# Patient Record
Sex: FEMALE | Race: WHITE | Employment: OTHER | URBAN - METROPOLITAN AREA
[De-identification: names, ages, dates, MRNs, and addresses within clinical notes are randomized per-mention and may not be internally consistent; named-entity substitution may affect disease eponyms.]

---

## 2017-01-10 ENCOUNTER — TELEPHONE (OUTPATIENT)
Dept: SURGERY | Age: 62
End: 2017-01-10

## 2017-01-10 DIAGNOSIS — Z85.3 PERSONAL HISTORY OF BREAST CANCER: Primary | ICD-10-CM

## 2017-02-09 ENCOUNTER — HOSPITAL ENCOUNTER (OUTPATIENT)
Dept: MAMMOGRAPHY | Age: 62
Discharge: HOME | End: 2017-02-09
Attending: SPECIALIST | Admitting: SPECIALIST

## 2017-02-09 ENCOUNTER — OFFICE VISIT (OUTPATIENT)
Dept: SURGERY | Age: 62
End: 2017-02-09

## 2017-02-09 VITALS
BODY MASS INDEX: 47.09 KG/M2 | HEIGHT: 66 IN | WEIGHT: 293 LBS | SYSTOLIC BLOOD PRESSURE: 118 MMHG | DIASTOLIC BLOOD PRESSURE: 82 MMHG

## 2017-02-09 DIAGNOSIS — Z85.3 PERSONAL HISTORY OF BREAST CANCER: ICD-10-CM

## 2017-02-09 DIAGNOSIS — Z85.3 PERSONAL HISTORY OF BREAST CANCER: Primary | ICD-10-CM

## 2017-02-09 PROCEDURE — 99213 OFFICE O/P EST LOW 20 MIN: CPT | Performed by: SPECIALIST

## 2017-02-09 ASSESSMENT — ENCOUNTER SYMPTOMS
VOMITING: 0
DIARRHEA: 0

## 2017-03-14 ENCOUNTER — EMPLOYEE WELLNESS (OUTPATIENT)
Dept: OTHER | Age: 62
End: 2017-03-14

## 2017-04-20 ENCOUNTER — OFFICE VISIT (OUTPATIENT)
Dept: URGENT CARE | Age: 62
End: 2017-04-20

## 2017-04-20 VITALS
WEIGHT: 293 LBS | DIASTOLIC BLOOD PRESSURE: 98 MMHG | TEMPERATURE: 97.6 F | RESPIRATION RATE: 16 BRPM | SYSTOLIC BLOOD PRESSURE: 146 MMHG | HEART RATE: 66 BPM | OXYGEN SATURATION: 92 % | BODY MASS INDEX: 47.09 KG/M2 | HEIGHT: 66 IN

## 2017-04-20 DIAGNOSIS — M25.562 ACUTE PAIN OF LEFT KNEE: Primary | ICD-10-CM

## 2017-04-20 PROCEDURE — 99203 OFFICE O/P NEW LOW 30 MIN: CPT | Performed by: EMERGENCY MEDICINE

## 2017-04-20 RX ORDER — NAPROXEN 500 MG/1
500 TABLET ORAL 2 TIMES DAILY PRN
Qty: 20 TABLET | Refills: 0 | Status: SHIPPED | OUTPATIENT
Start: 2017-04-20 | End: 2017-12-15

## 2017-04-20 RX ORDER — IBUPROFEN 200 MG
200 TABLET ORAL EVERY 6 HOURS PRN
COMMUNITY
End: 2017-12-15

## 2017-04-20 ASSESSMENT — ENCOUNTER SYMPTOMS: COLOR CHANGE: 0

## 2017-05-12 ENCOUNTER — OFFICE VISIT (OUTPATIENT)
Dept: PRIMARY CARE CLINIC | Age: 62
End: 2017-05-12

## 2017-05-12 VITALS
WEIGHT: 287 LBS | DIASTOLIC BLOOD PRESSURE: 98 MMHG | SYSTOLIC BLOOD PRESSURE: 140 MMHG | TEMPERATURE: 98.2 F | BODY MASS INDEX: 46.32 KG/M2

## 2017-05-12 DIAGNOSIS — A08.4 VIRAL GASTROENTERITIS: ICD-10-CM

## 2017-05-12 DIAGNOSIS — N39.41 URGE INCONTINENCE: ICD-10-CM

## 2017-05-12 DIAGNOSIS — I10 BENIGN ESSENTIAL HTN: Primary | ICD-10-CM

## 2017-05-12 PROCEDURE — 99214 OFFICE O/P EST MOD 30 MIN: CPT | Performed by: INTERNAL MEDICINE

## 2017-05-12 RX ORDER — HYDROCHLOROTHIAZIDE 25 MG/1
TABLET ORAL
Qty: 30 TABLET | Refills: 1 | Status: SHIPPED | OUTPATIENT
Start: 2017-05-12 | End: 2017-09-22

## 2017-05-12 RX ORDER — OXYBUTYNIN CHLORIDE 10 MG/1
10 TABLET, EXTENDED RELEASE ORAL DAILY
Qty: 30 TABLET | Refills: 1 | Status: SHIPPED | OUTPATIENT
Start: 2017-05-12 | End: 2017-06-22 | Stop reason: SDUPTHER

## 2017-05-26 ENCOUNTER — OFFICE VISIT (OUTPATIENT)
Dept: PRIMARY CARE CLINIC | Age: 62
End: 2017-05-26

## 2017-05-26 DIAGNOSIS — N39.41 URGE INCONTINENCE: ICD-10-CM

## 2017-05-26 DIAGNOSIS — I10 BENIGN ESSENTIAL HTN: Primary | ICD-10-CM

## 2017-05-26 PROCEDURE — 99214 OFFICE O/P EST MOD 30 MIN: CPT | Performed by: INTERNAL MEDICINE

## 2017-05-26 RX ORDER — HYDROCHLOROTHIAZIDE 50 MG/1
50 TABLET ORAL DAILY
Qty: 90 TABLET | Refills: 3
Start: 2017-05-26 | End: 2017-06-12 | Stop reason: SDUPTHER

## 2017-05-27 VITALS
BODY MASS INDEX: 46.32 KG/M2 | WEIGHT: 287 LBS | HEART RATE: 64 BPM | DIASTOLIC BLOOD PRESSURE: 90 MMHG | SYSTOLIC BLOOD PRESSURE: 140 MMHG

## 2017-06-12 ENCOUNTER — TELEPHONE (OUTPATIENT)
Dept: PRIMARY CARE CLINIC | Age: 62
End: 2017-06-12

## 2017-06-12 RX ORDER — HYDROCHLOROTHIAZIDE 50 MG/1
50 TABLET ORAL DAILY
Qty: 90 TABLET | Refills: 3 | Status: SHIPPED | OUTPATIENT
Start: 2017-06-12 | End: 2017-09-22 | Stop reason: SDUPTHER

## 2017-06-22 RX ORDER — OXYBUTYNIN CHLORIDE 10 MG/1
10 TABLET, EXTENDED RELEASE ORAL DAILY
Qty: 90 TABLET | Refills: 3 | Status: SHIPPED | OUTPATIENT
Start: 2017-06-22 | End: 2017-12-15

## 2017-08-24 ENCOUNTER — OFFICE VISIT (OUTPATIENT)
Dept: ORTHOPEDIC SURGERY | Age: 62
End: 2017-08-24

## 2017-08-24 VITALS
HEIGHT: 66 IN | HEART RATE: 63 BPM | DIASTOLIC BLOOD PRESSURE: 81 MMHG | BODY MASS INDEX: 45 KG/M2 | SYSTOLIC BLOOD PRESSURE: 132 MMHG | WEIGHT: 280 LBS

## 2017-08-24 DIAGNOSIS — M25.561 RIGHT KNEE PAIN, UNSPECIFIED CHRONICITY: Primary | ICD-10-CM

## 2017-08-24 DIAGNOSIS — M25.562 LEFT KNEE PAIN, UNSPECIFIED CHRONICITY: ICD-10-CM

## 2017-08-24 PROCEDURE — 73562 X-RAY EXAM OF KNEE 3: CPT | Performed by: ORTHOPAEDIC SURGERY

## 2017-08-24 PROCEDURE — 99203 OFFICE O/P NEW LOW 30 MIN: CPT | Performed by: ORTHOPAEDIC SURGERY

## 2017-08-24 PROCEDURE — 20610 DRAIN/INJ JOINT/BURSA W/O US: CPT | Performed by: ORTHOPAEDIC SURGERY

## 2017-08-24 RX ORDER — MELOXICAM 7.5 MG/1
7.5 TABLET ORAL DAILY
Qty: 30 TABLET | Refills: 3 | Status: SHIPPED | OUTPATIENT
Start: 2017-08-24 | End: 2017-12-15 | Stop reason: SDUPTHER

## 2017-08-25 ENCOUNTER — CLINICAL DOCUMENTATION (OUTPATIENT)
Dept: URGENT CARE | Age: 62
End: 2017-08-25

## 2017-08-25 LAB
ANION GAP SERPL CALCULATED.3IONS-SCNC: 16 MMOL/L (ref 3–16)
BUN BLDV-MCNC: 21 MG/DL (ref 7–20)
CALCIUM SERPL-MCNC: 10.5 MG/DL (ref 8.3–10.6)
CHLORIDE BLD-SCNC: 98 MMOL/L (ref 99–110)
CO2: 28 MMOL/L (ref 21–32)
CREAT SERPL-MCNC: 0.7 MG/DL (ref 0.6–1.2)
GFR AFRICAN AMERICAN: >60
GFR NON-AFRICAN AMERICAN: >60
GLUCOSE BLD-MCNC: 152 MG/DL (ref 70–99)
POTASSIUM SERPL-SCNC: 4.5 MMOL/L (ref 3.5–5.1)
SODIUM BLD-SCNC: 142 MMOL/L (ref 136–145)

## 2017-08-28 ENCOUNTER — TELEPHONE (OUTPATIENT)
Dept: PRIMARY CARE CLINIC | Age: 62
End: 2017-08-28

## 2017-08-28 DIAGNOSIS — R73.9 HYPERGLYCEMIA: Primary | ICD-10-CM

## 2017-08-31 ENCOUNTER — TELEPHONE (OUTPATIENT)
Dept: PRIMARY CARE CLINIC | Age: 62
End: 2017-08-31

## 2017-09-01 ENCOUNTER — HOSPITAL ENCOUNTER (OUTPATIENT)
Dept: PHYSICAL THERAPY | Age: 62
Discharge: HOME | End: 2017-09-30
Admitting: ORTHOPAEDIC SURGERY

## 2017-09-01 ENCOUNTER — HOSPITAL ENCOUNTER (OUTPATIENT)
Dept: PHYSICAL THERAPY | Age: 62
Discharge: HOME | End: 2017-08-31
Admitting: ORTHOPAEDIC SURGERY

## 2017-09-08 ENCOUNTER — HOSPITAL ENCOUNTER (OUTPATIENT)
Dept: PHYSICAL THERAPY | Age: 62
Discharge: HOME | End: 2017-09-08
Admitting: ORTHOPAEDIC SURGERY

## 2017-09-21 LAB
ANION GAP SERPL CALCULATED.3IONS-SCNC: 12 MMOL/L (ref 3–16)
BUN BLDV-MCNC: 19 MG/DL (ref 7–20)
CALCIUM SERPL-MCNC: 9.4 MG/DL (ref 8.3–10.6)
CHLORIDE BLD-SCNC: 101 MMOL/L (ref 99–110)
CO2: 28 MMOL/L (ref 21–32)
CREAT SERPL-MCNC: 0.8 MG/DL (ref 0.6–1.2)
GFR AFRICAN AMERICAN: >60
GFR NON-AFRICAN AMERICAN: >60
GLUCOSE BLD-MCNC: 102 MG/DL (ref 70–99)
POTASSIUM SERPL-SCNC: 4.1 MMOL/L (ref 3.5–5.1)
SODIUM BLD-SCNC: 141 MMOL/L (ref 136–145)

## 2017-09-22 ENCOUNTER — OFFICE VISIT (OUTPATIENT)
Dept: PRIMARY CARE CLINIC | Age: 62
End: 2017-09-22

## 2017-09-22 VITALS — DIASTOLIC BLOOD PRESSURE: 82 MMHG | SYSTOLIC BLOOD PRESSURE: 122 MMHG | WEIGHT: 288.6 LBS | BODY MASS INDEX: 46.58 KG/M2

## 2017-09-22 DIAGNOSIS — R73.03 PREDIABETES: Primary | ICD-10-CM

## 2017-09-22 DIAGNOSIS — E66.01 MORBID OBESITY WITH BMI OF 45.0-49.9, ADULT (HCC): ICD-10-CM

## 2017-09-22 DIAGNOSIS — Z23 NEED FOR INFLUENZA VACCINATION: ICD-10-CM

## 2017-09-22 LAB
ESTIMATED AVERAGE GLUCOSE: 131.2 MG/DL
HBA1C MFR BLD: 6.2 %

## 2017-09-22 PROCEDURE — 90686 IIV4 VACC NO PRSV 0.5 ML IM: CPT | Performed by: INTERNAL MEDICINE

## 2017-09-22 PROCEDURE — 99214 OFFICE O/P EST MOD 30 MIN: CPT | Performed by: INTERNAL MEDICINE

## 2017-09-22 PROCEDURE — 90471 IMMUNIZATION ADMIN: CPT | Performed by: INTERNAL MEDICINE

## 2017-09-22 RX ORDER — HYDROCHLOROTHIAZIDE 50 MG/1
50 TABLET ORAL DAILY
Qty: 90 TABLET | Refills: 3 | Status: SHIPPED | OUTPATIENT
Start: 2017-09-22 | End: 2017-12-15 | Stop reason: SDUPTHER

## 2017-09-22 RX ORDER — HYDROCHLOROTHIAZIDE 50 MG/1
50 TABLET ORAL DAILY
Qty: 30 TABLET | Refills: 3 | Status: SHIPPED | OUTPATIENT
Start: 2017-09-22 | End: 2017-09-22 | Stop reason: SDUPTHER

## 2017-09-22 RX ORDER — HYDROCHLOROTHIAZIDE 50 MG/1
50 TABLET ORAL DAILY
Qty: 90 TABLET | Refills: 3 | Status: SHIPPED | OUTPATIENT
Start: 2017-09-22 | End: 2017-09-22 | Stop reason: SDUPTHER

## 2017-09-29 ENCOUNTER — HOSPITAL ENCOUNTER (OUTPATIENT)
Dept: PHYSICAL THERAPY | Age: 62
Discharge: HOME | End: 2017-09-29
Admitting: ORTHOPAEDIC SURGERY

## 2017-09-29 NOTE — FLOWSHEET NOTE
Denies any soreness or pain after last session. Reports compliance with HEP without any c/o pain or discomfort. OBJECTIVE:       LEFS: 80/80 or 0% deficit    Flexibility L R Comment   Hamstring WFL WFL     Gastroc WFL WFL     ITB/Mary POS WFL     Quad/Ely POS 90 deg POS 90 deg                            ROM PROM AROM Overpressure Comment     L R L R L R     Flexion     118 123         Extension     +2 +2               Strength L R Comment   Quad 4+ 4+     Hamstring 4+ 4+     Gastroc 4+ 4+     Hip flexion 4 4     Hip abd 4 4     Hip add 4 4     Hip ext 4 4           Special Test Results/Comment   Meniscal Click pos   Crepitus pos        Palpation: no ttp     Functional Mobility/Transfers:                                          Squat: 3/4 ROM with good form                        SLB: 20\"      RESTRICTIONS/PRECAUTIONS:     Exercises/Interventions:   Exercise/Equipment Resistance/Repetitions Other comments   Stretching     Hamstring     Towel Pull     Inclined Calf     Hip Flexion     ITB 3x30\"    Groin     Quad 3x30\" Prone        SLR     Supine     Abduction     Adduction     Prone     SLR+ 5x45\"         Isometrics     Quad sets          Patellar Glides     Medial     Superior     Inferior          ROM     Sheet Pulls     Hang Weights     Passive     Active     Weight Shift     Ankle Pumps                    CKC        Wall sits     Step ups     1 leg stand     Squatting     CC TKE        Tandem Stance 3x30\" Airex   Non latex S/L   TB Bridges Black, x45 Non Latex   TB side stepping Blue, x5         PRE     Extension  RANGE: 90-30   Flexion  RANGE:        Quantum machines     SL   SL, 90/40   Leg curl          Manual interventions                 Therapeutic Exercise and NMR EXR  [x] (63864) Provided verbal/tactile cueing for activities related to strengthening, flexibility, endurance, ROM for improvements in LE, proximal hip, and core control with self care, mobility, lifting, ambulation.   [x] (45785) Provided (typically 30 minutes face-to-face)  [] EVAL (HIGH) 28581 (typically 45 minutes face-to-face)  [] RE-EVAL   [x] RY(27466) x  2   [] IONTO  [x] NMR (51870) x  1   [] VASO  [] Manual (98669) x       [] Other:  [x] TA x  1    [] Mech Traction (88117)  [] ES(attended) (06370)      [] ES (un) (63723):     GOALS:   Patient stated goal: No pain with walking in community     Therapist goals for Patient:   Short Term Goals: To be achieved in: 2 weeks  1. Independent in HEP and progression per patient tolerance, in order to prevent re-injury. MET  2. Patient will have a decrease in pain to facilitate improvement in movement, function, and ADL as indicated by Functional Deficits. MET     Long Term Goals: To be achieved in: 12 weeks  1. Disability index score of 10% or less for the LEFS to assist with reaching prior level of function. MET  2. Patient will demonstrate increased AROM to 115 to allow for proper joint functioning as indicated by patients Functional Deficits. MET  3. Patient will demonstrate an increase in Strength to good proximal hip strength and control in LE to allow for proper functional mobility as indicated by patients Functional Deficits. MET  4. Patient will return to negotiating stairs independently and reciprocally without increased symptoms or restriction. MET  5. Patient will balance SL for > 15\" without increased symptoms or restriction. MET    Progression Towards Functional goals:  [x] Patient is progressing as expected towards functional goals listed. [] Progression is slowed due to complexities listed. [] Progression has been slowed due to co-morbidities. [] Plan just implemented, too soon to assess goals progression  [] Other:     ASSESSMENT:  Pt tolerated session well, without pain and good carryover from last session. Pt has met all goals and displays a strong understanding of her exercises and progressions.   PT updated HEP and reviewed with pt in regard to progression and implementation; pt verbalized understanding with all questions answered. Skilled therapy is no longer indicated and pt is now discharged from care. Pt to continue independently with HEP and call PT with any questions or concerns. Treatment/Activity Tolerance:  [x] Patient tolerated treatment well [] Patient limited by fatique  [] Patient limited by pain  [] Patient limited by other medical complications  [] Other:     Patient education: Pt reviewed HEP and POC with pt; pt agreed with all questions answered. Pt to call PT with any questions    Prognosis: [x] Good [] Fair  [] Poor    Patient Requires Follow-up: [] Yes  [x] No    PLAN: See eval  [] Continue per plan of care [] Alter current plan (see comments)  [] Plan of care initiated [] Hold pending MD visit [x] Discharge    Electronically signed by: Aarti Heaton PT, DPT      Caesar Kothari. JUSTINE SandovalT, [id number]  Physical Therapist  [email or web address]

## 2017-10-09 RX ORDER — MONTELUKAST SODIUM 10 MG/1
TABLET ORAL
Qty: 90 TABLET | Refills: 1 | Status: SHIPPED | OUTPATIENT
Start: 2017-10-09 | End: 2017-12-15 | Stop reason: SDUPTHER

## 2017-12-14 ENCOUNTER — OFFICE VISIT (OUTPATIENT)
Dept: GYNECOLOGY | Age: 62
End: 2017-12-14

## 2017-12-14 VITALS
WEIGHT: 291 LBS | HEIGHT: 66 IN | SYSTOLIC BLOOD PRESSURE: 147 MMHG | RESPIRATION RATE: 17 BRPM | BODY MASS INDEX: 46.77 KG/M2 | DIASTOLIC BLOOD PRESSURE: 91 MMHG

## 2017-12-14 DIAGNOSIS — Z01.419 WELL WOMAN EXAM WITH ROUTINE GYNECOLOGICAL EXAM: Primary | ICD-10-CM

## 2017-12-14 DIAGNOSIS — Z78.0 MENOPAUSE: ICD-10-CM

## 2017-12-14 PROCEDURE — 99396 PREV VISIT EST AGE 40-64: CPT | Performed by: OBSTETRICS & GYNECOLOGY

## 2017-12-15 ENCOUNTER — OFFICE VISIT (OUTPATIENT)
Dept: PRIMARY CARE CLINIC | Age: 62
End: 2017-12-15

## 2017-12-15 ENCOUNTER — HOSPITAL ENCOUNTER (OUTPATIENT)
Dept: OTHER | Age: 62
Discharge: HOME | End: 2017-12-15
Attending: INTERNAL MEDICINE | Admitting: INTERNAL MEDICINE

## 2017-12-15 VITALS
WEIGHT: 291.4 LBS | SYSTOLIC BLOOD PRESSURE: 122 MMHG | HEIGHT: 66 IN | DIASTOLIC BLOOD PRESSURE: 74 MMHG | BODY MASS INDEX: 46.83 KG/M2

## 2017-12-15 DIAGNOSIS — M79.672 INTRACTABLE LEFT HEEL PAIN: ICD-10-CM

## 2017-12-15 DIAGNOSIS — Z00.00 ROUTINE PHYSICAL EXAMINATION: Primary | ICD-10-CM

## 2017-12-15 DIAGNOSIS — E66.01 MORBID OBESITY WITH BMI OF 45.0-49.9, ADULT (HCC): ICD-10-CM

## 2017-12-15 DIAGNOSIS — L20.84 INTRINSIC ECZEMA: ICD-10-CM

## 2017-12-15 DIAGNOSIS — I10 BENIGN ESSENTIAL HTN: ICD-10-CM

## 2017-12-15 DIAGNOSIS — E03.9 ACQUIRED HYPOTHYROIDISM: Chronic | ICD-10-CM

## 2017-12-15 LAB
A/G RATIO: 2 (ref 1.1–2.2)
ALBUMIN SERPL-MCNC: 4.7 G/DL (ref 3.4–5)
ALP BLD-CCNC: 56 U/L (ref 40–129)
ALT SERPL-CCNC: 32 U/L (ref 10–40)
ANION GAP SERPL CALCULATED.3IONS-SCNC: 13 MMOL/L (ref 3–16)
AST SERPL-CCNC: 25 U/L (ref 15–37)
BILIRUB SERPL-MCNC: 0.6 MG/DL (ref 0–1)
BUN BLDV-MCNC: 20 MG/DL (ref 7–20)
CALCIUM SERPL-MCNC: 9.8 MG/DL (ref 8.3–10.6)
CHLORIDE BLD-SCNC: 99 MMOL/L (ref 99–110)
CHOLESTEROL, TOTAL: 213 MG/DL (ref 0–199)
CO2: 31 MMOL/L (ref 21–32)
CREAT SERPL-MCNC: 0.9 MG/DL (ref 0.6–1.2)
CREATININE URINE: 74.7 MG/DL (ref 28–259)
GFR AFRICAN AMERICAN: >60
GFR NON-AFRICAN AMERICAN: >60
GLOBULIN: 2.4 G/DL
GLUCOSE BLD-MCNC: 126 MG/DL (ref 70–99)
HDLC SERPL-MCNC: 50 MG/DL (ref 40–60)
LDL CHOLESTEROL CALCULATED: 124 MG/DL
MICROALBUMIN UR-MCNC: <1.2 MG/DL
MICROALBUMIN/CREAT UR-RTO: NORMAL MG/G (ref 0–30)
POTASSIUM SERPL-SCNC: 3.9 MMOL/L (ref 3.5–5.1)
SODIUM BLD-SCNC: 143 MMOL/L (ref 136–145)
TOTAL PROTEIN: 7.1 G/DL (ref 6.4–8.2)
TRIGL SERPL-MCNC: 197 MG/DL (ref 0–150)
TSH SERPL DL<=0.05 MIU/L-ACNC: 4.16 UIU/ML (ref 0.27–4.2)
VLDLC SERPL CALC-MCNC: 39 MG/DL

## 2017-12-15 PROCEDURE — 99396 PREV VISIT EST AGE 40-64: CPT | Performed by: INTERNAL MEDICINE

## 2017-12-15 RX ORDER — MELOXICAM 7.5 MG/1
7.5 TABLET ORAL DAILY
Qty: 90 TABLET | Refills: 2 | Status: SHIPPED | OUTPATIENT
Start: 2017-12-15 | End: 2018-09-11 | Stop reason: SDUPTHER

## 2017-12-15 RX ORDER — LEVOTHYROXINE SODIUM 0.12 MG/1
TABLET ORAL
Qty: 90 TABLET | Refills: 3 | Status: SHIPPED | OUTPATIENT
Start: 2017-12-15 | End: 2018-03-23 | Stop reason: SDUPTHER

## 2017-12-15 RX ORDER — HYDROCHLOROTHIAZIDE 50 MG/1
50 TABLET ORAL DAILY
Qty: 90 TABLET | Refills: 2 | Status: SHIPPED | OUTPATIENT
Start: 2017-12-15 | End: 2018-03-23 | Stop reason: SDUPTHER

## 2017-12-15 RX ORDER — FLUTICASONE PROPIONATE 50 MCG
SPRAY, SUSPENSION (ML) NASAL
Qty: 3 BOTTLE | Refills: 2 | Status: SHIPPED | OUTPATIENT
Start: 2017-12-15 | End: 2018-09-11 | Stop reason: SDUPTHER

## 2017-12-15 RX ORDER — OMEPRAZOLE 40 MG/1
CAPSULE, DELAYED RELEASE ORAL
Qty: 45 CAPSULE | Refills: 2 | Status: SHIPPED | OUTPATIENT
Start: 2017-12-15 | End: 2018-03-23 | Stop reason: SDUPTHER

## 2017-12-15 RX ORDER — AZELASTINE 1 MG/ML
2 SPRAY, METERED NASAL 2 TIMES DAILY
Qty: 3 BOTTLE | Refills: 2 | Status: SHIPPED | OUTPATIENT
Start: 2017-12-15 | End: 2018-09-11 | Stop reason: SDUPTHER

## 2017-12-15 RX ORDER — CITALOPRAM 40 MG/1
TABLET ORAL
Qty: 90 TABLET | Refills: 2 | Status: SHIPPED | OUTPATIENT
Start: 2017-12-15 | End: 2018-03-23 | Stop reason: SDUPTHER

## 2017-12-15 RX ORDER — OXYBUTYNIN CHLORIDE 15 MG/1
15 TABLET, EXTENDED RELEASE ORAL DAILY
Qty: 90 TABLET | Refills: 2 | Status: SHIPPED | OUTPATIENT
Start: 2017-12-15 | End: 2018-06-22

## 2017-12-15 RX ORDER — MONTELUKAST SODIUM 10 MG/1
TABLET ORAL
Qty: 90 TABLET | Refills: 2 | Status: SHIPPED | OUTPATIENT
Start: 2017-12-15 | End: 2018-03-23 | Stop reason: SDUPTHER

## 2017-12-15 NOTE — PROGRESS NOTES
Vitamin D (CHOLECALCIFEROL) 1000 UNITS CAPS capsule Take 1,000 Units by mouth daily.  multivitamin (THERAGRAN) per tablet Take 1 tablet by mouth daily.          Past Medical History:   Diagnosis Date    Allergic     Allergic rhinitis     Aortic stenosis, mild-echo 2016    BMI 40.0-44.9, adult (HCC)     [facility] [address]     Cancer ([facility] [address]     right breast    Depression     GERD (gastroesophageal reflux disease)     H/O laparoscopic adjustable gastric banding 10/27/2016    Heart palpitations     HPV test positive 2014    Hypertension     Hypothyroidism 2000    Mild aortic regurgitation-echo 2016    Mild dysplasia of cervix 2016    Obesity     Sleep apnea     Urge incontinence 2017     Past Surgical History:   Procedure Laterality Date    APPENDECTOMY      BREAST BIOPSY      BREAST LUMPECTOMY      BREAST SURGERY  2009    right lumpectomy xrt and chemo and serm     SECTION  ,,,    failed to drop in birth canal     SECTION      CHOLECYSTECTOMY      COLONOSCOPY      HYSTERECTOMY      LAP BAND      LAP BAND      Removal    OVARY REMOVAL      PARTIAL HYSTERECTOMY      THYROIDECTOMY, PARTIAL Left     TUNNELED VENOUS PORT PLACEMENT      Placed/Removed    UPPER GASTROINTESTINAL ENDOSCOPY  6/10/16    gastritis, gastric polyp bx      Family History   Problem Relation Age of Onset    Cancer Mother      breast and cervical    Heart Disease Father     Cancer Maternal Grandmother      breast    Cancer Paternal Grandmother      breast    No Known Problems Sister     No Known Problems Brother     No Known Problems Maternal Aunt     No Known Problems Maternal Uncle     No Known Problems Paternal Aunt     No Known Problems Paternal Uncle     No Known Problems Maternal Grandfather     No Known Problems Paternal Grandfather     No Known Problems Other     Asthma Neg Hx     Diabetes Neg Hx  Emphysema Neg Hx     Heart Failure Neg Hx     Hypertension Neg Hx     Rheum Arthritis Neg Hx     Osteoarthritis Neg Hx     Breast Cancer Neg Hx     High Cholesterol Neg Hx     Migraines Neg Hx     Ovarian Cancer Neg Hx     Rashes/Skin Problems Neg Hx     Seizures Neg Hx     Stroke Neg Hx     Thyroid Disease Neg Hx      Social History     Social History    Marital status:      Spouse name: N/A    Number of children: N/A    Years of education: N/A     Occupational History    [employer]      Social History Main Topics    Smoking status: Former Smoker     Packs/day: 0.25     Years: 1.50     Types: Cigarettes     Quit date: 3/27/1972    Smokeless tobacco: Never Used    Alcohol use 0.0 oz/week      Comment: occasionally    Drug use: No    Sexual activity: Yes     Partners: Male     Other Topics Concern    Not on file     Social History Narrative            Spends time with , grandchildren        Lives in [facility]               Review of Systems:  A comprehensive review of systems was negative except for what was noted in the HPI. Physical Exam:   Vitals:    12/15/17 0859   BP: 122/74   Weight: 291 lb 6.4 oz (132.2 kg)   Height: 5' 6\" (1.676 m)     Body mass index is 47.03 kg/m². Constitutional: She is oriented to person, place, and time. She appears well-developed and well-nourished. No distress. HEENT:   Head: Normocephalic and atraumatic. Right Ear: Tympanic membrane, external ear and ear canal normal.   Left Ear: Tympanic membrane, external ear and ear canal normal.   Nose: Nose normal.   Mouth/Throat: Oropharynx is clear and moist, and mucous membranes are normal.  There is no cervical adenopathy. Eyes: Conjunctivae and extraocular motions are normal. Pupils are equal, round, and reactive to light. Neck: Neck supple. No JVD present. Carotid bruit is not present. No mass and no thyromegaly present.    Cardiovascular: Normal rate, regular rhythm, normal heart sounds

## 2017-12-19 LAB
HPV COMMENT: ABNORMAL
HPV TYPE 16: DETECTED
HPV TYPE 18: NOT DETECTED
HPVOH (OTHER TYPES): NOT DETECTED

## 2017-12-20 ASSESSMENT — ENCOUNTER SYMPTOMS
RESPIRATORY NEGATIVE: 1
GASTROINTESTINAL NEGATIVE: 1
EYES NEGATIVE: 1

## 2017-12-21 NOTE — PROGRESS NOTES
CS-Unspec      2 Term 1980     CS-Unspec      1 Term 1     CS-Unspec           Social History     Social History    Marital status:      Spouse name: N/A    Number of children: N/A    Years of education: N/A     Occupational History    [employer]      Social History Main Topics    Smoking status: Former Smoker     Packs/day: 0.25     Years: 1.50     Types: Cigarettes     Quit date: 3/27/1972    Smokeless tobacco: Never Used    Alcohol use 0.0 oz/week      Comment: occasionally    Drug use: No    Sexual activity: Yes     Partners: Male     Other Topics Concern    Not on file     Social History Narrative            Spends time with , grandchildren        Lives in condo             Allergies   Allergen Reactions    Latex Rash    Iodine Shortness Of Breath     No outpatient prescriptions have been marked as taking for the 12/14/17 encounter (Office Visit) with Nimco Rivero MD.     Family History   Problem Relation Age of Onset    Cancer Mother      breast and cervical    Heart Disease Father     Cancer Maternal Grandmother      breast    Cancer Paternal Grandmother      breast    No Known Problems Sister     No Known Problems Brother     No Known Problems Maternal Aunt     No Known Problems Maternal Uncle     No Known Problems Paternal Aunt     No Known Problems Paternal Uncle     No Known Problems Maternal Grandfather     No Known Problems Paternal Grandfather     No Known Problems Other     Asthma Neg Hx     Diabetes Neg Hx     Emphysema Neg Hx     Heart Failure Neg Hx     Hypertension Neg Hx     Rheum Arthritis Neg Hx     Osteoarthritis Neg Hx     Breast Cancer Neg Hx     High Cholesterol Neg Hx     Migraines Neg Hx     Ovarian Cancer Neg Hx     Rashes/Skin Problems Neg Hx     Seizures Neg Hx     Stroke Neg Hx     Thyroid Disease Neg Hx      BP (!) 147/91 (Site: Left Arm, Position: Sitting, Cuff Size: Medium Adult)   Resp 17   Ht 5' 6\" (1.676 m)   Wt 291 lb (132 kg)   Breastfeeding? No   BMI 46.97 kg/m²       Objective:   Physical Exam   Constitutional: She is oriented to person, place, and time. She appears well-developed and well-nourished. No distress. HENT:   Head: Normocephalic and atraumatic. Eyes: EOM are normal. Pupils are equal, round, and reactive to light. Neck: Normal range of motion. Neck supple. No thyromegaly present. Cardiovascular: Normal rate, regular rhythm and normal heart sounds. Exam reveals no gallop and no friction rub. No murmur heard. Pulmonary/Chest: Effort normal and breath sounds normal. No respiratory distress. She has no wheezes. She has no rales. Abdominal: Soft. Bowel sounds are normal. She exhibits no distension and no mass. There is no hepatomegaly. There is no tenderness. There is no rebound and no guarding. No hernia. Genitourinary: Rectum normal, vagina normal and uterus normal. Rectal exam shows no external hemorrhoid, no internal hemorrhoid, no fissure, no mass, no tenderness and guaiac negative stool. No breast swelling, tenderness, discharge or bleeding. There is no rash, tenderness, lesion or injury on the right labia. There is no rash, tenderness, lesion or injury on the left labia. Cervix exhibits no motion tenderness, no discharge and no friability. Right adnexum displays no mass, no tenderness and no fullness. Left adnexum displays no mass, no tenderness and no fullness. No erythema, tenderness or bleeding in the vagina. No foreign body in the vagina. No signs of injury around the vagina. No vaginal discharge found. Genitourinary Comments: Normal urethral meatus, nl urethra, nl bladder. Musculoskeletal: Normal range of motion. She exhibits no edema or tenderness. Lymphadenopathy:     She has no cervical adenopathy. Right: No inguinal adenopathy present. Left: No inguinal adenopathy present. Neurological: She is alert and oriented to person, place, and time.  She has normal reflexes. Skin: Skin is warm and dry. No rash noted. She is not diaphoretic. No erythema. Psychiatric: She has a normal mood and affect. Her behavior is normal. Judgment and thought content normal.       Assessment:      1. Annual  2. Menopause  3. Hx lgsil      Plan:      1. Pap, calcium, exercise, mammogram, hemocult negative  2. Stable  3.  pap

## 2018-01-05 ENCOUNTER — OFFICE VISIT (OUTPATIENT)
Dept: SLEEP MEDICINE | Age: 63
End: 2018-01-05

## 2018-01-05 VITALS
DIASTOLIC BLOOD PRESSURE: 66 MMHG | HEART RATE: 88 BPM | HEIGHT: 66 IN | BODY MASS INDEX: 46.28 KG/M2 | OXYGEN SATURATION: 95 % | SYSTOLIC BLOOD PRESSURE: 129 MMHG | WEIGHT: 288 LBS

## 2018-01-05 DIAGNOSIS — K21.9 CHRONIC GERD: Chronic | ICD-10-CM

## 2018-01-05 DIAGNOSIS — E03.9 ACQUIRED HYPOTHYROIDISM: Chronic | ICD-10-CM

## 2018-01-05 DIAGNOSIS — E66.01 MORBID OBESITY, UNSPECIFIED OBESITY TYPE (HCC): Chronic | ICD-10-CM

## 2018-01-05 DIAGNOSIS — G47.33 OBSTRUCTIVE SLEEP APNEA: Primary | Chronic | ICD-10-CM

## 2018-01-05 DIAGNOSIS — I10 BENIGN ESSENTIAL HTN: Chronic | ICD-10-CM

## 2018-01-05 PROCEDURE — 99214 OFFICE O/P EST MOD 30 MIN: CPT | Performed by: INTERNAL MEDICINE

## 2018-01-05 ASSESSMENT — ENCOUNTER SYMPTOMS
APNEA: 0
SHORTNESS OF BREATH: 0
RHINORRHEA: 0
COUGH: 0
CHOKING: 0

## 2018-01-05 ASSESSMENT — SLEEP AND FATIGUE QUESTIONNAIRES
HOW LIKELY ARE YOU TO NOD OFF OR FALL ASLEEP WHILE SITTING AND READING: 2
HOW LIKELY ARE YOU TO NOD OFF OR FALL ASLEEP WHEN YOU ARE A PASSENGER IN A CAR FOR AN HOUR WITHOUT A BREAK: 1
HOW LIKELY ARE YOU TO NOD OFF OR FALL ASLEEP WHILE SITTING INACTIVE IN A PUBLIC PLACE: 1
HOW LIKELY ARE YOU TO NOD OFF OR FALL ASLEEP WHILE WATCHING TV: 2
HOW LIKELY ARE YOU TO NOD OFF OR FALL ASLEEP WHILE SITTING AND TALKING TO SOMEONE: 0
ESS TOTAL SCORE: 9
HOW LIKELY ARE YOU TO NOD OFF OR FALL ASLEEP WHILE LYING DOWN TO REST IN THE AFTERNOON WHEN CIRCUMSTANCES PERMIT: 3
HOW LIKELY ARE YOU TO NOD OFF OR FALL ASLEEP IN A CAR, WHILE STOPPED FOR A FEW MINUTES IN TRAFFIC: 0
HOW LIKELY ARE YOU TO NOD OFF OR FALL ASLEEP WHILE SITTING QUIETLY AFTER LUNCH WITHOUT ALCOHOL: 0

## 2018-02-14 ENCOUNTER — TELEPHONE (OUTPATIENT)
Dept: BARIATRICS/WEIGHT MGMT | Age: 63
End: 2018-02-14

## 2018-02-14 NOTE — TELEPHONE ENCOUNTER
Called as a new pt courtesy call - spoke w patients  - he said he will let her know we called and if she needs help she can call us. Did receive paperwork - told patient to have new pt paperwork completely filled out, insurance card, and id and to arrive 45min prior to appt time. Also told not to wear any oil, lotion, or powder.   If they didn't have the paperwork filled out and arrive on time may be rescheduled

## 2018-02-27 ENCOUNTER — TELEPHONE (OUTPATIENT)
Dept: BARIATRICS/WEIGHT MGMT | Age: 63
End: 2018-02-27

## 2018-03-20 VITALS — WEIGHT: 188 LBS | BODY MASS INDEX: 30.34 KG/M2

## 2018-03-23 ENCOUNTER — OFFICE VISIT (OUTPATIENT)
Dept: PRIMARY CARE CLINIC | Age: 63
End: 2018-03-23

## 2018-03-23 VITALS
TEMPERATURE: 98.3 F | DIASTOLIC BLOOD PRESSURE: 80 MMHG | BODY MASS INDEX: 45.68 KG/M2 | WEIGHT: 283 LBS | SYSTOLIC BLOOD PRESSURE: 118 MMHG

## 2018-03-23 DIAGNOSIS — R35.89 POLYURIA: Primary | ICD-10-CM

## 2018-03-23 DIAGNOSIS — I10 BENIGN ESSENTIAL HTN: ICD-10-CM

## 2018-03-23 DIAGNOSIS — N39.41 URGE INCONTINENCE OF URINE: ICD-10-CM

## 2018-03-23 LAB
BILIRUBIN, POC: ABNORMAL
BLOOD URINE, POC: ABNORMAL
CLARITY, POC: ABNORMAL
COLOR, POC: ABNORMAL
GLUCOSE URINE, POC: ABNORMAL
KETONES, POC: ABNORMAL
LEUKOCYTE EST, POC: ABNORMAL
NITRITE, POC: ABNORMAL
PH, POC: 7
PROTEIN, POC: ABNORMAL
SPECIFIC GRAVITY, POC: 1.01
UROBILINOGEN, POC: 1

## 2018-03-23 PROCEDURE — 99214 OFFICE O/P EST MOD 30 MIN: CPT | Performed by: INTERNAL MEDICINE

## 2018-03-23 PROCEDURE — 81002 URINALYSIS NONAUTO W/O SCOPE: CPT | Performed by: INTERNAL MEDICINE

## 2018-03-23 RX ORDER — CITALOPRAM 40 MG/1
TABLET ORAL
Qty: 90 TABLET | Refills: 2 | Status: SHIPPED | OUTPATIENT
Start: 2018-03-23 | End: 2018-09-11 | Stop reason: SDUPTHER

## 2018-03-23 RX ORDER — OMEPRAZOLE 40 MG/1
CAPSULE, DELAYED RELEASE ORAL
Qty: 45 CAPSULE | Refills: 2 | Status: SHIPPED | OUTPATIENT
Start: 2018-03-23 | End: 2018-09-11 | Stop reason: SDUPTHER

## 2018-03-23 RX ORDER — MONTELUKAST SODIUM 10 MG/1
TABLET ORAL
Qty: 90 TABLET | Refills: 2 | Status: SHIPPED | OUTPATIENT
Start: 2018-03-23 | End: 2018-09-11 | Stop reason: SDUPTHER

## 2018-03-23 RX ORDER — HYDROCHLOROTHIAZIDE 50 MG/1
50 TABLET ORAL DAILY
Qty: 90 TABLET | Refills: 2 | Status: SHIPPED | OUTPATIENT
Start: 2018-03-23 | End: 2018-09-11 | Stop reason: SDUPTHER

## 2018-03-23 RX ORDER — LEVOTHYROXINE SODIUM 0.12 MG/1
TABLET ORAL
Qty: 90 TABLET | Refills: 3 | Status: SHIPPED | OUTPATIENT
Start: 2018-03-23 | End: 2018-09-11 | Stop reason: SDUPTHER

## 2018-03-23 NOTE — PROGRESS NOTES
Juarez Armendariz is a 60 y.o. female presenting today with c/o    Worsening urinary incontinence  Went through pad on chair yesterday at work  Ditropan at max dose did not help  Saw gyn  Trying Kegel's  Can laugh or sneeze with leakage of urine  Does not strain to urinate  Avoids caffeine and alcohol  Awakens and has to urinate over night  Using bathroom hourly at least during the day  Has had problem for several years, but now worsening    HYPERTENSION. Also here for f/u HTN. She indicates that she is feeling well and denies any symptoms referable to her elevated blood pressure. Specifically denies chest pain, palpitations, dyspnea, orthopnea, PND or peripheral edema. No anorexia, arthralgia, or leg cramps noted. Current medication regimen is as listed below. She denies any side effects of medication, and has been taking it regularly.  Patient does not exercise regularly  Needs refills medication    BMI 39  Joined [email or web address] online  Has lost 8#    Review of Systems - comprehensive review of systems negative except as noted in HPI    Allergies   Allergen Reactions    Latex Rash    Iodine Shortness Of Breath       Past Medical History:   Diagnosis Date    Allergic     Allergic rhinitis     Aortic stenosis, mild-echo 2016    BMI 40.0-44.9, adult ([address]     [facility] ([facility] [address]     Cancer ([facility] [address]     right breast    Depression     GERD (gastroesophageal reflux disease)     H/O laparoscopic adjustable gastric banding 10/27/2016    Heart palpitations     HPV test positive 2014    Hypertension     Hypothyroidism 2000    Mild aortic regurgitation-echo 2016    Mild dysplasia of cervix 2016    Obesity     Sleep apnea     Urge incontinence 2017       Past Surgical History:   Procedure Laterality Date    APPENDECTOMY      BREAST BIOPSY      BREAST LUMPECTOMY      BREAST SURGERY  2009    right lumpectomy xrt and chemo and serm     SECTION

## 2018-04-17 ENCOUNTER — EMPLOYEE WELLNESS (OUTPATIENT)
Dept: OTHER | Age: 63
End: 2018-04-17

## 2018-04-17 LAB
CHOLESTEROL, TOTAL: 197 MG/DL (ref 0–199)
GLUCOSE BLD-MCNC: 117 MG/DL (ref 70–99)
HDLC SERPL-MCNC: 44 MG/DL (ref 40–60)
LDL CHOLESTEROL CALCULATED: 124 MG/DL
TRIGL SERPL-MCNC: 144 MG/DL (ref 0–150)

## 2018-04-23 VITALS — BODY MASS INDEX: 44.55 KG/M2 | WEIGHT: 276 LBS

## 2018-06-11 ENCOUNTER — OFFICE VISIT (OUTPATIENT)
Dept: GYNECOLOGY | Age: 63
End: 2018-06-11

## 2018-06-11 VITALS
TEMPERATURE: 97.3 F | HEART RATE: 59 BPM | BODY MASS INDEX: 44.52 KG/M2 | SYSTOLIC BLOOD PRESSURE: 131 MMHG | WEIGHT: 277 LBS | DIASTOLIC BLOOD PRESSURE: 78 MMHG | RESPIRATION RATE: 17 BRPM | HEIGHT: 66 IN

## 2018-06-11 DIAGNOSIS — R87.811 VAGINAL HIGH RISK HPV DNA TEST POSITIVE: Primary | ICD-10-CM

## 2018-06-11 DIAGNOSIS — Z12.31 SCREENING MAMMOGRAM, ENCOUNTER FOR: ICD-10-CM

## 2018-06-11 PROCEDURE — 99213 OFFICE O/P EST LOW 20 MIN: CPT | Performed by: OBSTETRICS & GYNECOLOGY

## 2018-06-14 LAB
ANION GAP SERPL CALCULATED.3IONS-SCNC: 10 MMOL/L (ref 3–16)
BUN BLDV-MCNC: 21 MG/DL (ref 7–20)
CALCIUM SERPL-MCNC: 9.7 MG/DL (ref 8.3–10.6)
CHLORIDE BLD-SCNC: 100 MMOL/L (ref 99–110)
CO2: 31 MMOL/L (ref 21–32)
CREAT SERPL-MCNC: 0.9 MG/DL (ref 0.6–1.2)
ESTIMATED AVERAGE GLUCOSE: 131.2 MG/DL
GFR AFRICAN AMERICAN: >60
GFR NON-AFRICAN AMERICAN: >60
GLUCOSE BLD-MCNC: 100 MG/DL (ref 70–99)
HBA1C MFR BLD: 6.2 %
HPV COMMENT: ABNORMAL
HPV TYPE 16: DETECTED
HPV TYPE 18: NOT DETECTED
HPVOH (OTHER TYPES): NOT DETECTED
POTASSIUM SERPL-SCNC: 3.9 MMOL/L (ref 3.5–5.1)
SODIUM BLD-SCNC: 141 MMOL/L (ref 136–145)

## 2018-06-22 ENCOUNTER — OFFICE VISIT (OUTPATIENT)
Dept: PRIMARY CARE CLINIC | Age: 63
End: 2018-06-22

## 2018-06-22 VITALS
RESPIRATION RATE: 17 BRPM | WEIGHT: 270.8 LBS | DIASTOLIC BLOOD PRESSURE: 82 MMHG | SYSTOLIC BLOOD PRESSURE: 119 MMHG | HEIGHT: 66 IN | BODY MASS INDEX: 43.52 KG/M2

## 2018-06-22 DIAGNOSIS — I10 BENIGN ESSENTIAL HTN: Primary | ICD-10-CM

## 2018-06-22 DIAGNOSIS — E66.01 SEVERE OBESITY (BMI >= 40) (HCC): ICD-10-CM

## 2018-06-22 DIAGNOSIS — L98.9 SKIN LESION: ICD-10-CM

## 2018-06-22 PROCEDURE — 99214 OFFICE O/P EST MOD 30 MIN: CPT | Performed by: INTERNAL MEDICINE

## 2018-06-22 RX ORDER — TRIAMCINOLONE ACETONIDE 1 MG/ML
LOTION TOPICAL
Qty: 60 ML | Refills: 3 | Status: ON HOLD | OUTPATIENT
Start: 2018-06-22 | End: 2019-07-12 | Stop reason: HOSPADM

## 2018-07-24 NOTE — TELEPHONE ENCOUNTER
From: Dania Roberts  Sent: 7/24/2018 8:31 AM EDT  Subject: Medication Renewal Request    Dania Roberts would like a refill of the following medications:     Mirabegron ER (MYRBETRIQ) 50 MG TB24 [SELAM CLEVELAND MD]   Patient Comment: I tried to get a refill for this medication when I was out of town last week but when the [facility] in [facility] tried it was declined so now I need a refill. [facility] Thank You.     Preferred pharmacy: [address], [address] [facility] [phone] - F [phone]

## 2018-09-11 RX ORDER — FLUTICASONE PROPIONATE 50 MCG
SPRAY, SUSPENSION (ML) NASAL
Qty: 3 BOTTLE | Refills: 2 | Status: SHIPPED | OUTPATIENT
Start: 2018-09-11 | End: 2018-11-15 | Stop reason: SDUPTHER

## 2018-09-11 RX ORDER — OMEPRAZOLE 40 MG/1
CAPSULE, DELAYED RELEASE ORAL
Qty: 45 CAPSULE | Refills: 2 | Status: SHIPPED | OUTPATIENT
Start: 2018-09-11 | End: 2018-09-13 | Stop reason: SDUPTHER

## 2018-09-11 RX ORDER — HYDROCHLOROTHIAZIDE 50 MG/1
50 TABLET ORAL DAILY
Qty: 90 TABLET | Refills: 2 | Status: SHIPPED | OUTPATIENT
Start: 2018-09-11 | End: 2018-11-15 | Stop reason: SDUPTHER

## 2018-09-11 RX ORDER — LEVOTHYROXINE SODIUM 0.12 MG/1
TABLET ORAL
Qty: 90 TABLET | Refills: 3 | Status: SHIPPED | OUTPATIENT
Start: 2018-09-11 | End: 2018-11-16 | Stop reason: SDUPTHER

## 2018-09-11 RX ORDER — CITALOPRAM 40 MG/1
TABLET ORAL
Qty: 90 TABLET | Refills: 2 | Status: SHIPPED | OUTPATIENT
Start: 2018-09-11 | End: 2018-11-15 | Stop reason: SDUPTHER

## 2018-09-11 RX ORDER — MELOXICAM 7.5 MG/1
7.5 TABLET ORAL DAILY
Qty: 90 TABLET | Refills: 2 | Status: SHIPPED | OUTPATIENT
Start: 2018-09-11 | End: 2018-11-15 | Stop reason: SDUPTHER

## 2018-09-11 RX ORDER — MONTELUKAST SODIUM 10 MG/1
TABLET ORAL
Qty: 90 TABLET | Refills: 2 | Status: SHIPPED | OUTPATIENT
Start: 2018-09-11 | End: 2018-11-15 | Stop reason: SDUPTHER

## 2018-09-11 RX ORDER — AZELASTINE 1 MG/ML
2 SPRAY, METERED NASAL 2 TIMES DAILY
Qty: 3 BOTTLE | Refills: 2 | Status: SHIPPED | OUTPATIENT
Start: 2018-09-11 | End: 2018-11-15 | Stop reason: SDUPTHER

## 2018-09-11 NOTE — TELEPHONE ENCOUNTER
From: Panfilo Vitale  Sent: 9/11/2018 7:04 AM EDT  Subject: Medication Renewal Request    Panfilo Vitale would like a refill of the following medications:     meloxicam (MOBIC) 7.5 MG tablet [SELAM CLEVELAND MD]     azelastine (ASTELIN) 0.1 % nasal spray [SELAM CLEVELAND MD]     fluticasone (FLONASE) 50 MCG/ACT nasal spray [SELAM CLEVELAND MD]     montelukast (SINGULAIR) 10 MG tablet [SELAM CLEVELAND MD]     hydrochlorothiazide (HYDRODIURIL) 50 MG tablet [SELAM CLEVELAND MD]     citalopram (CELEXA) 40 MG tablet [SELAM CLEVELAND MD]     omeprazole (PRILOSEC) 40 MG delayed release capsule [SELAM CLEVELAND MD]     levothyroxine (SYNTHROID) 125 MCG tablet [SELAM Charles MD]     Mirabegron ER (MYRBETRIQ) 50 MG TB24 [SELAM Charles MD]    Preferred pharmacy: [address] [facility] [address], [address] [facility] [phone] - F [phone]

## 2018-09-12 ENCOUNTER — TELEPHONE (OUTPATIENT)
Dept: PRIMARY CARE CLINIC | Age: 63
End: 2018-09-12

## 2018-09-13 RX ORDER — OMEPRAZOLE 40 MG/1
CAPSULE, DELAYED RELEASE ORAL
Qty: 90 CAPSULE | Refills: 2 | Status: SHIPPED | OUTPATIENT
Start: 2018-09-13 | End: 2018-09-19 | Stop reason: SDUPTHER

## 2018-09-19 ENCOUNTER — TELEPHONE (OUTPATIENT)
Dept: PRIMARY CARE CLINIC | Age: 63
End: 2018-09-19

## 2018-09-19 RX ORDER — OMEPRAZOLE 40 MG/1
CAPSULE, DELAYED RELEASE ORAL
Qty: 90 CAPSULE | Refills: 3 | Status: SHIPPED | OUTPATIENT
Start: 2018-09-19 | End: 2018-11-15 | Stop reason: SDUPTHER

## 2018-10-03 ENCOUNTER — OFFICE VISIT (OUTPATIENT)
Dept: PRIMARY CARE CLINIC | Age: 63
End: 2018-10-03
Payer: COMMERCIAL

## 2018-10-03 VITALS
WEIGHT: 272.8 LBS | HEART RATE: 69 BPM | OXYGEN SATURATION: 96 % | BODY MASS INDEX: 43.84 KG/M2 | DIASTOLIC BLOOD PRESSURE: 82 MMHG | RESPIRATION RATE: 16 BRPM | HEIGHT: 66 IN | TEMPERATURE: 97.5 F | SYSTOLIC BLOOD PRESSURE: 136 MMHG

## 2018-10-03 DIAGNOSIS — K11.20 PAROTITIS: Primary | ICD-10-CM

## 2018-10-03 PROCEDURE — 99214 OFFICE O/P EST MOD 30 MIN: CPT | Performed by: EMERGENCY MEDICINE

## 2018-10-03 RX ORDER — CETIRIZINE HYDROCHLORIDE, PSEUDOEPHEDRINE HYDROCHLORIDE 5; 120 MG/1; MG/1
1 TABLET, FILM COATED, EXTENDED RELEASE ORAL 2 TIMES DAILY
Status: ON HOLD | COMMUNITY
End: 2019-07-12 | Stop reason: HOSPADM

## 2018-10-03 RX ORDER — AMOXICILLIN 500 MG/1
500 CAPSULE ORAL 3 TIMES DAILY
Qty: 30 CAPSULE | Refills: 0 | Status: SHIPPED | OUTPATIENT
Start: 2018-10-03 | End: 2018-10-13

## 2018-10-03 ASSESSMENT — ENCOUNTER SYMPTOMS
VOMITING: 0
EYES NEGATIVE: 1
FACIAL SWELLING: 1
SORE THROAT: 0
ABDOMINAL PAIN: 0
NAUSEA: 1
COUGH: 0
TROUBLE SWALLOWING: 0
RHINORRHEA: 0
SHORTNESS OF BREATH: 0

## 2018-10-13 ENCOUNTER — HOSPITAL ENCOUNTER (OUTPATIENT)
Dept: MAMMOGRAPHY | Age: 63
Discharge: HOME | End: 2018-10-13
Payer: COMMERCIAL

## 2018-10-13 DIAGNOSIS — Z12.39 SCREENING BREAST EXAMINATION: ICD-10-CM

## 2018-10-13 PROCEDURE — 77067 SCR MAMMO BI INCL CAD: CPT

## 2018-11-15 ENCOUNTER — OFFICE VISIT (OUTPATIENT)
Dept: PRIMARY CARE CLINIC | Age: 63
End: 2018-11-15
Payer: COMMERCIAL

## 2018-11-15 VITALS — SYSTOLIC BLOOD PRESSURE: 120 MMHG | BODY MASS INDEX: 45.84 KG/M2 | DIASTOLIC BLOOD PRESSURE: 70 MMHG | WEIGHT: 284 LBS

## 2018-11-15 DIAGNOSIS — I10 BENIGN ESSENTIAL HTN: Primary | ICD-10-CM

## 2018-11-15 DIAGNOSIS — E03.9 ACQUIRED HYPOTHYROIDISM: Chronic | ICD-10-CM

## 2018-11-15 DIAGNOSIS — E66.01 SEVERE OBESITY (BMI >= 40) (HCC): ICD-10-CM

## 2018-11-15 DIAGNOSIS — Z23 NEED FOR SHINGLES VACCINE: ICD-10-CM

## 2018-11-15 LAB
CREATININE URINE: 70.1 MG/DL (ref 28–259)
MICROALBUMIN UR-MCNC: <1.2 MG/DL
MICROALBUMIN/CREAT UR-RTO: NORMAL MG/G (ref 0–30)
TSH SERPL DL<=0.05 MIU/L-ACNC: 3.52 UIU/ML (ref 0.27–4.2)

## 2018-11-15 PROCEDURE — 90471 IMMUNIZATION ADMIN: CPT | Performed by: INTERNAL MEDICINE

## 2018-11-15 PROCEDURE — 90750 HZV VACC RECOMBINANT IM: CPT | Performed by: INTERNAL MEDICINE

## 2018-11-15 PROCEDURE — 99214 OFFICE O/P EST MOD 30 MIN: CPT | Performed by: INTERNAL MEDICINE

## 2018-11-15 RX ORDER — FLUTICASONE PROPIONATE 50 MCG
SPRAY, SUSPENSION (ML) NASAL
Qty: 3 BOTTLE | Refills: 2 | Status: SHIPPED | OUTPATIENT
Start: 2018-11-15 | End: 2019-12-05 | Stop reason: SDUPTHER

## 2018-11-15 RX ORDER — HYDROCHLOROTHIAZIDE 50 MG/1
50 TABLET ORAL DAILY
Qty: 90 TABLET | Refills: 2 | Status: ON HOLD | OUTPATIENT
Start: 2018-11-15 | End: 2019-07-12 | Stop reason: HOSPADM

## 2018-11-15 RX ORDER — CITALOPRAM 40 MG/1
TABLET ORAL
Qty: 90 TABLET | Refills: 2 | Status: SHIPPED | OUTPATIENT
Start: 2018-11-15 | End: 2019-11-11 | Stop reason: SDUPTHER

## 2018-11-15 RX ORDER — OMEPRAZOLE 40 MG/1
CAPSULE, DELAYED RELEASE ORAL
Qty: 90 CAPSULE | Refills: 3 | Status: SHIPPED | OUTPATIENT
Start: 2018-11-15 | End: 2019-07-29

## 2018-11-15 RX ORDER — MONTELUKAST SODIUM 10 MG/1
TABLET ORAL
Qty: 90 TABLET | Refills: 2 | Status: SHIPPED | OUTPATIENT
Start: 2018-11-15 | End: 2020-01-08 | Stop reason: SDUPTHER

## 2018-11-15 RX ORDER — AZELASTINE 1 MG/ML
2 SPRAY, METERED NASAL 2 TIMES DAILY
Qty: 3 BOTTLE | Refills: 2 | Status: ON HOLD | OUTPATIENT
Start: 2018-11-15 | End: 2019-07-12 | Stop reason: HOSPADM

## 2018-11-15 RX ORDER — MELOXICAM 7.5 MG/1
7.5 TABLET ORAL DAILY
Qty: 90 TABLET | Refills: 2 | Status: SHIPPED | OUTPATIENT
Start: 2018-11-15 | End: 2020-01-28 | Stop reason: ALTCHOICE

## 2018-11-15 ASSESSMENT — PATIENT HEALTH QUESTIONNAIRE - PHQ9
SUM OF ALL RESPONSES TO PHQ9 QUESTIONS 1 & 2: 0
SUM OF ALL RESPONSES TO PHQ QUESTIONS 1-9: 0
2. FEELING DOWN, DEPRESSED OR HOPELESS: 0
1. LITTLE INTEREST OR PLEASURE IN DOING THINGS: 0
SUM OF ALL RESPONSES TO PHQ QUESTIONS 1-9: 0

## 2018-11-15 NOTE — PROGRESS NOTES
Vj Ceja is a 58 y.o. female presenting today with c/o    HYPERTENSION. here for f/u HTN. She indicates that she is feeling well and denies any symptoms referable to her elevated blood pressure. Specifically denies chest pain, palpitations, dyspnea, orthopnea, PND or peripheral edema. No anorexia, arthralgia, or leg cramps noted. Current medication regimen is as listed below. She denies any side effects of medication, and has been taking it regularly. Patient does not exercise regularly    BMI 45  Has gained 14 # of weight she lost  No longer doing weight watchers  No exercise currently  Walking dog some    Thyroid: Patient presents for f/u  of hypothyroidism. Current symptoms include none. Patient  denies fatigue, weight changes, heat/cold intolerance, bowel/skin changes or CVS symptoms. Patient taking synthroid as prescribed.   Review of Systems - comprehensive review of systems negative except as noted in HPI    Allergies   Allergen Reactions    Latex Rash    Iodine Shortness Of Breath       Past Medical History:   Diagnosis Date    Allergic     Allergic rhinitis     Aortic stenosis, mild-echo 2016    BMI 40.0-44.9, adult ([address]     [facility] ([facility] [address]     Cancer ([facility] [address]     right breast    Depression     GERD (gastroesophageal reflux disease)     H/O laparoscopic adjustable gastric banding 10/27/2016    Heart palpitations     HPV test positive 2014    Hypertension     Hypothyroidism 2000    Mild aortic regurgitation-echo 2016    Mild dysplasia of cervix 2016    Obesity     Sleep apnea     Urge incontinence 2017       Past Surgical History:   Procedure Laterality Date    APPENDECTOMY      BREAST BIOPSY      BREAST LUMPECTOMY      BREAST SURGERY  2009    right lumpectomy xrt and chemo and serm     SECTION  ,,,    failed to drop in birth canal     SECTION      CHOLECYSTECTOMY      COLONOSCOPY     

## 2018-11-16 RX ORDER — LEVOTHYROXINE SODIUM 0.12 MG/1
TABLET ORAL
Qty: 90 TABLET | Refills: 3 | Status: SHIPPED | OUTPATIENT
Start: 2018-11-16 | End: 2020-01-29 | Stop reason: SDUPTHER

## 2018-12-17 ENCOUNTER — OFFICE VISIT (OUTPATIENT)
Dept: GYNECOLOGY | Age: 63
End: 2018-12-17
Payer: COMMERCIAL

## 2018-12-17 VITALS
HEART RATE: 80 BPM | HEIGHT: 67 IN | BODY MASS INDEX: 43.07 KG/M2 | WEIGHT: 274.38 LBS | SYSTOLIC BLOOD PRESSURE: 98 MMHG | DIASTOLIC BLOOD PRESSURE: 66 MMHG | RESPIRATION RATE: 17 BRPM

## 2018-12-17 DIAGNOSIS — Z85.3 HISTORY OF BREAST CANCER IN FEMALE: ICD-10-CM

## 2018-12-17 DIAGNOSIS — Z78.0 MENOPAUSE: ICD-10-CM

## 2018-12-17 DIAGNOSIS — R87.811 VAGINAL HIGH RISK HUMAN PAPILLOMAVIRUS (HPV) DNA TEST POSITIVE: ICD-10-CM

## 2018-12-17 DIAGNOSIS — Z01.419 WELL WOMAN EXAM WITH ROUTINE GYNECOLOGICAL EXAM: Primary | ICD-10-CM

## 2018-12-17 PROCEDURE — 99396 PREV VISIT EST AGE 40-64: CPT | Performed by: OBSTETRICS & GYNECOLOGY

## 2018-12-18 ASSESSMENT — ENCOUNTER SYMPTOMS
GASTROINTESTINAL NEGATIVE: 1
EYES NEGATIVE: 1
RESPIRATORY NEGATIVE: 1

## 2018-12-19 LAB
HPV COMMENT: NORMAL
HPV TYPE 16: NOT DETECTED
HPV TYPE 18: NOT DETECTED
HPVOH (OTHER TYPES): NOT DETECTED

## 2019-01-11 ENCOUNTER — OFFICE VISIT (OUTPATIENT)
Dept: PULMONOLOGY | Age: 64
End: 2019-01-11
Payer: COMMERCIAL

## 2019-01-11 VITALS
HEIGHT: 67 IN | BODY MASS INDEX: 43 KG/M2 | OXYGEN SATURATION: 98 % | HEART RATE: 79 BPM | DIASTOLIC BLOOD PRESSURE: 78 MMHG | SYSTOLIC BLOOD PRESSURE: 138 MMHG | WEIGHT: 274 LBS

## 2019-01-11 DIAGNOSIS — I10 ESSENTIAL HYPERTENSION: ICD-10-CM

## 2019-01-11 DIAGNOSIS — G47.33 OBSTRUCTIVE SLEEP APNEA SYNDROME: Primary | Chronic | ICD-10-CM

## 2019-01-11 DIAGNOSIS — E66.01 SEVERE OBESITY (BMI >= 40) (HCC): ICD-10-CM

## 2019-01-11 DIAGNOSIS — J30.9 ALLERGIC RHINITIS, UNSPECIFIED SEASONALITY, UNSPECIFIED TRIGGER: Chronic | ICD-10-CM

## 2019-01-11 DIAGNOSIS — K21.9 CHRONIC GERD: ICD-10-CM

## 2019-01-11 PROCEDURE — 99214 OFFICE O/P EST MOD 30 MIN: CPT | Performed by: NURSE PRACTITIONER

## 2019-01-11 ASSESSMENT — SLEEP AND FATIGUE QUESTIONNAIRES
HOW LIKELY ARE YOU TO NOD OFF OR FALL ASLEEP WHILE LYING DOWN TO REST IN THE AFTERNOON WHEN CIRCUMSTANCES PERMIT: 3
HOW LIKELY ARE YOU TO NOD OFF OR FALL ASLEEP IN A CAR, WHILE STOPPED FOR A FEW MINUTES IN TRAFFIC: 0
ESS TOTAL SCORE: 8
HOW LIKELY ARE YOU TO NOD OFF OR FALL ASLEEP WHILE SITTING QUIETLY AFTER LUNCH WITHOUT ALCOHOL: 1
HOW LIKELY ARE YOU TO NOD OFF OR FALL ASLEEP WHILE WATCHING TV: 1
HOW LIKELY ARE YOU TO NOD OFF OR FALL ASLEEP WHILE SITTING AND TALKING TO SOMEONE: 0
HOW LIKELY ARE YOU TO NOD OFF OR FALL ASLEEP WHEN YOU ARE A PASSENGER IN A CAR FOR AN HOUR WITHOUT A BREAK: 1
HOW LIKELY ARE YOU TO NOD OFF OR FALL ASLEEP WHILE SITTING INACTIVE IN A PUBLIC PLACE: 1
HOW LIKELY ARE YOU TO NOD OFF OR FALL ASLEEP WHILE SITTING AND READING: 1

## 2019-01-11 ASSESSMENT — ENCOUNTER SYMPTOMS
ABDOMINAL DISTENTION: 0
COUGH: 0
SINUS PRESSURE: 0
SHORTNESS OF BREATH: 0
APNEA: 0
ABDOMINAL PAIN: 0
EYE PAIN: 0
EYE REDNESS: 0
RHINORRHEA: 0

## 2019-01-22 ENCOUNTER — OFFICE VISIT (OUTPATIENT)
Dept: PRIMARY CARE CLINIC | Age: 64
End: 2019-01-22
Payer: COMMERCIAL

## 2019-01-22 VITALS
HEART RATE: 84 BPM | TEMPERATURE: 97.7 F | RESPIRATION RATE: 14 BRPM | SYSTOLIC BLOOD PRESSURE: 136 MMHG | DIASTOLIC BLOOD PRESSURE: 83 MMHG

## 2019-01-22 DIAGNOSIS — Z23 NEED FOR SHINGLES VACCINE: Primary | ICD-10-CM

## 2019-01-22 PROCEDURE — 99212 OFFICE O/P EST SF 10 MIN: CPT | Performed by: PHYSICIAN ASSISTANT

## 2019-03-04 ENCOUNTER — OFFICE VISIT (OUTPATIENT)
Dept: DERMATOLOGY | Age: 64
End: 2019-03-04
Payer: COMMERCIAL

## 2019-03-04 DIAGNOSIS — L85.3 XEROSIS CUTIS: ICD-10-CM

## 2019-03-04 DIAGNOSIS — L30.0 NUMMULAR ECZEMA: ICD-10-CM

## 2019-03-04 DIAGNOSIS — Z87.891 FORMER SMOKER: ICD-10-CM

## 2019-03-04 DIAGNOSIS — L82.1 SEBORRHEIC KERATOSES: ICD-10-CM

## 2019-03-04 DIAGNOSIS — L91.8 SKIN TAGS, MULTIPLE ACQUIRED: ICD-10-CM

## 2019-03-04 DIAGNOSIS — D48.9 NEOPLASM OF UNCERTAIN BEHAVIOR: Primary | ICD-10-CM

## 2019-03-04 DIAGNOSIS — D22.9 MULTIPLE BENIGN MELANOCYTIC NEVI: ICD-10-CM

## 2019-03-04 PROCEDURE — 11102 TANGNTL BX SKIN SINGLE LES: CPT | Performed by: DERMATOLOGY

## 2019-03-04 PROCEDURE — 99243 OFF/OP CNSLTJ NEW/EST LOW 30: CPT | Performed by: DERMATOLOGY

## 2019-03-07 LAB — DERMATOLOGY PATHOLOGY REPORT: ABNORMAL

## 2019-04-11 ENCOUNTER — PROCEDURE VISIT (OUTPATIENT)
Dept: DERMATOLOGY | Age: 64
End: 2019-04-11
Payer: COMMERCIAL

## 2019-04-11 VITALS
SYSTOLIC BLOOD PRESSURE: 119 MMHG | WEIGHT: 277.4 LBS | HEART RATE: 81 BPM | BODY MASS INDEX: 43.45 KG/M2 | DIASTOLIC BLOOD PRESSURE: 77 MMHG

## 2019-04-11 DIAGNOSIS — Z79.2 PROPHYLACTIC ANTIBIOTIC: ICD-10-CM

## 2019-04-11 DIAGNOSIS — D04.72 BOWEN'S DISEASE OF LOWER EXTREMITY, LEFT: Primary | ICD-10-CM

## 2019-04-11 PROCEDURE — 99999 PR OFFICE/OUTPT VISIT,PROCEDURE ONLY: CPT | Performed by: DERMATOLOGY

## 2019-04-11 PROCEDURE — 12032 INTMD RPR S/A/T/EXT 2.6-7.5: CPT | Performed by: DERMATOLOGY

## 2019-04-11 PROCEDURE — 11602 EXC TR-EXT MAL+MARG 1.1-2 CM: CPT | Performed by: DERMATOLOGY

## 2019-04-11 RX ORDER — DOXYCYCLINE HYCLATE 100 MG
TABLET ORAL
Qty: 20 TABLET | Refills: 0 | Status: SHIPPED | OUTPATIENT
Start: 2019-04-11 | End: 2019-07-11

## 2019-04-11 NOTE — PROGRESS NOTES
[facility]) Dermatology  [facility] Friday, [address] [address]     Akhil Brasher  1955    58 y.o. female     Date of Visit: 2019    Chief Complaint:   Chief Complaint   Patient presents with    Procedure     Excision: Left Proximal Pretibial Lower Extremity, Olmos's Ds- SCC        History of Present Illness:  Akhil Brasher is a 58 y.o. female who presents with the chief complaint of follow up for the followin. 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 No concerns since biopsy. Pacemaker/ICD: No  Joint prosthesis: No  Difficulty with numbing in the past: No  Local Anesthesia Reaction: No  Artificial Heart Valve: No  Organ Transplant: No  Immunosuppression: No  Bleeding/Clotting Disorders: No  Anticoagulant Therapy: yes, meloxicam        Review of Systems:  Constitutional: Reports general sense of well-being   Skin: No new or changing moles, no history of keloids or hypertrophic scars. Heme: No abnormal bruising or bleeding. Past Medical History, Family History, Surgical History, Medications and Allergies reviewed.       Family History   Problem Relation Age of Onset    Cancer Mother         breast and cervical    Heart Disease Father     Cancer Father 61        skin cancer    Cancer Maternal Grandmother         breast    Cancer Paternal Grandmother         breast    No Known Problems Sister     No Known Problems Brother     No Known Problems Maternal Aunt     No Known Problems Maternal Uncle     No Known Problems Paternal Aunt     No Known Problems Paternal Uncle     No Known Problems Maternal Grandfather     No Known Problems Paternal Grandfather     Cancer Other         neice- head, not sure what kind skin ca    Asthma Neg Hx     Diabetes Neg Hx     Emphysema Neg Hx     Heart Failure Neg Hx     Hypertension Neg Hx     Rheum Arthritis Neg Hx     Osteoarthritis Neg Hx     MCG tablet TAKE ONE TABLET BY MOUTH ONE TIME A DAY 90 tablet 3    omeprazole (PRILOSEC) 40 MG delayed release capsule TAKE ONE CAPSULE BY MOUTH ONE TIME A DAY 90 capsule 3    meloxicam (MOBIC) 7.5 MG tablet Take 1 tablet by mouth daily 90 tablet 2    azelastine (ASTELIN) 0.1 % nasal spray 2 sprays by Nasal route 2 times daily Use in each nostril as directed 3 Bottle 2    fluticasone (FLONASE) 50 MCG/ACT nasal spray 2 sprays each nostril daily 3 Bottle 2    montelukast (SINGULAIR) 10 MG tablet TAKE ONE TABLET BY MOUTH NIGHTLY 90 tablet 2    hydrochlorothiazide (HYDRODIURIL) 50 MG tablet Take 1 tablet by mouth daily 90 tablet 2    citalopram (CELEXA) 40 MG tablet TAKE ONE TABLET BY MOUTH ONE TIME A DAY 90 tablet 2    Mirabegron ER (MYRBETRIQ) 50 MG TB24 Take 50 mg by mouth daily 90 tablet 3    cetirizine-psuedoephedrine (ZYRTEC-D ALLERGY & CONGESTION) 5-120 MG per extended release tablet Take 1 tablet by mouth 2 times daily      cetirizine (ZYRTEC) 10 MG tablet Take 10 mg by mouth daily      dextromethorphan-guaiFENesin (MUCINEX DM)  MG per SR tablet Take 1 tablet by mouth every 12 hours as needed      hyoscyamine (ANASPAZ;LEVSIN) 0.125 MG tablet Take 0.125 mg by mouth as needed.  multivitamin (THERAGRAN) per tablet Take 1 tablet by mouth daily.          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[place] [address] Financial resource strain: Not on file    Food insecurity:     Worry: Not on file     Inability: Not on file    Transportation needs:     Medical: Not on file     Non-medical: Not on file   Tobacco Use    Smoking status: Former Smoker     Packs/day: 0.25     Years: 1.50     Pack years: 0.37     Types: Cigarettes     Last attempt to quit: 3/27/1972 Years since quittin.0    Smokeless tobacco: Never Used   Substance and Sexual Activity    Alcohol use: Yes     Alcohol/week: 0.0 oz     Comment: occasionally    Drug use: No    Sexual activity: Yes     Partners: Male   Lifestyle    Physical activity:     Days per week: Not on file     Minutes per session: Not on file    Stress: Not on file   Relationships    Social connections:     Talks on phone: Not on file     Gets together: Not on file     Attends Sabianist service: Not on file     Active member of club or organization: Not on file     Attends meetings of clubs or organizations: Not on file     Relationship status: Not on file    Intimate partner violence:     Fear of current or ex partner: Not on file     Emotionally abused: Not on file     Physically abused: Not on file     Forced sexual activity: Not on file   Other Topics Concern    Not on file   Social History Narrative            Spends time with , grandchildren        Lives in [facility]           Physical Examination     Well appearing. BP: 119/77  1. Left proximal pretibial lower extremity- 0.5cm at site of biopsied Bowen's disease        Assessment and Plan     1. Bowen's disease of lower extremity, left    2. Prophylactic antibiotic        1. Bowen's disease of lower extremity, left  -Informed written consent obtained after risks (bleeding, infection, scar, discomfort) and benefits explained. -Area prepped/draped in sterile fashion. Local anesthesia achieved with 1% lidocaine w/ epinephrine/sodium bicarbonate. Elliptical excision to superficial subcutaneous fat performed. Specimen sent to pathology. Edges undermined and hemostasis achieved w/ pinpoint electrodessication. Layered closure with 3-0 deep vicryl sutures and 3-0 pulley stitch interrupted centrally with 5-0 simple interrupted laterally.  Pressure dressing applied.   -Width of lesion w/ 3-4 mm margins - 1.1 cm; length of repair 4.0 cm.   -[facility] re: wound care, suture removal   -Post-procedure /77. Pt left office in stable condition. F/u 6 mo for FSE, sooner prn.      2. Prophylactic antibiotic  - doxycycline hyclate (VIBRA-TABS) 100 MG tablet; Take one tablet PO BID with food and glass of water for 10 days, remain upright for 1 hour afterwards    -Edu re: GI upset, pill esophagitis, photosensitivity, yeast infection, rare pseudotumor cerebri risk (HA, visual changes), (no pregnancy)        Return in about 2 weeks (around 4/25/2019) for suture removal- Dr. Shayna Tompkins wants to look at first before remove.

## 2019-04-16 ENCOUNTER — TELEPHONE (OUTPATIENT)
Dept: DERMATOLOGY | Age: 64
End: 2019-04-16

## 2019-04-16 LAB — DERMATOLOGY PATHOLOGY REPORT: ABNORMAL

## 2019-04-16 NOTE — TELEPHONE ENCOUNTER
Left vm,requesting pt to return call to discuss excision results. Sutures out: 4/25/19  1 year TBSE: 3/9/2020 (need to schedule 6 mos TBSE)  Margins clear after surgery, no further treatment needed.

## 2019-04-18 NOTE — TELEPHONE ENCOUNTER
called pt. left vm (ok per HIPAA) and relayed bx results. Instructed pt to return call to scheduled 6 mos TBSE.      Suture removal 4/25/19   1 yr TBSE 3/9/2020

## 2019-04-18 NOTE — TELEPHONE ENCOUNTER
----- Message from Keyhsa Wing DO sent at 4/16/2019 11:07 AM EDT -----  Margins clear after surgery, no further treatment needed. Please notify pt of result(s).  Confirm 6 month skin exam has been scheduled

## 2019-04-24 ENCOUNTER — TELEPHONE (OUTPATIENT)
Dept: DERMATOLOGY | Age: 64
End: 2019-04-24

## 2019-04-24 NOTE — TELEPHONE ENCOUNTER
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

## 2019-04-25 NOTE — TELEPHONE ENCOUNTER
Called pt and left VM asking her to return my call. Dr. Maureen Miles could like pt to come in Friday at 2 in [facility] if possible rather than for pt to remove sutures.

## 2019-04-26 ENCOUNTER — NURSE ONLY (OUTPATIENT)
Dept: DERMATOLOGY | Age: 64
End: 2019-04-26
Payer: COMMERCIAL

## 2019-04-26 DIAGNOSIS — D04.72 BOWEN'S DISEASE OF LOWER EXTREMITY, LEFT: Primary | ICD-10-CM

## 2019-04-26 PROCEDURE — S0630 REMOVAL OF SUTURES: HCPCS | Performed by: DERMATOLOGY

## 2019-05-30 ENCOUNTER — EMPLOYEE WELLNESS (OUTPATIENT)
Dept: OTHER | Age: 64
End: 2019-05-30

## 2019-06-10 VITALS — BODY MASS INDEX: 42.91 KG/M2 | WEIGHT: 274 LBS

## 2019-07-11 ENCOUNTER — APPOINTMENT (OUTPATIENT)
Dept: GENERAL RADIOLOGY | Age: 64
DRG: 309 | End: 2019-07-11
Payer: COMMERCIAL

## 2019-07-11 ENCOUNTER — TELEPHONE (OUTPATIENT)
Dept: PULMONOLOGY | Age: 64
End: 2019-07-11

## 2019-07-11 ENCOUNTER — HOSPITAL ENCOUNTER (INPATIENT)
Age: 64
LOS: 1 days | Discharge: HOME | DRG: 309 | End: 2019-07-12
Attending: EMERGENCY MEDICINE | Admitting: INTERNAL MEDICINE
Payer: COMMERCIAL

## 2019-07-11 ENCOUNTER — NURSE TRIAGE (OUTPATIENT)
Dept: OTHER | Facility: CLINIC | Age: 64
End: 2019-07-11

## 2019-07-11 DIAGNOSIS — R00.2 PALPITATIONS: ICD-10-CM

## 2019-07-11 DIAGNOSIS — R06.02 SHORTNESS OF BREATH: ICD-10-CM

## 2019-07-11 DIAGNOSIS — I48.91 ATRIAL FIBRILLATION WITH RAPID VENTRICULAR RESPONSE (HCC): Primary | ICD-10-CM

## 2019-07-11 LAB
A/G RATIO: 1.3 (ref 1.1–2.2)
ALBUMIN SERPL-MCNC: 4.3 G/DL (ref 3.4–5)
ALP BLD-CCNC: 63 U/L (ref 40–129)
ALT SERPL-CCNC: 23 U/L (ref 10–40)
ANION GAP SERPL CALCULATED.3IONS-SCNC: 17 MMOL/L (ref 3–16)
AST SERPL-CCNC: 28 U/L (ref 15–37)
BASOPHILS ABSOLUTE: 0 K/UL (ref 0–0.2)
BASOPHILS RELATIVE PERCENT: 0.7 %
BILIRUB SERPL-MCNC: 0.8 MG/DL (ref 0–1)
BILIRUBIN URINE: NEGATIVE
BLOOD, URINE: NEGATIVE
BUN BLDV-MCNC: 19 MG/DL (ref 7–20)
CALCIUM SERPL-MCNC: 10 MG/DL (ref 8.3–10.6)
CHLORIDE BLD-SCNC: 99 MMOL/L (ref 99–110)
CHOLESTEROL, TOTAL: 206 MG/DL (ref 0–199)
CLARITY: CLEAR
CO2: 24 MMOL/L (ref 21–32)
COLOR: YELLOW
CREAT SERPL-MCNC: 0.8 MG/DL (ref 0.6–1.2)
D DIMER: <200 NG/ML DDU (ref 0–229)
EKG ATRIAL RATE: 192 BPM
EKG DIAGNOSIS: NORMAL
EKG Q-T INTERVAL: 340 MS
EKG QRS DURATION: 92 MS
EKG QTC CALCULATION (BAZETT): 476 MS
EKG R AXIS: -3 DEGREES
EKG T AXIS: 177 DEGREES
EKG VENTRICULAR RATE: 118 BPM
EOSINOPHILS ABSOLUTE: 0.1 K/UL (ref 0–0.6)
EOSINOPHILS RELATIVE PERCENT: 2 %
GFR AFRICAN AMERICAN: >60
GFR NON-AFRICAN AMERICAN: >60
GLOBULIN: 3.2 G/DL
GLUCOSE BLD-MCNC: 164 MG/DL (ref 70–99)
GLUCOSE URINE: NEGATIVE MG/DL
HCT VFR BLD CALC: 44.4 % (ref 36–48)
HDLC SERPL-MCNC: 46 MG/DL (ref 40–60)
HEMOGLOBIN: 15.1 G/DL (ref 12–16)
KETONES, URINE: NEGATIVE MG/DL
LDL CHOLESTEROL CALCULATED: 125 MG/DL
LEUKOCYTE ESTERASE, URINE: NEGATIVE
LYMPHOCYTES ABSOLUTE: 1.4 K/UL (ref 1–5.1)
LYMPHOCYTES RELATIVE PERCENT: 23.8 %
MCH RBC QN AUTO: 30.8 PG (ref 26–34)
MCHC RBC AUTO-ENTMCNC: 34 G/DL (ref 31–36)
MCV RBC AUTO: 90.6 FL (ref 80–100)
MICROSCOPIC EXAMINATION: NORMAL
MONOCYTES ABSOLUTE: 0.6 K/UL (ref 0–1.3)
MONOCYTES RELATIVE PERCENT: 10.4 %
NEUTROPHILS ABSOLUTE: 3.9 K/UL (ref 1.7–7.7)
NEUTROPHILS RELATIVE PERCENT: 63.1 %
NITRITE, URINE: NEGATIVE
PDW BLD-RTO: 13.3 % (ref 12.4–15.4)
PH UA: 8 (ref 5–8)
PLATELET # BLD: 242 K/UL (ref 135–450)
PMV BLD AUTO: 8.5 FL (ref 5–10.5)
POTASSIUM REFLEX MAGNESIUM: 4.4 MMOL/L (ref 3.5–5.1)
PRO-BNP: 265 PG/ML (ref 0–124)
PROTEIN UA: NEGATIVE MG/DL
RBC # BLD: 4.9 M/UL (ref 4–5.2)
SODIUM BLD-SCNC: 140 MMOL/L (ref 136–145)
SPECIFIC GRAVITY UA: 1.01 (ref 1–1.03)
T4 FREE: 1.5 NG/DL (ref 0.9–1.8)
TOTAL PROTEIN: 7.5 G/DL (ref 6.4–8.2)
TRIGL SERPL-MCNC: 177 MG/DL (ref 0–150)
TROPONIN: <0.01 NG/ML
TSH REFLEX: 4.25 UIU/ML (ref 0.27–4.2)
URINE REFLEX TO CULTURE: NORMAL
URINE TYPE: NORMAL
UROBILINOGEN, URINE: 0.2 E.U./DL
VLDLC SERPL CALC-MCNC: 35 MG/DL
WBC # BLD: 6.1 K/UL (ref 4–11)

## 2019-07-11 PROCEDURE — 85025 COMPLETE CBC W/AUTO DIFF WBC: CPT

## 2019-07-11 PROCEDURE — 94761 N-INVAS EAR/PLS OXIMETRY MLT: CPT

## 2019-07-11 PROCEDURE — 71046 X-RAY EXAM CHEST 2 VIEWS: CPT

## 2019-07-11 PROCEDURE — 2060000000 HC ICU INTERMEDIATE R&B

## 2019-07-11 PROCEDURE — 84484 ASSAY OF TROPONIN QUANT: CPT

## 2019-07-11 PROCEDURE — 83036 HEMOGLOBIN GLYCOSYLATED A1C: CPT

## 2019-07-11 PROCEDURE — 2580000003 HC RX 258: Performed by: PHYSICIAN ASSISTANT

## 2019-07-11 PROCEDURE — 80061 LIPID PANEL: CPT

## 2019-07-11 PROCEDURE — 83880 ASSAY OF NATRIURETIC PEPTIDE: CPT

## 2019-07-11 PROCEDURE — 6370000000 HC RX 637 (ALT 250 FOR IP): Performed by: PHYSICIAN ASSISTANT

## 2019-07-11 PROCEDURE — 99285 EMERGENCY DEPT VISIT HI MDM: CPT

## 2019-07-11 PROCEDURE — 6370000000 HC RX 637 (ALT 250 FOR IP): Performed by: INTERNAL MEDICINE

## 2019-07-11 PROCEDURE — 81003 URINALYSIS AUTO W/O SCOPE: CPT

## 2019-07-11 PROCEDURE — 96374 THER/PROPH/DIAG INJ IV PUSH: CPT

## 2019-07-11 PROCEDURE — 80053 COMPREHEN METABOLIC PANEL: CPT

## 2019-07-11 PROCEDURE — 2500000003 HC RX 250 WO HCPCS: Performed by: PHYSICIAN ASSISTANT

## 2019-07-11 PROCEDURE — 6360000002 HC RX W HCPCS: Performed by: PHYSICIAN ASSISTANT

## 2019-07-11 PROCEDURE — 84443 ASSAY THYROID STIM HORMONE: CPT

## 2019-07-11 PROCEDURE — 36415 COLL VENOUS BLD VENIPUNCTURE: CPT

## 2019-07-11 PROCEDURE — 84439 ASSAY OF FREE THYROXINE: CPT

## 2019-07-11 PROCEDURE — 93005 ELECTROCARDIOGRAM TRACING: CPT | Performed by: PHYSICIAN ASSISTANT

## 2019-07-11 PROCEDURE — 85379 FIBRIN DEGRADATION QUANT: CPT

## 2019-07-11 PROCEDURE — 93010 ELECTROCARDIOGRAM REPORT: CPT | Performed by: INTERNAL MEDICINE

## 2019-07-11 PROCEDURE — 99255 IP/OBS CONSLTJ NEW/EST HI 80: CPT | Performed by: INTERNAL MEDICINE

## 2019-07-11 RX ORDER — SODIUM CHLORIDE 0.9 % (FLUSH) 0.9 %
10 SYRINGE (ML) INJECTION EVERY 12 HOURS SCHEDULED
Status: DISCONTINUED | OUTPATIENT
Start: 2019-07-11 | End: 2019-07-12 | Stop reason: HOSPADM

## 2019-07-11 RX ORDER — SODIUM CHLORIDE 0.9 % (FLUSH) 0.9 %
10 SYRINGE (ML) INJECTION PRN
Status: DISCONTINUED | OUTPATIENT
Start: 2019-07-11 | End: 2019-07-12 | Stop reason: HOSPADM

## 2019-07-11 RX ORDER — DILTIAZEM HYDROCHLORIDE 5 MG/ML
10 INJECTION INTRAVENOUS ONCE
Status: COMPLETED | OUTPATIENT
Start: 2019-07-11 | End: 2019-07-11

## 2019-07-11 RX ORDER — HYDROCHLOROTHIAZIDE 25 MG/1
50 TABLET ORAL DAILY
Status: DISCONTINUED | OUTPATIENT
Start: 2019-07-11 | End: 2019-07-11

## 2019-07-11 RX ORDER — FLECAINIDE ACETATE 100 MG/1
100 TABLET ORAL ONCE
Status: COMPLETED | OUTPATIENT
Start: 2019-07-11 | End: 2019-07-11

## 2019-07-11 RX ORDER — CITALOPRAM 20 MG/1
40 TABLET ORAL DAILY
Status: DISCONTINUED | OUTPATIENT
Start: 2019-07-11 | End: 2019-07-12 | Stop reason: HOSPADM

## 2019-07-11 RX ORDER — PANTOPRAZOLE SODIUM 40 MG/1
40 TABLET, DELAYED RELEASE ORAL
Status: DISCONTINUED | OUTPATIENT
Start: 2019-07-12 | End: 2019-07-12 | Stop reason: HOSPADM

## 2019-07-11 RX ORDER — LACTOBACILLUS ACIDOPHILUS 20B CELL
1 CAPSULE ORAL DAILY
COMMUNITY
End: 2022-01-13

## 2019-07-11 RX ORDER — FLECAINIDE ACETATE 100 MG/1
100 TABLET ORAL EVERY 12 HOURS SCHEDULED
Status: DISCONTINUED | OUTPATIENT
Start: 2019-07-11 | End: 2019-07-12 | Stop reason: HOSPADM

## 2019-07-11 RX ORDER — MELOXICAM 15 MG/1
7.5 TABLET ORAL DAILY
Status: DISCONTINUED | OUTPATIENT
Start: 2019-07-11 | End: 2019-07-12 | Stop reason: HOSPADM

## 2019-07-11 RX ORDER — LEVOTHYROXINE SODIUM 0.12 MG/1
125 TABLET ORAL DAILY
Status: DISCONTINUED | OUTPATIENT
Start: 2019-07-11 | End: 2019-07-12 | Stop reason: HOSPADM

## 2019-07-11 RX ORDER — MONTELUKAST SODIUM 10 MG/1
10 TABLET ORAL NIGHTLY
Status: DISCONTINUED | OUTPATIENT
Start: 2019-07-11 | End: 2019-07-12 | Stop reason: HOSPADM

## 2019-07-11 RX ORDER — FLUTICASONE PROPIONATE 50 MCG
2 SPRAY, SUSPENSION (ML) NASAL DAILY
Status: DISCONTINUED | OUTPATIENT
Start: 2019-07-11 | End: 2019-07-12 | Stop reason: HOSPADM

## 2019-07-11 RX ORDER — ONDANSETRON 2 MG/ML
4 INJECTION INTRAMUSCULAR; INTRAVENOUS EVERY 6 HOURS PRN
Status: DISCONTINUED | OUTPATIENT
Start: 2019-07-11 | End: 2019-07-12 | Stop reason: HOSPADM

## 2019-07-11 RX ORDER — ACETAMINOPHEN 325 MG/1
650 TABLET ORAL EVERY 4 HOURS PRN
Status: DISCONTINUED | OUTPATIENT
Start: 2019-07-11 | End: 2019-07-12 | Stop reason: HOSPADM

## 2019-07-11 RX ADMIN — METOPROLOL TARTRATE 25 MG: 25 TABLET ORAL at 12:46

## 2019-07-11 RX ADMIN — CITALOPRAM HYDROBROMIDE 40 MG: 20 TABLET ORAL at 11:57

## 2019-07-11 RX ADMIN — FLECAINIDE ACETATE 100 MG: 100 TABLET ORAL at 12:24

## 2019-07-11 RX ADMIN — MELOXICAM 7.5 MG: 15 TABLET ORAL at 11:58

## 2019-07-11 RX ADMIN — Medication 10 ML: at 20:51

## 2019-07-11 RX ADMIN — DILTIAZEM HYDROCHLORIDE 10 MG: 5 INJECTION INTRAVENOUS at 09:06

## 2019-07-11 RX ADMIN — ENOXAPARIN SODIUM 120 MG: 120 INJECTION SUBCUTANEOUS at 12:23

## 2019-07-11 RX ADMIN — DILTIAZEM HYDROCHLORIDE 5 MG/HR: 5 INJECTION INTRAVENOUS at 12:01

## 2019-07-11 RX ADMIN — ENOXAPARIN SODIUM 120 MG: 120 INJECTION SUBCUTANEOUS at 20:52

## 2019-07-11 RX ADMIN — DILTIAZEM HYDROCHLORIDE 2.5 MG/HR: 5 INJECTION INTRAVENOUS at 09:22

## 2019-07-11 RX ADMIN — MONTELUKAST SODIUM 10 MG: 10 TABLET, FILM COATED ORAL at 20:51

## 2019-07-11 RX ADMIN — LEVOTHYROXINE SODIUM 125 MCG: 125 TABLET ORAL at 11:59

## 2019-07-11 RX ADMIN — Medication 10 ML: at 12:00

## 2019-07-11 RX ADMIN — FLECAINIDE ACETATE 100 MG: 100 TABLET ORAL at 20:51

## 2019-07-11 ASSESSMENT — PAIN SCALES - GENERAL
PAINLEVEL_OUTOF10: 1
PAINLEVEL_OUTOF10: 0

## 2019-07-11 ASSESSMENT — PAIN DESCRIPTION - PAIN TYPE: TYPE: CHRONIC PAIN

## 2019-07-11 ASSESSMENT — PAIN DESCRIPTION - LOCATION: LOCATION: KNEE

## 2019-07-11 ASSESSMENT — ENCOUNTER SYMPTOMS
GASTROINTESTINAL NEGATIVE: 1
SHORTNESS OF BREATH: 1

## 2019-07-11 ASSESSMENT — PAIN DESCRIPTION - ORIENTATION: ORIENTATION: LEFT;RIGHT

## 2019-07-11 NOTE — CONSULTS
to be 50%. No regional wall motion abnormalities are noted. Mildly dilated left atrium.   The aortic valve area is calculated at 1.37 cm2 with a maximum pressure gradient of 24 mmHg and a mean pressure gradient of 15 mmHg and a peak   systolic velocity of 550IO/HEA. This is c/w mild aortic stenosis. Mild aortic regurgitation is present.   Trivial mitral regurgitation is present. Stress test 12/12/11  IMPRESSION:     No findings of pharmacologic stress induced reversible ischemia. Mild inferior diaphragm attenuation artifact noted  SPECT Myocardial Perfusion Imaging results are considered Negative for acute ischemia. 12/11/11 Study Conclusions  - Stress ECG conclusions: There were no stress arrhythmias    or conduction abnormalities. The stress ECG was    non-diagnostic. Griffiths scoring: exercise time of 2min;    maximum ST deviation of 0mm; no angina; resulting score is    2. This score predicts a moderate risk of cardiac events.    Stress echo: Normal echo stress. Impressions:   Nondiagnostic for ischemia after maximal  exercise without reproduction of symptoms.     Assessment  Atrial fibrillations Rapid ventricular response   Sleep apnea  History of breast cancer s/p chemo radiation  Patient Active Problem List   Diagnosis    Malignant neoplasm of female breast ([facility] [address]    Chronic GERD    Heart palpitations    Allergic rhinitis    History of breast cancer    Sleep apnea    Hypothyroidism    Depressive disorder, not elsewhere classified    Hiatal hernia    Multiple gastric polyps    Aortic stenosis, mild-echo 8/2016    Mild aortic regurgitation-echo 8/2016    H/O laparoscopic adjustable gastric banding    Essential hypertension    Urge incontinence    Severe obesity (BMI >= 40) (HCC)    Chest pain    Other and unspecified hyperlipidemia    Atrial fibrillation with RVR (HCC)       [id number] Score for Atrial Fibrillation Stroke Risk   Risk   Factors  Component Value   C CHF No 0   H HTN Yes 1

## 2019-07-11 NOTE — TELEPHONE ENCOUNTER
Left voice message requesting patient to please return call to office to schedule a 1 yr F/U appt . Relocating from [place] office.  Around 01/11/2020

## 2019-07-11 NOTE — H&P
cetirizine (ZYRTEC) 10 MG tablet Take 10 mg by mouth 2 times daily    Yes Historical Provider, MD   dextromethorphan-guaiFENesin (MUCINEX DM)  MG per SR tablet Take 1 tablet by mouth every 12 hours as needed   Yes Historical Provider, MD   multivitamin [facility]) per tablet Take 1 tablet by mouth daily. Yes Historical Provider, MD   triamcinolone (KENALOG) 0.1 % ointment Apply twice daily for up to 2 weeks on patch on right lower leg 11/15/18   Earl Valencia MD   cetirizine-psuedoephedrine (ZYRTEC-D ALLERGY & CONGESTION) 5-120 MG per extended release tablet Take 1 tablet by mouth 2 times daily    Historical Provider, MD   triamcinolone (KENALOG) 0.1 % lotion Use twice daily for up to 2 weeks for ear eczema 6/22/18   Earl Valencia MD       Allergies:  Latex and Iodine    Social History:  The patient currently lives at home    TOBACCO:   reports that she quit smoking about 47 years ago. Her smoking use included cigarettes. She has a 0.37 pack-year smoking history. She has never used smokeless tobacco.  ETOH:   reports that she drinks alcohol.       Family History:   Positive as follows:        Problem Relation Age of Onset    Cancer Mother         breast and cervical    Heart Disease Father     Cancer Father 61        skin cancer    Cancer Maternal Grandmother         breast    Cancer Paternal Grandmother         breast    No Known Problems Sister     No Known Problems Brother     No Known Problems Maternal Aunt     No Known Problems Maternal Uncle     No Known Problems Paternal Aunt     No Known Problems Paternal Uncle     No Known Problems Maternal Grandfather     No Known Problems Paternal Grandfather     Cancer Other         neice- head, not sure what kind skin ca    Asthma Neg Hx     Diabetes Neg Hx     Emphysema Neg Hx     Heart Failure Neg Hx     Hypertension Neg Hx     Rheum Arthritis Neg Hx     Osteoarthritis Neg Hx     Breast Cancer Neg Hx     High Cholesterol Neg Hx     Migraines Neg Hx     Ovarian Cancer Neg Hx     Rashes/Skin Problems Neg Hx     Seizures Neg Hx     Stroke Neg Hx     Thyroid Disease Neg Hx        REVIEW OF SYSTEMS:     Constitutional: Negative for fever   HENT: Negative for sore throat   Eyes: Negative for redness   Respiratory: +SOB  Cardiovascular: + palpitations, Negative for chest pain   Gastrointestinal: Negative for vomiting, diarrhea   Genitourinary: Negative for hematuria   Musculoskeletal: Negative for arthralgias   Skin: Negative for rash   Neurological: Negative for syncope   Hematological: Negative for adenopathy   Psychiatric/Behavorial: Negative for anxiety    PHYSICAL EXAM:    BP (!) 131/93   Pulse 81   Temp 98.6 °F (37 °C) (Oral)   Resp 15   Ht 5' 6\" (1.676 m)   Wt 270 lb (122.5 kg)   LMP  (LMP Unknown)   SpO2 93%   BMI 43.58 kg/m²   Gen: No distress. Alert. Eyes: PERRL. No sclera icterus. No conjunctival injection. ENT: No discharge. Pharynx clear. Neck: No JVD. No Carotid Bruit. Trachea midline. Resp: No accessory muscle use. No crackles. No wheezes. No rhonchi. CV: Irregular rate and rhythm. No murmur. No rub. + edema. Capillary Refill: Brisk,< 3 seconds   Peripheral Pulses: +2 palpable, equal bilaterally   GI: Non-tender. Non-distended. No masses. No organomegaly. Normal bowel sounds. No hernia. Skin: Warm and dry. No nodule on exposed extremities. No rash on exposed extremities. M/S: No cyanosis. No joint deformity. No clubbing. Neuro: Awake. Grossly nonfocal    Psych: Oriented x 3. No anxiety or agitation.      CBC:   Recent Labs     07/11/19  0820   WBC 6.1   HGB 15.1   HCT 44.4   MCV 90.6        BMP:   Recent Labs     07/11/19  0820      K 4.4   CL 99   CO2 24   BUN 19   CREATININE 0.8     LIVER PROFILE:   Recent Labs     07/11/19  0820   AST 28   ALT 23   BILITOT 0.8   ALKPHOS 63     UA:  Recent Labs     07/11/19  0835   COLORU Yellow   PHUR 8.0   CLARITYU Clear   SPECGRAV full dose Lovenox started   - Echo and TSH pending   - Cardiology consult-- resume BB, start flecainide  - Echo pending   - Continue telemetry monitoring   - Had been on BB in the past, will restart metoprolol 25 mg BID     HTN   - Continue HCTZ  - BP is controlled     Hypothyroidism   - Continue synthroid   - TSH is pending     Hx of breast cancer   - reports dx in 2009   - Underwent chemo and radiation and R lumpectomy   - completed 5 years of Arimidex     JINA  - home CPAP     GERD  - Continue PPI     Hyperglycemia   -  on admission   - no hx of DM   - Check Hgb A1c     Morbid Obesity  - Body mass index is 43.58 kg/m². - Complicating assessment and treatment. Placing patient at risk for multiple co-morbidities as well as early death and contributing to the patient's presentation.   - Counseled on weight loss. DVT Prophylaxis: Full dose Lovenox   Diet: DIET CARDIAC;   Code Status: Full Code    Hospital course and plan of care discussed with Dr. Terence Santiago.       Nadeem Orr PA-C  7/11/2019 10:45 AM

## 2019-07-12 VITALS
HEART RATE: 66 BPM | BODY MASS INDEX: 43.87 KG/M2 | SYSTOLIC BLOOD PRESSURE: 124 MMHG | OXYGEN SATURATION: 97 % | HEIGHT: 66 IN | DIASTOLIC BLOOD PRESSURE: 79 MMHG | TEMPERATURE: 98 F | WEIGHT: 273 LBS | RESPIRATION RATE: 16 BRPM

## 2019-07-12 LAB
ANION GAP SERPL CALCULATED.3IONS-SCNC: 12 MMOL/L (ref 3–16)
BASOPHILS ABSOLUTE: 0 K/UL (ref 0–0.2)
BASOPHILS RELATIVE PERCENT: 0.7 %
BUN BLDV-MCNC: 23 MG/DL (ref 7–20)
CALCIUM SERPL-MCNC: 9.4 MG/DL (ref 8.3–10.6)
CHLORIDE BLD-SCNC: 99 MMOL/L (ref 99–110)
CO2: 28 MMOL/L (ref 21–32)
CREAT SERPL-MCNC: 1 MG/DL (ref 0.6–1.2)
EKG ATRIAL RATE: 66 BPM
EKG DIAGNOSIS: NORMAL
EKG P AXIS: 85 DEGREES
EKG P-R INTERVAL: 176 MS
EKG Q-T INTERVAL: 438 MS
EKG QRS DURATION: 116 MS
EKG QTC CALCULATION (BAZETT): 459 MS
EKG R AXIS: 33 DEGREES
EKG T AXIS: 130 DEGREES
EKG VENTRICULAR RATE: 66 BPM
EOSINOPHILS ABSOLUTE: 0.2 K/UL (ref 0–0.6)
EOSINOPHILS RELATIVE PERCENT: 3 %
ESTIMATED AVERAGE GLUCOSE: 128.4 MG/DL
GFR AFRICAN AMERICAN: >60
GFR NON-AFRICAN AMERICAN: 56
GLUCOSE BLD-MCNC: 135 MG/DL (ref 70–99)
HBA1C MFR BLD: 6.1 %
HCT VFR BLD CALC: 40.6 % (ref 36–48)
HEMOGLOBIN: 13.6 G/DL (ref 12–16)
LV EF: 49 %
LVEF MODALITY: NORMAL
LYMPHOCYTES ABSOLUTE: 2.2 K/UL (ref 1–5.1)
LYMPHOCYTES RELATIVE PERCENT: 32.5 %
MCH RBC QN AUTO: 30.9 PG (ref 26–34)
MCHC RBC AUTO-ENTMCNC: 33.6 G/DL (ref 31–36)
MCV RBC AUTO: 91.9 FL (ref 80–100)
MONOCYTES ABSOLUTE: 0.9 K/UL (ref 0–1.3)
MONOCYTES RELATIVE PERCENT: 13.1 %
NEUTROPHILS ABSOLUTE: 3.4 K/UL (ref 1.7–7.7)
NEUTROPHILS RELATIVE PERCENT: 50.7 %
PDW BLD-RTO: 13.8 % (ref 12.4–15.4)
PLATELET # BLD: 221 K/UL (ref 135–450)
PMV BLD AUTO: 8.2 FL (ref 5–10.5)
POTASSIUM REFLEX MAGNESIUM: 3.7 MMOL/L (ref 3.5–5.1)
RBC # BLD: 4.42 M/UL (ref 4–5.2)
SODIUM BLD-SCNC: 139 MMOL/L (ref 136–145)
WBC # BLD: 6.7 K/UL (ref 4–11)

## 2019-07-12 PROCEDURE — 36415 COLL VENOUS BLD VENIPUNCTURE: CPT

## 2019-07-12 PROCEDURE — 93010 ELECTROCARDIOGRAM REPORT: CPT | Performed by: INTERNAL MEDICINE

## 2019-07-12 PROCEDURE — 93005 ELECTROCARDIOGRAM TRACING: CPT | Performed by: INTERNAL MEDICINE

## 2019-07-12 PROCEDURE — 93306 TTE W/DOPPLER COMPLETE: CPT

## 2019-07-12 PROCEDURE — 99233 SBSQ HOSP IP/OBS HIGH 50: CPT | Performed by: INTERNAL MEDICINE

## 2019-07-12 PROCEDURE — 6370000000 HC RX 637 (ALT 250 FOR IP): Performed by: INTERNAL MEDICINE

## 2019-07-12 PROCEDURE — 6370000000 HC RX 637 (ALT 250 FOR IP): Performed by: PHYSICIAN ASSISTANT

## 2019-07-12 PROCEDURE — 80048 BASIC METABOLIC PNL TOTAL CA: CPT

## 2019-07-12 PROCEDURE — 85025 COMPLETE CBC W/AUTO DIFF WBC: CPT

## 2019-07-12 PROCEDURE — 2580000003 HC RX 258: Performed by: PHYSICIAN ASSISTANT

## 2019-07-12 PROCEDURE — 99238 HOSP IP/OBS DSCHRG MGMT 30/<: CPT | Performed by: INTERNAL MEDICINE

## 2019-07-12 RX ORDER — ATORVASTATIN CALCIUM 40 MG/1
40 TABLET, FILM COATED ORAL NIGHTLY
Status: DISCONTINUED | OUTPATIENT
Start: 2019-07-12 | End: 2019-07-12 | Stop reason: HOSPADM

## 2019-07-12 RX ORDER — POLYETHYLENE GLYCOL 3350 17 G/17G
17 POWDER, FOR SOLUTION ORAL DAILY
Qty: 527 G | Refills: 1 | Status: SHIPPED | OUTPATIENT
Start: 2019-07-12 | End: 2019-08-11

## 2019-07-12 RX ORDER — ATORVASTATIN CALCIUM 40 MG/1
40 TABLET, FILM COATED ORAL NIGHTLY
Qty: 30 TABLET | Refills: 1 | Status: SHIPPED | OUTPATIENT
Start: 2019-07-12 | End: 2019-09-09 | Stop reason: SDUPTHER

## 2019-07-12 RX ORDER — FLECAINIDE ACETATE 100 MG/1
100 TABLET ORAL EVERY 12 HOURS SCHEDULED
Qty: 60 TABLET | Refills: 1 | Status: ON HOLD | OUTPATIENT
Start: 2019-07-12 | End: 2019-08-02 | Stop reason: HOSPADM

## 2019-07-12 RX ORDER — POLYETHYLENE GLYCOL 3350 17 G/17G
17 POWDER, FOR SOLUTION ORAL DAILY
Status: DISCONTINUED | OUTPATIENT
Start: 2019-07-12 | End: 2019-07-12 | Stop reason: HOSPADM

## 2019-07-12 RX ADMIN — PANTOPRAZOLE SODIUM 40 MG: 40 TABLET, DELAYED RELEASE ORAL at 05:19

## 2019-07-12 RX ADMIN — CITALOPRAM HYDROBROMIDE 40 MG: 20 TABLET ORAL at 09:22

## 2019-07-12 RX ADMIN — FLECAINIDE ACETATE 100 MG: 100 TABLET ORAL at 09:22

## 2019-07-12 RX ADMIN — MELOXICAM 7.5 MG: 15 TABLET ORAL at 09:22

## 2019-07-12 RX ADMIN — POLYETHYLENE GLYCOL (3350) 17 G: 17 POWDER, FOR SOLUTION ORAL at 16:45

## 2019-07-12 RX ADMIN — Medication 10 ML: at 09:29

## 2019-07-12 RX ADMIN — LEVOTHYROXINE SODIUM 125 MCG: 125 TABLET ORAL at 05:19

## 2019-07-12 RX ADMIN — FLUTICASONE PROPIONATE 2 SPRAY: 50 SPRAY, METERED NASAL at 09:26

## 2019-07-12 RX ADMIN — METOPROLOL TARTRATE 25 MG: 25 TABLET ORAL at 09:22

## 2019-07-12 ASSESSMENT — PAIN SCALES - GENERAL: PAINLEVEL_OUTOF10: 0

## 2019-07-12 NOTE — PROGRESS NOTES
4 Eyes Skin Assessment     The patient is being assess for   Admission    I agree that 2 RN's have performed a thorough Head to Toe Skin Assessment on the patient. ALL assessment sites listed below have been assessed. Areas assessed by both nurses:   [x]   Head, Face, and Ears   [x]   Shoulders, Back, and Chest, Abdomen  [x]   Arms, Elbows, and Hands   [x]   Coccyx, Sacrum, and Ischium  [x]   Legs, Feet, and Heels        No skin issues    **SHARE this note so that the co-signing nurse is able to place an eSignature**    Co-signer eSignature: Electronically signed by Mirtha Davies RN on 7/11/19 at 6:34 PM    Does the Patient have Skin Breakdown?   No          Abdiaziz Prevention initiated:  No   Wound Care Orders initiated:  No      [facility] nurse consulted for Pressure Injury (Stage 3,4, Unstageable, DTI, NWPT, Complex wounds)and New or Established Ostomies:  No      Primary Nurse eSignature: Electronically signed by Marce Up RN on 7/11/19 at 6:33 PM
Pt in bed, awake, A/O X4. Shift assessment complete, night meds given. No C/O pain. metoprolol held at this time HR 58 . No other needs expressed. Call light in reach. Will monitor.  Valeria Carmen
hydroxide, ondansetron, acetaminophen    Lab Data:  CBC:   Recent Labs     19  0820   WBC 6.1   HGB 15.1   HCT 44.4   MCV 90.6        BMP:   Recent Labs     19  0820      K 4.4   CL 99   CO2 24   BUN 19   CREATININE 0.8     LIVER PROFILE:   Recent Labs     19  0820   AST 28   ALT 23   BILITOT 0.8   ALKPHOS 63     PT/INR: No results for input(s): PROTIME, INR in the last 72 hours. APTT: No results for input(s): APTT in the last 72 hours. BNP:  No results for input(s): BNP in the last 72 hours. IMAGING:     I have reviewed the following tests and documented in this encounter as follows:      EK19 I have reviewed EKG with the following interpretation:  Impression:  Atrial fibrillation with rapid ventricular responseModerate voltage criteria for LVH, may be normal variantST & T wave abnormality, consider inferolateral ischemia or digitalis effectAbnormal ECGWhen compared with ECG of8.29.09 no significant changesConfirmed by Roosevelt Greene MD, [address] () on 2019 10:21:19 AM     CXR 19  No significant findings in the chest.   FINDINGS: The heart, mediastinum and pulmonary vascularity are normal.  Lungs are well-expanded and clear. No skeletal abnormalities are present in the chest.      ECHO 8/10/16  Summary   Left ventricular size is dilated. There is mild concentric left ventricular  hypertrophy. Ejection fraction is visually estimated to be 50%. No regional wall motion abnormalities are noted. Mildly dilated left atrium.   The aortic valve area is calculated at 1.37 cm2 with a maximum pressure gradient of 24 mmHg and a mean pressure gradient of 15 mmHg and a peak   systolic velocity of 314YV/SON. This is c/w mild aortic stenosis. Mild aortic regurgitation is present.   Trivial mitral regurgitation is present.     Stress test 11  IMPRESSION:    No findings of pharmacologic stress induced reversible ischemia.  Mild inferior diaphragm attenuation artifact noted  SPECT

## 2019-07-12 NOTE — DISCHARGE SUMMARY
Name:  Priti Zimmerman  Room:  /6033-63  MRN:    [id number]    Discharge Summary      This discharge summary is in conjunction with a complete physical exam done on the day of discharge. Discharging Physician: Dr. Tevin Acevedo: 2019  Discharge:   2019    HPI taken from admission H&P:    The patient is a 71 y.o. female with mild aortic stenosis, hx of breast cancer, GERD, HTN, hypothyroidism, JINA who presented to [facility] ED with complaint of palpitations. Patient reports that she was at home, in the shower when she suddenly noticed palpitations. They continued for a while and she felt significantly short of breath as well. She reports that she has felt this in the past ~15 + years ago. She was started on Metoprolol at that time and took it for years. She states that she went to a new PCP 2 years ago and was referred to a cardiologist, as she did not carry a dx for the metoprolol, and the cardiologist cleared her and stopped the BB. She reports that she would still get some mild palpitations every now and then, but they wouldn't last very long and she would never feel short of breath with them. She reports that she had no associated CP. She reports that she is already feeling significantly better. Diagnoses this Admission and Hospital Course   New onset Atrial fibrillation with RVR   - Noted on EKG in ED, started on diltiazem bolus and gtt   - Highest rates reported in to the 140's, now down to the 100 (was in the 80's in the ED)  - [id number] score 3, full dose Lovenox started-->D/c home on Eliquis   - Cardiology consult--> pt had been on BB in the past,  Restarted on  metoprolol 25 mg BID,  Flecainide added   - Echo- see results below , Mild decrease in EF  - TSH , mildly elevated at 4.25  - Continued telemetry monitoring   --> stable today . D/c home on metoprolol, Eliquis and flecainide   - Follow up with cardiology OP       HTN   - off  HCTZ  - BB added for atrial fibrillation . BP stable      Hypothyroidism   - Continue synthroid   - TSH is mildly elevated at 4.25 with Free T4 wnl      Hemorrhoids  - with some rectal bleed . On anusol HC suppository, add miralax. Needs close monitoring for GI bleed while on anticoagulation     Hx of breast cancer   - reports dx in 2009   - Underwent chemo and radiation and R lumpectomy   - completed 5 years of Arimidex      JINA  - home CPAP      GERD  - Continue PPI      Hyperglycemia   -  on admission   - no hx of DM   - Check Hgb A1c--6. 1     Morbid Obesity  - Body mass index is 43.58 kg/m². - Complicating assessment and treatment. Placing patient at risk for multiple co-morbidities as well as early death and contributing to the patient's presentation.   - Counseled on weight loss. [id number] Score for Atrial Fibrillation Stroke Risk    Risk   Factors   Component Value   C CHF No 0   H HTN Yes 1   A2 Age >= 76 No,  (65 y.o.) 0   D DM No 0   S2 Prior Stroke/TIA No 0   V Vascular Disease No 0   A Age 74-69 Yes,  (65 y.o.) 1   Sc Sex female 1     [id number]  Score   3   Score last updated 7/11/19 10:50 AM      Procedures (Please Review Full Report for Details)  Echo    Consults    Cardiology      Physical Exam at Discharge:    /79   Pulse 66   Temp 98 °F (36.7 °C) (Oral)   Resp 16   Ht 5' 6\" (1.676 m)   Wt 273 lb (123.8 kg)   LMP  (LMP Unknown)   SpO2 97%   BMI 44.06 kg/m²   Gen: No distress. Alert. Eyes: PERRL. No sclera icterus. No conjunctival injection. ENT: No discharge. Pharynx clear. Neck: No JVD. No Carotid Bruit. Trachea midline. Resp: No accessory muscle use. No crackles. No wheezes. No rhonchi. CV: Irregular rate and rhythm. No murmur. No rub. trace edema. Capillary Refill: Brisk,< 3 seconds   Peripheral Pulses: +2 palpable, equal bilaterally   GI: Non-tender. Non-distended. No masses. No organomegaly. Normal bowel sounds. No hernia. Skin: Warm and dry. No nodule on exposed extremities.  No rash on exposed extremities. M/S: No cyanosis. No joint deformity. No clubbing. Neuro: Awake. Grossly nonfocal    Psych: Oriented x 3. No anxiety or agitation. CBC:   Recent Labs     07/11/19 0820 07/12/19 0515   WBC 6.1 6.7   HGB 15.1 13.6   HCT 44.4 40.6   MCV 90.6 91.9    221     BMP:   Recent Labs     07/11/19 0820 07/12/19 0515    139   K 4.4 3.7   CL 99 99   CO2 24 28   BUN 19 23*   CREATININE 0.8 1.0     LIVER PROFILE:   Recent Labs     07/11/19 0820   AST 28   ALT 23   BILITOT 0.8   ALKPHOS 63     UA:  Recent Labs     07/11/19 0835   COLORU Yellow   PHUR 8.0   CLARITYU Clear   SPECGRAV 1.010   LEUKOCYTESUR Negative   UROBILINOGEN 0.2   BILIRUBINUR Negative   BLOODU Negative   GLUCOSEU Negative     CULTURES  none    RADIOLOGY  XR CHEST STANDARD (2 VW)   Final Result   No significant findings in the chest.              ECHO   Summary   Left ventricular systolic function is reduced with ejection fraction   estimated at 48-50 %.   Mild global hypokinesis is present.  Nicolas Fee is mild concentric left ventricular hypertrophy.   Normal left ventricular diastolic filling pressure.   Mild mitral regurgitation.   Aortic sclerosis vs mild aortic stenosis.   Mild aortic regurgitation is present.   Normal systolic pulmonary artery pressure (SPAP) estimated at 29 mmHg (RA   pressure 8 mmHg). Discharge Medications     Medication List      START taking these medications    apixaban 5 MG Tabs tablet  Commonly known as:  ELIQUIS  Take 1 tablet by mouth 2 times daily     atorvastatin 40 MG tablet  Commonly known as:  LIPITOR  Take 1 tablet by mouth nightly     flecainide 100 MG tablet  Commonly known as:  TAMBOCOR  Take 1 tablet by mouth every 12 hours     hydrocortisone 2.5 % rectal cream  Commonly known as:  ANUSOL-HC  Place rectally 2 times daily.      metoprolol tartrate 25 MG tablet  Commonly known as:  LOPRESSOR  Take 1 tablet by mouth 2 times daily     polyethylene glycol packet  Commonly known

## 2019-07-12 NOTE — CARE COORDINATION
DISCHARGE ORDER  Date/Time 2019 2:36 PM  Completed by: Jh Hernandez, Case Management    Patient Name: Isabel Armstrong    : 1955  Admitting Diagnosis: Atrial fibrillation with RVR ([address] [I48.91]  Admit Date/Time: 2019  8:07 AM    Noted discharge order. Confirmed discharge plan with patient: Yes   Discharge Plan: Order for dc noted. Noted pt to dc on Eliquis. Spoke with pt who would like all new meds thru meds to beds. Discussed 30 day free supply of Eliquis and pt provided with 10$ co pay card. Spoke with Randi George in [address] [place] [address] re: Eliquis Co pay and Randi George will check cost and return call to writer. [facility]. Chart reviewed and no other dc needs identified. Discharge timeout done  with nsg, CM and pt. All discharge needs and concerns addressed.

## 2019-07-13 ENCOUNTER — CARE COORDINATION (OUTPATIENT)
Dept: CASE MANAGEMENT | Age: 64
End: 2019-07-13

## 2019-07-14 ENCOUNTER — CARE COORDINATION (OUTPATIENT)
Dept: CASE MANAGEMENT | Age: 64
End: 2019-07-14

## 2019-07-14 DIAGNOSIS — R00.2 HEART PALPITATIONS: Primary | ICD-10-CM

## 2019-07-15 ENCOUNTER — TELEPHONE (OUTPATIENT)
Dept: CARDIOLOGY CLINIC | Age: 64
End: 2019-07-15

## 2019-07-15 ENCOUNTER — CARE COORDINATION (OUTPATIENT)
Dept: CASE MANAGEMENT | Age: 64
End: 2019-07-15

## 2019-07-16 ENCOUNTER — CARE COORDINATION (OUTPATIENT)
Dept: CASE MANAGEMENT | Age: 64
End: 2019-07-16

## 2019-07-16 DIAGNOSIS — I20.0 UNSTABLE ANGINA PECTORIS (HCC): Primary | ICD-10-CM

## 2019-07-17 ENCOUNTER — HOSPITAL ENCOUNTER (EMERGENCY)
Age: 64
Discharge: HOME | End: 2019-07-17
Attending: EMERGENCY MEDICINE
Payer: COMMERCIAL

## 2019-07-17 ENCOUNTER — APPOINTMENT (OUTPATIENT)
Dept: GENERAL RADIOLOGY | Age: 64
End: 2019-07-17
Payer: COMMERCIAL

## 2019-07-17 VITALS
SYSTOLIC BLOOD PRESSURE: 161 MMHG | RESPIRATION RATE: 20 BRPM | OXYGEN SATURATION: 97 % | TEMPERATURE: 98.3 F | BODY MASS INDEX: 43.58 KG/M2 | WEIGHT: 270 LBS | HEART RATE: 106 BPM | DIASTOLIC BLOOD PRESSURE: 113 MMHG

## 2019-07-17 DIAGNOSIS — I48.91 ATRIAL FIBRILLATION WITH RVR (HCC): Primary | ICD-10-CM

## 2019-07-17 LAB
A/G RATIO: 1.2 (ref 1.1–2.2)
ALBUMIN SERPL-MCNC: 3.9 G/DL (ref 3.4–5)
ALP BLD-CCNC: 61 U/L (ref 40–129)
ALT SERPL-CCNC: 31 U/L (ref 10–40)
ANION GAP SERPL CALCULATED.3IONS-SCNC: 12 MMOL/L (ref 3–16)
AST SERPL-CCNC: 17 U/L (ref 15–37)
BASOPHILS ABSOLUTE: 0 K/UL (ref 0–0.2)
BASOPHILS RELATIVE PERCENT: 0.8 %
BILIRUB SERPL-MCNC: 0.9 MG/DL (ref 0–1)
BUN BLDV-MCNC: 13 MG/DL (ref 7–20)
CALCIUM SERPL-MCNC: 9.4 MG/DL (ref 8.3–10.6)
CHLORIDE BLD-SCNC: 105 MMOL/L (ref 99–110)
CO2: 23 MMOL/L (ref 21–32)
CREAT SERPL-MCNC: 0.7 MG/DL (ref 0.6–1.2)
EKG ATRIAL RATE: 111 BPM
EKG DIAGNOSIS: NORMAL
EKG Q-T INTERVAL: 368 MS
EKG QRS DURATION: 108 MS
EKG QTC CALCULATION (BAZETT): 502 MS
EKG R AXIS: 0 DEGREES
EKG T AXIS: 187 DEGREES
EKG VENTRICULAR RATE: 112 BPM
EOSINOPHILS ABSOLUTE: 0.1 K/UL (ref 0–0.6)
EOSINOPHILS RELATIVE PERCENT: 0.9 %
GFR AFRICAN AMERICAN: >60
GFR NON-AFRICAN AMERICAN: >60
GLOBULIN: 3.3 G/DL
GLUCOSE BLD-MCNC: 141 MG/DL (ref 70–99)
HCT VFR BLD CALC: 42.8 % (ref 36–48)
HEMOGLOBIN: 14.5 G/DL (ref 12–16)
LYMPHOCYTES ABSOLUTE: 1.4 K/UL (ref 1–5.1)
LYMPHOCYTES RELATIVE PERCENT: 24.7 %
MCH RBC QN AUTO: 30.7 PG (ref 26–34)
MCHC RBC AUTO-ENTMCNC: 33.9 G/DL (ref 31–36)
MCV RBC AUTO: 90.6 FL (ref 80–100)
MONOCYTES ABSOLUTE: 0.6 K/UL (ref 0–1.3)
MONOCYTES RELATIVE PERCENT: 9.6 %
NEUTROPHILS ABSOLUTE: 3.7 K/UL (ref 1.7–7.7)
NEUTROPHILS RELATIVE PERCENT: 64 %
PDW BLD-RTO: 13.5 % (ref 12.4–15.4)
PLATELET # BLD: 229 K/UL (ref 135–450)
PMV BLD AUTO: 8.1 FL (ref 5–10.5)
POTASSIUM SERPL-SCNC: 3.8 MMOL/L (ref 3.5–5.1)
RBC # BLD: 4.73 M/UL (ref 4–5.2)
SODIUM BLD-SCNC: 140 MMOL/L (ref 136–145)
TOTAL PROTEIN: 7.2 G/DL (ref 6.4–8.2)
TROPONIN: <0.01 NG/ML
WBC # BLD: 5.8 K/UL (ref 4–11)

## 2019-07-17 PROCEDURE — 2500000003 HC RX 250 WO HCPCS: Performed by: EMERGENCY MEDICINE

## 2019-07-17 PROCEDURE — 80053 COMPREHEN METABOLIC PANEL: CPT

## 2019-07-17 PROCEDURE — 85025 COMPLETE CBC W/AUTO DIFF WBC: CPT

## 2019-07-17 PROCEDURE — 93005 ELECTROCARDIOGRAM TRACING: CPT | Performed by: EMERGENCY MEDICINE

## 2019-07-17 PROCEDURE — 96374 THER/PROPH/DIAG INJ IV PUSH: CPT

## 2019-07-17 PROCEDURE — 6370000000 HC RX 637 (ALT 250 FOR IP): Performed by: EMERGENCY MEDICINE

## 2019-07-17 PROCEDURE — 99285 EMERGENCY DEPT VISIT HI MDM: CPT

## 2019-07-17 PROCEDURE — 71046 X-RAY EXAM CHEST 2 VIEWS: CPT

## 2019-07-17 PROCEDURE — 84484 ASSAY OF TROPONIN QUANT: CPT

## 2019-07-17 PROCEDURE — 93010 ELECTROCARDIOGRAM REPORT: CPT | Performed by: INTERNAL MEDICINE

## 2019-07-17 RX ORDER — DILTIAZEM HYDROCHLORIDE 5 MG/ML
20 INJECTION INTRAVENOUS ONCE
Status: COMPLETED | OUTPATIENT
Start: 2019-07-17 | End: 2019-07-17

## 2019-07-17 RX ORDER — DILTIAZEM HYDROCHLORIDE 120 MG/1
120 CAPSULE, COATED, EXTENDED RELEASE ORAL DAILY
Qty: 30 CAPSULE | Refills: 0 | Status: SHIPPED | OUTPATIENT
Start: 2019-07-17 | End: 2019-07-29 | Stop reason: SDUPTHER

## 2019-07-17 RX ORDER — DILTIAZEM HYDROCHLORIDE 120 MG/1
120 CAPSULE, COATED, EXTENDED RELEASE ORAL ONCE
Status: COMPLETED | OUTPATIENT
Start: 2019-07-17 | End: 2019-07-17

## 2019-07-17 RX ADMIN — DILTIAZEM HYDROCHLORIDE 20 MG: 5 INJECTION INTRAVENOUS at 12:30

## 2019-07-17 RX ADMIN — DILTIAZEM HYDROCHLORIDE 120 MG: 120 CAPSULE, COATED, EXTENDED RELEASE ORAL at 13:43

## 2019-07-17 NOTE — ED PROVIDER NOTES
11/15/18   Kirsten Folres MD   citalopram (CELEXA) 40 MG tablet TAKE ONE TABLET BY MOUTH ONE TIME A DAY 11/15/18   Kirsten Flores MD   Mirabegron ER (MYRBETRIQ) 50 MG TB24 Take 50 mg by mouth daily  Patient taking differently: Take 1 tablet by mouth nightly  11/15/18   Kirsten Flores MD   cetirizine (ZYRTEC) 10 MG tablet Take 10 mg by mouth 2 times daily     Historical Provider, MD   multivitamin [facility]) per tablet Take 1 tablet by mouth daily. Historical Provider, MD       Social history:  reports that she quit smoking about 47 years ago. Her smoking use included cigarettes. She has a 0.38 pack-year smoking history. She has never used smokeless tobacco. She reports that she drinks alcohol. She reports that she does not use drugs.     Family history:    Family History   Problem Relation Age of Onset    Cancer Mother         breast and cervical    Heart Disease Father     Cancer Father 61        skin cancer    Cancer Maternal Grandmother         breast    Cancer Paternal Grandmother         breast    No Known Problems Sister     No Known Problems Brother     No Known Problems Maternal Aunt     No Known Problems Maternal Uncle     No Known Problems Paternal Aunt     No Known Problems Paternal Uncle     No Known Problems Maternal Grandfather     No Known Problems Paternal Grandfather     Cancer Other         neice- head, not sure what kind skin ca    Asthma Neg Hx     Diabetes Neg Hx     Emphysema Neg Hx     Heart Failure Neg Hx     Hypertension Neg Hx     Rheum Arthritis Neg Hx     Osteoarthritis Neg Hx     Breast Cancer Neg Hx     High Cholesterol Neg Hx     Migraines Neg Hx     Ovarian Cancer Neg Hx     Rashes/Skin Problems Neg Hx     Seizures Neg Hx     Stroke Neg Hx     Thyroid Disease Neg Hx        Exam  ED Triage Vitals [07/17/19 1133]   BP Temp Temp Source Pulse Resp SpO2 Height Weight   139/84 98.3 °F (36.8 °C) Oral 114 20 97 % -- 270 lb (122.5 kg) Physical Exam   Constitutional: She is oriented to person, place, and time. She appears well-developed and well-nourished. No distress. HENT:   Head: Normocephalic and atraumatic. Eyes: Pupils are equal, round, and reactive to light. Conjunctivae and lids are normal. Right eye exhibits no discharge. Left eye exhibits no discharge. No scleral icterus. Neck: Neck supple. No tracheal deviation present. Cardiovascular: Normal heart sounds and intact distal pulses. An irregularly irregular rhythm present. Tachycardia present. No murmur heard. Pulmonary/Chest: Effort normal and breath sounds normal. No stridor. No respiratory distress. She has no wheezes. Abdominal: Soft. She exhibits no distension. There is no tenderness. There is no rebound and no guarding. Musculoskeletal: She exhibits no edema or deformity. Neurological: She is alert and oriented to person, place, and time. She has normal strength. She displays no tremor. She exhibits normal muscle tone. No facial droop, no slurred speech   Skin: Skin is warm and dry. No rash noted. She is not diaphoretic. No cyanosis. No pallor. Psychiatric: She has a normal mood and affect. Her speech is normal.   Nursing note and vitals reviewed. MDM/ED Course  ED Medication Orders (From admission, onward)    Start Ordered     Status Ordering Provider    07/17/19 1330 07/17/19 1319  diltiazem (CARDIZEM CD) extended release capsule 120 mg  ONCE      Last MAR action:  Given - by Jonny Graves on 07/17/19 at [address]     07/17/19 1215 07/17/19 1202  diltiazem injection 20 mg  ONCE      Last MAR action:  Given - by Jonny Graves on 07/17/19 at [address] [facility]          EKG  The Ekg interpreted by me in the absence of a cardiologist shows. atrial fibrillation with a rate of 112  Axis is   Normal  QTc is  normal  Intervals and Durations are unremarkable. No specific ST-T wave changes appreciated.   No evidence of acute provided. I estimate there is LOW risk for ACUTE CORONARY SYNDROME, INTRACRANIAL HEMORRHAGE, MALIGNANT DYSRHYTHMIA, MENINGITIS, PNEUMONIA, PULMONARY EMBOLISM, SEPSIS, SUBARACHNOID HEMORRHAGE, SUBDURAL HEMATOMA, or STROKE, thus I consider the discharge disposition reasonable. Aureliano Kirkland and I have discussed the diagnosis and risks, and we agree with discharging home to follow-up with cardiology. We also discussed returning to the Emergency Department immediately if new or worsening symptoms occur. We have discussed the symptoms which are most concerning (e.g., changing or worsening pain, weakness, vomiting, fever) that necessitate immediate return. Clinical Impression:  1. Atrial fibrillation with RVR (HCC)        Disposition:  Discharge to home in good condition. Patient was given scripts for the following medications. I counseled patient how to take these medications. Discharge Medication List as of 7/17/2019  2:24 PM      START taking these medications    Details   diltiazem (CARDIZEM CD) 120 MG extended release capsule Take 1 capsule by mouth daily, Disp-30 capsule, R-0Print             Disposition referral (if applicable):  Jeanette Cuellar, [facility]  [phone]    Schedule an appointment as soon as possible for a visit       This chart was generated in part by using Dragon Dictation system and may contain errors related to that system including errors in grammar, punctuation, and spelling, as well as words and phrases that may be inappropriate. If there are any questions or concerns please feel free to contact the dictating provider for clarification.      Ashli Gupta MD  [facility] Cristhian Iyer MD  07/17/19 3516

## 2019-07-17 NOTE — TELEPHONE ENCOUNTER
Spoke to Janice Stockton she was informed of Vertroiscan myoview stress test. Instructed to be NPO at midnight night before procedure. Central scheduling phone number given. She will schedule ASAP. Furthermore She was discharged from hospital 7/12/19 has not felt well therefore has not returned to work. She will return to work 7/18/19 and is requesting letter for missed days. Dr. Shania Guerra was informed, ok to give letter. Letter completed and printed will be at  awaiting pt .

## 2019-07-18 ENCOUNTER — CARE COORDINATION (OUTPATIENT)
Dept: CASE MANAGEMENT | Age: 64
End: 2019-07-18

## 2019-07-18 NOTE — CARE COORDINATION
[facility] Transitions Follow Up Call    2019    Patient: Tavia Cruz  Patient : 1955   MRN: [id number]  Reason for Admission: A Fib  Discharge Date: 19 RARS: Readmission Risk Score: 10       Spoke with: Jaz Negrete (patient)    Care Transitions Subsequent and Final Call    Schedule Follow Up Appointment with PCP:  Completed  Subsequent and Final Calls  Do you have any ongoing symptoms?:  Yes  Onset of Patient-reported symptoms:  Other  Patient-reported symptoms:  Weakness  Interventions for patient-reported symptoms:  Other  Have your medications changed?:  Yes  Do you have any questions related to your medications?:  No  Do you currently have any active services?:  No  Do you have any needs or concerns that I can assist you with?:  No  Identified Barriers: Other, Fear of Failure, Stress  Care Transitions Interventions   Medication Assistance Program:  Completed     Other Interventions:          Patient had ED visit yesterday and found in A Fib. Started on diltiazem and Dc to home. Hopeful for cardioversion once anticoagulated well. Today c/o continued weakness. Has BP cuff at home. Encouraged her to monitor BP and P daily and log. Take log to f/up appts. Report CP, palpitations, SOB, elevated BP/HR to MD same day. States she felt bad since Monday, felt like an allergic rxn, started with aura of migraine without headache. Denies symptoms today. Feels diltiazem is working. Denies needs at this time. Thankful for CTN follow up and was encouraged to contact for needs/concerns. Also encouraged her to contact HR about FMLA. She states she did this morning. Care transition following.      Future Appointments   Date Time Provider Pelon Whitfield   2019  8:00 AM [address] [facility] [address] STRESS None   2019  8:00 AM [facility] NM CARDIAC CAMERA 2 [facility] NM [place] [facility]   2019  8:30 AM [facility] NM CARDIAC CAMERA 2 [facility] NM [place] [facility]   8/15/2019  1:45 PM Luiza Dent MD P Ana Salazar

## 2019-07-25 ENCOUNTER — HOSPITAL ENCOUNTER (OUTPATIENT)
Dept: CARDIOLOGY | Age: 64
Discharge: HOME | End: 2019-07-25
Payer: COMMERCIAL

## 2019-07-25 ENCOUNTER — HOSPITAL ENCOUNTER (OUTPATIENT)
Dept: NUCLEAR MEDICINE | Age: 64
Discharge: HOME | End: 2019-07-25
Payer: COMMERCIAL

## 2019-07-25 DIAGNOSIS — I20.0 UNSTABLE ANGINA PECTORIS (HCC): ICD-10-CM

## 2019-07-25 LAB
LV EF: 61 %
LVEF MODALITY: NORMAL

## 2019-07-25 PROCEDURE — 93017 CV STRESS TEST TRACING ONLY: CPT

## 2019-07-25 PROCEDURE — 78452 HT MUSCLE IMAGE SPECT MULT: CPT

## 2019-07-25 PROCEDURE — 6360000002 HC RX W HCPCS: Performed by: INTERNAL MEDICINE

## 2019-07-25 PROCEDURE — 3430000000 HC RX DIAGNOSTIC RADIOPHARMACEUTICAL: Performed by: INTERNAL MEDICINE

## 2019-07-25 PROCEDURE — A9502 TC99M TETROFOSMIN: HCPCS | Performed by: INTERNAL MEDICINE

## 2019-07-25 RX ADMIN — TETROFOSMIN 10.4 MILLICURIE: 1.38 INJECTION, POWDER, LYOPHILIZED, FOR SOLUTION INTRAVENOUS at 08:40

## 2019-07-25 RX ADMIN — TETROFOSMIN 30.7 MILLICURIE: 1.38 INJECTION, POWDER, LYOPHILIZED, FOR SOLUTION INTRAVENOUS at 09:53

## 2019-07-25 RX ADMIN — REGADENOSON 0.4 MG: 0.08 INJECTION, SOLUTION INTRAVENOUS at 09:53

## 2019-07-26 ENCOUNTER — CARE COORDINATION (OUTPATIENT)
Dept: CASE MANAGEMENT | Age: 64
End: 2019-07-26

## 2019-07-29 ENCOUNTER — OFFICE VISIT (OUTPATIENT)
Dept: FAMILY MEDICINE CLINIC | Age: 64
End: 2019-07-29
Payer: COMMERCIAL

## 2019-07-29 ENCOUNTER — TELEPHONE (OUTPATIENT)
Dept: CARDIOLOGY CLINIC | Age: 64
End: 2019-07-29

## 2019-07-29 VITALS
BODY MASS INDEX: 44.06 KG/M2 | DIASTOLIC BLOOD PRESSURE: 80 MMHG | OXYGEN SATURATION: 99 % | WEIGHT: 273 LBS | SYSTOLIC BLOOD PRESSURE: 130 MMHG | HEART RATE: 67 BPM

## 2019-07-29 DIAGNOSIS — Z76.89 ENCOUNTER TO ESTABLISH CARE: Primary | ICD-10-CM

## 2019-07-29 DIAGNOSIS — N39.41 URGE INCONTINENCE: ICD-10-CM

## 2019-07-29 DIAGNOSIS — I10 ESSENTIAL HYPERTENSION: ICD-10-CM

## 2019-07-29 DIAGNOSIS — Z86.011 H/O MENINGIOMA OF THE BRAIN: ICD-10-CM

## 2019-07-29 DIAGNOSIS — K21.9 CHRONIC GERD: ICD-10-CM

## 2019-07-29 DIAGNOSIS — Z23 NEED FOR TDAP VACCINATION: ICD-10-CM

## 2019-07-29 DIAGNOSIS — E78.2 MIXED HYPERLIPIDEMIA: ICD-10-CM

## 2019-07-29 DIAGNOSIS — E03.9 ACQUIRED HYPOTHYROIDISM: ICD-10-CM

## 2019-07-29 DIAGNOSIS — Z23 NEED FOR PNEUMOCOCCAL VACCINATION: ICD-10-CM

## 2019-07-29 DIAGNOSIS — I48.91 ATRIAL FIBRILLATION, UNSPECIFIED TYPE (HCC): ICD-10-CM

## 2019-07-29 PROCEDURE — 99214 OFFICE O/P EST MOD 30 MIN: CPT | Performed by: PHYSICIAN ASSISTANT

## 2019-07-29 PROCEDURE — 90471 IMMUNIZATION ADMIN: CPT | Performed by: PHYSICIAN ASSISTANT

## 2019-07-29 PROCEDURE — 90670 PCV13 VACCINE IM: CPT | Performed by: PHYSICIAN ASSISTANT

## 2019-07-29 PROCEDURE — 90715 TDAP VACCINE 7 YRS/> IM: CPT | Performed by: PHYSICIAN ASSISTANT

## 2019-07-29 PROCEDURE — 90472 IMMUNIZATION ADMIN EACH ADD: CPT | Performed by: PHYSICIAN ASSISTANT

## 2019-07-29 RX ORDER — DILTIAZEM HYDROCHLORIDE 120 MG/1
120 CAPSULE, COATED, EXTENDED RELEASE ORAL DAILY
Qty: 90 CAPSULE | Refills: 0 | Status: ON HOLD | OUTPATIENT
Start: 2019-07-29 | End: 2019-08-02 | Stop reason: HOSPADM

## 2019-07-29 RX ORDER — RANITIDINE 150 MG/1
150 TABLET ORAL 2 TIMES DAILY PRN
Qty: 180 TABLET | Refills: 0 | Status: SHIPPED | OUTPATIENT
Start: 2019-07-29 | End: 2019-10-08

## 2019-07-29 ASSESSMENT — ENCOUNTER SYMPTOMS
VOMITING: 0
CHEST TIGHTNESS: 0
CONSTIPATION: 0
VOICE CHANGE: 0
DIARRHEA: 0
WHEEZING: 0
ABDOMINAL PAIN: 0
SHORTNESS OF BREATH: 1
BLOOD IN STOOL: 0
TROUBLE SWALLOWING: 0
COUGH: 0
NAUSEA: 0

## 2019-07-29 NOTE — PROGRESS NOTES
2019    Melisa Shields (:  1955) is a 71 y.o. female, here for evaluation of the following medical concerns:    HPI  Treatment Adherence:   Medication compliance:  compliant all of the time  Diet compliance:  compliant most of the time  Weight trend: stable  Current exercise: no regular exercise  Barriers: current issues with heart    Hypertension:  Home blood pressure monitoring: No. Patient complains of shortness of breath. Antihypertensive medication side effects: no medication side effects noted. Use of agents associated with hypertension: thyroid hormones. Sodium (mmol/L)   Date Value   2019 140    BUN (mg/dL)   Date Value   2019 13    Glucose (mg/dL)   Date Value   2019 141 (H)   2017 97      Potassium (mmol/L)   Date Value   2019 3.8     Potassium reflex Magnesium (mmol/L)   Date Value   2019 3.7    CREATININE (mg/dL)   Date Value   2019 0.7         Hyperlipidemia:  No new myalgias or GI upset on atorvastatin (Lipitor). Lab Results   Component Value Date    CHOL 206 (H) 2019    TRIG 177 (H) 2019    HDL 46 2019    LDLCALC 125 (H) 2019     Lab Results   Component Value Date    ALT 31 2019    AST 17 2019        Hypothyroidism: Recent symptoms: palpitations. She denies weight gain, weight loss, cold intolerance, heat intolerance, constipation, diarrhea and anxiety. Patient is  taking her medication consistently on an empty stomach. Has had a mika-thyroidectomy due to nodule. Pt reports that it was non-cancerous. Lab Results   Component Value Date    TSHREFLEX 4.25 2019    TSHREFLEX 3.35 2016    TSHREFLEX 3.43 2016     Lab Results   Component Value Date    TSH 3.52 11/15/2018    TSH 4.16 12/15/2017    TSH 3.43 2016     Atrial Fibrillation:  Following with Dr. Arabella Robles in cardiology. Diagnosed with Atrial fibrillation on .  Currently taking

## 2019-07-30 ENCOUNTER — CARE COORDINATION (OUTPATIENT)
Dept: CASE MANAGEMENT | Age: 64
End: 2019-07-30

## 2019-07-30 RX ORDER — PREDNISONE 20 MG/1
40 TABLET ORAL EVERY 8 HOURS
Qty: 8 TABLET | Refills: 0 | Status: SHIPPED | OUTPATIENT
Start: 2019-07-30 | End: 2019-08-01

## 2019-07-30 RX ORDER — FAMOTIDINE 20 MG/1
20 TABLET, FILM COATED ORAL 2 TIMES DAILY
Qty: 3 TABLET | Refills: 0 | Status: ON HOLD | OUTPATIENT
Start: 2019-07-30 | End: 2019-08-02 | Stop reason: HOSPADM

## 2019-07-30 RX ORDER — DIPHENHYDRAMINE HCL 25 MG
25 TABLET ORAL 2 TIMES DAILY
Qty: 3 TABLET | Refills: 0 | Status: SHIPPED | OUTPATIENT
Start: 2019-07-30 | End: 2019-08-01

## 2019-07-30 NOTE — CARE COORDINATION
[facility] Transitions FINAL Call    2019    Patient: Natalie Mcdaniel  Patient : 1955   MRN: [id number]  Reason for Admission: A Fib  Discharge Date: 19 RARS: Readmission Risk Score: 10         Spoke with: Alicia Small (patient)    Care Transitions Subsequent and Final Call    Schedule Follow Up Appointment with PCP:  Completed  Subsequent and Final Calls  Do you have any ongoing symptoms?:  No  Have your medications changed?:  Yes  Do you have any questions related to your medications?:  No  Do you currently have any active services?:  No  Do you have any needs or concerns that I can assist you with?:  No  Identified Barriers:  None  Care Transitions Interventions  No Identified Needs   Medication Assistance Program:  Completed     Other Interventions:          Established with [facility] PCP and very pleased with experience. Having LHC at [facility] Friday. Aware of instructions. Denies needs/concerns today. Encouraged her to call if anything comes up. Care transition outreach complete.      Follow Up  Future Appointments   Date Time Provider Pelon Whitfield   2019  8:00 AM SCHEDULE, [facility] CATH LAB ROOM 1 [facility] CATH None   2019  7:30 AM MHC CT MAIN [facility] [place] [facility]   8/15/2019  1:45 PM Mary Matos MD [facility] CLER CAR MMA   2019  2:15 PM MD José Manuel Saenz   10/24/2019  9:00 AM Rain Miranda DO [facility]   2020  6:00 PM KADEN Ace MMA   3/9/2020  5:00 PM Rain Miranda DO [facility] MMA       Claudette Paula RN

## 2019-08-02 ENCOUNTER — HOSPITAL ENCOUNTER (OUTPATIENT)
Dept: CARDIAC CATH/INVASIVE PROCEDURES | Age: 64
Discharge: HOME | End: 2019-08-02
Attending: INTERNAL MEDICINE
Payer: COMMERCIAL

## 2019-08-02 VITALS — WEIGHT: 275 LBS | HEIGHT: 66 IN | BODY MASS INDEX: 44.2 KG/M2

## 2019-08-02 DIAGNOSIS — I42.9 CARDIOMYOPATHY, UNSPECIFIED TYPE (HCC): Primary | ICD-10-CM

## 2019-08-02 LAB
ANION GAP SERPL CALCULATED.3IONS-SCNC: 12 MMOL/L (ref 3–16)
BUN BLDV-MCNC: 21 MG/DL (ref 7–20)
CALCIUM SERPL-MCNC: 10 MG/DL (ref 8.3–10.6)
CHLORIDE BLD-SCNC: 102 MMOL/L (ref 99–110)
CHOLESTEROL, TOTAL: 146 MG/DL (ref 0–199)
CO2: 25 MMOL/L (ref 21–32)
CREAT SERPL-MCNC: 0.8 MG/DL (ref 0.6–1.2)
EKG ATRIAL RATE: 73 BPM
EKG DIAGNOSIS: NORMAL
EKG P AXIS: 62 DEGREES
EKG P-R INTERVAL: 198 MS
EKG Q-T INTERVAL: 422 MS
EKG QRS DURATION: 124 MS
EKG QTC CALCULATION (BAZETT): 464 MS
EKG R AXIS: -4 DEGREES
EKG T AXIS: 197 DEGREES
EKG VENTRICULAR RATE: 73 BPM
GFR AFRICAN AMERICAN: >60
GFR NON-AFRICAN AMERICAN: >60
GLUCOSE BLD-MCNC: 161 MG/DL (ref 70–99)
HCT VFR BLD CALC: 41.6 % (ref 36–48)
HDLC SERPL-MCNC: 58 MG/DL (ref 40–60)
HEMOGLOBIN: 14.2 G/DL (ref 12–16)
INR BLD: 1.21 (ref 0.86–1.14)
LDL CHOLESTEROL CALCULATED: 78 MG/DL
LEFT VENTRICULAR EJECTION FRACTION HIGH VALUE: 30 %
LEFT VENTRICULAR EJECTION FRACTION MODE: NORMAL
LV EF: 25 %
MCH RBC QN AUTO: 30.8 PG (ref 26–34)
MCHC RBC AUTO-ENTMCNC: 34.1 G/DL (ref 31–36)
MCV RBC AUTO: 90.3 FL (ref 80–100)
PDW BLD-RTO: 13.3 % (ref 12.4–15.4)
PLATELET # BLD: 288 K/UL (ref 135–450)
PMV BLD AUTO: 7.6 FL (ref 5–10.5)
POTASSIUM SERPL-SCNC: 4 MMOL/L (ref 3.5–5.1)
PROTHROMBIN TIME: 13.8 SEC (ref 9.8–13)
RBC # BLD: 4.61 M/UL (ref 4–5.2)
SODIUM BLD-SCNC: 139 MMOL/L (ref 136–145)
TRIGL SERPL-MCNC: 51 MG/DL (ref 0–150)
VLDLC SERPL CALC-MCNC: 10 MG/DL
WBC # BLD: 13 K/UL (ref 4–11)

## 2019-08-02 PROCEDURE — 99152 MOD SED SAME PHYS/QHP 5/>YRS: CPT | Performed by: INTERNAL MEDICINE

## 2019-08-02 PROCEDURE — 93458 L HRT ARTERY/VENTRICLE ANGIO: CPT

## 2019-08-02 PROCEDURE — 85027 COMPLETE CBC AUTOMATED: CPT

## 2019-08-02 PROCEDURE — 93458 L HRT ARTERY/VENTRICLE ANGIO: CPT | Performed by: INTERNAL MEDICINE

## 2019-08-02 PROCEDURE — 80048 BASIC METABOLIC PNL TOTAL CA: CPT

## 2019-08-02 PROCEDURE — C1894 INTRO/SHEATH, NON-LASER: HCPCS

## 2019-08-02 PROCEDURE — 6370000000 HC RX 637 (ALT 250 FOR IP)

## 2019-08-02 PROCEDURE — 99152 MOD SED SAME PHYS/QHP 5/>YRS: CPT

## 2019-08-02 PROCEDURE — 93010 ELECTROCARDIOGRAM REPORT: CPT | Performed by: INTERNAL MEDICINE

## 2019-08-02 PROCEDURE — 2580000003 HC RX 258

## 2019-08-02 PROCEDURE — 6360000002 HC RX W HCPCS

## 2019-08-02 PROCEDURE — 85610 PROTHROMBIN TIME: CPT

## 2019-08-02 PROCEDURE — 93005 ELECTROCARDIOGRAM TRACING: CPT | Performed by: INTERNAL MEDICINE

## 2019-08-02 PROCEDURE — C1760 CLOSURE DEV, VASC: HCPCS

## 2019-08-02 PROCEDURE — 6360000004 HC RX CONTRAST MEDICATION

## 2019-08-02 PROCEDURE — 2500000003 HC RX 250 WO HCPCS

## 2019-08-02 PROCEDURE — 80061 LIPID PANEL: CPT

## 2019-08-02 PROCEDURE — 2709999900 HC NON-CHARGEABLE SUPPLY

## 2019-08-02 PROCEDURE — C1769 GUIDE WIRE: HCPCS

## 2019-08-02 RX ORDER — MIDAZOLAM HYDROCHLORIDE 5 MG/ML
INJECTION INTRAMUSCULAR; INTRAVENOUS
Status: COMPLETED | OUTPATIENT
Start: 2019-08-02 | End: 2019-08-02

## 2019-08-02 RX ORDER — FENTANYL CITRATE 50 UG/ML
INJECTION, SOLUTION INTRAMUSCULAR; INTRAVENOUS
Status: COMPLETED | OUTPATIENT
Start: 2019-08-02 | End: 2019-08-02

## 2019-08-02 RX ORDER — FUROSEMIDE 10 MG/ML
10 INJECTION INTRAMUSCULAR; INTRAVENOUS ONCE
Status: COMPLETED | OUTPATIENT
Start: 2019-08-02 | End: 2019-08-02

## 2019-08-02 RX ORDER — METHYLPREDNISOLONE SODIUM SUCCINATE 125 MG/2ML
INJECTION, POWDER, LYOPHILIZED, FOR SOLUTION INTRAMUSCULAR; INTRAVENOUS
Status: COMPLETED | OUTPATIENT
Start: 2019-08-02 | End: 2019-08-02

## 2019-08-02 RX ORDER — LISINOPRIL 5 MG/1
5 TABLET ORAL DAILY
Qty: 30 TABLET | Refills: 3 | Status: SHIPPED | OUTPATIENT
Start: 2019-08-02 | End: 2019-11-14 | Stop reason: ALTCHOICE

## 2019-08-02 RX ORDER — METOPROLOL SUCCINATE 25 MG/1
25 TABLET, EXTENDED RELEASE ORAL DAILY
Qty: 30 TABLET | Refills: 3 | Status: SHIPPED | OUTPATIENT
Start: 2019-08-02 | End: 2020-03-16

## 2019-08-02 RX ORDER — SODIUM CHLORIDE 9 MG/ML
INJECTION, SOLUTION INTRAVENOUS CONTINUOUS
Status: DISCONTINUED | OUTPATIENT
Start: 2019-08-02 | End: 2019-08-02 | Stop reason: HOSPADM

## 2019-08-02 RX ORDER — ASPIRIN 325 MG
325 TABLET ORAL ONCE
Status: COMPLETED | OUTPATIENT
Start: 2019-08-02 | End: 2019-08-02

## 2019-08-02 RX ADMIN — FENTANYL CITRATE 25 MCG: 50 INJECTION, SOLUTION INTRAMUSCULAR; INTRAVENOUS at 08:47

## 2019-08-02 RX ADMIN — SODIUM CHLORIDE: 9 INJECTION, SOLUTION INTRAVENOUS at 07:59

## 2019-08-02 RX ADMIN — FENTANYL CITRATE 25 MCG: 50 INJECTION, SOLUTION INTRAMUSCULAR; INTRAVENOUS at 08:25

## 2019-08-02 RX ADMIN — Medication 325 MG: at 08:00

## 2019-08-02 RX ADMIN — MIDAZOLAM HYDROCHLORIDE 1 MG: 5 INJECTION INTRAMUSCULAR; INTRAVENOUS at 08:48

## 2019-08-02 RX ADMIN — METHYLPREDNISOLONE SODIUM SUCCINATE 40 MG: 125 INJECTION, POWDER, LYOPHILIZED, FOR SOLUTION INTRAMUSCULAR; INTRAVENOUS at 08:28

## 2019-08-02 RX ADMIN — FUROSEMIDE 10 MG: 10 INJECTION INTRAMUSCULAR; INTRAVENOUS at 11:00

## 2019-08-02 RX ADMIN — MIDAZOLAM HYDROCHLORIDE 2 MG: 5 INJECTION INTRAMUSCULAR; INTRAVENOUS at 08:25

## 2019-08-02 NOTE — H&P
incontinence 2017         Surgical History:  Past Surgical History:   Procedure Laterality Date    APPENDECTOMY      BREAST BIOPSY      BREAST LUMPECTOMY      BREAST SURGERY  2009    right lumpectomy xrt and chemo and serm     SECTION  7378,7603,2819,3885    failed to drop in birth canal     SECTION      CHOLECYSTECTOMY      COLONOSCOPY      HYSTERECTOMY  2000    LAP BAND      LAP BAND  2012    Removal    OVARY REMOVAL      PARTIAL HYSTERECTOMY      THYROIDECTOMY, PARTIAL Left 2000    TUNNELED VENOUS PORT PLACEMENT      Placed/Removed    UPPER GASTROINTESTINAL ENDOSCOPY  6/10/16    gastritis, gastric polyp bx          Medications:  Current Facility-Administered Medications   Medication Dose Route Frequency Provider Last Rate Last Dose    0.9 % sodium chloride infusion   Intravenous Continuous Alec Gonsalez MD        aspirin tablet 325 mg  325 mg Oral Once Alec Gonsalez MD               Pre-Sedation:    Pre-Sedation Documentation and Exam:  I have assessed the patient and agree with the H&P present on the chart. Prior History of Anesthesia Complications:   none    Modified Mallampati:  II (soft palate, uvula, fauces visible)    ASA Classification:  Class 3 - A patient with severe systemic disease that limits activity but is not incapacitating    Cheri Scale: Activity:  2 - Able to move 4 extremities voluntarily on command  Respiration:  2 - Able to breathe deeply and cough freely  Circulation:  2 - BP+/- 20mmHg of normal  Consciousness:  2 - Fully awake  Oxygen Saturation (color):  2 - Able to maintain oxygen saturation >92% on room air    Sedation/Anesthesia Plan:  Guard the patient's safety and welfare. Minimize physical discomfort and pain. Minimize negative psychological responses to treatment by providing sedation and analgesia and maximize the potential amnesia. Patient to meet pre-procedure discharge plan.     Medication Planned:  midazolam

## 2019-08-08 ENCOUNTER — HOSPITAL ENCOUNTER (OUTPATIENT)
Dept: CT IMAGING | Age: 64
Discharge: HOME | End: 2019-08-08
Payer: COMMERCIAL

## 2019-08-08 DIAGNOSIS — Z86.011 H/O MENINGIOMA OF THE BRAIN: ICD-10-CM

## 2019-08-08 PROCEDURE — 70450 CT HEAD/BRAIN W/O DYE: CPT

## 2019-08-13 ENCOUNTER — OFFICE VISIT (OUTPATIENT)
Dept: CARDIOLOGY CLINIC | Age: 64
End: 2019-08-13
Payer: COMMERCIAL

## 2019-08-13 VITALS
SYSTOLIC BLOOD PRESSURE: 110 MMHG | BODY MASS INDEX: 43.39 KG/M2 | WEIGHT: 270 LBS | DIASTOLIC BLOOD PRESSURE: 80 MMHG | HEART RATE: 58 BPM | OXYGEN SATURATION: 99 % | HEIGHT: 66 IN

## 2019-08-13 DIAGNOSIS — I35.0 AORTIC STENOSIS, MILD: ICD-10-CM

## 2019-08-13 DIAGNOSIS — I10 ESSENTIAL HYPERTENSION: ICD-10-CM

## 2019-08-13 DIAGNOSIS — R00.2 HEART PALPITATIONS: ICD-10-CM

## 2019-08-13 DIAGNOSIS — I50.22 CHRONIC SYSTOLIC HEART FAILURE (HCC): Primary | ICD-10-CM

## 2019-08-13 DIAGNOSIS — I48.0 PAROXYSMAL ATRIAL FIBRILLATION (HCC): ICD-10-CM

## 2019-08-13 PROCEDURE — 99214 OFFICE O/P EST MOD 30 MIN: CPT | Performed by: INTERNAL MEDICINE

## 2019-08-13 RX ORDER — SPIRONOLACTONE 25 MG/1
12.5 TABLET ORAL DAILY
Qty: 30 TABLET | Refills: 3 | Status: SHIPPED | OUTPATIENT
Start: 2019-08-13 | End: 2019-09-03 | Stop reason: SDUPTHER

## 2019-08-13 NOTE — LETTER
[address] Cardiology - [address]  Phone: [phone]  Fax: [phone]    Marty Maynard MD        August 13, 2019    [address] [place] [address] 1955 is still undergoing testing and adjusting medications. Will need to remain off work at least until follow up September 3, 2019. If you have any questions or concerns, please don't hesitate to call.     Sincerely,        Marty Maynard MD

## 2019-08-14 ENCOUNTER — NURSE TRIAGE (OUTPATIENT)
Dept: OTHER | Facility: CLINIC | Age: 64
End: 2019-08-14

## 2019-08-15 ENCOUNTER — TELEPHONE (OUTPATIENT)
Dept: CARDIOLOGY CLINIC | Age: 64
End: 2019-08-15

## 2019-08-15 DIAGNOSIS — I20.0 UNSTABLE ANGINA PECTORIS (HCC): ICD-10-CM

## 2019-08-15 DIAGNOSIS — R00.2 HEART PALPITATIONS: ICD-10-CM

## 2019-08-15 DIAGNOSIS — F41.9 ANXIETY: ICD-10-CM

## 2019-08-15 DIAGNOSIS — I50.22 CHRONIC SYSTOLIC HEART FAILURE (HCC): Primary | ICD-10-CM

## 2019-08-15 RX ORDER — LORAZEPAM 1 MG/1
1 TABLET ORAL ONCE
Qty: 1 TABLET | Refills: 0 | Status: SHIPPED | OUTPATIENT
Start: 2019-08-15 | End: 2019-08-15

## 2019-08-19 NOTE — TELEPHONE ENCOUNTER
Spoke with patient. She is a supervisor at [employer]. NEFTALI put her off until 09/03/2019 when she has a f/u appt with NEFTALI. Then go from there.

## 2019-08-23 PROBLEM — I42.0 DILATED CARDIOMYOPATHY (HCC): Status: ACTIVE | Noted: 2019-08-23

## 2019-08-23 PROBLEM — I50.42 CHRONIC COMBINED SYSTOLIC AND DIASTOLIC CONGESTIVE HEART FAILURE (HCC): Status: ACTIVE | Noted: 2019-08-23

## 2019-08-23 LAB
POC CREATININE: 1.08 MG/DL (ref 0.6–1.3)
POC EGFR MDRD NON AFRICAN AMERICAN: 51 ML/MIN/1.73ME2
POC GFR AFRICAN AMERICAN: >60 ML/MIN/1.73ME2
POC SAMPLE TYPE: NORMAL

## 2019-08-26 ENCOUNTER — TELEPHONE (OUTPATIENT)
Dept: CARDIOLOGY CLINIC | Age: 64
End: 2019-08-26

## 2019-08-26 RX ORDER — OXYBUTYNIN CHLORIDE 15 MG/1
15 TABLET, EXTENDED RELEASE ORAL DAILY
Qty: 90 TABLET | Refills: 2 | Status: SHIPPED | OUTPATIENT
Start: 2019-08-26 | End: 2020-05-26 | Stop reason: SDUPTHER

## 2019-08-28 ENCOUNTER — HOSPITAL ENCOUNTER (OUTPATIENT)
Age: 64
Discharge: HOME | End: 2019-08-28
Payer: COMMERCIAL

## 2019-08-28 DIAGNOSIS — I50.22 CHRONIC SYSTOLIC HEART FAILURE (HCC): ICD-10-CM

## 2019-08-28 PROCEDURE — 36415 COLL VENOUS BLD VENIPUNCTURE: CPT

## 2019-08-28 PROCEDURE — 83880 ASSAY OF NATRIURETIC PEPTIDE: CPT

## 2019-08-28 PROCEDURE — 80048 BASIC METABOLIC PNL TOTAL CA: CPT

## 2019-08-29 LAB
ANION GAP SERPL CALCULATED.3IONS-SCNC: 12 MMOL/L (ref 3–16)
BUN BLDV-MCNC: 26 MG/DL (ref 7–20)
CALCIUM SERPL-MCNC: 10.1 MG/DL (ref 8.3–10.6)
CHLORIDE BLD-SCNC: 101 MMOL/L (ref 99–110)
CO2: 26 MMOL/L (ref 21–32)
CREAT SERPL-MCNC: 1 MG/DL (ref 0.6–1.2)
GFR AFRICAN AMERICAN: >60
GFR NON-AFRICAN AMERICAN: 56
GLUCOSE BLD-MCNC: 87 MG/DL (ref 70–99)
POTASSIUM SERPL-SCNC: 4.2 MMOL/L (ref 3.5–5.1)
PRO-BNP: 125 PG/ML (ref 0–124)
SODIUM BLD-SCNC: 139 MMOL/L (ref 136–145)

## 2019-08-29 RX ORDER — OMEPRAZOLE 20 MG/1
20 CAPSULE, DELAYED RELEASE ORAL
Qty: 30 CAPSULE | Refills: 5 | Status: SHIPPED | OUTPATIENT
Start: 2019-08-29 | End: 2020-02-17 | Stop reason: SDUPTHER

## 2019-08-30 ENCOUNTER — TELEPHONE (OUTPATIENT)
Dept: CARDIOLOGY CLINIC | Age: 64
End: 2019-08-30

## 2019-08-30 NOTE — PROGRESS NOTES
accessory muscles  · Resp Auscultation: Normal breath sounds without dullness  Cardiovascular:  · The apical impulses not displaced  · Heart tones are crisp and normal  · Cervical veins are not engorged  · The carotid upstroke is normal in amplitude and contour without delay or bruit  · JVP less than 8 cm H2O  RRR with nl S1 and S2 without m,r,g  · Peripheral pulses are symmetrical and full  · There is no clubbing, cyanosis of the extremities. · No edema  · Femoral Arteries: 2+ and equal  · Pedal Pulses: 2+ and equal   Neck:  · No thyromegaly  Abdomen:  · No masses or tenderness  · Liver/Spleen: No Abnormalities Noted  Neurological/Psychiatric:  · Alert and oriented in all spheres  · Moves all extremities well  · Exhibits normal gait balance and coordination  · No abnormalities of mood, affect, memory, mentation, or behavior are noted    Echo: 07/11/19   Summary   Left ventricular systolic function is reduced with ejection fraction   estimated at 48-50 %. Mild global hypokinesis is present. There is mild concentric left ventricular hypertrophy. Normal left ventricular diastolic filling pressure. Mild mitral regurgitation. Aortic sclerosis vs mild aortic stenosis. Mild aortic regurgitation is present. Normal systolic pulmonary artery pressure (SPAP) estimated at 29 mmHg (RA   pressure 8 mmHg). Stress test: 07/25/19   Normal LVEF 61%  Base to mid anteroseptal hypokinesis  There is a mixed defect in the anteroseptal wall raising concern for mixed  ischemia/scar in that region  This would be considered an abnormal intermediate risk study     [facility]: 08/02/19  LM: luminals  LAD: mid luminals, distal vessel small  LCX: luminals  RCA: dominant, very large     LVEDP: 18  LVEF: 25-30% global hypokinesis      Assessment  1. Nonischemic cardiomyopathy  2. Given worsening cardiomyopathy, suggest Cardiac MRI                - eval for myocarditis or infiltrative cardiomyopathy  3.  Will d/c flecainide and dilt

## 2019-08-30 NOTE — TELEPHONE ENCOUNTER
Left message for Chaitanya Jang to call back regarding patients paperwork. I need to talk to her. It was faxed to them, they faxed it back and all of it is illegible. I need to know if they need additional information or if there was a problem or question about patients paperwork.

## 2019-09-03 ENCOUNTER — OFFICE VISIT (OUTPATIENT)
Dept: CARDIOLOGY CLINIC | Age: 64
End: 2019-09-03
Payer: COMMERCIAL

## 2019-09-03 VITALS
HEIGHT: 66 IN | WEIGHT: 266.25 LBS | DIASTOLIC BLOOD PRESSURE: 72 MMHG | BODY MASS INDEX: 42.79 KG/M2 | OXYGEN SATURATION: 96 % | HEART RATE: 76 BPM | SYSTOLIC BLOOD PRESSURE: 110 MMHG

## 2019-09-03 DIAGNOSIS — R00.2 HEART PALPITATIONS: ICD-10-CM

## 2019-09-03 DIAGNOSIS — I48.0 PAROXYSMAL ATRIAL FIBRILLATION (HCC): ICD-10-CM

## 2019-09-03 DIAGNOSIS — I35.0 AORTIC STENOSIS, MILD: ICD-10-CM

## 2019-09-03 DIAGNOSIS — I10 ESSENTIAL HYPERTENSION: ICD-10-CM

## 2019-09-03 DIAGNOSIS — I50.22 CHRONIC SYSTOLIC HEART FAILURE (HCC): Primary | ICD-10-CM

## 2019-09-03 PROCEDURE — 99214 OFFICE O/P EST MOD 30 MIN: CPT | Performed by: INTERNAL MEDICINE

## 2019-09-03 RX ORDER — HYDROCHLOROTHIAZIDE 25 MG/1
25 TABLET ORAL
COMMUNITY
End: 2020-01-16 | Stop reason: ALTCHOICE

## 2019-09-03 RX ORDER — SPIRONOLACTONE 25 MG/1
25 TABLET ORAL DAILY
Qty: 30 TABLET | Refills: 5 | Status: SHIPPED | OUTPATIENT
Start: 2019-09-03 | End: 2019-10-08

## 2019-09-03 RX ORDER — AZELASTINE HYDROCHLORIDE, FLUTICASONE PROPIONATE 137; 50 UG/1; UG/1
1 SPRAY, METERED NASAL 2 TIMES DAILY
COMMUNITY
End: 2022-01-13

## 2019-09-04 ENCOUNTER — OFFICE VISIT (OUTPATIENT)
Dept: CARDIOLOGY CLINIC | Age: 64
End: 2019-09-04
Payer: COMMERCIAL

## 2019-09-04 ENCOUNTER — TELEPHONE (OUTPATIENT)
Dept: CARDIOLOGY CLINIC | Age: 64
End: 2019-09-04

## 2019-09-04 VITALS
SYSTOLIC BLOOD PRESSURE: 112 MMHG | HEART RATE: 56 BPM | BODY MASS INDEX: 42.75 KG/M2 | DIASTOLIC BLOOD PRESSURE: 62 MMHG | HEIGHT: 66 IN | WEIGHT: 266 LBS

## 2019-09-04 DIAGNOSIS — R00.2 HEART PALPITATIONS: Primary | ICD-10-CM

## 2019-09-04 PROCEDURE — 93000 ELECTROCARDIOGRAM COMPLETE: CPT | Performed by: INTERNAL MEDICINE

## 2019-09-04 PROCEDURE — 99244 OFF/OP CNSLTJ NEW/EST MOD 40: CPT | Performed by: INTERNAL MEDICINE

## 2019-09-04 RX ORDER — PROPAFENONE HYDROCHLORIDE 225 MG/1
225 CAPSULE, EXTENDED RELEASE ORAL 2 TIMES DAILY
Qty: 60 CAPSULE | Refills: 6 | Status: SHIPPED | OUTPATIENT
Start: 2019-09-04 | End: 2020-03-17

## 2019-09-04 NOTE — LETTER
palpitations. She has been having weakness and shortness of breath. She is currently off work. She has been walking daily. Patient continues to feel palpitations. Past Medical History:   has a past medical history of Abnormal Pap smear of cervix, Allergic, Allergic rhinitis, Aortic stenosis, mild-echo 2016, Atrial fibrillation ([facility] BMI 40.0-44.9, adult ([facility] [address] Breast cancer ([facility] [address] Cancer ([facility] [address] Depression, GERD (gastroesophageal reflux disease), H/O laparoscopic adjustable gastric banding, Heart palpitations, HPV (human papilloma virus) infection, HPV test positive, Hypertension, Hypothyroidism, Meningioma ([facility] [address] Mild aortic regurgitation-echo 2016, Mild dysplasia of cervix, Obesity, Sleep apnea, and Urge incontinence. Past Surgical History:   has a past surgical history that includes Appendectomy; Cholecystectomy; Colonoscopy; Lap Band (); Tunneled venous port placement ();  section (6680,2142,5161,1262);  section; Breast surgery (); Breast biopsy; Breast lumpectomy; Hysterectomy (); Lap Band (); Ovary removal; Thyroidectomy, partial (Left, ); Upper gastrointestinal endoscopy (6/10/16); and partial hysterectomy (cervix not removed). Allergies:  Latex and Iodine    Social History:   reports that she quit smoking about 47 years ago. Her smoking use included cigarettes. She has a 0.38 pack-year smoking history. She has never used smokeless tobacco. She reports that she drinks alcohol. She reports that she does not use drugs. Family History: family history includes Cancer in her maternal grandmother, mother, paternal grandmother, sister, and another family member; Cancer (age of onset: 61) in her father; Heart Attack in her father; Heart Disease in her father and sister; No Known Problems in her brother, maternal aunt, maternal grandfather, maternal uncle, paternal aunt, paternal grandfather, and paternal uncle; Other in her father and sister. Home Medications:    Prior to Admission medications    Medication Sig Start Date End Date Taking? Authorizing Provider   hydrochlorothiazide (HYDRODIURIL) 25 MG tablet Take 25 mg by mouth three times a week   Yes Historical Provider, MD   Azelastine-Fluticasone 137-50 MCG/ACT SUSP 1 spray by Nasal route 2 times daily   Yes Historical Provider, MD   spironolactone (ALDACTONE) 25 MG tablet Take 1 tablet by mouth daily 9/3/19  Yes Anjali Perez MD   omeprazole (PRILOSEC) 20 MG delayed release capsule Take 1 capsule by mouth every morning (before breakfast) 8/29/19  Yes KADEN Fitzgerald   oxybutynin (DITROPAN XL) 15 MG extended release tablet Take 1 tablet by mouth daily 8/26/19  Yes KADEN Fitzgerald   dextromethorphan-guaiFENesin (MUCINEX DM)  MG per extended release tablet Take 1 tablet by mouth every 12 hours as needed Indications: prn   Yes Historical Provider, MD   metoprolol succinate (TOPROL XL) 25 MG extended release tablet Take 1 tablet by mouth daily 8/2/19  Yes Titi Briceno MD   lisinopril (PRINIVIL;ZESTRIL) 5 MG tablet Take 1 tablet by mouth daily 8/2/19  Yes Titi Briceno MD   ranitidine (ZANTAC) 150 MG tablet Take 1 tablet by mouth 2 times daily as needed for Heartburn 7/29/19  Yes KADEN Fitzgerald   apixaban (ELIQUIS) 5 MG TABS tablet Take 1 tablet by mouth 2 times daily 7/12/19  Yes Nishant Nichols MD   atorvastatin (LIPITOR) 40 MG tablet Take 1 tablet by mouth nightly 7/12/19  Yes Nishant Nichols MD   hydrocortisone (ANUSOL-HC) 2.5 % rectal cream Place rectally 2 times daily.  7/12/19  Yes Nishant Nichols MD   Lactobacillus (FLORAJEN ACIDOPHILUS) CAPS Take 1 capsule by mouth daily   Yes Historical Provider, MD   levothyroxine (SYNTHROID) 125 MCG tablet TAKE ONE TABLET BY MOUTH ONE TIME A DAY 11/16/18  Yes Derrick Dave MD   meloxicam FERNANDA CANTU [facility]) 7.5 MG tablet Take 1 tablet by mouth daily 11/15/18  Yes Jorgito Bunn MD Left ventricular systolic function is reduced with ejection fraction   estimated at 48-50 %. Mild global hypokinesis is present. There is mild concentric left ventricular hypertrophy. Normal left ventricular diastolic filling pressure. Mild mitral regurgitation. Aortic sclerosis vs mild aortic stenosis. Mild aortic regurgitation is present. Normal systolic pulmonary artery pressure (SPAP) estimated at 29 mmHg (RA   pressure 8 mmHg). LV Diastolic Dimension: 4.88 cm LV Systolic Dimension: 3.9 cm   LV Septum Diastolic: 6.27 cm   LV PW Diastolic: 5.83 cm        AO Root Dimension: 3.4 cm                                      LA Area: 24.3 cm2   LVOT: 2 cm                      LA volume/Index: 65 ml /29 ml/m2         [id number] Score for Atrial Fibrillation Stroke Risk   Risk   Factors  Component Value   C CHF Yes 1   H HTN Yes 1   A2 Age >= 76 No,  (65 y.o.) 0   D DM No 0   S2 Prior Stroke/TIA No 0   V Vascular Disease No 0   A Age 74-69 Yes,  (65 y.o.) 1   Sc Sex female 1    [id number]  Score  4   Score last updated 3/0/18 6:63 PM    Click here for a link to the UpToDate guideline \"Atrial Fibrillation: Anticoagulation therapy to prevent embolization    Disclaimer: Risk Score calculation is dependent on accuracy of patient problem list and past encounter diagnosis. IMPRESSION:    1. Paroxysmal atrial fibrillation  2. CHF  3. Aortic stenosis, mild-echo 7/2019  4. Hypertension   5. Palpitations   6. Sleep apnea- on CPAP  7. SOB  8. Weakness       RECOMMENDATIONS:  1. Ablation vs antiarrhythmic therapy dicussed Success and possible need for more than one procedure discussed.   Risk, benefit, alternative, rationale of the procedure were discussed with the patient which included but were not limited to allergic reaction to the medications, pain, bleeding, infection, nerve injury, injury to diaphragm(breathing muscle), pulmonary embolus(blood clot in lungs), deep Tete Howard MD

## 2019-09-09 NOTE — TELEPHONE ENCOUNTER
Spoke to [employer] @ [phone], they need an additional attending physicians statement faxed back to them. She faxed the form to us, I am working on filling this form out and will have NEFTALI sign and fax back. I left a message for the patient to call back to update her on this.

## 2019-09-10 RX ORDER — APIXABAN 5 MG/1
TABLET, FILM COATED ORAL
Qty: 180 TABLET | Refills: 3 | Status: ON HOLD | OUTPATIENT
Start: 2019-09-10 | End: 2020-03-07 | Stop reason: SDUPTHER

## 2019-09-10 RX ORDER — ATORVASTATIN CALCIUM 40 MG/1
40 TABLET, FILM COATED ORAL NIGHTLY
Qty: 90 TABLET | Refills: 3 | Status: SHIPPED | OUTPATIENT
Start: 2019-09-10 | End: 2020-08-25

## 2019-09-19 PROCEDURE — 93228 REMOTE 30 DAY ECG REV/REPORT: CPT | Performed by: INTERNAL MEDICINE

## 2019-09-25 ENCOUNTER — HOSPITAL ENCOUNTER (OUTPATIENT)
Age: 64
Discharge: HOME | End: 2019-09-25
Payer: COMMERCIAL

## 2019-09-25 DIAGNOSIS — I50.22 CHRONIC SYSTOLIC HEART FAILURE (HCC): ICD-10-CM

## 2019-09-25 LAB
ANION GAP SERPL CALCULATED.3IONS-SCNC: 9 MMOL/L (ref 3–16)
BUN BLDV-MCNC: 22 MG/DL (ref 7–20)
CALCIUM SERPL-MCNC: 9.7 MG/DL (ref 8.3–10.6)
CHLORIDE BLD-SCNC: 101 MMOL/L (ref 99–110)
CO2: 29 MMOL/L (ref 21–32)
CREAT SERPL-MCNC: 1.2 MG/DL (ref 0.6–1.2)
GFR AFRICAN AMERICAN: 55
GFR NON-AFRICAN AMERICAN: 45
GLUCOSE BLD-MCNC: 99 MG/DL (ref 70–99)
POTASSIUM SERPL-SCNC: 5 MMOL/L (ref 3.5–5.1)
PRO-BNP: 90 PG/ML (ref 0–124)
SODIUM BLD-SCNC: 139 MMOL/L (ref 136–145)

## 2019-09-25 PROCEDURE — 80048 BASIC METABOLIC PNL TOTAL CA: CPT

## 2019-09-25 PROCEDURE — 36415 COLL VENOUS BLD VENIPUNCTURE: CPT

## 2019-09-25 PROCEDURE — 83880 ASSAY OF NATRIURETIC PEPTIDE: CPT

## 2019-10-01 DIAGNOSIS — R00.2 HEART PALPITATIONS: ICD-10-CM

## 2019-10-01 DIAGNOSIS — I48.0 PAROXYSMAL ATRIAL FIBRILLATION (HCC): ICD-10-CM

## 2019-10-08 ENCOUNTER — OFFICE VISIT (OUTPATIENT)
Dept: CARDIOLOGY CLINIC | Age: 64
End: 2019-10-08
Payer: COMMERCIAL

## 2019-10-08 VITALS
HEART RATE: 57 BPM | DIASTOLIC BLOOD PRESSURE: 78 MMHG | HEIGHT: 66 IN | OXYGEN SATURATION: 96 % | SYSTOLIC BLOOD PRESSURE: 106 MMHG | BODY MASS INDEX: 42.68 KG/M2 | WEIGHT: 265.6 LBS

## 2019-10-08 DIAGNOSIS — I50.22 CHRONIC SYSTOLIC HEART FAILURE (HCC): ICD-10-CM

## 2019-10-08 DIAGNOSIS — I48.0 PAROXYSMAL ATRIAL FIBRILLATION (HCC): ICD-10-CM

## 2019-10-08 DIAGNOSIS — I42.8 NON-ISCHEMIC CARDIOMYOPATHY (HCC): Primary | ICD-10-CM

## 2019-10-08 PROCEDURE — 99214 OFFICE O/P EST MOD 30 MIN: CPT | Performed by: NURSE PRACTITIONER

## 2019-10-08 RX ORDER — SPIRONOLACTONE 25 MG/1
25 TABLET ORAL DAILY
Qty: 90 TABLET | Refills: 1 | Status: SHIPPED | OUTPATIENT
Start: 2019-10-08 | End: 2020-05-04

## 2019-10-17 ENCOUNTER — HOSPITAL ENCOUNTER (OUTPATIENT)
Dept: MAMMOGRAPHY | Age: 64
Discharge: HOME | End: 2019-10-17
Payer: COMMERCIAL

## 2019-10-17 DIAGNOSIS — Z12.31 SCREENING MAMMOGRAM, ENCOUNTER FOR: ICD-10-CM

## 2019-10-17 PROCEDURE — 77063 BREAST TOMOSYNTHESIS BI: CPT

## 2019-10-24 ENCOUNTER — OFFICE VISIT (OUTPATIENT)
Dept: DERMATOLOGY | Age: 64
End: 2019-10-24
Payer: COMMERCIAL

## 2019-10-24 DIAGNOSIS — Z86.007 HISTORY OF SQUAMOUS CELL CARCINOMA IN SITU: ICD-10-CM

## 2019-10-24 DIAGNOSIS — D22.9 MULTIPLE BENIGN MELANOCYTIC NEVI: ICD-10-CM
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
## 2019-10-24 DIAGNOSIS — L81.4 LENTIGINES: ICD-10-CM

## 2019-10-24 DIAGNOSIS — L57.0 ACTINIC KERATOSES: Primary | ICD-10-CM

## 2019-10-24 PROCEDURE — 17003 DESTRUCT PREMALG LES 2-14: CPT | Performed by: DERMATOLOGY

## 2019-10-24 PROCEDURE — 99213 OFFICE O/P EST LOW 20 MIN: CPT | Performed by: DERMATOLOGY

## 2019-10-24 PROCEDURE — 17000 DESTRUCT PREMALG LESION: CPT | Performed by: DERMATOLOGY

## 2019-11-06 ENCOUNTER — TELEPHONE (OUTPATIENT)
Dept: CARDIOLOGY CLINIC | Age: 64
End: 2019-11-06

## 2019-11-11 RX ORDER — MELOXICAM 7.5 MG/1
7.5 TABLET ORAL DAILY
Qty: 90 TABLET | Refills: 1 | OUTPATIENT
Start: 2019-11-11

## 2019-11-11 RX ORDER — CITALOPRAM 40 MG/1
TABLET ORAL
Qty: 90 TABLET | Refills: 1 | Status: SHIPPED | OUTPATIENT
Start: 2019-11-11 | End: 2020-05-04 | Stop reason: SDUPTHER

## 2019-11-14 ENCOUNTER — HOSPITAL ENCOUNTER (OUTPATIENT)
Age: 64
Discharge: HOME | End: 2019-11-14
Payer: COMMERCIAL

## 2019-11-14 ENCOUNTER — OFFICE VISIT (OUTPATIENT)
Dept: CARDIOLOGY CLINIC | Age: 64
End: 2019-11-14
Payer: COMMERCIAL

## 2019-11-14 VITALS
WEIGHT: 260.6 LBS | BODY MASS INDEX: 41.88 KG/M2 | HEIGHT: 66 IN | DIASTOLIC BLOOD PRESSURE: 64 MMHG | OXYGEN SATURATION: 97 % | SYSTOLIC BLOOD PRESSURE: 102 MMHG | HEART RATE: 68 BPM

## 2019-11-14 DIAGNOSIS — I48.0 PAROXYSMAL ATRIAL FIBRILLATION (HCC): ICD-10-CM

## 2019-11-14 DIAGNOSIS — I50.22 CHRONIC SYSTOLIC HEART FAILURE (HCC): ICD-10-CM

## 2019-11-14 DIAGNOSIS — I35.0 AORTIC STENOSIS, MILD: ICD-10-CM

## 2019-11-14 DIAGNOSIS — I42.8 NON-ISCHEMIC CARDIOMYOPATHY (HCC): Primary | ICD-10-CM

## 2019-11-14 LAB
ANION GAP SERPL CALCULATED.3IONS-SCNC: 13 MMOL/L (ref 3–16)
BUN BLDV-MCNC: 23 MG/DL (ref 7–20)
CALCIUM SERPL-MCNC: 9.9 MG/DL (ref 8.3–10.6)
CHLORIDE BLD-SCNC: 93 MMOL/L (ref 99–110)
CO2: 27 MMOL/L (ref 21–32)
CREAT SERPL-MCNC: 1.1 MG/DL (ref 0.6–1.2)
GFR AFRICAN AMERICAN: >60
GFR NON-AFRICAN AMERICAN: 50
GLUCOSE BLD-MCNC: 95 MG/DL (ref 70–99)
POTASSIUM SERPL-SCNC: 4.3 MMOL/L (ref 3.5–5.1)
PRO-BNP: 102 PG/ML (ref 0–124)
SODIUM BLD-SCNC: 133 MMOL/L (ref 136–145)

## 2019-11-14 PROCEDURE — 36415 COLL VENOUS BLD VENIPUNCTURE: CPT

## 2019-11-14 PROCEDURE — 80048 BASIC METABOLIC PNL TOTAL CA: CPT

## 2019-11-14 PROCEDURE — 99214 OFFICE O/P EST MOD 30 MIN: CPT | Performed by: NURSE PRACTITIONER

## 2019-11-14 PROCEDURE — 83880 ASSAY OF NATRIURETIC PEPTIDE: CPT

## 2019-11-14 RX ORDER — VALSARTAN 40 MG/1
40 TABLET ORAL DAILY
Qty: 90 TABLET | Refills: 3 | Status: SHIPPED | OUTPATIENT
Start: 2019-11-14 | End: 2020-10-07

## 2019-11-14 RX ORDER — MELOXICAM 7.5 MG/1
7.5 TABLET ORAL DAILY
Qty: 90 TABLET | Refills: 2 | OUTPATIENT
Start: 2019-11-14

## 2019-11-15 ENCOUNTER — TELEPHONE (OUTPATIENT)
Dept: CARDIOLOGY CLINIC | Age: 64
End: 2019-11-15

## 2019-12-02 ENCOUNTER — TELEPHONE (OUTPATIENT)
Dept: PULMONOLOGY | Age: 64
End: 2019-12-02

## 2019-12-05 RX ORDER — FLUTICASONE PROPIONATE 50 MCG
SPRAY, SUSPENSION (ML) NASAL
Qty: 3 BOTTLE | Refills: 2 | Status: SHIPPED | OUTPATIENT
Start: 2019-12-05 | End: 2021-02-25

## 2019-12-23 ENCOUNTER — OFFICE VISIT (OUTPATIENT)
Dept: GYNECOLOGY | Age: 64
End: 2019-12-23
Payer: COMMERCIAL

## 2019-12-23 VITALS
DIASTOLIC BLOOD PRESSURE: 69 MMHG | SYSTOLIC BLOOD PRESSURE: 119 MMHG | HEART RATE: 55 BPM | WEIGHT: 270.2 LBS | RESPIRATION RATE: 17 BRPM | HEIGHT: 66 IN | BODY MASS INDEX: 43.42 KG/M2

## 2019-12-23 DIAGNOSIS — Z78.0 MENOPAUSE: ICD-10-CM

## 2019-12-23 DIAGNOSIS — Z01.419 WELL WOMAN EXAM WITH ROUTINE GYNECOLOGICAL EXAM: Primary | ICD-10-CM

## 2019-12-23 PROCEDURE — 99397 PER PM REEVAL EST PAT 65+ YR: CPT | Performed by: OBSTETRICS & GYNECOLOGY

## 2019-12-26 ASSESSMENT — ENCOUNTER SYMPTOMS
RESPIRATORY NEGATIVE: 1
EYES NEGATIVE: 1
GASTROINTESTINAL NEGATIVE: 1

## 2019-12-27 ENCOUNTER — HOSPITAL ENCOUNTER (OUTPATIENT)
Dept: GENERAL RADIOLOGY | Age: 64
Discharge: HOME | End: 2019-12-27
Payer: COMMERCIAL

## 2019-12-27 DIAGNOSIS — Z78.0 MENOPAUSE: ICD-10-CM

## 2019-12-27 PROCEDURE — 77080 DXA BONE DENSITY AXIAL: CPT

## 2020-01-08 RX ORDER — MONTELUKAST SODIUM 10 MG/1
TABLET ORAL
Qty: 90 TABLET | Refills: 2 | Status: SHIPPED | OUTPATIENT
Start: 2020-01-08 | End: 2020-10-07 | Stop reason: SDUPTHER

## 2020-01-08 NOTE — TELEPHONE ENCOUNTER
Last Seen: 7/29/2019    Last Writen: 11- # 90 x 2 refills by another provider    Next Appointment: 1/28/2020    Requested Prescriptions     Pending Prescriptions Disp Refills    montelukast (SINGULAIR) 10 MG tablet 90 tablet 2     Sig: TAKE ONE TABLET BY MOUTH NIGHTLY

## 2020-01-16 ENCOUNTER — OFFICE VISIT (OUTPATIENT)
Dept: CARDIOLOGY CLINIC | Age: 65
End: 2020-01-16
Payer: COMMERCIAL

## 2020-01-16 VITALS
DIASTOLIC BLOOD PRESSURE: 70 MMHG | HEART RATE: 52 BPM | SYSTOLIC BLOOD PRESSURE: 110 MMHG | WEIGHT: 271.5 LBS | HEIGHT: 66 IN | BODY MASS INDEX: 43.63 KG/M2

## 2020-01-16 PROCEDURE — 99214 OFFICE O/P EST MOD 30 MIN: CPT | Performed by: NURSE PRACTITIONER

## 2020-01-16 NOTE — LETTER
[address]   Cardiac Follow-up    Primary Care Doctor:  KADEN Burton    Chief Complaint   Patient presents with    Cardiomyopathy    Dizziness        History of Present Illness:   I had the pleasure of seeing Gerri Issa in follow up for cardiomyopathy. Hx of Afib on Flecainide, diltiazem and eliquis. S/P [facility] 19. Since last visit, Lisinopril was stopped due to cough and changed to valsartan. She is getting some dizziness with position changes, intermittently she will get dizzy with standing, transient in duration. Otherwise breathing is much improved. Cough is improved and almost gone. Taking miralax PRN  She is able to walk into the office at work some shortness of breath but overall has improved. Mild leg edema depending on what she eats. Home weight 269lbs- gradually due to the diet. Gerri Issa describes symptoms including dyspnea but denies syncope. Past Medical History:   has a past medical history of Abnormal Pap smear of cervix, Allergic, Allergic rhinitis, Aortic stenosis, mild-echo 2016, Atrial fibrillation ([address] BMI 40.0-44.9, adult ([address] Breast cancer ([address] Cancer ([address] Depression, GERD (gastroesophageal reflux disease), H/O laparoscopic adjustable gastric banding, Heart palpitations, HPV (human papilloma virus) infection, HPV test positive, Hypertension, Hypothyroidism, Meningioma ([address] Mild aortic regurgitation-echo 2016, Mild dysplasia of cervix, Obesity, Sleep apnea, and Urge incontinence. Surgical History:   has a past surgical history that includes Appendectomy; Cholecystectomy; Colonoscopy; Lap Band (); Tunneled venous port placement ();  section (3912,1271,5327,5394);  section; Breast surgery (); Breast biopsy; Breast lumpectomy; Hysterectomy (); Lap Band (); Ovary removal; Thyroidectomy, partial (Left, );  Upper  Left ventricular systolic function is reduced with ejection fraction   estimated at 48-50 %.   Mild global hypokinesis is present.  Onel Pat is mild concentric left ventricular hypertrophy.   Normal left ventricular diastolic filling pressure.   Mild mitral regurgitation.   Aortic sclerosis vs mild aortic stenosis.   Mild aortic regurgitation is present.   Normal systolic pulmonary artery pressure (SPAP) estimated at 29 mmHg (RA   pressure 8 mmHg).    Stress test: 07/25/19   Normal LVEF 61%  Base to mid anteroseptal hypokinesis Onel Pat is a mixed defect in the anteroseptal wall raising concern for mixed  ischemia/scar in that region  This would be considered an abnormal intermediate risk study      [facility]: 08/02/19  LM: luminals  LAD: mid luminals, distal vessel small  LCX: luminals  RCA: dominant, very large     LVEDP: 18  LVEF: 25-30% global hypokinesis      Assessment  1. Nonischemic cardiomyopathy  2. Given worsening cardiomyopathy, suggest Cardiac MRI                - eval for myocarditis or infiltrative cardiomyopathy  3. Will d/c flecainide and dilt given worsening LVEF, pt currently in NSR                - start toprol 25                - start ASA and lisinopril  4. Lasix in post op area                - will make Appt with heart failure team    MRI cardiac: 08/23/19  FINDINGS:  1. The left ventricular size is normal. The left ventricular ejection fraction is 45 % by Melton's method. Global left ventricular function is mildly decreased. There is moderate hypokinesis involving the mid inferior, mid inferoseptal and apical inferior segments. The left ventricular mass is normal.  Delayed systole in the left ventricle relative to the right ventricle and septal bounce suggest incomplete or complete left bundle branch block. 2. Resting first pass myocardial perfusion is probably normal (assessment limited by wrap artifact).  There is no late gadolinium enhancement to

## 2020-01-16 NOTE — PROGRESS NOTES
not removed). Social History:   reports that she quit smoking about 47 years ago. Her smoking use included cigarettes. She has a 0.38 pack-year smoking history. She has never used smokeless tobacco. She reports current alcohol use. She reports that she does not use drugs. Family History:   Family History   Problem Relation Age of Onset    Cancer Mother         breast and cervical    Heart Disease Father     Cancer Father 61        skin cancer    Other Father         AAA    Heart Attack Father     Cancer Maternal Grandmother         breast    Cancer Paternal Grandmother         breast    Cancer Sister         lymphoma    No Known Problems Brother     No Known Problems Maternal Aunt     No Known Problems Maternal Uncle     No Known Problems Paternal Aunt     No Known Problems Paternal Uncle     No Known Problems Maternal Grandfather     No Known Problems Paternal Grandfather     Cancer Other         neice- head, not sure what kind skin ca    Heart Disease Sister     Other Sister         biliary hepatitis    Asthma Neg Hx     Diabetes Neg Hx     Emphysema Neg Hx     Heart Failure Neg Hx     Hypertension Neg Hx     Rheum Arthritis Neg Hx     Osteoarthritis Neg Hx     Breast Cancer Neg Hx     High Cholesterol Neg Hx     Migraines Neg Hx     Ovarian Cancer Neg Hx     Rashes/Skin Problems Neg Hx     Seizures Neg Hx     Stroke Neg Hx     Thyroid Disease Neg Hx        Home Medications:  Prior to Admission medications    Medication Sig Start Date End Date Taking?  Authorizing Provider   montelukast (SINGULAIR) 10 MG tablet TAKE ONE TABLET BY MOUTH NIGHTLY 1/8/20  Yes KADEN Hayden   fluticasone (FLONASE) 50 MCG/ACT nasal spray 2 sprays each nostril daily 12/5/19  Yes KADEN Hayden   valsartan (DIOVAN) 40 MG tablet Take 1 tablet by mouth daily 11/14/19  Yes BLANK Alexis CNP   citalopram (CELEXA) 40 MG tablet TAKE ONE TABLET BY MOUTH ONE TIME A DAY 11/11/19  Yes KADEN Pantoja   spironolactone (ALDACTONE) 25 MG tablet Take 1 tablet by mouth daily 10/8/19  Yes BLANK Mata - CNP   atorvastatin (LIPITOR) 40 MG tablet TAKE 1 TABLET BY MOUTH NIGHTLY 9/10/19  Yes Rafa Andre MD   ELIQUIS 5 MG TABS tablet TAKE 1 TABLET BY MOUTH TWO TIMES A DAY 9/10/19  Yes Rafa Andre MD   propafenone (RYTHMOL SR) 225 MG extended release capsule Take 1 capsule by mouth 2 times daily 9/4/19  Yes Sheila Zaldivar MD   hydrochlorothiazide (HYDRODIURIL) 25 MG tablet Take 25 mg by mouth three times a week   Yes Historical Provider, MD   Azelastine-Fluticasone 137-50 MCG/ACT SUSP 1 spray by Nasal route 2 times daily   Yes Historical Provider, MD   omeprazole (PRILOSEC) 20 MG delayed release capsule Take 1 capsule by mouth every morning (before breakfast) 8/29/19  Yes KADEN Pantoja   oxybutynin (DITROPAN XL) 15 MG extended release tablet Take 1 tablet by mouth daily 8/26/19  Yes KADEN Pantoja   dextromethorphan-guaiFENesin (MUCINEX DM)  MG per extended release tablet Take 1 tablet by mouth every 12 hours as needed Indications: prn   Yes Historical Provider, MD   metoprolol succinate (TOPROL XL) 25 MG extended release tablet Take 1 tablet by mouth daily 8/2/19  Yes Hector Hsu MD   hydrocortisone (ANUSOL-HC) 2.5 % rectal cream Place rectally 2 times daily. 7/12/19  Yes Vic Patiño MD   Lactobacillus (FLORAJEN ACIDOPHILUS) CAPS Take 1 capsule by mouth daily   Yes Historical Provider, MD   levothyroxine (SYNTHROID) 125 MCG tablet TAKE ONE TABLET BY MOUTH ONE TIME A DAY 11/16/18  Yes Evelio Mc MD   cetirizine (ZYRTEC) 10 MG tablet Take 10 mg by mouth 2 times daily    Yes Historical Provider, MD   multivitamin [facility]) per tablet Take 1 tablet by mouth daily.    Yes Historical Provider, MD   Cetirizine-Pseudoephedrine (ZYRTEC-D PO) Take by mouth    Historical Provider, MD   meloxicam (MOBIC) 7.5 MG tablet Take 1 tablet by mouth daily  Patient iron-overloading of the myocardium). 3. The right ventricular size is normal. Global right ventricular function is normal. There are no regional wall motion abnormalities of the right ventricular wall. There is late gadolinium enhancement of the right ventricular insertion points. 4. Left atrial size is mildly enlarged. Right atrial size is normal.  The Qp/Qs is 1.0 by phase contrast imaging and is consistent with no evidence of shunt.  There is late gadolinium enhancement involving the RV insertion points. 5. Velocity-encoded phase contrast Imaging in (2D/4D) was performed to assess valvular function. There is mild aortic stenosis. Peak velocity at the aortic valve is 1.76 corresponding to a peak gradient of 12.4 mmHg. There is mild aortic regurgitation. The calculated aortic regurgitant fraction is 10.5 %. There is mild mitral regurgitation. The calculated mitral regurgitant fraction is 10.3 %. Peak velocity at pulmonary valve is 0.73 corresponding to a peak gradient of 2.13 mmHg. The calculated pulmonic regurgitant fraction is 0.0 %.  6. The coronary arteries have normal origins and proximal courses.  There is evidence of atherosclerosis in the descending aorta.  The descending aorta is dilated measuring 28.0 mm.  7. 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 NYHA:   III  ACC/ AHA Stage:    C    Pertinent Problems:  · Non ischemic cardiomyopathy  · Chronic systolic heart failure  · PAF  · HTN  · Aortic stenosis- mild    Visit Diagnosis:    1. Non-ischemic cardiomyopathy ([address]    2. Chronic systolic heart failure ([address]    3. Essential hypertension    4. Nonrheumatic aortic valve stenosis        Plan:   1. Continue daily weights; if your weight goes up 3lbs in a day or 5lbs in a week notify the office  2. Stop the HCTZ for now due to dizziness  3. Repeat labs - as planned  4. Continue Spironolactone 25mg (aldactone) daily   5. Continue valsartan 40 mg daily   6.  Continue toprol

## 2020-01-16 NOTE — PATIENT INSTRUCTIONS
Plan:   1. Continue daily weights; if your weight goes up 3lbs in a day or 5lbs in a week notify the office  2. Stop the HCTZ for now due to dizziness  3. Repeat labs - as planned  4. Continue Spironolactone 25mg (aldactone) daily   5. Continue valsartan 40 mg daily   6. Continue toprol 25mg daily  7. Follow up with EP as planned  8.  Follow up 4 months - Dr. Felix Haywood

## 2020-01-28 ENCOUNTER — OFFICE VISIT (OUTPATIENT)
Dept: FAMILY MEDICINE CLINIC | Age: 65
End: 2020-01-28
Payer: COMMERCIAL

## 2020-01-28 VITALS
HEART RATE: 53 BPM | OXYGEN SATURATION: 98 % | DIASTOLIC BLOOD PRESSURE: 78 MMHG | SYSTOLIC BLOOD PRESSURE: 112 MMHG | BODY MASS INDEX: 44.36 KG/M2 | HEIGHT: 66 IN | WEIGHT: 276 LBS

## 2020-01-28 PROBLEM — N18.30 CKD (CHRONIC KIDNEY DISEASE) STAGE 3, GFR 30-59 ML/MIN (HCC): Status: ACTIVE | Noted: 2020-01-28

## 2020-01-28 PROBLEM — H91.93 BILATERAL CHANGE IN HEARING: Status: ACTIVE | Noted: 2020-01-28

## 2020-01-28 LAB
T4 FREE: 1.3 NG/DL (ref 0.9–1.8)
TSH REFLEX: 6.98 UIU/ML (ref 0.27–4.2)

## 2020-01-28 PROCEDURE — 36415 COLL VENOUS BLD VENIPUNCTURE: CPT | Performed by: PHYSICIAN ASSISTANT

## 2020-01-28 PROCEDURE — 99213 OFFICE O/P EST LOW 20 MIN: CPT | Performed by: PHYSICIAN ASSISTANT

## 2020-01-28 ASSESSMENT — ENCOUNTER SYMPTOMS
SINUS PRESSURE: 0
SHORTNESS OF BREATH: 0

## 2020-01-28 NOTE — PROGRESS NOTES
2020     Bossman Rogers (:  1955) is a 71 y.o. female, here for evaluation of the following medical concerns:    HPI    Hypothyroidism: Recent symptoms: none. She denies fatigue, weight gain, cold intolerance, heat intolerance, hair loss, dry skin, constipation and diarrhea. Patient is  taking her medication consistently on an empty stomach. TSH slightly high in the summer but T4 was within normal limits. Lab Results   Component Value Date    TSHREFLEX 4.25 2019    TSHREFLEX 3.35 2016    TSHREFLEX 3.43 2016     Lab Results   Component Value Date    TSH 3.52 11/15/2018    TSH 4.16 12/15/2017    TSH 3.43 2016     Kidney functioning stage III CKD. Pt is aware. Hydrochlorothiazide recently stopped. Being monitored by cardiology. Pt reports difficulty hearing. Has wax in bilateral ears. Affecting her ability to hear in meetings.  has asked her to have hearing checked. Review of Systems   Constitutional: Negative for activity change, appetite change, diaphoresis, fatigue and unexpected weight change. HENT: Positive for hearing loss. Negative for congestion, ear discharge, ear pain and sinus pressure. Respiratory: Negative for shortness of breath. Cardiovascular: Negative for chest pain. Endocrine: Negative for cold intolerance and heat intolerance. Prior to Visit Medications    Medication Sig Taking?  Authorizing Provider   montelukast (SINGULAIR) 10 MG tablet TAKE ONE TABLET BY MOUTH NIGHTLY Yes KADEN Stoddard   fluticasone (FLONASE) 50 MCG/ACT nasal spray 2 sprays each nostril daily Yes KADEN Stoddard   valsartan (DIOVAN) 40 MG tablet Take 1 tablet by mouth daily Yes Betsey Burkitt, APRN - CNP   citalopram (CELEXA) 40 MG tablet TAKE ONE TABLET BY MOUTH ONE TIME A DAY Yes KADEN Stoddard   spironolactone (ALDACTONE) 25 MG tablet Take 1 tablet by mouth daily Yes Betsey Burkitt, APRN - CNP   atorvastatin (LIPITOR) 40 MG tablet TAKE 1 TABLET BY MOUTH NIGHTLY Yes Vitaly Elias MD   ELIQUIS 5 MG TABS tablet TAKE 1 TABLET BY MOUTH TWO TIMES A DAY Yes Vitaly Elias MD   propafenone (RYTHMOL SR) 225 MG extended release capsule Take 1 capsule by mouth 2 times daily Yes Deirdre Haines MD   Azelastine-Fluticasone 137-50 MCG/ACT SUSP 1 spray by Nasal route 2 times daily Yes Historical Provider, MD   omeprazole (PRILOSEC) 20 MG delayed release capsule Take 1 capsule by mouth every morning (before breakfast) Yes KADEN Stoddard   oxybutynin (DITROPAN XL) 15 MG extended release tablet Take 1 tablet by mouth daily Yes KADEN Stoddard   dextromethorphan-guaiFENesin (MUCINEX DM)  MG per extended release tablet Take 1 tablet by mouth every 12 hours as needed Indications: prn Yes Historical Provider, MD   metoprolol succinate (TOPROL XL) 25 MG extended release tablet Take 1 tablet by mouth daily Yes Ramiro Pace MD   hydrocortisone (ANUSOL-HC) 2.5 % rectal cream Place rectally 2 times daily. Yes Bentley Manuel MD   levothyroxine (SYNTHROID) 125 MCG tablet TAKE ONE TABLET BY MOUTH ONE TIME A DAY Yes Dre Astorga MD   cetirizine (ZYRTEC) 10 MG tablet Take 10 mg by mouth 2 times daily  Yes Historical Provider, MD   multivitamin [facility]) per tablet Take 1 tablet by mouth daily. Yes Historical Provider, MD   Lactobacillus (FLORAJEN ACIDOPHILUS) CAPS Take 1 capsule by mouth daily  Historical Provider, MD        Social History     Tobacco Use    Smoking status: Former Smoker     Packs/day: 0.25     Years: 1.50     Pack years: 0.37     Types: Cigarettes     Last attempt to quit: 3/27/1972     Years since quittin.8    Smokeless tobacco: Never Used   Substance Use Topics    Alcohol use:  Yes     Alcohol/week: 0.0 standard drinks     Comment: soc        Vitals:    20 1758   BP: 112/78   Site: Left Upper Arm   Position: Sitting   Cuff Size: Medium Adult   Pulse: 53   SpO2: 98%   Weight: 276 lb (125.2 kg)   Height: 5'

## 2020-01-29 ENCOUNTER — OFFICE VISIT (OUTPATIENT)
Dept: PULMONOLOGY | Age: 65
End: 2020-01-29
Payer: COMMERCIAL

## 2020-01-29 VITALS
WEIGHT: 271 LBS | HEIGHT: 66 IN | TEMPERATURE: 98.8 F | HEART RATE: 62 BPM | BODY MASS INDEX: 43.55 KG/M2 | DIASTOLIC BLOOD PRESSURE: 71 MMHG | RESPIRATION RATE: 16 BRPM | OXYGEN SATURATION: 97 % | SYSTOLIC BLOOD PRESSURE: 116 MMHG

## 2020-01-29 PROCEDURE — 99214 OFFICE O/P EST MOD 30 MIN: CPT | Performed by: NURSE PRACTITIONER

## 2020-01-29 RX ORDER — LEVOTHYROXINE SODIUM 137 UG/1
TABLET ORAL
Qty: 90 TABLET | Refills: 1 | Status: SHIPPED | OUTPATIENT
Start: 2020-01-29 | End: 2020-07-29 | Stop reason: SDUPTHER

## 2020-01-29 ASSESSMENT — SLEEP AND FATIGUE QUESTIONNAIRES
HOW LIKELY ARE YOU TO NOD OFF OR FALL ASLEEP WHILE SITTING AND TALKING TO SOMEONE: 0
NECK CIRCUMFERENCE (INCHES): 17.75
HOW LIKELY ARE YOU TO NOD OFF OR FALL ASLEEP WHILE WATCHING TV: 1
ESS TOTAL SCORE: 6
HOW LIKELY ARE YOU TO NOD OFF OR FALL ASLEEP WHILE SITTING INACTIVE IN A PUBLIC PLACE: 1
HOW LIKELY ARE YOU TO NOD OFF OR FALL ASLEEP WHILE SITTING QUIETLY AFTER LUNCH WITHOUT ALCOHOL: 0
HOW LIKELY ARE YOU TO NOD OFF OR FALL ASLEEP WHILE SITTING AND READING: 1
HOW LIKELY ARE YOU TO NOD OFF OR FALL ASLEEP WHILE LYING DOWN TO REST IN THE AFTERNOON WHEN CIRCUMSTANCES PERMIT: 3
HOW LIKELY ARE YOU TO NOD OFF OR FALL ASLEEP WHEN YOU ARE A PASSENGER IN A CAR FOR AN HOUR WITHOUT A BREAK: 0
HOW LIKELY ARE YOU TO NOD OFF OR FALL ASLEEP IN A CAR, WHILE STOPPED FOR A FEW MINUTES IN TRAFFIC: 0

## 2020-01-29 NOTE — PATIENT INSTRUCTIONS
your ability to fall asleep. Regular exercise is recommended to help you deepen the sleep. 3- Avoid heavy, spicy, or sugary foods 4-6 hours before bedtime: These can affect your ability to stay asleep. 4- Have a light snack before bed: Having an empty stomach can interfere with your sleep. Dairy products and turkey contain tryptophan, which acts as a natural sleep inducer. 5- Stay away from caffeine, nicotine and alcohol at least 4-6 hours before bed: Caffeine and nicotine are stimulants that interfere with your ability to fall asleep. While alcohol has an immediate sleep-inducing effect, a few hours later, as alcohol levels in your blood start to fall, there is a stimulant effect and you will experience fragmented sleep. 6- Take a hot bath 90 minutes before bedtime:  A hot bath will raise your body temperature, but it is the drop in body temperature that may leave you feeling sleepy  7- Develop sleep rituals: it is important to give your body cues that it is time to slow down and sleep. Listen to relaxing music, read something soothing for 15 minutes, have a cup of caffeine free tea, or do relaxation exercises such as yoga or deep breathing help relieve anxiety and reduce muscle tension. 8- Fix a bedtime and an awakening time: Even on weekends! When your sleep cycle has a regular rhythm, you will feel better. 9- Sleep only when sleepy: This reduces the time you are awake in bed. 10- Get into your favorite sleeping position: If you can't fall asleep within 15-30 minutes, get up and do something boring until you feel sleepy. Sit quietly in the dark or read the warranty on your refrigerator. Don't expose yourself to bright light while you are up, it gives cues to your brain that it is time to wake up. 11- Only use your bed for sleeping: Dont use the bed as an office, workroom or recreation room. Let your body \"know\" that the bed is associated with sleeping  12- Use comfortable bedding.  Uncomfortable Moisture in your unit can cause sudden pressure increases or short circuits  DO's and DON'Ts:  - Don't use alcohol-based products to clean your mask, because it can cause the materials to become hard and brittle. - Don't put headgear in the washer or dryer  - Don't use any caustic or household cleaning solutions such as bleach on your CPAP   equipment.  - Do follow the recommended cleaning schedule. - Do change your disposable filter frequently. Adapted From: [employer]/cleaning. shtm. These are general suggestions for all models please follow specific s recommendations and specific instructions    Here are some tips to to getting better sleep  1- Avoid napping during the day: This will ensure you are tired at bedtime. If you have to take a nap, sleep less than one hour, before 3 pm.   2- Exercise regularly, but not right before bed: but the timing of the workout is important. Exercising in the morning or early afternoon will not interfere with sleep. Exercising within two hours before bedtime can decrease your ability to fall asleep. Regular exercise is recommended to help you deepen the sleep. 3- Avoid heavy, spicy, or sugary foods 4-6 hours before bedtime: These can affect your ability to stay asleep. 4- Have a light snack before bed: Having an empty stomach can interfere with your sleep. Dairy products and turkey contain tryptophan, which acts as a natural sleep inducer. 5- Stay away from caffeine, nicotine and alcohol at least 4-6 hours before bed: Caffeine and nicotine are stimulants that interfere with your ability to fall asleep. While alcohol has an immediate sleep-inducing effect, a few hours later, as alcohol levels in your blood start to fall, there is a stimulant effect and you will experience fragmented sleep.    6- Take a hot bath 90 minutes before bedtime:  A hot bath will raise your body temperature, but it is the drop in body temperature that may leave

## 2020-01-29 NOTE — PROGRESS NOTES
Patient ID: Darrin Cooper is a 71 y.o. female who is being seen today for   Chief Complaint   Patient presents with   [facility] Patient     Jerome Cody         HPI:     Darrin Cooper is a 71 y.o. female in office for JINA follow up. She was previously a patient of [place] [facility] however changed offices due to location. States she diagnosed with JINA in 1980s. States she has been on CPAP since, does well with it. States snoring, poor sleep hypersomnia prior to CPAP, resolved with CPAP. Patient is using CPAP 8+ hrs/night. Using humidifier. No snoring on CPAP. The pressure is well tolerated. The mask is comfortable-nasal mask dreamwear. No mask leak. No significant daytime sleepiness. No nodding off when driving. No dry nose or throat. Some fatigue. Bedtime is 8-9 pm and rise time is 530 am. Sleep onset is 2 minutes. Wakes up 0-1 times at night total. 0-1 nocturia. It takes usually few minutes to fall back a sleep. Rare naps during the day, more since diagnosed with afib. No headache in am. No weight gain. 0-1 caffienated beverages during the day. Rare alcohol. ESS is 6.     Sleep Medicine 1/29/2020 1/11/2019 1/5/2018 5/9/2016 10/7/2014   Sitting and reading 1 1 2 3 3   Watching TV 1 1 2 2 3   Sitting, inactive in a public place (e.g. a theatre or a meeting) 1 1 1 1 1   As a passenger in a car for an hour without a break 0 1 1 2 1   Lying down to rest in the afternoon when circumstances permit 3 3 3 3 3   Sitting and talking to someone 0 0 0 1 1   Sitting quietly after a lunch without alcohol 0 1 0 2 1   In a car, while stopped for a few minutes in traffic 0 0 0 0 0   Total score 6 8 9 14 13   Neck circumference 17.75 - - - -       Past Medical History:  Past Medical History:   Diagnosis Date    Abnormal Pap smear of cervix 06/11/2018    hpv+    Allergic     Allergic rhinitis     Aortic stenosis, mild-echo 8/2016 8/18/2016    Atrial fibrillation (HCC)     BMI 40.0-44.9, adult ([address]     Breast

## 2020-02-05 NOTE — PROGRESS NOTES
[address]   Electrophysiology  Note              Date:  February 7, 2020  Patient name: Virginia Londono  YOB: 1955    Reason for follow up:  Paroxysmal atrial fibrillation and  Atrial Fibrillation    KADEN Parrish     HISTORY OF PRESENT ILLNESS: Virginia Londono is a 71 y.o. female who presents for evaluation for Paroxysmal atrial fibrillation. She has a history of cardiomyopathy and follows with the heart failure team. She presented to the hospital 07/11/19 with palpitations. She was found to be in AFib RVR. She was started on flecainide, diltiazem and eliquis. Her echo from 07/11/19 showed her EF was 48-50% with mild/ global hypokinesis. Mild MR and AR. Her stress test from 07/25/19 showed a mixed defect in the anteroseptal wall raising concern for mixed ischemia/scar. Her cardiac cath from 08/02/19 showed nonischemic cardiomyopathy. Flecainide and diltiazem were stopped due to worsening. LVEF. She was started on toprol 25 mg. She was started on lisinopril and eliquis as well. She wore a KITTY from 8/13-8/26/19 showed a fib. Cardiac MRI showed her EF was 45%. Her EKG on 9/14/19 showed sinus abhinav. She states when she originally went to the ER in July, this started with palpitations. Her Lisinopril was stopped due to cough and changed to diovan 11/14/19. Interval history since last office visit:   Today she states that she feels good. She states that she felt her afib last night and continues to have intermittent palpitations. She states that she feels palpitations when she feels her afib She states that Monday she was nervous before a work meeting and felt like she was going to pass out but did not. She follows with heart failure team to manage her lasix. She is taking her medications as prescribed. She is tolerating them well.        Past Medical History:   has a past medical history of Abnormal Pap smear of cervix, Allergic, Allergic rhinitis, Aortic stenosis, mild-echo 2016, Atrial fibrillation ([facility] BMI 40.0-44.9, adult ([facility] [facility] Cancer ([facility] Depression, GERD (gastroesophageal reflux disease), H/O laparoscopic adjustable gastric banding, Heart palpitations, HPV (human papilloma virus) infection, HPV test positive, Hypertension, Hypothyroidism, Meningioma ([facility] [facility] Mild aortic regurgitation-echo 2016, Mild dysplasia of cervix, Obesity, Sleep apnea, and Urge incontinence. Past Surgical History:   has a past surgical history that includes Appendectomy; Cholecystectomy; Colonoscopy; Lap Band (); Tunneled venous port placement ();  section (9209,[id number],8967);  section; Breast surgery (); Breast biopsy; Breast lumpectomy; Hysterectomy (); Lap Band (); Ovary removal; Thyroidectomy, partial (Left, ); Upper gastrointestinal endoscopy (6/10/16); and partial hysterectomy (cervix not removed). Allergies:  Latex and Iodine    Social History:   reports that she quit smoking about 47 years ago. Her smoking use included cigarettes. She has a 0.38 pack-year smoking history. She has never used smokeless tobacco. She reports current alcohol use. She reports that she does not use drugs. Family History: family history includes Cancer in her maternal grandmother, mother, paternal grandmother, sister, and another family member; Cancer (age of onset: 61) in her father; Heart Attack in her father; Heart Disease in her father and sister; No Known Problems in her brother, maternal aunt, maternal grandfather, maternal uncle, paternal aunt, paternal grandfather, and paternal uncle; Other in her father and sister. Home Medications:    Prior to Admission medications    Medication Sig Start Date End Date Taking?  Authorizing Provider   levothyroxine (SYNTHROID) 137 MCG tablet TAKE ONE TABLET BY MOUTH ONE TIME A DAY 20  Yes KADEN Shah   montelukast (SINGULAIR) 10 MG tablet TAKE ONE TABLET BY MOUTH NIGHTLY 1/8/20  Yes KADEN Stoddard   fluticasone (FLONASE) 50 MCG/ACT nasal spray 2 sprays each nostril daily 12/5/19  Yes KADEN Stoddard   valsartan (DIOVAN) 40 MG tablet Take 1 tablet by mouth daily 11/14/19  Yes Betsey Burkitt, APRN - CNP   citalopram (CELEXA) 40 MG tablet TAKE ONE TABLET BY MOUTH ONE TIME A DAY 11/11/19  Yes KADEN Stoddard   spironolactone (ALDACTONE) 25 MG tablet Take 1 tablet by mouth daily 10/8/19  Yes Betsey Burkitt, APRN - CNP   atorvastatin (LIPITOR) 40 MG tablet TAKE 1 TABLET BY MOUTH NIGHTLY 9/10/19  Yes Vitaly Elias MD   ELIQUIS 5 MG TABS tablet TAKE 1 TABLET BY MOUTH TWO TIMES A DAY 9/10/19  Yes Vitaly Elias MD   propafenone (RYTHMOL SR) 225 MG extended release capsule Take 1 capsule by mouth 2 times daily 9/4/19  Yes Deirdre Haines MD   Azelastine-Fluticasone 137-50 MCG/ACT SUSP 1 spray by Nasal route 2 times daily   Yes Historical Provider, MD   omeprazole (PRILOSEC) 20 MG delayed release capsule Take 1 capsule by mouth every morning (before breakfast) 8/29/19  Yes KADEN Stoddard   oxybutynin (DITROPAN XL) 15 MG extended release tablet Take 1 tablet by mouth daily 8/26/19  Yes KADEN Stoddard   dextromethorphan-guaiFENesin (MUCINEX DM)  MG per extended release tablet Take 1 tablet by mouth every 12 hours as needed Indications: prn   Yes Historical Provider, MD   metoprolol succinate (TOPROL XL) 25 MG extended release tablet Take 1 tablet by mouth daily 8/2/19  Yes Ramiro Pace MD   hydrocortisone (ANUSOL-HC) 2.5 % rectal cream Place rectally 2 times daily. 7/12/19  Yes Bentley Manuel MD   Lactobacillus (FLORAJEN ACIDOPHILUS) CAPS Take 1 capsule by mouth daily   Yes Historical Provider, MD   cetirizine (ZYRTEC) 10 MG tablet Take 10 mg by mouth 2 times daily    Yes Historical Provider, MD   multivitamin [facility]) per tablet Take 1 tablet by mouth daily.    Yes Historical Provider, MD          REVIEW OF SYSTEMS:      All 14-point review of systems are completed and  pertinent positives are mentioned in the history of present illness. Other  systems are reviewed and are negative. Physical Examination:    /64   Pulse 66   Ht 5' 6\" (1.676 m)   Wt 269 lb (122 kg)   LMP  (LMP Unknown)   SpO2 98%   BMI 43.42 kg/m²      Constitutional and General Appearance:    alert, cooperative, no distress and appears stated age  [de-identified]:    PERRLA, no cervical lymphadenopathy. No masses palpable. Normal oral  mucosa  Respiratory:  · Normal excursion and expansion without use of accessory muscles  · Resp Auscultation: Normal breath sounds without dullness or wheezing  Cardiovascular:  · The apical impulse is not displaced  · Heart tones are crisp and normal. irregular   · Jugular venous pulsation Normal  · The carotid upstroke is normal in amplitude and contour without delay or bruit  · Peripheral pulses are symmetrical and full  Abdomen:  · No masses or tenderness  · Bowel sounds present  Extremities:  ·  No Cyanosis or Clubbing  ·  Lower extremity edema: No  · Skin: Warm and dry  Neurological:  · Alert and oriented. · Moves all extremities well  · No abnormalities of mood, affect, memory, mentation, or behavior are noted      DATA:    ECG:    ECHO: 7/12/19  Summary   Left ventricular systolic function is reduced with ejection fraction   estimated at 48-50 %. Mild global hypokinesis is present. There is mild concentric left ventricular hypertrophy. Normal left ventricular diastolic filling pressure. Mild mitral regurgitation. Aortic sclerosis vs mild aortic stenosis. Mild aortic regurgitation is present. Normal systolic pulmonary artery pressure (SPAP) estimated at 29 mmHg (RA   pressure 8 mmHg).     LV Diastolic Dimension: 4.62 cm LV Systolic Dimension: 3.9 cm   LV Septum Diastolic: 9.01 cm   LV PW Diastolic: 8.25 cm        AO Root Dimension: 3.4 cm                                      LA Area: 24.3 cm2   LVOT: 2 cm                      LA procedure. Explained to patient that discontinuation of anticoagulation is not an indication for the procedure. 2.  Hold eliquis one day prior. And Hold diuretics the morning of procedure. Obesity: The patient is asked to make an attempt to improve diet and exercise patterns to aid in medical management of this problem. QUALITY MEASURES  1. Tobacco Cessation Counseling: NA  2. Retake of BP if >140/90:   NA  3. Documentation to PCP/referring for new patient:  Sent to PCP at close of office visit  4. CAD patient on anti-platelet: NA  5. CAD patient on STATIN therapy:  NA  6. Patient with CHF and aFib on anticoagulation:  Yes     This note was scribed in the presence of MICAH Chow MD by Silvino Thibodeaux RN.     I, Sandrita Vallejo, personally performed the services described in this documentation as scribed by the above signed scribe in my presence, and it is both accurate and complete to the best of our ability and knowledge. I agree with the details independently gathered by my clinical support staff, while the remaining scribed note accurately describes my personal service to the patient. The above RN is working as a scribe for and in the presence of myself . Working as a scribe, the RN may have prepopulated components of this note with my historical intellectual property under my direct supervision. Any additions to this intellectual property were performed at my direction. Furthermore, the content and accuracy of this note have been reviewed by me to the best of my ability.          All questions and concerns were addressed to the patient/family. Alternatives to my treatment were discussed. FELICITY ThomasC. Electrophysiology  [facility]. [address].   [place], [address]  Phone: [phone]  Fax: [phone]

## 2020-02-07 ENCOUNTER — TELEPHONE (OUTPATIENT)
Dept: CARDIOLOGY CLINIC | Age: 65
End: 2020-02-07

## 2020-02-07 ENCOUNTER — OFFICE VISIT (OUTPATIENT)
Dept: CARDIOLOGY CLINIC | Age: 65
End: 2020-02-07
Payer: COMMERCIAL

## 2020-02-07 VITALS
SYSTOLIC BLOOD PRESSURE: 112 MMHG | DIASTOLIC BLOOD PRESSURE: 64 MMHG | BODY MASS INDEX: 43.23 KG/M2 | OXYGEN SATURATION: 98 % | HEART RATE: 66 BPM | HEIGHT: 66 IN | WEIGHT: 269 LBS

## 2020-02-07 PROCEDURE — 99215 OFFICE O/P EST HI 40 MIN: CPT | Performed by: INTERNAL MEDICINE

## 2020-02-07 NOTE — PATIENT INSTRUCTIONS
RECOMMENDATIONS:  1. Ablation discussed. Risk, benefit, alternative, rationale of the procedure were discussed with the patient which included but were not limited to allergic reaction to the medications, pain, bleeding, infection, nerve injury, injury to diaphragm(breathing muscle), pulmonary embolus(blood clot in lungs), deep vein blood clot, pneumothorax, hemothorax, acute renal failure, cardiac perforation,  tamponade, need for emergent surgery (open heart), need for any future surgery, pulmonary vein stenosis requiring stent or angioplasty, left atrial to esophageal fistula requiring emergent surgery, stroke, myocardial infarction, need for permanent pacemaker, lead dislodgement and death. Given the complex nature of the disease no guarantee was given on the success of the procedure. Explained to patient that discontinuation of anticoagulation is not an indication for the procedure. 2.  Hold eliquis one day prior. And Hold diuretics the morning of procedure. Obesity: The patient is asked to make an attempt to improve diet and exercise patterns to aid in medical management of this problem.

## 2020-02-07 NOTE — TELEPHONE ENCOUNTER
Patient was seen in office today. Patient is scheduled with Dr. Antonia Barnes for Afib Ablation with anesthesia on 3/5/20 at [facility], arrival time of 6:30am to the Cath Lab. Please have patient arrive to the main entrance of [facility] at 6:15am and check in with the registration desk. Medications reviewed with CANDELARIA Otto -Hold eliquis one day prior. And Hold diuretics the morning of procedure. Remind patient to be NPO after midnight (8 hours prior). Do not apply lotions/creams on skin the day of procedure.

## 2020-02-17 RX ORDER — OMEPRAZOLE 20 MG/1
20 CAPSULE, DELAYED RELEASE ORAL
Qty: 30 CAPSULE | Refills: 5 | Status: SHIPPED | OUTPATIENT
Start: 2020-02-17 | End: 2020-08-24

## 2020-02-17 NOTE — TELEPHONE ENCOUNTER
Last office visit 1/28/2020     Last written 08/29/2019, 30 days with 5 refills.       Next office visit scheduled     Requested Prescriptions     Pending Prescriptions Disp Refills    omeprazole (PRILOSEC) 20 MG delayed release capsule 30 capsule 5     Sig: Take 1 capsule by mouth every morning (before breakfast)

## 2020-03-05 ENCOUNTER — HOSPITAL ENCOUNTER (INPATIENT)
Dept: CARDIAC CATH/INVASIVE PROCEDURES | Age: 65
LOS: 2 days | Discharge: HOME | DRG: 274 | End: 2020-03-07
Attending: INTERNAL MEDICINE | Admitting: INTERNAL MEDICINE
Payer: COMMERCIAL

## 2020-03-05 ENCOUNTER — ANESTHESIA (OUTPATIENT)
Dept: CARDIAC CATH/INVASIVE PROCEDURES | Age: 65
DRG: 274 | End: 2020-03-05
Payer: COMMERCIAL

## 2020-03-05 ENCOUNTER — ANESTHESIA EVENT (OUTPATIENT)
Dept: CARDIAC CATH/INVASIVE PROCEDURES | Age: 65
DRG: 274 | End: 2020-03-05
Payer: COMMERCIAL

## 2020-03-05 VITALS
SYSTOLIC BLOOD PRESSURE: 151 MMHG | RESPIRATION RATE: 3 BRPM | DIASTOLIC BLOOD PRESSURE: 69 MMHG | OXYGEN SATURATION: 88 % | TEMPERATURE: 95.6 F

## 2020-03-05 PROBLEM — I48.0 PAF (PAROXYSMAL ATRIAL FIBRILLATION) (HCC): Status: ACTIVE | Noted: 2020-03-05

## 2020-03-05 LAB
A/G RATIO: 1.4 (ref 1.1–2.2)
ALBUMIN SERPL-MCNC: 4 G/DL (ref 3.4–5)
ALP BLD-CCNC: 49 U/L (ref 40–129)
ALT SERPL-CCNC: 25 U/L (ref 10–40)
ANION GAP SERPL CALCULATED.3IONS-SCNC: 12 MMOL/L (ref 3–16)
AST SERPL-CCNC: 29 U/L (ref 15–37)
BILIRUB SERPL-MCNC: 0.6 MG/DL (ref 0–1)
BUN BLDV-MCNC: 16 MG/DL (ref 7–20)
CALCIUM SERPL-MCNC: 9.1 MG/DL (ref 8.3–10.6)
CHLORIDE BLD-SCNC: 104 MMOL/L (ref 99–110)
CO2: 25 MMOL/L (ref 21–32)
CREAT SERPL-MCNC: 0.9 MG/DL (ref 0.6–1.2)
EKG ATRIAL RATE: 52 BPM
EKG DIAGNOSIS: NORMAL
EKG P AXIS: 83 DEGREES
EKG P-R INTERVAL: 186 MS
EKG Q-T INTERVAL: 464 MS
EKG QRS DURATION: 110 MS
EKG QTC CALCULATION (BAZETT): 431 MS
EKG R AXIS: 3 DEGREES
EKG T AXIS: 16 DEGREES
EKG VENTRICULAR RATE: 52 BPM
GFR AFRICAN AMERICAN: >60
GFR NON-AFRICAN AMERICAN: >60
GLOBULIN: 2.8 G/DL
GLUCOSE BLD-MCNC: 131 MG/DL (ref 70–99)
HCT VFR BLD CALC: 36.7 % (ref 36–48)
HEMOGLOBIN: 12.3 G/DL (ref 12–16)
INR BLD: 1.21 (ref 0.86–1.14)
MCH RBC QN AUTO: 30.7 PG (ref 26–34)
MCHC RBC AUTO-ENTMCNC: 33.6 G/DL (ref 31–36)
MCV RBC AUTO: 91.5 FL (ref 80–100)
PDW BLD-RTO: 13.6 % (ref 12.4–15.4)
PLATELET # BLD: 214 K/UL (ref 135–450)
PMV BLD AUTO: 8.1 FL (ref 5–10.5)
POTASSIUM SERPL-SCNC: 4.9 MMOL/L (ref 3.5–5.1)
PROTHROMBIN TIME: 14.1 SEC (ref 10–13.2)
RBC # BLD: 4.02 M/UL (ref 4–5.2)
SODIUM BLD-SCNC: 141 MMOL/L (ref 136–145)
TOTAL PROTEIN: 6.8 G/DL (ref 6.4–8.2)
WBC # BLD: 4.9 K/UL (ref 4–11)

## 2020-03-05 PROCEDURE — 93657 TX L/R ATRIAL FIB ADDL: CPT | Performed by: INTERNAL MEDICINE

## 2020-03-05 PROCEDURE — 93662 INTRACARDIAC ECG (ICE): CPT | Performed by: INTERNAL MEDICINE

## 2020-03-05 PROCEDURE — 2060000000 HC ICU INTERMEDIATE R&B

## 2020-03-05 PROCEDURE — C1769 GUIDE WIRE: HCPCS

## 2020-03-05 PROCEDURE — 93656 COMPRE EP EVAL ABLTJ ATR FIB: CPT | Performed by: INTERNAL MEDICINE

## 2020-03-05 PROCEDURE — 2580000003 HC RX 258: Performed by: INTERNAL MEDICINE

## 2020-03-05 PROCEDURE — 2500000003 HC RX 250 WO HCPCS

## 2020-03-05 PROCEDURE — 4A0234Z MEASUREMENT OF CARDIAC ELECTRICAL ACTIVITY, PERCUTANEOUS APPROACH: ICD-10-PCS | Performed by: INTERNAL MEDICINE

## 2020-03-05 PROCEDURE — 85610 PROTHROMBIN TIME: CPT

## 2020-03-05 PROCEDURE — 85027 COMPLETE CBC AUTOMATED: CPT

## 2020-03-05 PROCEDURE — 3700000000 HC ANESTHESIA ATTENDED CARE

## 2020-03-05 PROCEDURE — 93623 PRGRMD STIMJ&PACG IV RX NFS: CPT | Performed by: INTERNAL MEDICINE

## 2020-03-05 PROCEDURE — 02583ZZ DESTRUCTION OF CONDUCTION MECHANISM, PERCUTANEOUS APPROACH: ICD-10-PCS | Performed by: INTERNAL MEDICINE

## 2020-03-05 PROCEDURE — 93005 ELECTROCARDIOGRAM TRACING: CPT | Performed by: INTERNAL MEDICINE

## 2020-03-05 PROCEDURE — 93662 INTRACARDIAC ECG (ICE): CPT

## 2020-03-05 PROCEDURE — 2580000003 HC RX 258

## 2020-03-05 PROCEDURE — 6360000002 HC RX W HCPCS: Performed by: NURSE ANESTHETIST, CERTIFIED REGISTERED

## 2020-03-05 PROCEDURE — 3700000001 HC ADD 15 MINUTES (ANESTHESIA)

## 2020-03-05 PROCEDURE — 2720000010 HC SURG SUPPLY STERILE

## 2020-03-05 PROCEDURE — 6360000002 HC RX W HCPCS: Performed by: ANESTHESIOLOGY

## 2020-03-05 PROCEDURE — C1732 CATH, EP, DIAG/ABL, 3D/VECT: HCPCS

## 2020-03-05 PROCEDURE — 93613 INTRACARDIAC EPHYS 3D MAPG: CPT | Performed by: INTERNAL MEDICINE

## 2020-03-05 PROCEDURE — 80053 COMPREHEN METABOLIC PANEL: CPT

## 2020-03-05 PROCEDURE — 93623 PRGRMD STIMJ&PACG IV RX NFS: CPT

## 2020-03-05 PROCEDURE — 4A023FZ MEASUREMENT OF CARDIAC RHYTHM, PERCUTANEOUS APPROACH: ICD-10-PCS | Performed by: INTERNAL MEDICINE

## 2020-03-05 PROCEDURE — 6370000000 HC RX 637 (ALT 250 FOR IP): Performed by: INTERNAL MEDICINE

## 2020-03-05 PROCEDURE — 6360000002 HC RX W HCPCS

## 2020-03-05 PROCEDURE — 02K83ZZ MAP CONDUCTION MECHANISM, PERCUTANEOUS APPROACH: ICD-10-PCS | Performed by: INTERNAL MEDICINE

## 2020-03-05 PROCEDURE — 2500000003 HC RX 250 WO HCPCS: Performed by: NURSE ANESTHETIST, CERTIFIED REGISTERED

## 2020-03-05 PROCEDURE — C1894 INTRO/SHEATH, NON-LASER: HCPCS

## 2020-03-05 PROCEDURE — 7100000001 HC PACU RECOVERY - ADDTL 15 MIN

## 2020-03-05 PROCEDURE — 2580000003 HC RX 258: Performed by: NURSE ANESTHETIST, CERTIFIED REGISTERED

## 2020-03-05 PROCEDURE — 93657 TX L/R ATRIAL FIB ADDL: CPT

## 2020-03-05 PROCEDURE — 93656 COMPRE EP EVAL ABLTJ ATR FIB: CPT

## 2020-03-05 PROCEDURE — 6370000000 HC RX 637 (ALT 250 FOR IP): Performed by: ANESTHESIOLOGY

## 2020-03-05 PROCEDURE — 2709999900 HC NON-CHARGEABLE SUPPLY

## 2020-03-05 PROCEDURE — 7100000000 HC PACU RECOVERY - FIRST 15 MIN

## 2020-03-05 PROCEDURE — G0378 HOSPITAL OBSERVATION PER HR: HCPCS

## 2020-03-05 PROCEDURE — C1759 CATH, INTRA ECHOCARDIOGRAPHY: HCPCS

## 2020-03-05 PROCEDURE — 85347 COAGULATION TIME ACTIVATED: CPT

## 2020-03-05 PROCEDURE — 93613 INTRACARDIAC EPHYS 3D MAPG: CPT

## 2020-03-05 RX ORDER — MONTELUKAST SODIUM 10 MG/1
10 TABLET ORAL NIGHTLY
Status: DISCONTINUED | OUTPATIENT
Start: 2020-03-05 | End: 2020-03-07 | Stop reason: HOSPADM

## 2020-03-05 RX ORDER — ROCURONIUM BROMIDE 10 MG/ML
INJECTION, SOLUTION INTRAVENOUS PRN
Status: DISCONTINUED | OUTPATIENT
Start: 2020-03-05 | End: 2020-03-05 | Stop reason: SDUPTHER

## 2020-03-05 RX ORDER — FENTANYL CITRATE 50 UG/ML
INJECTION, SOLUTION INTRAMUSCULAR; INTRAVENOUS PRN
Status: DISCONTINUED | OUTPATIENT
Start: 2020-03-05 | End: 2020-03-05 | Stop reason: SDUPTHER

## 2020-03-05 RX ORDER — PANTOPRAZOLE SODIUM 40 MG/1
40 TABLET, DELAYED RELEASE ORAL
Status: DISCONTINUED | OUTPATIENT
Start: 2020-03-06 | End: 2020-03-07 | Stop reason: HOSPADM

## 2020-03-05 RX ORDER — ONDANSETRON 2 MG/ML
4 INJECTION INTRAMUSCULAR; INTRAVENOUS
Status: COMPLETED | OUTPATIENT
Start: 2020-03-05 | End: 2020-03-05

## 2020-03-05 RX ORDER — ONDANSETRON 2 MG/ML
INJECTION INTRAMUSCULAR; INTRAVENOUS PRN
Status: DISCONTINUED | OUTPATIENT
Start: 2020-03-05 | End: 2020-03-05 | Stop reason: SDUPTHER

## 2020-03-05 RX ORDER — SPIRONOLACTONE 25 MG/1
25 TABLET ORAL DAILY
Status: DISCONTINUED | OUTPATIENT
Start: 2020-03-05 | End: 2020-03-07 | Stop reason: HOSPADM

## 2020-03-05 RX ORDER — VALSARTAN 80 MG/1
40 TABLET ORAL DAILY
Status: DISCONTINUED | OUTPATIENT
Start: 2020-03-05 | End: 2020-03-07 | Stop reason: HOSPADM

## 2020-03-05 RX ORDER — OXYCODONE HYDROCHLORIDE AND ACETAMINOPHEN 5; 325 MG/1; MG/1
1 TABLET ORAL PRN
Status: COMPLETED | OUTPATIENT
Start: 2020-03-05 | End: 2020-03-05

## 2020-03-05 RX ORDER — CETIRIZINE HYDROCHLORIDE 10 MG/1
10 TABLET ORAL DAILY
Status: DISCONTINUED | OUTPATIENT
Start: 2020-03-05 | End: 2020-03-07 | Stop reason: HOSPADM

## 2020-03-05 RX ORDER — SODIUM CHLORIDE, SODIUM LACTATE, POTASSIUM CHLORIDE, CALCIUM CHLORIDE 600; 310; 30; 20 MG/100ML; MG/100ML; MG/100ML; MG/100ML
INJECTION, SOLUTION INTRAVENOUS CONTINUOUS PRN
Status: DISCONTINUED | OUTPATIENT
Start: 2020-03-05 | End: 2020-03-05 | Stop reason: SDUPTHER

## 2020-03-05 RX ORDER — HEPARIN SODIUM 1000 [USP'U]/ML
INJECTION, SOLUTION INTRAVENOUS; SUBCUTANEOUS
Status: COMPLETED | OUTPATIENT
Start: 2020-03-05 | End: 2020-03-05

## 2020-03-05 RX ORDER — M-VIT,TX,IRON,MINS/CALC/FOLIC 27MG-0.4MG
1 TABLET ORAL DAILY
Status: DISCONTINUED | OUTPATIENT
Start: 2020-03-06 | End: 2020-03-07 | Stop reason: HOSPADM

## 2020-03-05 RX ORDER — SODIUM CHLORIDE, SODIUM LACTATE, POTASSIUM CHLORIDE, CALCIUM CHLORIDE 600; 310; 30; 20 MG/100ML; MG/100ML; MG/100ML; MG/100ML
INJECTION, SOLUTION INTRAVENOUS CONTINUOUS
Status: CANCELLED | OUTPATIENT
Start: 2020-03-05

## 2020-03-05 RX ORDER — SUCCINYLCHOLINE CHLORIDE 20 MG/ML
INJECTION INTRAMUSCULAR; INTRAVENOUS PRN
Status: DISCONTINUED | OUTPATIENT
Start: 2020-03-05 | End: 2020-03-05 | Stop reason: SDUPTHER

## 2020-03-05 RX ORDER — PROPAFENONE HYDROCHLORIDE 225 MG/1
225 CAPSULE, EXTENDED RELEASE ORAL 2 TIMES DAILY
Status: DISCONTINUED | OUTPATIENT
Start: 2020-03-05 | End: 2020-03-07 | Stop reason: HOSPADM

## 2020-03-05 RX ORDER — CITALOPRAM 20 MG/1
40 TABLET ORAL DAILY
Status: DISCONTINUED | OUTPATIENT
Start: 2020-03-05 | End: 2020-03-07 | Stop reason: HOSPADM

## 2020-03-05 RX ORDER — SODIUM CHLORIDE 0.9 % (FLUSH) 0.9 %
10 SYRINGE (ML) INJECTION PRN
Status: CANCELLED | OUTPATIENT
Start: 2020-03-05

## 2020-03-05 RX ORDER — PROPOFOL 10 MG/ML
INJECTION, EMULSION INTRAVENOUS PRN
Status: DISCONTINUED | OUTPATIENT
Start: 2020-03-05 | End: 2020-03-05 | Stop reason: SDUPTHER

## 2020-03-05 RX ORDER — SODIUM CHLORIDE 0.9 % (FLUSH) 0.9 %
10 SYRINGE (ML) INJECTION EVERY 12 HOURS SCHEDULED
Status: DISCONTINUED | OUTPATIENT
Start: 2020-03-05 | End: 2020-03-07 | Stop reason: HOSPADM

## 2020-03-05 RX ORDER — SODIUM CHLORIDE 0.9 % (FLUSH) 0.9 %
10 SYRINGE (ML) INJECTION EVERY 12 HOURS SCHEDULED
Status: CANCELLED | OUTPATIENT
Start: 2020-03-05

## 2020-03-05 RX ORDER — FLUTICASONE PROPIONATE 50 MCG
1 SPRAY, SUSPENSION (ML) NASAL 2 TIMES DAILY PRN
Status: DISCONTINUED | OUTPATIENT
Start: 2020-03-05 | End: 2020-03-07 | Stop reason: HOSPADM

## 2020-03-05 RX ORDER — SODIUM CHLORIDE 9 MG/ML
INJECTION, SOLUTION INTRAVENOUS ONCE
Status: COMPLETED | OUTPATIENT
Start: 2020-03-05 | End: 2020-03-05

## 2020-03-05 RX ORDER — METOPROLOL SUCCINATE 25 MG/1
25 TABLET, EXTENDED RELEASE ORAL DAILY
Status: DISCONTINUED | OUTPATIENT
Start: 2020-03-05 | End: 2020-03-07 | Stop reason: HOSPADM

## 2020-03-05 RX ORDER — MORPHINE SULFATE 4 MG/ML
2 INJECTION, SOLUTION INTRAMUSCULAR; INTRAVENOUS EVERY 5 MIN PRN
Status: DISCONTINUED | OUTPATIENT
Start: 2020-03-05 | End: 2020-03-07 | Stop reason: HOSPADM

## 2020-03-05 RX ORDER — OXYBUTYNIN CHLORIDE 5 MG/1
15 TABLET, EXTENDED RELEASE ORAL DAILY
Status: DISCONTINUED | OUTPATIENT
Start: 2020-03-05 | End: 2020-03-07 | Stop reason: HOSPADM

## 2020-03-05 RX ORDER — MIDAZOLAM HYDROCHLORIDE 1 MG/ML
INJECTION INTRAMUSCULAR; INTRAVENOUS PRN
Status: DISCONTINUED | OUTPATIENT
Start: 2020-03-05 | End: 2020-03-05 | Stop reason: SDUPTHER

## 2020-03-05 RX ORDER — HEPARIN SODIUM 10000 [USP'U]/100ML
INJECTION, SOLUTION INTRAVENOUS CONTINUOUS PRN
Status: COMPLETED | OUTPATIENT
Start: 2020-03-05 | End: 2020-03-05

## 2020-03-05 RX ORDER — OXYCODONE HYDROCHLORIDE AND ACETAMINOPHEN 5; 325 MG/1; MG/1
2 TABLET ORAL PRN
Status: COMPLETED | OUTPATIENT
Start: 2020-03-05 | End: 2020-03-05

## 2020-03-05 RX ORDER — MEPERIDINE HYDROCHLORIDE 50 MG/ML
12.5 INJECTION INTRAMUSCULAR; INTRAVENOUS; SUBCUTANEOUS EVERY 5 MIN PRN
Status: DISCONTINUED | OUTPATIENT
Start: 2020-03-05 | End: 2020-03-07 | Stop reason: HOSPADM

## 2020-03-05 RX ORDER — ACETAMINOPHEN 325 MG/1
650 TABLET ORAL EVERY 4 HOURS PRN
Status: DISCONTINUED | OUTPATIENT
Start: 2020-03-05 | End: 2020-03-06

## 2020-03-05 RX ORDER — LACTOBACILLUS RHAMNOSUS GG 10B CELL
1 CAPSULE ORAL DAILY
Status: DISCONTINUED | OUTPATIENT
Start: 2020-03-05 | End: 2020-03-07 | Stop reason: HOSPADM

## 2020-03-05 RX ORDER — ATORVASTATIN CALCIUM 40 MG/1
40 TABLET, FILM COATED ORAL NIGHTLY
Status: DISCONTINUED | OUTPATIENT
Start: 2020-03-05 | End: 2020-03-07 | Stop reason: HOSPADM

## 2020-03-05 RX ORDER — SODIUM CHLORIDE 0.9 % (FLUSH) 0.9 %
10 SYRINGE (ML) INJECTION PRN
Status: DISCONTINUED | OUTPATIENT
Start: 2020-03-05 | End: 2020-03-07 | Stop reason: HOSPADM

## 2020-03-05 RX ORDER — LIDOCAINE HYDROCHLORIDE 20 MG/ML
INJECTION, SOLUTION EPIDURAL; INFILTRATION; INTRACAUDAL; PERINEURAL PRN
Status: DISCONTINUED | OUTPATIENT
Start: 2020-03-05 | End: 2020-03-05 | Stop reason: SDUPTHER

## 2020-03-05 RX ORDER — DEXAMETHASONE SODIUM PHOSPHATE 4 MG/ML
INJECTION, SOLUTION INTRA-ARTICULAR; INTRALESIONAL; INTRAMUSCULAR; INTRAVENOUS; SOFT TISSUE PRN
Status: DISCONTINUED | OUTPATIENT
Start: 2020-03-05 | End: 2020-03-05 | Stop reason: SDUPTHER

## 2020-03-05 RX ORDER — MORPHINE SULFATE 4 MG/ML
1 INJECTION, SOLUTION INTRAMUSCULAR; INTRAVENOUS EVERY 5 MIN PRN
Status: DISCONTINUED | OUTPATIENT
Start: 2020-03-05 | End: 2020-03-07 | Stop reason: HOSPADM

## 2020-03-05 RX ADMIN — CETIRIZINE HYDROCHLORIDE 10 MG: 10 TABLET, FILM COATED ORAL at 16:28

## 2020-03-05 RX ADMIN — LIDOCAINE HYDROCHLORIDE 60 MG: 20 INJECTION, SOLUTION EPIDURAL; INFILTRATION; INTRACAUDAL; PERINEURAL at 08:35

## 2020-03-05 RX ADMIN — FENTANYL CITRATE 50 MCG: 50 INJECTION, SOLUTION INTRAMUSCULAR; INTRAVENOUS at 12:51

## 2020-03-05 RX ADMIN — MONTELUKAST SODIUM 10 MG: 10 TABLET ORAL at 21:17

## 2020-03-05 RX ADMIN — SUCCINYLCHOLINE CHLORIDE 140 MG: 20 INJECTION, SOLUTION INTRAMUSCULAR; INTRAVENOUS at 08:35

## 2020-03-05 RX ADMIN — PROPAFENONE HYDROCHLORIDE 225 MG: 225 CAPSULE, EXTENDED RELEASE ORAL at 21:17

## 2020-03-05 RX ADMIN — HEPARIN SODIUM 1000 UNITS/HR: 10000 INJECTION, SOLUTION INTRAVENOUS at 09:54

## 2020-03-05 RX ADMIN — FENTANYL CITRATE 75 MCG: 50 INJECTION, SOLUTION INTRAMUSCULAR; INTRAVENOUS at 08:35

## 2020-03-05 RX ADMIN — MIDAZOLAM HYDROCHLORIDE 2 MG: 2 INJECTION, SOLUTION INTRAMUSCULAR; INTRAVENOUS at 07:50

## 2020-03-05 RX ADMIN — SPIRONOLACTONE 25 MG: 25 TABLET ORAL at 16:29

## 2020-03-05 RX ADMIN — HEPARIN SODIUM 6000 UNITS: 1000 INJECTION, SOLUTION INTRAVENOUS; SUBCUTANEOUS at 10:22

## 2020-03-05 RX ADMIN — SODIUM CHLORIDE, POTASSIUM CHLORIDE, SODIUM LACTATE AND CALCIUM CHLORIDE: 600; 310; 30; 20 INJECTION, SOLUTION INTRAVENOUS at 09:29

## 2020-03-05 RX ADMIN — Medication 1 CAPSULE: at 16:28

## 2020-03-05 RX ADMIN — OXYBUTYNIN CHLORIDE 15 MG: 5 TABLET, EXTENDED RELEASE ORAL at 16:27

## 2020-03-05 RX ADMIN — ONDANSETRON 4 MG: 2 INJECTION INTRAMUSCULAR; INTRAVENOUS at 17:17

## 2020-03-05 RX ADMIN — CITALOPRAM HYDROBROMIDE 40 MG: 20 TABLET ORAL at 16:28

## 2020-03-05 RX ADMIN — SODIUM CHLORIDE: 9 INJECTION, SOLUTION INTRAVENOUS at 07:05

## 2020-03-05 RX ADMIN — ONDANSETRON 4 MG: 2 INJECTION INTRAMUSCULAR; INTRAVENOUS at 13:00

## 2020-03-05 RX ADMIN — APIXABAN 5 MG: 5 TABLET, FILM COATED ORAL at 21:18

## 2020-03-05 RX ADMIN — FLUTICASONE PROPIONATE 1 SPRAY: 50 SPRAY, METERED NASAL at 16:27

## 2020-03-05 RX ADMIN — HEPARIN SODIUM 5000 UNITS: 1000 INJECTION, SOLUTION INTRAVENOUS; SUBCUTANEOUS at 09:18

## 2020-03-05 RX ADMIN — HEPARIN SODIUM 6000 UNITS: 1000 INJECTION, SOLUTION INTRAVENOUS; SUBCUTANEOUS at 10:50

## 2020-03-05 RX ADMIN — ATORVASTATIN CALCIUM 40 MG: 40 TABLET, FILM COATED ORAL at 21:18

## 2020-03-05 RX ADMIN — HEPARIN SODIUM 1000 UNITS: 1000 INJECTION, SOLUTION INTRAVENOUS; SUBCUTANEOUS at 09:53

## 2020-03-05 RX ADMIN — OXYCODONE HYDROCHLORIDE AND ACETAMINOPHEN 2 TABLET: 5; 325 TABLET ORAL at 17:17

## 2020-03-05 RX ADMIN — VALSARTAN 40 MG: 80 TABLET, FILM COATED ORAL at 16:29

## 2020-03-05 RX ADMIN — ROCURONIUM BROMIDE 70 MG: 10 SOLUTION INTRAVENOUS at 09:10

## 2020-03-05 RX ADMIN — PROPAFENONE HYDROCHLORIDE 225 MG: 225 CAPSULE, EXTENDED RELEASE ORAL at 16:28

## 2020-03-05 RX ADMIN — METOPROLOL SUCCINATE 25 MG: 25 TABLET, EXTENDED RELEASE ORAL at 16:27

## 2020-03-05 RX ADMIN — LEVOTHYROXINE SODIUM 137 MCG: 112 TABLET ORAL at 16:28

## 2020-03-05 RX ADMIN — PROPOFOL 200 MG: 10 INJECTION, EMULSION INTRAVENOUS at 08:35

## 2020-03-05 RX ADMIN — ROCURONIUM BROMIDE 5 MG: 10 SOLUTION INTRAVENOUS at 08:35

## 2020-03-05 RX ADMIN — Medication 10 ML: at 21:18

## 2020-03-05 RX ADMIN — HYDROMORPHONE HYDROCHLORIDE 0.25 MG: 1 INJECTION, SOLUTION INTRAMUSCULAR; INTRAVENOUS; SUBCUTANEOUS at 13:59

## 2020-03-05 RX ADMIN — DEXAMETHASONE SODIUM PHOSPHATE 8 MG: 4 INJECTION, SOLUTION INTRAMUSCULAR; INTRAVENOUS at 13:00

## 2020-03-05 ASSESSMENT — PULMONARY FUNCTION TESTS
PIF_VALUE: 28
PIF_VALUE: 29
PIF_VALUE: 27
PIF_VALUE: 28
PIF_VALUE: 32
PIF_VALUE: 28
PIF_VALUE: 27
PIF_VALUE: 29
PIF_VALUE: 27
PIF_VALUE: 32
PIF_VALUE: 19
PIF_VALUE: 27
PIF_VALUE: 27
PIF_VALUE: 29
PIF_VALUE: 2
PIF_VALUE: 1
PIF_VALUE: 29
PIF_VALUE: 31
PIF_VALUE: 0
PIF_VALUE: 29
PIF_VALUE: 2
PIF_VALUE: 33
PIF_VALUE: 28
PIF_VALUE: 0
PIF_VALUE: 0
PIF_VALUE: 28
PIF_VALUE: 28
PIF_VALUE: 0
PIF_VALUE: 29
PIF_VALUE: 0
PIF_VALUE: 27
PIF_VALUE: 31
PIF_VALUE: 28
PIF_VALUE: 29
PIF_VALUE: 28
PIF_VALUE: 28
PIF_VALUE: 0
PIF_VALUE: 0
PIF_VALUE: 27
PIF_VALUE: 20
PIF_VALUE: 28
PIF_VALUE: 27
PIF_VALUE: 28
PIF_VALUE: 27
PIF_VALUE: 17
PIF_VALUE: 28
PIF_VALUE: 30
PIF_VALUE: 27
PIF_VALUE: 28
PIF_VALUE: 30
PIF_VALUE: 27
PIF_VALUE: 0
PIF_VALUE: 29
PIF_VALUE: 27
PIF_VALUE: 28
PIF_VALUE: 28
PIF_VALUE: 0
PIF_VALUE: 27
PIF_VALUE: 32
PIF_VALUE: 29
PIF_VALUE: 11
PIF_VALUE: 29
PIF_VALUE: 30
PIF_VALUE: 27
PIF_VALUE: 29
PIF_VALUE: 28
PIF_VALUE: 29
PIF_VALUE: 28
PIF_VALUE: 24
PIF_VALUE: 28
PIF_VALUE: 29
PIF_VALUE: 40
PIF_VALUE: 27
PIF_VALUE: 29
PIF_VALUE: 28
PIF_VALUE: 27
PIF_VALUE: 29
PIF_VALUE: 28
PIF_VALUE: 32
PIF_VALUE: 27
PIF_VALUE: 0
PIF_VALUE: 29
PIF_VALUE: 1
PIF_VALUE: 27
PIF_VALUE: 28
PIF_VALUE: 28
PIF_VALUE: 30
PIF_VALUE: 29
PIF_VALUE: 31
PIF_VALUE: 28
PIF_VALUE: 27
PIF_VALUE: 2
PIF_VALUE: 27
PIF_VALUE: 28
PIF_VALUE: 27
PIF_VALUE: 28
PIF_VALUE: 27
PIF_VALUE: 29
PIF_VALUE: 0
PIF_VALUE: 29
PIF_VALUE: 33
PIF_VALUE: 32
PIF_VALUE: 0
PIF_VALUE: 0
PIF_VALUE: 28
PIF_VALUE: 29
PIF_VALUE: 28
PIF_VALUE: 28
PIF_VALUE: 29
PIF_VALUE: 29
PIF_VALUE: 0
PIF_VALUE: 29
PIF_VALUE: 28
PIF_VALUE: 29
PIF_VALUE: 27
PIF_VALUE: 29
PIF_VALUE: 3
PIF_VALUE: 29
PIF_VALUE: 29
PIF_VALUE: 27
PIF_VALUE: 28
PIF_VALUE: 1
PIF_VALUE: 28
PIF_VALUE: 27
PIF_VALUE: 29
PIF_VALUE: 28
PIF_VALUE: 29
PIF_VALUE: 22
PIF_VALUE: 32
PIF_VALUE: 29
PIF_VALUE: 28
PIF_VALUE: 28
PIF_VALUE: 8
PIF_VALUE: 11
PIF_VALUE: 29
PIF_VALUE: 33
PIF_VALUE: 28
PIF_VALUE: 32
PIF_VALUE: 32
PIF_VALUE: 28
PIF_VALUE: 27
PIF_VALUE: 28
PIF_VALUE: 32
PIF_VALUE: 28
PIF_VALUE: 31
PIF_VALUE: 28
PIF_VALUE: 29
PIF_VALUE: 29
PIF_VALUE: 28
PIF_VALUE: 0
PIF_VALUE: 26
PIF_VALUE: 28
PIF_VALUE: 29
PIF_VALUE: 28
PIF_VALUE: 27
PIF_VALUE: 28
PIF_VALUE: 0
PIF_VALUE: 29
PIF_VALUE: 28
PIF_VALUE: 29
PIF_VALUE: 30
PIF_VALUE: 32
PIF_VALUE: 0
PIF_VALUE: 27
PIF_VALUE: 29
PIF_VALUE: 28
PIF_VALUE: 27
PIF_VALUE: 28
PIF_VALUE: 14
PIF_VALUE: 29
PIF_VALUE: 29
PIF_VALUE: 28
PIF_VALUE: 29
PIF_VALUE: 28
PIF_VALUE: 28
PIF_VALUE: 29
PIF_VALUE: 0
PIF_VALUE: 29
PIF_VALUE: 0
PIF_VALUE: 28
PIF_VALUE: 31
PIF_VALUE: 29
PIF_VALUE: 2
PIF_VALUE: 28
PIF_VALUE: 29
PIF_VALUE: 32
PIF_VALUE: 27
PIF_VALUE: 31
PIF_VALUE: 29
PIF_VALUE: 27
PIF_VALUE: 27
PIF_VALUE: 28
PIF_VALUE: 32
PIF_VALUE: 0
PIF_VALUE: 28
PIF_VALUE: 28
PIF_VALUE: 32
PIF_VALUE: 29
PIF_VALUE: 27
PIF_VALUE: 28
PIF_VALUE: 31
PIF_VALUE: 2
PIF_VALUE: 28
PIF_VALUE: 26
PIF_VALUE: 28
PIF_VALUE: 27
PIF_VALUE: 27
PIF_VALUE: 29
PIF_VALUE: 28
PIF_VALUE: 31
PIF_VALUE: 27
PIF_VALUE: 28
PIF_VALUE: 27
PIF_VALUE: 28
PIF_VALUE: 30
PIF_VALUE: 22
PIF_VALUE: 28
PIF_VALUE: 31
PIF_VALUE: 28
PIF_VALUE: 29
PIF_VALUE: 0
PIF_VALUE: 26
PIF_VALUE: 0
PIF_VALUE: 29
PIF_VALUE: 28
PIF_VALUE: 29
PIF_VALUE: 28
PIF_VALUE: 32
PIF_VALUE: 29
PIF_VALUE: 0
PIF_VALUE: 27
PIF_VALUE: 28
PIF_VALUE: 32
PIF_VALUE: 28
PIF_VALUE: 27
PIF_VALUE: 29
PIF_VALUE: 29
PIF_VALUE: 27
PIF_VALUE: 2
PIF_VALUE: 3
PIF_VALUE: 28
PIF_VALUE: 28
PIF_VALUE: 27
PIF_VALUE: 32
PIF_VALUE: 28
PIF_VALUE: 27
PIF_VALUE: 28
PIF_VALUE: 27
PIF_VALUE: 0
PIF_VALUE: 28
PIF_VALUE: 29
PIF_VALUE: 29
PIF_VALUE: 28
PIF_VALUE: 32
PIF_VALUE: 29
PIF_VALUE: 28
PIF_VALUE: 51
PIF_VALUE: 29
PIF_VALUE: 0
PIF_VALUE: 32
PIF_VALUE: 28
PIF_VALUE: 29
PIF_VALUE: 0
PIF_VALUE: 29
PIF_VALUE: 27
PIF_VALUE: 27
PIF_VALUE: 29
PIF_VALUE: 32
PIF_VALUE: 0
PIF_VALUE: 31
PIF_VALUE: 29
PIF_VALUE: 29
PIF_VALUE: 28
PIF_VALUE: 29
PIF_VALUE: 0

## 2020-03-05 ASSESSMENT — PAIN SCALES - GENERAL
PAINLEVEL_OUTOF10: 0
PAINLEVEL_OUTOF10: 5
PAINLEVEL_OUTOF10: 2
PAINLEVEL_OUTOF10: 5

## 2020-03-05 ASSESSMENT — PAIN DESCRIPTION - PAIN TYPE: TYPE: ACUTE PAIN

## 2020-03-05 ASSESSMENT — LIFESTYLE VARIABLES: SMOKING_STATUS: 0

## 2020-03-05 ASSESSMENT — PAIN DESCRIPTION - LOCATION: LOCATION: HEAD

## 2020-03-05 ASSESSMENT — PAIN DESCRIPTION - DESCRIPTORS: DESCRIPTORS: HEADACHE

## 2020-03-05 ASSESSMENT — ENCOUNTER SYMPTOMS: SHORTNESS OF BREATH: 1

## 2020-03-05 NOTE — ANESTHESIA PRE PROCEDURE
2018    hpv+    Allergic     Allergic rhinitis     Aortic stenosis, mild-echo 2016    Atrial fibrillation (HCC)     BMI 40.0-44.9, adult ([facility]     Breast cancer ([facility]     53    Cancer ([facility] 2009    right breast    Depression     GERD (gastroesophageal reflux disease)     H/O laparoscopic adjustable gastric banding 10/27/2016    Heart palpitations     HPV (human papilloma virus) infection 2018    HPV test positive 2014    Hypertension     Hypothyroidism 2000    [facility]     10 years ago    Mild aortic regurgitation-echo 2016    Mild dysplasia of cervix 2016    Obesity     Sleep apnea     Urge incontinence 2017       Past Surgical History:        Procedure Laterality Date    APPENDECTOMY      BREAST BIOPSY      BREAST LUMPECTOMY      BREAST SURGERY  2009    right lumpectomy xrt and chemo and serm     SECTION  ,,,    failed to drop in birth canal     SECTION      CHOLECYSTECTOMY      COLONOSCOPY      HYSTERECTOMY      LAP BAND      LAP BAND      Removal    OVARY REMOVAL      PARTIAL HYSTERECTOMY      THYROIDECTOMY, PARTIAL Left 2000    TUNNELED VENOUS PORT PLACEMENT      Placed/Removed    UPPER GASTROINTESTINAL ENDOSCOPY  6/10/16    gastritis, gastric polyp bx        Social History:    Social History     Tobacco Use    Smoking status: Former Smoker     Packs/day: 0.25     Years: 1.50     Pack years: 0.37     Types: Cigarettes     Last attempt to quit: 3/27/1972     Years since quittin.9    Smokeless tobacco: Never Used   Substance Use Topics    Alcohol use:  Yes     Alcohol/week: 0.0 standard drinks     Comment: soc                                Counseling given: Not Answered      Vital Signs (Current):   Vitals:    20 0639   Weight: 281 lb (127.5 kg)   Height: 5' 6\" (1.676 m)                                              BP Readings from Last 3 Encounters:

## 2020-03-05 NOTE — PROCEDURES
cathter (DecaNav)was placed inside the coronary sinus for pacing, recording and mapping of the left atrium. Patient received a bolus of heparin prior transseptal puncture followed by continuous monitoring of the ACT and boluses of heparin during the procedure to keep the ACT more than 300. Also an esophageal temperature probe CIRCA  was advanced into the esophagus for mapping of the esophagus and careful monitoring of the esophageal temperature during ablation and was moved as needed to be near ablation site using ICE. Patient's esophagus was directly posterior to RIPV veins      A transseptal puncture through intact septum were done using ICE as well as  pressure monitoring . We placed a SL-1 Sheaths inside the left atrium. The LA pressure was recorded. Then a Pentaray catheter  and an irrigated ablation catheter was advanced sequentially into the left atrium. Using Pentaray cathter and a Carto  mapping system a three dimensional map and electro anatomical map of the left atrium was created. In addition right and left sided pulmonary vein map was created. Patient had classic 4 pulmonary vein anatomy. There were 2 Veins on left and 2 on right. Then wide area circumferential ablation for right and left sided pulmonary veins were performed well outside the pulmonary vein ostium till all four pulmonary veins were isolated. The power was limited to 10-15 W posteriorly and 32 W anteriorly and duration to 10 second on the posterior wall and careful monitoring of esophageal temperature was done to prevent injury to esophagus. At one point esophageal temperature jumped to 1.4 degrees making it very difficult to isolate RIPV      Following confirmation of pulmonary veins isolation by PentaRay catheter and completing EP study, isuprel IV was started to check for pulmonary veins reconnection and induction of any arrhythmia. No arrhythmia was induced.        Exit block was confirmed using PentaRay

## 2020-03-05 NOTE — ANESTHESIA POSTPROCEDURE EVALUATION
Department of Anesthesiology  Postprocedure Note    Patient: Renaldo Reece  MRN: [id number]  YOB: 1955  Date of evaluation: 3/5/2020  Time:  2:56 PM     Procedure Summary     Date:  03/05/20 Room / Location:  [facility] Cardiac Cath Lab    Anesthesia Start:  0752 Anesthesia Stop:  1322    Procedure:  ABLATION Diagnosis:       Paroxysmal atrial fibrillation      PAF (paroxysmal atrial fibrillation) (HCC)      Paroxysmal atrial fibrillation    Scheduled Providers:   Responsible Provider:  Yahir Shen MD    Anesthesia Type:  general ASA Status:  3          Anesthesia Type: general    Cheri Phase I: Cheri Score: 8    Cheri Phase II:      Last vitals: Reviewed and per EMR flowsheets.      Vitals:    03/05/20 1355 03/05/20 1400 03/05/20 1405 03/05/20 1410   BP: (!) 173/80 (!) 184/70 (!) 170/80 (!) 172/79   Pulse: 72 75 71 74   Resp: 10 12 14 20   Temp:   97.1 °F (36.2 °C)    TempSrc:   Temporal    SpO2: 92% (!) 89% 90% 94%   Weight:       Height:         Anesthesia Post Evaluation    Patient location during evaluation: bedside  Patient participation: complete - patient participated  Level of consciousness: awake and alert  Airway patency: patent  Nausea & Vomiting: no nausea  Complications: no  Cardiovascular status: hemodynamically stable  Respiratory status: acceptable  Hydration status: euvolemic

## 2020-03-05 NOTE — H&P
[address]   Electrophysiology  Note              Date:  March 5, 2020  Patient name: Virginia Londono  YOB: 1955    H&P is reflective of office visit from 2/7/20. No significant changes noted. HISTORY OF PRESENT ILLNESS: Virginia Londono is a 71 y.o. female who presents for evaluation for Paroxysmal atrial fibrillation. She has a history of cardiomyopathy and follows with the heart failure team. She presented to the hospital 07/11/19 with palpitations. She was found to be in AFib RVR. She was started on flecainide, diltiazem and eliquis. Her echo from 07/11/19 showed her EF was 48-50% with mild/ global hypokinesis. Mild MR and AR. Her stress test from 07/25/19 showed a mixed defect in the anteroseptal wall raising concern for mixed ischemia/scar. Her cardiac cath from 08/02/19 showed nonischemic cardiomyopathy. Flecainide and diltiazem were stopped due to worsening. LVEF. She was started on toprol 25 mg. She was started on lisinopril and eliquis as well. She wore a KITTY from 8/13-8/26/19 showed a fib. Cardiac MRI showed her EF was 45%. Her EKG on 9/14/19 showed sinus abhinav. She states when she originally went to the ER in July, this started with palpitations. Her Lisinopril was stopped due to cough and changed to diovan 11/14/19. Interval history since last office visit:   Today she states that she feels good. She states that she felt her afib last night and continues to have intermittent palpitations. She states that she feels palpitations when she feels her afib She states that Monday she was nervous before a work meeting and felt like she was going to pass out but did not. She follows with heart failure team to manage her lasix. She is taking her medications as prescribed. She is tolerating them well.        Past Medical History:   has a past medical history of Abnormal Pap smear of cervix, Allergic, Allergic rhinitis, Aortic stenosis, mild-echo 8/2016, Atrial fibrillation ([facility] BMI 40.0-44.9, adult ([facility] [address] [facility] Cancer ([facility] Depression, GERD (gastroesophageal reflux disease), H/O laparoscopic adjustable gastric banding, Heart palpitations, HPV (human papilloma virus) infection, HPV test positive, Hypertension, Hypothyroidism, Meningioma ([facility] Mild aortic regurgitation-echo 2016, Mild dysplasia of cervix, Obesity, Sleep apnea, and Urge incontinence. Past Surgical History:   has a past surgical history that includes Appendectomy; Cholecystectomy; Colonoscopy; Lap Band (); Tunneled venous port placement ();  section ([id number]);  section; Breast surgery (); Breast biopsy; Breast lumpectomy; Hysterectomy (); Lap Band (); Ovary removal; Thyroidectomy, partial (Left, ); Upper gastrointestinal endoscopy (6/10/16); and partial hysterectomy (cervix not removed). Allergies:  Latex and Iodine    Social History:   reports that she quit smoking about 47 years ago. Her smoking use included cigarettes. She has a 0.38 pack-year smoking history. She has never used smokeless tobacco. She reports current alcohol use. She reports that she does not use drugs. Family History: family history includes Cancer in her maternal grandmother, mother, paternal grandmother, sister, and another family member; Cancer (age of onset: 61) in her father; Heart Attack in her father; Heart Disease in her father and sister; No Known Problems in her brother, maternal aunt, maternal grandfather, maternal uncle, paternal aunt, paternal grandfather, and paternal uncle; Other in her father and sister. Home Medications:    Prior to Admission medications    Medication Sig Start Date End Date Taking?  Authorizing Provider   omeprazole (PRILOSEC) 20 MG delayed release capsule Take 1 capsule by mouth every morning (before breakfast) 20  Yes Claire Hall, DO   levothyroxine (SYNTHROID) 137 MCG tablet TAKE ONE TABLET BY MOUTH ONE positives are mentioned in the history of present illness. Other  systems are reviewed and are negative. Physical Examination:    Ht 5' 6\" (1.676 m)   Wt 281 lb (127.5 kg)   LMP  (LMP Unknown)   BMI 45.35 kg/m²      Constitutional and General Appearance:    alert, cooperative, no distress and appears stated age  [de-identified]:    PERRLA, no cervical lymphadenopathy. No masses palpable. Normal oral  mucosa  Respiratory:  · Normal excursion and expansion without use of accessory muscles  · Resp Auscultation: Normal breath sounds without dullness or wheezing  Cardiovascular:  · The apical impulse is not displaced  · Heart tones are crisp and normal. irregular   · Jugular venous pulsation Normal  · The carotid upstroke is normal in amplitude and contour without delay or bruit  · Peripheral pulses are symmetrical and full  Abdomen:  · No masses or tenderness  · Bowel sounds present  Extremities:  ·  No Cyanosis or Clubbing  ·  Lower extremity edema: No  · Skin: Warm and dry  Neurological:  · Alert and oriented. · Moves all extremities well  · No abnormalities of mood, affect, memory, mentation, or behavior are noted      DATA:    ECG:    ECHO: 7/12/19  Summary   Left ventricular systolic function is reduced with ejection fraction   estimated at 48-50 %. Mild global hypokinesis is present. There is mild concentric left ventricular hypertrophy. Normal left ventricular diastolic filling pressure. Mild mitral regurgitation. Aortic sclerosis vs mild aortic stenosis. Mild aortic regurgitation is present. Normal systolic pulmonary artery pressure (SPAP) estimated at 29 mmHg (RA   pressure 8 mmHg).     LV Diastolic Dimension: 1.46 cm LV Systolic Dimension: 3.9 cm   LV Septum Diastolic: 6.11 cm   LV PW Diastolic: 3.51 cm        AO Root Dimension: 3.4 cm                                      LA Area: 24.3 cm2   LVOT: 2 cm                      LA volume/Index: 65 ml /29 ml/m2         [id number] Score for Atrial Fibrillation Stroke Risk   Risk   Factors  Component Value   C CHF No 0   H HTN Yes 1   A2 Age >= 76 No,  (65 y.o.) 0   D DM No 0   S2 Prior Stroke/TIA No 0   V Vascular Disease No 0   A Age 74-69 Yes,  (65 y.o.) 1   Sc Sex female 1    [id number]  Score  3   Score last updated 7/0/07 3:13 PM    Click here for a link to the UpToDate guideline \"Atrial Fibrillation: Anticoagulation therapy to prevent embolization    Disclaimer: Risk Score calculation is dependent on accuracy of patient problem list and past encounter diagnosis. IMPRESSION:    1. Paroxysmal atrial fibrillation   ~Flecainide and diltiazem stopped due to worsening LVEF 8/2019 and toprol, lisinopril and eliquis was started. 11/14/19 Lisinopril changed to diovan due to cough   ~[id number] Score 3 (HTN, age, and gender)   ~On Rythmol   2. CHF   ~followed by Dr. Rufino Fleming  3. Aortic stenosis, mild-echo 7/2019  4. Hypertension   5. Palpitations    ~on going   6. Sleep apnea   ~on CPAP  7. Nonischemic cardiomyopathy   ~ cardiac cath 8/2/19      RECOMMENDATIONS:  1. Electrophysiology study with possible radiofrequency catheter ablation discussed. Risk, benefit, alternative, rationale of the procedure were discussed with the patient which included but were not limited to allergic reaction to the medications, pain, bleeding, infection, nerve injury, injury to diaphragm(breathing muscle), pulmonary embolus(blood clot in lungs), deep vein blood clot, pneumothorax, hemothorax, acute renal failure, cardiac perforation,  tamponade, need for emergent surgery (open heart), need for any future surgery, pulmonary vein stenosis requiring stent or angioplasty, left atrial to esophageal fistula requiring emergent surgery, stroke, myocardial infarction, need for permanent pacemaker, lead dislodgement and death. Given the complex nature of the disease no guarantee was given on the success of the procedure.     Explained to patient that discontinuation of

## 2020-03-05 NOTE — PROGRESS NOTES
Patient admitted to room 429 from [facility] Patient oriented to room, call light, bed rails, phone, lights and bathroom. Patient instructed about the schedule of the day including: vital sign frequency, lab draws, possible tests, frequency of MD and staff rounds, including RN/MD rounding together at bedside, daily weights, and I &O's. Patient instructed about prescribed diet, how to use 8MENU, and television. Telemetry box  in place, patient aware of placement and reason. Bed locked, in lowest position, side rails up 2/4, call light within reach. Will continue to monitor.

## 2020-03-05 NOTE — PROGRESS NOTES
Pt arrived to PACU,VSS. BP elevated, 181/86. Anesthesia aware, ok with diasystolic BP < 951. Site clean and intact, no bleeding and soft to touch. Pt denies pain or nausea. Will continue to monitor.   Parisa Olivares RN

## 2020-03-06 LAB
POC ACT LR: 208 SEC
POC ACT LR: 211 SEC
POC ACT LR: 258 SEC
POC ACT LR: 270 SEC
POC ACT LR: 343 SEC
POC ACT LR: >400 SEC

## 2020-03-06 PROCEDURE — 6370000000 HC RX 637 (ALT 250 FOR IP): Performed by: INTERNAL MEDICINE

## 2020-03-06 PROCEDURE — 2060000000 HC ICU INTERMEDIATE R&B

## 2020-03-06 PROCEDURE — 99233 SBSQ HOSP IP/OBS HIGH 50: CPT | Performed by: NURSE PRACTITIONER

## 2020-03-06 PROCEDURE — G0378 HOSPITAL OBSERVATION PER HR: HCPCS

## 2020-03-06 PROCEDURE — 2580000003 HC RX 258: Performed by: INTERNAL MEDICINE

## 2020-03-06 PROCEDURE — 6370000000 HC RX 637 (ALT 250 FOR IP): Performed by: NURSE PRACTITIONER

## 2020-03-06 RX ORDER — ACETAMINOPHEN 325 MG/1
650 TABLET ORAL EVERY 4 HOURS PRN
Status: DISCONTINUED | OUTPATIENT
Start: 2020-03-06 | End: 2020-03-07 | Stop reason: HOSPADM

## 2020-03-06 RX ADMIN — LEVOTHYROXINE SODIUM 137 MCG: 112 TABLET ORAL at 05:45

## 2020-03-06 RX ADMIN — Medication 10 ML: at 08:57

## 2020-03-06 RX ADMIN — ACETAMINOPHEN 650 MG: 325 TABLET ORAL at 16:57

## 2020-03-06 RX ADMIN — Medication 1 CAPSULE: at 08:57

## 2020-03-06 RX ADMIN — MONTELUKAST SODIUM 10 MG: 10 TABLET ORAL at 21:02

## 2020-03-06 RX ADMIN — Medication 10 ML: at 21:03

## 2020-03-06 RX ADMIN — CITALOPRAM HYDROBROMIDE 40 MG: 20 TABLET ORAL at 08:57

## 2020-03-06 RX ADMIN — PROPAFENONE HYDROCHLORIDE 225 MG: 225 CAPSULE, EXTENDED RELEASE ORAL at 08:57

## 2020-03-06 RX ADMIN — PANTOPRAZOLE SODIUM 40 MG: 40 TABLET, DELAYED RELEASE ORAL at 05:46

## 2020-03-06 RX ADMIN — SPIRONOLACTONE 25 MG: 25 TABLET ORAL at 09:07

## 2020-03-06 RX ADMIN — APIXABAN 5 MG: 5 TABLET, FILM COATED ORAL at 21:02

## 2020-03-06 RX ADMIN — Medication 1 TABLET: at 08:57

## 2020-03-06 RX ADMIN — VALSARTAN 40 MG: 80 TABLET, FILM COATED ORAL at 09:07

## 2020-03-06 RX ADMIN — OXYBUTYNIN CHLORIDE 15 MG: 5 TABLET, EXTENDED RELEASE ORAL at 08:56

## 2020-03-06 RX ADMIN — ATORVASTATIN CALCIUM 40 MG: 40 TABLET, FILM COATED ORAL at 21:02

## 2020-03-06 RX ADMIN — CETIRIZINE HYDROCHLORIDE 10 MG: 10 TABLET, FILM COATED ORAL at 08:57

## 2020-03-06 RX ADMIN — ACETAMINOPHEN 650 MG: 325 TABLET ORAL at 10:29

## 2020-03-06 RX ADMIN — METOPROLOL SUCCINATE 25 MG: 25 TABLET, EXTENDED RELEASE ORAL at 08:57

## 2020-03-06 RX ADMIN — PROPAFENONE HYDROCHLORIDE 225 MG: 225 CAPSULE, EXTENDED RELEASE ORAL at 21:02

## 2020-03-06 RX ADMIN — APIXABAN 5 MG: 5 TABLET, FILM COATED ORAL at 08:57

## 2020-03-06 ASSESSMENT — PAIN DESCRIPTION - DESCRIPTORS: DESCRIPTORS: HEADACHE

## 2020-03-06 ASSESSMENT — PAIN DESCRIPTION - PAIN TYPE: TYPE: ACUTE PAIN

## 2020-03-06 ASSESSMENT — PAIN SCALES - GENERAL
PAINLEVEL_OUTOF10: 6
PAINLEVEL_OUTOF10: 0
PAINLEVEL_OUTOF10: 0
PAINLEVEL_OUTOF10: 8
PAINLEVEL_OUTOF10: 6

## 2020-03-06 ASSESSMENT — PAIN DESCRIPTION - LOCATION: LOCATION: HEAD

## 2020-03-06 NOTE — PROGRESS NOTES
[address]     Electrophysiology                                     Progress Note    Admission date:  3/5/2020    Reason for follow up visit: PAF    HPI/CC: Serge Blanc was admitted on 3/5/2020 and had an elective RFCA of PAF. Rhythm has been sinus. Subjective: She complains of a headache and generalized weakness. Denies chest pain, palpitations, shortness of breath, and dizziness. She has eaten, ambulated, and voided. Vitals:  Blood pressure 119/76, pulse 65, temperature 97.6 °F (36.4 °C), temperature source Oral, resp. rate 16, height 5' 6\" (1.676 m), weight 281 lb 3.2 oz (127.6 kg), SpO2 99 %, not currently breastfeeding.   Temp  Av.3 °F (35.2 °C)  Min: 95 °F (35 °C)  Max: 98.4 °F (36.9 °C)  Pulse  Av.1  Min: 60  Max: 82  BP  Min: 112/68  Max: 210/104  SpO2  Av.4 %  Min: 83 %  Max: 100 %  FiO2   Av.3 %  Min: 90 %  Max: 95 %    24 hour I/O    Intake/Output Summary (Last 24 hours) at 3/6/2020 1100  Last data filed at 3/6/2020 1031  Gross per 24 hour   Intake 2560 ml   Output 2750 ml   Net -190 ml     Current Facility-Administered Medications   Medication Dose Route Frequency Provider Last Rate Last Dose    acetaminophen (TYLENOL) tablet 650 mg  650 mg Oral Q4H PRN BLANK Garcia - CNP   650 mg at 20 1029    morphine (PF) injection 1 mg  1 mg Intravenous Q5 Min PRN Theresa Jewell MD        morphine (PF) injection 2 mg  2 mg Intravenous Q5 Min PRN Theresa Jewell MD        meperidine (DEMEROL) injection 12.5 mg  12.5 mg Intravenous Q5 Min PRN Theresa Jewell MD        therapeutic multivitamin-minerals 1 tablet  1 tablet Oral Daily Willie Conn MD   1 tablet at 20 0857    cetirizine (ZYRTEC) tablet 10 mg  10 mg Oral Daily Willie Conn MD   10 mg at 20 0857    lactobacillus (CULTURELLE) capsule 1 capsule  1 capsule Oral Daily Tor Bernard MD   1 capsule at 20 0857    metoprolol succinate (TOPROL XL) extended release tablet 25 mg  25 mg Oral Daily Tor Almanza MD   25 mg at 03/06/20 0857    oxybutynin (DITROPAN-XL) extended release tablet 15 mg  15 mg Oral Daily Bj Davenport MD   15 mg at 03/06/20 0856    fluticasone (FLONASE) 50 MCG/ACT nasal spray 1 spray  1 spray Nasal BID PRN Bj Davenport MD   1 spray at 03/05/20 1627    propafenone (RYTHMOL SR) extended release capsule 225 mg  225 mg Oral BID Tor Almanza MD   225 mg at 03/06/20 0857    atorvastatin (LIPITOR) tablet 40 mg  40 mg Oral Nightly Tor Almanza MD   40 mg at 03/05/20 2118    apixaban (ELIQUIS) tablet 5 mg  5 mg Oral BID Tor Almanza MD   5 mg at 03/06/20 5012    spironolactone (ALDACTONE) tablet 25 mg  25 mg Oral Daily Tor Almanza MD   25 mg at 03/06/20 2102    citalopram (CELEXA) tablet 40 mg  40 mg Oral Daily Tor Almanza MD   40 mg at 03/06/20 0857    valsartan (DIOVAN) tablet 40 mg  40 mg Oral Daily Tor Almanza MD   40 mg at 03/06/20 0084    montelukast (SINGULAIR) tablet 10 mg  10 mg Oral Nightly Tor Almanza MD   10 mg at 03/05/20 2117    levothyroxine (SYNTHROID) tablet 137 mcg  137 mcg Oral Daily Tor Almanza MD   137 mcg at 03/06/20 0545    pantoprazole (PROTONIX) tablet 40 mg  40 mg Oral QAM AC Bj Davenport MD   40 mg at 03/06/20 0546    sodium chloride flush 0.9 % injection 10 mL  10 mL Intravenous 2 times per day Bj Davenport MD   10 mL at 03/06/20 0857    sodium chloride flush 0.9 % injection 10 mL  10 mL Intravenous PRN Bj Davenport MD        HYDROmorphone (DILAUDID) injection 0.25 mg  0.25 mg Intravenous Q5 Min PRN Audra Robertson MD   0.25 mg at 03/05/20 1359    HYDROmorphone (DILAUDID) injection 0.5 mg  0.5 mg Intravenous Q5 Min PRN Audra Robertson MD           Objective:     Telemetry monitor: SR    Physical Exam:  Constitutional and general appearance: alert, cooperative, no distress and appears stated age  [de-identified]: PERRL, no cervical lymphadenopathy. No masses palpable. Normal oral mucosa  Respiratory:  · Normal excursion and expansion without use of accessory muscles  · Resp auscultation: Normal breath sounds without wheezing, rhonchi, and rales  Cardiovascular:  · The apical impulse is not displaced  · Heart tones are crisp and normal. regular S1 and S2.  · Jugular venous pulsation Normal  · The carotid upstroke is normal in amplitude and contour without delay or bruit  · Peripheral pulses are symmetrical and full   Abdomen:  · No masses or tenderness  · Bowel sounds present  Extremities:  ·  No cyanosis or clubbing  ·  No lower extremity edema  ·  Skin: warm and dry; right femoral access site stable, no suture present   Neurological:  · Alert and oriented  · Moves all extremities well  · No abnormalities of mood, affect, memory, mentation, or behavior are noted    Data  Echo 7/12/2019:  Left ventricular systolic function is reduced with ejection fraction estimated at 48-50 %. Mild global hypokinesis is present. There is mild concentric left ventricular hypertrophy. Normal left ventricular diastolic filling pressure. Mild mitral regurgitation. Aortic sclerosis vs mild aortic stenosis. Mild aortic regurgitation is present. Normal systolic pulmonary artery pressure (SPAP) estimated at 29 mmHg (RA pressure 8 mmHg). Cardiac MRI 8/23/2019 ():  Findings are most consistent with a nonischemic cardiomyopathy. There is no evidence of left ventricular scarring, edema or iron overload. There is evidence of incomplete or complete left bundle branch block. Regional wall motion abnormalities involving are likely secondary to underlying conduction disease; however, ischemic heart disease cannot be excluded. The right ventricle is normal in size and systolic function. There is late gadolinium enhancement of the RV insertion points suggestive of elevated right heart pressures.   The left atrium is mildly dilated. There is mild mitral regurgitation  The right atrium is normal in size. There is late gadolinium enhancement involving both atria. There is mild aortic stenosis and regurgitation. The descending aorta is dilated there is evidence of associated atherosclerosis. [facility] 8/2/2019 ([place]):  LM: luminals  LAD: mid luminals, distal vessel small  LCX: luminals  RCA: dominant, very large     LVEDP: 18  LVEF: 25-30% global hypokinesis    Assessment  1. Nonischemic cardiomyopathy  2. Given worsening cardiomyopathy, suggest Cardiac MRI                - eval for myocarditis or infiltrative cardiomyopathy  3. Will d/c flecainide and dilt given worsening LVEF, pt currently in NSR                - start toprol 25                - start ASA and lisinopril  4. Lasix in post op area                - will make Appt with heart failure team    All labs and testing reviewed.   Lab Review     Renal Profile:   Lab Results   Component Value Date    CREATININE 0.9 03/05/2020    BUN 16 03/05/2020     03/05/2020    K 4.9 03/05/2020    K 3.7 07/12/2019     03/05/2020    CO2 25 03/05/2020     CBC:    Lab Results   Component Value Date    WBC 4.9 03/05/2020    RBC 4.02 03/05/2020    RBC 4.71 01/24/2017    HGB 12.3 03/05/2020    HCT 36.7 03/05/2020    MCV 91.5 03/05/2020    RDW 13.6 03/05/2020     03/05/2020     BNP:  No results found for: BNP  Fasting Lipid Panel:    Lab Results   Component Value Date    CHOL 146 08/02/2019    HDL 58 08/02/2019    TRIG 51 08/02/2019     Cardiac Enzymes:  CK/MbTroponin  Lab Results   Component Value Date    TROPONINI <0.01 07/17/2019     PT/ INR   Lab Results   Component Value Date    INR 1.21 03/05/2020    INR 1.21 08/02/2019    PROTIME 14.1 03/05/2020    PROTIME 13.8 08/02/2019     PTT No results found for: PTT   Lab Results   Component Value Date    MG 2.40 05/02/2016      Lab Results   Component Value Date    TSH 3.52 11/15/2018       Assessment:  Symptomatic paroxysmal atrial fibrillation: stable   -s/p RFCA of PAF 3/5/2020   -[id number] score 4 (age, gender, CHF, HTN)  NICM: ongoing   -EF 45% on cardiac MRI 3/3546  Chronic systolic CHF: compensated   -followed by heart failure team   -cough with lisinopril  Aortic stenosis: mild  Hypothyroidism: on Synthroid  Sleep apnea: uses CPAP  Obesity: diet, exercise, and weight loss is recommended     Plan:   1. Continue Eliquis, Rythmol, Toprol, valsartan, and spironolactone  2. Tylenol for headache  3. Post procedure instructions reviewed    Will reevaluate for discharge in afternoon.      BLANK Moeller-CNP  [facility]  [phone]

## 2020-03-06 NOTE — PROGRESS NOTES
Patient was reevaluated. Headache improving. Patient complains of generalized weakness and would like to be reevaluated for discharge on 3/7/2020.

## 2020-03-06 NOTE — PROGRESS NOTES
4 Eyes Skin Assessment     The patient is being assess for  Admission     I agree that 2 RN's have performed a thorough Head to Toe Skin Assessment on the patient. ALL assessment sites listed below have been assessed. Areas assessed by both nurses:   [x]   Head, Face, and Ears   [x]   Shoulders, Back, and Chest  [x]   Arms, Elbows, and Hands   [x]   Coccyx, Sacrum, and IschIum  [x]   Legs, Feet, and Heels        Does the Patient have Skin Breakdown?   No         Abdiaziz Prevention initiated:  No   Wound Care Orders initiated:  No      [facility] nurse consulted for Pressure Injury (Stage 3,4, Unstageable, DTI, NWPT, and Complex wounds), New and Established Ostomies:  No      Nurse 1 eSignature: Electronically signed by Bola Rich RN on 3/5/20 at 10:38 PM    **SHARE this note so that the co-signing nurse is able to place an eSignature**    Nurse 2 eSignature: Electronically signed by Edyta Hurtado RN on 3/6/20 at 6:47 PM

## 2020-03-06 NOTE — CARE COORDINATION
CASE MANAGEMENT INITIAL ASSESSMENT      Reviewed chart and met with patient today, re: Triggered by age an diagnosis  Explained Case Management role/services.  To patient     Family present: None  Primary contact information: Figueroa Cedeño spouse [phone]    Admit date/status: inpatient 3/5/20  Diagnosis: PAF  Is this a Readmission?: No    Insurance: Roberto Drake required for SNF - Y     3 night stay required -  N    Living arrangements, Adls, care needs, prior to admission: Lives with spouse and ambulates independently, independent with all ADL's and still drives walker and cane belong to her     Transportation:     [facility] [employer] at home: Walker_x_Cane_x_RTS__ BSC__Shower Chair__  02__ HHN__ CPAP__  BiPap__  Hospital Bed__ W/C___ Other__________    Services in the home and/or outpatient, prior to admission: none      PT/OT recs: not seen/ ambulatory    Hospital Exemption Notification (HEN): N/A    Barriers to discharge: None identified at present     Plan/comments: Denies needs and plans to return home with spouse    ECOC on chart for MD signature

## 2020-03-06 NOTE — FLOWSHEET NOTE
03/05/20 2227   Assessment   Charting Type Shift assessment   Neurological   Neuro (WDL) WDL   Level of Consciousness 0   [place] Coma Scale   Eye Opening 4   Best Verbal Response 5   Best Motor Response 6   Kirti Coma Scale Score 15   HEENT   HEENT (WDL) WDL   Respiratory   Respiratory (WDL) WDL   Respiratory Pattern Regular   Respiratory Depth Normal   Respiratory Quality/Effort Unlabored   Chest Assessment Chest expansion symmetrical   L Breath Sounds Clear   R Breath Sounds Clear   Cardiac   Cardiac (WDL) WDL   Cardiac Regularity Regular   Heart Sounds S1, S2   Cardiac Rhythm NSR   Cardiac Monitor   Telemetry Monitor On Yes   Telemetry Audible Yes   Telemetry Alarms Set Yes   Gastrointestinal   Abdominal (WDL) X   Abdomen Inspection Rotund; Soft   Tenderness Soft;Nontender   Peripheral Vascular   Peripheral Vascular (WDL) WDL   Puncture Site Assessment 1   Location Femoral - right   Site Assessment No redness, drainage, swelling or hematoma   Dressing Applied Transparent occlusive dressing   Skin Color/Condition   Skin Color/Condition (WDL) WDL   Skin Integrity   Skin Integrity (WDL) WDL   Musculoskeletal   Musculoskeletal (WDL) WDL   Genitourinary   Genitourinary (WDL) X   Flank Tenderness No   Suprapubic Tenderness No   Dysuria No   Anus/Rectum   Anus/Rectum (WDL) WDL   Urethral Catheter   Placement Date/Time: 03/05/20 (c)   Inserted by: cath lab   Catheter Indications Perioperative use in selected surgeries including but not limited to urologic, pelvic or need for intraoperative monitoring of urinary output due to prolonged surgery, large volume infusion or need for diuretic therapy in surgery   Site Assessment Pink; No urethral drainage   Urine Color Yellow   Urine Appearance Clear   Psychosocial   Psychosocial (WDL) WDL

## 2020-03-07 VITALS
DIASTOLIC BLOOD PRESSURE: 67 MMHG | RESPIRATION RATE: 16 BRPM | SYSTOLIC BLOOD PRESSURE: 102 MMHG | HEART RATE: 53 BPM | HEIGHT: 66 IN | WEIGHT: 287.8 LBS | BODY MASS INDEX: 46.25 KG/M2 | TEMPERATURE: 98.2 F | OXYGEN SATURATION: 92 %

## 2020-03-07 PROCEDURE — 6370000000 HC RX 637 (ALT 250 FOR IP): Performed by: NURSE PRACTITIONER

## 2020-03-07 PROCEDURE — 99239 HOSP IP/OBS DSCHRG MGMT >30: CPT | Performed by: NURSE PRACTITIONER

## 2020-03-07 PROCEDURE — 6370000000 HC RX 637 (ALT 250 FOR IP): Performed by: INTERNAL MEDICINE

## 2020-03-07 PROCEDURE — 94761 N-INVAS EAR/PLS OXIMETRY MLT: CPT

## 2020-03-07 PROCEDURE — 2700000000 HC OXYGEN THERAPY PER DAY

## 2020-03-07 PROCEDURE — G0378 HOSPITAL OBSERVATION PER HR: HCPCS

## 2020-03-07 RX ORDER — FUROSEMIDE 20 MG/1
20 TABLET ORAL ONCE
Status: COMPLETED | OUTPATIENT
Start: 2020-03-07 | End: 2020-03-07

## 2020-03-07 RX ADMIN — VALSARTAN 40 MG: 80 TABLET, FILM COATED ORAL at 09:43

## 2020-03-07 RX ADMIN — PANTOPRAZOLE SODIUM 40 MG: 40 TABLET, DELAYED RELEASE ORAL at 07:46

## 2020-03-07 RX ADMIN — OXYBUTYNIN CHLORIDE 15 MG: 5 TABLET, EXTENDED RELEASE ORAL at 09:43

## 2020-03-07 RX ADMIN — Medication 1 CAPSULE: at 09:43

## 2020-03-07 RX ADMIN — LEVOTHYROXINE SODIUM 137 MCG: 112 TABLET ORAL at 07:45

## 2020-03-07 RX ADMIN — SPIRONOLACTONE 25 MG: 25 TABLET ORAL at 09:44

## 2020-03-07 RX ADMIN — CETIRIZINE HYDROCHLORIDE 10 MG: 10 TABLET, FILM COATED ORAL at 09:43

## 2020-03-07 RX ADMIN — Medication 1 TABLET: at 09:43

## 2020-03-07 RX ADMIN — FUROSEMIDE 20 MG: 20 TABLET ORAL at 09:43

## 2020-03-07 RX ADMIN — APIXABAN 5 MG: 5 TABLET, FILM COATED ORAL at 09:44

## 2020-03-07 RX ADMIN — PROPAFENONE HYDROCHLORIDE 225 MG: 225 CAPSULE, EXTENDED RELEASE ORAL at 09:43

## 2020-03-07 RX ADMIN — CITALOPRAM HYDROBROMIDE 40 MG: 20 TABLET ORAL at 09:43

## 2020-03-07 RX ADMIN — METOPROLOL SUCCINATE 25 MG: 25 TABLET, EXTENDED RELEASE ORAL at 09:43

## 2020-03-07 ASSESSMENT — PAIN SCALES - GENERAL
PAINLEVEL_OUTOF10: 0
PAINLEVEL_OUTOF10: 0

## 2020-03-07 NOTE — CARE COORDINATION
CASE MANAGEMENT DISCHARGE SUMMARY      Discharge to: home      Transportation:    Family/car:     Confirmed discharge plan with: patient and RN     Patient: yes     Family, name and contact number: spoke with patient who is alert and oriented and denies any needs for discharge.

## 2020-03-07 NOTE — DISCHARGE SUMMARY
atria.  There is mild aortic stenosis and regurgitation. The descending aorta is dilated there is evidence of associated atherosclerosis. [facility] 8/2/2019 ([place]):  LM: luminals  LAD: mid luminals, distal vessel small  LCX: luminals  RCA: dominant, very large     LVEDP: 18  LVEF: 25-30% global hypokinesis     Assessment  1. Nonischemic cardiomyopathy  2. Given worsening cardiomyopathy, suggest Cardiac MRI                - eval for myocarditis or infiltrative cardiomyopathy  3. Will d/c flecainide and dilt given worsening LVEF, pt currently in NSR                - start toprol 25                - start ASA and lisinopril  4.  Lasix in post op area                - will make Appt with heart failure team    Labs:   Lab Results   Component Value Date    CREATININE 0.9 03/05/2020    BUN 16 03/05/2020     03/05/2020    K 4.9 03/05/2020     03/05/2020    CO2 25 03/05/2020      Lab Results   Component Value Date    WBC 4.9 03/05/2020    HGB 12.3 03/05/2020    HCT 36.7 03/05/2020    MCV 91.5 03/05/2020     03/05/2020      Lab Results   Component Value Date    INR 1.21 (H) 03/05/2020    PROTIME 14.1 (H) 03/05/2020    No results found for: BNP    Radiology: N/A    Treatments: analgesia: Dilaudid and Percocet, cardiac meds: valsartan, spironolactone, metoprolol, furosemide and Rhythmol and anticoagulation: Eliquis    Disposition: home    Patient Instructions:    Germain Gracia \"Maira\"   Home Medication Instructions [facility]:[id number]    Printed on:03/07/20 7887   Medication Information                      apixaban (ELIQUIS) 5 MG TABS tablet  TAKE 1 TABLET BY MOUTH TWO TIMES A DAY             atorvastatin (LIPITOR) 40 MG tablet  TAKE 1 TABLET BY MOUTH NIGHTLY             Azelastine-Fluticasone 137-50 MCG/ACT SUSP  1 spray by Nasal route 2 times daily             cetirizine (ZYRTEC) 10 MG tablet  Take 10 mg by mouth 2 times daily              citalopram (CELEXA) 40 MG tablet  TAKE ONE TABLET BY MOUTH ONE

## 2020-03-09 ENCOUNTER — TELEPHONE (OUTPATIENT)
Dept: FAMILY MEDICINE CLINIC | Age: 65
End: 2020-03-09

## 2020-03-11 ENCOUNTER — TELEPHONE (OUTPATIENT)
Dept: FAMILY MEDICINE CLINIC | Age: 65
End: 2020-03-11

## 2020-03-13 ENCOUNTER — TELEPHONE (OUTPATIENT)
Dept: CARDIOLOGY CLINIC | Age: 65
End: 2020-03-13

## 2020-03-13 NOTE — TELEPHONE ENCOUNTER
Pt had ABL week ago Thursday and on that Friday after she noticed a sore throat and cough. Should she be concerned. Also pt works at [employer] and with her heart condition should she work. Please advise.

## 2020-03-14 ENCOUNTER — NURSE TRIAGE (OUTPATIENT)
Dept: OTHER | Facility: CLINIC | Age: 65
End: 2020-03-14

## 2020-03-16 ENCOUNTER — TELEPHONE (OUTPATIENT)
Dept: CARDIOLOGY CLINIC | Age: 65
End: 2020-03-16

## 2020-03-16 RX ORDER — METOPROLOL SUCCINATE 25 MG/1
TABLET, EXTENDED RELEASE ORAL
Qty: 30 TABLET | Refills: 2 | Status: SHIPPED | OUTPATIENT
Start: 2020-03-16 | End: 2020-03-17

## 2020-03-16 NOTE — TELEPHONE ENCOUNTER
[id number] Minoo Santiago, that happens. Any fevers, chills, nausea, vomiting, diarrhea, shortness of breath, swallowing issues? If not and is improving ok to watch and see her PCP, thanks.

## 2020-03-16 NOTE — TELEPHONE ENCOUNTER
Patient had an ablation on 3/5/2020 with RPS. She called today to let us know she has had a cough that started while she was in the hospital. When she coughs she has a \"pain in her heart. \" she denies any palpitations. No fevers. I advised her to call her PCP regarding her cough. She states it is gradually getting better.

## 2020-03-17 RX ORDER — PROPAFENONE HYDROCHLORIDE 225 MG/1
CAPSULE, EXTENDED RELEASE ORAL
Qty: 180 CAPSULE | Refills: 1 | Status: SHIPPED | OUTPATIENT
Start: 2020-03-17 | End: 2020-08-27

## 2020-03-17 RX ORDER — METOPROLOL SUCCINATE 25 MG/1
TABLET, EXTENDED RELEASE ORAL
Qty: 30 TABLET | Refills: 2 | Status: SHIPPED | OUTPATIENT
Start: 2020-03-17 | End: 2020-05-26

## 2020-04-02 ENCOUNTER — TELEMEDICINE (OUTPATIENT)
Dept: CARDIOLOGY CLINIC | Age: 65
End: 2020-04-02
Payer: COMMERCIAL

## 2020-04-02 VITALS — OXYGEN SATURATION: 98 % | HEART RATE: 59 BPM | WEIGHT: 273 LBS | BODY MASS INDEX: 43.87 KG/M2 | HEIGHT: 66 IN

## 2020-04-02 PROCEDURE — 99442 PR PHYS/QHP TELEPHONE EVALUATION 11-20 MIN: CPT | Performed by: NURSE PRACTITIONER

## 2020-04-02 RX ORDER — FUROSEMIDE 20 MG/1
20 TABLET ORAL SEE ADMIN INSTRUCTIONS
Qty: 90 TABLET | Refills: 0 | Status: SHIPPED | OUTPATIENT
Start: 2020-04-02 | End: 2020-08-25

## 2020-04-02 NOTE — PATIENT INSTRUCTIONS
Continue daily weights, record blood pressures a few times over next few weeks; If top number running >140's please notify the office. Discussed CHF symptoms  Will trial 20mg lasix as needed for weight gain and worsening shortness of breath. Discussed how to take and if she is requiring more than 2 doses in a week then will check BMP. Last BMP showed normal renal function and potassium 4.9. Continue Spironolactone (aldactone) and valsartan  Continue toprol   Follow up with Dr. Lynda Boogie as planned and will reschedule EP visit.

## 2020-04-02 NOTE — PROGRESS NOTES
2020    Marvin Sawyer is a 71 y.o. female evaluated via telephone on 2020. Consent:  She and/or health care decision maker is aware that that she may receive a bill for this telephone service, depending on her insurance coverage, and has provided verbal consent to proceed: Yes      HPI:    Marvin Sawyer (:  1955) has requested an audio evaluation for the following concern(s): follow up cardiomyopathy. Hx of Afib on Flecainide, diltiazem and eliquis. S/P LHC 19. Since last visit, she underwent elective afib RFCA ablation 3/5/20  HCTZ was stopped due to dizziness 2020. She was set up to do video visit however, due to technology issues was not able to perform the video portion of the visit. HPI obtained via telephone. SHe had a headache before and after procedure. Episode of chest pain on 3/14/20 with coughing, when she would cough she feel it a muscle pull in her chest. Then she had esophageal spasms. Pain in the front and the back and then improved in about 25 minutes. Sat in the chair and improved through the day. No further chest pains since that day. No lightheadedness or dizziness. She has been off of work; now working from home. She feels really good lately , no palpitations. HR running 59. Has a b/p cuff, but doesn't know where it is. Home weight was 273lbs- trying to lose weight. Still getting shortness of breath with exertion with activity for about 20 minutes. Not coughing with shortness of breath. Still has a residual cough from the procedure- clear sputum     She doesn't feel like her legs are swelling. She continues to use her CPAP. Review of Systems    Prior to Visit Medications    Medication Sig Taking?  Authorizing Provider   propafenone (RYTHMOL SR) 225 MG extended release capsule TAKE 1 CAPSULE BY MOUTH 2 TIMES A DAY  Rivas Romero MD   metoprolol succinate (TOPROL XL) 25 MG extended release tablet TAKE 1 TABLET BY MOUTH hypokinesis is present. There is mild concentric left ventricular hypertrophy. Normal left ventricular diastolic filling pressure. Mild mitral regurgitation. Aortic sclerosis vs mild aortic stenosis. Mild aortic regurgitation is present. Normal systolic pulmonary artery pressure (SPAP) estimated at 29 mmHg (RA pressure 8 mmHg).       Cardiac MRI 8/23/2019 ():  Findings are most consistent with a nonischemic cardiomyopathy. Arland Finner is no evidence of left ventricular scarring, edema or iron overload. There is evidence of incomplete or complete left bundle branch block. Regional wall motion abnormalities involving are likely secondary to underlying conduction disease; however, ischemic heart disease cannot be excluded. The right ventricle is normal in size and systolic function. There is late gadolinium enhancement of the RV insertion points suggestive of elevated right heart pressures. The left atrium is mildly dilated. There is mild mitral regurgitation  The right atrium is normal in size.  There is late gadolinium enhancement involving both atria. There is mild aortic stenosis and regurgitation. The descending aorta is dilated there is evidence of associated atherosclerosis.     [facility] 8/2/2019 ([place]):  LM: luminals  LAD: mid luminals, distal vessel small  LCX: luminals  RCA: dominant, very large     LVEDP: 18  LVEF: 25-30% global hypokinesis     Assessment  1. Nonischemic cardiomyopathy  2. Given worsening cardiomyopathy, suggest Cardiac MRI                - eval for myocarditis or infiltrative cardiomyopathy  3. Will d/c flecainide and dilt given worsening LVEF, pt currently in NSR                - start toprol 25                - start ASA and lisinopril  4. Lasix in post op area                - will make Appt with heart failure team      ASSESSMENT/PLAN:  There are no diagnoses linked to this encounter.     Non-ischemic cardiomyopathy  Chronic systolic heart failure  PAF  HTN  Aortic stenosis    Plan:

## 2020-05-04 RX ORDER — CITALOPRAM 40 MG/1
TABLET ORAL
Qty: 90 TABLET | Refills: 1 | Status: SHIPPED | OUTPATIENT
Start: 2020-05-04 | End: 2020-10-26 | Stop reason: SDUPTHER

## 2020-05-04 RX ORDER — SPIRONOLACTONE 25 MG/1
TABLET ORAL
Qty: 90 TABLET | Refills: 1 | Status: SHIPPED | OUTPATIENT
Start: 2020-05-04 | End: 2020-10-26

## 2020-05-04 NOTE — TELEPHONE ENCOUNTER
LOV 04/02/2020 w/ NPRB (tele visit)    Assessment  1. Nonischemic cardiomyopathy  2. Given worsening cardiomyopathy, suggest Cardiac MRI                - eval for myocarditis or infiltrative cardiomyopathy  3. Will d/c flecainide and dilt given worsening LVEF, pt currently in NSR                - start toprol 25                - start ASA and lisinopril  4. Lasix in post op area                - will make Appt with heart failure team        ASSESSMENT/PLAN:  There are no diagnoses linked to this encounter.     Non-ischemic cardiomyopathy  Chronic systolic heart failure  PAF  HTN  Aortic stenosis     Plan:   Continue daily weights, record blood pressures  Discussed CHF symptoms  Will trial 20mg lasix PRN for weight gain and worsening shortness of breath. Discussed how to take and if she is requiring more than 2 doses in a week then will check BMP. Last BMP showed normal renal function and potassium 4.9.    Continue Spironolactone (aldactone) and valsartan  Continue toprol

## 2020-05-15 NOTE — PROGRESS NOTES
[facility]  Advanced CHF/Pulmonary Hypertension   Cardiac Evaluation      Nzaia Almeida  YOB: 1955    Date of Visit:  5/19/20      Chief Complaint   Patient presents with    Follow-up    Congestive Heart Failure        History of Present Illness:  Nazia Almeida is a 71 y.o. female who presents from referral from Dr. Johnathan Monteiro for consultation and management of cardiomyopathy. She presented to the hospital 07/11/19 with palpitations. She was found to be in AFib RVR. She was started on flecainide, diltiazem and eliquis. Her echo from 07/11/19 showed her EF was 48-50% with mild/ global hypokinesis. Mild MR and AR. Her stress test from 07/25/19 showed a mixed defect in the anteroseptal wall raising concern for mixed ischemia/scar. Her cardiac cath from 08/02/19 showed nonischemic cardiomyopathy. Flecainide and diltiazem were stopped due to worsening. LVEF. She was started on toprol 25 mg. She was started on lisinopril and eliquis as well. She believes she has had PAF for 30 years but was never able to capture it. She reports she feels awful and SOB with the AFib. She reports she verifies afib with checking her radial pulse. She started having palpitations again last night after eating. She does not check her BP at home. Her cardiac MRI from 08/02/19 showed her EF was 45%. She underwent afib ablation with Dr Orestes Giron on 03/05/20    Nazia Almeida is a 71 y.o. female evaluated via virtual visit (video) on 5/19/2020. Consent:  She and/or health care decision maker is aware that that she may receive a bill for this telephone service, depending on her insurance coverage, and has provided verbal consent to proceed: Yes      Documentation:  I communicated with the patient and/or health care decision maker about medications and symptoms. Details of this discussion including any medical advice provided: see below.       I affirm this is a Patient Initiated Episode with an Last attempt to quit: 3/27/1972     Years since quittin.1    Smokeless tobacco: Never Used   Substance and Sexual Activity    Alcohol use: Yes     Alcohol/week: 0.0 standard drinks     Comment: soc    Drug use: No    Sexual activity: Yes     Partners: Male   Lifestyle    Physical activity     Days per week: Not on file     Minutes per session: Not on file    Stress: Not on file   Relationships    Social connections     Talks on phone: Not on file     Gets together: Not on file     Attends [employer] service: Not on file     Active member of club or organization: Not on file     Attends meetings of clubs or organizations: Not on file     Relationship status: Not on file    Intimate partner violence     Fear of current or ex partner: Not on file     Emotionally abused: Not on file     Physically abused: Not on file     Forced sexual activity: Not on file   Other Topics Concern    Not on file   Social History Narrative            Spends time with , grandchildren        Lives in [address] [place] [address]:   · Constitutional: there has been no unanticipated weight loss. There's been no change in energy level, sleep pattern, or activity level. · Eyes: No visual changes or diplopia. No scleral icterus. · ENT: No Headaches, hearing loss or vertigo. No mouth sores or sore throat. · Cardiovascular: Reviewed in HPI  · Respiratory: No cough or wheezing, no sputum production. No hematemesis. · Gastrointestinal: No abdominal pain, appetite loss, blood in stools. No change in bowel or bladder habits. · Genitourinary: No dysuria, trouble voiding, or hematuria. · Musculoskeletal:  No gait disturbance, weakness or joint complaints. · Integumentary: No rash or pruritis. · Neurological: No headache, diplopia, change in muscle strength, numbness or tingling. No change in gait, balance, coordination, mood, affect, memory, mentation, behavior.   · Psychiatric: No anxiety, no Martina Nelson M.D., [facility]

## 2020-05-19 ENCOUNTER — TELEPHONE (OUTPATIENT)
Dept: CARDIOLOGY CLINIC | Age: 65
End: 2020-05-19

## 2020-05-19 ENCOUNTER — VIRTUAL VISIT (OUTPATIENT)
Dept: CARDIOLOGY CLINIC | Age: 65
End: 2020-05-19
Payer: COMMERCIAL

## 2020-05-19 VITALS — WEIGHT: 278.2 LBS | OXYGEN SATURATION: 98 % | HEART RATE: 68 BPM | BODY MASS INDEX: 44.71 KG/M2 | HEIGHT: 66 IN

## 2020-05-19 PROCEDURE — 99215 OFFICE O/P EST HI 40 MIN: CPT | Performed by: INTERNAL MEDICINE

## 2020-05-26 RX ORDER — OXYBUTYNIN CHLORIDE 15 MG/1
15 TABLET, EXTENDED RELEASE ORAL DAILY
Qty: 90 TABLET | Refills: 2 | Status: SHIPPED | OUTPATIENT
Start: 2020-05-26 | End: 2021-02-25

## 2020-05-26 RX ORDER — METOPROLOL SUCCINATE 25 MG/1
TABLET, EXTENDED RELEASE ORAL
Qty: 30 TABLET | Refills: 2 | Status: SHIPPED | OUTPATIENT
Start: 2020-05-26 | End: 2020-08-22

## 2020-05-30 DIAGNOSIS — E78.2 MIXED HYPERLIPIDEMIA: ICD-10-CM

## 2020-05-30 DIAGNOSIS — I48.0 PAROXYSMAL ATRIAL FIBRILLATION (HCC): ICD-10-CM

## 2020-05-30 DIAGNOSIS — I50.22 CHRONIC SYSTOLIC HEART FAILURE (HCC): ICD-10-CM

## 2020-05-30 DIAGNOSIS — E55.9 VITAMIN D DEFICIENCY: ICD-10-CM

## 2020-05-30 LAB
A/G RATIO: 1.6 (ref 1.1–2.2)
ALBUMIN SERPL-MCNC: 4.2 G/DL (ref 3.4–5)
ALP BLD-CCNC: 55 U/L (ref 40–129)
ALT SERPL-CCNC: 20 U/L (ref 10–40)
ANION GAP SERPL CALCULATED.3IONS-SCNC: 11 MMOL/L (ref 3–16)
AST SERPL-CCNC: 21 U/L (ref 15–37)
BASOPHILS ABSOLUTE: 0 K/UL (ref 0–0.2)
BASOPHILS RELATIVE PERCENT: 0.7 %
BILIRUB SERPL-MCNC: 0.6 MG/DL (ref 0–1)
BUN BLDV-MCNC: 22 MG/DL (ref 7–20)
CALCIUM SERPL-MCNC: 9.6 MG/DL (ref 8.3–10.6)
CHLORIDE BLD-SCNC: 100 MMOL/L (ref 99–110)
CHOLESTEROL, TOTAL: 142 MG/DL (ref 0–199)
CO2: 26 MMOL/L (ref 21–32)
CREAT SERPL-MCNC: 0.9 MG/DL (ref 0.6–1.2)
EOSINOPHILS ABSOLUTE: 0.1 K/UL (ref 0–0.6)
EOSINOPHILS RELATIVE PERCENT: 1.5 %
GFR AFRICAN AMERICAN: >60
GFR NON-AFRICAN AMERICAN: >60
GLOBULIN: 2.7 G/DL
GLUCOSE BLD-MCNC: 133 MG/DL (ref 70–99)
HCT VFR BLD CALC: 40.2 % (ref 36–48)
HDLC SERPL-MCNC: 39 MG/DL (ref 40–60)
HEMOGLOBIN: 13.3 G/DL (ref 12–16)
LDL CHOLESTEROL CALCULATED: 76 MG/DL
LYMPHOCYTES ABSOLUTE: 1.4 K/UL (ref 1–5.1)
LYMPHOCYTES RELATIVE PERCENT: 26 %
MCH RBC QN AUTO: 30.2 PG (ref 26–34)
MCHC RBC AUTO-ENTMCNC: 33.2 G/DL (ref 31–36)
MCV RBC AUTO: 90.9 FL (ref 80–100)
MONOCYTES ABSOLUTE: 0.5 K/UL (ref 0–1.3)
MONOCYTES RELATIVE PERCENT: 8.8 %
NEUTROPHILS ABSOLUTE: 3.4 K/UL (ref 1.7–7.7)
NEUTROPHILS RELATIVE PERCENT: 63 %
PDW BLD-RTO: 14.1 % (ref 12.4–15.4)
PLATELET # BLD: 227 K/UL (ref 135–450)
PMV BLD AUTO: 8.5 FL (ref 5–10.5)
POTASSIUM SERPL-SCNC: 4.3 MMOL/L (ref 3.5–5.1)
PRO-BNP: 146 PG/ML (ref 0–124)
RBC # BLD: 4.42 M/UL (ref 4–5.2)
SODIUM BLD-SCNC: 137 MMOL/L (ref 136–145)
T4 FREE: 1.4 NG/DL (ref 0.9–1.8)
TOTAL PROTEIN: 6.9 G/DL (ref 6.4–8.2)
TRIGL SERPL-MCNC: 133 MG/DL (ref 0–150)
TSH REFLEX: 4.46 UIU/ML (ref 0.27–4.2)
VITAMIN D 25-HYDROXY: 52.8 NG/ML
VLDLC SERPL CALC-MCNC: 27 MG/DL
WBC # BLD: 5.4 K/UL (ref 4–11)

## 2020-06-04 NOTE — PATIENT INSTRUCTIONS
Surgical Wound Care Instructions  Sutured wounds:  · After your surgery, go home and take it easy. Please refrain from any vigorous physical activity or heavy lifting. · Leave the pressure bandage in place for 48 hours. If it starts to detach, reinforce the bandage with another piece of tape. · Please keep the bandage dry for 48 hours  · After 48 hours, the wound can get wet. Clean the area daily using mild soapy water and a gauze pad or cotton tipped applicator, applying gentle pressure. · Apply a thin layer of Vaseline or petroleum jelly. · Cut a Telfa pad in the shape of the wound but slightly larger and secure with tape. Special sites:  Facial sites:  · Keep the wound elevated for the first 2 nights. · Do not sleep on the side of the body where the wound is located. · Do not bend your head lower than your heart or engage in heavy lifting. Leg:  · Keep the leg elevated when reclining, using a pillow to elevate it. · Keep leg wrapped daily with ACE bandage for at least the first 7 days. Complications:  · If bleeding develops when you go home, apply pressure for 15 minutes continuously with no peeking. A small amount of ice in a bag covered with a towel may be used for another 10 minutes if necessary. If the bleeding does not stop, please call us [phone]. · Please call if you develop any fevers, chills, or pus drains from the wound. · A small amount of redness at the site of the surgery is normal at first, but if the redness begins to spread and/or pain worsens, you may have an infection and need to call the office.
08-Jun-2020 17:56

## 2020-07-29 RX ORDER — LEVOTHYROXINE SODIUM 137 UG/1
TABLET ORAL
Qty: 90 TABLET | Refills: 1 | Status: SHIPPED | OUTPATIENT
Start: 2020-07-29 | End: 2021-01-27 | Stop reason: SDUPTHER

## 2020-07-29 NOTE — TELEPHONE ENCOUNTER
Last office visit 1/28/2020     Last written 1- #90 x 1 refill    Next office visit scheduled  Not scheduled    Requested Prescriptions     Pending Prescriptions Disp Refills    levothyroxine (SYNTHROID) 137 MCG tablet 90 tablet 1     Sig: TAKE ONE TABLET BY MOUTH ONE TIME A DAY

## 2020-08-22 RX ORDER — METOPROLOL SUCCINATE 25 MG/1
TABLET, EXTENDED RELEASE ORAL
Qty: 30 TABLET | Refills: 2 | Status: SHIPPED | OUTPATIENT
Start: 2020-08-22 | End: 2021-01-06 | Stop reason: SDUPTHER

## 2020-08-24 RX ORDER — OMEPRAZOLE 20 MG/1
CAPSULE, DELAYED RELEASE ORAL
Qty: 30 CAPSULE | Refills: 5 | Status: SHIPPED | OUTPATIENT
Start: 2020-08-24 | End: 2021-01-25

## 2020-08-24 NOTE — TELEPHONE ENCOUNTER
Refill Request     Last Seen: 1/28/2020    Last Written: 2/17/2020    Next Appointment:   Future Appointments   Date Time Provider Pelon Robersoni   8[facility]25/2020  8:00 [facility]   12/28/2020 11:20 AM MD Timbo [place]wood G[facility]   1/25/2021  3:00 PM Zak Link, APRN - CNP CLERM PULM [facility]             Requested Prescriptions     Pending Prescriptions Disp Refills    omeprazole (PRILOSEC) 20 MG delayed release capsule [Pharmacy Med Name: OMEPRAZOLE 20MG CPDR] 30 capsule 5     Sig: TAKE ONE CAPSULE BY MOUTH EVERY MORNING BEFORE BREAKFAST

## 2020-08-25 ENCOUNTER — OFFICE VISIT (OUTPATIENT)
Dept: CARDIOLOGY CLINIC | Age: 65
End: 2020-08-25
Payer: COMMERCIAL

## 2020-08-25 VITALS
HEART RATE: 61 BPM | OXYGEN SATURATION: 98 % | DIASTOLIC BLOOD PRESSURE: 58 MMHG | HEIGHT: 66 IN | BODY MASS INDEX: 46.03 KG/M2 | SYSTOLIC BLOOD PRESSURE: 112 MMHG | WEIGHT: 286.4 LBS

## 2020-08-25 PROCEDURE — 99214 OFFICE O/P EST MOD 30 MIN: CPT | Performed by: INTERNAL MEDICINE

## 2020-08-25 RX ORDER — FUROSEMIDE 20 MG/1
TABLET ORAL
Qty: 90 TABLET | Refills: 1 | Status: SHIPPED | OUTPATIENT
Start: 2020-08-25 | End: 2021-01-27 | Stop reason: SDUPTHER

## 2020-08-25 RX ORDER — ATORVASTATIN CALCIUM 40 MG/1
TABLET, FILM COATED ORAL
Qty: 90 TABLET | Refills: 1 | Status: SHIPPED | OUTPATIENT
Start: 2020-08-25 | End: 2021-02-22

## 2020-08-25 NOTE — PROGRESS NOTES
[facility]  Advanced CHF/Pulmonary Hypertension   Cardiac Evaluation      Woodrow Skinner  YOB: 1955    Date of Visit:  8/25/20      Chief Complaint   Patient presents with    Follow-up    Congestive Heart Failure        History of Present Illness:  Woodrow Skinner is a 76 y.o. female who presents from referral from Dr. Jamil Aleman for consultation and management of cardiomyopathy. She presented to the hospital 07/11/19 with palpitations. She was found to be in AFib RVR. She was started on flecainide, diltiazem and eliquis. Her echo from 07/11/19 showed her EF was 48-50% with mild/ global hypokinesis. Mild MR and AR. Her stress test from 07/25/19 showed a mixed defect in the anteroseptal wall raising concern for mixed ischemia/scar. Her cardiac cath from 08/02/19 showed nonischemic cardiomyopathy. Flecainide and diltiazem were stopped due to worsening. LVEF. She was started on toprol 25 mg. She was started on lisinopril and eliquis as well. She believes she has had PAF for 30 years but was never able to capture it. She reports she feels awful and SOB with the AFib. She reports she verifies afib with checking her radial pulse. She started having palpitations again last night after eating. She does not check her BP at home. Her cardiac MRI from 08/02/19 showed her EF was 45%. She underwent afib ablation with Dr Neville Caicedo on 03/05/20. Today she reports she feels well. She has occasional BLE edema. She denies CP, SOB, dizziness or syncope. She is still working from home and will continue indefinitely. She is on spironolactone once daily. She is on lasix as needed for edema about 3 times a week. She is still taking propafenone.          Allergies   Allergen Reactions    Latex Rash    Iodine Shortness Of Breath     Current Outpatient Medications   Medication Sig Dispense Refill    omeprazole (PRILOSEC) 20 MG delayed release capsule TAKE ONE CAPSULE BY MOUTH EVERY MORNING BEFORE BREAKFAST 30 capsule 5    metoprolol succinate (TOPROL XL) 25 MG extended release tablet TAKE 1 TABLET BY MOUTH ONE TIME A DAY 30 tablet 2    levothyroxine (SYNTHROID) 137 MCG tablet TAKE ONE TABLET BY MOUTH ONE TIME A DAY 90 tablet 1    oxybutynin (DITROPAN XL) 15 MG extended release tablet Take 1 tablet by mouth daily 90 tablet 2    spironolactone (ALDACTONE) 25 MG tablet TAKE 1 TABLET BY MOUTH ONE TIME A DAY 90 tablet 1    citalopram (CELEXA) 40 MG tablet TAKE ONE TABLET BY MOUTH ONE TIME A DAY 90 tablet 1    furosemide (LASIX) 20 MG tablet Take 1 tablet by mouth See Admin Instructions As needed for weight gain of 3lbs in a day or 5lbs in a week. 90 tablet 0    propafenone (RYTHMOL SR) 225 MG extended release capsule TAKE 1 CAPSULE BY MOUTH 2 TIMES A  capsule 1    apixaban (ELIQUIS) 5 MG TABS tablet TAKE 1 TABLET BY MOUTH TWO TIMES A  tablet 3    montelukast (SINGULAIR) 10 MG tablet TAKE ONE TABLET BY MOUTH NIGHTLY 90 tablet 2    fluticasone (FLONASE) 50 MCG/ACT nasal spray 2 sprays each nostril daily 3 Bottle 2    valsartan (DIOVAN) 40 MG tablet Take 1 tablet by mouth daily 90 tablet 3    atorvastatin (LIPITOR) 40 MG tablet TAKE 1 TABLET BY MOUTH NIGHTLY 90 tablet 3    Azelastine-Fluticasone 137-50 MCG/ACT SUSP 1 spray by Nasal route 2 times daily      hydrocortisone (ANUSOL-HC) 2.5 % rectal cream Place rectally 2 times daily. 1 Tube 0    Lactobacillus (FLORAJEN ACIDOPHILUS) CAPS Take 1 capsule by mouth daily      cetirizine (ZYRTEC) 10 MG tablet Take 10 mg by mouth 2 times daily       multivitamin (THERAGRAN) per tablet Take 1 tablet by mouth daily. No current facility-administered medications for this visit.         Past Medical History:   Diagnosis Date    Abnormal Pap smear of cervix 06/11/2018    hpv+    Allergic     Allergic rhinitis     Aortic stenosis, mild-echo 8/2016 8/18/2016    Atrial fibrillation (HCC)     BMI 40.0-44.9, adult (HCC)     Breast cancer (HCC)     54    Cancer [facility]) 2009    right breast    Depression     GERD (gastroesophageal reflux disease)     H/O laparoscopic adjustable gastric banding 10/27/2016    Heart palpitations     HPV (human papilloma virus) infection 2018    HPV test positive 2014    Hypertension     Hypothyroidism 2000    Meningioma ([address]     10 years ago    Mild aortic regurgitation-echo 2016    Mild dysplasia of cervix 2016    Obesity     Sleep apnea     Urge incontinence 2017     Past Surgical History:   Procedure Laterality Date    APPENDECTOMY      BREAST BIOPSY      BREAST LUMPECTOMY      BREAST SURGERY  2009    right lumpectomy xrt and chemo and serm     SECTION  ,,,    failed to drop in birth canal     SECTION      CHOLECYSTECTOMY      COLONOSCOPY      HYSTERECTOMY  2000    LAP BAND      LAP BAND      Removal    OVARY REMOVAL      PARTIAL HYSTERECTOMY      THYROIDECTOMY, PARTIAL Left 2000    TUNNELED VENOUS PORT PLACEMENT      Placed/Removed    UPPER GASTROINTESTINAL ENDOSCOPY  6/10/16    gastritis, gastric polyp bx      Family History   Problem Relation Age of Onset    Cancer Mother         breast and cervical    Heart Disease Father     Cancer Father 61        skin cancer    Other Father         AAA    Heart Attack Father     Cancer Maternal Grandmother         breast    Cancer Paternal Grandmother         breast    Cancer Sister         lymphoma    No Known Problems Brother     No Known Problems Maternal Aunt     No Known Problems Maternal Uncle     No Known Problems Paternal Aunt     No Known Problems Paternal Uncle     No Known Problems Maternal Grandfather     No Known Problems Paternal Grandfather     Cancer Other         neice- head, not sure what kind skin ca    Heart Disease Sister     Other Sister         biliary hepatitis    Asthma Neg Hx     Diabetes Neg Hx     Emphysema Neg Hx     Heart Failure Neg Hx  Hypertension Neg Hx     Rheum Arthritis Neg Hx     Osteoarthritis Neg Hx     Breast Cancer Neg Hx     High Cholesterol Neg Hx     Migraines Neg Hx     Ovarian Cancer Neg Hx     Rashes/Skin Problems Neg Hx     Seizures Neg Hx     Stroke Neg Hx     Thyroid Disease Neg Hx      Social History     Socioeconomic History    Marital status:      Spouse name: Not on file    Number of children: Not on file    Years of education: Not on file    Highest education level: Not on file   Occupational History    Occupation: [employer]   Social Needs    Financial resource strain: Not on file    Food insecurity     Worry: Not on file     Inability: Not on file   [email or web address] needs     Medical: Not on file     Non-medical: Not on file   Tobacco Use    Smoking status: Former Smoker     Packs/day: 0.25     Years: 1.50     Pack years: 0.37     Types: Cigarettes     Last attempt to quit: 3/27/1972     Years since quittin.4    Smokeless tobacco: Never Used   Substance and Sexual Activity    Alcohol use:  Yes     Alcohol/week: 0.0 standard drinks     Comment: soc    Drug use: No    Sexual activity: Yes     Partners: Male   Lifestyle    Physical activity     Days per week: Not on file     Minutes per session: Not on file    Stress: Not on file   Relationships    Social connections     Talks on phone: Not on file     Gets together: Not on file     Attends Ten[employer] service: Not on file     Active member of club or organization: Not on file     Attends meetings of clubs or organizations: Not on file     Relationship status: Not on file    Intimate partner violence     Fear of current or ex partner: Not on file     Emotionally abused: Not on file     Physically abused: Not on file     Forced sexual activity: Not on file   Other Topics Concern    Not on file   Social History Narrative            Spends time with , grandchildren        Lives in [facility]           Review of Systems: · Constitutional: there has been no unanticipated weight loss. There's been no change in energy level, sleep pattern, or activity level. · Eyes: No visual changes or diplopia. No scleral icterus. · ENT: No Headaches, hearing loss or vertigo. No mouth sores or sore throat. · Cardiovascular: Reviewed in HPI  · Respiratory: No cough or wheezing, no sputum production. No hematemesis. · Gastrointestinal: No abdominal pain, appetite loss, blood in stools. No change in bowel or bladder habits. · Genitourinary: No dysuria, trouble voiding, or hematuria. · Musculoskeletal:  No gait disturbance, weakness or joint complaints. · Integumentary: No rash or pruritis. · Neurological: No headache, diplopia, change in muscle strength, numbness or tingling. No change in gait, balance, coordination, mood, affect, memory, mentation, behavior. · Psychiatric: No anxiety, no depression. · Endocrine: No malaise, fatigue or temperature intolerance. No excessive thirst, fluid intake, or urination. No tremor. · Hematologic/Lymphatic: No abnormal bruising or bleeding, blood clots or swollen lymph nodes. · Allergic/Immunologic: No nasal congestion or hives. Physical Examination:    Vitals:    08/25/20 0758 08/25/20 0802   BP: (!) 112/58 (!) 112/58   Pulse: 61    SpO2: 98%    Weight: 286 lb 6.4 oz (129.9 kg)    Height: 5' 6\" (1.676 m)      Body mass index is 46.23 kg/m².      Wt Readings from Last 3 Encounters:   08/25/20 286 lb 6.4 oz (129.9 kg)   05/19/20 278 lb 3.2 oz (126.2 kg)   04/02/20 273 lb (123.8 kg)     BP Readings from Last 3 Encounters:   08/25/20 (!) 112/58   03/07/20 102/67   03/05/20 (!) 151/69      Constitutional and General Appearance:   Chronically ill appearing  HEENT:  NC/AT  CHEY  No problems with hearing  Skin:  Warm, dry  Respiratory:  · Normal excursion and expansion without use of accessory muscles  · Resp Auscultation: Normal breath sounds without dullness  Cardiovascular:  · The apical impulses not displaced  · Heart tones are crisp and normal  · Cervical veins are not engorged  · The carotid upstroke is normal in amplitude and contour without delay or bruit   JVP <8 mm H2O\   Irregularly irregular rhythm with nl S1 and S2 without m,r,g  · Peripheral pulses are symmetrical and full  · There is no clubbing, cyanosis of the extremities. · No edema  · Femoral Arteries: 2+ and equal  · Pedal Pulses: 2+ and equal   Neck:  · No thyromegaly  Abdomen:  · No masses or tenderness  · Liver/Spleen: No Abnormalities Noted  Neurological/Psychiatric:  · Alert and oriented in all spheres  · Moves all extremities well  · Exhibits normal gait balance and coordination  · No abnormalities of mood, affect, memory, mentation, or behavior are noted        Echo: 07/11/19   Summary   Left ventricular systolic function is reduced with ejection fraction   estimated at 48-50 %. Mild global hypokinesis is present. There is mild concentric left ventricular hypertrophy. Normal left ventricular diastolic filling pressure. Mild mitral regurgitation. Aortic sclerosis vs mild aortic stenosis. Mild aortic regurgitation is present. Normal systolic pulmonary artery pressure (SPAP) estimated at 29 mmHg (RA   pressure 8 mmHg). Stress test: 07/25/19   Normal LVEF 61%  Base to mid anteroseptal hypokinesis  There is a mixed defect in the anteroseptal wall raising concern for mixed  ischemia/scar in that region  This would be considered an abnormal intermediate risk study     [facility]: 08/02/19  LM: luminals  LAD: mid luminals, distal vessel small  LCX: luminals  RCA: dominant, very large     LVEDP: 18  LVEF: 25-30% global hypokinesis      Assessment  1. Nonischemic cardiomyopathy  2. Given worsening cardiomyopathy, suggest Cardiac MRI                - eval for myocarditis or infiltrative cardiomyopathy  3.  Will d/c flecainide and dilt given worsening LVEF, pt currently in NSR                - start toprol 25                - start ASA and lisinopril  4. Lasix in post op area                - will make Appt with heart failure team    MRI cardiac: 08/23/19  FINDINGS:    1. The left ventricular size is normal. The left ventricular ejection fraction is 45 % by Melton's method. Global left ventricular function is mildly decreased. There is moderate hypokinesis involving the mid inferior, mid inferoseptal and apical inferior segments. The left ventricular mass is normal.  Delayed systole in the left ventricle relative to the right ventricle and septal bounce suggest incomplete or complete left bundle branch block. 2. Resting first pass myocardial perfusion is probably normal (assessment limited by wrap artifact). There is no late gadolinium enhancement to suggest prior infarct, inflammation, or infiltration. There is no evidence of myocardial edema by T2 weighted imaging (myocardium/skeletal muscle 159/108, normal < 1.9). There is no evidence of increased iron deposition within the myocardium (T2*myocardium = 53 msec; if < 20 msec, suggestive of iron-overloading of the myocardium). 3. The right ventricular size is normal. Global right ventricular function is normal. There are no regional wall motion abnormalities of the right ventricular wall. There is late gadolinium enhancement of the right ventricular insertion points. 4. Left atrial size is mildly enlarged. Right atrial size is normal.  The Qp/Qs is 1.0 by phase contrast imaging and is consistent with no evidence of shunt.  There is late gadolinium enhancement involving the RV insertion points. 5. Velocity-encoded phase contrast Imaging in (2D/4D) was performed to assess valvular function. There is mild aortic stenosis. Peak velocity at the aortic valve is 1.76 corresponding to a peak gradient of 12.4 mmHg. There is mild aortic regurgitation. The calculated aortic regurgitant fraction is 10.5 %. There is mild mitral regurgitation. The calculated mitral regurgitant fraction is 10.3 %.  Peak

## 2020-08-25 NOTE — TELEPHONE ENCOUNTER
Pt requesting furosemide 20mg. Pending script sent to [facility]. Last OV 5/19/2020 LW.  Next OV 12/8/2020 LW

## 2020-08-25 NOTE — TELEPHONE ENCOUNTER
Pt requesting atorvastatin calcium 40mg. Pending script sent to [facility]. Last VV 5/19/2020 LW. Next VV 12/8/2020 LW      Plan:  1. Echo in 3 months  2. CMP, CBC, BNP, TSH, T4, Vit D and lipids  3.  Follow up in 3 months

## 2020-08-27 RX ORDER — PROPAFENONE HYDROCHLORIDE 225 MG/1
CAPSULE, EXTENDED RELEASE ORAL
Qty: 180 CAPSULE | Refills: 0 | Status: SHIPPED | OUTPATIENT
Start: 2020-08-27 | End: 2020-12-30 | Stop reason: SDUPTHER

## 2020-08-27 NOTE — TELEPHONE ENCOUNTER
She has not seen EP since Dr. Neville Caicedo 2/7/2020 and EP study 3/5/2020  Can you fill?   Upcoming with BB CNP 9/29/2020

## 2020-09-09 ENCOUNTER — EMPLOYEE WELLNESS (OUTPATIENT)
Dept: OTHER | Age: 65
End: 2020-09-09

## 2020-09-09 LAB
CHOLESTEROL, TOTAL: 166 MG/DL (ref 0–199)
GLUCOSE BLD-MCNC: 127 MG/DL (ref 70–99)
HDLC SERPL-MCNC: 41 MG/DL (ref 40–60)
LDL CHOLESTEROL CALCULATED: 85 MG/DL
TRIGL SERPL-MCNC: 202 MG/DL (ref 0–150)

## 2020-09-14 LAB
ESTIMATED AVERAGE GLUCOSE: 142.7 MG/DL
HBA1C MFR BLD: 6.6 %

## 2020-09-23 ENCOUNTER — OFFICE VISIT (OUTPATIENT)
Dept: FAMILY MEDICINE CLINIC | Age: 65
End: 2020-09-23
Payer: COMMERCIAL

## 2020-09-23 VITALS
SYSTOLIC BLOOD PRESSURE: 124 MMHG | BODY MASS INDEX: 47.13 KG/M2 | WEIGHT: 292 LBS | HEART RATE: 63 BPM | OXYGEN SATURATION: 96 % | TEMPERATURE: 97 F | DIASTOLIC BLOOD PRESSURE: 70 MMHG

## 2020-09-23 DIAGNOSIS — I42.8 NON-ISCHEMIC CARDIOMYOPATHY (HCC): ICD-10-CM

## 2020-09-23 DIAGNOSIS — E03.9 ACQUIRED HYPOTHYROIDISM: Chronic | ICD-10-CM

## 2020-09-23 PROBLEM — R73.03 PREDIABETES: Status: ACTIVE | Noted: 2020-09-23

## 2020-09-23 LAB
ANION GAP SERPL CALCULATED.3IONS-SCNC: 10 MMOL/L (ref 3–16)
BUN BLDV-MCNC: 19 MG/DL (ref 7–20)
CALCIUM SERPL-MCNC: 9.6 MG/DL (ref 8.3–10.6)
CHLORIDE BLD-SCNC: 101 MMOL/L (ref 99–110)
CO2: 28 MMOL/L (ref 21–32)
CREAT SERPL-MCNC: 0.9 MG/DL (ref 0.6–1.2)
GFR AFRICAN AMERICAN: >60
GFR NON-AFRICAN AMERICAN: >60
GLUCOSE BLD-MCNC: 141 MG/DL (ref 70–99)
POTASSIUM SERPL-SCNC: 4.5 MMOL/L (ref 3.5–5.1)
PRO-BNP: 149 PG/ML (ref 0–124)
SODIUM BLD-SCNC: 139 MMOL/L (ref 136–145)
TSH REFLEX: 3.4 UIU/ML (ref 0.27–4.2)

## 2020-09-23 PROCEDURE — 90662 IIV NO PRSV INCREASED AG IM: CPT | Performed by: PHYSICIAN ASSISTANT

## 2020-09-23 PROCEDURE — 90732 PPSV23 VACC 2 YRS+ SUBQ/IM: CPT | Performed by: PHYSICIAN ASSISTANT

## 2020-09-23 PROCEDURE — 90472 IMMUNIZATION ADMIN EACH ADD: CPT | Performed by: PHYSICIAN ASSISTANT

## 2020-09-23 PROCEDURE — 90471 IMMUNIZATION ADMIN: CPT | Performed by: PHYSICIAN ASSISTANT

## 2020-09-23 PROCEDURE — 99214 OFFICE O/P EST MOD 30 MIN: CPT | Performed by: PHYSICIAN ASSISTANT

## 2020-09-23 RX ORDER — POLYETHYLENE GLYCOL 3350 17 G/17G
17 POWDER, FOR SOLUTION ORAL DAILY PRN
Status: ON HOLD | COMMUNITY
End: 2020-11-02 | Stop reason: ALTCHOICE

## 2020-09-23 ASSESSMENT — ENCOUNTER SYMPTOMS
CONSTIPATION: 0
DIARRHEA: 0
SHORTNESS OF BREATH: 0
BLOOD IN STOOL: 0

## 2020-09-23 NOTE — PROGRESS NOTES
2020     Benita Munoz (:  1955) is a 76 y.o. female, here for evaluation of the following medical concerns:    HPI  GI Disorder:  Patient presents for follow-up of GERD- chronic. Current symptoms include none. Symptoms are better since last visit. Patient denies any other symptoms. Current treatment includes: Prilosec. Medication side effects:  none. Recent diagnostic testing: none. Symptoms are well controlled with medication    Hypothyroidism: Recent symptoms: weight gain. She denies hair loss, dry skin, constipation, diarrhea, edema, anxiety, tremor, palpitations and dysphagia. Patient is  taking her medication consistently on an empty stomach. Lab Results   Component Value Date    TSHREFLEX 4.46 2020    TSHREFLEX 6.98 2020    TSHREFLEX 4.25 2019     Lab Results   Component Value Date    TSH 3.52 11/15/2018    TSH 4.16 12/15/2017    TSH 3.43 2016     Prediabetes: Pt admits to eating a high carb diet over the last few months. She has also been very inactive due to COVID restrictions. Her weight is increased. Recent screening for work revealed A1C of 6.6%. Pt would like to do a trial of 3 months of lifestyle change prior to diagnostic A1C. Current symptoms: frequent urination      Review of Systems   Constitutional: Positive for activity change and unexpected weight change. Negative for appetite change. Respiratory: Negative for shortness of breath. Cardiovascular: Negative for chest pain and palpitations. Gastrointestinal: Negative for blood in stool, constipation and diarrhea. Endocrine: Positive for polydipsia. Negative for cold intolerance, heat intolerance, polyphagia and polyuria. Genitourinary: Positive for urgency. Negative for hematuria. Skin: Negative for pallor. Neurological: Negative for dizziness, light-headedness and headaches. Hematological: Negative for adenopathy. Prior to Visit Medications    Medication Sig Taking? Authorizing Provider   polyethylene glycol (GLYCOLAX) 17 g packet Take 17 g by mouth daily as needed for Constipation Yes Historical Provider, MD   propafenone (RYTHMOL SR) 225 MG extended release capsule TAKE 1 CAPSULE BY MOUTH 2 TIMES A DAY Yes SARAH Kaplan MD   furosemide (LASIX) 20 MG tablet TAKE 1 TABLET BY MOUTH ONE TIME A DAY AS NEEDED FOR WEIGHT GAIN OF 3LBS IN A DAY OR 5LBS IN A WEEK Yes BLANK Patiño CNP   atorvastatin (LIPITOR) 40 MG tablet TAKE ONE TABLET BY MOUTH NIGHTLY Yes Gustavo Perry MD   omeprazole (PRILOSEC) 20 MG delayed release capsule TAKE ONE CAPSULE BY MOUTH EVERY MORNING BEFORE BREAKFAST Yes Francisca Coleman,    metoprolol succinate (TOPROL XL) 25 MG extended release tablet TAKE 1 TABLET BY MOUTH ONE TIME A DAY Yes Octavio Cabrera MD   levothyroxine (SYNTHROID) 137 MCG tablet TAKE ONE TABLET BY MOUTH ONE TIME A DAY Yes KADEN Graff   oxybutynin (DITROPAN XL) 15 MG extended release tablet Take 1 tablet by mouth daily Yes KADEN Graff   spironolactone (ALDACTONE) 25 MG tablet TAKE 1 TABLET BY MOUTH ONE TIME A DAY Yes BLANK Monroy - CNP   citalopram (CELEXA) 40 MG tablet TAKE ONE TABLET BY MOUTH ONE TIME A DAY Yes KADEN Graff   apixaban (ELIQUIS) 5 MG TABS tablet TAKE 1 TABLET BY MOUTH TWO TIMES A DAY Yes BLANK Christopher - CNP   montelukast (SINGULAIR) 10 MG tablet TAKE ONE TABLET BY MOUTH NIGHTLY Yes KADEN Shah   fluticasone (FLONASE) 50 MCG/ACT nasal spray 2 sprays each nostril daily Yes KADEN Graff   valsartan (DIOVAN) 40 MG tablet Take 1 tablet by mouth daily Yes BLANK Patiño CNP   Azelastine-Fluticasone 137-50 MCG/ACT SUSP 1 spray by Nasal route 2 times daily Yes Historical Provider, MD   hydrocortisone (ANUSOL-HC) 2.5 % rectal cream Place rectally 2 times daily.  Yes Mau Mandujano MD   [facility] ACIDOPHILUS) CAPS Take 1 capsule by mouth daily Yes Historical Provider, MD   cetirizine (ZYRTEC) 10 MG tablet Take 10 mg by mouth 2 times daily  Yes Historical Provider, MD   multivitamin [facility]) per tablet Take 1 tablet by mouth daily. Yes Historical Provider, MD        Social History     Tobacco Use    Smoking status: Former Smoker     Packs/day: 0.25     Years: 1.50     Pack years: 0.37     Types: Cigarettes     Last attempt to quit: 3/27/1972     Years since quittin.5    Smokeless tobacco: Never Used   Substance Use Topics    Alcohol use: Yes     Alcohol/week: 0.0 standard drinks     Comment: soc        Vitals:    20 0926   BP: 124/70   Pulse: 63   Temp: 97 °F (36.1 °C)   TempSrc: Temporal   SpO2: 96%   Weight: 292 lb (132.5 kg)     Estimated body mass index is 47.13 kg/m² as calculated from the following:    Height as of 20: 5' 6\" (1.676 m). Weight as of this encounter: 292 lb (132.5 kg). Physical Exam  Vitals signs reviewed. Constitutional:       Appearance: Normal appearance. HENT:      Head: Normocephalic and atraumatic. Eyes:      Conjunctiva/sclera: Conjunctivae normal.   Cardiovascular:      Rate and Rhythm: Normal rate and regular rhythm. Heart sounds: Normal heart sounds. Pulmonary:      Effort: Pulmonary effort is normal.      Breath sounds: Normal breath sounds. No wheezing or rhonchi. Abdominal:      General: Bowel sounds are normal.      Palpations: Abdomen is soft. Musculoskeletal:      Right lower leg: No edema. Left lower leg: No edema. Neurological:      Mental Status: She is alert and oriented to person, place, and time. Cranial Nerves: No cranial nerve deficit. Psychiatric:         Mood and Affect: Mood normal.         Behavior: Behavior normal.         Thought Content: Thought content normal.         Judgment: Judgment normal.         ASSESSMENT/PLAN:  1.  Prediabetes  -  Educated the patient on the plate method of eating and encouraged her to increase physical activity to 150 mins of moderate intensity exercise per week. Follow up in 3 months for repeat A1C    2. Chronic GERD  -  Continue with Prilosec. Well controlled. 3. Acquired hypothyroidism  -  Pt to complete TSH, medication adjusted after completion    4. CKD (chronic kidney disease) stage 3, GFR 30-59 ml/min ([facility])  -  Pending lab work    5. Need for influenza vaccination  - INFLUENZA, HIGH-DOSE, QUADV, 72 YRS +, IM, PF, 0.7ML (FLUZONE)    6. Need for pneumococcal vaccination  - Pneumococcal polysaccharide vaccine 23-valent PPSV23    7. Encounter for screening mammogram for breast cancer  - [facility] Digital Screen Bilateral [[id number]]; Future      Return in about 3 months (around 12/23/2020) for prediabetes. An electronic signature was used to authenticate this note.     --KADEN Pierson on 9/23/2020 at 1:37 PM

## 2020-09-24 ENCOUNTER — TELEPHONE (OUTPATIENT)
Dept: CARDIOLOGY CLINIC | Age: 65
End: 2020-09-24

## 2020-09-24 NOTE — TELEPHONE ENCOUNTER
----- Message from BLANK Lord CNP sent at 9/24/2020  7:52 AM EDT -----  Covering for Jennifer Kruse CNP   Lab results stable. Continue current treatment plan.     BLANK Lord CNP

## 2020-09-28 RX ORDER — SULFAMETHOXAZOLE AND TRIMETHOPRIM 800; 160 MG/1; MG/1
1 TABLET ORAL 2 TIMES DAILY
Qty: 14 TABLET | Refills: 0 | Status: SHIPPED | OUTPATIENT
Start: 2020-09-28 | End: 2020-10-05

## 2020-09-29 ENCOUNTER — HOSPITAL ENCOUNTER (OUTPATIENT)
Dept: NON INVASIVE DIAGNOSTICS | Age: 65
Discharge: HOME | End: 2020-09-29
Payer: COMMERCIAL

## 2020-09-29 LAB
LV EF: 55 %
LVEF MODALITY: NORMAL

## 2020-09-29 PROCEDURE — 93306 TTE W/DOPPLER COMPLETE: CPT

## 2020-10-02 NOTE — PROGRESS NOTES
partial hysterectomy (cervix not removed). Home Medications:    Prior to Admission medications    Medication Sig Start Date End Date Taking?  Authorizing Provider   polyethylene glycol (GLYCOLAX) 17 g packet Take 17 g by mouth daily as needed for Constipation   Yes Historical Provider, MD   propafenone (RYTHMOL SR) 225 MG extended release capsule TAKE 1 CAPSULE BY MOUTH 2 TIMES A DAY 8/27/20  Yes Katherin Webb MD   furosemide (LASIX) 20 MG tablet TAKE 1 TABLET BY MOUTH ONE TIME A DAY AS NEEDED FOR WEIGHT GAIN OF 3LBS IN A DAY OR 5LBS IN A WEEK 8/25/20  Yes BLANK Sequeira CNP   atorvastatin (LIPITOR) 40 MG tablet TAKE ONE TABLET BY MOUTH NIGHTLY 8/25/20  Yes Yashira Wheeler MD   omeprazole (PRILOSEC) 20 MG delayed release capsule TAKE ONE CAPSULE BY MOUTH EVERY MORNING BEFORE BREAKFAST 8/24/20  Yes Baron Valdez DO   metoprolol succinate (TOPROL XL) 25 MG extended release tablet TAKE 1 TABLET BY MOUTH ONE TIME A DAY 8/22/20  Yes Della Garcia MD   levothyroxine (SYNTHROID) 137 MCG tablet TAKE ONE TABLET BY MOUTH ONE TIME A DAY 7/29/20  Yes KADEN Tinoco   oxybutynin (DITROPAN XL) 15 MG extended release tablet Take 1 tablet by mouth daily 5/26/20  Yes KADEN Tinoco   spironolactone (ALDACTONE) 25 MG tablet TAKE 1 TABLET BY MOUTH ONE TIME A DAY 5/4/20  Yes BLANK Marshall CNP   citalopram (CELEXA) 40 MG tablet TAKE ONE TABLET BY MOUTH ONE TIME A DAY 5/4/20  Yes KADEN Tinoco   apixaban (ELIQUIS) 5 MG TABS tablet TAKE 1 TABLET BY MOUTH TWO TIMES A DAY 3/7/20  Yes BLANK Marshall CNP   montelukast (SINGULAIR) 10 MG tablet TAKE ONE TABLET BY MOUTH NIGHTLY 1/8/20  Yes KADEN Tinoco   fluticasone (FLONASE) 50 MCG/ACT nasal spray 2 sprays each nostril daily 12/5/19  Yes KADEN Tinoco   valsartan (DIOVAN) 40 MG tablet Take 1 tablet by mouth daily 11/14/19  Yes Daniele Hacker, APRN - CNP   Azelastine-Fluticasone 137-50 MCG/ACT SUSP 1 spray by Nasal route 2 times daily   Yes Historical Provider, MD   Lactobacillus (FLORAJEN ACIDOPHILUS) CAPS Take 1 capsule by mouth daily   Yes Historical Provider, MD   cetirizine (ZYRTEC) 10 MG tablet Take 10 mg by mouth 2 times daily    Yes Historical Provider, MD   multivitamin [facility]) per tablet Take 1 tablet by mouth daily. Yes Historical Provider, MD   sulfamethoxazole-trimethoprim (BACTRIM DS;SEPTRA DS) 800-160 MG per tablet Take 1 tablet by mouth 2 times daily for 7 days  Patient not taking: Reported on 10/5/2020 9/28/20 10/5/20  KADEN Shrestha   hydrocortisone (ANUSOL-HC) 2.5 % rectal cream Place rectally 2 times daily. Patient not taking: Reported on 10/5/2020 7/12/19   Dominik Padilla MD       Allergies:  Latex and Iodine    Social History:   reports that she quit smoking about 48 years ago. Her smoking use included cigarettes. She has a 0.38 pack-year smoking history. She has never used smokeless tobacco. She reports current alcohol use. She reports that she does not use drugs. Family History: family history includes Cancer in her maternal grandmother, mother, paternal grandmother, sister, and another family member; Cancer (age of onset: 61) in her father; Heart Attack in her father; Heart Disease in her father and sister; No Known Problems in her brother, maternal aunt, maternal grandfather, maternal uncle, paternal aunt, paternal grandfather, and paternal uncle; Other in her father and sister. Review of Systems   Constitutional: Negative. HENT: Negative. Eyes: Negative. Respiratory: Negative. Cardiovascular: see HPI  Gastrointestinal: Negative. Genitourinary: Negative. Musculoskeletal: Negative. Skin: Negative. Neurological: + occasional dizziness with standing   Hematological: Negative. Psychiatric/Behavioral: Negative.     PHYSICAL EXAM:    Vital signs:    /84   Pulse 68   Ht 5' 6\" (1.676 m)   Wt 292 lb (132.5 kg)   LMP  (LMP Unknown)   SpO2 98%   BMI 47.13 kg/m² Constitutional and general appearance: alert, cooperative, no distress and appears stated age  [de-identified]: PERRL, no cervical lymphadenopathy. No masses palpable. Normal oral mucosa  Respiratory:  · Normal excursion and expansion without use of accessory muscles  · Resp auscultation: Normal breath sounds without wheezing, rhonchi, and rales  Cardiovascular:  · The apical impulse is not displaced  · Heart tones are crisp and normal. regular S1 and S2.  · Jugular venous pulsation Normal  · The carotid upstroke is normal in amplitude and contour without delay or bruit  · Peripheral pulses are symmetrical and full   Abdomen:  · No masses or tenderness  · Bowel sounds present  Extremities:  ·  No cyanosis or clubbing  ·  No lower extremity edema  ·  Skin: warm and dry  Neurological:  · Alert and oriented  · Moves all extremities well  · No abnormalities of mood, affect, memory, mentation, or behavior are noted    DATA:    ECG 10/5/2020:  SR HR 63    Echo 9/29/2020:  Left ventricular systolic function is normal with a visually estimated ejection fraction of 55%. The left ventricle is normal in size with mild concentric hypertrophy. No regional wall motion abnormalities are noted. Systolic pulmonary artery pressure (SPAP) is normal and estimated at 32 mmHg (right atrial pressure 8 mmHg). The IVC is normal in size (<2.1 cm) but collapses <50% with respiration consistent with elevated right atrial pressures (8 mmHg) . Echo 7/12/2019:  Left ventricular systolic function is reduced with ejection fraction estimated at 48-50 %. Mild global hypokinesis is present. There is mild concentric left ventricular hypertrophy. Normal left ventricular diastolic filling pressure. Mild mitral regurgitation. Aortic sclerosis vs mild aortic stenosis. Mild aortic regurgitation is present. Normal systolic pulmonary artery pressure (SPAP) estimated at 29 mmHg (RA pressure 8 mmHg).       Cardiac MRI 8/23/2019 ():  Findings are most consistent with a nonischemic cardiomyopathy. There is no evidence of left ventricular scarring, edema or iron overload. There is evidence of incomplete or complete left bundle branch block. Regional wall motion abnormalities involving are likely secondary to underlying conduction disease; however, ischemic heart disease cannot be excluded. The right ventricle is normal in size and systolic function. There is late gadolinium enhancement of the RV insertion points suggestive of elevated right heart pressures. The left atrium is mildly dilated. There is mild mitral regurgitation  The right atrium is normal in size. There is late gadolinium enhancement involving both atria. There is mild aortic stenosis and regurgitation. The descending aorta is dilated there is evidence of associated atherosclerosis.     [facility] 8/2/2019 ([place]):  LM: luminals  LAD: mid luminals, distal vessel small  LCX: luminals  RCA: dominant, very large     LVEDP: 18  LVEF: 25-30% global hypokinesis     Assessment  1. Nonischemic cardiomyopathy  2. Given worsening cardiomyopathy, suggest Cardiac MRI                - eval for myocarditis or infiltrative cardiomyopathy  3. Will d/c flecainide and dilt given worsening LVEF, pt currently in NSR                - start toprol 25                - start ASA and lisinopril  4. Lasix in post op area                - will make Appt with heart failure team    All labs and testing reviewed. CARDIOLOGY LABS:   CBC: No results for input(s): WBC, HGB, HCT, PLT in the last 72 hours. BMP: No results for input(s): NA, K, CO2, BUN, CREATININE, LABGLOM, GLUCOSE in the last 72 hours. PT/INR: No results for input(s): PROTIME, INR in the last 72 hours. APTT:No results for input(s): APTT in the last 72 hours.   FASTING LIPID PANEL:  Lab Results   Component Value Date    HDL 41 09/09/2020    LDLCALC 85 09/09/2020    TRIG 202 09/09/2020     LIVER PROFILE:No results for input(s): AST, ALT, ALB in the last 72 hours.      Assessment:   Symptomatic paroxysmal atrial fibrillation: stable              -s/p RFCA of PAF 3/2020              -[id number] score 4 (age, gender, CHF, HTN)  NICM: improved               -EF 45% on cardiac MRI 8/2019   -EF 55% 7/8501  Chronic systolic CHF: compensated              -followed by heart failure team              -cough with lisinopril  Aortic stenosis: mild  Hypothyroidism: on Synthroid  Sleep apnea: uses CPAP  History of breast cancer    Plan:   1. Continue Eliquis, Toprol, Rythmol, Lasix, valsartan, and spironolactone   2. Annual CBC (due 5/2021) and BMP (due 9/2021)   3.  Follow up in 6 months      BLANK Villela-CNP  [employer]  [phone]

## 2020-10-05 ENCOUNTER — OFFICE VISIT (OUTPATIENT)
Dept: CARDIOLOGY CLINIC | Age: 65
End: 2020-10-05
Payer: COMMERCIAL

## 2020-10-05 VITALS
OXYGEN SATURATION: 98 % | SYSTOLIC BLOOD PRESSURE: 132 MMHG | HEIGHT: 66 IN | WEIGHT: 292 LBS | HEART RATE: 68 BPM | DIASTOLIC BLOOD PRESSURE: 84 MMHG | BODY MASS INDEX: 46.93 KG/M2

## 2020-10-05 PROCEDURE — 93000 ELECTROCARDIOGRAM COMPLETE: CPT | Performed by: NURSE PRACTITIONER

## 2020-10-05 PROCEDURE — 99213 OFFICE O/P EST LOW 20 MIN: CPT | Performed by: NURSE PRACTITIONER

## 2020-10-05 NOTE — LETTER
[address]   Electrophysiology Outpatient Note              Date:  2020  Patient name: Grant Vizcaino  YOB: 1955    Primary Care physician: KADEN Rendon    HISTORY OF PRESENT ILLNESS: The patient is a 76 y.o.  female with a history of atrial fibrillation, NICM with recovered EF, nonobstructive CAD, chronic CHF, AS, hypothyroidism, breast cancer, and sleep apnea. She was admitted in 2019 with AF RVR and was started on flecainide. Echo showed an EF of 48-50%. Stress test was abnormal and LHC in 2019 showed nonobstructive CAD and EF 25-30%. Cardiac MRI 2019 showed an EF of 45%. Afib was recurrent and Rythmol was started. In 3/2020 she had RFCA of PAF. Most recent echo in 2020 showed an EF of 55%. Today she is being seen for PAF. EKG shows SR with a HR of 63. Patient complains of occasional dizziness but denies chest pain, palpitations, and shortness of breath. Past Medical History:   has a past medical history of Abnormal Pap smear of cervix, Allergic, Allergic rhinitis, Aortic stenosis, mild-echo 2016, Atrial fibrillation ([address] BMI 40.0-44.9, adult ([address] Breast cancer ([address] Cancer ([address] Depression, GERD (gastroesophageal reflux disease), H/O laparoscopic adjustable gastric banding, Heart palpitations, HPV (human papilloma virus) infection, HPV test positive, Hypertension, Hypothyroidism, Meningioma ([address] Mild aortic regurgitation-echo 2016, Mild dysplasia of cervix, Obesity, Sleep apnea, and Urge incontinence. Past Surgical History:   has a past surgical history that includes Appendectomy; Cholecystectomy; Colonoscopy; Lap Band (); Tunneled venous port placement ();  section (7983,7767,5920,7145);  section; Breast surgery (); Breast biopsy; Breast lumpectomy; Hysterectomy (); Lap Band (); Ovary removal; Thyroidectomy, partial (Left, );  Upper gastrointestinal endoscopy (6/10/16); and partial hysterectomy (cervix not removed). Home Medications:    Prior to Admission medications    Medication Sig Start Date End Date Taking?  Authorizing Provider   polyethylene glycol (GLYCOLAX) 17 g packet Take 17 g by mouth daily as needed for Constipation   Yes Historical Provider, MD   propafenone (RYTHMOL SR) 225 MG extended release capsule TAKE 1 CAPSULE BY MOUTH 2 TIMES A DAY 8/27/20  Yes Steven Carlos MD   furosemide (LASIX) 20 MG tablet TAKE 1 TABLET BY MOUTH ONE TIME A DAY AS NEEDED FOR WEIGHT GAIN OF 3LBS IN A DAY OR 5LBS IN A WEEK 8/25/20  Yes BLANK Tabor CNP   atorvastatin (LIPITOR) 40 MG tablet TAKE ONE TABLET BY MOUTH NIGHTLY 8/25/20  Yes Precious Cruz MD   omeprazole (PRILOSEC) 20 MG delayed release capsule TAKE ONE CAPSULE BY MOUTH EVERY MORNING BEFORE BREAKFAST 8/24/20  Yes Jeanette Mckeon DO   metoprolol succinate (TOPROL XL) 25 MG extended release tablet TAKE 1 TABLET BY MOUTH ONE TIME A DAY 8/22/20  Yes Chiquita Velez MD   levothyroxine (SYNTHROID) 137 MCG tablet TAKE ONE TABLET BY MOUTH ONE TIME A DAY 7/29/20  Yes KADEN Rendon   oxybutynin (DITROPAN XL) 15 MG extended release tablet Take 1 tablet by mouth daily 5/26/20  Yes KADEN Rendon   spironolactone (ALDACTONE) 25 MG tablet TAKE 1 TABLET BY MOUTH ONE TIME A DAY 5/4/20  Yes BLANK Meade CNP   citalopram (CELEXA) 40 MG tablet TAKE ONE TABLET BY MOUTH ONE TIME A DAY 5/4/20  Yes KADEN Rendon   apixaban (ELIQUIS) 5 MG TABS tablet TAKE 1 TABLET BY MOUTH TWO TIMES A DAY 3/7/20  Yes BLANK Meade CNP   montelukast (SINGULAIR) 10 MG tablet TAKE ONE TABLET BY MOUTH NIGHTLY 1/8/20  Yes KADEN Rendon   fluticasone (FLONASE) 50 MCG/ACT nasal spray 2 sprays each nostril daily 12/5/19  Yes KADEN Rendon   valsartan (DIOVAN) 40 MG tablet Take 1 tablet by mouth daily 11/14/19  Yes BLANK Tabor CNP /84   Pulse 68   Ht 5' 6\" (1.676 m)   Wt 292 lb (132.5 kg)   LMP  (LMP Unknown)   SpO2 98%   BMI 47.13 kg/m²      Constitutional and general appearance: alert, cooperative, no distress and appears stated age  HEENT: PERRL, no cervical lymphadenopathy. No masses palpable. Normal oral mucosa  Respiratory:  · Normal excursion and expansion without use of accessory muscles  · Resp auscultation: Normal breath sounds without wheezing, rhonchi, and rales  Cardiovascular:  · The apical impulse is not displaced  · Heart tones are crisp and normal. regular S1 and S2.  · Jugular venous pulsation Normal  · The carotid upstroke is normal in amplitude and contour without delay or bruit  · Peripheral pulses are symmetrical and full   Abdomen:  · No masses or tenderness  · Bowel sounds present  Extremities:  ·  No cyanosis or clubbing  ·  No lower extremity edema  ·  Skin: warm and dry  Neurological:  · Alert and oriented  · Moves all extremities well  · No abnormalities of mood, affect, memory, mentation, or behavior are noted    DATA:    ECG 10/5/2020:  SR HR 63    Echo 9/29/2020:  Left ventricular systolic function is normal with a visually estimated ejection fraction of 55%. The left ventricle is normal in size with mild concentric hypertrophy. No regional wall motion abnormalities are noted. Systolic pulmonary artery pressure (SPAP) is normal and estimated at 32 mmHg (right atrial pressure 8 mmHg). The IVC is normal in size (<2.1 cm) but collapses <50% with respiration consistent with elevated right atrial pressures (8 mmHg) . Echo 7/12/2019:  Left ventricular systolic function is reduced with ejection fraction estimated at 48-50 %. Mild global hypokinesis is present. There is mild concentric left ventricular hypertrophy. Normal left ventricular diastolic filling pressure. Mild mitral regurgitation. Aortic sclerosis vs mild aortic stenosis. Mild aortic regurgitation is present. Normal systolic pulmonary artery pressure (SPAP) estimated at 29 mmHg (RA pressure 8 mmHg).    Cardiac MRI 8/23/2019 ():  Findings are most consistent with a nonischemic cardiomyopathy. There is no evidence of left ventricular scarring, edema or iron overload. There is evidence of incomplete or complete left bundle branch block. Regional wall motion abnormalities involving are likely secondary to underlying conduction disease; however, ischemic heart disease cannot be excluded. The right ventricle is normal in size and systolic function. There is late gadolinium enhancement of the RV insertion points suggestive of elevated right heart pressures. The left atrium is mildly dilated. There is mild mitral regurgitation  The right atrium is normal in size. There is late gadolinium enhancement involving both atria. There is mild aortic stenosis and regurgitation. The descending aorta is dilated there is evidence of associated atherosclerosis.     [facility] 8/2/2019 ([place]):  LM: luminals  LAD: mid luminals, distal vessel small  LCX: luminals  RCA: dominant, very large     LVEDP: 18  LVEF: 25-30% global hypokinesis     Assessment  1. Nonischemic cardiomyopathy  2. Given worsening cardiomyopathy, suggest Cardiac MRI                - eval for myocarditis or infiltrative cardiomyopathy  3. Will d/c flecainide and dilt given worsening LVEF, pt currently in NSR                - start toprol 25                - start ASA and lisinopril  4. Lasix in post op area                - will make Appt with heart failure team    All labs and testing reviewed. CARDIOLOGY LABS:   CBC: No results for input(s): WBC, HGB, HCT, PLT in the last 72 hours. BMP: No results for input(s): NA, K, CO2, BUN, CREATININE, LABGLOM, GLUCOSE in the last 72 hours. PT/INR: No results for input(s): PROTIME, INR in the last 72 hours. APTT:No results for input(s): APTT in the last 72 hours.   FASTING LIPID PANEL:  Lab Results   Component Value Date HDL 41 09/09/2020    LDLCALC 85 09/09/2020    TRIG 202 09/09/2020     LIVER PROFILE:No results for input(s): AST, ALT, ALB in the last 72 hours. Assessment:   Symptomatic paroxysmal atrial fibrillation: stable              -s/p RFCA of PAF 3/2020              -[id number] score 4 (age, gender, CHF, HTN)  NICM: improved               -EF 45% on cardiac MRI 8/2019   -EF 55% 7/2064  Chronic systolic CHF: compensated              -followed by heart failure team              -cough with lisinopril  Aortic stenosis: mild  Hypothyroidism: on Synthroid  Sleep apnea: uses CPAP  History of breast cancer    Plan:   1. Continue Eliquis, Toprol, Rythmol, Lasix, valsartan, and spironolactone   2. Annual CBC (due 5/2021) and BMP (due 9/2021)   3.  Follow up in 6 months      BLANK Sinclair-SOM  [facility]  [phone]

## 2020-10-06 RX ORDER — CEPHALEXIN 500 MG/1
500 CAPSULE ORAL 3 TIMES DAILY
Qty: 21 CAPSULE | Refills: 0 | Status: SHIPPED | OUTPATIENT
Start: 2020-10-06 | End: 2020-10-13

## 2020-10-07 RX ORDER — VALSARTAN 40 MG/1
TABLET ORAL
Qty: 90 TABLET | Refills: 3 | Status: SHIPPED | OUTPATIENT
Start: 2020-10-07 | End: 2021-08-24 | Stop reason: SDUPTHER

## 2020-10-07 RX ORDER — MONTELUKAST SODIUM 10 MG/1
TABLET ORAL
Qty: 90 TABLET | Refills: 2 | Status: SHIPPED | OUTPATIENT
Start: 2020-10-07 | End: 2021-04-20 | Stop reason: SDUPTHER

## 2020-10-11 ENCOUNTER — NURSE TRIAGE (OUTPATIENT)
Dept: OTHER | Facility: CLINIC | Age: 65
End: 2020-10-11

## 2020-10-11 NOTE — TELEPHONE ENCOUNTER
Call on Ensemble line:     patient calling with right thumb nail  issues- patient was placed on Bactrim for this issue and the patient had an allergic reaction to the Bactrim. The PCP then ordered Keflex to fight the infection under the right thumb nail. The patient started when she had an allergic reaction her tongue did swell and the tongue is sensitive. The patient has a sensitive tongue still and dryness at the corner of her mouth. Corner of the mouth is dry and cracking with slight bleeding occasionally. Patient is leaving town tomorrow. Protocol recommendation shared to be seen within 3 days  and care advice shared. Patient will message her PCP for her issue. Reason for Disposition   [1] Dry mouth AND [2] new onset AND [3] unexplained (Exceptions: chronic symptom or dry mouth from mild dehydration)    Answer Assessment - Initial Assessment Questions  1. SYMPTOM: \"What's the main symptom you're concerned about? \" (e.g., dry mouth. chapped lips, lump)      Dryness to the corners of the mouth and tongue sensitivity    2. ONSET: \"When did the    start? \"       Started on 9/29/20     3. PAIN: \"Is there any pain? \" If so, ask: \"How bad is it? \" (Scale: 1-10; mild, moderate, severe)  Tongue is sensitive - no pain       4. CAUSE: \"What do you think is causing the symptoms? \"     Allergic reaction to Bactrim    5. OTHER SYMPTOMS: \"Do you have any other symptoms? \" (e.g., fever, sore throat, toothache, swelling)      Denies fever, sore throat, toothache, swelling    6. PREGNANCY: \"Is there any chance you are pregnant? \" \"When was your last menstrual period? \"      n/a    Protocols used: [facility]

## 2020-10-15 ENCOUNTER — TELEPHONE (OUTPATIENT)
Dept: DERMATOLOGY | Age: 65
End: 2020-10-15

## 2020-10-15 NOTE — TELEPHONE ENCOUNTER
Pt calling states she has a infection in her RT thumb have some concern about is taking a antibotic for it pls return pt call back @ [phone] to discuss

## 2020-10-19 VITALS — WEIGHT: 287 LBS | BODY MASS INDEX: 46.32 KG/M2

## 2020-10-22 NOTE — TELEPHONE ENCOUNTER
Recommend she see PCP or urgent care/ER as soon as possible. No openings this week or next in my schedule. Add her to cancellation waitlist please.      (Last visit with me: 10/2019)

## 2020-10-22 NOTE — TELEPHONE ENCOUNTER
Called pt and she was unavailable. However, I spoke to her , Fortunato Aviles ( ok per HIPAA) and relayed message. He stated he understood and she would call tomorrow if she had more questions.

## 2020-10-22 NOTE — TELEPHONE ENCOUNTER
Pt has infection under fingernail. Took round of bactrim and it cleared up, but returned immediately. Just finished round of Keflex. Infection has created a hole in nail near base, that pt can smell foul odor. Please advise.

## 2020-10-23 RX ORDER — CLINDAMYCIN HYDROCHLORIDE 300 MG/1
300 CAPSULE ORAL 3 TIMES DAILY
Qty: 21 CAPSULE | Refills: 0 | Status: SHIPPED | OUTPATIENT
Start: 2020-10-23 | End: 2020-10-30

## 2020-10-26 ENCOUNTER — HOSPITAL ENCOUNTER (OUTPATIENT)
Dept: WOMENS IMAGING | Age: 65
Discharge: HOME | End: 2020-10-26
Payer: COMMERCIAL

## 2020-10-26 ENCOUNTER — TELEPHONE (OUTPATIENT)
Dept: WOMENS IMAGING | Age: 65
End: 2020-10-26

## 2020-10-26 PROCEDURE — 77067 SCR MAMMO BI INCL CAD: CPT

## 2020-10-26 PROCEDURE — 77063 BREAST TOMOSYNTHESIS BI: CPT

## 2020-10-26 RX ORDER — SPIRONOLACTONE 25 MG/1
TABLET ORAL
Qty: 90 TABLET | Refills: 1 | Status: SHIPPED | OUTPATIENT
Start: 2020-10-26 | End: 2021-04-20 | Stop reason: SDUPTHER

## 2020-10-26 RX ORDER — CITALOPRAM 40 MG/1
TABLET ORAL
Qty: 90 TABLET | Refills: 1 | Status: SHIPPED | OUTPATIENT
Start: 2020-10-26 | End: 2021-04-20 | Stop reason: SDUPTHER

## 2020-10-26 NOTE — TELEPHONE ENCOUNTER
Last office visit 9/23/2020     Last written 5-4-2020 x 1 refill    Next office visit scheduled 12/28/2020    Requested Prescriptions     Pending Prescriptions Disp Refills    citalopram (CELEXA) 40 MG tablet 90 tablet 1     Sig: TAKE ONE TABLET BY MOUTH ONE TIME A DAY

## 2020-10-30 ENCOUNTER — OFFICE VISIT (OUTPATIENT)
Dept: ORTHOPEDIC SURGERY | Age: 65
End: 2020-10-30
Payer: COMMERCIAL

## 2020-10-30 ENCOUNTER — HOSPITAL ENCOUNTER (OUTPATIENT)
Age: 65
Discharge: HOME | End: 2020-10-30
Payer: COMMERCIAL

## 2020-10-30 VITALS — WEIGHT: 292 LBS | BODY MASS INDEX: 46.93 KG/M2 | HEIGHT: 66 IN

## 2020-10-30 LAB — SARS-COV-2, NAAT: NOT DETECTED

## 2020-10-30 PROCEDURE — U0002 COVID-19 LAB TEST NON-CDC: HCPCS

## 2020-10-30 PROCEDURE — 99243 OFF/OP CNSLTJ NEW/EST LOW 30: CPT | Performed by: ORTHOPAEDIC SURGERY

## 2020-10-30 NOTE — PROGRESS NOTES
Chief Complaint  Hand Pain (RIGHT THUMB HAS BEEN RED WITH DISCHARGE FOR SINCE 2020)      History of Present Illness:  Kota Carrillo is a 76 y.o. female right-hand-dominant female who presents for on and off cracking and swelling of the right thumb fingernail for about 2 years but worsened with drainage over the past 2 months. She denies fevers or night sweats. There has been several rounds of oral antibiotics. She does not feel that this is going away.   She has required debridement of paronychia in the foot before    A specialty hand and upper extremity consultation was requested by my local colleague KADEN Gill, for evaluation treatment of right thumb pain and concern for infection    Medical History  Past Medical History:   Diagnosis Date    Abnormal Pap smear of cervix 2018    hpv+    Allergic     Allergic rhinitis     Aortic stenosis, mild-echo 2016    Atrial fibrillation ([address]     BMI 40.0-44.9, adult ([address]     Breast cancer ([address]     53    Cancer ([address] 2009    right breast    Depression     GERD (gastroesophageal reflux disease)     H/O laparoscopic adjustable gastric banding 10/27/2016    Heart palpitations     HPV (human papilloma virus) infection 2018    HPV test positive 2014    Hypertension     Hypothyroidism 2000    Meningioma ([address]     10 years ago    Mild aortic regurgitation-echo 2016    Mild dysplasia of cervix 2016    Obesity     Sleep apnea     Urge incontinence 2017     Past Surgical History:   Procedure Laterality Date    APPENDECTOMY      BREAST BIOPSY      BREAST LUMPECTOMY      BREAST SURGERY  2009    right lumpectomy xrt and chemo and serm     SECTION  ,,,    failed to drop in birth canal     SECTION      CHOLECYSTECTOMY      COLONOSCOPY      HYSTERECTOMY  2000    LAP BAND      LAP BAND      Removal    OVARY REMOVAL      PARTIAL HYSTERECTOMY      THYROIDECTOMY, PARTIAL Left 2000    TUNNELED VENOUS PORT PLACEMENT  2009/2011    Placed/Removed    UPPER GASTROINTESTINAL ENDOSCOPY  6/10/16    gastritis, gastric polyp bx      Family History   Problem Relation Age of Onset    Cancer Mother         breast and cervical    Heart Disease Father     Cancer Father 61        skin cancer    Other Father         AAA    Heart Attack Father     Cancer Maternal Grandmother         breast    Cancer Paternal Grandmother         breast    Cancer Sister         lymphoma    No Known Problems Brother     No Known Problems Maternal Aunt     No Known Problems Maternal Uncle     No Known Problems Paternal Aunt     No Known Problems Paternal Uncle     No Known Problems Maternal Grandfather     No Known Problems Paternal Grandfather     Cancer Other         neice- head, not sure what kind skin ca    Heart Disease Sister     Other Sister         biliary hepatitis    Asthma Neg Hx     Diabetes Neg Hx     Emphysema Neg Hx     Heart Failure Neg Hx     Hypertension Neg Hx     Rheum Arthritis Neg Hx     Osteoarthritis Neg Hx     Breast Cancer Neg Hx     High Cholesterol Neg Hx     Migraines Neg Hx     Ovarian Cancer Neg Hx     Rashes/Skin Problems Neg Hx     Seizures Neg Hx     Stroke Neg Hx     Thyroid Disease Neg Hx      Social History     Socioeconomic History    Marital status:      Spouse name: Not on file    Number of children: Not on file    Years of education: Not on file    Highest education level: Not on file   Occupational History    Occupation: [employer]   Social [employer]    Financial resource strain: Not on file    Food insecurity     Worry: Not on file     Inability: Not on file    Transportation needs     Medical: Not on file     Non-medical: Not on file   Tobacco Use    Smoking status: Former Smoker     Packs/day: 0.25     Years: 1.50     Pack years: 0.37     Types: Cigarettes     Last attempt to quit: 3/27/1972     Years since quittin.6    Smokeless tobacco: Never Used   Substance and Sexual Activity    Alcohol use:  Yes     Alcohol/week: 0.0 standard drinks     Comment: soc    Drug use: No    Sexual activity: Yes     Partners: Male   Lifestyle    Physical activity     Days per week: Not on file     Minutes per session: Not on file    Stress: Not on file   Relationships    Social connections     Talks on phone: Not on file     Gets together: Not on file     Attends Bahai service: Not on file     Active member of club or organization: Not on file     Attends meetings of clubs or organizations: Not on file     Relationship status: Not on file    Intimate partner violence     Fear of current or ex partner: Not on file     Emotionally abused: Not on file     Physically abused: Not on file     Forced sexual activity: Not on file   Other Topics Concern    Not on file   Social History Narrative            Spends time with , grandchildren        L[facility] in [facility]         Current Outpatient Medications   Medication Sig Dispense Refill    spironolactone (ALDACTONE) 25 MG tablet TAKE 1 TABLET BY MOUTH ONE TIME A DAY 90 tablet 1    citalopram (CELEXA) 40 MG tablet TAKE ONE TABLET BY MOUTH ONE TIME A DAY 90 tablet 1    clindamycin (CLEOCIN) 300 MG capsule Take 1 capsule by mouth 3 times daily for 7 days 21 capsule 0    valsartan (DIOVAN) 40 MG tablet TAKE 1 TABLET BY MOUTH ONE TIME A DAY 90 tablet 3    montelukast (SINGULAIR) 10 MG tablet TAKE ONE TABLET BY MOUTH NIGHTLY 90 tablet 2    polyethylene glycol (GLYCOLAX) 17 g packet Take 17 g by mouth daily as needed for Constipation      propafenone (RYTHMOL SR) 225 MG extended release capsule TAKE 1 CAPSULE BY MOUTH 2 TIMES A  capsule 0    furosemide (LASIX) 20 MG tablet TAKE 1 TABLET BY MOUTH ONE TIME A DAY AS NEEDED FOR WEIGHT GAIN OF 3LBS IN A DAY OR 5LBS IN A WEEK 90 tablet 1    atorvastatin (LIPITOR) 40 MG tablet TAKE ONE TABLET BY MOUTH NIGHTLY 90 tablet 1  omeprazole (PRILOSEC) 20 MG delayed release capsule TAKE ONE CAPSULE BY MOUTH EVERY MORNING BEFORE BREAKFAST 30 capsule 5    metoprolol succinate (TOPROL XL) 25 MG extended release tablet TAKE 1 TABLET BY MOUTH ONE TIME A DAY 30 tablet 2    levothyroxine (SYNTHROID) 137 MCG tablet TAKE ONE TABLET BY MOUTH ONE TIME A DAY 90 tablet 1    oxybutynin (DITROPAN XL) 15 MG extended release tablet Take 1 tablet by mouth daily 90 tablet 2    apixaban (ELIQUIS) 5 MG TABS tablet TAKE 1 TABLET BY MOUTH TWO TIMES A  tablet 3    fluticasone (FLONASE) 50 MCG/ACT nasal spray 2 sprays each nostril daily 3 Bottle 2    Azelastine-Fluticasone 137-50 MCG/ACT SUSP 1 spray by Nasal route 2 times daily      hydrocortisone (ANUSOL-HC) 2.5 % rectal cream Place rectally 2 times daily. 1 Tube 0    Lactobacillus (FLORAJEN ACIDOPHILUS) CAPS Take 1 capsule by mouth daily      cetirizine (ZYRTEC) 10 MG tablet Take 10 mg by mouth 2 times daily       multivitamin (THERAGRAN) per tablet Take 1 tablet by mouth daily. No current facility-administered medications for this visit. Allergies   Allergen Reactions    Latex Rash    Bactrim [Sulfamethoxazole-Trimethoprim] Swelling    Iodine Shortness Of Breath       Review of Systems  Relevant review of systems reviewed and available in the patient's chart    Vital Signs  There were no vitals filed for this visit. General Exam:   Constitutional: Patient is adequately groomed with no evidence of malnutrition  Mental Status: The patient is oriented to time, place and person. The patient's mood and affect are appropriate. Lymphatic: The lymphatic examination bilaterally reveals all areas to be without enlargement or induration. Neurological: The patient has good coordination. There is no weakness or sensory deficit. Finger Examination  Inspection: Mild swelling around the paronychial fold, along its entire length with proximal erythema at the nail fold.   There is ridging and cracking and discoloration of the fingernail. Discoloration beneath the fingernail, yellowish and darkish. Palpation: Tenderness is mild, no fluctuance or active drainage at this point    Range of Motion: Good motion without guarding. Strength: Intact neurovascular exam    Special Tests: No lymphadenopathy    Skin: There are no additional worrisome rashes, ulcerations or lesions. Gait: normal    Circulation: well perfused    Radiology:     X-rays obtained and reviewed in office:  Views 3  Location right thumb  Impression :  No fracture or lytic lesion. Large dorsal radial bone spur at the IP joint consistent with arthritis           Assessment: Chronic paronychia right thumb    Impression:   Encounter Diagnosis   Name Primary?  Finger pain, right Yes       Office Procedures:  Orders Placed This Encounter   Procedures    XR FINGER RIGHT (MIN 2 VIEWS)     Standing Status:   Future     Number of Occurrences:   1     Standing Expiration Date:   10/29/2021       Treatment Plan: Recommendation made for surgical drainage of the right thumb including removal of the fingernail, thorough cultures of the nailbed with possible nailbed biopsy. There will be opening of the paronychial fold with debridement. Fingernail will be left off. We will also remove bone spur at the IP joint. Patient is in agreement with the plan. We discussed anesthesia options, she is interested in local anesthesia only. We will schedule in the next couple coming days. We appreciate the patient referral.  All questions and concerns were addressed today. Patient is in agreement with the plan. Norah Tomlinson MD  Hand & Upper Extremity Surgery  [address] [place] [address]  A partner of [facility] ([facility])        Please note that this transcription was created using voice recognition software.   Any errors are unintentional and may be due to voice recognition

## 2020-11-02 ENCOUNTER — HOSPITAL ENCOUNTER (OUTPATIENT)
Age: 65
Setting detail: OUTPATIENT SURGERY
Discharge: HOME | End: 2020-11-02
Attending: ORTHOPAEDIC SURGERY | Admitting: ORTHOPAEDIC SURGERY
Payer: COMMERCIAL

## 2020-11-02 VITALS
RESPIRATION RATE: 14 BRPM | BODY MASS INDEX: 46.28 KG/M2 | WEIGHT: 288 LBS | HEART RATE: 55 BPM | DIASTOLIC BLOOD PRESSURE: 57 MMHG | HEIGHT: 66 IN | SYSTOLIC BLOOD PRESSURE: 130 MMHG | OXYGEN SATURATION: 96 % | TEMPERATURE: 97.1 F

## 2020-11-02 PROCEDURE — 87101 SKIN FUNGI CULTURE: CPT

## 2020-11-02 PROCEDURE — 87070 CULTURE OTHR SPECIMN AEROBIC: CPT

## 2020-11-02 PROCEDURE — 87015 SPECIMEN INFECT AGNT CONCNTJ: CPT

## 2020-11-02 PROCEDURE — 87076 CULTURE ANAEROBE IDENT EACH: CPT

## 2020-11-02 PROCEDURE — 2500000003 HC RX 250 WO HCPCS: Performed by: ORTHOPAEDIC SURGERY

## 2020-11-02 PROCEDURE — 3600000002 HC SURGERY LEVEL 2 BASE: Performed by: ORTHOPAEDIC SURGERY

## 2020-11-02 PROCEDURE — 2580000003 HC RX 258: Performed by: ORTHOPAEDIC SURGERY

## 2020-11-02 PROCEDURE — 87206 SMEAR FLUORESCENT/ACID STAI: CPT

## 2020-11-02 PROCEDURE — 87205 SMEAR GRAM STAIN: CPT

## 2020-11-02 PROCEDURE — 7100000010 HC PHASE II RECOVERY - FIRST 15 MIN: Performed by: ORTHOPAEDIC SURGERY

## 2020-11-02 PROCEDURE — 87116 MYCOBACTERIA CULTURE: CPT

## 2020-11-02 PROCEDURE — 87185 SC STD ENZYME DETCJ PER NZM: CPT

## 2020-11-02 PROCEDURE — 2709999900 HC NON-CHARGEABLE SUPPLY: Performed by: ORTHOPAEDIC SURGERY

## 2020-11-02 PROCEDURE — 87186 SC STD MICRODIL/AGAR DIL: CPT

## 2020-11-02 PROCEDURE — 3600000012 HC SURGERY LEVEL 2 ADDTL 15MIN: Performed by: ORTHOPAEDIC SURGERY

## 2020-11-02 PROCEDURE — 87075 CULTR BACTERIA EXCEPT BLOOD: CPT

## 2020-11-02 PROCEDURE — 87077 CULTURE AEROBIC IDENTIFY: CPT

## 2020-11-02 RX ORDER — HYDROCODONE BITARTRATE AND ACETAMINOPHEN 5; 325 MG/1; MG/1
1 TABLET ORAL EVERY 8 HOURS PRN
Qty: 15 TABLET | Refills: 0 | Status: SHIPPED | OUTPATIENT
Start: 2020-11-02 | End: 2020-11-07

## 2020-11-02 RX ORDER — MAGNESIUM HYDROXIDE 1200 MG/15ML
LIQUID ORAL CONTINUOUS PRN
Status: COMPLETED | OUTPATIENT
Start: 2020-11-02 | End: 2020-11-02

## 2020-11-02 RX ORDER — BUPIVACAINE HYDROCHLORIDE 2.5 MG/ML
INJECTION, SOLUTION INFILTRATION; PERINEURAL PRN
Status: DISCONTINUED | OUTPATIENT
Start: 2020-11-02 | End: 2020-11-02 | Stop reason: ALTCHOICE

## 2020-11-02 ASSESSMENT — PAIN DESCRIPTION - DESCRIPTORS: DESCRIPTORS: ACHING

## 2020-11-02 ASSESSMENT — PAIN - FUNCTIONAL ASSESSMENT
PAIN_FUNCTIONAL_ASSESSMENT: PREVENTS OR INTERFERES WITH MANY ACTIVE NOT PASSIVE ACTIVITIES
PAIN_FUNCTIONAL_ASSESSMENT: 0-10

## 2020-11-02 ASSESSMENT — PAIN SCALES - GENERAL: PAINLEVEL_OUTOF10: 0

## 2020-11-02 NOTE — OP NOTE
Operative Note      Patient: David Mcgowan  YOB: 1955  MRN: [id number]    Date of Procedure: 11/2/2020    Pre-Op Diagnosis:  1. Right thumb pain  2. Chronic paronychia right thumb  3. Arthritis right thumb IP joint    Post-Op Diagnosis: Same     Procedure:  1. Irrigation debridement right thumb nailbed and paronychial fold for chronic paronychia including nail plate removal, and removal of segment of ingrown nail  2. Right thumb IP joint arthrotomy with bone spur removal, osteophyte, separate incision      Surgeon(s):  Ana Paula Contreras MD    Assistant:   Surgical Assistant: Ana Paula Contreras    Anesthesia: Local only    Estimated Blood Loss (mL): Minimal    Complications: None    Specimens:   Cultures right thumb paronychial fold and nailbed    Implants:  * No implants in log *      Drains: * No LDAs found *    Findings:     HPI: Patient with longstanding discomfort right thumb and infectious-like symptoms and findings despite multiple rounds of oral antibiotics and a topical antifungal.  Concern for chronic paronychia. She also has longstanding discomfort at the IP joint and spur formation and what she explains is a area dorsal radially that will swell on and off, consistent with likely mucous cyst.  We discussed surgical debridement of the right thumb involving the nailbed and paronychial fold with cultures. Possible biopsy of the nailbed. She would like the bone spur removed at the IP joint at the same time. Detailed Description of Procedure:     Patient brought to the operating room and kept in the usual supine position. Under sterile conditions digital nerve block provided. Right upper extremity prepped and draped in the normal sterile fashion. Timeout held. Digital tourniquet used. T-type incision created on the radial side of the IP joint through skin only. Full-thickness skin flaps carefully elevated.   Cyst was noted to be arising between the collateral ligament and extensor tendon with early mucous cyst attached. Mucous cyst was excised and discarded. It appeared benign. Benign-appearing osteophyte, bone spur off the dorsal radial aspect of the distal phalanx at the joint was removed with a small rongeur. The joint appeared to have some early arthritic change but no loose body or sign of infection. The wound and joint was thoroughly irrigated. Meticulous hemostasis followed by closure with nylon. Separate surgical site now for the debridement of the right thumb chronic paronychia. [place] elevator was now used to carefully elevate the nail plate off of the nailbed. The nail plate was discolored, cracked, thin and appeared abnormal consistent with chronic paronychia. The radial aspect appeared to be have a chronic ingrowth appearance, this was excised for permanent removal.  The nail plate was discarded. The nailbed had a nice pink color throughout, no discoloration of the nailbed, therefore nail biopsy not performed. There was some swelling of the central nailbed. The nailbed was incised longitudinally at the area of swelling down to the distal phalanx. No sign of osteomyelitis. No sign of foreign body. The small blade was now used to open the paronychial fold around its circumference. Gentle debridement of the paronychial fold throughout. Cultures were taken of the nailbed and paronychial fold. No purulence noted. Thorough irrigation of the paronychial fold and nailbed with more than 1 L of sterile saline. The remaining structures appeared unremarkable at this point under loupe magnification. The incision made on the paronychial fold was allowed to heal by secondary intention. Tissues appeared healthy enough to not need marsupialization at this point. Nail plate not replaced as it appeared unhealthy and potentially had nidus for infection. Digital tourniquet was removed and the thumb was well perfused.   Nonadherent dressings were placed over the nailbed and surgical site at the IP joint. Both sites were now covered with sterile dressings and a gentle compression wrap. She tolerated the procedure under local anesthesia and was taken the postanesthesia recovery in good condition. No complications. Postoperative course: Follow cultures and adjust antibiotic or antifungal regimen as needed. Time to allow the fingernail to regrow. Gentle motion is permitted. Edema control. Sutures out once wound has healed, likely 10 to 21 days. Therapy if stiffness is a problem. Charisse Larry MD  Hand & Upper Extremity Surgery  [address] [place] [address]          Please note that this transcription was created using voice recognition software. Any errors are unintentional and may be due to voice recognition transcription.       Electronically signed by Sav Ho MD on 11/2/2020 at 9:27 AM

## 2020-11-02 NOTE — H&P
Department of Orthopedic Surgery  Attending   History and Physical        CHIEF COMPLAINT: Right thumb pain    Reason for Admission: As Above    History Obtained From:  patient    HISTORY OF PRESENT ILLNESS:      The patient is a 76 y.o. female  who presents with persistent pain and infection type symptoms right thumb consistent with chronic paronychia       Past Medical History:        Diagnosis Date    Abnormal Pap smear of cervix 2018    hpv+    Allergic     Allergic rhinitis     Aortic stenosis, mild-echo 2016    Atrial fibrillation ([address]     BMI 40.0-44.9, adult ([address]     Breast cancer ([address]     53    Cancer ([address]     right breast    Depression     GERD (gastroesophageal reflux disease)     H/O laparoscopic adjustable gastric banding 10/27/2016    Heart palpitations     HPV (human papilloma virus) infection 2018    HPV test positive 2014    Hypertension     Hypothyroidism 2000    Meningioma ([address]     10 years ago    Mild aortic regurgitation-echo 2016    Mild dysplasia of cervix 2016    Obesity     Sleep apnea     Urge incontinence 2017       Past Surgical History:        Procedure Laterality Date    APPENDECTOMY      BREAST BIOPSY      BREAST LUMPECTOMY      BREAST SURGERY  2009    right lumpectomy xrt and chemo and serm     SECTION  ,,,    failed to drop in birth canal     SECTION      CHOLECYSTECTOMY      COLONOSCOPY      HYSTERECTOMY      LAP BAND      LAP BAND      Removal    OVARY REMOVAL      PARTIAL HYSTERECTOMY      THYROIDECTOMY, PARTIAL Left 2000    TUNNELED VENOUS PORT PLACEMENT      Placed/Removed    UPPER GASTROINTESTINAL ENDOSCOPY  6/10/16    gastritis, gastric polyp bx        Medications Prior to Admission:   Prior to Admission medications    Medication Sig Start Date End Date Taking?  Authorizing Provider   spironolactone (ALDACTONE) 25 MG tablet TAKE 1 MD   Lactobacillus (FLORAJEN ACIDOPHILUS) CAPS Take 1 capsule by mouth daily    Historical Provider, MD       Allergies:  Latex; Bactrim [sulfamethoxazole-trimethoprim]; and Iodine    Social History:    Tobacco:  reports that she quit smoking about 48 years ago. Her smoking use included cigarettes. She has a 0.38 pack-year smoking history. She has never used smokeless tobacco.   Alcohol:  reports current alcohol use.    Illicit Drug: No    Family History:       Problem Relation Age of Onset    Cancer Mother         breast and cervical    Heart Disease Father     Cancer Father 61        skin cancer    Other Father         AAA    Heart Attack Father     Cancer Maternal Grandmother         breast    Cancer Paternal Grandmother         breast    Cancer Sister         lymphoma    No Known Problems Brother     No Known Problems Maternal Aunt     No Known Problems Maternal Uncle     No Known Problems Paternal Aunt     No Known Problems Paternal Uncle     No Known Problems Maternal Grandfather     No Known Problems Paternal Grandfather     Cancer Other         neice- head, not sure what kind skin ca    Heart Disease Sister     Other Sister         biliary hepatitis    Asthma Neg Hx     Diabetes Neg Hx     Emphysema Neg Hx     Heart Failure Neg Hx     Hypertension Neg Hx     Rheum Arthritis Neg Hx     Osteoarthritis Neg Hx     Breast Cancer Neg Hx     High Cholesterol Neg Hx     Migraines Neg Hx     Ovarian Cancer Neg Hx     Rashes/Skin Problems Neg Hx     Seizures Neg Hx     Stroke Neg Hx     Thyroid Disease Neg Hx        REVIEW OF SYSTEMS:  CONSTITUTIONAL:  negative  EYES:  negative  HEENT:  negative  RESPIRATORY:  negative  CARDIOVASCULAR:  negative  GASTROINTESTINAL:  negative  MUSCULOSKELETAL:  positive for  pain  NEUROLOGICAL:  positive for pain    PHYSICAL EXAM:  Admission weight: 288 lb (130.6 kg)  5' 6\" (167.6 cm)  VITALS:  BP (!) 119/54   Pulse 74   Temp 97.1 °F (36.2 °C) (Temporal)   Resp 14   Ht 5' 6\" (1.676 m)   Wt 288 lb (130.6 kg)   LMP  (LMP Unknown)   SpO2 94%   BMI 46.48 kg/m²     awake, alert, cooperative, no apparent distress, and appears stated age  ENT:  Clear, eye movements intact  CHEST:  Clear, regular inspiration  CARDIAC: Reg rate  ABD:  Soft, NT  MUSCULOSKELETAL:  there is no redness, warmth, or swelling of the joints  full range of motion noted  motor strength is 5 out of 5 all extremities bilaterally  tone is normal  with exception of  right hand:  Thumb with paronychial swelling discoloration with nail cracking and discoloration  NEURO: Tenderness about the thumb bed. Thumb sensate and pink    DATA:  CBC:   Lab Results   Component Value Date    WBC 5.4 05/30/2020    RBC 4.42 05/30/2020    RBC 4.71 01/24/2017    HGB 13.3 05/30/2020    HCT 40.2 05/30/2020    MCV 90.9 05/30/2020    MCH 30.2 05/30/2020    MCHC 33.2 05/30/2020    RDW 14.1 05/30/2020     05/30/2020    MPV 8.5 05/30/2020     BMP:    Lab Results   Component Value Date     09/23/2020    K 4.5 09/23/2020    K 3.7 07/12/2019     09/23/2020    CO2 28 09/23/2020    BUN 19 09/23/2020    LABALBU 4.2 05/30/2020    CREATININE 0.9 09/23/2020    CALCIUM 9.6 09/23/2020    GFRAA >60 09/23/2020    LABGLOM >60 09/23/2020    GLUCOSE 141 09/23/2020    GLUCOSE 97 01/24/2017     PT/INR:    Lab Results   Component Value Date    PROTIME 14.1 03/05/2020    INR 1.21 03/05/2020       Radiology:  No orders to display         ASSESSMENT: Chronic paronychia right thumb    PLAN:  1) to the OR for I&D right thumb including removal of nail plate, cultures  2) Marked and consented. She wants local anesthesia only  3)  The risks and benefits were discussed thoroughly. These included, but were not limited to infection, tendon or nerve injury, scar or thumb sensitivity, need for transfusion, adverse effects of anesthesia, incomplete relief of numbness, pain, and/or weakness.     The patient was counseled at length about the risks of jono Covid-19 during their perioperative period and any recovery window from their procedure. The patient was made aware that jono Covid-19  may worsen their prognosis for recovering from their procedure  and lend to a higher morbidity and/or mortality risk. All material risks, benefits, and reasonable alternatives including postponing the procedure were discussed. The patient does wish to proceed with the procedure at this time. All questions and concerns were addressed today. Patient is in agreement with the plan.         Matthew Carrasco MD  Hand & Upper Extremity Surgery  [address] partner of [address]

## 2020-11-08 LAB
ANAEROBIC CULTURE: ABNORMAL
GRAM STAIN RESULT: ABNORMAL
ORGANISM: ABNORMAL
WOUND/ABSCESS: ABNORMAL
WOUND/ABSCESS: ABNORMAL

## 2020-11-10 ENCOUNTER — TELEPHONE (OUTPATIENT)
Dept: ORTHOPEDIC SURGERY | Age: 65
End: 2020-11-10

## 2020-11-10 ENCOUNTER — TELEPHONE (OUTPATIENT)
Dept: INFECTIOUS DISEASES | Age: 65
End: 2020-11-10

## 2020-11-10 RX ORDER — CIPROFLOXACIN 500 MG/1
500 TABLET, FILM COATED ORAL 2 TIMES DAILY
Qty: 28 TABLET | Refills: 0 | Status: SHIPPED | OUTPATIENT
Start: 2020-11-10 | End: 2020-11-24

## 2020-11-10 NOTE — TELEPHONE ENCOUNTER
Called pt about rx. Working on getting her in w ID. ----- Message from Luke Brooks MD sent at 11/9/2020  7:25 AM EST -----    Please inform patient that cultures have returned positive from the chronic paronychia. I would suggest beginning the patient on a 2-week course of ciprofloxacin. Appears to be a good choice based on results from cultures intraoperatively and her current allergies. In addition, please have patient have consultation with infectious disease for further recommendations.     Thank you  ----- Message -----  From: Kat Tellez Incoming Lab Results From Soft (Epic Adt)  Sent: 11/8/2020   8:00 AM EST  To: Luke Brooks MD

## 2020-11-10 NOTE — TELEPHONE ENCOUNTER
Dr. Chiquita Rainey office called to ask if we are able to see patient in clinic? She has chronic paronychia and is currently on antibiotics. Next available is second week of December but they are asking if we can see sooner.      Thank you

## 2020-11-10 NOTE — TELEPHONE ENCOUNTER
Called Infectious Dis for an appt. They offered 12/8 w Dr. Tyrone Chisholm at [facility]. She is going to see if Dr. Tyrone Chisholm will see her sooner and give us a call back. Pt has been made aware of the rx and upcoming ID appt to be scheduled.

## 2020-11-11 ENCOUNTER — TELEPHONE (OUTPATIENT)
Dept: ORTHOPEDIC SURGERY | Age: 65
End: 2020-11-11

## 2020-11-11 NOTE — TELEPHONE ENCOUNTER
I called Dr. Luigi Robledo office twice today, and their answering machine picked up. I left a message trying to follow up with Jazmine Cornejo about getting pt an appt sooner then 12/8. I left a message for them to call me.

## 2020-11-12 ENCOUNTER — TELEPHONE (OUTPATIENT)
Dept: ORTHOPEDIC SURGERY | Age: 65
End: 2020-11-12

## 2020-11-12 NOTE — TELEPHONE ENCOUNTER
I don't see any opportunity in my schedule next week   Please put her on a cancellation list and check with the other docs as we can keep trying to expedite her evaluation   Thanks

## 2020-11-12 NOTE — TELEPHONE ENCOUNTER
Called Dr. Ernesto Bonilla office to get an ID appt, spoke to [facility], she informed me Dr. Ernesto Bonilla has responded to the message. She is going to resend the message and get back in touch w us. She has my direct contact to follow up w us about an appt.

## 2020-11-13 ENCOUNTER — OFFICE VISIT (OUTPATIENT)
Dept: ORTHOPEDIC SURGERY | Age: 65
End: 2020-11-13

## 2020-11-13 ENCOUNTER — TELEPHONE (OUTPATIENT)
Dept: ORTHOPEDIC SURGERY | Age: 65
End: 2020-11-13

## 2020-11-13 VITALS — HEIGHT: 66 IN | WEIGHT: 288 LBS | BODY MASS INDEX: 46.28 KG/M2

## 2020-11-13 PROBLEM — L03.019 PARONYCHIA OF FINGER: Status: ACTIVE | Noted: 2020-11-13

## 2020-11-13 PROCEDURE — 99024 POSTOP FOLLOW-UP VISIT: CPT | Performed by: ORTHOPAEDIC SURGERY

## 2020-11-13 NOTE — PROGRESS NOTES
Chief Complaint   Patient presents with    Post-Op Check     s/p 11/2/2020 right thumb incsision and drainage, nail removal       HISTORY OF PRESENT ILLNESS:  Harpreet Miles is a 76 y.o.  patient here for postoperative evaluation after right thumb nail removal with debridement of the nailbed for chronic paronychia along with right thumb IP joint spur removal.  Surgery 11 days ago. She reports mild discomfort, no numbness. Denies fevers. She is on ciprofloxacin postoperatively for positive cultures    ROS:  ROS neg     Past medical history is reviewed again today, no changes to report    PHYSICAL EXAMINATION:  Patient is alert and pleasant, in no acute distress. The affected extremity is examined today. Right thumb is well aligned with mild swelling, to be expected. Thumb is sensate, well-perfused and viable appearing. Fingernail was removed, nailbed appears healthy. Minimal swelling. No worrisome erythema locally or streaking. No fluctuance or drainage. Mild thumb stiffness    X-rays: None today    Cultures positive for Pseudomonas, staph coagulase negative, Prevotella. Fungal cultures negative to this point    IMPRESSION AND PLAN: Chronic paronychia right thumb  Doing well. We will continue with our current care plan. Postoperative wound care was discussed with the patient today. Sutures were removed without difficulty. Steri-Strips were applied (as long as no allergy) to help prevent wound dehiscence. Appropriate monitoring and care of the wound, including cleansing, was outlined with the patient today. We discussed appropriate activity limitations and modifications over the next couple weeks to prevent wound complication, such as dehiscence. The patient understands to contact my office if having wound problems. These would include, but not limited to, erythema, new swelling or pain, dehiscence, signs of infection. We have arranged infectious disease appointment for the patient. We have extended antibiotics until that point after reviewing susceptibilities. Nonadherent dressing was applied along with a tip protector. Soft tissue massage, range of motion and wound care outlined. I would like to see the patient in about 1 month unless needed sooner. All questions and concerns were addressed today. Patient is in agreement with the plan. Eugenia Hairston MD  Hand & Upper Extremity Surgery  [phone]  A partner of [facility] ([facility])        Please note that this transcription was created using voice recognition software. Any errors are unintentional and may be due to voice recognition transcription.

## 2020-11-18 RX ORDER — CIPROFLOXACIN 500 MG/1
500 TABLET, FILM COATED ORAL 2 TIMES DAILY
Qty: 28 TABLET | Refills: 0 | Status: SHIPPED | OUTPATIENT
Start: 2020-11-24 | End: 2020-12-08

## 2020-11-27 ENCOUNTER — TELEPHONE (OUTPATIENT)
Dept: ORTHOPEDIC SURGERY | Age: 65
End: 2020-11-27

## 2020-12-04 NOTE — PROGRESS NOTES
[address]  Advanced CHF/Pulmonary Hypertension   Cardiac Evaluation      Harpreet Miles  YOB: 1955    Date of Visit:  12/8/20      Chief Complaint   Patient presents with    Follow-up    Congestive Heart Failure        History of Present Illness:  Harpreet Miles is a 76 y.o. female who presents from referral from Dr. Marcella Lehman for consultation and management of cardiomyopathy. She presented to the hospital 07/11/19 with palpitations. She was found to be in AFib RVR. She was started on flecainide, diltiazem and eliquis. Her echo from 07/11/19 showed her EF was 48-50% with mild/ global hypokinesis. Mild MR and AR. Her stress test from 07/25/19 showed a mixed defect in the anteroseptal wall raising concern for mixed ischemia/scar. Her cardiac cath from 08/02/19 showed nonischemic cardiomyopathy. Flecainide and diltiazem were stopped due to worsening. LVEF. She was started on toprol 25 mg. She was started on lisinopril and eliquis as well. She believes she has had PAF for 30 years but was never able to capture it. She reports she feels awful and SOB with the AFib. She reports she verifies afib with checking her radial pulse. She started having palpitations again last night after eating. She does not check her BP at home. Her cardiac MRI from 08/02/19 showed her EF was 45%. She underwent afib ablation with Dr Angelique Reveles on 03/05/20. Her echo from 09/29/20 showed her EF was 55%. Elevated RA pressures. Today she continues to work from home due to [employer]. She reports she feels well overall. She denies CP, SOB, dizziness or syncope. She reports she has a nail surgery on her right thumb. She was to follow up for infection with DR Padmini Rader. Lasix as needed about 2-3 times a week.          Allergies   Allergen Reactions    Latex Rash    Bactrim [Sulfamethoxazole-Trimethoprim] Swelling    Iodine Shortness Of Breath     Current Outpatient Medications   Medication Sig Dispense Refill    ciprofloxacin (CIPRO) 500 MG tablet Take 1 tablet by mouth 2 times daily for 14 days 28 tablet 0    spironolactone (ALDACTONE) 25 MG tablet TAKE 1 TABLET BY MOUTH ONE TIME A DAY 90 tablet 1    citalopram (CELEXA) 40 MG tablet TAKE ONE TABLET BY MOUTH ONE TIME A DAY 90 tablet 1    valsartan (DIOVAN) 40 MG tablet TAKE 1 TABLET BY MOUTH ONE TIME A DAY 90 tablet 3    montelukast (SINGULAIR) 10 MG tablet TAKE ONE TABLET BY MOUTH NIGHTLY 90 tablet 2    propafenone (RYTHMOL SR) 225 MG extended release capsule TAKE 1 CAPSULE BY MOUTH 2 TIMES A  capsule 0    furosemide (LASIX) 20 MG tablet TAKE 1 TABLET BY MOUTH ONE TIME A DAY AS NEEDED FOR WEIGHT GAIN OF 3LBS IN A DAY OR 5LBS IN A WEEK 90 tablet 1    atorvastatin (LIPITOR) 40 MG tablet TAKE ONE TABLET BY MOUTH NIGHTLY 90 tablet 1    omeprazole (PRILOSEC) 20 MG delayed release capsule TAKE ONE CAPSULE BY MOUTH EVERY MORNING BEFORE BREAKFAST 30 capsule 5    metoprolol succinate (TOPROL XL) 25 MG extended release tablet TAKE 1 TABLET BY MOUTH ONE TIME A DAY 30 tablet 2    levothyroxine (SYNTHROID) 137 MCG tablet TAKE ONE TABLET BY MOUTH ONE TIME A DAY 90 tablet 1    oxybutynin (DITROPAN XL) 15 MG extended release tablet Take 1 tablet by mouth daily 90 tablet 2    apixaban (ELIQUIS) 5 MG TABS tablet TAKE 1 TABLET BY MOUTH TWO TIMES A  tablet 3    fluticasone (FLONASE) 50 MCG/ACT nasal spray 2 sprays each nostril daily 3 Bottle 2    Azelastine-Fluticasone 137-50 MCG/ACT SUSP 1 spray by Nasal route 2 times daily      hydrocortisone (ANUSOL-HC) 2.5 % rectal cream Place rectally 2 times daily. 1 Tube 0    Lactobacillus (FLORAJEN ACIDOPHILUS) CAPS Take 1 capsule by mouth daily      cetirizine (ZYRTEC) 10 MG tablet Take 10 mg by mouth 2 times daily       multivitamin (THERAGRAN) per tablet Take 1 tablet by mouth daily. No current facility-administered medications for this visit.         Past Medical History:   Diagnosis Date    Abnormal Pap smear of cervix 2018    hpv+    Allergic     Allergic rhinitis     Aortic stenosis, mild-echo 2016    Atrial fibrillation (HCC)     [facility] 40.0-44.9, [facility] ([facility]     Breast cancer ([facility]     54    Cancer ([facility] 2009    right breast    Depression     GERD (gastroesophageal reflux disease)     H/O laparoscopic adjustable gastric banding 10/27/2016    Heart palpitations     HPV (human papilloma virus) infection 2018    HPV test positive 2014    Hypertension     Hypothyroidism 2000    Meningioma (HCC)     10 years ago    Mild aortic regurgitation-echo 2016    Mild dysplasia of cervix 2016    Obesity     Sleep apnea     Urge incontinence 2017     Past Surgical History:   Procedure Laterality Date    APPENDECTOMY      BREAST BIOPSY      BREAST LUMPECTOMY      BREAST SURGERY  2009    right lumpectomy xrt and chemo and serm     SECTION  ,,,    failed to drop in birth canal     SECTION      CHOLECYSTECTOMY      COLONOSCOPY      HAND SURGERY Right 2020    RIGHT THUMB INCISION AND DRAINAGE WITH NAIL REMOVAL AND SPUR REMOVAL performed by Leatha Alford MD at [address] [facility] [facility] LAP BAND      LAP BAND      Removal    OVARY REMOVAL      PARTIAL HYSTERECTOMY      THYROIDECTOMY, PARTIAL Left     TUNNELED VENOUS PORT PLACEMENT      Placed/Removed    UPPER GASTROINTESTINAL ENDOSCOPY  6/10/16    gastritis, gastric polyp bx      Family History   Problem Relation Age of Onset    Cancer Mother         breast and cervical    Heart Disease Father     Cancer Father 61        skin cancer    Other Father         AAA    Heart Attack Father     Cancer Maternal Grandmother         breast    Cancer Paternal Grandmother         breast    Cancer Sister         lymphoma    No Known Problems Brother     No Known Problems Maternal Aunt     No Known Problems Maternal Uncle  No Known Problems Paternal Aunt     No Known Problems Paternal Uncle     No Known Problems Maternal Grandfather     No Known Problems Paternal Grandfather     Cancer Other         neice- head, not sure what kind skin ca    Heart Disease Sister     Other Sister         biliary hepatitis    Asthma Neg Hx     Diabetes Neg Hx     Emphysema Neg Hx     Heart Failure Neg Hx     Hypertension Neg Hx     Rheum Arthritis Neg Hx     Osteoarthritis Neg Hx     Breast Cancer Neg Hx     High Cholesterol Neg Hx     Migraines Neg Hx     Ovarian Cancer Neg Hx     Rashes/Skin Problems Neg Hx     Seizures Neg Hx     Stroke Neg Hx     Thyroid Disease Neg Hx      Social History     Socioeconomic History    Marital status:      Spouse name: Not on file    Number of children: Not on file    Years of education: Not on file    Highest education level: Not on file   Occupational History    Occupation: [employer] Needs    Financial resource strain: Not on file    Food insecurity     Worry: Not on file     Inability: Not on file   [employer] needs     Medical: Not on file     Non-medical: Not on file   Tobacco Use    Smoking status: Former Smoker     Packs/day: 0.25     Years: 1.50     Pack years: 0.37     Types: Cigarettes     Last attempt to quit: 3/27/1972     Years since quittin.7    Smokeless tobacco: Never Used   Substance and Sexual Activity    Alcohol use:  Yes     Alcohol/week: 0.0 standard drinks     Comment: soc    Drug use: No    Sexual activity: Yes     Partners: Male   Lifestyle    Physical activity     Days per week: Not on file     Minutes per session: Not on file    Stress: Not on file   Relationships    Social connections     Talks on phone: Not on file     Gets together: Not on file     Attends Baptism service: Not on file     Active member of club or organization: Not on file     Attends meetings of clubs or organizations: Not on file     Relationship status: Not on file    Intimate partner violence     Fear of current or ex partner: Not on file     Emotionally abused: Not on file     Physically abused: Not on file     Forced sexual activity: Not on file   Other Topics Concern    Not on file   Social History Narrative            Spends time with , grandchildren        Lives in [address] [place] [address]:   · Constitutional: there has been no unanticipated weight loss. There's been no change in energy level, sleep pattern, or activity level. · Eyes: No visual changes or diplopia. No scleral icterus. · ENT: No Headaches, hearing loss or vertigo. No mouth sores or sore throat. · Cardiovascular: Reviewed in HPI  · Respiratory: No cough or wheezing, no sputum production. No hematemesis. · Gastrointestinal: No abdominal pain, appetite loss, blood in stools. No change in bowel or bladder habits. · Genitourinary: No dysuria, trouble voiding, or hematuria. · Musculoskeletal:  No gait disturbance, weakness or joint complaints. · Integumentary: No rash or pruritis. · Neurological: No headache, diplopia, change in muscle strength, numbness or tingling. No change in gait, balance, coordination, mood, affect, memory, mentation, behavior. · Psychiatric: No anxiety, no depression. · Endocrine: No malaise, fatigue or temperature intolerance. No excessive thirst, fluid intake, or urination. No tremor. · Hematologic/Lymphatic: No abnormal bruising or bleeding, blood clots or swollen lymph nodes. · Allergic/Immunologic: No nasal congestion or hives. Physical Examination:    Vitals:    12/08/20 1529   BP: 110/70   Pulse: 57   SpO2: 98%   Weight: 293 lb 12.8 oz (133.3 kg)   Height: 5' 6\" (1.676 m)     Body mass index is 47.42 kg/m².      Wt Readings from Last 3 Encounters:   12/08/20 293 lb 12.8 oz (133.3 kg)   11/13/20 288 lb (130.6 kg)   11/02/20 288 lb (130.6 kg)     BP Readings from Last 3 Encounters:   12/08/20 110/70   11/02/20 (!) 130/57 10/05/20 132/84          Constitutional and General Appearance:   Chronically ill appearing  HEENT:  NC/AT  CHEY  No problems with hearing  Skin:  Warm, dry  Respiratory:  · Normal excursion and expansion without use of accessory muscles  · Resp Auscultation: Normal breath sounds without dullness  Cardiovascular:  · The apical impulses not displaced  · Heart tones are crisp and normal  · Cervical veins are not engorged  · The carotid upstroke is normal in amplitude and contour without delay or bruit   JVP <8 mm H2O\   Irregularly irregular rhythm with nl S1 and S2 without m,r,g  · Peripheral pulses are symmetrical and full  · There is no clubbing, cyanosis of the extremities. · No edema  · Femoral Arteries: 2+ and equal  · Pedal Pulses: 2+ and equal   Neck:  · No thyromegaly  Abdomen:  · No masses or tenderness  · Liver/Spleen: No Abnormalities Noted  Neurological/Psychiatric:  · Alert and oriented in all spheres  · Moves all extremities well  · Exhibits normal gait balance and coordination  · No abnormalities of mood, affect, memory, mentation, or behavior are noted        Echo: 07/11/19   Summary   Left ventricular systolic function is reduced with ejection fraction   estimated at 48-50 %. Mild global hypokinesis is present. There is mild concentric left ventricular hypertrophy. Normal left ventricular diastolic filling pressure. Mild mitral regurgitation. Aortic sclerosis vs mild aortic stenosis. Mild aortic regurgitation is present. Normal systolic pulmonary artery pressure (SPAP) estimated at 29 mmHg (RA   pressure 8 mmHg).      Stress test: 07/25/19   Normal LVEF 61%  Base to mid anteroseptal hypokinesis  There is a mixed defect in the anteroseptal wall raising concern for mixed  ischemia/scar in that region  This would be considered an abnormal intermediate risk study     [facility]: 08/02/19  LM: luminals  LAD: mid luminals, distal vessel small  LCX: luminals  RCA: dominant, very large     LVEDP: 18  LVEF: 25-30% global hypokinesis      Assessment  1. Nonischemic cardiomyopathy  2. Given worsening cardiomyopathy, suggest Cardiac MRI                - eval for myocarditis or infiltrative cardiomyopathy  3. Will d/c flecainide and dilt given worsening LVEF, pt currently in NSR                - start toprol 25                - start ASA and lisinopril  4. Lasix in post op area                - will make Appt with heart failure team    MRI cardiac: 08/23/19  FINDINGS:    1. The left ventricular size is normal. The left ventricular ejection fraction is 45 % by Melton's method. Global left ventricular function is mildly decreased. There is moderate hypokinesis involving the mid inferior, mid inferoseptal and apical inferior segments. The left ventricular mass is normal.  Delayed systole in the left ventricle relative to the right ventricle and septal bounce suggest incomplete or complete left bundle branch block. 2. Resting first pass myocardial perfusion is probably normal (assessment limited by wrap artifact). There is no late gadolinium enhancement to suggest prior infarct, inflammation, or infiltration. There is no evidence of myocardial edema by T2 weighted imaging (myocardium/skeletal muscle 159/108, normal < 1.9). There is no evidence of increased iron deposition within the myocardium (T2*myocardium = 53 msec; if < 20 msec, suggestive of iron-overloading of the myocardium). 3. The right ventricular size is normal. Global right ventricular function is normal. There are no regional wall motion abnormalities of the right ventricular wall. There is late gadolinium enhancement of the right ventricular insertion points. 4. Left atrial size is mildly enlarged. Right atrial size is normal.  The Qp/Qs is 1.0 by phase contrast imaging and is consistent with no evidence of shunt.  There is late gadolinium enhancement involving the RV insertion points.   5. Velocity-encoded phase contrast Imaging in (2D/4D) was reviewed by me in its entirety. I confirm that the note above accurately reflects all work, treatment, procedures, and medical decision making performed by me. I appreciate the opportunity of cooperating in the care of this patient.     Hayden Mcdowell M.D., [facility]

## 2020-12-07 LAB — CULTURE FUNGUS SKIN: NORMAL

## 2020-12-08 ENCOUNTER — OFFICE VISIT (OUTPATIENT)
Dept: CARDIOLOGY CLINIC | Age: 65
End: 2020-12-08
Payer: COMMERCIAL

## 2020-12-08 VITALS
WEIGHT: 293 LBS | HEIGHT: 66 IN | BODY MASS INDEX: 47.09 KG/M2 | HEART RATE: 57 BPM | OXYGEN SATURATION: 98 % | SYSTOLIC BLOOD PRESSURE: 110 MMHG | DIASTOLIC BLOOD PRESSURE: 70 MMHG

## 2020-12-08 PROCEDURE — 99214 OFFICE O/P EST MOD 30 MIN: CPT | Performed by: INTERNAL MEDICINE

## 2020-12-14 ENCOUNTER — OFFICE VISIT (OUTPATIENT)
Dept: ORTHOPEDIC SURGERY | Age: 65
End: 2020-12-14

## 2020-12-14 VITALS — BODY MASS INDEX: 47.09 KG/M2 | WEIGHT: 293 LBS | HEIGHT: 66 IN

## 2020-12-14 PROCEDURE — 99024 POSTOP FOLLOW-UP VISIT: CPT | Performed by: ORTHOPAEDIC SURGERY

## 2020-12-14 NOTE — PROGRESS NOTES
Chief Complaint   Patient presents with    Follow-up     s/p 11/2/2020 right thumb incsision and drainage, nail removal       HISTORY OF PRESENT ILLNESS:  Xiao Lombardi is a 76 y.o.  patient here for repeat postoperative visit now 1 month following right thumb nailbed debridement with thumb nail removal for chronic paronychia. She has a follow-up infectious disease appointment tomorrow. ROS:  ROS neg     Past medical history is reviewed again today, no changes to report    PHYSICAL EXAMINATION:  Patient is alert and pleasant, in no acute distress. The affected extremity is examined today. Right thumb reveals minimal swelling. The mucous cyst excisional area with bone spur at the IP joint has healed nicely with minimal residual swelling. He paronychial fold swelling is minimal without erythema. No sign of residual infection. The new fingernail has not fully emerged yet. She has minimal stiffness but no pain. No guarding. Thumb is well-perfused. X-rays: None needed today  Positive surgical cultures reviewed again. IMPRESSION AND PLAN: Right thumb chronic paronychia  Doing well. We will continue with our current care plan. Patient to be seen by infectious disease tomorrow for her chronic paronychia. At this stage, I do feel that she is making progress and doing well. We would be able to honor any changes in antibacterial or antifungal regimen as outlined by infectious disease. Patient will continue to monitor the area. Soft tissue massage, range of motion and stretching. Follow-up in 8 weeks unless needed sooner. She is pleased with her progress. All questions and concerns were addressed today. Patient is in agreement with the plan.         Farhan Carreon MD  Hand & Upper Extremity Surgery  [id number] partner of [facility])

## 2020-12-15 ENCOUNTER — OFFICE VISIT (OUTPATIENT)
Dept: INFECTIOUS DISEASES | Age: 65
End: 2020-12-15
Payer: COMMERCIAL

## 2020-12-15 VITALS
DIASTOLIC BLOOD PRESSURE: 70 MMHG | WEIGHT: 291 LBS | TEMPERATURE: 97.8 F | BODY MASS INDEX: 46.77 KG/M2 | SYSTOLIC BLOOD PRESSURE: 118 MMHG | HEIGHT: 66 IN

## 2020-12-15 PROBLEM — L03.011 PARONYCHIA OF FINGER OF RIGHT HAND: Status: ACTIVE | Noted: 2020-11-13

## 2020-12-15 LAB
AFB CULTURE (MYCOBACTERIA): NORMAL
AFB SMEAR: NORMAL

## 2020-12-15 PROCEDURE — 99203 OFFICE O/P NEW LOW 30 MIN: CPT | Performed by: INTERNAL MEDICINE

## 2020-12-15 NOTE — PROGRESS NOTES
Infectious Diseases   Consult Note      Reason for Consult:  Chronic paronychia, Pseudomonas infection   Requesting Physician:  Nickie Coley MD        Subjective:   CHIEF COMPLAINT:  None given       HPI:    Stacia Magdaleno is a 75yoF with history of afib, breast cancer, GERD, HTN, JINA, obesity     Few years ago, \"split the nail\" of R thumb. Historically gets regular manicures regularly with synthetic nail \"dipping. \"   Several months ago developed paronychia R thumb, tenderness, periungual erythema, dystrophic nail base with scant drainage. PCP gave po abx - Bactrim complicated by rash, cephalexin - improved to some degree but would recur. 11/2/20 - I&D R thumbnail bed, nail plate removal, R thumb IP joint arthrotomy with bone spur removal, osteophyte. Joint without overt signs of infection. Culture was positive for pan-S Pseudomonas aeruginosa, rare growth, Prevotella and CoNS in broth. GS with [facility]. Rx po cipro x2 weeks, off maybe 4 days and now completing another 2 week course. Well tolerated without AE.    surgical incision well healed. Decreased local inflammatory changes. Minimal pain.                      Current abx:  cipro BID        Past Surgical History:       Diagnosis Date    Abnormal Pap smear of cervix 06/11/2018    hpv+    Allergic     Allergic rhinitis     Aortic stenosis, mild-echo 8/2016 8/18/2016    Atrial fibrillation (HCC)     BMI 40.0-44.9, adult ([address]     Breast cancer ([facility]     54    Cancer ([facility] [address] 2009    right breast    Depression     GERD (gastroesophageal reflux disease)     H/O laparoscopic adjustable gastric banding 10/27/2016    Heart palpitations     HPV (human papilloma virus) infection 06/11/2018    HPV test positive 12/2014    Hypertension     Hypothyroidism 2000    Meningioma (HCC)     10 years ago    Mild aortic regurgitation-echo 8/2016 8/18/2016    Mild dysplasia of cervix 04/2016    Obesity     Sleep apnea     Urge incontinence 2017         Procedure Laterality Date    APPENDECTOMY      BREAST BIOPSY      BREAST LUMPECTOMY      BREAST SURGERY  2009    right lumpectomy xrt and chemo and serm     SECTION  6000,1708,1201,0063    failed to drop in birth canal     SECTION      CHOLECYSTECTOMY      COLONOSCOPY      HAND SURGERY Right 2020    RIGHT THUMB INCISION AND DRAINAGE WITH NAIL REMOVAL AND SPUR REMOVAL performed by Antonia Zheng MD at [address] LAP BAND      LAP BAND      Removal    OVARY REMOVAL      PARTIAL HYSTERECTOMY      THYROIDECTOMY, PARTIAL Left 2000    TUNNELED VENOUS PORT PLACEMENT      Placed/Removed    UPPER GASTROINTESTINAL ENDOSCOPY  6/10/16    gastritis, gastric polyp bx        Social History:    TOBACCO:   reports that she quit smoking about 48 years ago. Her smoking use included cigarettes. She has a 0.38 pack-year smoking history. She has never used smokeless tobacco.  ETOH:   reports current alcohol use. There is no history of illicit drug use or other significant epidemiologic exposures.       Family History:       Problem Relation Age of Onset    Cancer Mother         breast and cervical    Heart Disease Father     Cancer Father 61        skin cancer    Other Father         AAA    Heart Attack Father     Cancer Maternal Grandmother         breast    Cancer Paternal Grandmother         breast    Cancer Sister         lymphoma    No Known Problems Brother     No Known Problems Maternal Aunt     No Known Problems Maternal Uncle     No Known Problems Paternal Aunt     No Known Problems Paternal Uncle     No Known Problems Maternal Grandfather     No Known Problems Paternal Grandfather     Cancer Other         neice- head, not sure what kind skin ca    Heart Disease Sister     Other Sister         biliary hepatitis    Asthma Neg Hx     Diabetes Neg Hx     Emphysema Neg Hx     Heart Failure Neg Hx     Hypertension Neg Hx     Rheum Arthritis Neg Hx     Osteoarthritis Neg Hx     Breast Cancer Neg Hx     High Cholesterol Neg Hx     Migraines Neg Hx     Ovarian Cancer Neg Hx     Rashes/Skin Problems Neg Hx     Seizures Neg Hx     Stroke Neg Hx     Thyroid Disease Neg Hx        Allergies   Allergen Reactions    Latex Rash    Bactrim [Sulfamethoxazole-Trimethoprim] Swelling    Iodine Shortness Of Breath        REVIEW OF SYSTEMS:    CONSTITUTIONAL:   Without F/C/NS  EYES:  negative for blurred vision, eye discharge, visual disturbance and icterus  HEENT:  negative for acute hearing loss  RESPIRATORY:  No cough, shortness of breath, hemoptysis  CARDIOVASCULAR:  negative for chest pain, edema, syncope  GASTROINTESTINAL:  negative for nausea, vomiting, diarrhea   GENITOURINARY:  negative for frequency, dysuria  HEMATOLOGIC/LYMPHATIC:  negative for easy bruising, bleeding and lymphadenopathy  ALLERGIC/IMMUNOLOGIC:  negative for recurrent infections, angioedema, anaphylaxis and drug reactions  ENDOCRINE:  negative for weight changes and diabetic symptoms including polyuria, polydipsia and polyphagia  MUSCULOSKELETAL:   Per HPI   NEUROLOGICAL:  negative for headaches, slurred speech, unilateral weakness  PSYCHIATRIC/BEHAVIORAL: negative for hallucinations, behavioral problems, confusion and agitation. Objective:   PHYSICAL EXAM:      VITALS:  LMP  (LMP Unknown)      24HR INTAKE/OUTPUT:  No intake or output data in the 24 hours ending 12/15/20 0751  CONSTITUTIONAL:  Awake, alert, cooperative, no apparent distress, and appears stated age  [de-identified]: NCAT, PERRL, EOMI. Sclera white, conjunctiva full. NECK:  Supple, symmetrical  LUNGS:  no increased work of breathing  PSYCHIATRIC: Oriented to person place and time. No obvious depression or anxiety. MUSCULOSKELETAL: No obvious misalignment or effusion of the joints. R thumb pictures below. Surgical site well healed. No local erythema.   Mild

## 2020-12-30 DIAGNOSIS — I48.0 PAF (PAROXYSMAL ATRIAL FIBRILLATION) (HCC): Primary | ICD-10-CM

## 2020-12-30 RX ORDER — PROPAFENONE HYDROCHLORIDE 225 MG/1
225 CAPSULE, EXTENDED RELEASE ORAL 2 TIMES DAILY
Qty: 60 CAPSULE | Refills: 0 | OUTPATIENT
Start: 2020-12-30 | End: 2021-07-14 | Stop reason: SDUPTHER

## 2020-12-30 RX ORDER — PROPAFENONE HYDROCHLORIDE 225 MG/1
225 CAPSULE, EXTENDED RELEASE ORAL 2 TIMES DAILY
Qty: 180 CAPSULE | Refills: 2 | Status: SHIPPED | OUTPATIENT
Start: 2020-12-30 | End: 2021-04-15 | Stop reason: ALTCHOICE

## 2020-12-30 RX ORDER — PROPAFENONE HYDROCHLORIDE 225 MG/1
225 CAPSULE, EXTENDED RELEASE ORAL 2 TIMES DAILY
Qty: 60 CAPSULE | Refills: 0 | Status: CANCELLED | OUTPATIENT
Start: 2020-12-30

## 2021-01-06 RX ORDER — METOPROLOL SUCCINATE 25 MG/1
TABLET, EXTENDED RELEASE ORAL
Qty: 90 TABLET | Refills: 2 | Status: SHIPPED | OUTPATIENT
Start: 2021-01-06 | End: 2021-04-07 | Stop reason: SDUPTHER

## 2021-01-13 ENCOUNTER — OFFICE VISIT (OUTPATIENT)
Dept: FAMILY MEDICINE CLINIC | Age: 66
End: 2021-01-13
Payer: COMMERCIAL

## 2021-01-13 VITALS
DIASTOLIC BLOOD PRESSURE: 80 MMHG | BODY MASS INDEX: 46.97 KG/M2 | TEMPERATURE: 96.9 F | WEIGHT: 291 LBS | OXYGEN SATURATION: 96 % | HEART RATE: 62 BPM | SYSTOLIC BLOOD PRESSURE: 106 MMHG

## 2021-01-13 DIAGNOSIS — R73.03 PREDIABETES: Primary | ICD-10-CM

## 2021-01-13 DIAGNOSIS — I50.22 CHRONIC SYSTOLIC HEART FAILURE (HCC): ICD-10-CM

## 2021-01-13 PROBLEM — L03.011 PARONYCHIA OF FINGER OF RIGHT HAND: Status: RESOLVED | Noted: 2020-11-13 | Resolved: 2021-01-13

## 2021-01-13 LAB — HBA1C MFR BLD: 6.2 %

## 2021-01-13 PROCEDURE — 99212 OFFICE O/P EST SF 10 MIN: CPT | Performed by: PHYSICIAN ASSISTANT

## 2021-01-13 PROCEDURE — 83036 HEMOGLOBIN GLYCOSYLATED A1C: CPT | Performed by: PHYSICIAN ASSISTANT

## 2021-01-13 NOTE — PROGRESS NOTES
 montelukast (SINGULAIR) 10 MG tablet TAKE ONE TABLET BY MOUTH NIGHTLY 90 tablet 2    furosemide (LASIX) 20 MG tablet TAKE 1 TABLET BY MOUTH ONE TIME A DAY AS NEEDED FOR WEIGHT GAIN OF 3LBS IN A DAY OR 5LBS IN A WEEK 90 tablet 1    atorvastatin (LIPITOR) 40 MG tablet TAKE ONE TABLET BY MOUTH NIGHTLY 90 tablet 1    omeprazole (PRILOSEC) 20 MG delayed release capsule TAKE ONE CAPSULE BY MOUTH EVERY MORNING BEFORE BREAKFAST 30 capsule 5    levothyroxine (SYNTHROID) 137 MCG tablet TAKE ONE TABLET BY MOUTH ONE TIME A DAY 90 tablet 1    oxybutynin (DITROPAN XL) 15 MG extended release tablet Take 1 tablet by mouth daily 90 tablet 2    apixaban (ELIQUIS) 5 MG TABS tablet TAKE 1 TABLET BY MOUTH TWO TIMES A  tablet 3    fluticasone (FLONASE) 50 MCG/ACT nasal spray 2 sprays each nostril daily 3 Bottle 2    Azelastine-Fluticasone 137-50 MCG/ACT SUSP 1 spray by Nasal route 2 times daily      hydrocortisone (ANUSOL-HC) 2.5 % rectal cream Place rectally 2 times daily. 1 Tube 0    Lactobacillus (FLORAJEN ACIDOPHILUS) CAPS Take 1 capsule by mouth daily      cetirizine (ZYRTEC) 10 MG tablet Take 10 mg by mouth 2 times daily       multivitamin (THERAGRAN) per tablet Take 1 tablet by mouth daily. No current facility-administered medications for this visit. Vitals:    01/13/21 0754   BP: 106/80   Pulse: 62   Temp: 96.9 °F (36.1 °C)   TempSrc: Temporal   SpO2: 96%   Weight: 291 lb (132 kg)     Estimated body mass index is 46.97 kg/m² as calculated from the following:    Height as of 12/15/20: 5' 6\" (1.676 m). Weight as of this encounter: 291 lb (132 kg). Physical Exam  Vitals signs reviewed. Constitutional:       Appearance: Normal appearance. Eyes:      Conjunctiva/sclera: Conjunctivae normal.   Neurological:      Mental Status: She is alert and oriented to person, place, and time. Cranial Nerves: No cranial nerve deficit.    Psychiatric:         Mood and Affect: Mood normal. Behavior: Behavior normal.         Thought Content: Thought content normal.         Judgment: Judgment normal.         ASSESSMENT and PLAN:  Beola Nyhan was seen today for diabetes. Diagnoses and all orders for this visit:    Prediabetes  -     POCT glycosylated hemoglobin (Hb A1C): improved at 6.2%  -     Pt would like to continue lifestyle changes and delay medication. She will be retiring soon and therefore will have more time for regular exercise. I encouraged her to continue reducing carbohydrates at her meals to less than 1/4 of her plate. Return in about 6 months (around 7/13/2021) for prediabetes.

## 2021-01-14 LAB
ANION GAP SERPL CALCULATED.3IONS-SCNC: 9 MMOL/L (ref 3–16)
BASOPHILS ABSOLUTE: 0 K/UL (ref 0–0.2)
BASOPHILS RELATIVE PERCENT: 0.6 %
BUN BLDV-MCNC: 22 MG/DL (ref 7–20)
CALCIUM SERPL-MCNC: 9.7 MG/DL (ref 8.3–10.6)
CHLORIDE BLD-SCNC: 101 MMOL/L (ref 99–110)
CO2: 30 MMOL/L (ref 21–32)
CREAT SERPL-MCNC: 1.2 MG/DL (ref 0.6–1.2)
EOSINOPHILS ABSOLUTE: 0.1 K/UL (ref 0–0.6)
EOSINOPHILS RELATIVE PERCENT: 2.3 %
GFR AFRICAN AMERICAN: 54
GFR NON-AFRICAN AMERICAN: 45
GLUCOSE BLD-MCNC: 145 MG/DL (ref 70–99)
HCT VFR BLD CALC: 40.1 % (ref 36–48)
HEMOGLOBIN: 13.1 G/DL (ref 12–16)
LYMPHOCYTES ABSOLUTE: 1.3 K/UL (ref 1–5.1)
LYMPHOCYTES RELATIVE PERCENT: 23.1 %
MCH RBC QN AUTO: 30.5 PG (ref 26–34)
MCHC RBC AUTO-ENTMCNC: 32.7 G/DL (ref 31–36)
MCV RBC AUTO: 93.3 FL (ref 80–100)
MONOCYTES ABSOLUTE: 0.6 K/UL (ref 0–1.3)
MONOCYTES RELATIVE PERCENT: 10.1 %
NEUTROPHILS ABSOLUTE: 3.6 K/UL (ref 1.7–7.7)
NEUTROPHILS RELATIVE PERCENT: 63.9 %
PDW BLD-RTO: 14.2 % (ref 12.4–15.4)
PLATELET # BLD: 242 K/UL (ref 135–450)
PMV BLD AUTO: 8.6 FL (ref 5–10.5)
POTASSIUM SERPL-SCNC: 4.3 MMOL/L (ref 3.5–5.1)
PRO-BNP: 194 PG/ML (ref 0–124)
RBC # BLD: 4.3 M/UL (ref 4–5.2)
SODIUM BLD-SCNC: 140 MMOL/L (ref 136–145)
WBC # BLD: 5.6 K/UL (ref 4–11)

## 2021-01-25 ENCOUNTER — TELEPHONE (OUTPATIENT)
Dept: PULMONOLOGY | Age: 66
End: 2021-01-25

## 2021-01-25 RX ORDER — OMEPRAZOLE 20 MG/1
CAPSULE, DELAYED RELEASE ORAL
Qty: 90 CAPSULE | Refills: 1 | Status: SHIPPED | OUTPATIENT
Start: 2021-01-25 | End: 2021-02-25

## 2021-01-25 NOTE — TELEPHONE ENCOUNTER
Refill Request     Last Seen: 1/13/2021    Last Written: 8/24/2020    Next Appointment:   Future Appointments   Date Time Provider Pelon Whitfield   1/25/2021  3:00 PM BLANK Smith CNP [facility]   6/8/2021  3:00 PM MD Gato Butt [facility]   7/13/2021  8:30 AM KADEN Franks [facility]       Future appointment scheduled      Requested Prescriptions     Pending Prescriptions Disp Refills    omeprazole (PRILOSEC) 20 MG delayed release capsule [Pharmacy Med Name: OMEPRAZOLE 20MG CPDR] 90 capsule 1     Sig: TAKE ONE CAPSULE BY MOUTH EVERY MORNING BEFORE BREAKFAST

## 2021-01-25 NOTE — TELEPHONE ENCOUNTER
Patient did not show for 1 year sleep appointment  with Ej Lopez on 1/25/21    Same Day Cancellation: Yes    Patient rescheduled:  Yes    New appointment: 3/17/21    Patient was also no show on: n/a    LOV 1/29/20

## 2021-01-27 RX ORDER — LEVOTHYROXINE SODIUM 137 UG/1
TABLET ORAL
Qty: 90 TABLET | Refills: 1 | Status: SHIPPED | OUTPATIENT
Start: 2021-01-27 | End: 2021-02-25

## 2021-02-22 RX ORDER — ATORVASTATIN CALCIUM 40 MG/1
TABLET, FILM COATED ORAL
Qty: 90 TABLET | Refills: 1 | Status: SHIPPED | OUTPATIENT
Start: 2021-02-22 | End: 2021-07-14 | Stop reason: SDUPTHER

## 2021-02-25 RX ORDER — OXYBUTYNIN CHLORIDE 15 MG/1
TABLET, EXTENDED RELEASE ORAL
Qty: 90 TABLET | Refills: 2 | Status: SHIPPED | OUTPATIENT
Start: 2021-02-25 | End: 2021-07-24 | Stop reason: SDUPTHER

## 2021-02-25 RX ORDER — FLUTICASONE PROPIONATE 50 MCG
SPRAY, SUSPENSION (ML) NASAL
Qty: 1 BOTTLE | Refills: 1 | Status: SHIPPED | OUTPATIENT
Start: 2021-02-25

## 2021-02-25 RX ORDER — LEVOTHYROXINE SODIUM 137 UG/1
TABLET ORAL
Qty: 90 TABLET | Refills: 1 | Status: SHIPPED | OUTPATIENT
Start: 2021-02-25 | End: 2021-04-20 | Stop reason: SDUPTHER

## 2021-02-25 RX ORDER — OMEPRAZOLE 20 MG/1
CAPSULE, DELAYED RELEASE ORAL
Qty: 90 CAPSULE | Refills: 1 | Status: SHIPPED | OUTPATIENT
Start: 2021-02-25 | End: 2021-04-20 | Stop reason: SDUPTHER

## 2021-02-25 NOTE — TELEPHONE ENCOUNTER
Refill Request     Last Seen: 2021    Last Written: Levothyroxine was just ordered 2021 #90 with 1 refill, Oxybutynin 2020 #90 with 2 refills, and Fluticasone 2019 3 bottles with 2 refills      Next Appointment:   Future Appointments   Date Time Provider Pelon Whitfield   3/17/2021  9:00 AM BLANK Grissom - CNP [facility] SLEEP [facility]   2021  3:00 PM MD Varun Dixon [facility]   2021  8:30 AM KADEN Diaz [facility]       Future appointment scheduled      Requested Prescriptions     Pending Prescriptions Disp Refills    fluticasone (FLONASE) 50 MCG/ACT nasal spray [Pharmacy Med Name: FLUTICASONE PROPIONATE 50 SUSP] 1 Bottle 1     Si SPRAYS IN EACH NOSTRIL ONCE DAILY.     oxybutynin (DITROPAN XL) 15 MG extended release tablet [Pharmacy Med Name: OXYBUTYNIN CHLORIDE ER 15MG TB24] 90 tablet 2     Sig: TAKE 1 TABLET BY MOUTH ONE TIME A DAY    levothyroxine (SYNTHROID) 137 MCG tablet [Pharmacy Med Name: LEVOTHYROXINE SODIUM 137MCG TABS] 90 tablet 1     Sig: TAKE 1 TABLET BY MOUTH ONE TIME A DAY

## 2021-02-25 NOTE — TELEPHONE ENCOUNTER
Refill Request     Last Seen: 1/13/2021    Last Written: 1/25/2021    Next Appointment:   Future Appointments   Date Time Provider Pelon Whitfield   3/17/2021  9:00 AM BLANK Penny CNP [facility]   6/8/2021  3:00 PM MD Burgess Bret Suresh [facility]   7/13/2021  8:30 AM KADEN Queen [facility]       Future appointment scheduled      Requested Prescriptions     Pending Prescriptions Disp Refills    omeprazole (PRILOSEC) 20 MG delayed release capsule [Pharmacy Med Name: OMEPRAZOLE 20MG CPDR] 30 capsule 5     Sig: TAKE ONE CAPSULE BY MOUTH EVERY MORNING BEFORE BREAKFAST

## 2021-03-17 ENCOUNTER — VIRTUAL VISIT (OUTPATIENT)
Dept: PULMONOLOGY | Age: 66
End: 2021-03-17
Payer: MEDICARE

## 2021-03-17 ENCOUNTER — TELEPHONE (OUTPATIENT)
Dept: PULMONOLOGY | Age: 66
End: 2021-03-17

## 2021-03-17 DIAGNOSIS — R53.83 OTHER FATIGUE: ICD-10-CM

## 2021-03-17 DIAGNOSIS — Z71.89 CPAP USE COUNSELING: ICD-10-CM

## 2021-03-17 DIAGNOSIS — G47.33 SEVERE OBSTRUCTIVE SLEEP APNEA: Primary | Chronic | ICD-10-CM

## 2021-03-17 DIAGNOSIS — E66.01 OBESITY, MORBID, BMI 40.0-49.9 (HCC): ICD-10-CM

## 2021-03-17 PROCEDURE — G8399 PT W/DXA RESULTS DOCUMENT: HCPCS | Performed by: NURSE PRACTITIONER

## 2021-03-17 PROCEDURE — 4040F PNEUMOC VAC/ADMIN/RCVD: CPT | Performed by: NURSE PRACTITIONER

## 2021-03-17 PROCEDURE — 99213 OFFICE O/P EST LOW 20 MIN: CPT | Performed by: NURSE PRACTITIONER

## 2021-03-17 PROCEDURE — G8427 DOCREV CUR MEDS BY ELIG CLIN: HCPCS | Performed by: NURSE PRACTITIONER

## 2021-03-17 PROCEDURE — 3017F COLORECTAL CA SCREEN DOC REV: CPT | Performed by: NURSE PRACTITIONER

## 2021-03-17 PROCEDURE — 1090F PRES/ABSN URINE INCON ASSESS: CPT | Performed by: NURSE PRACTITIONER

## 2021-03-17 PROCEDURE — 1123F ACP DISCUSS/DSCN MKR DOCD: CPT | Performed by: NURSE PRACTITIONER

## 2021-03-17 ASSESSMENT — SLEEP AND FATIGUE QUESTIONNAIRES
ESS TOTAL SCORE: 4
HOW LIKELY ARE YOU TO NOD OFF OR FALL ASLEEP WHILE SITTING QUIETLY AFTER LUNCH WITHOUT ALCOHOL: 0
HOW LIKELY ARE YOU TO NOD OFF OR FALL ASLEEP WHILE SITTING AND TALKING TO SOMEONE: 0
HOW LIKELY ARE YOU TO NOD OFF OR FALL ASLEEP WHILE SITTING AND READING: 1
HOW LIKELY ARE YOU TO NOD OFF OR FALL ASLEEP IN A CAR, WHILE STOPPED FOR A FEW MINUTES IN TRAFFIC: 0
HOW LIKELY ARE YOU TO NOD OFF OR FALL ASLEEP WHILE LYING DOWN TO REST IN THE AFTERNOON WHEN CIRCUMSTANCES PERMIT: 3

## 2021-03-17 NOTE — TELEPHONE ENCOUNTER

## 2021-03-17 NOTE — PATIENT INSTRUCTIONS

## 2021-03-17 NOTE — PROGRESS NOTES
Patient ID: Ann Carr is a 76 y.o. female who is being seen today for   Chief Complaint   Patient presents with    Sleep Apnea     1 year          HPI:     Ann Carr is a 76 y.o. female for televideo appointment via video and audio doxy. me virtual visit for JINA follow up. States she is doing okay with CPAP. States it is old and she needs a new one, states it makes more noise. States she has had CPAP around 6 years it is old and outdated. States it is leaking. Patient is using CPAP   8 hrs/night. Sometimes using humidifier. No snoring on CPAP. The pressure is well tolerated. The mask is comfortable- dreamwear nasal mask. No mask leak. No significant daytime sleepiness. No nodding off when driving. No dry nose or throat. Some  fatigue. Bedtime is 10 pm and rise time is 8-9 am. Sleep onset is few minutes. Wakes up 0 times at night total. No nocturia. 1 naps during the day most days for 60 minutes. No headache in am. No weight gain. 1-2 caffienated beverages during the day. Rare alcohol. ESS is 4. Initial 1/29/20  Ann Carr is a 76 y.o. female in office for JINA follow up. She was previously a patient of [facility] however changed offices due to location. States she diagnosed with JINA in 1980s. States she has been on CPAP since, does well with it. States snoring, poor sleep hypersomnia prior to CPAP, resolved with CPAP. Patient is using CPAP 8+ hrs/night. Using humidifier. No snoring on CPAP. The pressure is well tolerated. The mask is comfortable-nasal mask dreamwear. No mask leak. No significant daytime sleepiness. No nodding off when driving. No dry nose or throat. Some fatigue. Bedtime is 8-9 pm and rise time is 530 am. Sleep onset is 2 minutes. Wakes up 0-1 times at night total. 0-1 nocturia. It takes usually few minutes to fall back a sleep. Rare naps during the day, more since diagnosed with afib. No headache in am. No weight gain.  0-1 caffienated beverages during the day. Rare alcohol. ESS is 6.     Sleep Medicine 3/17/2021 2020 2019 2018 2016 10/7/2014   Sitting and reading 1 1 1 2 3 3   Watching TV 0 1 1 2 2 3   Sitting, inactive in a public place (e.g. a theatre or a meeting) 0 1 1 1 1 1   As a passenger in a car for an hour without a break 0 0 1 1 2 1   Lying down to rest in the afternoon when circumstances permit 3 3 3 3 3 3   Sitting and talking to someone 0 0 0 0 1 1   Sitting quietly after a lunch without alcohol 0 0 1 0 2 1   In a car, while stopped for a few minutes in traffic 0 0 0 0 0 0   Total score 4 6 8 9 14 13   Neck circumference - 17.75 - - - -       Past Medical History:  Past Medical History:   Diagnosis Date    Abnormal Pap smear of cervix 2018    hpv+    Allergic     Allergic rhinitis     Aortic stenosis, mild-echo 2016    Atrial fibrillation ([address]     BMI 40.0-44.9, adult ([address]     Breast cancer ([address]     53    Cancer ([address]     right breast    Depression     GERD (gastroesophageal reflux disease)     H/O laparoscopic adjustable gastric banding 10/27/2016    Heart palpitations     HPV (human papilloma virus) infection 2018    HPV test positive 2014    Hypertension     Hypothyroidism 2000    Meningioma ([address]     10 years ago    Mild aortic regurgitation-echo 2016    Mild dysplasia of cervix 2016    Obesity     Sleep apnea     Urge incontinence 2017       Past Surgical History:        Procedure Laterality Date    APPENDECTOMY      BREAST BIOPSY      BREAST LUMPECTOMY      BREAST SURGERY  2009    right lumpectomy xrt and chemo and serm     SECTION  ,,1981,1984    failed to drop in birth canal     SECTION      CHOLECYSTECTOMY      COLONOSCOPY      HAND SURGERY Right 2020    RIGHT THUMB INCISION AND DRAINAGE WITH NAIL REMOVAL AND SPUR REMOVAL performed by Grace Krish[facility]urthy MD [facility]    LAP BAND  2007    LAP BAND  2012    Removal    OVARY REMOVAL      PARTIAL HYSTERECTOMY      THYROIDECTOMY, PARTIAL Left 2000    TUNNELED VENOUS PORT PLACEMENT  2009/2011    Placed/Removed    UPPER GASTROINTESTINAL ENDOSCOPY  6/10/16    gastritis, gastric polyp bx        Allergies:  is allergic to latex; bactrim [sulfamethoxazole-trimethoprim]; and iodine. Social History:    TOBACCO:   reports that she quit smoking about 49 years ago. Her smoking use included cigarettes. She has a 0.38 pack-year smoking history. She has never used smokeless tobacco.  ETOH:   reports current alcohol use.     Family History:       Problem Relation Age of Onset    Cancer Mother         breast and cervical    Heart Disease Father     Cancer Father 61        skin cancer    Other Father         AAA    Heart Attack Father     Cancer Maternal Grandmother         breast    Cancer Paternal Grandmother         breast    Cancer Sister         lymphoma    No Known Problems Brother     No Known Problems Maternal Aunt     No Known Problems Maternal Uncle     No Known Problems Paternal Aunt     No Known Problems Paternal Uncle     No Known Problems Maternal Grandfather     No Known Problems Paternal Grandfather     Cancer Other         neice- head, not sure what kind skin ca    Heart Disease Sister     Other Sister         biliary hepatitis    Asthma Neg Hx     Diabetes Neg Hx     Emphysema Neg Hx     Heart Failure Neg Hx     Hypertension Neg Hx     Rheum Arthritis Neg Hx     Osteoarthritis Neg Hx     Breast Cancer Neg Hx     High Cholesterol Neg Hx     Migraines Neg Hx     Ovarian Cancer Neg Hx     Rashes/Skin Problems Neg Hx     Seizures Neg Hx     Stroke Neg Hx     Thyroid Disease Neg Hx        Current Medications:    Current Outpatient Medications:     omeprazole (PRILOSEC) 20 MG delayed release capsule, TAKE ONE CAPSULE BY MOUTH EVERY MORNING BEFORE BREAKFAST, Disp: 90 capsule, Rfl: 1   fluticasone (FLONASE) 50 MCG/ACT nasal spray, 2 SPRAYS IN EACH NOSTRIL ONCE DAILY. , Disp: 1 Bottle, Rfl: 1    oxybutynin (DITROPAN XL) 15 MG extended release tablet, TAKE 1 TABLET BY MOUTH ONE TIME A DAY, Disp: 90 tablet, Rfl: 2    levothyroxine (SYNTHROID) 137 MCG tablet, TAKE 1 TABLET BY MOUTH ONE TIME A DAY, Disp: 90 tablet, Rfl: 1    atorvastatin (LIPITOR) 40 MG tablet, TAKE ONE TABLET BY MOUTH NIGHTLY, Disp: 90 tablet, Rfl: 1    apixaban (ELIQUIS) 5 MG TABS tablet, TAKE 1 TABLET BY MOUTH TWO TIMES A DAY, Disp: 180 tablet, Rfl: 1    furosemide (LASIX) 20 MG tablet, TAKE 1 TABLET BY MOUTH ONE TIME A DAY AS NEEDED FOR WEIGHT GAIN OF 3LBS IN A DAY OR 5LBS IN A WEEK, Disp: 90 tablet, Rfl: 2    metoprolol succinate (TOPROL XL) 25 MG extended release tablet, TAKE 1 TABLET BY MOUTH ONE TIME A DAY, Disp: 90 tablet, Rfl: 2    propafenone (RYTHMOL SR) 225 MG extended release capsule, Take 1 capsule by mouth 2 times daily, Disp: 60 capsule, Rfl: 0    spironolactone (ALDACTONE) 25 MG tablet, TAKE 1 TABLET BY MOUTH ONE TIME A DAY, Disp: 90 tablet, Rfl: 1    citalopram (CELEXA) 40 MG tablet, TAKE ONE TABLET BY MOUTH ONE TIME A DAY, Disp: 90 tablet, Rfl: 1    valsartan (DIOVAN) 40 MG tablet, TAKE 1 TABLET BY MOUTH ONE TIME A DAY, Disp: 90 tablet, Rfl: 3    montelukast (SINGULAIR) 10 MG tablet, TAKE ONE TABLET BY MOUTH NIGHTLY, Disp: 90 tablet, Rfl: 2    Azelastine-Fluticasone 137-50 MCG/ACT SUSP, 1 spray by Nasal route 2 times daily, Disp: , Rfl:     hydrocortisone (ANUSOL-HC) 2.5 % rectal cream, Place rectally 2 times daily. , Disp: 1 Tube, Rfl: 0    cetirizine (ZYRTEC) 10 MG tablet, Take 10 mg by mouth 2 times daily , Disp: , Rfl:     multivitamin (THERAGRAN) per tablet, Take 1 tablet by mouth daily. , Disp: , Rfl:     propafenone (RYTHMOL SR) 225 MG extended release capsule, Take 1 capsule by mouth 2 times daily, Disp: 180 capsule, Rfl: 2    Lactobacillus (FLORAJEN ACIDOPHILUS) CAPS, Take 1 capsule by mouth daily, Disp: , Rfl:       Objective:   PHYSICAL EXAM:    LMP  (LMP Unknown)     Physical exam:  Exam:  Gen: No acute distress, does not appear to be in pain. Appears well developed and nourished. HENT: Head is normocephalic and atraumatic. Normal appearing nose. External Ears normal.   Neck: No visualized mass. Trachea is midline   Eyes: EOM intact. No visible discharge. Resp:No visualized signs of difficulty breathing or respiratory distress, speaking in full sentences. Respiratory effort normal.  Neuro: Awake. Alert. Able to follow commands. No facial asymmetry. Psych: Oriented x 3. No anxiety. Normal affect. DATA:   6/16/2006 PSG RDI 57.8, low SPO2 84%  6/17/2006 titration, recommendation CPAP 15 cm H2O  3/28/2014 titration recommendation CPAP 12 cm H2O    CPAP compliance data:  Compliance download report from 12/30/19 to 1/28/20 reviewed today by me and showed patient is using machine 8:54 hrs/night with 96.7% compliance and AHI 2.6 within this time frame. 29/30days with greater than 4 hours of machine use. 90% pressure 17.4 cm H20 on auto CPAP 15-20 cm H2O    3/17/2021unable to obtain CPAP download report. Patient has older outdated CPAP    Assessment:      · Severe JINA. Auto CPAP 15-20 cm H2O. Compliant per patient  · Snoring, hypersomnia-resolved on CPAP  · Obesity  · Fatigue  · A. fib and HTNper Cardiology    Plan:     -Order new CPAP 16-20 cm H2O  - Advised to use CPAP 6-8 hrs at night and during naps. - Replacement of mask, tubing, head straps every 3-6 months or sooner if damaged. - Patient instructed to contact [employer] for any mask, tubing or machine trouble shooting if problems arise.  - Sleep hygiene  - Avoid sedatives, alcohol and caffeinated drinks at bed time. - Patient counseled to never drive or operate heavy machinery while fatigue, drowsy or sleepy.    - Weight loss is recommended as a long-term intervention.    - Complications of JINA if not treated were discussed with patient patient, including: systemic hypertension, pulmonary hypertension, cardiovascular morbidities, car accidents and all cause mortality.  -Patient education  provided regarding sleep tips and CPAP cleaning recommendations     Consent for telehealth visit was obtained and is noted in chart      [address]. Denise Corbett is a 76 y.o. female being evaluated by a Virtual Visit (video visit) encounter to address concerns as mentioned above. A caregiver was present when appropriate. Due to this being a TeleHealth encounter (During GYJXR-90 public health emergency), evaluation of the following organ systems was limited: Vitals/Constitutional/EENT/Resp/CV/GI//MS/Neuro/Skin/Heme-Lymph-Imm. Pursuant to the emergency declaration under the [address] [place] [address] authority and the [employer] Act, this Virtual Visit was conducted with patient's (and/or legal guardian's) consent, to reduce the patient's risk of exposure to COVID-19 and provide necessary medical care. The patient (and/or legal guardian) has also been advised to contact this office for worsening conditions or problems, and seek emergency medical treatment and/or call 911 if deemed necessary. Patient identification was verified at the start of the visit: Yes    Total time spent for this encounter: Not billed by time    Services were provided through a video synchronous discussion virtually to substitute for in-person clinic visit. Patient and provider were located at their individual homes. --BLANK Bergeron - CNP on 3/17/2021 at 9:22 AM    An electronic signature was used to authenticate this note.

## 2021-04-05 NOTE — PATIENT INSTRUCTIONS
Sun Protection Tips    Apply broad spectrum water resistant sunscreen with an SPF of at least 30 to exposed areas of the skin. Dont forget the ears and lips! Remember to reapply sunscreen about every 2 hours and after swimming or sweating. Wear sun protective clothing. Swim shirts (aka. rash guards) are a great idea and negates the need to reapply sunscreen in those areas. Seek the shade whenever possible especially between the hours of 10am and 4pm when the suns rays are the strongest.     Avoid tanning beds          Wear a wide brim hat while in the sun     Cryosurgery (Freezing) Wound Care Instructions    AFTER THE PROCEDURE:    You will notice swelling and redness around the site. This is normal.    You may experience a sharp or sore feeling for the next several days. For this discomfort, you may take acetaminophen (Tylenol©).  A blister may develop at the treated area, sometimes as soon as by the end of the day. After several days, the blister will subside and a scab will form.  If the area is bumped or traumatized during the first few days following freezing, you may develop bleeding into the blister, forming a blood blister. This is nothing to be alarmed about.  If the blister is tense, uncomfortable, or much larger than the site that was frozen, you may pop the blister along its edge with a sterile needle (boiled, heated under a flame, or cleaned with alcohol) to allow the fluid to drain out. If the blister does not bother you, no treatment is needed.  Do NOT peel off the top of the blister roof. It will act as a dressing on top of your wound. WOUND CARE:    You may shower or bathe as usual, but avoid scrubbing the areas that have been frozen.  Cleanse the site twice a day with mild soapy water, and then apply a thin film of white petrolatum (Vaseline©).  You do not need to cover the area, but can if you prefer.     Do NOT allow the site to become dry or crusted, or attempt to dry it out with rubbing alcohol or hydrogen peroxide.  Continue this regimen until the area is pink and healed. Depending on the size and location of your cryosurgery site, healing may take 2 to 4 weeks.  The area may continue to be pink for several weeks, and over the next few months may become darker or lighter than the surrounding skin. This may be a permanent change. Thanks for your visit! Feel free to send  Dr. Krystin Ayala assistant, Gini, a [employer] message/call for any questions, concerns or  to schedule your cosmetic procedures.

## 2021-04-06 ENCOUNTER — OFFICE VISIT (OUTPATIENT)
Dept: DERMATOLOGY | Age: 66
End: 2021-04-06
Payer: MEDICARE

## 2021-04-06 VITALS — TEMPERATURE: 98.8 F

## 2021-04-06 DIAGNOSIS — L57.0 ACTINIC KERATOSES: Primary | ICD-10-CM

## 2021-04-06 DIAGNOSIS — L82.1 SEBORRHEIC KERATOSIS: ICD-10-CM

## 2021-04-06 DIAGNOSIS — Z86.007 HISTORY OF SQUAMOUS CELL CARCINOMA IN SITU: ICD-10-CM

## 2021-04-06 DIAGNOSIS — D22.9 MULTIPLE BENIGN NEVI: ICD-10-CM

## 2021-04-06 DIAGNOSIS — L55.0 SUNBURN OF FIRST DEGREE: ICD-10-CM

## 2021-04-06 PROCEDURE — G8399 PT W/DXA RESULTS DOCUMENT: HCPCS | Performed by: DERMATOLOGY

## 2021-04-06 PROCEDURE — 99213 OFFICE O/P EST LOW 20 MIN: CPT | Performed by: DERMATOLOGY

## 2021-04-06 PROCEDURE — 1123F ACP DISCUSS/DSCN MKR DOCD: CPT | Performed by: DERMATOLOGY

## 2021-04-06 PROCEDURE — G8427 DOCREV CUR MEDS BY ELIG CLIN: HCPCS | Performed by: DERMATOLOGY

## 2021-04-06 PROCEDURE — 17000 DESTRUCT PREMALG LESION: CPT | Performed by: DERMATOLOGY

## 2021-04-06 PROCEDURE — 1036F TOBACCO NON-USER: CPT | Performed by: DERMATOLOGY

## 2021-04-06 PROCEDURE — 4040F PNEUMOC VAC/ADMIN/RCVD: CPT | Performed by: DERMATOLOGY

## 2021-04-06 PROCEDURE — 3017F COLORECTAL CA SCREEN DOC REV: CPT | Performed by: DERMATOLOGY

## 2021-04-06 PROCEDURE — 1090F PRES/ABSN URINE INCON ASSESS: CPT | Performed by: DERMATOLOGY

## 2021-04-06 PROCEDURE — G8417 CALC BMI ABV UP PARAM F/U: HCPCS | Performed by: DERMATOLOGY

## 2021-04-06 PROCEDURE — 17003 DESTRUCT PREMALG LES 2-14: CPT | Performed by: DERMATOLOGY

## 2021-04-06 NOTE — PROGRESS NOTES
[facility]) Dermatology  [address], [place], [address]       Germaine Perkins  1955    76 y.o. female     Date of Visit: 2021    Chief Complaint:   Chief Complaint   Patient presents with    Skin Lesion     spots, tbse        I was asked to see this patient by Dr. Ring ref. provider found. History of Present Illness:  Germaine Perkins is a 76 y.o. female who presents with the chief complaint of the followin. Total-body skin exam for spots. Many year history of multiple nevi on the head/neck, trunk and extremities, all present for many years. Denies new moles. Denies moles changing in size, shape, color. None associated w/ pain, bleeding, pruritus. Last visit 10/2019.  2. 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 History of actinic keratoses to right lateral cheek and left lateral cheek status post cryotherapy at last visit in 10/2019. Patient denies recurrence. 4.  Complains of a raised rough feeling bump to her left inferior forehead near eyebrow,she denies associated burning, bleeding, pain, or itch. 5.  Admits to being outdoors recently for her grandchildren soccer game without proper sun protection. Unable to seek shade at that time. Did experience a sunburn to her face but denies any blistering. Admits to sun exposure in youth without wearing sunscreen, hats, or protective clothing.  wears sunscreen occasionally when outdoors for long periods of time, denies wearing sun hats.     Review of Systems:  Constitutional: Reports general sense of well-being   Skin: No new or changing moles, no tendency to develop thick scars, no interval of severe sunburns  Heme: No abnormal bruising or bleeding.       Past Skin Hx:  -2019-history of Bowen's disease/SCCIS located to left proximal pretibial lower extremity status post surgical excision  -History of nummular eczema-triamcinolone 0.1% lotion twice daily sparingly as needed flares  -History of solar lentiginosis Patient denies past history of melanoma, dysplastic nevi     PFHx: Denies hx of MM or NMSC    ADDITIONAL HISTORY:    I have reviewed past medical and surgical histories, current medications, allergies, social and family histories as documented in the patient's electronic medical record.     Family History   Problem Relation Age of Onset    Cancer Mother         breast and cervical    Heart Disease Father     Cancer Father 61        skin cancer    Other Father         AAA    Heart Attack Father     Cancer Maternal Grandmother         breast    Cancer Paternal Grandmother         breast    Cancer Sister         lymphoma    No Known Problems Brother     No Known Problems Maternal Aunt     No Known Problems Maternal Uncle     No Known Problems Paternal Aunt     No Known Problems Paternal Uncle     No Known Problems Maternal Grandfather     No Known Problems Paternal Grandfather     Cancer Other         neice- head, not sure what kind skin ca    Heart Disease Sister     Other Sister         biliary hepatitis    Asthma Neg Hx     Diabetes Neg Hx     Emphysema Neg Hx     Heart Failure Neg Hx     Hypertension Neg Hx     Rheum Arthritis Neg Hx     Osteoarthritis Neg Hx     Breast Cancer Neg Hx     High Cholesterol Neg Hx     Migraines Neg Hx     Ovarian Cancer Neg Hx     Rashes/Skin Problems Neg Hx     Seizures Neg Hx     Stroke Neg Hx     Thyroid Disease Neg Hx      Past Medical History:   Diagnosis Date    Abnormal Pap smear of cervix 06/11/2018    hpv+    Allergic     Allergic rhinitis     Aortic stenosis, mild-echo 8/2016 8/18/2016    Atrial fibrillation (HCC)     BMI 40.0-44.9, adult (HCC)     Breast cancer ([address]     54    Cancer ([address] 2009    right breast    Depression     GERD (gastroesophageal reflux disease)     H/O laparoscopic adjustable gastric banding 10/27/2016    Heart palpitations     HPV (human papilloma virus) infection 06/11/2018    HPV test positive 12/2014 furosemide (LASIX) 20 MG tablet TAKE 1 TABLET BY MOUTH ONE TIME A DAY AS NEEDED FOR WEIGHT GAIN OF 3LBS IN A DAY OR 5LBS IN A WEEK 90 tablet 2    metoprolol succinate (TOPROL XL) 25 MG extended release tablet TAKE 1 TABLET BY MOUTH ONE TIME A DAY 90 tablet 2    propafenone (RYTHMOL SR) 225 MG extended release capsule Take 1 capsule by mouth 2 times daily 60 capsule 0    spironolactone (ALDACTONE) 25 MG tablet TAKE 1 TABLET BY MOUTH ONE TIME A DAY 90 tablet 1    citalopram (CELEXA) 40 MG tablet TAKE ONE TABLET BY MOUTH ONE TIME A DAY 90 tablet 1    valsartan (DIOVAN) 40 MG tablet TAKE 1 TABLET BY MOUTH ONE TIME A DAY 90 tablet 3    montelukast (SINGULAIR) 10 MG tablet TAKE ONE TABLET BY MOUTH NIGHTLY 90 tablet 2    Azelastine-Fluticasone 137-50 MCG/ACT SUSP 1 spray by Nasal route 2 times daily      hydrocortisone (ANUSOL-HC) 2.5 % rectal cream Place rectally 2 times daily. 1 Tube 0    Lactobacillus (FLORAJEN ACIDOPHILUS) CAPS Take 1 capsule by mouth daily      cetirizine (ZYRTEC) 10 MG tablet Take 10 mg by mouth 2 times daily       multivitamin (THERAGRAN) per tablet Take 1 tablet by mouth daily. Social History:   Social History     Socioeconomic History    Marital status:      Spouse name: Not on file    Number of children: Not on file    Years of education: Not on file    Highest education level: Not on file   Occupational History    Occupation: [employer] [address] [place] [address] Financial resource strain: Not on file    Food insecurity     Worry: Not on file     Inability: Not on file   [employer] needs     Medical: Not on file     Non-medical: Not on file   Tobacco Use    Smoking status: Former Smoker     Packs/day: 0.25     Years: 1.50     Pack years: 0.37     Types: Cigarettes     Quit date: 3/27/1972     Years since quittin.0    Smokeless tobacco: Never Used   Substance and Sexual Activity    Alcohol use:  Yes     Alcohol/week: 0.0 standard drinks     Comment: soc    2. Seborrheic keratosis    3. Multiple benign nevi    4. Sunburn of first degree    5. History of squamous cell carcinoma in situ        1. Actinic keratoses  -Edu re: relationship with chronic cumulative sun exposure, low premalignant potential.   Verbal consent obtained. -left lateral cheek and left temple, 2 lesion(s) treated w/ liquid nitrogen x 1cycles, 3 seconds each located    Edu re: risk of blister formation, discomfort, scar, dyspigmentation. Discussed wound care. -Reviewed sun protective behavior -- sun avoidance during the peak hours of the day, sun-protective clothing (including hat and sunglasses), sunscreen use (water resistant, broad spectrum, SPF at least 30, need for reapplication every 2 to 3 hours). -Patient to contact office if AK fails to resolve despite treatment or concern for recurrence (discussed signs/symptoms to monitor for) or if patient develops side effect from therapy, such as unbearable blister, crusting, scabbing, redness, or tenderness. 2. Seborrheic keratosis  -Reassurance provided to the patient regarding their chronic and benign nature. No treatment performed      3. Multiple benign nevi  Benign acquired melanocytic nevi  -Recommend monthly self skin exams   -Educated regarding the ABCDEs of melanoma detection   -Call for any new/changing moles or concerning lesions  -Reviewed sun protective behavior -- sun avoidance during the peak hours of the day, sun-protective clothing (including hat and sunglasses), sunscreen use (water resistant, broad spectrum, SPF at least 30, need for reapplication every 1.5 to 2 hours), avoidance of tanning beds   -Plan: Observation with annual skin checks (earlier if indicated) performed in office to monitor current nevi and to assess for new lesions.       4. Sunburn of first degree  The patient was counseled regarding sun protective practices such as sunscreen use (water resistant, broad spectrum sunscreen with SPF rating of at least 30) and need for reapplication at least every 2 hours, sun protective clothing (including hat and sunglasses), avoidance of sun during the peak hours of the day, and avoidance of tanning beds.  -discussed UV radiation exposure can lead to skin cancer, practice sun avoidance/sun protection    -aquaphor daily to affected areas prn    5. History of squamous cell carcinoma in situ  No evidence of recurrence   Skin Care:  Skin cancer is primarily a result of exposure to UV light. Patients with a history of non-melanoma skin cancer should wear broad-spectrum sunscreen (discussed proper use with patient),, sun protective clothing, wide-brimmed hats and practice sun avoidance as much as possible to decrease their risk of developing new skin cancers. Expectations:  Scars from where skin cancers have been removed should be monitored for recurrences. Contact office:  If the patient notices discoloration, bleeding, or a bump arising in a previously healed scar or if a new lesion develops elsewhere. Return to Clinic: 1 year skin exam   Discussed plan with patient and/or primary caretaker. Patient to call clinic with any questions / concerns. Reviewed proper use and side effects of treatment(s) and/or medication(s) with patient and/or primary caretaker. AVS provided or is available on [employer]     Note is transcribed using voice recognition software. Inadvertent computerized transcription errors may be present.

## 2021-04-15 ENCOUNTER — OFFICE VISIT (OUTPATIENT)
Dept: FAMILY MEDICINE CLINIC | Age: 66
End: 2021-04-15
Payer: MEDICARE

## 2021-04-15 VITALS
HEIGHT: 66 IN | OXYGEN SATURATION: 98 % | WEIGHT: 290 LBS | DIASTOLIC BLOOD PRESSURE: 78 MMHG | BODY MASS INDEX: 46.61 KG/M2 | HEART RATE: 61 BPM | SYSTOLIC BLOOD PRESSURE: 128 MMHG

## 2021-04-15 DIAGNOSIS — R68.89 FORGETFULNESS: ICD-10-CM

## 2021-04-15 DIAGNOSIS — Z00.00 ROUTINE GENERAL MEDICAL EXAMINATION AT A HEALTH CARE FACILITY: Primary | ICD-10-CM

## 2021-04-15 PROCEDURE — 3017F COLORECTAL CA SCREEN DOC REV: CPT | Performed by: PHYSICIAN ASSISTANT

## 2021-04-15 PROCEDURE — 4040F PNEUMOC VAC/ADMIN/RCVD: CPT | Performed by: PHYSICIAN ASSISTANT

## 2021-04-15 PROCEDURE — G0402 INITIAL PREVENTIVE EXAM: HCPCS | Performed by: PHYSICIAN ASSISTANT

## 2021-04-15 PROCEDURE — 1123F ACP DISCUSS/DSCN MKR DOCD: CPT | Performed by: PHYSICIAN ASSISTANT

## 2021-04-15 ASSESSMENT — PATIENT HEALTH QUESTIONNAIRE - PHQ9: 2. FEELING DOWN, DEPRESSED OR HOPELESS: 1

## 2021-04-15 NOTE — PROGRESS NOTES
Medicare Annual Wellness Visit  Name: Daniele Wong Date: 4/15/2021   MRN: [id number] Sex: Female   Age: 76 y.o. Ethnicity: Non-/Non    : 1955 Race: Sylvie Lozano is here for Medicare AWV    Screenings for behavioral, psychosocial and functional/safety risks, and cognitive dysfunction are all negative except as indicated below. These results, as well as other patient data from the [address] form, are documented in Flowsheets linked to this Encounter. Allergies   Allergen Reactions    Latex Rash    Bactrim [Sulfamethoxazole-Trimethoprim] Swelling    Iodine Shortness Of Breath         Prior to Visit Medications    Medication Sig Taking? Authorizing Provider   metoprolol succinate (TOPROL XL) 25 MG extended release tablet TAKE 1 TABLET BY MOUTH ONE TIME A DAY Yes BLANK Moore CNP   ELDERBERRY PO Take by mouth Yes Historical Provider, MD   omeprazole (PRILOSEC) 20 MG delayed release capsule TAKE ONE CAPSULE BY MOUTH EVERY MORNING BEFORE BREAKFAST Yes KADEN Shah   fluticasone (FLONASE) 50 MCG/ACT nasal spray 2 SPRAYS IN EACH NOSTRIL ONCE DAILY.  Yes KADEN Ham   oxybutynin (DITROPAN XL) 15 MG extended release tablet TAKE 1 TABLET BY MOUTH ONE TIME A DAY Yes KADEN Ham   levothyroxine (SYNTHROID) 137 MCG tablet TAKE 1 TABLET BY MOUTH ONE TIME A DAY Yes KADEN Shah   atorvastatin (LIPITOR) 40 MG tablet TAKE ONE TABLET BY MOUTH NIGHTLY Yes Too Ceja MD   apixaban (ELIQUIS) 5 MG TABS tablet TAKE 1 TABLET BY MOUTH TWO TIMES A DAY Yes BLANK Moore CNP   furosemide (LASIX) 20 MG tablet TAKE 1 TABLET BY MOUTH ONE TIME A DAY AS NEEDED FOR WEIGHT GAIN OF 3LBS IN A DAY OR 5LBS IN A WEEK Yes BLANK Scruggs CNP   propafenone (RYTHMOL SR) 225 MG extended release capsule Take 1 capsule by mouth 2 times daily Yes Patrice Smith MD   spironolactone (ALDACTONE) 25 MG tablet TAKE 1 TABLET  HAND SURGERY Right 11/2/2020    RIGHT THUMB INCISION AND DRAINAGE WITH NAIL REMOVAL AND SPUR REMOVAL performed by Elisabeth Richard MD at [address] LAP BAND  2007    LAP BAND  2012    Removal    OVARY REMOVAL      PARTIAL HYSTERECTOMY      THYROIDECTOMY, PARTIAL Left 2000    TUNNELED VENOUS PORT PLACEMENT  2009/2011    Placed/Removed    UPPER GASTROINTESTINAL ENDOSCOPY  6/10/16    gastritis, gastric polyp bx          Family History   Problem Relation Age of Onset    Cancer Mother         breast and cervical    Heart Disease Father     Cancer Father 61        skin cancer    Other Father         AAA    Heart Attack Father     Cancer Maternal Grandmother         breast    Cancer Paternal Grandmother         breast    Cancer Sister         lymphoma    No Known Problems Brother     No Known Problems Maternal Aunt     No Known Problems Maternal Uncle     No Known Problems Paternal Aunt     No Known Problems Paternal Uncle     No Known Problems Maternal Grandfather     No Known Problems Paternal Grandfather     Cancer Other         neice- head, not sure what kind skin ca    Heart Disease Sister     Other Sister         biliary hepatitis    Asthma Neg Hx     Diabetes Neg Hx     Emphysema Neg Hx     Heart Failure Neg Hx     Hypertension Neg Hx     Rheum Arthritis Neg Hx     Osteoarthritis Neg Hx     Breast Cancer Neg Hx     High Cholesterol Neg Hx     Migraines Neg Hx     Ovarian Cancer Neg Hx     Rashes/Skin Problems Neg Hx     Seizures Neg Hx     Stroke Neg Hx     Thyroid Disease Neg Hx        CareTeam (Including outside providers/suppliers regularly involved in providing care):   Patient Care Team:  KADEN Crystal as PCP - General (Physician Assistant)  KADEN Crystal as PCP - [facility] Empaneled Provider  Barbie Dietz MD as PCP - OBGYN (Obstetrics & Gynecology)  Virginia Bowers MD as Consulting Physician (Sleep Medicine) Preventive Plan   Current Health Maintenance Status  Immunization History   Administered Date(s) Administered    Influenza A (K0V3-00) Vaccine IM 10/10/2015    Influenza A (I6V9-61) Vaccine PF IM 10/10/2015    Influenza Vaccine, unspecified formulation 09/27/2011, 11/02/2016    Influenza Virus Vaccine 12/12/2012, 10/24/2014, 11/02/2016, 10/31/2018, 10/31/2018, 10/01/2019, 11/06/2019    Influenza Whole 09/27/2011, 12/12/2012, 10/24/2014    Influenza, High-dose, Quadv, 65 yrs +, IM (Fluzone) 09/23/2020    Influenza, Quadv, IM, PF (6 mo and older Fluzone, Flulaval, Fluarix, and 3 yrs and older Afluria) 09/22/2017    Pneumococcal Conjugate 13-valent ([id number]) 07/29/2019    Pneumococcal Polysaccharide ([id number]) 04/01/2009, 08/30/2009, 09/23/2020    Tdap (Boostrix, Adacel) 04/01/2009, 07/29/2019    Zoster Recombinant (Shingrix) 11/15/2018, 01/22/2019        Health Maintenance   Topic Date Due    COVID-19 Vaccine (1) Never done   [employer] Visit (AWV)  Never done    Lipid screen  09/09/2021    TSH testing  09/23/2021    Breast cancer screen  10/26/2021    A1C test (Diabetic or Prediabetic)  01/13/2022    Potassium monitoring  01/13/2022    Creatinine monitoring  01/13/2022    Colon cancer screen colonoscopy  08/06/2025    DTaP/Tdap/Td vaccine (3 - Td) 07/29/2029    DEXA (modify frequency per FRAX score)  Completed    Flu vaccine  Completed    Shingles Vaccine  Completed    Pneumococcal 65+ years Vaccine  Completed    Hepatitis C screen  Completed    Hepatitis A vaccine  Aged Out    Hepatitis B vaccine  Aged Out    Hib vaccine  Aged Out    Meningococcal (ACWY) vaccine  Aged Out     Recommendations for [employer] Due: see orders and patient instructions/AVS.  . Recommended screening schedule for the next 5-10 years is provided to the patient in written form: see Patient Veronika Forte was seen today for medicare awv.     Diagnoses and all orders for this visit:    Routine general medical examination at a health care facility  -   Pt has ACP documents signed at home and will bring to her next appt    Forgetfulness  -  SLUMS completed and within normal range. Encouraged to read, work on word cross puzzles and sudoko. Pt considering prevagin.

## 2021-04-15 NOTE — PATIENT INSTRUCTIONS
Personalized Preventive Plan for Catalina Williamson - 4/15/2021  Medicare offers a range of preventive health benefits. Some of the tests and screenings are paid in full while other may be subject to a deductible, co-insurance, and/or copay. Some of these benefits include a comprehensive review of your medical history including lifestyle, illnesses that may run in your family, and various assessments and screenings as appropriate. After reviewing your medical record and screening and assessments performed today your provider may have ordered immunizations, labs, imaging, and/or referrals for you. A list of these orders (if applicable) as well as your Preventive Care list are included within your After Visit Summary for your review. Other Preventive Recommendations:    · A preventive eye exam performed by an eye specialist is recommended every 1-2 years to screen for glaucoma; cataracts, macular degeneration, and other eye disorders. · A preventive dental visit is recommended every 6 months. · Try to get at least 150 minutes of exercise per week or 10,000 steps per day on a pedometer . · Order or download the FREE \"Exercise & Physical Activity: Your Everyday Guide\" from [employer]. Call 3-752.778.1387 or search [employer] online. · You need 7168-8338 mg of calcium and 2566-7506 IU of vitamin D per day. It is possible to meet your calcium requirement with diet alone, but a vitamin D supplement is usually necessary to meet this goal.  · When exposed to the sun, use a sunscreen that protects against both UVA and UVB radiation with an SPF of 30 or greater. Reapply every 2 to 3 hours or after sweating, drying off with a towel, or swimming. · Always wear a seat belt when traveling in a car. Always wear a helmet when riding a bicycle or motorcycle.

## 2021-04-20 ENCOUNTER — PATIENT MESSAGE (OUTPATIENT)
Dept: FAMILY MEDICINE CLINIC | Age: 66
End: 2021-04-20

## 2021-04-20 DIAGNOSIS — I50.22 CHRONIC SYSTOLIC HEART FAILURE (HCC): ICD-10-CM

## 2021-04-20 RX ORDER — MONTELUKAST SODIUM 10 MG/1
TABLET ORAL
Qty: 90 TABLET | Refills: 2 | Status: SHIPPED | OUTPATIENT
Start: 2021-04-20 | End: 2022-01-04

## 2021-04-20 RX ORDER — OMEPRAZOLE 20 MG/1
CAPSULE, DELAYED RELEASE ORAL
Qty: 90 CAPSULE | Refills: 1 | Status: SHIPPED | OUTPATIENT
Start: 2021-04-20 | End: 2021-10-01

## 2021-04-20 RX ORDER — CITALOPRAM 40 MG/1
TABLET ORAL
Qty: 90 TABLET | Refills: 1 | Status: SHIPPED | OUTPATIENT
Start: 2021-04-20 | End: 2021-10-01

## 2021-04-20 RX ORDER — SPIRONOLACTONE 25 MG/1
TABLET ORAL
Qty: 90 TABLET | Refills: 3 | Status: SHIPPED | OUTPATIENT
Start: 2021-04-20 | End: 2022-03-03

## 2021-04-20 RX ORDER — LEVOTHYROXINE SODIUM 137 UG/1
TABLET ORAL
Qty: 90 TABLET | Refills: 1 | Status: SHIPPED | OUTPATIENT
Start: 2021-04-20 | End: 2021-10-01

## 2021-04-20 NOTE — TELEPHONE ENCOUNTER
Patient called requesting a new  prescription for spirolactone with 90 DAY refills be sent to her new pharmacy, [facility]

## 2021-04-20 NOTE — TELEPHONE ENCOUNTER
From: Lima Daniel  To: KADEN Green  Sent: 4/20/2021 10:58 AM EDT  Subject: Prescription Question    Hi, Since I have retired and Medicare is my primary now I have new pharmacy insurance also. I am due for a few refills with the new insurance. Today I filled my meds for 30 days and there are 4 meds that I will need filled before next month when I fill them again. I am asking that you please send a written rx to my mail away pharmacy for 90 days to: [facility] Attn: Claims [address] [facility],  [id number], [place]. My rx ID is [id number] eff 3/1/21. My list of meds needed is: Montelukast sodium 10mg 1 Daily, Citalopram Hydrobromide 40mg tabs 1 daily, Omepraole 20mg 1 daily and Levothyroxine Sodium 137 mcg 1 daily. Please let me know if there are any problems and I thank you for your help with my refills.  Jason Lott

## 2021-05-20 ENCOUNTER — VIRTUAL VISIT (OUTPATIENT)
Dept: PULMONOLOGY | Age: 66
End: 2021-05-20
Payer: MEDICARE

## 2021-05-20 ENCOUNTER — TELEPHONE (OUTPATIENT)
Dept: PULMONOLOGY | Age: 66
End: 2021-05-20

## 2021-05-20 DIAGNOSIS — Z71.89 CPAP USE COUNSELING: ICD-10-CM

## 2021-05-20 DIAGNOSIS — G47.33 SEVERE OBSTRUCTIVE SLEEP APNEA: Primary | ICD-10-CM

## 2021-05-20 DIAGNOSIS — E66.01 OBESITY, MORBID, BMI 40.0-49.9 (HCC): ICD-10-CM

## 2021-05-20 PROCEDURE — G8427 DOCREV CUR MEDS BY ELIG CLIN: HCPCS | Performed by: NURSE PRACTITIONER

## 2021-05-20 PROCEDURE — 1123F ACP DISCUSS/DSCN MKR DOCD: CPT | Performed by: NURSE PRACTITIONER

## 2021-05-20 PROCEDURE — 1090F PRES/ABSN URINE INCON ASSESS: CPT | Performed by: NURSE PRACTITIONER

## 2021-05-20 PROCEDURE — 4040F PNEUMOC VAC/ADMIN/RCVD: CPT | Performed by: NURSE PRACTITIONER

## 2021-05-20 PROCEDURE — 99213 OFFICE O/P EST LOW 20 MIN: CPT | Performed by: NURSE PRACTITIONER

## 2021-05-20 PROCEDURE — G8399 PT W/DXA RESULTS DOCUMENT: HCPCS | Performed by: NURSE PRACTITIONER

## 2021-05-20 PROCEDURE — 3017F COLORECTAL CA SCREEN DOC REV: CPT | Performed by: NURSE PRACTITIONER

## 2021-05-20 ASSESSMENT — SLEEP AND FATIGUE QUESTIONNAIRES
HOW LIKELY ARE YOU TO NOD OFF OR FALL ASLEEP WHEN YOU ARE A PASSENGER IN A CAR FOR AN HOUR WITHOUT A BREAK: 1
HOW LIKELY ARE YOU TO NOD OFF OR FALL ASLEEP WHILE SITTING QUIETLY AFTER LUNCH WITHOUT ALCOHOL: 0
ESS TOTAL SCORE: 6
HOW LIKELY ARE YOU TO NOD OFF OR FALL ASLEEP IN A CAR, WHILE STOPPED FOR A FEW MINUTES IN TRAFFIC: 0
HOW LIKELY ARE YOU TO NOD OFF OR FALL ASLEEP WHILE LYING DOWN TO REST IN THE AFTERNOON WHEN CIRCUMSTANCES PERMIT: 3
HOW LIKELY ARE YOU TO NOD OFF OR FALL ASLEEP WHILE SITTING AND TALKING TO SOMEONE: 0
HOW LIKELY ARE YOU TO NOD OFF OR FALL ASLEEP WHILE SITTING AND READING: 1

## 2021-05-20 NOTE — PROGRESS NOTES
2019 2018 2016 10/7/2014   Sitting and reading 1 1 1 1 2 3 3   Watching TV 1 0 1 1 2 2 3   Sitting, inactive in a public place (e.g. a theatre or a meeting) 0 0 1 1 1 1 1   As a passenger in a car for an hour without a break 1 0 0 1 1 2 1   Lying down to rest in the afternoon when circumstances permit 3 3 3 3 3 3 3   Sitting and talking to someone 0 0 0 0 0 1 1   Sitting quietly after a lunch without alcohol 0 0 0 1 0 2 1   In a car, while stopped for a few minutes in traffic 0 0 0 0 0 0 0   Total score 6 4 6 8 9 14 13   Neck circumference - - 17.75 - - - -       Past Medical History:  Past Medical History:   Diagnosis Date    Abnormal Pap smear of cervix 2018    hpv+    Allergic     Allergic rhinitis     Aortic stenosis, mild-echo 2016    Atrial fibrillation ([address]     BMI 40.0-44.9, adult ([address]     Breast cancer ([address]     53    Cancer ([address] 2009    right breast    Depression     GERD (gastroesophageal reflux disease)     H/O laparoscopic adjustable gastric banding 10/27/2016    Heart palpitations     HPV (human papilloma virus) infection 2018    HPV test positive 2014    Hypertension     Hypothyroidism 2000    Meningioma ([address]     10 years ago    Mild aortic regurgitation-echo 2016    Mild dysplasia of cervix 2016    Obesity     Sleep apnea     Urge incontinence 2017       Past Surgical History:        Procedure Laterality Date    APPENDECTOMY      BREAST BIOPSY      BREAST LUMPECTOMY      BREAST SURGERY  2009    right lumpectomy xrt and chemo and serm     SECTION  ,,,    failed to drop in birth canal     SECTION      CHOLECYSTECTOMY      COLONOSCOPY      HAND SURGERY Right 2020    RIGHT THUMB INCISION AND DRAINAGE WITH NAIL REMOVAL AND SPUR REMOVAL performed by Mil Hensley [facility] at [facility]  2007   [address] [place] [address]  2012    Removal    OVARY REMOVAL      PARTIAL HYSTERECTOMY      THYROIDECTOMY, PARTIAL Left 2000    TUNNELED VENOUS PORT PLACEMENT  2009/2011    Placed/Removed    UPPER GASTROINTESTINAL ENDOSCOPY  6/10/16    gastritis, gastric polyp bx        Allergies:  is allergic to latex, bactrim [sulfamethoxazole-trimethoprim], and iodine. Social History:    TOBACCO:   reports that she quit smoking about 49 years ago. Her smoking use included cigarettes. She has a 0.38 pack-year smoking history. She has never used smokeless tobacco.  ETOH:   reports current alcohol use.     Family History:       Problem Relation Age of Onset    Cancer Mother         breast and cervical    Heart Disease Father     Cancer Father 61        skin cancer    Other Father         AAA    Heart Attack Father     Cancer Maternal Grandmother         breast    Cancer Paternal Grandmother         breast    Cancer Sister         lymphoma    No Known Problems Brother     No Known Problems Maternal Aunt     No Known Problems Maternal Uncle     No Known Problems Paternal Aunt     No Known Problems Paternal Uncle     No Known Problems Maternal Grandfather     No Known Problems Paternal Grandfather     Cancer Other         neice- head, not sure what kind skin ca    Heart Disease Sister     Other Sister         biliary hepatitis    Asthma Neg Hx     Diabetes Neg Hx     Emphysema Neg Hx     Heart Failure Neg Hx     Hypertension Neg Hx     Rheum Arthritis Neg Hx     Osteoarthritis Neg Hx     Breast Cancer Neg Hx     High Cholesterol Neg Hx     Migraines Neg Hx     Ovarian Cancer Neg Hx     Rashes/Skin Problems Neg Hx     Seizures Neg Hx     Stroke Neg Hx     Thyroid Disease Neg Hx        Current Medications:    Current Outpatient Medications:     montelukast (SINGULAIR) 10 MG tablet, TAKE ONE TABLET BY MOUTH NIGHTLY, Disp: 90 tablet, Rfl: 2    citalopram (CELEXA) 40 MG tablet, TAKE ONE TABLET BY MOUTH ONE TIME A DAY, Disp: 90 tablet, Rfl: 1   omeprazole (PRILOSEC) 20 MG delayed release capsule, TAKE ONE CAPSULE BY MOUTH EVERY MORNING BEFORE BREAKFAST, Disp: 90 capsule, Rfl: 1    levothyroxine (SYNTHROID) 137 MCG tablet, TAKE 1 TABLET BY MOUTH ONE TIME A DAY, Disp: 90 tablet, Rfl: 1    spironolactone (ALDACTONE) 25 MG tablet, TAKE 1 TABLET BY MOUTH ONE TIME A DAY, Disp: 90 tablet, Rfl: 3    metoprolol succinate (TOPROL XL) 25 MG extended release tablet, TAKE 1 TABLET BY MOUTH ONE TIME A DAY, Disp: 90 tablet, Rfl: 0    ELDERBERRY PO, Take by mouth, Disp: , Rfl:     fluticasone (FLONASE) 50 MCG/ACT nasal spray, 2 SPRAYS IN EACH NOSTRIL ONCE DAILY. , Disp: 1 Bottle, Rfl: 1    oxybutynin (DITROPAN XL) 15 MG extended release tablet, TAKE 1 TABLET BY MOUTH ONE TIME A DAY, Disp: 90 tablet, Rfl: 2    atorvastatin (LIPITOR) 40 MG tablet, TAKE ONE TABLET BY MOUTH NIGHTLY, Disp: 90 tablet, Rfl: 1    apixaban (ELIQUIS) 5 MG TABS tablet, TAKE 1 TABLET BY MOUTH TWO TIMES A DAY, Disp: 180 tablet, Rfl: 1    furosemide (LASIX) 20 MG tablet, TAKE 1 TABLET BY MOUTH ONE TIME A DAY AS NEEDED FOR WEIGHT GAIN OF 3LBS IN A DAY OR 5LBS IN A WEEK, Disp: 90 tablet, Rfl: 2    propafenone (RYTHMOL SR) 225 MG extended release capsule, Take 1 capsule by mouth 2 times daily, Disp: 60 capsule, Rfl: 0    valsartan (DIOVAN) 40 MG tablet, TAKE 1 TABLET BY MOUTH ONE TIME A DAY, Disp: 90 tablet, Rfl: 3    Azelastine-Fluticasone 137-50 MCG/ACT SUSP, 1 spray by Nasal route 2 times daily, Disp: , Rfl:     hydrocortisone (ANUSOL-HC) 2.5 % rectal cream, Place rectally 2 times daily. , Disp: 1 Tube, Rfl: 0    Lactobacillus (FLORAJEN ACIDOPHILUS) CAPS, Take 1 capsule by mouth daily, Disp: , Rfl:     cetirizine (ZYRTEC) 10 MG tablet, Take 10 mg by mouth 2 times daily , Disp: , Rfl:     multivitamin (THERAGRAN) per tablet, Take 1 tablet by mouth daily. , Disp: , Rfl:       Objective:   PHYSICAL EXAM:    LMP  (LMP Unknown)     Physical exam:  Exam:  Gen: No acute distress, does not appear Patient counseled to never drive or operate heavy machinery while fatigue, drowsy or sleepy. - Weight loss is recommended as a long-term intervention.    - Complications of JINA if not treated were discussed with patient patient, including: systemic hypertension, pulmonary hypertension, cardiovascular morbidities, car accidents and all cause mortality.  -Patient education  provided regarding sleep tips and CPAP cleaning recommendations     Consent for telehealth visit was obtained and is noted in chart      THIS VISIT [facility]. Jordi Loya is a 76 y.o. female being evaluated by a Virtual Visit (video visit) encounter to address concerns as mentioned above. A caregiver was present when appropriate. Due to this being a TeleHealth encounter (During BZYPX-68 public health emergency), evaluation of the following organ systems was limited: Vitals/Constitutional/EENT/Resp/CV/GI//MS/Neuro/Skin/Heme-Lymph-Imm. Pursuant to the emergency declaration under the [address] [place] [address], [address] [place] [address] and the [employer] and [employer] Act, this Virtual Visit was conducted with patient's (and/or legal guardian's) consent, to reduce the patient's risk of exposure to COVID-19 and provide necessary medical care. The patient (and/or legal guardian) has also been advised to contact this office for worsening conditions or problems, and seek emergency medical treatment and/or call 911 if deemed necessary. Patient identification was verified at the start of the visit: Yes    Total time spent for this encounter: Not billed by time    Services were provided through a video synchronous discussion virtually to substitute for in-person clinic visit. Patient and provider were located at their individual homes. --BLANK Giles - CNP on 5/20/2021 at 1:07 PM    An electronic signature was used to authenticate this note.

## 2021-05-20 NOTE — PATIENT INSTRUCTIONS

## 2021-06-08 ENCOUNTER — OFFICE VISIT (OUTPATIENT)
Dept: CARDIOLOGY CLINIC | Age: 66
End: 2021-06-08
Payer: MEDICARE

## 2021-06-08 VITALS
DIASTOLIC BLOOD PRESSURE: 70 MMHG | WEIGHT: 289 LBS | HEART RATE: 56 BPM | SYSTOLIC BLOOD PRESSURE: 110 MMHG | OXYGEN SATURATION: 97 % | HEIGHT: 66 IN | BODY MASS INDEX: 46.45 KG/M2

## 2021-06-08 DIAGNOSIS — I42.8 NON-ISCHEMIC CARDIOMYOPATHY (HCC): ICD-10-CM

## 2021-06-08 DIAGNOSIS — I10 ESSENTIAL HYPERTENSION: ICD-10-CM

## 2021-06-08 DIAGNOSIS — E78.2 MIXED HYPERLIPIDEMIA: ICD-10-CM

## 2021-06-08 DIAGNOSIS — I50.22 CHRONIC SYSTOLIC HEART FAILURE (HCC): Primary | ICD-10-CM

## 2021-06-08 DIAGNOSIS — I48.0 PAF (PAROXYSMAL ATRIAL FIBRILLATION) (HCC): ICD-10-CM

## 2021-06-08 DIAGNOSIS — E55.9 VITAMIN D DEFICIENCY: ICD-10-CM

## 2021-06-08 PROCEDURE — G8427 DOCREV CUR MEDS BY ELIG CLIN: HCPCS | Performed by: INTERNAL MEDICINE

## 2021-06-08 PROCEDURE — 4040F PNEUMOC VAC/ADMIN/RCVD: CPT | Performed by: INTERNAL MEDICINE

## 2021-06-08 PROCEDURE — 1123F ACP DISCUSS/DSCN MKR DOCD: CPT | Performed by: INTERNAL MEDICINE

## 2021-06-08 PROCEDURE — 99215 OFFICE O/P EST HI 40 MIN: CPT | Performed by: INTERNAL MEDICINE

## 2021-06-08 PROCEDURE — G8399 PT W/DXA RESULTS DOCUMENT: HCPCS | Performed by: INTERNAL MEDICINE

## 2021-06-08 PROCEDURE — G8417 CALC BMI ABV UP PARAM F/U: HCPCS | Performed by: INTERNAL MEDICINE

## 2021-06-08 PROCEDURE — 1090F PRES/ABSN URINE INCON ASSESS: CPT | Performed by: INTERNAL MEDICINE

## 2021-06-08 PROCEDURE — 3017F COLORECTAL CA SCREEN DOC REV: CPT | Performed by: INTERNAL MEDICINE

## 2021-06-08 PROCEDURE — 1036F TOBACCO NON-USER: CPT | Performed by: INTERNAL MEDICINE

## 2021-06-08 NOTE — PROGRESS NOTES
[facility]  Advanced CHF/Pulmonary Hypertension   Cardiac Evaluation      Natalya Perkins  YOB: 1955    Date of Visit:  6/8/21      Chief Complaint   Patient presents with    Follow-up    Congestive Heart Failure        History of Present Illness:  Natalya Perkins is a 76 y.o. female who presents from referral from Dr. Gali Smith for consultation and management of cardiomyopathy. She presented to the hospital 07/11/19 with palpitations. She was found to be in AFib RVR. She was started on flecainide, diltiazem and eliquis. Her echo from 07/11/19 showed her EF was 48-50% with mild/ global hypokinesis. Mild MR and AR. Her stress test from 07/25/19 showed a mixed defect in the anteroseptal wall raising concern for mixed ischemia/scar. Her cardiac cath from 08/02/19 showed nonischemic cardiomyopathy. Flecainide and diltiazem were stopped due to worsening. LVEF. She was started on toprol 25 mg. She was started on lisinopril and eliquis as well. She believes she has had PAF for 30 years but was never able to capture it. She reports she feels awful and SOB with the AFib. She reports she verifies afib with checking her radial pulse. She started having palpitations again last night after eating. She does not check her BP at home. Her cardiac MRI from 08/02/19 showed her EF was 45%. She underwent afib ablation with Dr Ligia Bañuelos on 03/05/20. Her echo from 09/29/20 showed her EF was 55%. Elevated RA pressures. Today she reports she has retired. She is working at [employer] living for two days a week. She reports a couple of episodes of palpitations after missing a couple of doses of medications. She reports more recently she feels well overall. She reports a little dizziness at times when she is dehydrated. She denies CP, SOB, BLE edema or syncope. She is taking lasix two - three times a week.           Allergies   Allergen Reactions    Latex Rash    Bactrim [Sulfamethoxazole-Trimethoprim] Swelling    Iodine Shortness Of Breath     Current Outpatient Medications   Medication Sig Dispense Refill    montelukast (SINGULAIR) 10 MG tablet TAKE ONE TABLET BY MOUTH NIGHTLY 90 tablet 2    citalopram (CELEXA) 40 MG tablet TAKE ONE TABLET BY MOUTH ONE TIME A DAY 90 tablet 1    omeprazole (PRILOSEC) 20 MG delayed release capsule TAKE ONE CAPSULE BY MOUTH EVERY MORNING BEFORE BREAKFAST 90 capsule 1    levothyroxine (SYNTHROID) 137 MCG tablet TAKE 1 TABLET BY MOUTH ONE TIME A DAY 90 tablet 1    spironolactone (ALDACTONE) 25 MG tablet TAKE 1 TABLET BY MOUTH ONE TIME A DAY 90 tablet 3    metoprolol succinate (TOPROL XL) 25 MG extended release tablet TAKE 1 TABLET BY MOUTH ONE TIME A DAY 90 tablet 0    ELDERBERRY PO Take by mouth      fluticasone (FLONASE) 50 MCG/ACT nasal spray 2 SPRAYS IN EACH NOSTRIL ONCE DAILY. 1 Bottle 1    oxybutynin (DITROPAN XL) 15 MG extended release tablet TAKE 1 TABLET BY MOUTH ONE TIME A DAY 90 tablet 2    atorvastatin (LIPITOR) 40 MG tablet TAKE ONE TABLET BY MOUTH NIGHTLY 90 tablet 1    apixaban (ELIQUIS) 5 MG TABS tablet TAKE 1 TABLET BY MOUTH TWO TIMES A  tablet 1    furosemide (LASIX) 20 MG tablet TAKE 1 TABLET BY MOUTH ONE TIME A DAY AS NEEDED FOR WEIGHT GAIN OF 3LBS IN A DAY OR 5LBS IN A WEEK 90 tablet 2    propafenone (RYTHMOL SR) 225 MG extended release capsule Take 1 capsule by mouth 2 times daily 60 capsule 0    valsartan (DIOVAN) 40 MG tablet TAKE 1 TABLET BY MOUTH ONE TIME A DAY 90 tablet 3    Azelastine-Fluticasone 137-50 MCG/ACT SUSP 1 spray by Nasal route 2 times daily      Lactobacillus (FLORAJEN ACIDOPHILUS) CAPS Take 1 capsule by mouth daily      cetirizine (ZYRTEC) 10 MG tablet Take 10 mg by mouth 2 times daily       multivitamin (THERAGRAN) per tablet Take 1 tablet by mouth daily.  hydrocortisone (ANUSOL-HC) 2.5 % rectal cream Place rectally 2 times daily.  (Patient not taking: Reported on 6/8/2021) 1 Tube 0     No current facility-administered medications for this visit.        Past Medical History:   Diagnosis Date    Abnormal Pap smear of cervix 2018    hpv+    Allergic     Allergic rhinitis     Aortic stenosis, mild-echo 2016    Atrial fibrillation (HCC)     BMI 40.0-44.9, adult ([address]     Breast cancer ([facility]     54    Cancer ([facility]     right breast    Depression     GERD (gastroesophageal reflux disease)     H/O laparoscopic adjustable gastric banding 10/27/2016    Heart palpitations     HPV (human papilloma virus) infection 2018    HPV test positive 2014    Hypertension     Hypothyroidism 2000    Meningioma (HCC)     10 years ago    Mild aortic regurgitation-echo 2016    Mild dysplasia of cervix 2016    Obesity     Sleep apnea     Urge incontinence 2017     Past Surgical History:   Procedure Laterality Date    APPENDECTOMY      BREAST BIOPSY      BREAST LUMPECTOMY      BREAST SURGERY  2009    right lumpectomy xrt and chemo and serm     SECTION  ,,,    failed to drop in birth canal     SECTION      CHOLECYSTECTOMY      COLONOSCOPY      HAND SURGERY Right 2020    RIGHT THUMB INCISION AND DRAINAGE WITH NAIL REMOVAL AND SPUR REMOVAL performed by Maykel Siddiqi MD at [address] [facility] LAP BAND      LAP BAND      Removal    OVARY REMOVAL      PARTIAL HYSTERECTOMY      THYROIDECTOMY, PARTIAL Left     TUNNELED VENOUS PORT PLACEMENT      Placed/Removed    UPPER GASTROINTESTINAL ENDOSCOPY  6/10/16    gastritis, gastric polyp bx      Family History   Problem Relation Age of Onset    Cancer Mother         breast and cervical    Heart Disease Father     Cancer Father 61        skin cancer    Other Father         AAA    Heart Attack Father     Cancer Maternal Grandmother         breast    Cancer Paternal Grandmother         breast    Cancer Sister lymphoma    No Known Problems Brother     No Known Problems Maternal Aunt     No Known Problems Maternal Uncle     No Known Problems Paternal Aunt     No Known Problems Paternal Uncle     No Known Problems Maternal Grandfather     No Known Problems Paternal Grandfather     Cancer Other         neice- head, not sure what kind skin ca    Heart Disease Sister     Other Sister         biliary hepatitis    Asthma Neg Hx     Diabetes Neg Hx     Emphysema Neg Hx     Heart Failure Neg Hx     Hypertension Neg Hx     Rheum Arthritis Neg Hx     Osteoarthritis Neg Hx     Breast Cancer Neg Hx     High Cholesterol Neg Hx     Migraines Neg Hx     Ovarian Cancer Neg Hx     Rashes/Skin Problems Neg Hx     Seizures Neg Hx     Stroke Neg Hx     Thyroid Disease Neg Hx      Social History     Socioeconomic History    Marital status:      Spouse name: Not on file    Number of children: Not on file    Years of education: Not on file    Highest education level: Not on file   Occupational History    Occupation: billing [employer]   Tobacco Use    Smoking status: Former Smoker     Packs/day: 0.25     Years: 1.50     Pack years: 0.37     Types: Cigarettes     Quit date: 3/27/1972     Years since quittin.2    Smokeless tobacco: Never Used   Vaping Use    Vaping Use: Never used   Substance and Sexual Activity    Alcohol use: Yes     Alcohol/week: 0.0 standard drinks     Comment: soc    Drug use: No    Sexual activity: Yes     Partners: Male   Other Topics Concern    Not on file   Social History Narrative            Spends time with , grandchildren        Lives in [address] [place] [address] Dr Strain:     Difficulty of Paying Living Expenses:    Food Insecurity:     Worried About Running Out of Food in the Last Year:     [address] in the Last Year:    Transportation Needs:     Lack of Transportation (Medical):      Lack of Transportation (Non-Medical):    Physical Activity:     Days of Exercise per Week:     Minutes of Exercise per Session:    Stress:     Feeling of Stress :    Social Connections:     Frequency of Communication with Friends and Family:     Frequency of Social Gatherings with Friends and Family:     Attends Yarsani Services:     Active Member of Clubs or Organizations:     Attends Club or Organization Meetings:     Marital Status:    Intimate Partner Violence:     Fear of Current or Ex-Partner:     Emotionally Abused:     Physically Abused:     Sexually Abused:        Review of Systems:   · Constitutional: there has been no unanticipated weight loss. There's been no change in energy level, sleep pattern, or activity level. · Eyes: No visual changes or diplopia. No scleral icterus. · ENT: No Headaches, hearing loss or vertigo. No mouth sores or sore throat. · Cardiovascular: Reviewed in HPI  · Respiratory: No cough or wheezing, no sputum production. No hematemesis. · Gastrointestinal: No abdominal pain, appetite loss, blood in stools. No change in bowel or bladder habits. · Genitourinary: No dysuria, trouble voiding, or hematuria. · Musculoskeletal:  No gait disturbance, weakness or joint complaints. · Integumentary: No rash or pruritis. · Neurological: No headache, diplopia, change in muscle strength, numbness or tingling. No change in gait, balance, coordination, mood, affect, memory, mentation, behavior. · Psychiatric: No anxiety, no depression. · Endocrine: No malaise, fatigue or temperature intolerance. No excessive thirst, fluid intake, or urination. No tremor. · Hematologic/Lymphatic: No abnormal bruising or bleeding, blood clots or swollen lymph nodes. · Allergic/Immunologic: No nasal congestion or hives. Physical Examination:    Vitals:    06/08/21 1523   BP: 110/70   Pulse: 56   SpO2: 97%   Weight: 289 lb (131.1 kg)   Height: 5' 6\" (1.676 m)     Body mass index is 46.65 kg/m².      Wt Readings from Last 3 Encounters:   06/08/21 289 lb (131.1 kg)   04/15/21 290 lb (131.5 kg)   01/13/21 291 lb (132 kg)     BP Readings from Last 3 Encounters:   06/08/21 110/70   04/15/21 128/78   01/13/21 106/80          Constitutional and General Appearance:   Chronically ill appearing  HEENT:  NC/AT  CHEY  No problems with hearing  Skin:  Warm, dry  Respiratory:  · Normal excursion and expansion without use of accessory muscles  · Resp Auscultation: Normal breath sounds without dullness  Cardiovascular:  · The apical impulses not displaced  · Heart tones are crisp and normal  · Cervical veins are not engorged  · The carotid upstroke is normal in amplitude and contour without delay or bruit   JVP <8 mm H2O\   Irregularly irregular rhythm with nl S1 and S2 without m,r,g  · Peripheral pulses are symmetrical and full  · There is no clubbing, cyanosis of the extremities. · No edema  · Femoral Arteries: 2+ and equal  · Pedal Pulses: 2+ and equal   Neck:  · No thyromegaly  Abdomen:  · No masses or tenderness  · Liver/Spleen: No Abnormalities Noted  Neurological/Psychiatric:  · Alert and oriented in all spheres  · Moves all extremities well  · Exhibits normal gait balance and coordination  · No abnormalities of mood, affect, memory, mentation, or behavior are noted        Echo: 07/11/19   Summary   Left ventricular systolic function is reduced with ejection fraction   estimated at 48-50 %. Mild global hypokinesis is present. There is mild concentric left ventricular hypertrophy. Normal left ventricular diastolic filling pressure. Mild mitral regurgitation. Aortic sclerosis vs mild aortic stenosis. Mild aortic regurgitation is present. Normal systolic pulmonary artery pressure (SPAP) estimated at 29 mmHg (RA   pressure 8 mmHg).      Stress test: 07/25/19   Normal LVEF 61%  Base to mid anteroseptal hypokinesis  There is a mixed defect in the anteroseptal wall raising concern for mixed  ischemia/scar in that contrast imaging and is consistent with no evidence of shunt.  There is late gadolinium enhancement involving the RV insertion points. 5. Velocity-encoded phase contrast Imaging in (2D/4D) was performed to assess valvular function. There is mild aortic stenosis. Peak velocity at the aortic valve is 1.76 corresponding to a peak gradient of 12.4 mmHg. There is mild aortic regurgitation. The calculated aortic regurgitant fraction is 10.5 %. There is mild mitral regurgitation. The calculated mitral regurgitant fraction is 10.3 %. Peak velocity at pulmonary valve is 0.73 corresponding to a peak gradient of 2.13 mmHg. The calculated pulmonic regurgitant fraction is 0.0 %.  6. The coronary arteries have normal origins and proximal courses.  There is evidence of atherosclerosis in the descending aorta.  The descending aorta is dilated measuring 28.0 mm.  7. 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 Echo: 09/29/20  Summary   Left ventricular systolic function is normal with a visually estimated   ejection fraction of 55%. The left ventricle is normal in size with mild concentric hypertrophy. No regional wall motion abnormalities are noted. Systolic pulmonary artery pressure (SPAP) is normal and estimated at 32 mmHg   (right atrial pressure 8 mmHg). The IVC is normal in size (<2.1 cm) but collapses <50% with respiration   consistent with elevated right atrial pressures (8 mmHg) . Labs were reviewed including labs from other hospital systems through [facility]. Cardiac testing was reviewed including echos, nuclear scans, cardiac catheterization, including from other hospital systems through [facility]. Assessment:    1. Chronic systolic heart failure ([address]    2. PAF (paroxysmal atrial fibrillation) ([address]    3. Essential hypertension    4. Non-ischemic cardiomyopathy ([address]    5. Mixed hyperlipidemia    6. Vitamin D deficiency          Plan:  1.  Lipids, CMP, CBC, BNP and Vit D  2. Echo in 6 months  3. Follow up in 6 months          Scribe's attestation: This note was scribed in the presence of Rajwinder Lane M.D. By Erline Ganser RN    The scribe's documentation has been prepared under my direction and personally reviewed by me in its entirety. I confirm that the note above accurately reflects all work, treatment, procedures, and medical decision making performed by me. Time Based Itemization  A total of 40 minutes was spent on today's patient encounter. If applicable, non-patient-facing activities:  ( x)Preparing to see the patient and reviewing records  ( ) Individual interpretation of results  ( ) Discussion or coordination of care with other health care professionals  ( x) Ordering of unique tests, medications, or procedures  ( x) Documentation within the EHR       I appreciate the opportunity of cooperating in the care of this patient.     Rajwinder Lane M.D., [facility]

## 2021-06-24 ENCOUNTER — VIRTUAL VISIT (OUTPATIENT)
Dept: INFECTIOUS DISEASES | Age: 66
End: 2021-06-24
Payer: MEDICARE

## 2021-06-24 DIAGNOSIS — L03.011 PARONYCHIA OF FINGER OF RIGHT HAND: Primary | ICD-10-CM

## 2021-06-24 PROCEDURE — 3017F COLORECTAL CA SCREEN DOC REV: CPT | Performed by: INTERNAL MEDICINE

## 2021-06-24 PROCEDURE — 99213 OFFICE O/P EST LOW 20 MIN: CPT | Performed by: INTERNAL MEDICINE

## 2021-06-24 PROCEDURE — 1090F PRES/ABSN URINE INCON ASSESS: CPT | Performed by: INTERNAL MEDICINE

## 2021-06-24 PROCEDURE — 1123F ACP DISCUSS/DSCN MKR DOCD: CPT | Performed by: INTERNAL MEDICINE

## 2021-06-24 PROCEDURE — G8427 DOCREV CUR MEDS BY ELIG CLIN: HCPCS | Performed by: INTERNAL MEDICINE

## 2021-06-24 PROCEDURE — G8399 PT W/DXA RESULTS DOCUMENT: HCPCS | Performed by: INTERNAL MEDICINE

## 2021-06-24 PROCEDURE — 4040F PNEUMOC VAC/ADMIN/RCVD: CPT | Performed by: INTERNAL MEDICINE

## 2021-06-24 NOTE — PROGRESS NOTES
Infectious Diseases   office Note      Reason for visit:  Chronic paronychia, Pseudomonas infection       Subjective:   CHIEF COMPLAINT:  None given       HPI:    Stacia Magdaleno is a 75yoF with history of afib, breast cancer, GERD, HTN, JINA, obesity     She was seen in office consult 12/2020 for evaluation chronic infection of the R thumb   She is seen in telehealth visit today with concerns nail dystrophy and possible infection     Few years ago, \"split the nail\" of R thumb. Historically gets regular manicures regularly with synthetic nail \"dipping. \"   Several months ago developed paronychia R thumb, tenderness, periungual erythema, dystrophic nail base with scant drainage. PCP gave po abx - Bactrim complicated by rash, cephalexin - improved to some degree but would recur. 11/2/20 - I&D R thumbnail bed, nail plate removal, R thumb IP joint arthrotomy with bone spur removal, osteophyte. Joint without overt signs of infection.    Culture was positive for pan-S Pseudomonas aeruginosa, rare growth, Prevotella and CoNS in broth for which she completed a course of abx      Nail was removed  Nail has grown back, \"from both sides to meet in the middle\"  Now the nail has a line that is green-yellow at the base  Painful with pressure  No erythema distally       Current abx:  None        Past Surgical History:       Diagnosis Date    Abnormal Pap smear of cervix 06/11/2018    hpv+    Allergic     Allergic rhinitis     Aortic stenosis, mild-echo 8/2016 8/18/2016    Atrial fibrillation ([address]     BMI 40.0-44.9, adult ([address]     Breast cancer ([address]     53    Cancer ([address] 2009    right breast    Depression     GERD (gastroesophageal reflux disease)     H/O laparoscopic adjustable gastric banding 10/27/2016    Heart palpitations     HPV (human papilloma virus) infection 06/11/2018    HPV test positive 12/2014    Hypertension     Hypothyroidism 2000    Meningioma ([address]     10 years ago    Mild aortic regurgitation-echo 2016    Mild dysplasia of cervix 2016    Obesity     Sleep apnea     Urge incontinence 2017         Procedure Laterality Date    APPENDECTOMY      BREAST BIOPSY      BREAST LUMPECTOMY      BREAST SURGERY  2009    right lumpectomy xrt and chemo and serm     SECTION  ,,,    failed to drop in birth canal     SECTION      CHOLECYSTECTOMY      COLONOSCOPY      HAND SURGERY Right 2020    RIGHT THUMB INCISION AND DRAINAGE WITH NAIL REMOVAL AND SPUR REMOVAL performed by Anna Ibarra MD at [address] [facility] [facility] LAP BAND      LAP BAND  2012    Removal    OVARY REMOVAL      PARTIAL HYSTERECTOMY      THYROIDECTOMY, PARTIAL Left 2000    TUNNELED VENOUS PORT PLACEMENT      Placed/Removed    UPPER GASTROINTESTINAL ENDOSCOPY  6/10/16    gastritis, gastric polyp bx        Social History:    TOBACCO:   reports that she quit smoking about 49 years ago. Her smoking use included cigarettes. She has a 0.38 pack-year smoking history. She has never used smokeless tobacco.  ETOH:   reports current alcohol use. There is no history of illicit drug use or other significant epidemiologic exposures.       Allergies   Allergen Reactions    Latex Rash    Bactrim [Sulfamethoxazole-Trimethoprim] Swelling    Iodine Shortness Of Breath        REVIEW OF SYSTEMS:    CONSTITUTIONAL:   Without F/C/NS  EYES:  negative for blurred vision, eye discharge, visual disturbance and icterus  HEENT:  negative for acute hearing loss  GASTROINTESTINAL:  negative for nausea, vomiting, diarrhea   GENITOURINARY:  negative for frequency, dysuria  HEMATOLOGIC/LYMPHATIC:  negative for easy bruising, bleeding and lymphadenopathy  ALLERGIC/IMMUNOLOGIC:  negative for recurrent infections, angioedema, anaphylaxis and drug reactions  ENDOCRINE:  negative for weight changes and diabetic symptoms including polyuria, polydipsia and Pseudomonas and has completed several weeks without adverse effects. Role of the CoNS and anaerobe in this infection unclear. She completed a course of po abx    Today with central abnormality in the nail bed with green color, no other local sins of infection     Allergy Bactrim        -low suspicion of residual infection in the nail based on appearance today  -continue to monitor expectantly   -she will call with infection related concerns     Discussed in detail, all questions answered       Michael Carter M.D. Thank you for the opportunity to participate in the care of your patient. Please do not hesitate to contact me:   [phone] office    Raul Pelayo, was evaluated through a synchronous (real-time) audio-video encounter. The patient (or guardian if applicable) is aware that this is a billable service. Verbal consent to proceed has been obtained within the past 12 months. The visit was conducted pursuant to the emergency declaration under the [address] [place] [address], [address] [place] [address] authority and the [employer] Act. Patient identification was verified, and a caregiver was present when appropriate. The patient was located in a state where the provider was credentialed to provide care. Total time spent for this encounter: Not billed by time    --Armen Calvo MD on 7/6/2021 at 11:49 AM    An electronic signature was used to authenticate this note.

## 2021-06-25 NOTE — TELEPHONE ENCOUNTER
CPAP download report from 5/21/2021-6/19/2021 on auto CPAP 17-20 cm H2O reviewed. Compliance is good 97%. AHI has improved and is good 2.9.    -[employer] recently recalled some Pap units. Patient should call DME/manufacture to see if her unit is on recall and should register it with [employer] if so. Patient has severe JINA    At this time, if patients have moderate to severe sleep apnea or have severe daytime sleepiness, it is recommended continue therapy until the machine can be replaced. Based upon the information we have so far, it appears the risk of stopping therapy may be higher than the risks associated with foam degradation. However, there are still many unknown variables and each patient will have to make a final determination on his or her own. Please only use cleaning methods recommended by .     I am more than happy to further discuss risks/benefits of continuing or discontinuing CPAP and/or alternative treatments if patient would like appt to discuss

## 2021-07-13 ENCOUNTER — OFFICE VISIT (OUTPATIENT)
Dept: FAMILY MEDICINE CLINIC | Age: 66
End: 2021-07-13
Payer: MEDICARE

## 2021-07-13 VITALS
DIASTOLIC BLOOD PRESSURE: 80 MMHG | HEIGHT: 66 IN | TEMPERATURE: 98 F | OXYGEN SATURATION: 98 % | HEART RATE: 55 BPM | WEIGHT: 283 LBS | SYSTOLIC BLOOD PRESSURE: 130 MMHG | BODY MASS INDEX: 45.48 KG/M2

## 2021-07-13 DIAGNOSIS — E03.9 ACQUIRED HYPOTHYROIDISM: ICD-10-CM

## 2021-07-13 DIAGNOSIS — I50.22 CHRONIC SYSTOLIC HEART FAILURE (HCC): ICD-10-CM

## 2021-07-13 DIAGNOSIS — F32.9 REACTIVE DEPRESSION: ICD-10-CM

## 2021-07-13 DIAGNOSIS — E55.9 VITAMIN D DEFICIENCY: ICD-10-CM

## 2021-07-13 DIAGNOSIS — R73.03 PREDIABETES: Primary | ICD-10-CM

## 2021-07-13 DIAGNOSIS — E78.2 MIXED HYPERLIPIDEMIA: ICD-10-CM

## 2021-07-13 LAB
A/G RATIO: 1.7 (ref 1.1–2.2)
ALBUMIN SERPL-MCNC: 4.3 G/DL (ref 3.4–5)
ALP BLD-CCNC: 79 U/L (ref 40–129)
ALT SERPL-CCNC: 18 U/L (ref 10–40)
ANION GAP SERPL CALCULATED.3IONS-SCNC: 14 MMOL/L (ref 3–16)
AST SERPL-CCNC: 16 U/L (ref 15–37)
BASOPHILS ABSOLUTE: 0 K/UL (ref 0–0.2)
BASOPHILS RELATIVE PERCENT: 0.5 %
BILIRUB SERPL-MCNC: 0.6 MG/DL (ref 0–1)
BUN BLDV-MCNC: 17 MG/DL (ref 7–20)
CALCIUM SERPL-MCNC: 9.5 MG/DL (ref 8.3–10.6)
CHLORIDE BLD-SCNC: 104 MMOL/L (ref 99–110)
CHOLESTEROL, TOTAL: 138 MG/DL (ref 0–199)
CO2: 24 MMOL/L (ref 21–32)
CREAT SERPL-MCNC: 1 MG/DL (ref 0.6–1.2)
EOSINOPHILS ABSOLUTE: 0.1 K/UL (ref 0–0.6)
EOSINOPHILS RELATIVE PERCENT: 2 %
GFR AFRICAN AMERICAN: >60
GFR NON-AFRICAN AMERICAN: 55
GLOBULIN: 2.5 G/DL
GLUCOSE BLD-MCNC: 130 MG/DL (ref 70–99)
HBA1C MFR BLD: 6.4 %
HCT VFR BLD CALC: 38.5 % (ref 36–48)
HDLC SERPL-MCNC: 41 MG/DL (ref 40–60)
HEMOGLOBIN: 13.1 G/DL (ref 12–16)
LDL CHOLESTEROL CALCULATED: 64 MG/DL
LYMPHOCYTES ABSOLUTE: 1.6 K/UL (ref 1–5.1)
LYMPHOCYTES RELATIVE PERCENT: 27.7 %
MCH RBC QN AUTO: 30.8 PG (ref 26–34)
MCHC RBC AUTO-ENTMCNC: 34.1 G/DL (ref 31–36)
MCV RBC AUTO: 90.6 FL (ref 80–100)
MONOCYTES ABSOLUTE: 0.6 K/UL (ref 0–1.3)
MONOCYTES RELATIVE PERCENT: 10.9 %
NEUTROPHILS ABSOLUTE: 3.5 K/UL (ref 1.7–7.7)
NEUTROPHILS RELATIVE PERCENT: 58.9 %
PDW BLD-RTO: 14.1 % (ref 12.4–15.4)
PLATELET # BLD: 212 K/UL (ref 135–450)
PMV BLD AUTO: 8.6 FL (ref 5–10.5)
POTASSIUM SERPL-SCNC: 4.5 MMOL/L (ref 3.5–5.1)
PRO-BNP: 315 PG/ML (ref 0–124)
RBC # BLD: 4.25 M/UL (ref 4–5.2)
SODIUM BLD-SCNC: 142 MMOL/L (ref 136–145)
T4 FREE: 1.3 NG/DL (ref 0.9–1.8)
TOTAL PROTEIN: 6.8 G/DL (ref 6.4–8.2)
TRIGL SERPL-MCNC: 163 MG/DL (ref 0–150)
TSH SERPL DL<=0.05 MIU/L-ACNC: 3.42 UIU/ML (ref 0.27–4.2)
VITAMIN D 25-HYDROXY: 51.1 NG/ML
VLDLC SERPL CALC-MCNC: 33 MG/DL
WBC # BLD: 5.9 K/UL (ref 4–11)

## 2021-07-13 PROCEDURE — 3017F COLORECTAL CA SCREEN DOC REV: CPT | Performed by: PHYSICIAN ASSISTANT

## 2021-07-13 PROCEDURE — 4040F PNEUMOC VAC/ADMIN/RCVD: CPT | Performed by: PHYSICIAN ASSISTANT

## 2021-07-13 PROCEDURE — G8399 PT W/DXA RESULTS DOCUMENT: HCPCS | Performed by: PHYSICIAN ASSISTANT

## 2021-07-13 PROCEDURE — 1090F PRES/ABSN URINE INCON ASSESS: CPT | Performed by: PHYSICIAN ASSISTANT

## 2021-07-13 PROCEDURE — G8427 DOCREV CUR MEDS BY ELIG CLIN: HCPCS | Performed by: PHYSICIAN ASSISTANT

## 2021-07-13 PROCEDURE — 1123F ACP DISCUSS/DSCN MKR DOCD: CPT | Performed by: PHYSICIAN ASSISTANT

## 2021-07-13 PROCEDURE — G8417 CALC BMI ABV UP PARAM F/U: HCPCS | Performed by: PHYSICIAN ASSISTANT

## 2021-07-13 PROCEDURE — 99213 OFFICE O/P EST LOW 20 MIN: CPT | Performed by: PHYSICIAN ASSISTANT

## 2021-07-13 PROCEDURE — 83036 HEMOGLOBIN GLYCOSYLATED A1C: CPT | Performed by: PHYSICIAN ASSISTANT

## 2021-07-13 PROCEDURE — 1036F TOBACCO NON-USER: CPT | Performed by: PHYSICIAN ASSISTANT

## 2021-07-13 SDOH — ECONOMIC STABILITY: FOOD INSECURITY: WITHIN THE PAST 12 MONTHS, YOU WORRIED THAT YOUR FOOD WOULD RUN OUT BEFORE YOU GOT MONEY TO BUY MORE.: NEVER TRUE

## 2021-07-13 SDOH — ECONOMIC STABILITY: TRANSPORTATION INSECURITY
IN THE PAST 12 MONTHS, HAS LACK OF TRANSPORTATION KEPT YOU FROM MEETINGS, WORK, OR FROM GETTING THINGS NEEDED FOR DAILY LIVING?: NO

## 2021-07-13 SDOH — ECONOMIC STABILITY: FOOD INSECURITY: WITHIN THE PAST 12 MONTHS, THE FOOD YOU BOUGHT JUST DIDN'T LAST AND YOU DIDN'T HAVE MONEY TO GET MORE.: NEVER TRUE

## 2021-07-13 SDOH — ECONOMIC STABILITY: TRANSPORTATION INSECURITY
IN THE PAST 12 MONTHS, HAS THE LACK OF TRANSPORTATION KEPT YOU FROM MEDICAL APPOINTMENTS OR FROM GETTING MEDICATIONS?: NO

## 2021-07-13 ASSESSMENT — ENCOUNTER SYMPTOMS: SHORTNESS OF BREATH: 0

## 2021-07-13 ASSESSMENT — SOCIAL DETERMINANTS OF HEALTH (SDOH): HOW HARD IS IT FOR YOU TO PAY FOR THE VERY BASICS LIKE FOOD, HOUSING, MEDICAL CARE, AND HEATING?: NOT HARD AT ALL

## 2021-07-13 NOTE — PROGRESS NOTES
2021  Marquita Duque (: 1955)  78 y.o. HPI  Prediabetes:  Diet: eating smaller portions, intermittent fasting  Exercise: small improvements to be more active, no regular exercise  Weight: down 10 lbs since the fall  Last A1C was 6.2%    Thyroid:  Pt is taking her medication on an empty stomach. No concerns with thyroid at this time. Depression:  Current medication: Celexa  Side effects: none  Residual symptoms: some sadness but heavier depression is lifting as life starts again after COVID  Denies: impulsive behaviors, intrusive thoughts, SI/HI    Nail bed problem  Seeing Dr. Susie Dale for chronic paronychia. Was treated with ABX and removal of finger nail. The nail has grown back with a bend which is allowing for bacteria to enter the nail bed    Review of Systems   Constitutional: Negative for activity change, appetite change and unexpected weight change. Respiratory: Negative for shortness of breath. Cardiovascular: Negative for chest pain and palpitations. Endocrine: Negative for cold intolerance, heat intolerance, polydipsia, polyphagia and polyuria. Skin: Negative for pallor. Neurological: Negative for dizziness, light-headedness and headaches. Psychiatric/Behavioral: Positive for dysphoric mood. Negative for agitation, behavioral problems, confusion, decreased concentration, hallucinations, self-injury, sleep disturbance and suicidal ideas. The patient is not nervous/anxious and is not hyperactive. Allergies, past medical history, family history, and social history reviewed and unchanged from previous encounter.      Current Outpatient Medications   Medication Sig Dispense Refill    montelukast (SINGULAIR) 10 MG tablet TAKE ONE TABLET BY MOUTH NIGHTLY 90 tablet 2    citalopram (CELEXA) 40 MG tablet TAKE ONE TABLET BY MOUTH ONE TIME A DAY 90 tablet 1    omeprazole (PRILOSEC) 20 MG delayed release capsule TAKE ONE CAPSULE BY MOUTH EVERY MORNING BEFORE BREAKFAST 90 capsule 1    levothyroxine (SYNTHROID) 137 MCG tablet TAKE 1 TABLET BY MOUTH ONE TIME A DAY 90 tablet 1    spironolactone (ALDACTONE) 25 MG tablet TAKE 1 TABLET BY MOUTH ONE TIME A DAY 90 tablet 3    metoprolol succinate (TOPROL XL) 25 MG extended release tablet TAKE 1 TABLET BY MOUTH ONE TIME A DAY 90 tablet 0    ELDERBERRY PO Take by mouth      fluticasone (FLONASE) 50 MCG/ACT nasal spray 2 SPRAYS IN EACH NOSTRIL ONCE DAILY. 1 Bottle 1    oxybutynin (DITROPAN XL) 15 MG extended release tablet TAKE 1 TABLET BY MOUTH ONE TIME A DAY 90 tablet 2    atorvastatin (LIPITOR) 40 MG tablet TAKE ONE TABLET BY MOUTH NIGHTLY 90 tablet 1    apixaban (ELIQUIS) 5 MG TABS tablet TAKE 1 TABLET BY MOUTH TWO TIMES A  tablet 1    furosemide (LASIX) 20 MG tablet TAKE 1 TABLET BY MOUTH ONE TIME A DAY AS NEEDED FOR WEIGHT GAIN OF 3LBS IN A DAY OR 5LBS IN A WEEK 90 tablet 2    propafenone (RYTHMOL SR) 225 MG extended release capsule Take 1 capsule by mouth 2 times daily 60 capsule 0    valsartan (DIOVAN) 40 MG tablet TAKE 1 TABLET BY MOUTH ONE TIME A DAY 90 tablet 3    Azelastine-Fluticasone 137-50 MCG/ACT SUSP 1 spray by Nasal route 2 times daily      hydrocortisone (ANUSOL-HC) 2.5 % rectal cream Place rectally 2 times daily. 1 Tube 0    Lactobacillus (FLORAJEN ACIDOPHILUS) CAPS Take 1 capsule by mouth daily      cetirizine (ZYRTEC) 10 MG tablet Take 10 mg by mouth 2 times daily       multivitamin (THERAGRAN) per tablet Take 1 tablet by mouth daily. No current facility-administered medications for this visit. Vitals:    07/13/21 0834   BP: 130/80   Site: Left Upper Arm   Position: Sitting   Cuff Size: Large Adult   Pulse: 55   Temp: 98 °F (36.7 °C)   SpO2: 98%   Weight: 283 lb (128.4 kg)   Height: 5' 6\" (1.676 m)     Estimated body mass index is 45.68 kg/m² as calculated from the following:    Height as of this encounter: 5' 6\" (1.676 m).     Weight as of this encounter: 283 lb (128.4 kg).    Physical Exam  Vitals reviewed. Constitutional:       Appearance: Normal appearance. She is obese. Neurological:      Mental Status: She is alert and oriented to person, place, and time. Cranial Nerves: No cranial nerve deficit. Psychiatric:         Attention and Perception: Attention and perception normal.         Mood and Affect: Affect normal. Mood is depressed. Speech: Speech normal.         Behavior: Behavior normal. Behavior is cooperative. Thought Content: Thought content normal.         Cognition and Memory: Cognition and memory normal.         Judgment: Judgment normal.         ASSESSMENT and PLAN:  Sonny Mosqueda was seen today for other. Diagnoses and all orders for this visit:    Prediabetes  -     POCT glycosylated hemoglobin (Hb A1C): 6.4%  -      The pt would like to avoid medication at this time. We discussed ways to increase her physical activity and further improve her diet. Follow up in 6 months    Acquired hypothyroidism  -     TSH without Reflex; Future  -     T4, FREE; Future  -     Adjust medication after labs are completed    Reactive depression        -     Pt is happy with her current medication. Continue with celexa. Return in about 6 months (around 1/13/2022) for prediabetes, thyroid and mood.

## 2021-07-14 DIAGNOSIS — I48.0 PAF (PAROXYSMAL ATRIAL FIBRILLATION) (HCC): ICD-10-CM

## 2021-07-14 RX ORDER — PROPAFENONE HYDROCHLORIDE 225 MG/1
225 CAPSULE, EXTENDED RELEASE ORAL 2 TIMES DAILY
Qty: 60 CAPSULE | Refills: 0 | Status: SHIPPED | OUTPATIENT
Start: 2021-07-14 | End: 2022-01-11

## 2021-07-14 RX ORDER — PROPAFENONE HYDROCHLORIDE 225 MG/1
225 CAPSULE, EXTENDED RELEASE ORAL 2 TIMES DAILY
Qty: 180 CAPSULE | Refills: 0 | Status: SHIPPED | OUTPATIENT
Start: 2021-07-14 | End: 2021-08-24 | Stop reason: SDUPTHER

## 2021-07-14 NOTE — TELEPHONE ENCOUNTER
Last OV with [facility] 10/2020. Sees [facility] 7/20/2021. CBC done 7/13/21    Almost out of med. Requesting 30 days to local pharmacy and full script to mail order.

## 2021-07-15 RX ORDER — METOPROLOL SUCCINATE 25 MG/1
TABLET, EXTENDED RELEASE ORAL
Qty: 90 TABLET | Refills: 1 | Status: SHIPPED | OUTPATIENT
Start: 2021-07-15 | End: 2022-03-03 | Stop reason: SDUPTHER

## 2021-07-15 NOTE — PROGRESS NOTES
Hysterectomy (2000); Lap Band (2012); Ovary removal; Thyroidectomy, partial (Left, 2000); Upper gastrointestinal endoscopy (6/10/16); partial hysterectomy (cervix not removed); and Hand surgery (Right, 11/2/2020). Home Medications:    Prior to Admission medications    Medication Sig Start Date End Date Taking? Authorizing Provider   atorvastatin (LIPITOR) 40 MG tablet Take 1 tablet by mouth daily 7/16/21 7/16/22 Yes BLANK Cunningham CNP   metoprolol succinate (TOPROL XL) 25 MG extended release tablet TAKE 1 TABLET BY MOUTH ONE TIME A DAY 7/15/21  Yes BLANK Bang - CNP   apixaban (ELIQUIS) 5 MG TABS tablet TAKE 1 TABLET BY MOUTH TWO TIMES A DAY 7/15/21  Yes Adrienne Downy, BLANK - CNP   apixaban (ELIQUIS) 5 MG TABS tablet TAKE 1 TABLET BY MOUTH TWO TIMES A DAY 7/15/21  Yes BLANK Bang - CNP   propafenone (RYTHMOL SR) 225 MG extended release capsule Take 1 capsule by mouth 2 times daily 7/14/21  Yes Darline Garcia MD   propafenone (RYTHMOL SR) 225 MG extended release capsule Take 1 capsule by mouth 2 times daily 7/14/21  Yes Darline Garcia MD   montelukast (SINGULAIR) 10 MG tablet TAKE ONE TABLET BY MOUTH NIGHTLY 4/20/21  Yes KADEN Parkinson   citalopram (CELEXA) 40 MG tablet TAKE ONE TABLET BY MOUTH ONE TIME A DAY 4/20/21  Yes KADEN Parkinson   omeprazole (PRILOSEC) 20 MG delayed release capsule TAKE ONE CAPSULE BY MOUTH EVERY MORNING BEFORE BREAKFAST 4/20/21  Yes KADEN Parkinson   levothyroxine (SYNTHROID) 137 MCG tablet TAKE 1 TABLET BY MOUTH ONE TIME A DAY 4/20/21  Yes KADEN Parkinson   spironolactone (ALDACTONE) 25 MG tablet TAKE 1 TABLET BY MOUTH ONE TIME A DAY 4/20/21  Yes Mara Casper MD   ELDERBERRY PO Take by mouth   Yes Maynor Quiroga MD   fluticasone (FLONASE) 50 MCG/ACT nasal spray 2 SPRAYS IN EACH NOSTRIL ONCE DAILY.  2/25/21  Yes KADEN Parkinson   oxybutynin (DITROPAN XL) 15 MG extended release tablet TAKE 1 TABLET BY MOUTH ONE TIME A DAY 2/25/21  Yes KADEN Bermudez   furosemide (LASIX) 20 MG tablet TAKE 1 TABLET BY MOUTH ONE TIME A DAY AS NEEDED FOR WEIGHT GAIN OF 3LBS IN A DAY OR 5LBS IN A WEEK 1/27/21  Yes BLANK Bazzi - CNP   valsartan (DIOVAN) 40 MG tablet TAKE 1 TABLET BY MOUTH ONE TIME A DAY 10/7/20  Yes Ally Donovan MD   Azelastine-Fluticasone 137-50 MCG/ACT SUSP 1 spray by Nasal route 2 times daily   Yes Historical Provider, MD   hydrocortisone (ANUSOL-HC) 2.5 % rectal cream Place rectally 2 times daily. 7/12/19  Yes Maria Alejandra Arguelles MD   Lactobacillus (FLORAJEN ACIDOPHILUS) CAPS Take 1 capsule by mouth daily   Yes Historical Provider, MD   cetirizine (ZYRTEC) 10 MG tablet Take 10 mg by mouth 2 times daily    Yes Historical Provider, MD   multivitamin [facility]) per tablet Take 1 tablet by mouth daily. Yes Historical Provider, MD       Allergies:  Latex, Bactrim [sulfamethoxazole-trimethoprim], and Iodine    Social History:   reports that she quit smoking about 49 years ago. Her smoking use included cigarettes. She has a 0.38 pack-year smoking history. She has never used smokeless tobacco. She reports current alcohol use. She reports that she does not use drugs. Family History: family history includes Cancer in her maternal grandmother, mother, paternal grandmother, sister, and another family member; Cancer (age of onset: 61) in her father; Heart Attack in her father; Heart Disease in her father and sister; No Known Problems in her brother, maternal aunt, maternal grandfather, maternal uncle, paternal aunt, paternal grandfather, and paternal uncle; Other in her father and sister. Review of Systems   All 14-point review of systems are completed and pertinent positives are mentioned in the history of present illness. Other systems are reviewed and are negative.      PHYSICAL EXAM:    Vital signs:    /64   Pulse 62   Ht 5' 6\" (1.676 m)   Wt 284 lb (128.8 kg)   LMP  (LMP Unknown)   SpO2 98%   BMI 45.84 kg/m²      Constitutional and general appearance: alert, cooperative, no distress and appears stated age  HEENT: PERRL, no cervical lymphadenopathy. No masses palpable. Normal oral mucosa  Respiratory:  · Normal excursion and expansion without use of accessory muscles  · Resp auscultation: Normal breath sounds without wheezing, rhonchi, and rales  Cardiovascular:  · The apical impulse is not displaced  · Heart tones are crisp and normal. regular S1 and S2.  · Jugular venous pulsation Normal  · The carotid upstroke is normal in amplitude and contour without delay or bruit  · Peripheral pulses are symmetrical and full   Abdomen:  · No masses or tenderness  · Bowel sounds present  Extremities:  ·  No cyanosis or clubbing  ·  No lower extremity edema  ·  Skin: warm and dry  Neurological:  · Alert and oriented  · Moves all extremities well  · No abnormalities of mood, affect, memory, mentation, or behavior are noted    DATA:    ECG 7/20/2021:  SB HR 55    Echo 9/29/2020:  Left ventricular systolic function is normal with a visually estimated ejection fraction of 55%. The left ventricle is normal in size with mild concentric hypertrophy. No regional wall motion abnormalities are noted. Systolic pulmonary artery pressure (SPAP) is normal and estimated at 32 mmHg (right atrial pressure 8 mmHg). The IVC is normal in size (<2.1 cm) but collapses <50% with respiration consistent with elevated right atrial pressures (8 mmHg) . Echo 7/12/2019:  Left ventricular systolic function is reduced with ejection fraction estimated at 48-50 %. Mild global hypokinesis is present. There is mild concentric left ventricular hypertrophy. Normal left ventricular diastolic filling pressure. Mild mitral regurgitation. Aortic sclerosis vs mild aortic stenosis. Mild aortic regurgitation is present. Normal systolic pulmonary artery pressure (SPAP) estimated at 29 mmHg (RA pressure 8 mmHg).       Cardiac MRI 8/23/2019 ([facility]):  Findings are most consistent with a nonischemic cardiomyopathy. There is no evidence of left ventricular scarring, edema or iron overload. There is evidence of incomplete or complete left bundle branch block. Regional wall motion abnormalities involving are likely secondary to underlying conduction disease; however, ischemic heart disease cannot be excluded. The right ventricle is normal in size and systolic function. There is late gadolinium enhancement of the RV insertion points suggestive of elevated right heart pressures. The left atrium is mildly dilated. There is mild mitral regurgitation  The right atrium is normal in size. There is late gadolinium enhancement involving both atria. There is mild aortic stenosis and regurgitation. The descending aorta is dilated there is evidence of associated atherosclerosis.     [facility] 8/2/2019 ([place]):  LM: luminals  LAD: mid luminals, distal vessel small  LCX: luminals  RCA: dominant, very large     LVEDP: 18  LVEF: 25-30% global hypokinesis     Assessment  1. Nonischemic cardiomyopathy  2. Given worsening cardiomyopathy, suggest Cardiac MRI                - eval for myocarditis or infiltrative cardiomyopathy  3. Will d/c flecainide and dilt given worsening LVEF, pt currently in NSR                - start toprol 25                - start ASA and lisinopril  4. Lasix in post op area                - will make Appt with heart failure team    All labs and testing reviewed. CARDIOLOGY LABS:   CBC:   No results for input(s): WBC, HGB, HCT, PLT in the last 72 hours. BMP:   No results for input(s): NA, K, CO2, BUN, CREATININE, LABGLOM, GLUCOSE in the last 72 hours. PT/INR: No results for input(s): PROTIME, INR in the last 72 hours. APTT:No results for input(s): APTT in the last 72 hours.   FASTING LIPID PANEL:  Lab Results   Component Value Date    HDL 41 07/13/2021    LDLCALC 64 07/13/2021    TRIG 163 07/13/2021     LIVER PROFILE:  No results for input(s): AST, ALT, ALB in the last 72 hours. Assessment:   Symptomatic paroxysmal atrial fibrillation: stable              -s/p RFCA of PAF 3/2020              -[id number] score 4 (age, gender, CHF, HTN)  Sinus bradycardia: stable   HTN: controlled   NICM: improved               -EF 45% on cardiac MRI 8/2019   -EF 55% 3/4112  Chronic systolic CHF: compensated              -followed by heart failure team              -cough with lisinopril  Aortic stenosis: mild  Hypothyroidism: on Synthroid  Sleep apnea: uses CPAP  History of breast cancer    Plan:   1. Continue Eliquis, Toprol, Rythmol, Lasix, valsartan, and spironolactone   2. Annual CBC and BMP (due 7/2022)  3.  Follow up in 6 months with EP     MDM: BLANK Raymundo-CNP  [facility]  [phone]

## 2021-07-16 ENCOUNTER — TELEPHONE (OUTPATIENT)
Dept: CARDIOLOGY CLINIC | Age: 66
End: 2021-07-16

## 2021-07-16 RX ORDER — ATORVASTATIN CALCIUM 40 MG/1
40 TABLET, FILM COATED ORAL DAILY
Qty: 90 TABLET | Refills: 1 | Status: SHIPPED | OUTPATIENT
Start: 2021-07-16 | End: 2022-03-03

## 2021-07-16 RX ORDER — ATORVASTATIN CALCIUM 40 MG/1
TABLET, FILM COATED ORAL
Qty: 30 TABLET | Refills: 0 | OUTPATIENT
Start: 2021-07-16

## 2021-07-16 NOTE — TELEPHONE ENCOUNTER
----- Message from BLANK Alberto CNP sent at 7/15/2021  9:10 PM EDT -----  Labs are stable. Mildly elevated BNP compared to prior,continue daily weights, 2L fluid restriction. Continue current regimen.

## 2021-07-16 NOTE — TELEPHONE ENCOUNTER
Created telephone encounter. Spoke with Maira relayed message per [facility] regarding labs. Pt verbalized understanding.

## 2021-07-20 ENCOUNTER — OFFICE VISIT (OUTPATIENT)
Dept: CARDIOLOGY CLINIC | Age: 66
End: 2021-07-20
Payer: MEDICARE

## 2021-07-20 VITALS
BODY MASS INDEX: 45.64 KG/M2 | OXYGEN SATURATION: 98 % | HEART RATE: 62 BPM | WEIGHT: 284 LBS | SYSTOLIC BLOOD PRESSURE: 110 MMHG | HEIGHT: 66 IN | DIASTOLIC BLOOD PRESSURE: 64 MMHG

## 2021-07-20 DIAGNOSIS — I48.0 PAF (PAROXYSMAL ATRIAL FIBRILLATION) (HCC): Primary | ICD-10-CM

## 2021-07-20 DIAGNOSIS — I10 ESSENTIAL HYPERTENSION: ICD-10-CM

## 2021-07-20 DIAGNOSIS — I42.8 NON-ISCHEMIC CARDIOMYOPATHY (HCC): ICD-10-CM

## 2021-07-20 PROCEDURE — 3017F COLORECTAL CA SCREEN DOC REV: CPT | Performed by: NURSE PRACTITIONER

## 2021-07-20 PROCEDURE — 1123F ACP DISCUSS/DSCN MKR DOCD: CPT | Performed by: NURSE PRACTITIONER

## 2021-07-20 PROCEDURE — 93000 ELECTROCARDIOGRAM COMPLETE: CPT | Performed by: NURSE PRACTITIONER

## 2021-07-20 PROCEDURE — G8417 CALC BMI ABV UP PARAM F/U: HCPCS | Performed by: NURSE PRACTITIONER

## 2021-07-20 PROCEDURE — 1090F PRES/ABSN URINE INCON ASSESS: CPT | Performed by: NURSE PRACTITIONER

## 2021-07-20 PROCEDURE — G8399 PT W/DXA RESULTS DOCUMENT: HCPCS | Performed by: NURSE PRACTITIONER

## 2021-07-20 PROCEDURE — 99214 OFFICE O/P EST MOD 30 MIN: CPT | Performed by: NURSE PRACTITIONER

## 2021-07-20 PROCEDURE — 1036F TOBACCO NON-USER: CPT | Performed by: NURSE PRACTITIONER

## 2021-07-20 PROCEDURE — G8427 DOCREV CUR MEDS BY ELIG CLIN: HCPCS | Performed by: NURSE PRACTITIONER

## 2021-07-20 PROCEDURE — 4040F PNEUMOC VAC/ADMIN/RCVD: CPT | Performed by: NURSE PRACTITIONER

## 2021-07-20 RX ORDER — METOPROLOL SUCCINATE 25 MG/1
25 TABLET, EXTENDED RELEASE ORAL DAILY
Qty: 30 TABLET | Refills: 0 | Status: SHIPPED | OUTPATIENT
Start: 2021-07-20 | End: 2022-01-11

## 2021-07-20 NOTE — LETTER
[facility]   Electrophysiology Outpatient Note              Date:  2021  Patient name: Bob Medrano  YOB: 1955    Primary Care physician: KADEN Deng    HISTORY OF PRESENT ILLNESS: The patient is a 78 y.o.  female with a history of atrial fibrillation, NICM with recovered EF, nonobstructive CAD, chronic CHF, AS, hypothyroidism, breast cancer, and sleep apnea. She was admitted in 2019 with AF RVR and was started on flecainide. Echo showed an EF of 48-50%. Stress test was abnormal and LHC in 2019 showed nonobstructive CAD and EF 25-30%. Cardiac MRI 2019 showed an EF of 45%. Afib was recurrent and Rythmol was started. In 3/2020 she had RFCA of PAF. Most recent echo in 2020 showed an EF of 55%. Has remained in sinus at subsequent visits. Today she is being seen for PAF. EKG shows sinus abhinav with a HR of 55. Patient reports she had palpitations and dizziness over a month ago but they have resolved. She is feeling well and denies chest pain and shortness of breath. Enjoying custodial since 2021. Past Medical History:   has a past medical history of Abnormal Pap smear of cervix, Allergic, Allergic rhinitis, Aortic stenosis, mild-echo 2016, Atrial fibrillation ([address] BMI 40.0-44.9, adult ([address] Breast cancer ([address] Cancer ([address] Depression, GERD (gastroesophageal reflux disease), H/O laparoscopic adjustable gastric banding, Heart palpitations, HPV (human papilloma virus) infection, HPV test positive, Hypertension, Hypothyroidism, Meningioma ([address] Mild aortic regurgitation-echo 2016, Mild dysplasia of cervix, Obesity, Sleep apnea, and Urge incontinence. Past Surgical History:   has a past surgical history that includes Appendectomy; Cholecystectomy; Colonoscopy; Lap Band (); Tunneled venous port placement ();  section (4834,1588,3311,3915);  section; Breast surgery ();  Breast biopsy; Breast lumpectomy; Hysterectomy (2000); Lap Band (2012); Ovary removal; Thyroidectomy, partial (Left, 2000); Upper gastrointestinal endoscopy (6/10/16); partial hysterectomy (cervix not removed); and Hand surgery (Right, 11/2/2020). Home Medications:    Prior to Admission medications    Medication Sig Start Date End Date Taking? Authorizing Provider   atorvastatin (LIPITOR) 40 MG tablet Take 1 tablet by mouth daily 7/16/21 7/16/22 Yes BLANK Vargas CNP   metoprolol succinate (TOPROL XL) 25 MG extended release tablet TAKE 1 TABLET BY MOUTH ONE TIME A DAY 7/15/21  Yes BLANK Dorantes CNP   apixaban (ELIQUIS) 5 MG TABS tablet TAKE 1 TABLET BY MOUTH TWO TIMES A DAY 7/15/21  Yes BLANK Dorantes CNP   apixaban (ELIQUIS) 5 MG TABS tablet TAKE 1 TABLET BY MOUTH TWO TIMES A DAY 7/15/21  Yes BLANK Dorantes CNP   propafenone (RYTHMOL SR) 225 MG extended release capsule Take 1 capsule by mouth 2 times daily 7/14/21  Yes Alanis Andrade MD   propafenone (RYTHMOL SR) 225 MG extended release capsule Take 1 capsule by mouth 2 times daily 7/14/21  Yes Alanis Andrade MD   montelukast (SINGULAIR) 10 MG tablet TAKE ONE TABLET BY MOUTH NIGHTLY 4/20/21  Yes KADEN Reeves   citalopram (CELEXA) 40 MG tablet TAKE ONE TABLET BY MOUTH ONE TIME A DAY 4/20/21  Yes KADEN Reeves   omeprazole (PRILOSEC) 20 MG delayed release capsule TAKE ONE CAPSULE BY MOUTH EVERY MORNING BEFORE BREAKFAST 4/20/21  Yes KADEN Reeves   levothyroxine (SYNTHROID) 137 MCG tablet TAKE 1 TABLET BY MOUTH ONE TIME A DAY 4/20/21  Yes KADEN Reeves   spironolactone (ALDACTONE) 25 MG tablet TAKE 1 TABLET BY MOUTH ONE TIME A DAY 4/20/21  Yes MD GEOVANNI Boston PO Take by mouth   Yes Maynor Quiroga MD   fluticasone (FLONASE) 50 MCG/ACT nasal spray 2 SPRAYS IN EACH NOSTRIL ONCE DAILY.  2/25/21  Yes KADEN Reeves   oxybutynin (DITROPAN XL) 15 MG extended release tablet TAKE 1 TABLET BY MOUTH ONE TIME A DAY 2/25/21  Yes KADEN Benjamin   furosemide (LASIX) 20 MG tablet TAKE 1 TABLET BY MOUTH ONE TIME A DAY AS NEEDED FOR WEIGHT GAIN OF 3LBS IN A DAY OR 5LBS IN A WEEK 1/27/21  Yes BLANK Gottlieb CNP   valsartan (DIOVAN) 40 MG tablet TAKE 1 TABLET BY MOUTH ONE TIME A DAY 10/7/20  Yes Caitlyn Boyd MD   Azelastine-Fluticasone 137-50 MCG/ACT SUSP 1 spray by Nasal route 2 times daily   Yes Historical Provider, MD   hydrocortisone (ANUSOL-HC) 2.5 % rectal cream Place rectally 2 times daily. 7/12/19  Yes Basilio Salgado MD   Lactobacillus (FLORAJEN ACIDOPHILUS) CAPS Take 1 capsule by mouth daily   Yes Historical Provider, MD   cetirizine (ZYRTEC) 10 MG tablet Take 10 mg by mouth 2 times daily    Yes Historical Provider, MD   multivitamin [facility]) per tablet Take 1 tablet by mouth daily. Yes Historical Provider, MD       Allergies:  Latex, Bactrim [sulfamethoxazole-trimethoprim], and Iodine    Social History:   reports that she quit smoking about 49 years ago. Her smoking use included cigarettes. She has a 0.38 pack-year smoking history. She has never used smokeless tobacco. She reports current alcohol use. She reports that she does not use drugs. Family History: family history includes Cancer in her maternal grandmother, mother, paternal grandmother, sister, and another family member; Cancer (age of onset: 61) in her father; Heart Attack in her father; Heart Disease in her father and sister; No Known Problems in her brother, maternal aunt, maternal grandfather, maternal uncle, paternal aunt, paternal grandfather, and paternal uncle; Other in her father and sister. Review of Systems   All 14-point review of systems are completed and pertinent positives are mentioned in the history of present illness. Other systems are reviewed and are negative.      PHYSICAL EXAM:    Vital signs:    /64   Pulse 62   Ht 5' 6\" (1.676 m)   Wt 284 lb (128.8 kg)   LMP  (LMP Unknown) SpO2 98%   BMI 45.84 kg/m²      Constitutional and general appearance: alert, cooperative, no distress and appears stated age  [de-identified]: PERRL, no cervical lymphadenopathy. No masses palpable. Normal oral mucosa  Respiratory:  · Normal excursion and expansion without use of accessory muscles  · Resp auscultation: Normal breath sounds without wheezing, rhonchi, and rales  Cardiovascular:  · The apical impulse is not displaced  · Heart tones are crisp and normal. regular S1 and S2.  · Jugular venous pulsation Normal  · The carotid upstroke is normal in amplitude and contour without delay or bruit  · Peripheral pulses are symmetrical and full   Abdomen:  · No masses or tenderness  · Bowel sounds present  Extremities:  ·  No cyanosis or clubbing  ·  No lower extremity edema  ·  Skin: warm and dry  Neurological:  · Alert and oriented  · Moves all extremities well  · No abnormalities of mood, affect, memory, mentation, or behavior are noted    DATA:    ECG 7/20/2021:  SB HR 55    Echo 9/29/2020:  Left ventricular systolic function is normal with a visually estimated ejection fraction of 55%. The left ventricle is normal in size with mild concentric hypertrophy. No regional wall motion abnormalities are noted. Systolic pulmonary artery pressure (SPAP) is normal and estimated at 32 mmHg (right atrial pressure 8 mmHg). The IVC is normal in size (<2.1 cm) but collapses <50% with respiration consistent with elevated right atrial pressures (8 mmHg) . Echo 7/12/2019:  Left ventricular systolic function is reduced with ejection fraction estimated at 48-50 %. Mild global hypokinesis is present. There is mild concentric left ventricular hypertrophy. Normal left ventricular diastolic filling pressure. Mild mitral regurgitation. Aortic sclerosis vs mild aortic stenosis. Mild aortic regurgitation is present. Normal systolic pulmonary artery pressure (SPAP) estimated at 29 mmHg (RA pressure 8 mmHg).       Cardiac MRI 8/23/2019 ():  Findings are most consistent with a nonischemic cardiomyopathy. There is no evidence of left ventricular scarring, edema or iron overload. There is evidence of incomplete or complete left bundle branch block. Regional wall motion abnormalities involving are likely secondary to underlying conduction disease; however, ischemic heart disease cannot be excluded. The right ventricle is normal in size and systolic function. There is late gadolinium enhancement of the RV insertion points suggestive of elevated right heart pressures. The left atrium is mildly dilated. There is mild mitral regurgitation  The right atrium is normal in size. There is late gadolinium enhancement involving both atria. There is mild aortic stenosis and regurgitation. The descending aorta is dilated there is evidence of associated atherosclerosis.     [facility] 8/2/2019 ([place]):  LM: luminals  LAD: mid luminals, distal vessel small  LCX: luminals  RCA: dominant, very large     LVEDP: 18  LVEF: 25-30% global hypokinesis     Assessment  1. Nonischemic cardiomyopathy  2. Given worsening cardiomyopathy, suggest Cardiac MRI                - eval for myocarditis or infiltrative cardiomyopathy  3. Will d/c flecainide and dilt given worsening LVEF, pt currently in NSR                - start toprol 25                - start ASA and lisinopril  4. Lasix in post op area                - will make Appt with heart failure team    All labs and testing reviewed. CARDIOLOGY LABS:   CBC:   No results for input(s): WBC, HGB, HCT, PLT in the last 72 hours. BMP:   No results for input(s): NA, K, CO2, BUN, CREATININE, LABGLOM, GLUCOSE in the last 72 hours. PT/INR: No results for input(s): PROTIME, INR in the last 72 hours. APTT:No results for input(s): APTT in the last 72 hours.   FASTING LIPID PANEL:  Lab Results   Component Value Date    HDL 41 07/13/2021    LDLCALC 64 07/13/2021    TRIG 163 07/13/2021     LIVER PROFILE:  No results for

## 2021-07-26 RX ORDER — OXYBUTYNIN CHLORIDE 15 MG/1
TABLET, EXTENDED RELEASE ORAL
Qty: 90 TABLET | Refills: 2 | Status: SHIPPED | OUTPATIENT
Start: 2021-07-26 | End: 2022-01-13 | Stop reason: SDUPTHER

## 2021-07-26 NOTE — TELEPHONE ENCOUNTER
Refill request for ditropan medication.      [facility]      Last visit - 7-13-21     Pending visit - 1-13-22    Last refill -2-25-21      Medication Contract signed -   Last Jamaal Herring ran-         Additional Comments 63 F w/ hx of anxiety / depression on valium, hx of spinal stenosis sx on (hydromorphine) sent to the er by PCP for concern that pt is withdrawing from benzos, pt reports that she is seeing bugs and concerned that they are crawling on her, she is afraid that other people "will get my disease and I don't want to hurt anyone" pt reports that she has a distant hx of cocaine use (over 4 yerars ago) denies any alcohol use, pt reports that she was seen at [facility] for tremors/anxiety and auditory/visual hallucinations seen by psych and sent home, since then pt continues to have tremors, family concerned. no active vomiting, pt reports mild nausea.   pt last use of benzo was ~2 weeks ago? though pt reports "I am unsure" and opiate was 10 days ago  on exam, pt is awake and alert oritented x3 w/ repetitive thoughts, she has tremors in the bilateral upper extremities, she has pupils that are 6 mm and reactive bilaterally, she has dry mucus membranes, armpits dry, she has a soft abdomen, no lower extremity edema, no piloerection, she has clear lungs.   Will obtain blood work, treat for possible benzo withdrawal w/ IV benzo and admit to medicine for continued monitoring and psych clearance

## 2021-08-10 ENCOUNTER — PATIENT MESSAGE (OUTPATIENT)
Dept: CARDIOLOGY CLINIC | Age: 66
End: 2021-08-10

## 2021-08-10 DIAGNOSIS — I48.0 PAF (PAROXYSMAL ATRIAL FIBRILLATION) (HCC): Primary | ICD-10-CM

## 2021-08-12 NOTE — TELEPHONE ENCOUNTER
Refill approved. With new insurance, she will be eligible for a free 30 day supply. Please mail her a card. Thank you.

## 2021-08-12 NOTE — TELEPHONE ENCOUNTER
Spoke with patient on the phone- Will pend Eliquis to Ella Cardenas in [facility] with refills.

## 2021-08-23 DIAGNOSIS — I48.0 PAF (PAROXYSMAL ATRIAL FIBRILLATION) (HCC): ICD-10-CM

## 2021-08-23 NOTE — TELEPHONE ENCOUNTER
08/23      Medication Refill    Medication needing refilled: PROPAFENONE/VALSARTAN    Dosage of the medication:   PROPAFENONE 225 MG  VALSARTAN 40 MG    How are you taking this medication (QD, BID, TID, QID, PRN):  PROPAFENONE 1 CAP BY MOUTH 2X DAILY  VALSARTAN 1 TAB BY MOUTH 1X A DAY    30 or 90 day supply called in:  PROPAFENONE 60 CAPS  VALSARTAN 90 TABS    When will you run out of your medication: [address] [place] [address] are we sending the medication to?:    Fabio Laguerre [facility], [place] - [facility] Amber Diaz Sender [phone]   [address] [place]   Phone:  [phone]  Fax:  [phone]

## 2021-08-24 ENCOUNTER — TELEPHONE (OUTPATIENT)
Dept: CARDIOLOGY CLINIC | Age: 66
End: 2021-08-24

## 2021-08-24 DIAGNOSIS — I48.0 PAF (PAROXYSMAL ATRIAL FIBRILLATION) (HCC): ICD-10-CM

## 2021-08-24 RX ORDER — VALSARTAN 40 MG/1
TABLET ORAL
Qty: 90 TABLET | Refills: 3 | OUTPATIENT
Start: 2021-08-24

## 2021-08-24 RX ORDER — PROPAFENONE HYDROCHLORIDE 225 MG/1
225 CAPSULE, EXTENDED RELEASE ORAL 2 TIMES DAILY
Qty: 60 CAPSULE | Refills: 12 | OUTPATIENT
Start: 2021-08-24

## 2021-08-24 RX ORDER — VALSARTAN 40 MG/1
TABLET ORAL
Qty: 90 TABLET | Refills: 3 | Status: SHIPPED | OUTPATIENT
Start: 2021-08-24 | End: 2022-06-07 | Stop reason: SDUPTHER

## 2021-08-24 RX ORDER — PROPAFENONE HYDROCHLORIDE 225 MG/1
225 CAPSULE, EXTENDED RELEASE ORAL 2 TIMES DAILY
Qty: 180 CAPSULE | Refills: 1 | Status: SHIPPED | OUTPATIENT
Start: 2021-08-24 | End: 2022-01-11 | Stop reason: SDUPTHER

## 2021-08-24 NOTE — TELEPHONE ENCOUNTER
Needs to be signed off by two different cardiac specialist. Will refuse this request and send two separate encounters.

## 2021-08-24 NOTE — TELEPHONE ENCOUNTER
Medication Refill     Medication needing refilled: VALSARTAN     Dosage of the medication: 40 MG     How are you taking this medication (QD, BID, TID, QID, PRN): 1 TAB BY MOUTH 1X A DAY     30 or 90 day supply called in: 90 TABS     When will you run out of your medication: OUT NOW     Which Pharmacy are we sending the medication to?:     [facility], [place] - [facility] Amber Siegel [phone]   [address] [place] [address]   Phone:  [phone]  Fax:  [phone]

## 2021-08-24 NOTE — TELEPHONE ENCOUNTER
Medication Refill     Medication needing refilled: PROPAFENONE     Dosage of the medication: 225 MG    How are you taking this medication (QD, BID, TID, QID, PRN): 1 CAP BY MOUTH 2X DAILY    30 or 90 day supply called in: 60 CAPS    When will you run out of your medication: [address] [facility] are we sending the medication to?:     [facility] Sridhar Maci Ayala [phone]   [address]   Phone:  [phone]  Fax:  [phone]

## 2021-10-01 RX ORDER — CITALOPRAM 40 MG/1
TABLET ORAL
Qty: 90 TABLET | Refills: 1 | Status: SHIPPED | OUTPATIENT
Start: 2021-10-01 | End: 2022-03-03

## 2021-10-01 RX ORDER — LEVOTHYROXINE SODIUM 137 UG/1
TABLET ORAL
Qty: 90 TABLET | Refills: 1 | Status: SHIPPED | OUTPATIENT
Start: 2021-10-01 | End: 2022-03-03

## 2021-10-01 RX ORDER — OMEPRAZOLE 20 MG/1
CAPSULE, DELAYED RELEASE ORAL
Qty: 90 CAPSULE | Refills: 1 | Status: SHIPPED | OUTPATIENT
Start: 2021-10-01 | End: 2021-11-16 | Stop reason: SDUPTHER

## 2021-10-01 NOTE — TELEPHONE ENCOUNTER
.  Refill Request     Last Seen: Last Seen Department: 7/13/2021  Last Seen by PCP: 7/13/2021    Last Written: 4-20-21 90 with 1     Next Appointment:   Future Appointments   Date Time Provider Pelon Robersoni   11/30/2021 10:00 AM [facility] ECHO ROOM [facility] AND CAR Elvie Cage   11/30/2021 11:15 AM MD Irina Tate G[place] [facility]   1/10/2022 11:20 AM MD Beata Johansen GYN [facility]   1/13/2022  9:30 AM KADEN [place] - [facility]D   4/18/2022  9:30 AM KADEN [place] - DYD   5/20/2022 11:20 AM BLANK Jacob CNP [facility]       Future appointment scheduled      Requested Prescriptions     Pending Prescriptions Disp Refills    omeprazole (PRILOSEC) 20 MG delayed release capsule [Pharmacy Med Name: OMEPRAZOL RX CAP 20MG] 90 capsule 1     Sig: TAKE 1 CAPSULE EVERY       MORNING BEFORE BREAKFAST    citalopram (CELEXA) 40 MG tablet [Pharmacy Med Name: CITALOPRAM TAB 40MG] 90 tablet 1     Sig: TAKE 1 TABLET DAILY    levothyroxine (SYNTHROID) 137 MCG tablet [Pharmacy Med Name: SYNTHROID TAB 137MCG] 90 tablet 1     Sig: TAKE 1 TABLET ONCE DAILY

## 2021-10-26 ENCOUNTER — TELEPHONE (OUTPATIENT)
Dept: INFECTIOUS DISEASES | Age: 66
End: 2021-10-26

## 2021-10-26 ENCOUNTER — OFFICE VISIT (OUTPATIENT)
Dept: INFECTIOUS DISEASES | Age: 66
End: 2021-10-26
Payer: MEDICARE

## 2021-10-26 VITALS
SYSTOLIC BLOOD PRESSURE: 116 MMHG | DIASTOLIC BLOOD PRESSURE: 72 MMHG | BODY MASS INDEX: 43.23 KG/M2 | HEIGHT: 66 IN | WEIGHT: 269 LBS | TEMPERATURE: 98.3 F

## 2021-10-26 DIAGNOSIS — I87.2 VENOUS STASIS DERMATITIS OF RIGHT LOWER EXTREMITY: ICD-10-CM

## 2021-10-26 DIAGNOSIS — A08.4 VIRAL GASTROENTERITIS: ICD-10-CM

## 2021-10-26 DIAGNOSIS — L03.011 PARONYCHIA OF FINGER OF RIGHT HAND: Primary | ICD-10-CM

## 2021-10-26 PROCEDURE — 99214 OFFICE O/P EST MOD 30 MIN: CPT | Performed by: INTERNAL MEDICINE

## 2021-10-26 PROCEDURE — G8427 DOCREV CUR MEDS BY ELIG CLIN: HCPCS | Performed by: INTERNAL MEDICINE

## 2021-10-26 PROCEDURE — G8417 CALC BMI ABV UP PARAM F/U: HCPCS | Performed by: INTERNAL MEDICINE

## 2021-10-26 PROCEDURE — 4040F PNEUMOC VAC/ADMIN/RCVD: CPT | Performed by: INTERNAL MEDICINE

## 2021-10-26 PROCEDURE — 3017F COLORECTAL CA SCREEN DOC REV: CPT | Performed by: INTERNAL MEDICINE

## 2021-10-26 PROCEDURE — 1036F TOBACCO NON-USER: CPT | Performed by: INTERNAL MEDICINE

## 2021-10-26 PROCEDURE — G8399 PT W/DXA RESULTS DOCUMENT: HCPCS | Performed by: INTERNAL MEDICINE

## 2021-10-26 PROCEDURE — 1123F ACP DISCUSS/DSCN MKR DOCD: CPT | Performed by: INTERNAL MEDICINE

## 2021-10-26 PROCEDURE — 1090F PRES/ABSN URINE INCON ASSESS: CPT | Performed by: INTERNAL MEDICINE

## 2021-10-26 PROCEDURE — G8484 FLU IMMUNIZE NO ADMIN: HCPCS | Performed by: INTERNAL MEDICINE

## 2021-10-26 NOTE — PROGRESS NOTES
Infectious Diseases   office Note      Reason for visit:  Chronic paronychia, Pseudomonas infection       Subjective:   CHIEF COMPLAINT:  None given       HPI:    Alisa Walton is a 68yoF with history of afib, breast cancer, GERD, HTN, JINA, obesity     She was seen in office consult 12/2020 for evaluation chronic infection of the R thumb   She is seen in telehealth visit 6/2021 concerns nail dystrophy and possible infection     Pertinent history -   Few years ago, \"split the nail\" of R thumb. Historically gets regular manicures regularly with synthetic nail \"dipping. \"   Developed paronychia R thumb, tenderness, periungual erythema, dystrophic nail base with scant drainage. PCP gave po abx - Bactrim complicated by rash, cephalexin - improved to some degree but would recur. 11/2/20 - I&D R thumbnail bed, nail plate removal, R thumb IP joint arthrotomy with bone spur removal, osteophyte. Joint without overt signs of infection. Culture was positive for pan-S Pseudomonas aeruginosa, rare growth, Prevotella and CoNS in broth for which she completed a course of abx      Nail grew back, \"from both sides to meet in the middle\"  Now the nail has a line that is green-yellow at the base  Painful with pressure  No erythema distally   This has persisted since my last evaluation with her in 6/2021. It continues to be bothersome. She saw Ortho yesterday and removal of part of the nail was offered. She was also prescribed Augmentin, not yet started       She is also concerned about R leg rash noted yesterday  Does not itch, hurt, burn. Does not seem to be enlarging. She does not shave  Was told it may be \"staph infection\"       GI concerns   ~10d ago - belching with sulfuric smell   Watery diarrhea, cramping, malaise, weakness  Symptoms persisted ~1 week   Still not energetic  Today burp was again sulfuric, has her concerned the diarrhea may be returning.     Today stool was formed at initiation, softer at the finish   Spouse had similar symptoms   No fever      Current abx:  None        Past Surgical History:       Diagnosis Date    Abnormal Pap smear of cervix 2018    hpv+    Allergic     Allergic rhinitis     Aortic stenosis, mild-echo 2016    Atrial fibrillation ([address]     BMI 40.0-44.9, adult ([address]     Breast cancer ([facility]     54    Cancer ([facility]     right breast    Depression     GERD (gastroesophageal reflux disease)     H/O laparoscopic adjustable gastric banding 10/27/2016    Heart palpitations     HPV (human papilloma virus) infection 2018    HPV test positive 2014    Hypertension     Hypothyroidism 2000    Meningioma ([facility]     10 years ago    Mild aortic regurgitation-echo 2016    Mild dysplasia of cervix 2016    Obesity     Sleep apnea     Urge incontinence 2017         Procedure Laterality Date    APPENDECTOMY      BREAST BIOPSY      BREAST LUMPECTOMY      BREAST SURGERY  2009    right lumpectomy xrt and chemo and serm     SECTION  ,,,    failed to drop in birth canal     SECTION      CHOLECYSTECTOMY      COLONOSCOPY      HAND SURGERY Right 2020    RIGHT THUMB INCISION AND DRAINAGE WITH NAIL REMOVAL AND SPUR REMOVAL performed by Shilpa Aldrich MD at [address] [facility] LAP BAND      LAP BAND  2012    Removal    OVARY REMOVAL      PARTIAL HYSTERECTOMY      THYROIDECTOMY, PARTIAL Left 2000    TUNNELED VENOUS PORT PLACEMENT      Placed/Removed    UPPER GASTROINTESTINAL ENDOSCOPY  6/10/16    gastritis, gastric polyp bx        Social History:    TOBACCO:   reports that she quit smoking about 49 years ago. Her smoking use included cigarettes. She has a 0.38 pack-year smoking history. She has never used smokeless tobacco.  ETOH:   reports current alcohol use.   There is no history of illicit drug use or other significant epidemiologic exposures. Allergies   Allergen Reactions    Latex Rash    Bactrim [Sulfamethoxazole-Trimethoprim] Swelling    Iodine Shortness Of Breath        REVIEW OF SYSTEMS:    CONSTITUTIONAL:   Without F/C/NS  EYES:  negative for blurred vision, eye discharge, visual disturbance and icterus  HEENT:  negative for acute hearing loss  GASTROINTESTINAL:  negative for nausea, vomiting, diarrhea   GENITOURINARY:  negative for frequency, dysuria  HEMATOLOGIC/LYMPHATIC:  negative for easy bruising, bleeding and lymphadenopathy  ALLERGIC/IMMUNOLOGIC:  negative for recurrent infections, angioedema, anaphylaxis and drug reactions  ENDOCRINE:  negative for weight changes and diabetic symptoms including polyuria, polydipsia and polyphagia  MUSCULOSKELETAL:   Per HPI   NEUROLOGICAL:  negative for headaches, slurred speech, unilateral weakness  PSYCHIATRIC/BEHAVIORAL: negative for hallucinations, behavioral problems, confusion and agitation. Objective:   PHYSICAL EXAM:      VITALS:  /72   Temp 98.3 °F (36.8 °C) (Oral)   Ht 5' 6\" (1.676 m)   Wt 269 lb (122 kg)   LMP  (LMP Unknown)   BMI 43.42 kg/m²      Alert, oriented, well appearing  Thumb without cellulitic changes   Raised area in center of nail bed with discoloration    Patch or erythema R anterior tibia, very slight nodular component.   No vesicles or pustules       DATA:    Old records have been reviewed      Cultures:   11/2/20 surgical culture R thumb CoNS in broth, light growth P aeruginosa; GS 1+ GPC   Pseudomonas aeruginosa  Antibiotic Interpretation SIMEON Status    cefepime Sensitive <=2 mcg/mL     ciprofloxacin Sensitive <=1 mcg/mL     gentamicin Sensitive <=4 mcg/mL     meropenem Sensitive <=1 mcg/mL     piperacillin-tazobactam Sensitive <=16 mcg/mL     tobramycin Sensitive <=4 mcg/mL         Assessment:     Patient Active Problem List   Diagnosis    Chronic GERD    Allergic rhinitis    History of breast cancer    Severe obstructive sleep apnea    Hypothyroidism    Reactive depression    Hiatal hernia    Multiple gastric polyps    Aortic valve stenosis    Mild aortic regurgitation-echo 8/2016    H/O laparoscopic adjustable gastric banding    Essential hypertension    Urge incontinence    Obesity, morbid, BMI 40.0-49.9 (HCC)    Other and unspecified hyperlipidemia    Non-ischemic cardiomyopathy (HCC)    Chronic systolic heart failure (HCC)    CKD (chronic kidney disease) stage 3, GFR 30-59 ml/min (HCC)    Bilateral change in hearing    PAF ([facility]oxysmal [facility] fibrillation) ([facility]    Prediabetes       Parker Gomez is a 75yoF who is evaluated for the following:    Chronic paronychia R thumb  S/p debridement and excision nail plate 07/7/47. Surgical culture with pan-S Pseudomonas, Pevotella and CoNS. The nail bed is healthy appearing  Favor nail removal to facilitate resolution - she is in agreement   Can probably hold off on the Augmentin, jackie since recovering from GI illness, unless sgy is delayed or local signs of infection get worse      Dermatitis RLE  Does not look like cellulitis or SSTI   Perhaps stasis dermatitis or other contact dermatitis  Apply gentle moisturizer such as Cerave or Aquaphor  Call back if it persists or gets worse, can revisit diagnosis and treatment       Acute gastroenteritis   Spouse was also affected and recovered  Favor viral though common food borne illness not excluded   Seems to be self limited. As long as diarrhea is resolved, ok to return to work as soon as tomorrow      Allergy Bactrim      Call back with concerns   Discussed in detail, all questions answered       Arnel Kilgore M.D. Thank you for the opportunity to participate in the care of your patient.     Please do not hesitate to contact me:   [phone] office

## 2021-11-04 ENCOUNTER — OFFICE VISIT (OUTPATIENT)
Dept: ORTHOPEDIC SURGERY | Age: 66
End: 2021-11-04
Payer: MEDICARE

## 2021-11-04 VITALS — HEIGHT: 66 IN | WEIGHT: 269 LBS | TEMPERATURE: 96.8 F | BODY MASS INDEX: 43.23 KG/M2

## 2021-11-04 DIAGNOSIS — M17.12 PRIMARY OSTEOARTHRITIS OF LEFT KNEE: ICD-10-CM

## 2021-11-04 DIAGNOSIS — M17.11 PRIMARY OSTEOARTHRITIS OF RIGHT KNEE: Primary | ICD-10-CM

## 2021-11-04 DIAGNOSIS — M25.561 RIGHT KNEE PAIN, UNSPECIFIED CHRONICITY: ICD-10-CM

## 2021-11-04 DIAGNOSIS — E66.01 CLASS 3 SEVERE OBESITY DUE TO EXCESS CALORIES WITH BODY MASS INDEX (BMI) OF 40.0 TO 44.9 IN ADULT, UNSPECIFIED WHETHER SERIOUS COMORBIDITY PRESENT (HCC): ICD-10-CM

## 2021-11-04 PROCEDURE — 99213 OFFICE O/P EST LOW 20 MIN: CPT | Performed by: PHYSICIAN ASSISTANT

## 2021-11-04 PROCEDURE — G8399 PT W/DXA RESULTS DOCUMENT: HCPCS | Performed by: PHYSICIAN ASSISTANT

## 2021-11-04 PROCEDURE — G8427 DOCREV CUR MEDS BY ELIG CLIN: HCPCS | Performed by: PHYSICIAN ASSISTANT

## 2021-11-04 PROCEDURE — 3017F COLORECTAL CA SCREEN DOC REV: CPT | Performed by: PHYSICIAN ASSISTANT

## 2021-11-04 PROCEDURE — G8484 FLU IMMUNIZE NO ADMIN: HCPCS | Performed by: PHYSICIAN ASSISTANT

## 2021-11-04 PROCEDURE — 4040F PNEUMOC VAC/ADMIN/RCVD: CPT | Performed by: PHYSICIAN ASSISTANT

## 2021-11-04 PROCEDURE — G8417 CALC BMI ABV UP PARAM F/U: HCPCS | Performed by: PHYSICIAN ASSISTANT

## 2021-11-04 PROCEDURE — 20610 DRAIN/INJ JOINT/BURSA W/O US: CPT | Performed by: PHYSICIAN ASSISTANT

## 2021-11-04 PROCEDURE — 1090F PRES/ABSN URINE INCON ASSESS: CPT | Performed by: PHYSICIAN ASSISTANT

## 2021-11-04 PROCEDURE — 1123F ACP DISCUSS/DSCN MKR DOCD: CPT | Performed by: PHYSICIAN ASSISTANT

## 2021-11-04 PROCEDURE — 1036F TOBACCO NON-USER: CPT | Performed by: PHYSICIAN ASSISTANT

## 2021-11-04 NOTE — PROGRESS NOTES
Review of Systems   Cardiovascular:        H/o Heart disease   Musculoskeletal:        Arthritis     All other systems reviewed and are negative.

## 2021-11-05 NOTE — PROGRESS NOTES
[facility]  History and Physical      Date:  2021    Name:  Betty Vega  Address:  [address] [address]    :  1955      Age:   78 y.o. Chief Complaint    New Patient (right knee)      History of Present Illness:  Betty Vega is a 78 y.o. female who presents for evaluation of her right knee pain. This patient is known to [address] [place] [address] and is being treated with conservative therapy for the arthritis in both of her knees. She was last seen on 2017 where she had a corticosteroid injection administered in the left knee. Patient presents today due to several weeks of increased right knee pain. She states this started when she went on vacation and had an increased amount of ambulating. She states she also went on to subsequent trips after that where she conducted an increased amount of walking attempting to keep up with her family. She began noticing pain throughout the day throughout the right knee. She denies any mechanical symptoms or sensations of instability. The patient denies any new injury. She has been attempting to utilize Tylenol and elevation with little relief. The patient denies any catching, giving way, joint locking, numbness, paresthesias, or weakness.       Pain Assessment  Location of Pain: Knee  Location Modifiers: Right  Severity of Pain: 10  Quality of Pain: Sharp, Aching  Duration of Pain: Persistent  Frequency of Pain: Constant  Aggravating Factors: Bending, Stretching  Limiting Behavior: Yes  Relieving Factors: Rest, Nsaids  Result of Injury: No  Work-Related Injury: No  Are there other pain locations you wish to document?: No    Past Medical History:   Diagnosis Date    Abnormal Pap smear of cervix 2018    hpv+    Allergic     Allergic rhinitis     Aortic stenosis, mild-echo 2016    Atrial fibrillation (HCC)     BMI 40.0-44.9, adult ([facility]     Breast cancer ([facility]     54    Cancer ([facility]     right breast    Depression     GERD (gastroesophageal reflux disease)     H/O laparoscopic adjustable gastric banding 10/27/2016    Heart palpitations     HPV (human papilloma virus) infection 2018    HPV test positive 2014    Hypertension     Hypothyroidism 2000    Meningioma ([facility]     10 years ago    Mild aortic regurgitation-echo 2016    Mild dysplasia of cervix 2016    Obesity     Sleep apnea     Urge incontinence 2017        Past Surgical History:   Procedure Laterality Date    APPENDECTOMY      BREAST BIOPSY      BREAST LUMPECTOMY      BREAST SURGERY  2009    right lumpectomy xrt and chemo and serm     SECTION  ,,,    failed to drop in birth canal     SECTION      CHOLECYSTECTOMY      COLONOSCOPY      HAND SURGERY Right 2020    RIGHT THUMB INCISION AND DRAINAGE WITH NAIL REMOVAL AND SPUR REMOVAL performed by Kaylan Hay MD at [address] [facility] LAP BAND      LAP BAND      Removal    OVARY REMOVAL      PARTIAL HYSTERECTOMY      THYROIDECTOMY, PARTIAL Left     TUNNELED VENOUS PORT PLACEMENT      Placed/Removed    UPPER GASTROINTESTINAL ENDOSCOPY  6/10/16    gastritis, gastric polyp bx        Family History   Problem Relation Age of Onset    Cancer Mother         breast and cervical    Heart Disease Father     Cancer Father 61        skin cancer    Other Father         AAA    Heart Attack Father     Cancer Maternal Grandmother         breast    Cancer Paternal Grandmother         breast    Cancer Sister         lymphoma    No Known Problems Brother     No Known Problems Maternal Aunt     No Known Problems Maternal Uncle     No Known Problems Paternal Aunt     No Known Problems Paternal Uncle     No Known Problems Maternal Grandfather     No Known Problems Paternal Grandfather     Cancer Other         neice- head, not sure what kind skin ca    Heart Disease Sister     Other Sister         biliary hepatitis    Asthma Neg Hx     Diabetes Neg Hx     Emphysema Neg Hx     Heart Failure Neg Hx     Hypertension Neg Hx     Rheum Arthritis Neg Hx     Osteoarthritis Neg Hx     Breast Cancer Neg Hx     High Cholesterol Neg Hx     Migraines Neg Hx     Ovarian Cancer Neg Hx     Rashes/Skin Problems Neg Hx     Seizures Neg Hx     Stroke Neg Hx     Thyroid Disease Neg Hx        Social History     Socioeconomic History    Marital status:      Spouse name: None    Number of children: None    Years of education: None    Highest education level: None   Occupational History    Occupation: billing [employer]   Tobacco Use    Smoking status: Former Smoker     Packs/day: 0.25     Years: 1.50     Pack years: 0.37     Types: Cigarettes     Quit date: 3/27/1972     Years since quittin.6    Smokeless tobacco: Never Used   Vaping Use    Vaping Use: Never used   Substance and Sexual Activity    Alcohol use: Yes     Alcohol/week: 0.0 standard drinks     Comment: soc    Drug use: No    Sexual activity: Yes     Partners: Male   Other Topics Concern    None   Social History Narrative            Spends time with , grandchildren        Lives in [address] [place] [address] of Health     Financial Resource Strain: Low Risk     Difficulty of Paying Living Expenses: Not hard at all   Food Insecurity: No Food Insecurity    Worried About Running Out of Food in the Last Year: Never true    Kathy of Food in the Last Year: Never true   Transportation Needs: No Transportation Needs    Lack of Transportation (Medical): No    Lack of Transportation (Non-Medical):  No   Physical Activity:     Days of Exercise per Week:     Minutes of Exercise per Session:    Stress:     Feeling of Stress :    Social Connections:     Frequency of Communication with Friends and Family:     Frequency of Social Gatherings with Friends and Family:     Attends Buddhist Services:     Active Member of Clubs or Organizations:     Attends Club or Organization Meetings:     Marital Status:    Intimate Partner Violence:     Fear of Current or Ex-Partner:     Emotionally Abused:     Physically Abused:     Sexually Abused:        Current Outpatient Medications   Medication Sig Dispense Refill    omeprazole (PRILOSEC) 20 MG delayed release capsule TAKE 1 CAPSULE EVERY       MORNING BEFORE BREAKFAST 90 capsule 1    citalopram (CELEXA) 40 MG tablet TAKE 1 TABLET DAILY 90 tablet 1    levothyroxine (SYNTHROID) 137 MCG tablet TAKE 1 TABLET ONCE DAILY 90 tablet 1    valsartan (DIOVAN) 40 MG tablet TAKE 1 TABLET BY MOUTH ONE TIME A DAY 90 tablet 3    propafenone (RYTHMOL SR) 225 MG extended release capsule Take 1 capsule by mouth 2 times daily 180 capsule 1    apixaban (ELIQUIS) 5 MG TABS tablet TAKE 1 TABLET BY MOUTH TWO TIMES A DAY 60 tablet 5    oxybutynin (DITROPAN XL) 15 MG extended release tablet TAKE 1 TABLET BY MOUTH ONE TIME A DAY 90 tablet 2    metoprolol succinate (TOPROL XL) 25 MG extended release tablet Take 1 tablet by mouth daily 30 tablet 0    atorvastatin (LIPITOR) 40 MG tablet Take 1 tablet by mouth daily 90 tablet 1    metoprolol succinate (TOPROL XL) 25 MG extended release tablet TAKE 1 TABLET BY MOUTH ONE TIME A DAY 90 tablet 1    apixaban (ELIQUIS) 5 MG TABS tablet TAKE 1 TABLET BY MOUTH TWO TIMES A  tablet 1    propafenone (RYTHMOL SR) 225 MG extended release capsule Take 1 capsule by mouth 2 times daily 60 capsule 0    montelukast (SINGULAIR) 10 MG tablet TAKE ONE TABLET BY MOUTH NIGHTLY 90 tablet 2    spironolactone (ALDACTONE) 25 MG tablet TAKE 1 TABLET BY MOUTH ONE TIME A DAY 90 tablet 3    ELDERBERRY PO Take by mouth      fluticasone (FLONASE) 50 MCG/ACT nasal spray 2 SPRAYS IN EACH NOSTRIL ONCE DAILY.  1 Bottle 1    furosemide (LASIX) 20 MG tablet TAKE 1 TABLET BY MOUTH ONE TIME A DAY AS NEEDED FOR WEIGHT GAIN OF 3LBS IN A DAY OR 5LBS IN A WEEK 90 tablet 2    Azelastine-Fluticasone 137-50 MCG/ACT SUSP 1 spray by Nasal route 2 times daily      hydrocortisone (ANUSOL-HC) 2.5 % rectal cream Place rectally 2 times daily. 1 Tube 0    Lactobacillus (FLORAJEN ACIDOPHILUS) CAPS Take 1 capsule by mouth daily      cetirizine (ZYRTEC) 10 MG tablet Take 10 mg by mouth 2 times daily       multivitamin (THERAGRAN) per tablet Take 1 tablet by mouth daily. No current facility-administered medications for this visit. Allergies   Allergen Reactions    Latex Rash    Bactrim [Sulfamethoxazole-Trimethoprim] Swelling    Iodine Shortness Of Breath         Review of Systems:  A 14 point review of systems was completed by the patient and is available in the media section of the scanned medical record and was reviewed. Vital Signs:   Temp 96.8 °F (36 °C) (Infrared)   Ht 5' 6\" (1.676 m)   Wt 269 lb (122 kg)   LMP  (LMP Unknown)   BMI 43.42 kg/m²     General Exam:    General: Dana Gray is a healthy and well appearing 78y.o. year old female who is sitting comfortably in our office in no acute distress. Neuro: Alert & Oriented x 3,  normal,  no focal deficits noted. Normal mood & affect. Eyes: Sclera clear  Ears: Normal external ear  Mouth: Patient wearing a mask  Pulm: Respirations unlabored and regular   Skin: Warm, well perfused      Knee Examination: Right    Inspection:  No effusion, ecchymosis, discoloration, erythema, excessive warmth. no gross deformities, no signs of infection. Palpation: There is patellofemoral crepitus, there is medial joint line tenderness. No other osseous or soft tissue tenderness around the knee. Negative calf tenderness    Range of Motion:  0-125 degrees without pain or difficulty. Appropriate patellar mobility.     Strength: Grossly intact    Special Tests:  No ligament instability, valgus and varus stress test are stable at 0 and 30°. Lachman's exhibits a solid endpoint. Negative posterior drawer. Negative Homans sign    Gait: antalgic, with the use of a cane    Alignment:  Varus deformity    Neuro: Sensation equal bilateral lower extremities    Vascular: 2+ posterior tibialis pulse      Contralateral Knee Comparison Examination: Left    Inspection:  No effusion, ecchymosis, discoloration, erythema, excessive warmth. no gross deformities, no signs of infection. Palpation: There is patellofemoral crepitus, there is no line tenderness. No other osseous or soft tissue tenderness around the knee. Negative calf tenderness    Range of Motion:  0-125 degrees without pain or difficulty. Appropriate patellar mobility. Strength: Grossly intact    Special Tests:  No ligament instability, valgus and varus stress test are stable at 0 and 30°. Lachman's exhibits a solid endpoint. Negative posterior drawer. Negative Homans sign    Gait: antalgic, with the use of a cane    Alignment:  Varus deformity    Neuro: Sensation equal bilateral lower extremities    Vascular: 2+ posterior tibialis pulse      Radiology:   Previously obtain imaging reviewed. X-rays obtained and reviewed in office: AP and merchant view of both knees and a lateral of the right. Impression: No fractures, dislocations, visible tumors, or signs of acute trauma. Patient exhibits decreased joint space in the medial and lateral femoral-tibial compartments bilaterally. Medial compartments worse than lateral, left worse than right. Patient has severely decreased joint spacing in the patellofemoral joints bilaterally. KL grade 2. Patellas exhibit mild lateral subluxation with bone-on-bone osteoarthritis. Osteophytes noted to the lateral patella border of both knees.        Office Procedures:  Orders Placed This Encounter   Procedures    XR KNEE RIGHT (3 VIEWS)     Standing Status:   Future     Number of Occurrences:   1     Standing Expiration Date:   11/4/2022     Order Specific Question:   Reason for exam:     Answer:   pain     After informed consent was provided, the patient was seated on the exam table with the right knee flexed to 90 degrees. The anterolateral aspect of the knee adjacent to the joint line was prepped with Chlora-prep. The skin and subcutaneous tissues were anesthetized with ethyl chloride spray. A 22-gauge needle was inserted into the right knee and 2 ml of 40 mg/ml DepoMedrol was injected. The needle was withdrawn and the puncture site sealed with a Band-Aid. The patient tolerated the procedure well. Post injection instructions and precautions given and any problems to notify us. Assessment: Patient is a 78 y.o. female presenting to the office for an evaluation of her acute on chronic right knee pain. Patient is being treated with conservative therapy for the arthritis in both of her knees. Encounter Diagnoses   Name Primary?  Primary osteoarthritis of right knee Yes    Primary osteoarthritis of left knee     Right knee pain, unspecified chronicity     Class 3 severe obesity due to excess calories with body mass index (BMI) of 40.0 to 44.9 in adult, unspecified whether serious comorbidity present [facility])          Medical decision making:  Patient's workup and evaluation were reviewed with the patient in the office today. Imaging reviewed with the patient. Believe the patient will do well with conservative therapy. She was given a corticosteroid injection in the right knee which is worked well for her in the past.  It is likely that her increased amount ambulating caused exacerbation of her pre-existing osteoarthritis. Patient was educated on conservative therapy for her condition as detailed in the treatment plan below. She was also educated on surgical optimization for future total knee replacement. All the patient's questions were answered while in the clinic.   The patient is understanding of all instructions and agrees with the plan. Treatment Plan:    1. Observe postinjection precautions  2. Health/surgical optimization  3. Utilize cane while ambulating as needed  4. Activity modification/Rest   5. Ice 20 minutes ever 1-2 hours PRN  6. Take medications as prescribed/instructed  7. Elevation   8. Lightweight exercise/low impact exercise  9. Appropriate diet/weight management      Follow up: As needed    This patient exhibits moderate complexity for obtaining an independent history, reviewing past medical records, independent interpretation/review of diagnostic imaging, and further coordinating care. The patient exhibits low risk given that the patient is being treated with conservative therapy. Approximately 30 minutes were spent reviewing past medical records, imaging, educating the patient, and coordinating care. Sarah Ayers PA-C    Physician Assistant - Certified    91/51/61 12:24 PM    During this examination, I, Eben Donne Rom[place], [place], functioned as a scribe for Dr. Allan Matson. This dictation was performed with a verbal recognition program (DRAGON) and it was checked for errors. It is possible that there are still dictated errors within this office note. If so, please bring any errors to my attention for an addendum. All efforts were made to ensure that this office note is accurate. This dictation was performed with a verbal recognition program (DRAGON) and it was checked for errors. It is possible that there are still dictated errors within this office note. If so, please bring any errors to my attention for an addendum. All efforts were made to ensure that this office note is accurate. Supervising Physician Attestation:  I, Dr. Allan Matson, personally performed the services described in this documentation as scribed above, and it is both accurate and complete and I agree with all pertinent clinical information.   I personally interviewed the patient and performed a physical examination and medical decision making. I discussed the patient's condition and treatment options and have  reviewed and agree with the past medical, family and social history unless otherwise noted. All of the patient's questions were answered.       Board Certified Orthopaedic Surgeon  [facility] and [address] and [address] and [employer]  Professor of [address]

## 2021-11-16 ENCOUNTER — OFFICE VISIT (OUTPATIENT)
Dept: FAMILY MEDICINE CLINIC | Age: 66
End: 2021-11-16
Payer: MEDICARE

## 2021-11-16 VITALS
TEMPERATURE: 98.1 F | HEIGHT: 66 IN | OXYGEN SATURATION: 97 % | DIASTOLIC BLOOD PRESSURE: 80 MMHG | HEART RATE: 60 BPM | SYSTOLIC BLOOD PRESSURE: 118 MMHG | WEIGHT: 279.6 LBS | BODY MASS INDEX: 44.93 KG/M2

## 2021-11-16 DIAGNOSIS — K59.01 SLOW TRANSIT CONSTIPATION: ICD-10-CM

## 2021-11-16 DIAGNOSIS — N18.31 STAGE 3A CHRONIC KIDNEY DISEASE (HCC): ICD-10-CM

## 2021-11-16 DIAGNOSIS — R11.2 NAUSEA AND VOMITING, INTRACTABILITY OF VOMITING NOT SPECIFIED, UNSPECIFIED VOMITING TYPE: Primary | ICD-10-CM

## 2021-11-16 DIAGNOSIS — Z12.31 ENCOUNTER FOR SCREENING MAMMOGRAM FOR MALIGNANT NEOPLASM OF BREAST: ICD-10-CM

## 2021-11-16 PROCEDURE — G8417 CALC BMI ABV UP PARAM F/U: HCPCS | Performed by: PHYSICIAN ASSISTANT

## 2021-11-16 PROCEDURE — 4040F PNEUMOC VAC/ADMIN/RCVD: CPT | Performed by: PHYSICIAN ASSISTANT

## 2021-11-16 PROCEDURE — 99214 OFFICE O/P EST MOD 30 MIN: CPT | Performed by: PHYSICIAN ASSISTANT

## 2021-11-16 PROCEDURE — 1090F PRES/ABSN URINE INCON ASSESS: CPT | Performed by: PHYSICIAN ASSISTANT

## 2021-11-16 PROCEDURE — G8427 DOCREV CUR MEDS BY ELIG CLIN: HCPCS | Performed by: PHYSICIAN ASSISTANT

## 2021-11-16 PROCEDURE — G8484 FLU IMMUNIZE NO ADMIN: HCPCS | Performed by: PHYSICIAN ASSISTANT

## 2021-11-16 PROCEDURE — 1036F TOBACCO NON-USER: CPT | Performed by: PHYSICIAN ASSISTANT

## 2021-11-16 PROCEDURE — 1123F ACP DISCUSS/DSCN MKR DOCD: CPT | Performed by: PHYSICIAN ASSISTANT

## 2021-11-16 PROCEDURE — G8399 PT W/DXA RESULTS DOCUMENT: HCPCS | Performed by: PHYSICIAN ASSISTANT

## 2021-11-16 PROCEDURE — 3017F COLORECTAL CA SCREEN DOC REV: CPT | Performed by: PHYSICIAN ASSISTANT

## 2021-11-16 RX ORDER — SENNA PLUS 8.6 MG/1
1 TABLET ORAL DAILY
Qty: 90 TABLET | Refills: 1 | Status: SHIPPED | OUTPATIENT
Start: 2021-11-16 | End: 2022-02-14

## 2021-11-16 RX ORDER — OMEPRAZOLE 40 MG/1
CAPSULE, DELAYED RELEASE ORAL
Qty: 90 CAPSULE | Refills: 1 | Status: SHIPPED | OUTPATIENT
Start: 2021-11-16 | End: 2022-01-19 | Stop reason: SDUPTHER

## 2021-11-16 ASSESSMENT — ENCOUNTER SYMPTOMS
CONSTIPATION: 1
COUGH: 0
ANAL BLEEDING: 0
RECTAL PAIN: 0
ABDOMINAL PAIN: 0
NAUSEA: 0
CHOKING: 0
ABDOMINAL DISTENTION: 0
APNEA: 0
VOMITING: 1
DIARRHEA: 0
COLOR CHANGE: 0
BLOOD IN STOOL: 1

## 2021-11-16 NOTE — PROGRESS NOTES
Jackelyn George (:  1955) is a 78 y.o. female,Established patient, here for evaluation of the following chief complaint(s):  Nausea & Vomiting and Leg Pain (Bilateral leg pain )         ASSESSMENT/PLAN:  1. Nausea and vomiting, intractability of vomiting not specified, unspecified vomiting type  -     omeprazole (PRILOSEC) 40 MG delayed release capsule; TAKE 1 CAPSULE EVERY       MORNING BEFORE BREAKFAST, Disp-90 capsule, R-1Normal  -    Increase omperazole to 40 mg daily. Follow up with GI to rule out stricture  2. Slow transit constipation  -     senna (SENOKOT) 8.6 MG tablet; Take 1 tablet by mouth daily, Disp-90 tablet, R-1Normal  -     Stop fiber, start senna. If stools return to loose state please add fiber back in  3. Stage 3a chronic kidney disease (HCC)  -     Stable, follow up in 6 months for repeat lab work  4. Encounter for screening mammogram for malignant neoplasm of breast  -     CYNTHIA DIGITAL SCREEN W OR WO CAD BILATERAL; Future      Return if symptoms worsen or fail to improve.          Subjective   SUBJECTIVE/OBJECTIVE:  HPI  Nausea and vomiting:  Occurring intermittently, 1-2 times per week, some weeks not at all  Has watering taste in her mouth and then vomits, occurs quickly and without warning  Denies any blood in emesis, diarrhea, abdominal cramping, diaphoresis, cough  Can occur with or without eating  Occasionally feels like she gets full easily or like food is sitting in her throat    constipation:  Having harder stools that requiring straining and aggravation of external hemorrhoid  Diet: consumes a large amount of dairy through cheese, stress eating currently  Tx: taking fiber gummies, she had explosive diarrhea with miralax    Bilateral leg pain:  Started after a trip in October  Has seen ortho and was given a steroid injection in her knee  There is knee pain below the patella tendon and into the shins  There is no swelling or erythema    Review of Systems   Respiratory: Negative for apnea, cough and choking. Gastrointestinal: Positive for blood in stool, constipation and vomiting. Negative for abdominal distention, abdominal pain, anal bleeding, diarrhea, nausea and rectal pain. Musculoskeletal: Positive for arthralgias and myalgias. Negative for joint swelling. Skin: Negative for color change. Objective   Physical Exam  Vitals reviewed. Constitutional:       Appearance: She is normal weight. Cardiovascular:      Rate and Rhythm: Normal rate and regular rhythm. Heart sounds: Normal heart sounds. Pulmonary:      Effort: Pulmonary effort is normal.      Breath sounds: Normal breath sounds. No wheezing. Abdominal:      General: Abdomen is flat. Bowel sounds are normal.      Palpations: Abdomen is soft. Tenderness: There is abdominal tenderness in the right upper quadrant and epigastric area. Musculoskeletal:        Legs:    Neurological:      Mental Status: She is alert. An electronic signature was used to authenticate this note.     --KADEN Downing

## 2021-11-23 ENCOUNTER — HOSPITAL ENCOUNTER (OUTPATIENT)
Dept: WOMENS IMAGING | Age: 66
Discharge: HOME | End: 2021-11-23
Payer: MEDICARE

## 2021-11-23 DIAGNOSIS — Z12.31 ENCOUNTER FOR SCREENING MAMMOGRAM FOR MALIGNANT NEOPLASM OF BREAST: ICD-10-CM

## 2021-11-23 PROCEDURE — 77063 BREAST TOMOSYNTHESIS BI: CPT

## 2022-01-04 RX ORDER — MONTELUKAST SODIUM 10 MG/1
TABLET ORAL
Qty: 90 TABLET | Refills: 2 | Status: SHIPPED | OUTPATIENT
Start: 2022-01-04 | End: 2022-09-23

## 2022-01-04 NOTE — TELEPHONE ENCOUNTER
Refill Request     Last Seen: Last Seen Department: 11/16/2021  Last Seen by PCP: 11/16/2021    Last Written: 4/20/21    Next Appointment:   Future Appointments   Date Time Provider Pelon Roseann   1/10/2022 11:20 AM MD Beata Clements [facility]   1/11/2022 10:00 AM A ECHO ROOM [facility] AND CAR Gabe [facility]   1/11/2022 11:15 AM MD Praveena Beavers [facility]   1/13/2022  9:30 AM Aneta Fabry, PA [facility]  [place] [facility]   4/18/2022  9:30 AM Aneta Fabry, PA [facility] [place] - [facility]   5/20/2022 11:20 AM Dasie Canter, APRN - CNP [facility]       Future appointment scheduled      Requested Prescriptions     Pending Prescriptions Disp Refills    montelukast (SINGULAIR) 10 MG tablet [Pharmacy Med Name: MONTELUKAST  TAB 10MG] 90 tablet 2     Sig: TAKE 1 TABLET NIGHTLY

## 2022-01-10 ENCOUNTER — OFFICE VISIT (OUTPATIENT)
Dept: GYNECOLOGY | Age: 67
End: 2022-01-10
Payer: MEDICARE

## 2022-01-10 VITALS
HEIGHT: 67 IN | BODY MASS INDEX: 44.64 KG/M2 | RESPIRATION RATE: 17 BRPM | SYSTOLIC BLOOD PRESSURE: 114 MMHG | WEIGHT: 284.4 LBS | HEART RATE: 52 BPM | DIASTOLIC BLOOD PRESSURE: 76 MMHG | OXYGEN SATURATION: 96 %

## 2022-01-10 DIAGNOSIS — Z01.419 WELL WOMAN EXAM WITH ROUTINE GYNECOLOGICAL EXAM: Primary | ICD-10-CM

## 2022-01-10 PROCEDURE — G8484 FLU IMMUNIZE NO ADMIN: HCPCS | Performed by: OBSTETRICS & GYNECOLOGY

## 2022-01-10 PROCEDURE — G0101 CA SCREEN;PELVIC/BREAST EXAM: HCPCS | Performed by: OBSTETRICS & GYNECOLOGY

## 2022-01-10 ASSESSMENT — ENCOUNTER SYMPTOMS
RESPIRATORY NEGATIVE: 1
GASTROINTESTINAL NEGATIVE: 1
EYES NEGATIVE: 1

## 2022-01-11 ENCOUNTER — HOSPITAL ENCOUNTER (OUTPATIENT)
Dept: CARDIOLOGY | Age: 67
Discharge: HOME | End: 2022-01-11

## 2022-01-11 ENCOUNTER — OFFICE VISIT (OUTPATIENT)
Dept: CARDIOLOGY CLINIC | Age: 67
End: 2022-01-11
Payer: MEDICARE

## 2022-01-11 VITALS
BODY MASS INDEX: 44.49 KG/M2 | DIASTOLIC BLOOD PRESSURE: 80 MMHG | HEIGHT: 67 IN | SYSTOLIC BLOOD PRESSURE: 124 MMHG | HEART RATE: 59 BPM | OXYGEN SATURATION: 97 % | WEIGHT: 283.5 LBS

## 2022-01-11 DIAGNOSIS — I48.0 PAF (PAROXYSMAL ATRIAL FIBRILLATION) (HCC): ICD-10-CM

## 2022-01-11 DIAGNOSIS — I50.22 CHRONIC SYSTOLIC HEART FAILURE (HCC): ICD-10-CM

## 2022-01-11 DIAGNOSIS — I42.8 NON-ISCHEMIC CARDIOMYOPATHY (HCC): ICD-10-CM

## 2022-01-11 DIAGNOSIS — I50.22 CHRONIC SYSTOLIC HEART FAILURE (HCC): Primary | ICD-10-CM

## 2022-01-11 PROCEDURE — G8417 CALC BMI ABV UP PARAM F/U: HCPCS | Performed by: INTERNAL MEDICINE

## 2022-01-11 PROCEDURE — G8399 PT W/DXA RESULTS DOCUMENT: HCPCS | Performed by: INTERNAL MEDICINE

## 2022-01-11 PROCEDURE — 99214 OFFICE O/P EST MOD 30 MIN: CPT | Performed by: INTERNAL MEDICINE

## 2022-01-11 PROCEDURE — 1123F ACP DISCUSS/DSCN MKR DOCD: CPT | Performed by: INTERNAL MEDICINE

## 2022-01-11 PROCEDURE — G8484 FLU IMMUNIZE NO ADMIN: HCPCS | Performed by: INTERNAL MEDICINE

## 2022-01-11 PROCEDURE — 4040F PNEUMOC VAC/ADMIN/RCVD: CPT | Performed by: INTERNAL MEDICINE

## 2022-01-11 PROCEDURE — 1090F PRES/ABSN URINE INCON ASSESS: CPT | Performed by: INTERNAL MEDICINE

## 2022-01-11 PROCEDURE — 1036F TOBACCO NON-USER: CPT | Performed by: INTERNAL MEDICINE

## 2022-01-11 PROCEDURE — G8427 DOCREV CUR MEDS BY ELIG CLIN: HCPCS | Performed by: INTERNAL MEDICINE

## 2022-01-11 PROCEDURE — 3017F COLORECTAL CA SCREEN DOC REV: CPT | Performed by: INTERNAL MEDICINE

## 2022-01-11 RX ORDER — PROPAFENONE HYDROCHLORIDE 225 MG/1
225 CAPSULE, EXTENDED RELEASE ORAL 2 TIMES DAILY
Qty: 180 CAPSULE | Refills: 1 | Status: ON HOLD | OUTPATIENT
Start: 2022-01-11 | End: 2022-03-28

## 2022-01-11 NOTE — PROGRESS NOTES
Subjective:      Patient ID: Vikas Jama is a 78 y.o. female. Patient is here for annual. Patient in menopause. Gynecologic Exam        Review of Systems   Constitutional: Negative. HENT: Negative. Eyes: Negative. Respiratory: Negative. Cardiovascular: Negative. Gastrointestinal: Negative. Genitourinary: Negative. Musculoskeletal: Negative. Skin: Negative. Neurological: Negative. Psychiatric/Behavioral: Negative.       Date of Birth 1955  Past Medical History:   Diagnosis Date    Abnormal Pap smear of cervix 2018    hpv+    Allergic     Allergic rhinitis     Aortic stenosis, mild-echo 2016    Atrial fibrillation (HCC)     BMI 40.0-44.9, adult ([address]     Breast cancer ([facility]     54    Cancer ([address]     right breast    Depression     GERD (gastroesophageal reflux disease)     H/O laparoscopic adjustable gastric banding 10/27/2016    Heart palpitations     HPV (human papilloma virus) infection 2018    HPV test positive 2014    Hypertension     Hypothyroidism 2000    Meningioma ([facility]     10 years ago    Mild aortic regurgitation-echo 2016    Mild dysplasia of cervix 2016    Obesity     Sleep apnea     Urge incontinence 2017     Past Surgical History:   Procedure Laterality Date    APPENDECTOMY      BREAST BIOPSY      BREAST LUMPECTOMY      BREAST SURGERY      right lumpectomy xrt and chemo and serm    CARDIAC SURGERY      ablation     SECTION  2201,[id number]    failed to drop in birth canal     SECTION      CHOLECYSTECTOMY      COLONOSCOPY      HAND SURGERY Right 2020    RIGHT THUMB INCISION AND DRAINAGE WITH NAIL REMOVAL AND SPUR REMOVAL performed by Zhao Gibbons MD at [address] [facility] LAP BAND      LAP BAND      Removal    OVARY REMOVAL      PARTIAL HYSTERECTOMY      THYROIDECTOMY, PARTIAL Left     TUNNELED VENOUS PORT PLACEMENT      Placed/Removed    UPPER GASTROINTESTINAL ENDOSCOPY  06/10/2016    gastritis, gastric polyp bx      OB History    Para Term  AB Living   4 4 4     4   SAB IAB Ectopic Molar Multiple Live Births                    # Outcome Date GA Lbr Terry/2nd Weight Sex Delivery Anes PTL Lv   4 Term 1984     CS-Unspec      3 Term 0     CS-Unspec      2 Term 36     CS-Unspec      1 Term 1     CS-Unspec        Social History     Socioeconomic History    Marital status:      Spouse name: Not on file    Number of children: Not on file    Years of education: Not on file    Highest education level: Not on file   Occupational History    Occupation: billing [employer]   Tobacco Use    Smoking status: Former Smoker     Packs/day: 0.25     Years: 1.50     Pack years: 0.37     Types: Cigarettes     Quit date: 3/27/1972     Years since quittin.8    Smokeless tobacco: Never Used   Vaping Use    Vaping Use: Never used   Substance and Sexual Activity    Alcohol use: Yes     Alcohol/week: 0.0 standard drinks     Comment: soc    Drug use: No    Sexual activity: Yes     Partners: Male   Other Topics Concern    Not on file   Social History Narrative            Spends time with , grandchildren        Lives in [address] [place] [address] Health     Financial Resource Strain: Low Risk     Difficulty of Paying Living Expenses: Not hard at all   Food Insecurity: No Food Insecurity    Worried About Running Out of Food in the Last Year: Never true    Kathy of Food in the Last Year: Never true   Transportation Needs: No Transportation Needs    Lack of Transportation (Medical): No    Lack of Transportation (Non-Medical):  No   Physical Activity:     Days of Exercise per Week: Not on file    Minutes of Exercise per Session: Not on file   Stress:     Feeling of Stress : Not on file   Social Connections:     Frequency of Communication with Friends and Family: Not on file    Frequency of Social Gatherings with Friends and Family: Not on file    Attends Congregation Services: Not on file    Active Member of Clubs or Organizations: Not on file    Attends Club or Organization Meetings: Not on file    Marital Status: Not on file   Intimate Partner Violence:     Fear of Current or Ex-Partner: Not on file    Emotionally Abused: Not on file    Physically Abused: Not on file    Sexually Abused: Not on file   Housing Stability:     Unable to Pay for Housing in the Last Year: Not on file    Number of Places Lived in the Last Year: Not on file    Unstable Housing in the Last Year: Not on file     Allergies   Allergen Reactions    Latex Rash    Bactrim [Sulfamethoxazole-Trimethoprim] Swelling    Iodine Shortness Of Breath     Outpatient Medications Marked as Taking for the 1/10/22 encounter (Office Visit) with Alie Michelle MD   Medication Sig Dispense Refill    montelukast (SINGULAIR) 10 MG tablet TAKE 1 TABLET NIGHTLY 90 tablet 2    senna (SENOKOT) 8.6 MG tablet Take 1 tablet by mouth daily 90 tablet 1    omeprazole (PRILOSEC) 40 MG delayed release capsule TAKE 1 CAPSULE EVERY       MORNING BEFORE BREAKFAST 90 capsule 1    citalopram (CELEXA) 40 MG tablet TAKE 1 TABLET DAILY 90 tablet 1    levothyroxine (SYNTHROID) 137 MCG tablet TAKE 1 TABLET ONCE DAILY 90 tablet 1    valsartan (DIOVAN) 40 MG tablet TAKE 1 TABLET BY MOUTH ONE TIME A DAY 90 tablet 3    oxybutynin (DITROPAN XL) 15 MG extended release tablet TAKE 1 TABLET BY MOUTH ONE TIME A DAY 90 tablet 2    atorvastatin (LIPITOR) 40 MG tablet Take 1 tablet by mouth daily 90 tablet 1    metoprolol succinate (TOPROL XL) 25 MG extended release tablet TAKE 1 TABLET BY MOUTH ONE TIME A DAY 90 tablet 1    apixaban (ELIQUIS) 5 MG TABS tablet TAKE 1 TABLET BY MOUTH TWO TIMES A  tablet 1    propafenone (RYTHMOL SR) 225 MG extended release capsule Take 1 capsule by mouth 2 times daily 60 capsule 0  spironolactone (ALDACTONE) 25 MG tablet TAKE 1 TABLET BY MOUTH ONE TIME A DAY 90 tablet 3    ELDERBERRY PO Take by mouth      fluticasone (FLONASE) 50 MCG/ACT nasal spray 2 SPRAYS IN EACH NOSTRIL ONCE DAILY. 1 Bottle 1    furosemide (LASIX) 20 MG tablet TAKE 1 TABLET BY MOUTH ONE TIME A DAY AS NEEDED FOR WEIGHT GAIN OF 3LBS IN A DAY OR 5LBS IN A WEEK 90 tablet 2    Azelastine-Fluticasone 137-50 MCG/ACT SUSP 1 spray by Nasal route 2 times daily      hydrocortisone (ANUSOL-HC) 2.5 % rectal cream Place rectally 2 times daily. 1 Tube 0    Lactobacillus (FLORAJEN ACIDOPHILUS) CAPS Take 1 capsule by mouth daily      cetirizine (ZYRTEC) 10 MG tablet Take 10 mg by mouth 2 times daily       multivitamin (THERAGRAN) per tablet Take 1 tablet by mouth daily.        Family History   Problem Relation Age of Onset    Cancer Mother         breast and cervical    Breast Cancer Mother     Heart Disease Father     Cancer Father 61        skin cancer    Other Father         AAA    Heart Attack Father     Cancer Maternal Grandmother         breast    BRCA 2 Negative Maternal Grandmother     Cancer Paternal Grandmother         breast    BRCA 2 Negative Paternal Grandmother    Carmen Larger Cancer Sister         lymphoma    No Known Problems Brother     No Known Problems Maternal Aunt     No Known Problems Maternal Uncle     No Known Problems Paternal Aunt     No Known Problems Paternal Uncle     No Known Problems Maternal Grandfather     No Known Problems Paternal Grandfather     Cancer Other         neice- head, not sure what kind skin ca    Heart Disease Sister     Other Sister         biliary hepatitis    Asthma Neg Hx     Diabetes Neg Hx     Emphysema Neg Hx     Heart Failure Neg Hx     Hypertension Neg Hx     Rheum Arthritis Neg Hx     Osteoarthritis Neg Hx     High Cholesterol Neg Hx     Migraines Neg Hx     Ovarian Cancer Neg Hx     Rashes/Skin Problems Neg Hx     Seizures Neg Hx     Stroke Neg Hx  Thyroid Disease Neg Hx      /76 (Site: Right Lower Arm, Position: Sitting, Cuff Size: Large Adult)   Pulse 52   Resp 17   Ht 5' 7\" (1.702 m)   Wt 284 lb 6.4 oz (129 kg)   LMP  (LMP Unknown)   SpO2 96%   BMI 44.54 kg/m²       Objective:   Physical Exam  Constitutional:       General: She is not in acute distress. Appearance: Normal appearance. She is well-developed and normal weight. She is not diaphoretic. HENT:      Head: Normocephalic. Nose: Nose normal.      Mouth/Throat:      Mouth: Mucous membranes are moist.      Pharynx: Oropharynx is clear. Eyes:      Pupils: Pupils are equal, round, and reactive to light. Neck:      Thyroid: No thyromegaly. Cardiovascular:      Rate and Rhythm: Normal rate and regular rhythm. Heart sounds: Normal heart sounds. No murmur heard. No friction rub. No gallop. Pulmonary:      Effort: Pulmonary effort is normal. No respiratory distress. Breath sounds: Normal breath sounds. No wheezing or rales. Chest:      Chest wall: No tenderness. Breasts:      Right: No swelling, bleeding, inverted nipple, mass, nipple discharge, skin change or tenderness. Left: No swelling, bleeding, inverted nipple, mass, nipple discharge, skin change or tenderness. Abdominal:      General: Abdomen is flat. There is no distension. Palpations: Abdomen is soft. There is no hepatomegaly or mass. Tenderness: There is no abdominal tenderness. There is no guarding or rebound. Hernia: No hernia is present. There is no hernia in the left inguinal area. Genitourinary:     Exam position: Lithotomy position. Labia:         Right: No rash, tenderness, lesion or injury. Left: No rash, tenderness, lesion or injury. Urethra: No prolapse, urethral pain, urethral swelling or urethral lesion. Vagina: Normal. No signs of injury and foreign body. No vaginal discharge, erythema, tenderness, bleeding or lesions.       Adnexa: Right adnexa normal and left adnexa normal.        Right: No mass, tenderness or fullness. Left: No mass, tenderness or fullness. Rectum: Normal. Guaiac result negative. No mass, tenderness, anal fissure, external hemorrhoid or internal hemorrhoid. Normal anal tone. Comments: Normal urethral meatus, nl urethra, nl bladder. Musculoskeletal:         General: No tenderness. Normal range of motion. Cervical back: Normal range of motion and neck supple. No rigidity. Lymphadenopathy:      Cervical: No cervical adenopathy. Skin:     General: Skin is warm and dry. Neurological:      General: No focal deficit present. Mental Status: She is alert and oriented to person, place, and time. Mental status is at baseline. Deep Tendon Reflexes: Reflexes are normal and symmetric. Psychiatric:         Mood and Affect: Mood normal.         Behavior: Behavior normal.         Thought Content: Thought content normal.         Judgment: Judgment normal.         Assessment:      1. Annual  2. menopause      Plan:      1.  Pap, calcium, exercise, mammogram, hemocult negative  2. stable        Yifan Fitch MD

## 2022-01-11 NOTE — PROGRESS NOTES
[facility]  Advanced CHF/Pulmonary Hypertension   Cardiac Evaluation      Anna Mcelroy  YOB: 1955    Date of Visit:  1/11/22    Chief Complaint   Patient presents with    Follow-up    Congestive Heart Failure    Cardiomyopathy    Atrial Fibrillation          History of Present Illness:  Anna Mcelroy is a 78 y.o. female who presents from referral from Dr. Karen aPtrick for consultation and management of cardiomyopathy. She presented to the hospital 07/11/19 with palpitations. She was found to be in AFib RVR. She was started on flecainide, diltiazem and eliquis. Her echo from 07/11/19 showed her EF was 48-50% with mild/ global hypokinesis. Mild MR and AR. Her stress test from 07/25/19 showed a mixed defect in the anteroseptal wall raising concern for mixed ischemia/scar. Her cardiac cath from 08/02/19 showed nonischemic cardiomyopathy. Flecainide and diltiazem were stopped due to worsening. LVEF. She was started on toprol 25 mg. She was started on lisinopril and eliquis as well. She believes she has had PAF for 30 years but was never able to capture it. She reports she feels awful and SOB with the AFib. She reports she verifies afib with checking her radial pulse. She started having palpitations again last night after eating. She does not check her BP at home. Her cardiac MRI from 08/02/19 showed her EF was 45%. She underwent afib ablation with Dr Patti Avendaño on 03/05/20. Her echo from 09/29/20 showed her EF was 55%. Elevated RA pressures. Today presents for 6 month follow up. She wrote down the wrong time for her echo and did not have it done today. She plans to reschedule. She retired recently. She reports she feels her heart race only when having caffeine. She reports she took a trip and did not have her rythmol and noticed a difference. Patient is taking all cardiac medications as prescribed and tolerates them well.  Patient denies chest pain, sob, palpitations, dizziness or daily.      senna (SENOKOT) 8.6 MG tablet Take 1 tablet by mouth daily (Patient not taking: Reported on 2022) 90 tablet 1     No current facility-administered medications for this visit.        Past Medical History:   Diagnosis Date    Abnormal Pap smear of cervix 2018    hpv+    Allergic     Allergic rhinitis     Aortic stenosis, mild-echo 2016    Atrial fibrillation (HCC)     BMI 40.0-44.9, adult ([facility]     Breast cancer ([facility]     53    Cancer ([facility] 2009    right breast    Depression     GERD (gastroesophageal reflux disease)     H/O laparoscopic adjustable gastric banding 10/27/2016    Heart palpitations     HPV (human papilloma virus) infection 2018    HPV test positive 2014    Hypertension     Hypothyroidism 2000    Meningioma ([facility]     10 years ago    Mild aortic regurgitation-echo 2016    Mild dysplasia of cervix 2016    Obesity     Sleep apnea     Urge incontinence 2017     Past Surgical History:   Procedure Laterality Date    APPENDECTOMY      BREAST BIOPSY      BREAST LUMPECTOMY      BREAST SURGERY      right lumpectomy xrt and chemo and serm    CARDIAC SURGERY      ablation     SECTION  8882,7763,6261,6788    failed to drop in birth canal     SECTION      CHOLECYSTECTOMY      COLONOSCOPY      HAND SURGERY Right 2020    RIGHT THUMB INCISION AND DRAINAGE WITH NAIL REMOVAL AND SPUR REMOVAL performed by Shan Lorenzana MD at [address] [facility] [facility] LAP BAND      LAP BAND      Removal    OVARY REMOVAL      PARTIAL HYSTERECTOMY      THYROIDECTOMY, PARTIAL Left 2000    TUNNELED VENOUS PORT PLACEMENT      Placed/Removed    UPPER GASTROINTESTINAL ENDOSCOPY  06/10/2016    gastritis, gastric polyp bx      Family History   Problem Relation Age of Onset    Cancer Mother         breast and cervical    Breast Cancer Mother     Heart Disease Father  Cancer Father 61        skin cancer    Other Father         AAA    Heart Attack Father     Cancer Maternal Grandmother         breast    BRCA 2 Negative Maternal Grandmother     Cancer Paternal Grandmother         breast    BRCA 2 Negative Paternal Grandmother     Cancer Sister         lymphoma    No Known Problems Brother     No Known Problems Maternal Aunt     No Known Problems Maternal Uncle     No Known Problems Paternal Aunt     No Known Problems Paternal Uncle     No Known Problems Maternal Grandfather     No Known Problems Paternal Grandfather     Cancer Other         neice- head, not sure what kind skin ca    Heart Disease Sister     Other Sister         biliary hepatitis    Asthma Neg Hx     Diabetes Neg Hx     Emphysema Neg Hx     Heart Failure Neg Hx     Hypertension Neg Hx     Rheum Arthritis Neg Hx     Osteoarthritis Neg Hx     High Cholesterol Neg Hx     Migraines Neg Hx     Ovarian Cancer Neg Hx     Rashes/Skin Problems Neg Hx     Seizures Neg Hx     Stroke Neg Hx     Thyroid Disease Neg Hx      Social History     Socioeconomic History    Marital status:      Spouse name: Not on file    Number of children: Not on file    Years of education: Not on file    Highest education level: Not on file   Occupational History    Occupation: billing [employer]   Tobacco Use    Smoking status: Former Smoker     Packs/day: 0.25     Years: 1.50     Pack years: 0.37     Types: Cigarettes     Quit date: 3/27/1972     Years since quittin.8    Smokeless tobacco: Never Used   Vaping Use    Vaping Use: Never used   Substance and Sexual Activity    Alcohol use:  Yes     Alcohol/week: 0.0 standard drinks     Comment: soc    Drug use: No    Sexual activity: Yes     Partners: Male   Other Topics Concern    Not on file   Social History Narrative            Spends time with , grandchildren        Lives in [address] Resource Strain: Low Risk     Difficulty of Paying Living Expenses: Not hard at all   Food Insecurity: No Food Insecurity    Worried About Running Out of Food in the Last Year: Never true    Kahty of Food in the Last Year: Never true   Transportation Needs: No Transportation Needs    Lack of Transportation (Medical): No    Lack of Transportation (Non-Medical): No   Physical Activity:     Days of Exercise per Week: Not on file    Minutes of Exercise per Session: Not on file   Stress:     Feeling of Stress : Not on file   Social Connections:     Frequency of Communication with Friends and Family: Not on file    Frequency of Social Gatherings with Friends and Family: Not on file    Attends Rastafarian Services: Not on file    Active Member of [address] [place] [address] or Organizations: Not on file    Attends Club or Organization Meetings: Not on file    Marital Status: Not on file   Intimate Partner Violence:     Fear of Current or Ex-Partner: Not on file    Emotionally Abused: Not on file    Physically Abused: Not on file    Sexually Abused: Not on file   Housing Stability:     Unable to Pay for Housing in the Last Year: Not on file    Number of [place] in the Last Year: Not on file    Unstable Housing in the Last Year: Not on file       Review of Systems:   · Constitutional: there has been no unanticipated weight loss. There's been no change in energy level, sleep pattern, or activity level. · Eyes: No visual changes or diplopia. No scleral icterus. · ENT: No Headaches, hearing loss or vertigo. No mouth sores or sore throat. · Cardiovascular: Reviewed in HPI  · Respiratory: No cough or wheezing, no sputum production. No hematemesis. · Gastrointestinal: No abdominal pain, appetite loss, blood in stools. No change in bowel or bladder habits. · Genitourinary: No dysuria, trouble voiding, or hematuria. · Musculoskeletal:  No gait disturbance, weakness or joint complaints.   · Integumentary: No rash or pruritis. · Neurological: No headache, diplopia, change in muscle strength, numbness or tingling. No change in gait, balance, coordination, mood, affect, memory, mentation, behavior. · Psychiatric: No anxiety, no depression. · Endocrine: No malaise, fatigue or temperature intolerance. No excessive thirst, fluid intake, or urination. No tremor. · Hematologic/Lymphatic: No abnormal bruising or bleeding, blood clots or swollen lymph nodes. · Allergic/Immunologic: No nasal congestion or hives. Physical Examination:    Vitals:    01/11/22 1150   BP: 124/80   Pulse: 59   SpO2: 97%   Weight: 283 lb 8 oz (128.6 kg)   Height: 5' 7\" (1.702 m)     Body mass index is 44.4 kg/m². Wt Readings from Last 3 Encounters:   01/11/22 283 lb 8 oz (128.6 kg)   01/10/22 284 lb 6.4 oz (129 kg)   11/16/21 279 lb 9.6 oz (126.8 kg)     BP Readings from Last 3 Encounters:   01/11/22 124/80   01/10/22 114/76   11/16/21 118/80          Constitutional and General Appearance:   Chronically ill appearing  HEENT:  NC/AT  CHEY  No problems with hearing  Skin:  Warm, dry  Respiratory:  · Normal excursion and expansion without use of accessory muscles  · Resp Auscultation: Normal breath sounds without dullness  Cardiovascular:  · The apical impulses not displaced  · Heart tones are crisp and normal  · Cervical veins are not engorged  · The carotid upstroke is normal in amplitude and contour without delay or bruit   JVP <8 mm H2O\   Irregularly irregular rhythm with nl S1 and S2 without m,r,g  · Peripheral pulses are symmetrical and full  · There is no clubbing, cyanosis of the extremities.   · No edema  · Femoral Arteries: 2+ and equal  · Pedal Pulses: 2+ and equal   Neck:  · No thyromegaly  Abdomen:  · No masses or tenderness  · Liver/Spleen: No Abnormalities Noted  Neurological/Psychiatric:  · Alert and oriented in all spheres  · Moves all extremities well  · Exhibits normal gait balance and coordination  · No abnormalities of mood, affect, memory, mentation, or behavior are noted        Echo: 07/11/19   Summary   Left ventricular systolic function is reduced with ejection fraction   estimated at 48-50 %. Mild global hypokinesis is present. There is mild concentric left ventricular hypertrophy. Normal left ventricular diastolic filling pressure. Mild mitral regurgitation. Aortic sclerosis vs mild aortic stenosis. Mild aortic regurgitation is present. Normal systolic pulmonary artery pressure (SPAP) estimated at 29 mmHg (RA   pressure 8 mmHg). Stress test: 07/25/19   Normal LVEF 61%  Base to mid anteroseptal hypokinesis  There is a mixed defect in the anteroseptal wall raising concern for mixed  ischemia/scar in that region  This would be considered an abnormal intermediate risk study     [facility]: 08/02/19  LM: luminals  LAD: mid luminals, distal vessel small  LCX: luminals  RCA: dominant, very large     LVEDP: 18  LVEF: 25-30% global hypokinesis      Assessment  1. Nonischemic cardiomyopathy  2. Given worsening cardiomyopathy, suggest Cardiac MRI                - eval for myocarditis or infiltrative cardiomyopathy  3. Will d/c flecainide and dilt given worsening LVEF, pt currently in NSR                - start toprol 25                - start ASA and lisinopril  4. Lasix in post op area                - will make Appt with heart failure team    MRI cardiac: 08/23/19  FINDINGS:    1. The left ventricular size is normal. The left ventricular ejection fraction is 45 % by Melton's method. Global left ventricular function is mildly decreased. There is moderate hypokinesis involving the mid inferior, mid inferoseptal and apical inferior segments. The left ventricular mass is normal.  Delayed systole in the left ventricle relative to the right ventricle and septal bounce suggest incomplete or complete left bundle branch block. 2. Resting first pass myocardial perfusion is probably normal (assessment limited by wrap artifact).  There is no late gadolinium enhancement to suggest prior infarct, inflammation, or infiltration. There is no evidence of myocardial edema by T2 weighted imaging (myocardium/skeletal muscle 159/108, normal < 1.9). There is no evidence of increased iron deposition within the myocardium (T2*myocardium = 53 msec; if < 20 msec, suggestive of iron-overloading of the myocardium). 3. The right ventricular size is normal. Global right ventricular function is normal. There are no regional wall motion abnormalities of the right ventricular wall. There is late gadolinium enhancement of the right ventricular insertion points. 4. Left atrial size is mildly enlarged. Right atrial size is normal.  The Qp/Qs is 1.0 by phase contrast imaging and is consistent with no evidence of shunt.  There is late gadolinium enhancement involving the RV insertion points. 5. Velocity-encoded phase contrast Imaging in (2D/4D) was performed to assess valvular function. There is mild aortic stenosis. Peak velocity at the aortic valve is 1.76 corresponding to a peak gradient of 12.4 mmHg. There is mild aortic regurgitation. The calculated aortic regurgitant fraction is 10.5 %. There is mild mitral regurgitation. The calculated mitral regurgitant fraction is 10.3 %. Peak velocity at pulmonary valve is 0.73 corresponding to a peak gradient of 2.13 mmHg. The calculated pulmonic regurgitant fraction is 0.0 %.  6. The coronary arteries have normal origins and proximal courses.  There is evidence of atherosclerosis in the descending aorta.  The descending aorta is dilated measuring 28.0 mm.  7. 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 Echo: 09/29/20  Summary   Left ventricular systolic function is normal with a visually estimated   ejection fraction of 55%. The left ventricle is normal in size with mild concentric hypertrophy. No regional wall motion abnormalities are noted.    Systolic pulmonary artery pressure (SPAP) is normal and estimated at 32 mmHg   (right atrial pressure 8 mmHg). The IVC is normal in size (<2.1 cm) but collapses <50% with respiration   consistent with elevated right atrial pressures (8 mmHg) . Labs were reviewed including labs from other hospital systems through [facility]. Cardiac testing was reviewed including echos, nuclear scans, cardiac catheterization, including from other hospital systems through [facility]. Assessment:    1. Chronic systolic heart failure ([facility]    2. Non-ischemic cardiomyopathy ([facility]    3. PAF (paroxysmal atrial fibrillation) ([facility]          Plan:  1. Labs: fasting lipids, BNP, CBC, CMP  2. Recommend COVID vaccine  3. Establish care with MD Isaías HECTOR  4. Follow up with me in 6 months    This note has been scribed in the presence of Nicolás More MD by Toshia Knight RN. The scribe's documentation has been prepared under my direction and personally reviewed by me in its entirety. I confirm that the note above accurately reflects all work, treatment, procedures, and medical decision making performed by me. Time Based Itemization  A total of 30 minutes was spent on today's patient encounter. If applicable, non-patient-facing activities:  ( x)Preparing to see the patient and reviewing records  ( ) Individual interpretation of results  ( ) Discussion or coordination of care with other health care professionals  ( x) Ordering of unique tests, medications, or procedures  ( x) Documentation within the EHR      I appreciate the opportunity of cooperating in the care of this patient.     Kentrell Ball M.D., [facility] [place]

## 2022-01-11 NOTE — PATIENT INSTRUCTIONS
Plan:  1. Labs: fasting lipids, BNP, CBC, CMP  2. Recommend COVID vaccine  3. Establish care with MD Isaías HECTOR  4. Follow up with me in 6 months    Your provider has ordered testing for further evaluation. An order/prescription has been included in your paper work.  To schedule outpatient testing, contact Central Scheduling by calling [email or web address] ([phone]).

## 2022-01-13 ENCOUNTER — VIRTUAL VISIT (OUTPATIENT)
Dept: FAMILY MEDICINE CLINIC | Age: 67
End: 2022-01-13
Payer: MEDICARE

## 2022-01-13 DIAGNOSIS — E66.01 OBESITY, CLASS III, BMI 40-49.9 (MORBID OBESITY) (HCC): ICD-10-CM

## 2022-01-13 DIAGNOSIS — N18.31 STAGE 3A CHRONIC KIDNEY DISEASE (HCC): ICD-10-CM

## 2022-01-13 DIAGNOSIS — K21.9 CHRONIC GERD: ICD-10-CM

## 2022-01-13 DIAGNOSIS — R73.03 PREDIABETES: ICD-10-CM

## 2022-01-13 DIAGNOSIS — N32.81 OAB (OVERACTIVE BLADDER): ICD-10-CM

## 2022-01-13 DIAGNOSIS — F32.9 REACTIVE DEPRESSION: ICD-10-CM

## 2022-01-13 DIAGNOSIS — E03.9 ACQUIRED HYPOTHYROIDISM: Primary | ICD-10-CM

## 2022-01-13 PROBLEM — N39.41 URGE INCONTINENCE: Status: RESOLVED | Noted: 2017-05-26 | Resolved: 2022-01-13

## 2022-01-13 PROCEDURE — G8427 DOCREV CUR MEDS BY ELIG CLIN: HCPCS | Performed by: PHYSICIAN ASSISTANT

## 2022-01-13 PROCEDURE — 99214 OFFICE O/P EST MOD 30 MIN: CPT | Performed by: PHYSICIAN ASSISTANT

## 2022-01-13 PROCEDURE — 4040F PNEUMOC VAC/ADMIN/RCVD: CPT | Performed by: PHYSICIAN ASSISTANT

## 2022-01-13 PROCEDURE — G8484 FLU IMMUNIZE NO ADMIN: HCPCS | Performed by: PHYSICIAN ASSISTANT

## 2022-01-13 PROCEDURE — 3017F COLORECTAL CA SCREEN DOC REV: CPT | Performed by: PHYSICIAN ASSISTANT

## 2022-01-13 PROCEDURE — 1036F TOBACCO NON-USER: CPT | Performed by: PHYSICIAN ASSISTANT

## 2022-01-13 PROCEDURE — 1090F PRES/ABSN URINE INCON ASSESS: CPT | Performed by: PHYSICIAN ASSISTANT

## 2022-01-13 PROCEDURE — G8399 PT W/DXA RESULTS DOCUMENT: HCPCS | Performed by: PHYSICIAN ASSISTANT

## 2022-01-13 PROCEDURE — G8417 CALC BMI ABV UP PARAM F/U: HCPCS | Performed by: PHYSICIAN ASSISTANT

## 2022-01-13 PROCEDURE — 1123F ACP DISCUSS/DSCN MKR DOCD: CPT | Performed by: PHYSICIAN ASSISTANT

## 2022-01-13 RX ORDER — OXYBUTYNIN CHLORIDE 15 MG/1
TABLET, EXTENDED RELEASE ORAL
Qty: 90 TABLET | Refills: 2 | Status: SHIPPED | OUTPATIENT
Start: 2022-01-13 | End: 2022-09-23

## 2022-01-13 ASSESSMENT — ENCOUNTER SYMPTOMS
VOMITING: 0
DIARRHEA: 0
BLOOD IN STOOL: 0
CONSTIPATION: 0
NAUSEA: 0
ABDOMINAL PAIN: 0
SHORTNESS OF BREATH: 0
CHEST TIGHTNESS: 0
WHEEZING: 0

## 2022-01-13 NOTE — PROGRESS NOTES
medications. Follow up annually. 3. Chronic GERD  -  Stable, awaiting results of EGD. Continue with prilosec    4. OAB (overactive bladder)  -  Well controlled, continue with the current medication. Follow up annually  - oxybutynin (DITROPAN XL) 15 MG extended release tablet; TAKE 1 TABLET BY MOUTH ONE TIME A DAY  Dispense: 90 tablet; Refill: 2    5. Prediabetes  -  Continue to make lifestyle changes which improve blood sugar such as portion control and increased physical activity  - Hemoglobin A1C; Future    6. Obesity, Class III, BMI 40-49.9 (morbid obesity) ([facility]  -  Stable, work on increasing exercise as tolerated for a total of 150 minutes per week of moderate intensity exercise    7. Stage 3a chronic kidney disease (HCC)  -   Stable, review labs after they return      Return in about 6 months (around 7/13/2022) for hypothyroidism. Apolinar Bess, was evaluated through a synchronous (real-time) audio-video encounter. The patient (or guardian if applicable) is aware that this is a billable service. Verbal consent to proceed has been obtained within the past 12 months. The visit was conducted pursuant to the emergency declaration under the [facility], [address] [place] [address] authority and the [employer] and [employer] General Act. Patient identification was verified, and a caregiver was present when appropriate. The patient was located in a state where the provider was credentialed to provide care. Total time spent on this encounter: Not billed by time    --KADEN Hsu on 1/13/2022 at 9:41 AM    An electronic signature was used to authenticate this note.

## 2022-01-19 DIAGNOSIS — R11.2 NAUSEA AND VOMITING, INTRACTABILITY OF VOMITING NOT SPECIFIED, UNSPECIFIED VOMITING TYPE: ICD-10-CM

## 2022-01-19 RX ORDER — OMEPRAZOLE 40 MG/1
CAPSULE, DELAYED RELEASE ORAL
Qty: 90 CAPSULE | Refills: 1 | Status: SHIPPED | OUTPATIENT
Start: 2022-01-19 | End: 2022-06-30

## 2022-01-19 NOTE — TELEPHONE ENCOUNTER
Last office visit 1/13/2022     Last written 11-  To local pharmacy pt.  Requesting mail order    Next office visit scheduled 4/18/2022    Requested Prescriptions     Pending Prescriptions Disp Refills    omeprazole (PRILOSEC) 40 MG delayed release capsule 90 capsule 1     Sig: TAKE 1 CAPSULE EVERY       MORNING BEFORE BREAKFAST

## 2022-03-03 DIAGNOSIS — I50.22 CHRONIC SYSTOLIC HEART FAILURE (HCC): ICD-10-CM

## 2022-03-03 RX ORDER — CITALOPRAM 40 MG/1
TABLET ORAL
Qty: 90 TABLET | Refills: 1 | Status: SHIPPED | OUTPATIENT
Start: 2022-03-03 | End: 2022-10-10

## 2022-03-03 RX ORDER — METOPROLOL SUCCINATE 25 MG/1
TABLET, EXTENDED RELEASE ORAL
Qty: 90 TABLET | Refills: 1 | Status: CANCELLED | OUTPATIENT
Start: 2022-03-03

## 2022-03-03 RX ORDER — SPIRONOLACTONE 25 MG/1
TABLET ORAL
Qty: 90 TABLET | Refills: 3 | Status: SHIPPED | OUTPATIENT
Start: 2022-03-03 | End: 2022-09-02 | Stop reason: SDUPTHER

## 2022-03-03 RX ORDER — LEVOTHYROXINE SODIUM 137 UG/1
TABLET ORAL
Qty: 90 TABLET | Refills: 1 | Status: SHIPPED | OUTPATIENT
Start: 2022-03-03 | End: 2022-10-10

## 2022-03-03 RX ORDER — METOPROLOL SUCCINATE 25 MG/1
TABLET, EXTENDED RELEASE ORAL
Qty: 90 TABLET | Refills: 1 | Status: ON HOLD | OUTPATIENT
Start: 2022-03-03 | End: 2022-03-30 | Stop reason: HOSPADM

## 2022-03-03 RX ORDER — ATORVASTATIN CALCIUM 40 MG/1
TABLET, FILM COATED ORAL
Qty: 90 TABLET | Refills: 3 | Status: SHIPPED | OUTPATIENT
Start: 2022-03-03

## 2022-03-03 NOTE — TELEPHONE ENCOUNTER
.  Refill Request     Last Seen: Last Seen Department: 1/13/2022  Last Seen by PCP: 1/13/2022    Last Written: 10-1-21 90 with 1     Next Appointment:   Future Appointments   Date Time Provider Pelon Robersoni   3/10/2022 10:00 AM [facility] ECHO ROOM [facility] AND FRANNIE [facility]   3/10/2022 11:00 AM [facility] NM CAMERA 1 MHAZ NUC MED Lopezphilippe Vides   3/22/2022  1:15 PM MD Rain Guzman   4/18/2022  9:30 AM Metro CountsKADEN - DYISSAC   5/20/2022 11:20 AM Beena Lust, APRN - CNP CLERM PULM [facility]   7/13/2022 11:30 AM Metro CountsKADEN [place] - DYD   7/19/2022 11:15 AM MD Rain Stevens       Requested Prescriptions     Pending Prescriptions Disp Refills    levothyroxine (SYNTHROID) 137 MCG tablet [Pharmacy Med Name: SYNTHROID TAB 137MCG] 90 tablet 1     Sig: TAKE 1 TABLET ONCE DAILY    citalopram (CELEXA) 40 MG tablet [Pharmacy Med Name: CITALOPRAM TAB 40MG] 90 tablet 1     Sig: TAKE 1 TABLET DAILY

## 2022-03-04 DIAGNOSIS — E03.9 ACQUIRED HYPOTHYROIDISM: ICD-10-CM

## 2022-03-04 DIAGNOSIS — I50.22 CHRONIC SYSTOLIC HEART FAILURE (HCC): ICD-10-CM

## 2022-03-04 DIAGNOSIS — R73.03 PREDIABETES: ICD-10-CM

## 2022-03-05 LAB
A/G RATIO: 1.9 (ref 1.1–2.2)
ALBUMIN SERPL-MCNC: 4.7 G/DL (ref 3.4–5)
ALP BLD-CCNC: 73 U/L (ref 40–129)
ALT SERPL-CCNC: 19 U/L (ref 10–40)
ANION GAP SERPL CALCULATED.3IONS-SCNC: 20 MMOL/L (ref 3–16)
AST SERPL-CCNC: 18 U/L (ref 15–37)
BASOPHILS ABSOLUTE: 0 K/UL (ref 0–0.2)
BASOPHILS RELATIVE PERCENT: 0.6 %
BILIRUB SERPL-MCNC: 0.6 MG/DL (ref 0–1)
BUN BLDV-MCNC: 15 MG/DL (ref 7–20)
CALCIUM SERPL-MCNC: 10.2 MG/DL (ref 8.3–10.6)
CHLORIDE BLD-SCNC: 103 MMOL/L (ref 99–110)
CHOLESTEROL, TOTAL: 200 MG/DL (ref 0–199)
CO2: 20 MMOL/L (ref 21–32)
CREAT SERPL-MCNC: 0.9 MG/DL (ref 0.6–1.2)
EOSINOPHILS ABSOLUTE: 0.1 K/UL (ref 0–0.6)
EOSINOPHILS RELATIVE PERCENT: 1.8 %
ESTIMATED AVERAGE GLUCOSE: 139.9 MG/DL
GFR AFRICAN AMERICAN: >60
GFR NON-AFRICAN AMERICAN: >60
GLUCOSE BLD-MCNC: 126 MG/DL (ref 70–99)
HBA1C MFR BLD: 6.5 %
HCT VFR BLD CALC: 40.3 % (ref 36–48)
HDLC SERPL-MCNC: 43 MG/DL (ref 40–60)
HEMOGLOBIN: 13.5 G/DL (ref 12–16)
LDL CHOLESTEROL CALCULATED: 129 MG/DL
LYMPHOCYTES ABSOLUTE: 1.2 K/UL (ref 1–5.1)
LYMPHOCYTES RELATIVE PERCENT: 22.5 %
MCH RBC QN AUTO: 30.5 PG (ref 26–34)
MCHC RBC AUTO-ENTMCNC: 33.5 G/DL (ref 31–36)
MCV RBC AUTO: 91.1 FL (ref 80–100)
MONOCYTES ABSOLUTE: 0.5 K/UL (ref 0–1.3)
MONOCYTES RELATIVE PERCENT: 9.2 %
NEUTROPHILS ABSOLUTE: 3.5 K/UL (ref 1.7–7.7)
NEUTROPHILS RELATIVE PERCENT: 65.9 %
PDW BLD-RTO: 14.1 % (ref 12.4–15.4)
PLATELET # BLD: 231 K/UL (ref 135–450)
PMV BLD AUTO: 8.5 FL (ref 5–10.5)
POTASSIUM SERPL-SCNC: 4.5 MMOL/L (ref 3.5–5.1)
RBC # BLD: 4.42 M/UL (ref 4–5.2)
SODIUM BLD-SCNC: 143 MMOL/L (ref 136–145)
TOTAL PROTEIN: 7.2 G/DL (ref 6.4–8.2)
TRIGL SERPL-MCNC: 139 MG/DL (ref 0–150)
TSH REFLEX: 1.95 UIU/ML (ref 0.27–4.2)
VLDLC SERPL CALC-MCNC: 28 MG/DL
WBC # BLD: 5.2 K/UL (ref 4–11)

## 2022-03-07 ENCOUNTER — TELEPHONE (OUTPATIENT)
Dept: CARDIOLOGY CLINIC | Age: 67
End: 2022-03-07

## 2022-03-07 NOTE — TELEPHONE ENCOUNTER
Spoke with patient and relayed results. She VU. Patient states she has not been taking her Lipitor 40 mg since Jan 2022 due to insurance changes and issues. She states her Lipitor 40 mg is currently in the process of being shipped to patient home and she will resume taking.

## 2022-03-07 NOTE — TELEPHONE ENCOUNTER
Refill  propafenone (RYTHMOL SR) 225 MG extended release capsule. Due to insurance they will not pay for SR. Needs to send in new order or prescribe something else. Pt has been out of for sometime. Due to pharmacy and insurance issues.   [address] [place] [address], [address] [facility] [address] [phone] [facility] [phone]

## 2022-03-07 NOTE — TELEPHONE ENCOUNTER
----- Message from BLANK Gonzalez CNP sent at 3/7/2022 12:00 PM EST -----  Covering for Dr. Tatianna Shirley. Kidney function is stable. Electrolytes are stable. Total cholesterol and LDL is elevated. Please verify if she is taking the lipitor 40mg daily. If she is then recommend increasing this to 80mg daily.    CBC is normal.

## 2022-03-10 ENCOUNTER — HOSPITAL ENCOUNTER (OUTPATIENT)
Dept: NUCLEAR MEDICINE | Age: 67
Discharge: HOME | End: 2022-03-10
Payer: MEDICARE

## 2022-03-10 ENCOUNTER — HOSPITAL ENCOUNTER (OUTPATIENT)
Dept: CARDIOLOGY | Age: 67
Discharge: HOME | End: 2022-03-10
Payer: MEDICARE

## 2022-03-10 DIAGNOSIS — R11.10 REGURGITATION OF STOMACH CONTENTS: ICD-10-CM

## 2022-03-10 LAB
LV EF: 50 %
LVEF MODALITY: NORMAL

## 2022-03-10 PROCEDURE — A9541 TC99M SULFUR COLLOID: HCPCS

## 2022-03-10 PROCEDURE — 3430000000 HC RX DIAGNOSTIC RADIOPHARMACEUTICAL

## 2022-03-10 PROCEDURE — 93306 TTE W/DOPPLER COMPLETE: CPT

## 2022-03-10 PROCEDURE — 78264 GASTRIC EMPTYING IMG STUDY: CPT

## 2022-03-10 RX ADMIN — Medication 1 MILLICURIE: at 11:09

## 2022-03-11 ENCOUNTER — TELEPHONE (OUTPATIENT)
Dept: CARDIOLOGY CLINIC | Age: 67
End: 2022-03-11

## 2022-03-11 NOTE — RESULT ENCOUNTER NOTE
Please call patient with echocardiogram results. Echo shows heart function has remains normal.  Mild valvular regurg of the aortic, mitral valves. Overall, looks good. Continue current regimen.

## 2022-03-11 NOTE — TELEPHONE ENCOUNTER
----- Message from BLANK Conde CNP sent at 3/11/2022  3:12 PM EST -----  Please call patient with echocardiogram results. Echo shows heart function has remains normal.  Mild valvular regurg of the aortic, mitral valves. Overall, looks good. Continue current regimen.

## 2022-03-22 ENCOUNTER — OFFICE VISIT (OUTPATIENT)
Dept: CARDIOLOGY CLINIC | Age: 67
End: 2022-03-22
Payer: MEDICARE

## 2022-03-22 VITALS
OXYGEN SATURATION: 98 % | WEIGHT: 283.5 LBS | SYSTOLIC BLOOD PRESSURE: 118 MMHG | HEART RATE: 59 BPM | DIASTOLIC BLOOD PRESSURE: 68 MMHG | HEIGHT: 67 IN | BODY MASS INDEX: 44.49 KG/M2

## 2022-03-22 DIAGNOSIS — I48.0 PAF (PAROXYSMAL ATRIAL FIBRILLATION) (HCC): Primary | ICD-10-CM

## 2022-03-22 DIAGNOSIS — I42.8 NON-ISCHEMIC CARDIOMYOPATHY (HCC): ICD-10-CM

## 2022-03-22 PROCEDURE — 99214 OFFICE O/P EST MOD 30 MIN: CPT | Performed by: INTERNAL MEDICINE

## 2022-03-22 PROCEDURE — G8417 CALC BMI ABV UP PARAM F/U: HCPCS | Performed by: INTERNAL MEDICINE

## 2022-03-22 PROCEDURE — G8427 DOCREV CUR MEDS BY ELIG CLIN: HCPCS | Performed by: INTERNAL MEDICINE

## 2022-03-22 PROCEDURE — G8484 FLU IMMUNIZE NO ADMIN: HCPCS | Performed by: INTERNAL MEDICINE

## 2022-03-22 PROCEDURE — 1090F PRES/ABSN URINE INCON ASSESS: CPT | Performed by: INTERNAL MEDICINE

## 2022-03-22 PROCEDURE — 93000 ELECTROCARDIOGRAM COMPLETE: CPT | Performed by: INTERNAL MEDICINE

## 2022-03-22 RX ORDER — POLYDEXTROSE 1.5 G
TABLET,CHEWABLE ORAL
COMMUNITY
End: 2022-07-19

## 2022-03-22 NOTE — LETTER
Gail Mcleod  1955    [address]   Electrophysiology Consult Note              Date: 3/22/22  Patient Name: Gail Mcleod  YOB: 1955    Primary Care Physician: KADEN Khan    CHIEF COMPLAINT:   Chief Complaint   Patient presents with    New Patient    Hypertension    Atrial Fibrillation    Medication Refill     HISTORY OF PRESENT ILLNESS: Gail Mcleod is a 78 y.o. female with a history of atrial fibrillation, NICM with recovered EF, nonobstructive CAD, chronic CHF, AS, hypothyroidism, breast cancer, and sleep apnea. She was admitted in 7/2019 with AF RVR and was started on flecainide. Echo showed an EF of 48-50%. Stress test was abnormal and LHC in 8/2019 showed nonobstructive CAD and EF 25-30%. Cardiac MRI 8/2019 showed an EF of 45%. Afib was recurrent and Rythmol was started. In 3/2020 she had RFCA of PAF. Most recent echo in 9/2020 showed an EF of 55%. Has remained in sinus at subsequent visits. Today, 3/22/2022, ECG demonstrates SB (59). She states that she is doing ok. She had a couple of days of AF on and off a couple of weeks ago. She can tell when she is in AF. She is taking her medications as prescribed. Denies recent issues with bleeding or bruising. Patient denies current edema, chest pain, sob, palpitations, dizziness or syncope. Past Medical History:   has a past medical history of Abnormal Pap smear of cervix, Allergic, Allergic rhinitis, Aortic stenosis, mild-echo 8/2016, Atrial fibrillation ([address] BMI 40.0-44.9, adult ([address] Breast cancer ([address] Cancer ([address] Depression, GERD (gastroesophageal reflux disease), H/O laparoscopic adjustable gastric banding, Heart palpitations, HPV (human papilloma virus) infection, HPV test positive, Hypertension, Hypothyroidism, Meningioma ([address] Mild aortic regurgitation-echo 8/2016, Mild dysplasia of cervix, Obesity, Sleep apnea, and Urge incontinence.     Past Surgical History:   has a past surgical history that includes Appendectomy; Cholecystectomy; Colonoscopy; Lap Band (); Tunneled venous port placement ();  section (2457,3526,1586,3045);  section; Breast surgery (); Breast biopsy; Breast lumpectomy; Hysterectomy (); Lap Band (); Ovary removal; Thyroidectomy, partial (Left, ); Upper gastrointestinal endoscopy (06/10/2016); partial hysterectomy (cervix not removed); Hand surgery (Right, 2020); and Cardiac surgery (). Allergies:  Latex, Bactrim [sulfamethoxazole-trimethoprim], and Iodine    Social History:   reports that she quit smoking about 50 years ago. Her smoking use included cigarettes. She has a 0.38 pack-year smoking history. She has never used smokeless tobacco. She reports current alcohol use. She reports that she does not use drugs. Family History: family history includes BRCA 2 Negative in her maternal grandmother and paternal grandmother; Breast Cancer in her mother; Cancer in her maternal grandmother, mother, paternal grandmother, sister, and another family member; Cancer (age of onset: 61) in her father; Heart Attack in her father; Heart Disease in her father and sister; No Known Problems in her brother, maternal aunt, maternal grandfather, maternal uncle, paternal aunt, paternal grandfather, and paternal uncle; Other in her father and sister. Home Medications:    Prior to Admission medications    Medication Sig Start Date End Date Taking?  Authorizing Provider   ELDERBERRY PO Take by mouth   Yes Historical Provider, MD   [employer] Children CHEW Take by mouth   Yes Historical Provider, MD   spironolactone (ALDACTONE) 25 MG tablet TAKE 1 TABLET ONCE DAILY 3/3/22  Yes Anastasia Aviles MD   levothyroxine (SYNTHROID) 137 MCG tablet TAKE 1 TABLET ONCE DAILY 3/3/22  Yes KADEN Padilla   atorvastatin (LIPITOR) 40 MG tablet TAKE 1 TABLET DAILY 3/3/22  Yes Anastasia Aviles MD   citalopram (CELEXA) 40 MG tablet TAKE 1 TABLET DAILY 3/3/22  Yes KADEN Cuevas   metoprolol succinate (TOPROL XL) 25 MG extended release tablet TAKE 1 TABLET BY MOUTH ONE TIME A DAY 3/3/22  Yes BLANK Wise CNP   omeprazole (PRILOSEC) 40 MG delayed release capsule TAKE 1 CAPSULE EVERY       MORNING BEFORE BREAKFAST 1/19/22  Yes KADEN Cuevas   oxybutynin (DITROPAN XL) 15 MG extended release tablet TAKE 1 TABLET BY MOUTH ONE TIME A DAY 1/13/22  Yes KADEN Cuevas   apixaban (ELIQUIS) 5 MG TABS tablet TAKE 1 TABLET BY MOUTH TWO TIMES A DAY 1/11/22  Yes BLANK Wise CNP   propafenone (RYTHMOL SR) 225 MG extended release capsule Take 1 capsule by mouth 2 times daily 1/11/22  Yes BLANK Wise CNP   montelukast (SINGULAIR) 10 MG tablet TAKE 1 TABLET NIGHTLY 1/4/22  Yes KADEN Cuevas   valsartan (DIOVAN) 40 MG tablet TAKE 1 TABLET BY MOUTH ONE TIME A DAY 8/24/21  Yes Ashlee Gutierrez MD   fluticasone (FLONASE) 50 MCG/ACT nasal spray 2 SPRAYS IN EACH NOSTRIL ONCE DAILY. 2/25/21  Yes KADEN Cuevas   furosemide (LASIX) 20 MG tablet TAKE 1 TABLET BY MOUTH ONE TIME A DAY AS NEEDED FOR WEIGHT GAIN OF 3LBS IN A DAY OR 5LBS IN A WEEK 1/27/21  Yes BLANK Julien CNP   multivitamin [facility]) per tablet Take 1 tablet by mouth daily. Yes Historical Provider, MD       REVIEW OF SYSTEMS:    All 14-point review of systems are completed and  pertinent positives are mentioned in the history of present illness. Other  systems are reviewed and are negative. Physical Examination:    /68   Pulse 59   Ht 5' 7\" (1.702 m)   Wt 283 lb 8 oz (128.6 kg)   LMP  (LMP Unknown)   SpO2 98%   BMI 44.40 kg/m²      Constitutional and General Appearance:    alert, cooperative, no distress and appears stated age  [de-identified]:    PERRLA, no cervical lymphadenopathy. No masses palpable.  Normal oral  mucosa  Respiratory:  · Normal excursion and expansion without use of accessory muscles  · Resp Auscultation: Normal breath sounds without dullness or wheezing  Cardiovascular:  · The apical impulse is not displaced  · RRR S1S2 w/o M/G/R  Abdomen:  · No masses or tenderness  · Bowel sounds present  Extremities:  ·  No Cyanosis or Clubbing  ·  Lower extremity edema: No  · Skin: Warm and dry  Neurological:  · Alert and oriented. · Moves all extremities well  · No abnormalities of mood, affect, memory, mentation, or behavior are noted    DATA:    ECG 3/22/22: Personally reviewed. Echo 3/10/2022  Summary   Low normal left ventricle systolic function with an estimated ejection   fraction of 50%. The left ventricle is mildly dilated. No regional wall motion abnormalities are seen. Normal left ventricular diastolic filling pressure. Aortic valve appears sclerotic but opens adequately. Mild aortic and mitral regurgitation. Trace pulmonic and tricuspid regurgitation. Systolic pulmonary artery pressure (SPAP) is normal and estimated at 26 mmHg   (right atrial pressure 3 mmHg). Frequent premature beats throughout the study. Stress test 7/2019  Summary Normal LVEF 61%  Base to mid anteroseptal hypokinesis  There is a mixed defect in the anteroseptal wall raising concern for mixed  ischemia/scar in that region  This would be considered an abnormal intermediate risk study      Cardiac cath 8/2/2019  Assessment  1. Nonischemic cardiomyopathy  2. Given worsening cardiomyopathy, suggest Cardiac MRI                - eval for myocarditis or infiltrative cardiomyopathy  3. Will d/c flecainide and dilt given worsening LVEF, pt currently in NSR                - start toprol 25                - start ASA and lisinopril  4. Lasix in post op area                - will make Appt with heart failure team     IMPRESSION:    1.  Paroxysmal atrial fibrillation ([id number])  3/22/2022  Mrs. Wayne Richmond is a pleasant 78year old female with a medical history significant for paroxysmal atrial fibrillation, history of non-ischemic cardiomyopathy, hypertension, chronic renal insufficiency, and hypothyroidism who presents from home to [facility] care. Patient's been doing well. She is home propafenone however her QRS duration is increasing. She also has a history of nonischemic cardiomyopathy. Because of this I like to stop her propafenone and start her on a class III antiarrhythmic. Patient agrees with plan. We will have her stop her propafenone and schedule a dofetilide admission.  - Stop propafenone. - Schedule dofetilide admission.  - 2-week monitor after discharge. - Continue metoprolol.  - continue apixaban. - Follow up after admission. RECOMMENDATIONS:  1. Stop Rythmol. Discussed alternatives to Rythmol.    -Amiodarone, Dofetilide (requires 4 day hospital stay), Sotalol (requires 4 day hospital stay), or dronedarone. 2. Cardiac event monitor for 2 weeks AFTER Tikosyn/Dofetilide. 3. Follow up after hospital stay. QUALITY MEASURES  1. Tobacco Cessation Counseling: NA  2. Retake of BP if >140/90:   NA  3. Documentation to PCP/referring for new patient:  Sent to PCP at close of office visit  4. CAD patient on anti-platelet: NA  5. CAD patient on STATIN therapy:  Yes  6. Patient with CHF and aFib on anticoagulation:  Yes     All questions and concerns were addressed to the patient/family. Alternatives to my treatment were discussed. Dr. Brown Gutierrez MD  Electrophysiology  [facility]. [address] [place] [address]. [address]. [place], [address]  Phone: [phone]  Fax: [phone]     NOTE: This report was transcribed using voice recognition software. Every effort was made to ensure accuracy, however, inadvertent computerized transcription errors may be present. Aggie Reagan RN, am scribing for and in the presence of Dr. Abner Llanos. 03/22/22 1:47 PM   April Smith RN    The scribe's documentation has been prepared under my direction and personally reviewed by me in its entirety.  I confirm that the note above accurately reflects all work, physical examination, the discussion of treatments and procedures, and medical decision making performed by me. Chau Dos Santos MD personally performed the services described in this documentation as scribed by nurse in my presence, and is both accurate and complete.     Electronically signed by Brooks Suarez MD on 3/24/2022 at 9:48 AM

## 2022-03-22 NOTE — PROGRESS NOTES
[facility]   Electrophysiology Consult Note              Date: 3/22/22  Patient Name: Juancarlos Robles  YOB: 1955    Primary Care Physician: KADEN Landry    CHIEF COMPLAINT:   Chief Complaint   Patient presents with    New Patient    Hypertension    Atrial Fibrillation    Medication Refill     HISTORY OF PRESENT ILLNESS: Juancarlos Robles is a 78 y.o. female with a history of atrial fibrillation, NICM with recovered EF, nonobstructive CAD, chronic CHF, AS, hypothyroidism, breast cancer, and sleep apnea. She was admitted in 7/2019 with AF RVR and was started on flecainide. Echo showed an EF of 48-50%. Stress test was abnormal and LHC in 8/2019 showed nonobstructive CAD and EF 25-30%. Cardiac MRI 8/2019 showed an EF of 45%. Afib was recurrent and Rythmol was started. In 3/2020 she had RFCA of PAF. Most recent echo in 9/2020 showed an EF of 55%. Has remained in sinus at subsequent visits. Today, 3/22/2022, ECG demonstrates SB (59). She states that she is doing ok. She had a couple of days of AF on and off a couple of weeks ago. She can tell when she is in AF. She is taking her medications as prescribed. Denies recent issues with bleeding or bruising. Patient denies current edema, chest pain, sob, palpitations, dizziness or syncope. Past Medical History:   has a past medical history of Abnormal Pap smear of cervix, Allergic, Allergic rhinitis, Aortic stenosis, mild-echo 8/2016, Atrial fibrillation ([address] BMI 40.0-44.9, adult ([address] Breast cancer ([address] Cancer ([address] Depression, GERD (gastroesophageal reflux disease), H/O laparoscopic adjustable gastric banding, Heart palpitations, HPV (human papilloma virus) infection, HPV test positive, Hypertension, Hypothyroidism, Meningioma ([address] Mild aortic regurgitation-echo 8/2016, Mild dysplasia of cervix, Obesity, Sleep apnea, and Urge incontinence.     Past Surgical History:   has a past surgical history that includes Appendectomy; Cholecystectomy; Colonoscopy; Lap Band (); Tunneled venous port placement ();  section (3709,9386,2814,3616);  section; Breast surgery (); Breast biopsy; Breast lumpectomy; Hysterectomy (); Lap Band (); Ovary removal; Thyroidectomy, partial (Left, ); Upper gastrointestinal endoscopy (06/10/2016); partial hysterectomy (cervix not removed); Hand surgery (Right, 2020); and Cardiac surgery (). Allergies:  Latex, Bactrim [sulfamethoxazole-trimethoprim], and Iodine    Social History:   reports that she quit smoking about 50 years ago. Her smoking use included cigarettes. She has a 0.38 pack-year smoking history. She has never used smokeless tobacco. She reports current alcohol use. She reports that she does not use drugs. Family History: family history includes BRCA 2 Negative in her maternal grandmother and paternal grandmother; Breast Cancer in her mother; Cancer in her maternal grandmother, mother, paternal grandmother, sister, and another family member; Cancer (age of onset: 61) in her father; Heart Attack in her father; Heart Disease in her father and sister; No Known Problems in her brother, maternal aunt, maternal grandfather, maternal uncle, paternal aunt, paternal grandfather, and paternal uncle; Other in her father and sister. Home Medications:    Prior to Admission medications    Medication Sig Start Date End Date Taking?  Authorizing Provider   ELDERBERRY PO Take by mouth   Yes Historical Provider, MD   [employer] Children CHEW Take by mouth   Yes Historical Provider, MD   spironolactone (ALDACTONE) 25 MG tablet TAKE 1 TABLET ONCE DAILY 3/3/22  Yes Mesfin Glover MD   levothyroxine (SYNTHROID) 137 MCG tablet TAKE 1 TABLET ONCE DAILY 3/3/22  Yes KADEN Farmer   atorvastatin (LIPITOR) 40 MG tablet TAKE 1 TABLET DAILY 3/3/22  Yes Mesfin Glover MD   citalopram (CELEXA) 40 MG tablet TAKE 1 TABLET DAILY 3/3/22  Yes KADEN Lin   metoprolol succinate (TOPROL XL) 25 MG extended release tablet TAKE 1 TABLET BY MOUTH ONE TIME A DAY 3/3/22  Yes BLANK Wise CNP   omeprazole (PRILOSEC) 40 MG delayed release capsule TAKE 1 CAPSULE EVERY       MORNING BEFORE BREAKFAST 1/19/22  Yes KADEN Lin   oxybutynin (DITROPAN XL) 15 MG extended release tablet TAKE 1 TABLET BY MOUTH ONE TIME A DAY 1/13/22  Yes KADEN Lin   apixaban (ELIQUIS) 5 MG TABS tablet TAKE 1 TABLET BY MOUTH TWO TIMES A DAY 1/11/22  Yes BLANK Wies CNP   propafenone (RYTHMOL SR) 225 MG extended release capsule Take 1 capsule by mouth 2 times daily 1/11/22  Yes BLANK Wise CNP   montelukast (SINGULAIR) 10 MG tablet TAKE 1 TABLET NIGHTLY 1/4/22  Yes KADEN Lin   valsartan (DIOVAN) 40 MG tablet TAKE 1 TABLET BY MOUTH ONE TIME A DAY 8/24/21  Yes Noman White MD   fluticasone (FLONASE) 50 MCG/ACT nasal spray 2 SPRAYS IN EACH NOSTRIL ONCE DAILY. 2/25/21  Yes KADEN Lin   furosemide (LASIX) 20 MG tablet TAKE 1 TABLET BY MOUTH ONE TIME A DAY AS NEEDED FOR WEIGHT GAIN OF 3LBS IN A DAY OR 5LBS IN A WEEK 1/27/21  Yes BLANK Taylor CNP   multivitamin [facility]) per tablet Take 1 tablet by mouth daily. Yes Historical Provider, MD       REVIEW OF SYSTEMS:    All 14-point review of systems are completed and  pertinent positives are mentioned in the history of present illness. Other  systems are reviewed and are negative. Physical Examination:    /68   Pulse 59   Ht 5' 7\" (1.702 m)   Wt 283 lb 8 oz (128.6 kg)   LMP  (LMP Unknown)   SpO2 98%   BMI 44.40 kg/m²      Constitutional and General Appearance:    alert, cooperative, no distress and appears stated age  [de-identified]:    PERRLA, no cervical lymphadenopathy. No masses palpable.  Normal oral  mucosa  Respiratory:  · Normal excursion and expansion without use of accessory muscles  · Resp Auscultation: Normal breath sounds without dullness or wheezing  Cardiovascular:  · The apical impulse is not displaced  · RRR S1S2 w/o M/G/R  Abdomen:  · No masses or tenderness  · Bowel sounds present  Extremities:  ·  No Cyanosis or Clubbing  ·  Lower extremity edema: No  · Skin: Warm and dry  Neurological:  · Alert and oriented. · Moves all extremities well  · No abnormalities of mood, affect, memory, mentation, or behavior are noted    DATA:    ECG 3/22/22: Personally reviewed. Echo 3/10/2022  Summary   Low normal left ventricle systolic function with an estimated ejection   fraction of 50%. The left ventricle is mildly dilated. No regional wall motion abnormalities are seen. Normal left ventricular diastolic filling pressure. Aortic valve appears sclerotic but opens adequately. Mild aortic and mitral regurgitation. Trace pulmonic and tricuspid regurgitation. Systolic pulmonary artery pressure (SPAP) is normal and estimated at 26 mmHg   (right atrial pressure 3 mmHg). Frequent premature beats throughout the study. Stress test 7/2019  Summary Normal LVEF 61%  Base to mid anteroseptal hypokinesis  There is a mixed defect in the anteroseptal wall raising concern for mixed  ischemia/scar in that region  This would be considered an abnormal intermediate risk study      Cardiac cath 8/2/2019  Assessment  1. Nonischemic cardiomyopathy  2. Given worsening cardiomyopathy, suggest Cardiac MRI                - eval for myocarditis or infiltrative cardiomyopathy  3. Will d/c flecainide and dilt given worsening LVEF, pt currently in NSR                - start toprol 25                - start ASA and lisinopril  4. Lasix in post op area                - will make Appt with heart failure team     IMPRESSION:    1.  Paroxysmal atrial fibrillation ([id number] 4)  3/22/2022  Mrs. Diaz Daily is a pleasant 78year old female with a medical history significant for paroxysmal atrial fibrillation, history of non-ischemic cardiomyopathy, hypertension, chronic renal physical examination, the discussion of treatments and procedures, and medical decision making performed by me. Marco Antonio Huggins MD personally performed the services described in this documentation as scribed by nurse in my presence, and is both accurate and complete.     Electronically signed by Tono No MD on 3/24/2022 at 9:48 AM

## 2022-03-23 ENCOUNTER — TELEPHONE (OUTPATIENT)
Dept: CARDIOLOGY CLINIC | Age: 67
End: 2022-03-23

## 2022-03-23 NOTE — TELEPHONE ENCOUNTER
Spoke with transfer center. Patient scheduled for [facility] admission for Monday 3/28/2022. Date ok'd per [facility]. Patient already aware.      FYI

## 2022-03-28 ENCOUNTER — HOSPITAL ENCOUNTER (INPATIENT)
Age: 67
LOS: 2 days | Discharge: HOME | DRG: 309 | End: 2022-03-30
Attending: INTERNAL MEDICINE | Admitting: INTERNAL MEDICINE
Payer: MEDICARE

## 2022-03-28 DIAGNOSIS — I48.0 PAF (PAROXYSMAL ATRIAL FIBRILLATION) (HCC): Primary | ICD-10-CM

## 2022-03-28 LAB
ANION GAP SERPL CALCULATED.3IONS-SCNC: 11 MMOL/L (ref 3–16)
BUN BLDV-MCNC: 18 MG/DL (ref 7–20)
CALCIUM SERPL-MCNC: 9.7 MG/DL (ref 8.3–10.6)
CHLORIDE BLD-SCNC: 100 MMOL/L (ref 99–110)
CO2: 24 MMOL/L (ref 21–32)
CREAT SERPL-MCNC: 1 MG/DL (ref 0.6–1.2)
EKG ATRIAL RATE: 100 BPM
EKG ATRIAL RATE: 58 BPM
EKG DIAGNOSIS: NORMAL
EKG DIAGNOSIS: NORMAL
EKG P AXIS: 67 DEGREES
EKG P-R INTERVAL: 192 MS
EKG Q-T INTERVAL: 462 MS
EKG Q-T INTERVAL: 480 MS
EKG QRS DURATION: 98 MS
EKG QRS DURATION: 98 MS
EKG QTC CALCULATION (BAZETT): 453 MS
EKG QTC CALCULATION (BAZETT): 467 MS
EKG R AXIS: 1 DEGREES
EKG R AXIS: 2 DEGREES
EKG T AXIS: 35 DEGREES
EKG T AXIS: 6 DEGREES
EKG VENTRICULAR RATE: 57 BPM
EKG VENTRICULAR RATE: 58 BPM
GFR AFRICAN AMERICAN: >60
GFR NON-AFRICAN AMERICAN: 55
GLUCOSE BLD-MCNC: 134 MG/DL (ref 70–99)
MAGNESIUM: 1.9 MG/DL (ref 1.8–2.4)
POTASSIUM SERPL-SCNC: 4.1 MMOL/L (ref 3.5–5.1)
SODIUM BLD-SCNC: 135 MMOL/L (ref 136–145)

## 2022-03-28 PROCEDURE — 2060000000 HC ICU INTERMEDIATE R&B

## 2022-03-28 PROCEDURE — 2580000003 HC RX 258: Performed by: NURSE PRACTITIONER

## 2022-03-28 PROCEDURE — 83735 ASSAY OF MAGNESIUM: CPT

## 2022-03-28 PROCEDURE — 36415 COLL VENOUS BLD VENIPUNCTURE: CPT

## 2022-03-28 PROCEDURE — 80048 BASIC METABOLIC PNL TOTAL CA: CPT

## 2022-03-28 PROCEDURE — 99223 1ST HOSP IP/OBS HIGH 75: CPT | Performed by: NURSE PRACTITIONER

## 2022-03-28 PROCEDURE — 93005 ELECTROCARDIOGRAM TRACING: CPT | Performed by: NURSE PRACTITIONER

## 2022-03-28 PROCEDURE — 93010 ELECTROCARDIOGRAM REPORT: CPT | Performed by: INTERNAL MEDICINE

## 2022-03-28 PROCEDURE — 6360000002 HC RX W HCPCS: Performed by: NURSE PRACTITIONER

## 2022-03-28 PROCEDURE — 6370000000 HC RX 637 (ALT 250 FOR IP): Performed by: NURSE PRACTITIONER

## 2022-03-28 RX ORDER — SPIRONOLACTONE 25 MG/1
25 TABLET ORAL DAILY
Status: DISCONTINUED | OUTPATIENT
Start: 2022-03-28 | End: 2022-03-30 | Stop reason: HOSPADM

## 2022-03-28 RX ORDER — ATORVASTATIN CALCIUM 40 MG/1
40 TABLET, FILM COATED ORAL DAILY
Status: DISCONTINUED | OUTPATIENT
Start: 2022-03-28 | End: 2022-03-30 | Stop reason: HOSPADM

## 2022-03-28 RX ORDER — DOFETILIDE 0.25 MG/1
500 CAPSULE ORAL ONCE
Status: COMPLETED | OUTPATIENT
Start: 2022-03-28 | End: 2022-03-28

## 2022-03-28 RX ORDER — ACETAMINOPHEN 325 MG/1
650 TABLET ORAL EVERY 6 HOURS PRN
Status: DISCONTINUED | OUTPATIENT
Start: 2022-03-28 | End: 2022-03-30 | Stop reason: HOSPADM

## 2022-03-28 RX ORDER — SODIUM CHLORIDE 0.9 % (FLUSH) 0.9 %
5-40 SYRINGE (ML) INJECTION EVERY 12 HOURS SCHEDULED
Status: DISCONTINUED | OUTPATIENT
Start: 2022-03-28 | End: 2022-03-30 | Stop reason: HOSPADM

## 2022-03-28 RX ORDER — SODIUM CHLORIDE 9 MG/ML
INJECTION, SOLUTION INTRAVENOUS PRN
Status: DISCONTINUED | OUTPATIENT
Start: 2022-03-28 | End: 2022-03-30 | Stop reason: HOSPADM

## 2022-03-28 RX ORDER — M-VIT,TX,IRON,MINS/CALC/FOLIC 27MG-0.4MG
1 TABLET ORAL DAILY
Status: DISCONTINUED | OUTPATIENT
Start: 2022-03-28 | End: 2022-03-30 | Stop reason: HOSPADM

## 2022-03-28 RX ORDER — CITALOPRAM 20 MG/1
40 TABLET ORAL DAILY
Status: DISCONTINUED | OUTPATIENT
Start: 2022-03-28 | End: 2022-03-30 | Stop reason: HOSPADM

## 2022-03-28 RX ORDER — POLYETHYLENE GLYCOL 3350 17 G/17G
17 POWDER, FOR SOLUTION ORAL DAILY PRN
Status: DISCONTINUED | OUTPATIENT
Start: 2022-03-28 | End: 2022-03-30 | Stop reason: HOSPADM

## 2022-03-28 RX ORDER — MAGNESIUM SULFATE IN WATER 40 MG/ML
2000 INJECTION, SOLUTION INTRAVENOUS ONCE
Status: COMPLETED | OUTPATIENT
Start: 2022-03-28 | End: 2022-03-28

## 2022-03-28 RX ORDER — SODIUM CHLORIDE 0.9 % (FLUSH) 0.9 %
5-40 SYRINGE (ML) INJECTION PRN
Status: DISCONTINUED | OUTPATIENT
Start: 2022-03-28 | End: 2022-03-30 | Stop reason: HOSPADM

## 2022-03-28 RX ORDER — ACETAMINOPHEN 650 MG/1
650 SUPPOSITORY RECTAL EVERY 6 HOURS PRN
Status: DISCONTINUED | OUTPATIENT
Start: 2022-03-28 | End: 2022-03-30 | Stop reason: HOSPADM

## 2022-03-28 RX ORDER — DOFETILIDE 0.25 MG/1
500 CAPSULE ORAL EVERY 12 HOURS SCHEDULED
Status: DISCONTINUED | OUTPATIENT
Start: 2022-03-28 | End: 2022-03-30 | Stop reason: HOSPADM

## 2022-03-28 RX ORDER — FLUTICASONE PROPIONATE 50 MCG
2 SPRAY, SUSPENSION (ML) NASAL DAILY
Status: DISCONTINUED | OUTPATIENT
Start: 2022-03-28 | End: 2022-03-30 | Stop reason: HOSPADM

## 2022-03-28 RX ORDER — METOPROLOL SUCCINATE 25 MG/1
25 TABLET, EXTENDED RELEASE ORAL DAILY
Status: DISCONTINUED | OUTPATIENT
Start: 2022-03-28 | End: 2022-03-30 | Stop reason: HOSPADM

## 2022-03-28 RX ORDER — VALSARTAN 80 MG/1
40 TABLET ORAL DAILY
Status: DISCONTINUED | OUTPATIENT
Start: 2022-03-28 | End: 2022-03-30 | Stop reason: HOSPADM

## 2022-03-28 RX ORDER — OXYBUTYNIN CHLORIDE 5 MG/1
15 TABLET, EXTENDED RELEASE ORAL NIGHTLY
Status: DISCONTINUED | OUTPATIENT
Start: 2022-03-28 | End: 2022-03-30 | Stop reason: HOSPADM

## 2022-03-28 RX ORDER — MONTELUKAST SODIUM 10 MG/1
10 TABLET ORAL NIGHTLY
Status: DISCONTINUED | OUTPATIENT
Start: 2022-03-28 | End: 2022-03-30 | Stop reason: HOSPADM

## 2022-03-28 RX ORDER — PANTOPRAZOLE SODIUM 40 MG/1
40 TABLET, DELAYED RELEASE ORAL
Status: DISCONTINUED | OUTPATIENT
Start: 2022-03-29 | End: 2022-03-30 | Stop reason: HOSPADM

## 2022-03-28 RX ADMIN — LEVOTHYROXINE SODIUM 137 MCG: 0.11 TABLET ORAL at 11:57

## 2022-03-28 RX ADMIN — SPIRONOLACTONE 25 MG: 25 TABLET ORAL at 11:57

## 2022-03-28 RX ADMIN — FLUTICASONE PROPIONATE 2 SPRAY: 50 SPRAY, METERED NASAL at 11:58

## 2022-03-28 RX ADMIN — CITALOPRAM HYDROBROMIDE 40 MG: 20 TABLET ORAL at 11:57

## 2022-03-28 RX ADMIN — APIXABAN 5 MG: 5 TABLET, FILM COATED ORAL at 11:57

## 2022-03-28 RX ADMIN — DOFETILIDE 500 MCG: 0.25 CAPSULE ORAL at 11:57

## 2022-03-28 RX ADMIN — MONTELUKAST SODIUM 10 MG: 10 TABLET ORAL at 21:55

## 2022-03-28 RX ADMIN — OXYBUTYNIN CHLORIDE 15 MG: 5 TABLET, EXTENDED RELEASE ORAL at 21:55

## 2022-03-28 RX ADMIN — ATORVASTATIN CALCIUM 40 MG: 40 TABLET, FILM COATED ORAL at 21:55

## 2022-03-28 RX ADMIN — Medication 1 TABLET: at 11:57

## 2022-03-28 RX ADMIN — MAGNESIUM SULFATE HEPTAHYDRATE 2000 MG: 40 INJECTION, SOLUTION INTRAVENOUS at 12:47

## 2022-03-28 RX ADMIN — VALSARTAN 40 MG: 80 TABLET, FILM COATED ORAL at 11:57

## 2022-03-28 RX ADMIN — SODIUM CHLORIDE, PRESERVATIVE FREE 10 ML: 5 INJECTION INTRAVENOUS at 21:56

## 2022-03-28 RX ADMIN — APIXABAN 5 MG: 5 TABLET, FILM COATED ORAL at 21:55

## 2022-03-28 RX ADMIN — DOFETILIDE 500 MCG: 0.25 CAPSULE ORAL at 21:55

## 2022-03-28 ASSESSMENT — PAIN SCALES - GENERAL: PAINLEVEL_OUTOF10: 0

## 2022-03-28 NOTE — ACP (ADVANCE CARE PLANNING)
Advance Care Planning     Advance Care Planning Inpatient Note  Spiritual Care Department    Today's Date: 3/28/2022  Unit: [facility]    Received request from IDT Member. Upon review of chart and communication with care team, patient's decision making abilities are not in question. . Patient was/were present in the room during visit. Goals of ACP Conversation:  Discuss advance care planning documents    Health Care Decision Makers:       Primary Decision Maker: Miguel Sappjud) - Spouse - [phone]    Secondary Decision Maker: Jalen Santillan - Child - [phone]    Supplemental (Other) Decision Maker: Layne Houghjud - Child - [phone]    Summary:  Completed [facility]  Updated Healthcare Decision [facility]    Advance Care Planning Documents (Patient Wishes):  Healthcare Power of /Advance Directive Appointment of Health Care Agent  Living Will/Advance Directive     Assessment:  Patient clarified her wishes around health care decision maker proxy order. 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 We reviewed the documents and she had no questions. Interventions:  Provided education on documents for clarity and greater understanding    Care Preferences Communicated:   No    Outcomes/Plan:  New advance directive completed.   Returned original document(s) to patient, as well as copies for distribution to appointed agents    Electronically signed by Philip Li [facility] on 3/28/2022 at 2:33 PM

## 2022-03-28 NOTE — H&P
section ([id number]);  section; Breast surgery (); Breast biopsy; Breast lumpectomy; Hysterectomy (); Lap Band (); Ovary removal; Thyroidectomy, partial (Left, ); Upper gastrointestinal endoscopy (06/10/2016); partial hysterectomy (cervix not removed); Hand surgery (Right, 2020); and Cardiac surgery (). Home Medications:    Prior to Admission medications    Medication Sig Start Date End Date Taking? Authorizing Provider   ELDERBERRY PO Take by mouth    Historical Provider, MD   [employer] G[employer] Children CHEW Take by mouth    Historical Provider, MD   spironolactone (ALDACTONE) 25 MG tablet TAKE 1 TABLET ONCE DAILY 3/3/22   Jonathan Yi MD   levothyroxine (SYNTHROID) 137 MCG tablet TAKE 1 TABLET ONCE DAILY 3/3/22   KADEN Shetty   atorvastatin (LIPITOR) 40 MG tablet TAKE 1 TABLET DAILY 3/3/22   Jonathan Yi MD   citalopram (CELEXA) 40 MG tablet TAKE 1 TABLET DAILY 3/3/22   KADEN Shetty   metoprolol succinate (TOPROL XL) 25 MG extended release tablet TAKE 1 TABLET BY MOUTH ONE TIME A DAY 3/3/22   BLANK Wise CNP   omeprazole (PRILOSEC) 40 MG delayed release capsule TAKE 1 CAPSULE EVERY       MORNING BEFORE BREAKFAST 22   KADEN Shetty   oxybutynin (DITROPAN XL) 15 MG extended release tablet TAKE 1 TABLET BY MOUTH ONE TIME A DAY 22   KADEN Shetty   apixaban (ELIQUIS) 5 MG TABS tablet TAKE 1 TABLET BY MOUTH TWO TIMES A DAY 22   BLANK Wise CNP   montelukast (SINGULAIR) 10 MG tablet TAKE 1 TABLET NIGHTLY 22   KADEN Shetty   valsartan (DIOVAN) 40 MG tablet TAKE 1 TABLET BY MOUTH ONE TIME A DAY 21   Jonathan Yi MD   fluticasone (FLONASE) 50 MCG/ACT nasal spray 2 SPRAYS IN EACH NOSTRIL ONCE DAILY.  21   KADEN Shetty   furosemide (LASIX) 20 MG tablet TAKE 1 TABLET BY MOUTH ONE TIME A DAY AS NEEDED FOR WEIGHT GAIN OF 3LBS IN A DAY OR 5LBS IN A WEEK 21   Isaiah Tiwari Karen King, APRN - CNP   multivitamin [facility]) per tablet Take 1 tablet by mouth daily. Historical Provider, MD       Allergies:  Latex, Bactrim [sulfamethoxazole-trimethoprim], and Iodine    Social History:   reports that she quit smoking about 50 years ago. Her smoking use included cigarettes. She has a 0.38 pack-year smoking history. She has never used smokeless tobacco. She reports current alcohol use. She reports that she does not use drugs. Family History: family history includes BRCA 2 Negative in her maternal grandmother and paternal grandmother; Breast Cancer in her mother; Cancer in her maternal grandmother, mother, paternal grandmother, sister, and another family member; Cancer (age of onset: 61) in her father; Heart Attack in her father; Heart Disease in her father and sister; No Known Problems in her brother, maternal aunt, maternal grandfather, maternal uncle, paternal aunt, paternal grandfather, and paternal uncle; Other in her father and sister. REVIEW OF SYSTEMS:    · Constitutional: there has been no unanticipated weight loss. There's been no change in energy level, sleep pattern, or activity level. · Eyes: No visual changes or diplopia. No scleral icterus. · ENT: No Headaches, hearing loss or vertigo. No mouth sores or sore throat. · Cardiovascular: no chest pain, dyspnea on exertion, palpitations, loss of consciousness, cough, hemoptysis, pleuritic pain, or phlebitis. · Respiratory: no cough or wheezing, or hemoptysis  · Gastrointestinal: No abdominal pain, appetite loss, blood in stools. No change in bowel or bladder habits. No hematemesis. · Genitourinary: No dysuria, trouble voiding, or hematuria. · Musculoskeletal:  No gait disturbance, weakness, or joint complaints. · Integumentary: No rash or pruritis. · Neurological: No headache, diplopia, change in muscle strength, numbness or tingling.  No change in gait, balance, coordination, mood, affect, memory, mentation, pulmonic and tricuspid regurgitation. Systolic pulmonary artery pressure (SPAP) is normal and estimated at 26 mmHg   (right atrial pressure 3 mmHg). Frequent premature beats throughout the study. Echo 7/2019:     Summary   Left ventricular systolic function is reduced with ejection fraction   estimated at 48-50 %. Mild global hypokinesis is present. There is mild concentric left ventricular hypertrophy. Normal left ventricular diastolic filling pressure. Mild mitral regurgitation. Aortic sclerosis vs mild aortic stenosis. Mild aortic regurgitation is present. Normal systolic pulmonary artery pressure (SPAP) estimated at 29 mmHg (RA   pressure 8 mmHg).    Cardiac MRI 8/23/2019 ():  Findings are most consistent with a nonischemic cardiomyopathy. Lucetta Record is no evidence of left ventricular scarring, edema or iron overload. There is evidence of incomplete or complete left bundle branch block. Regional wall motion abnormalities involving are likely secondary to underlying conduction disease; however, ischemic heart disease cannot be excluded. The right ventricle is normal in size and systolic function. There is late gadolinium enhancement of the RV insertion points suggestive of elevated right heart pressures. The left atrium is mildly dilated. There is mild mitral regurgitation  The right atrium is normal in size.  There is late gadolinium enhancement involving both atria. There is mild aortic stenosis and regurgitation. The descending aorta is dilated there is evidence of associated atherosclerosis.     [facility] 8/2/2019 ([place]):  LM: luminals  LAD: mid luminals, distal vessel small  LCX: luminals  RCA: dominant, very large     LVEDP: 18  LVEF: 25-30% global hypokinesis     Assessment  1. Nonischemic cardiomyopathy  2. Given worsening cardiomyopathy, suggest Cardiac MRI                - eval for myocarditis or infiltrative cardiomyopathy  3.  Will d/c flecainide and dilt given worsening LVEF, pt currently in NSR                - start toprol 25                - start ASA and lisinopril  4. Lasix in post op area                - will make Appt with heart failure team      LABS:   CBC: No results for input(s): WBC, HGB, HCT, PLT in the last 72 hours. BMP:   Recent Labs     03/28/22  1030   *   K 4.1   CO2 24   BUN 18   CREATININE 1.0   LABGLOM 55*   GLUCOSE 134*     BNP: No results for input(s): BNP in the last 72 hours. PT/INR: No results for input(s): PROTIME, INR in the last 72 hours. APTT:No results for input(s): APTT in the last 72 hours. CARDIAC ENZYMES:No results for input(s): CKMB, CKMBINDEX, TROPONINI in the last 72 hours. Invalid input(s): CKTOTAL;3  FASTING LIPID PANEL:  Lab Results   Component Value Date    HDL 43 03/04/2022    LDLCALC 129 03/04/2022    TRIG 139 03/04/2022     LIVER PROFILE:No results for input(s): AST, ALT, ALB in the last 72 hours. IMPRESSION:    Principal Problem:    PAF (paroxysmal atrial fibrillation) (HCC)  Resolved Problems:    * No resolved hospital problems. *      Assessment :   Symptomatic paroxsymal atrial fibrillation: stable   -[id number] score 4 (age, gender, CHF, HTN)    -flecainide stopped due to worsening EF 8/2019   -Rythmol stopped due to increase in QRS duration 3/2022   -Tikosyn started 3/28/2022   -s/p PVI and RFCA 3/2020 [facility] Achilles)   NICM with recovered EF: stable    -most recent EF 55% 1/7014  Chronic systolic CHF  Aortic stenosis   Hypothyroidism on Synthroid   JINA: CPAP compliant   Obesity: diet, exercise, and weight loss is recommended      Plan:   1. Continue Eliquis, spironolactone and valsartan    2. Toprol held due to bradycardia   3. Start Tikosyn 500 mcg po BID  4. Daily BMP and magnesium  5. Keep K+ over 4 and mag over 2  6. EKG 2 hours after the first 5 doses  7. Continuous telemetry monitoring  8.  Two week monitor at discharge per Dr. Jose Miguel Lubin office visit note 3/22/2022    Discussed plan and medications with  Kale    Dispo: Likely Wednesday 3/30/2022 after 5th dose of Tikosyn     Gini Lawrence, APRN-CNP  [facility]  [phone]

## 2022-03-28 NOTE — PROGRESS NOTES
Patient admitted to room 432  from home. Patient oriented to room, call light, bed rails, phone, lights and bathroom. Patient instructed about the schedule of the day including: vital sign frequency, lab draws, possible tests, frequency of MD and staff rounds, including RN/MD rounding together at bedside, daily weights, and I &O's. Patient instructed about prescribed diet, how to use 8MENU, and television. No  bed alarm in place, patient aware of placement and reason. Telemetry box  41 in place, patient aware of placement and reason. Bed locked, in lowest position, side rails up 2/4, call light within reach. Will continue to monitor. Suction set up at bedside.

## 2022-03-29 LAB
ANION GAP SERPL CALCULATED.3IONS-SCNC: 12 MMOL/L (ref 3–16)
BUN BLDV-MCNC: 17 MG/DL (ref 7–20)
CALCIUM SERPL-MCNC: 9.3 MG/DL (ref 8.3–10.6)
CHLORIDE BLD-SCNC: 102 MMOL/L (ref 99–110)
CO2: 25 MMOL/L (ref 21–32)
CREAT SERPL-MCNC: 0.9 MG/DL (ref 0.6–1.2)
EKG ATRIAL RATE: 55 BPM
EKG ATRIAL RATE: 58 BPM
EKG DIAGNOSIS: NORMAL
EKG DIAGNOSIS: NORMAL
EKG P AXIS: 72 DEGREES
EKG P AXIS: 76 DEGREES
EKG P-R INTERVAL: 168 MS
EKG P-R INTERVAL: 172 MS
EKG Q-T INTERVAL: 450 MS
EKG Q-T INTERVAL: 486 MS
EKG QRS DURATION: 100 MS
EKG QRS DURATION: 96 MS
EKG QTC CALCULATION (BAZETT): 441 MS
EKG QTC CALCULATION (BAZETT): 464 MS
EKG R AXIS: -1 DEGREES
EKG R AXIS: -2 DEGREES
EKG T AXIS: -27 DEGREES
EKG T AXIS: 9 DEGREES
EKG VENTRICULAR RATE: 55 BPM
EKG VENTRICULAR RATE: 58 BPM
GFR AFRICAN AMERICAN: >60
GFR NON-AFRICAN AMERICAN: >60
GLUCOSE BLD-MCNC: 136 MG/DL (ref 70–99)
MAGNESIUM: 2.2 MG/DL (ref 1.8–2.4)
POTASSIUM SERPL-SCNC: 4.4 MMOL/L (ref 3.5–5.1)
SODIUM BLD-SCNC: 139 MMOL/L (ref 136–145)

## 2022-03-29 PROCEDURE — 80048 BASIC METABOLIC PNL TOTAL CA: CPT

## 2022-03-29 PROCEDURE — 83735 ASSAY OF MAGNESIUM: CPT

## 2022-03-29 PROCEDURE — 2060000000 HC ICU INTERMEDIATE R&B

## 2022-03-29 PROCEDURE — 6370000000 HC RX 637 (ALT 250 FOR IP): Performed by: NURSE PRACTITIONER

## 2022-03-29 PROCEDURE — 36415 COLL VENOUS BLD VENIPUNCTURE: CPT

## 2022-03-29 PROCEDURE — 2580000003 HC RX 258: Performed by: NURSE PRACTITIONER

## 2022-03-29 PROCEDURE — 93005 ELECTROCARDIOGRAM TRACING: CPT | Performed by: INTERNAL MEDICINE

## 2022-03-29 PROCEDURE — 93010 ELECTROCARDIOGRAM REPORT: CPT | Performed by: INTERNAL MEDICINE

## 2022-03-29 RX ADMIN — OXYBUTYNIN CHLORIDE 15 MG: 5 TABLET, EXTENDED RELEASE ORAL at 20:56

## 2022-03-29 RX ADMIN — LEVOTHYROXINE SODIUM 137 MCG: 0.11 TABLET ORAL at 05:22

## 2022-03-29 RX ADMIN — CITALOPRAM HYDROBROMIDE 40 MG: 20 TABLET ORAL at 08:55

## 2022-03-29 RX ADMIN — VALSARTAN 40 MG: 80 TABLET, FILM COATED ORAL at 08:55

## 2022-03-29 RX ADMIN — APIXABAN 5 MG: 5 TABLET, FILM COATED ORAL at 08:54

## 2022-03-29 RX ADMIN — DOFETILIDE 500 MCG: 0.25 CAPSULE ORAL at 20:57

## 2022-03-29 RX ADMIN — DOFETILIDE 500 MCG: 0.25 CAPSULE ORAL at 08:55

## 2022-03-29 RX ADMIN — SODIUM CHLORIDE, PRESERVATIVE FREE 10 ML: 5 INJECTION INTRAVENOUS at 08:55

## 2022-03-29 RX ADMIN — FLUTICASONE PROPIONATE 2 SPRAY: 50 SPRAY, METERED NASAL at 08:55

## 2022-03-29 RX ADMIN — PANTOPRAZOLE SODIUM 40 MG: 40 TABLET, DELAYED RELEASE ORAL at 05:23

## 2022-03-29 RX ADMIN — SPIRONOLACTONE 25 MG: 25 TABLET ORAL at 08:55

## 2022-03-29 RX ADMIN — APIXABAN 5 MG: 5 TABLET, FILM COATED ORAL at 20:57

## 2022-03-29 RX ADMIN — ATORVASTATIN CALCIUM 40 MG: 40 TABLET, FILM COATED ORAL at 20:57

## 2022-03-29 RX ADMIN — MONTELUKAST SODIUM 10 MG: 10 TABLET ORAL at 20:56

## 2022-03-29 RX ADMIN — Medication 1 TABLET: at 08:55

## 2022-03-29 RX ADMIN — SODIUM CHLORIDE, PRESERVATIVE FREE 10 ML: 5 INJECTION INTRAVENOUS at 20:59

## 2022-03-29 NOTE — PROGRESS NOTES
[facility]          Cardiology                                                                Progress Note    Admission date:  3/28/2022    Subjective:   CC dofetilide loading  HPI pt feels well, no complaints. Rhythm has been sinus. Vitals:  Blood pressure (!) 145/78, pulse 72, temperature 98.5 °F (36.9 °C), temperature source Oral, resp. rate 18, height 5' 7\" (1.702 m), weight 283 lb (128.4 kg), SpO2 94 %, not currently breastfeeding.   Temp  Av °F (36.7 °C)  Min: 97.7 °F (36.5 °C)  Max: 98.5 °F (36.9 °C)  Pulse  Av  Min: 56  Max: 72  BP  Min: 108/63  Max: 145/78  SpO2  Av.7 %  Min: 94 %  Max: 97 %    24 hour I/O    Intake/Output Summary (Last 24 hours) at 3/29/2022 193  Last data filed at 3/29/2022 1155  Gross per 24 hour   Intake 490 ml   Output 2150 ml   Net -1660 ml       Objective:     Telemetry monitor: SR    Physical Exam:  General Appearance:  comfortable  HEENT: pupils equal and reactive, no scleral  icterus  Skin:  Warm and dry  Heart:  Regular, normal apex, S1 and S2 normal  Lungs:  Clear, no wheezing  Abd: obese, soft, non tender  Extremities:  No edema, no cyanosis  Psych: normal affect, oriented X 3      Lab Review     Renal Profile:   Lab Results   Component Value Date    CREATININE 0.9 2022    BUN 17 2022     2022    K 4.4 2022    K 3.7 2019     2022    CO2 25 2022     CBC:    Lab Results   Component Value Date    WBC 5.2 2022    RBC 4.42 2022    RBC 4.71 2017    HGB 13.5 2022    HCT 40.3 2022    MCV 91.1 2022    RDW 14.1 2022     2022     BNP:  No results found for: BNP  Fasting Lipid Panel:    Lab Results   Component Value Date    CHOL 200 2022    HDL 43 2022    TRIG 139 2022     Cardiac Enzymes:  CK/MbTroponin  Lab Results   Component Value Date    TROPONINI <0.01 2019     PT/ INR   Lab Results   Component Value Date    INR 1.21 03/05/2020    INR 1.21 08/02/2019    PROTIME 14.1 03/05/2020    PROTIME 13.8 08/02/2019     PTT No results found for: PTT   Lab Results   Component Value Date    MG 2.20 03/29/2022      Lab Results   Component Value Date    TSH 3.42 07/13/2021       Assessment:     1. PAF- she is S/P 3 doses of dofetilide with normal QTc. Plan inpt status until 5 doses have been administered. 2. NICM  3. AS  4. JINA on CPAP    Plan:     1. Continue dofetilide  2.  Check QTc after each dofetilide dose      Aminata Meng MD

## 2022-03-29 NOTE — PLAN OF CARE
Problem: Skin Integrity/Risk  Goal: No skin breakdown during hospitalization  3/28/2022 2338 by Eleni Beyer RN  Outcome: Ongoing

## 2022-03-29 NOTE — FLOWSHEET NOTE
Patient commented about feelings of boredom. I offered her crossword puzzles which she politely declined as she has her computer and phone at bedside. She was calm, and during our prayer she asked that we pray for world peace as all of her needs were taken care of. She received Holy Communion and is open to receiving tomorrow before her discharge.        03/29/22 1134   Encounter Summary   Services provided to: Patient   Referral/Consult From: Other    Continue Visiting   (3/29 Pt recvd Communion)   Volunteer Visit Yes   Complexity of Encounter Moderate   Length of Encounter 15 minutes   Sacraments   Communion Patient received communion

## 2022-03-29 NOTE — FLOWSHEET NOTE
03/29/22 0852   Vital Signs   Temp 98.5 °F (36.9 °C)   Temp Source Oral   Pulse 57   Heart Rate Source Monitor   Resp 16   /75   BP Location Left lower arm   MAP (mmHg) 91   Patient Position Sitting   Level of Consciousness Alert (0)   MEWS Score 1   Patient Currently in Pain Denies   Oxygen Therapy   SpO2 95 %   O2 Device None (Room air)

## 2022-03-29 NOTE — FLOWSHEET NOTE
03/28/22 2102   Assessment   Charting Type Shift assessment   Neurological   Neuro (WDL) WDL   Level of Consciousness Alert (0)   Kirti Coma Scale   Eye Opening 4   Best Verbal Response 5   Best Motor Response 6   Kirti Coma Scale Score 15   HEENT   HEENT (WDL) X   Right Eye Impaired vision   Left Eye Impaired vision   Respiratory   Respiratory (WDL) X   Respiratory Pattern Regular   Respiratory Depth Normal   Respiratory Quality/Effort Unlabored   Chest Assessment Chest expansion symmetrical   L Breath Sounds Diminished   R Breath Sounds Diminished   Breath Sounds   Right Upper Lobe Clear;Diminished   Right Middle Lobe Clear;Diminished   Right Lower Lobe Clear;Diminished   Left Upper Lobe Clear;Diminished   Left Lower Lobe Clear;Diminished   Cardiac   Cardiac (WDL) X   Gastrointestinal   Abdominal (WDL) WDL   Peripheral Vascular   Peripheral Vascular (WDL) X   Edema Right lower extremity; Left lower extremity   RLE Edema Trace   LLE Edema Trace   RLE Neurovascular Assessment   Capillary Refill Less than/equal to 3 seconds   Color Appropriate for ethnicity   Temperature Warm   Sensation RLE Full sensation   R Pedal Pulse +2   LLE Neurovascular Assessment   Capillary Refill Less than/equal to 3 seconds   Color Appropriate for ethnicity   Temperature Warm   Sensation LLE Full sensation   L Pedal Pulse +2   Skin Color/Condition   Skin Color/Condition (WDL) WDL   Skin Integrity   Skin Integrity (WDL) WDL   Musculoskeletal   Musculoskeletal (WDL) WDL   Genitourinary   Genitourinary (WDL) WDL   Anus/Rectum   Anus/Rectum (WDL) WDL   Psychosocial   Psychosocial (WDL) WDL

## 2022-03-30 VITALS
TEMPERATURE: 98.2 F | WEIGHT: 277.6 LBS | HEART RATE: 70 BPM | HEIGHT: 67 IN | BODY MASS INDEX: 43.57 KG/M2 | RESPIRATION RATE: 16 BRPM | DIASTOLIC BLOOD PRESSURE: 78 MMHG | OXYGEN SATURATION: 97 % | SYSTOLIC BLOOD PRESSURE: 121 MMHG

## 2022-03-30 LAB
ANION GAP SERPL CALCULATED.3IONS-SCNC: 11 MMOL/L (ref 3–16)
BUN BLDV-MCNC: 19 MG/DL (ref 7–20)
CALCIUM SERPL-MCNC: 9.5 MG/DL (ref 8.3–10.6)
CHLORIDE BLD-SCNC: 100 MMOL/L (ref 99–110)
CO2: 25 MMOL/L (ref 21–32)
CREAT SERPL-MCNC: 0.8 MG/DL (ref 0.6–1.2)
EKG ATRIAL RATE: 63 BPM
EKG ATRIAL RATE: 66 BPM
EKG ATRIAL RATE: 74 BPM
EKG DIAGNOSIS: NORMAL
EKG P AXIS: 42 DEGREES
EKG P AXIS: 74 DEGREES
EKG P AXIS: 80 DEGREES
EKG P-R INTERVAL: 158 MS
EKG P-R INTERVAL: 160 MS
EKG P-R INTERVAL: 168 MS
EKG Q-T INTERVAL: 408 MS
EKG Q-T INTERVAL: 448 MS
EKG Q-T INTERVAL: 450 MS
EKG QRS DURATION: 100 MS
EKG QRS DURATION: 110 MS
EKG QRS DURATION: 96 MS
EKG QTC CALCULATION (BAZETT): 452 MS
EKG QTC CALCULATION (BAZETT): 458 MS
EKG QTC CALCULATION (BAZETT): 471 MS
EKG R AXIS: -3 DEGREES
EKG R AXIS: 3 DEGREES
EKG R AXIS: 3 DEGREES
EKG T AXIS: -86 DEGREES
EKG T AXIS: 152 DEGREES
EKG T AXIS: 250 DEGREES
EKG VENTRICULAR RATE: 63 BPM
EKG VENTRICULAR RATE: 66 BPM
EKG VENTRICULAR RATE: 74 BPM
GFR AFRICAN AMERICAN: >60
GFR NON-AFRICAN AMERICAN: >60
GLUCOSE BLD-MCNC: 145 MG/DL (ref 70–99)
MAGNESIUM: 2 MG/DL (ref 1.8–2.4)
POTASSIUM SERPL-SCNC: 4.3 MMOL/L (ref 3.5–5.1)
SODIUM BLD-SCNC: 136 MMOL/L (ref 136–145)

## 2022-03-30 PROCEDURE — 2580000003 HC RX 258: Performed by: NURSE PRACTITIONER

## 2022-03-30 PROCEDURE — 83735 ASSAY OF MAGNESIUM: CPT

## 2022-03-30 PROCEDURE — 93010 ELECTROCARDIOGRAM REPORT: CPT | Performed by: INTERNAL MEDICINE

## 2022-03-30 PROCEDURE — 36415 COLL VENOUS BLD VENIPUNCTURE: CPT

## 2022-03-30 PROCEDURE — 6370000000 HC RX 637 (ALT 250 FOR IP): Performed by: NURSE PRACTITIONER

## 2022-03-30 PROCEDURE — 93005 ELECTROCARDIOGRAM TRACING: CPT | Performed by: INTERNAL MEDICINE

## 2022-03-30 PROCEDURE — 99239 HOSP IP/OBS DSCHRG MGMT >30: CPT | Performed by: NURSE PRACTITIONER

## 2022-03-30 PROCEDURE — 80048 BASIC METABOLIC PNL TOTAL CA: CPT

## 2022-03-30 RX ORDER — DOFETILIDE 0.5 MG/1
500 CAPSULE ORAL EVERY 12 HOURS SCHEDULED
Qty: 60 CAPSULE | Refills: 3 | Status: SHIPPED | OUTPATIENT
Start: 2022-03-30 | End: 2022-07-21

## 2022-03-30 RX ADMIN — Medication 1 TABLET: at 09:15

## 2022-03-30 RX ADMIN — FLUTICASONE PROPIONATE 2 SPRAY: 50 SPRAY, METERED NASAL at 09:15

## 2022-03-30 RX ADMIN — PANTOPRAZOLE SODIUM 40 MG: 40 TABLET, DELAYED RELEASE ORAL at 05:57

## 2022-03-30 RX ADMIN — SPIRONOLACTONE 25 MG: 25 TABLET ORAL at 09:15

## 2022-03-30 RX ADMIN — CITALOPRAM HYDROBROMIDE 40 MG: 20 TABLET ORAL at 09:15

## 2022-03-30 RX ADMIN — SODIUM CHLORIDE, PRESERVATIVE FREE 10 ML: 5 INJECTION INTRAVENOUS at 09:15

## 2022-03-30 RX ADMIN — DOFETILIDE 500 MCG: 0.25 CAPSULE ORAL at 09:15

## 2022-03-30 RX ADMIN — LEVOTHYROXINE SODIUM 137 MCG: 0.11 TABLET ORAL at 05:57

## 2022-03-30 RX ADMIN — APIXABAN 5 MG: 5 TABLET, FILM COATED ORAL at 09:15

## 2022-03-30 RX ADMIN — VALSARTAN 40 MG: 80 TABLET, FILM COATED ORAL at 09:15

## 2022-03-30 ASSESSMENT — PAIN SCALES - GENERAL
PAINLEVEL_OUTOF10: 0

## 2022-03-30 NOTE — FLOWSHEET NOTE
03/29/22 2050   Assessment   Charting Type Shift assessment   Neurological   Neuro (WDL) WDL   Level of Consciousness Alert (0)   Kirti Coma Scale   Eye Opening 4   Best Verbal Response 5   Best Motor Response 6   Kirti Coma Scale Score 15   HEENT   HEENT (WDL) X   Right Eye Impaired vision   Left Eye Impaired vision   Respiratory   Respiratory (WDL) X   Respiratory Pattern Regular   Respiratory Depth Normal   Respiratory Quality/Effort Unlabored   Chest Assessment Chest expansion symmetrical   L Breath Sounds Diminished   R Breath Sounds Diminished   Breath Sounds   Right Upper Lobe Clear;Diminished   Right Middle Lobe Clear;Diminished   Right Lower Lobe Clear;Diminished   Left Upper Lobe Clear;Diminished   Left Lower Lobe Clear;Diminished   Cardiac   Cardiac (WDL) X   Cardiac Regularity Regular   Heart Sounds S1, S2   Cardiac Rhythm Sinus rhythm;Sinus abhinav   Rhythm Interpretation   Pulse 77   Cardiac Monitor   Telemetry Monitor On Yes   Gastrointestinal   Abdominal (WDL) WDL   Peripheral Vascular   Peripheral Vascular (WDL) X   Edema Right lower extremity; Left lower extremity   RLE Edema Trace   LLE Edema Trace   Skin Color/Condition   Skin Color/Condition (WDL) WDL   Skin Integrity   Skin Integrity (WDL) WDL   Musculoskeletal   Musculoskeletal (WDL) WDL   Genitourinary   Genitourinary (WDL) WDL   Anus/Rectum   Anus/Rectum (WDL) WDL   Psychosocial   Psychosocial (WDL) WDL

## 2022-03-30 NOTE — DISCHARGE SUMMARY
Patient ID:  Bradley Joe  [id number]  80 y.o.  1955    Admit date: 3/28/2022    Discharge date and time: 3/30/2022    Admitting Physician: Dilma Reddy MD     Discharge NP: Gaye Moore CNP    Admission Diagnoses: PAF (paroxysmal atrial fibrillation) [facility]) [I48.0]    Discharge Diagnoses: SAME    Admission Condition: fair    Discharged Condition: good    Hospital Course: Bradley Joe was admitted on 3/28/2022 to start Tikosyn for symptomatic PAF. Rhythm has been sinus. She has no complaints. Denies chest pain, palpitations, shortness of breath, and dizziness.          Assessment :   Symptomatic paroxsymal atrial fibrillation: stable              -[id number] score 4 (age, gender, CHF, HTN)               -flecainide stopped due to worsening EF 8/2019              -Rythmol stopped due to increase in QRS duration 3/2022              -Tikosyn started 3/28/2022              -s/p PVI and RFCA 3/2020 Nano Wu)   NICM with recovered EF: stable               -most recent EF 55% 0/4584  Chronic systolic CHF  Aortic stenosis   Hypothyroidism on Synthroid   JINA: CPAP compliant   Obesity: diet, exercise, and weight loss is recommended       PLAN:   Continue Tikosyn, Eliquis, spironolactone and valsartan   Toprol not resumed due to bradycardia   Two week event monitor   Office to arrange for follow up       Discharge Exam:  /78   Pulse 70   Temp 98.2 °F (36.8 °C) (Oral)   Resp 16   Ht 5' 7\" (1.702 m)   Wt 277 lb 9.6 oz (125.9 kg)   LMP  (LMP Unknown)   SpO2 97%   BMI 43.48 kg/m²     General Appearance:    Alert, cooperative, no distress, appears stated age   Head:    Normocephalic, without obvious abnormality, atraumatic   Eyes:    PERRL, conjunctiva/corneas clear, EOM's intact        Ears:    deferred   Nose:   Nares normal, septum midline, mucosa normal, no drainage    or sinus tenderness   Throat:   Lips, mucosa, and tongue normal; teeth and gums normal   Neck:   Supple, symmetrical, trachea midline, no adenopathy;        thyroid:  No enlargement/tenderness/nodules; no carotid    bruit or JVD   Back:     Symmetric, no curvature, ROM normal, no CVA tenderness   Lungs:     Clear to auscultation bilaterally, respirations unlabored   Chest wall:    No tenderness or deformity   Heart:    Regular rate and rhythm , S1 and S2 normal, no murmur, rub   or gallop   Abdomen:     Soft, non-tender, bowel sounds active all four quadrants,     no masses, no organomegaly   Genitalia:    deferred   Rectal:    deferred    Extremities:   Extremities normal, atraumatic, no cyanosis or edema   Pulses:   2+ and symmetric all extremities   Skin:   Skin color, texture, turgor normal, no rashes or lesions   Lymph nodes:   Cervical, supraclavicular, and axillary nodes normal   Neurologic:   CNII-XII intact. Normal strength, sensation and reflexes       throughout     Disposition: home    Patient Instructions:      Medication List      START taking these medications    dofetilide 500 MCG capsule  Commonly known as: TIKOSYN  Take 1 capsule by mouth every 12 hours        CONTINUE taking these medications    apixaban 5 MG Tabs tablet  Commonly known as: Eliquis  TAKE 1 TABLET BY MOUTH TWO TIMES A DAY     atorvastatin 40 MG tablet  Commonly known as: LIPITOR  TAKE 1 TABLET DAILY     citalopram 40 MG tablet  Commonly known as: CELEXA  TAKE 1 TABLET DAILY     [employer] Gummy Bears Children Chew     ELDERBERRY PO     fluticasone 50 MCG/ACT nasal spray  Commonly known as: FLONASE  2 SPRAYS IN EACH NOSTRIL ONCE DAILY.      furosemide 20 MG tablet  Commonly known as: LASIX  TAKE 1 TABLET BY MOUTH ONE TIME A DAY AS NEEDED FOR WEIGHT GAIN OF 3LBS IN A DAY OR 5LBS IN A WEEK     levothyroxine 137 MCG tablet  Commonly known as: Synthroid  TAKE 1 TABLET ONCE DAILY     montelukast 10 MG tablet  Commonly known as: SINGULAIR  TAKE 1 TABLET NIGHTLY     multivitamin per tablet     omeprazole 40 MG delayed release capsule  Commonly known as: PRILOSEC  TAKE 1 CAPSULE EVERY       MORNING BEFORE BREAKFAST     oxybutynin 15 MG extended release tablet  Commonly known as: DITROPAN XL  TAKE 1 TABLET BY MOUTH ONE TIME A DAY     spironolactone 25 MG tablet  Commonly known as: ALDACTONE  TAKE 1 TABLET ONCE DAILY     valsartan 40 MG tablet  Commonly known as: DIOVAN  TAKE 1 TABLET BY MOUTH ONE TIME A DAY        STOP taking these medications    metoprolol succinate 25 MG extended release tablet  Commonly known as: TOPROL XL     propafenone 225 MG extended release capsule  Commonly known as: RYTHMOL SR           Where to Get Your Medications      These medications were sent to [facility] [address] [facility]    Phone: [phone]   · dofetilide 500 MCG capsule         Activity: as tolerated  Diet: cardiac die    BLANK Wise-CNP  [facility]  [phone]     Time spent on discharge of patient: 35 minutes, including plan of care, patient education, and care coordination.

## 2022-03-30 NOTE — FLOWSHEET NOTE
Patient was unavailable to receive Holy Communion. Is discharging later today.        03/30/22 1207   Encounter Summary   Services provided to: Patient not available   Referral/Consult From: Patient   Continue Visiting   (3/30 pt unavailable for Communion)   Complexity of Encounter Low   Length of Encounter 15 minutes

## 2022-03-30 NOTE — FLOWSHEET NOTE
03/30/22 0913   Vital Signs   Temp 98.7 °F (37.1 °C)   Temp Source Oral   Pulse 55   Heart Rate Source Monitor   Resp 16   /81   BP Location Left lower arm   MAP (mmHg) 97   Patient Position Sitting   Level of Consciousness Alert (0)   MEWS Score 1   Patient Currently in Pain Denies   Pain Assessment   Pain Assessment 0-10   Pain Level 0   Oxygen Therapy   SpO2 94 %   O2 Device None (Room air)

## 2022-03-31 ENCOUNTER — TELEPHONE (OUTPATIENT)
Dept: CARDIOLOGY CLINIC | Age: 67
End: 2022-03-31

## 2022-03-31 ENCOUNTER — TELEPHONE (OUTPATIENT)
Dept: FAMILY MEDICINE CLINIC | Age: 67
End: 2022-03-31

## 2022-03-31 NOTE — TELEPHONE ENCOUNTER
Pt called [facility] today and stated she got discharged yesterday from Hospital w/ a 2 week monitor placed. Just needing clarification when to schedule her. Please advice.

## 2022-03-31 NOTE — TELEPHONE ENCOUNTER
[facility] [place] [facility] Trans[facility] Initial Follow Up Call    Outreach made within 2 business days of discharge: Yes    Patient: Juancarlos Robles Patient : 1955   MRN: [id number]  Reason for Admission: There are no discharge diagnoses documented for the most recent discharge. Discharge Date: 3/30/22       Spoke with: Patient, she didn't need a visit at this [facility] for a follow up with pcp     Discharge department/facility: [facility] Interactive Patient Contact:  Was patient able to fill all prescriptions: Yes  Was patient instructed to bring all medications to the follow-up visit: Yes  Is patient taking all medications as directed in the discharge summary?  Yes  Does patient understand their discharge instructions: Yes  Does patient have questions or concerns that need addressed prior to 7-14 day follow up office visit: no    Scheduled appointment with PCP within 7-14 days    Follow Up  Future Appointments   Date Time Provider [place]   2022  9:30 AM KADEN Landry  [place] - [facility]   2022 11:20 AM BLANK Olson CNP [facility]   2022 11:30 AM KADEN Landry  [place] - DYD   2022 11:15 AM MD Blake Dumas [facility]

## 2022-04-08 ENCOUNTER — TELEPHONE (OUTPATIENT)
Dept: FAMILY MEDICINE CLINIC | Age: 67
End: 2022-04-08
Payer: MEDICARE

## 2022-04-08 ENCOUNTER — TELEPHONE (OUTPATIENT)
Dept: FAMILY MEDICINE CLINIC | Age: 67
End: 2022-04-08

## 2022-04-08 DIAGNOSIS — N30.01 ACUTE CYSTITIS WITH HEMATURIA: ICD-10-CM

## 2022-04-08 DIAGNOSIS — R31.9 HEMATURIA, UNSPECIFIED TYPE: Primary | ICD-10-CM

## 2022-04-08 DIAGNOSIS — R10.2 VAGINAL PAIN: Primary | ICD-10-CM

## 2022-04-08 PROCEDURE — 81000 URINALYSIS NONAUTO W/SCOPE: CPT | Performed by: PHYSICIAN ASSISTANT

## 2022-04-08 RX ORDER — NITROFURANTOIN 25; 75 MG/1; MG/1
100 CAPSULE ORAL 2 TIMES DAILY
Qty: 10 CAPSULE | Refills: 0 | Status: SHIPPED | OUTPATIENT
Start: 2022-04-08 | End: 2022-04-13

## 2022-04-08 NOTE — TELEPHONE ENCOUNTER
Large amount of blood with small Leukocytes. No nitrites. .    3 day history of small amount of labial discomfort with urine leaking. Noted hematuria and nausea which started yesterday. No fever, appetite change, vomiting, diarrhea. Hx recurrent urinary leakage which improved with Ditropan in the past.  Recently hospitalized for PAF where her Rhythmol was changed to Tikosyn. Continues to wear Holter Monitor. No prior hx kidney stones or recurrent UTIs. GFR 3/30/22 >60. Macrobid sent to [facility] per request.  Instructed patient to go to ER if develops back/ flank pain, worsening N/V, fever, or appetite loss. Asked patient to make appt for next week if no improvement in symptoms.

## 2022-04-08 NOTE — TELEPHONE ENCOUNTER
Pt called stating she is having vaginal pain when wiping. She stated it started yesterday and has started leaking urine today. No available appts for today.  What do you suggest?

## 2022-04-08 NOTE — TELEPHONE ENCOUNTER
Patient called in asking about results from her urine she did this morning. Please advise. Also requesting that if anything is called in for it to go to [facility] in [facility] (I added that pharmacy to chart). Thanks!

## 2022-04-08 NOTE — TELEPHONE ENCOUNTER
Probably needs at least at  for right now. Can she come to the clinic and give us one?   Or she can go to Urgent care for an exam

## 2022-04-11 NOTE — TELEPHONE ENCOUNTER
It looks like the pt came in to complete a urine while I was away but no one called her with results. Urine was positive for blood and some small leukocytes. Is she still having symptoms?

## 2022-04-12 ENCOUNTER — TELEPHONE (OUTPATIENT)
Dept: PULMONOLOGY | Age: 67
End: 2022-04-12

## 2022-04-12 DIAGNOSIS — G47.33 SEVERE OBSTRUCTIVE SLEEP APNEA: Primary | ICD-10-CM

## 2022-04-13 ENCOUNTER — TELEPHONE (OUTPATIENT)
Dept: FAMILY MEDICINE CLINIC | Age: 67
End: 2022-04-13

## 2022-04-13 NOTE — TELEPHONE ENCOUNTER
It looks like an antibiotic was started last week for her while I was on vacation. Please make sure she is taking this medication. If so, see if she can come in for an appointment with me, I have one opening tomorrow.  Thank you

## 2022-04-13 NOTE — TELEPHONE ENCOUNTER
Pt called and stated she is still blooding as of last wednesday not as much and finished her last antibiotic this morning. What does she need to do?

## 2022-04-14 ENCOUNTER — OFFICE VISIT (OUTPATIENT)
Dept: FAMILY MEDICINE CLINIC | Age: 67
End: 2022-04-14
Payer: MEDICARE

## 2022-04-14 VITALS
OXYGEN SATURATION: 97 % | SYSTOLIC BLOOD PRESSURE: 100 MMHG | WEIGHT: 260 LBS | BODY MASS INDEX: 40.72 KG/M2 | HEART RATE: 71 BPM | DIASTOLIC BLOOD PRESSURE: 70 MMHG | RESPIRATION RATE: 18 BRPM

## 2022-04-14 DIAGNOSIS — I48.0 PAF (PAROXYSMAL ATRIAL FIBRILLATION) (HCC): ICD-10-CM

## 2022-04-14 DIAGNOSIS — A60.04 HERPES SIMPLEX VULVOVAGINITIS: Primary | ICD-10-CM

## 2022-04-14 DIAGNOSIS — R31.9 HEMATURIA, UNSPECIFIED TYPE: ICD-10-CM

## 2022-04-14 DIAGNOSIS — R30.9 PAIN WITH URINATION: ICD-10-CM

## 2022-04-14 DIAGNOSIS — R63.4 WEIGHT LOSS: ICD-10-CM

## 2022-04-14 LAB
BILIRUBIN, POC: ABNORMAL
BLOOD URINE, POC: ABNORMAL
CLARITY, POC: ABNORMAL
COLOR, POC: ABNORMAL
GLUCOSE URINE, POC: NEGATIVE
KETONES, POC: NEGATIVE
LEUKOCYTE EST, POC: ABNORMAL
NITRITE, POC: NEGATIVE
PH, POC: 5.5
PROTEIN, POC: 30
SPECIFIC GRAVITY, POC: >=1.03
UROBILINOGEN, POC: 1

## 2022-04-14 PROCEDURE — 1036F TOBACCO NON-USER: CPT | Performed by: NURSE PRACTITIONER

## 2022-04-14 PROCEDURE — 81002 URINALYSIS NONAUTO W/O SCOPE: CPT | Performed by: NURSE PRACTITIONER

## 2022-04-14 PROCEDURE — 1123F ACP DISCUSS/DSCN MKR DOCD: CPT | Performed by: NURSE PRACTITIONER

## 2022-04-14 PROCEDURE — G8417 CALC BMI ABV UP PARAM F/U: HCPCS | Performed by: NURSE PRACTITIONER

## 2022-04-14 PROCEDURE — 4040F PNEUMOC VAC/ADMIN/RCVD: CPT | Performed by: NURSE PRACTITIONER

## 2022-04-14 PROCEDURE — 99214 OFFICE O/P EST MOD 30 MIN: CPT | Performed by: NURSE PRACTITIONER

## 2022-04-14 PROCEDURE — 1090F PRES/ABSN URINE INCON ASSESS: CPT | Performed by: NURSE PRACTITIONER

## 2022-04-14 PROCEDURE — G8399 PT W/DXA RESULTS DOCUMENT: HCPCS | Performed by: NURSE PRACTITIONER

## 2022-04-14 PROCEDURE — G8427 DOCREV CUR MEDS BY ELIG CLIN: HCPCS | Performed by: NURSE PRACTITIONER

## 2022-04-14 PROCEDURE — 3017F COLORECTAL CA SCREEN DOC REV: CPT | Performed by: NURSE PRACTITIONER

## 2022-04-14 PROCEDURE — 1111F DSCHRG MED/CURRENT MED MERGE: CPT | Performed by: NURSE PRACTITIONER

## 2022-04-14 RX ORDER — ACYCLOVIR 400 MG/1
400 TABLET ORAL
Qty: 35 TABLET | Refills: 0 | Status: SHIPPED | OUTPATIENT
Start: 2022-04-14 | End: 2022-04-21

## 2022-04-14 NOTE — PROGRESS NOTES
Obdulio Estrada (:  1955) is a 78 y.o. female,Established patient, here for evaluation of the following chief complaint(s):  Urinary Tract Infection (Hematuria, discomfort, pressure/Sweeling?)         ASSESSMENT/PLAN:  1. Herpes simplex vulvovaginitis  -     acyclovir (ZOVIRAX) 400 MG tablet; Take 1 tablet by mouth 5 times daily for 7 days, Disp-35 tablet, R-0Normal  2. Hematuria, unspecified type  -     POCT Urinalysis no Micro  -     Culture, Urine  3. Weight loss  4. PAF (paroxysmal atrial fibrillation) ([facility])  5. Pain with urination  -     POCT Urinalysis no Micro    Not able to send culture today, swabs outdated     No past hx of genital herpes, monogamous relationship and not sexually active. Discussed weight loss and the timing and amount are somewhat concerning. She denies additional symptoms. Has appointment in follow up with cardiology. Encouraged to discuss with them as well. Labs from  reviewed in EMR, stable. Has appt with PCP next week for Medicare AWV. Advised should discuss at that time as well. No follow-ups on file. Subjective   SUBJECTIVE/OBJECTIVE:  HPI      1 week ago was wiping and noted blood on paper, pain with urination. Left urine at office, large blood and small leukocytes. Not sent for culture. Wearing a pad on underpants since last week. Thinks less blood noted, yesterday was very light pink when wiped. Was ordered macrobid BID for 5 days, completed yesterday. Decreased appetite since was in hospital for atrial fib. Started on Tikosyn. Weight down About 20 pounds over past 5 months per office visits at this office. Down 23 pounds since seen at cardiology 3/22/2022. Denies nausea, emesis. Currently she is pleased with the decreased appetite. BM daily to every other day. Denies blood when wiping after BM.      Had pap completed 1/10/2022 per Dr. Osiel Julian, normal. Assessment at that time reviewed in EMR and was normal.     Review of Systems All other systems reviewed and are negative. Objective   Physical Exam  Constitutional:       General: She is not in acute distress. Appearance: Normal appearance. She is obese. She is not ill-appearing. Cardiovascular:      Rate and Rhythm: Regular rhythm. Heart sounds: Normal heart sounds. Pulmonary:      Effort: Pulmonary effort is normal.      Breath sounds: Normal breath sounds. Abdominal:      General: There is no distension. Palpations: Abdomen is soft. Tenderness: There is no abdominal tenderness. Genitourinary:     Comments: Past hx of hysterectomy. Pelvic exam reveals clusters of bleeding lesions around opening. Circular, bleeding around edges of circular lesions, appearance of probable vesicles. Vaginal mucosa very dry. Tender to palpation. Neg for bleeding deeper in canal.   Skin:     General: Skin is warm and dry. Neurological:      Mental Status: She is alert and oriented to person, place, and time.    Psychiatric:         Behavior: Behavior normal.                  An electronic signature was used to authenticate this note.    --REJI KELLEY, BLANK - CNP

## 2022-04-15 LAB — URINE CULTURE, ROUTINE: NORMAL

## 2022-04-17 DIAGNOSIS — A60.04 HERPES SIMPLEX VULVOVAGINITIS: ICD-10-CM

## 2022-04-17 RX ORDER — ACYCLOVIR 400 MG/1
TABLET ORAL
Qty: 35 TABLET | Refills: 0 | OUTPATIENT
Start: 2022-04-17

## 2022-04-17 NOTE — TELEPHONE ENCOUNTER
.  Refill Request     Last Seen: Last Seen Department: 4/14/2022  Last Seen by PCP: 4/14/2022    Last Written: 4/14/22 35 with 0     Next Appointment:   Future Appointments   Date Time Provider Pelon Robersoni   4/18/2022  9:30 AM KADEN Velasquez [facility] [place]   5/20/2022 11:20 AM BLANK Jaramillo - CNP [facility]   [facility]13[facility]   7/19/2022 10:30 AM BLANK Lora - SOM Diaz [facility]   7/19/2022 11:15 AM MD Reji Marie [facility]           Requested Prescriptions     Pending Prescriptions Disp Refills    acyclovir (ZOVIRAX) 400 MG tablet [Pharmacy Med Name: ACYCLOVIR 400 MG TABLET] 35 tablet 0     Sig: TAKE ONE TABLET BY MOUTH FIVE TIMES A DAY FOR 7 DAYS

## 2022-04-18 ENCOUNTER — OFFICE VISIT (OUTPATIENT)
Dept: FAMILY MEDICINE CLINIC | Age: 67
End: 2022-04-18
Payer: MEDICARE

## 2022-04-18 VITALS
DIASTOLIC BLOOD PRESSURE: 80 MMHG | OXYGEN SATURATION: 98 % | WEIGHT: 270 LBS | SYSTOLIC BLOOD PRESSURE: 110 MMHG | BODY MASS INDEX: 43.39 KG/M2 | HEIGHT: 66 IN | HEART RATE: 69 BPM

## 2022-04-18 DIAGNOSIS — Z00.00 INITIAL MEDICARE ANNUAL WELLNESS VISIT: Primary | ICD-10-CM

## 2022-04-18 PROCEDURE — G0438 PPPS, INITIAL VISIT: HCPCS | Performed by: PHYSICIAN ASSISTANT

## 2022-04-18 PROCEDURE — 1123F ACP DISCUSS/DSCN MKR DOCD: CPT | Performed by: PHYSICIAN ASSISTANT

## 2022-04-18 PROCEDURE — 4040F PNEUMOC VAC/ADMIN/RCVD: CPT | Performed by: PHYSICIAN ASSISTANT

## 2022-04-18 PROCEDURE — 3017F COLORECTAL CA SCREEN DOC REV: CPT | Performed by: PHYSICIAN ASSISTANT

## 2022-04-18 ASSESSMENT — PATIENT HEALTH QUESTIONNAIRE - PHQ9
SUM OF ALL RESPONSES TO PHQ QUESTIONS 1-9: 0
1. LITTLE INTEREST OR PLEASURE IN DOING THINGS: 0
1. LITTLE INTEREST OR PLEASURE IN DOING THINGS: 0
SUM OF ALL RESPONSES TO PHQ QUESTIONS 1-9: 0
SUM OF ALL RESPONSES TO PHQ9 QUESTIONS 1 & 2: 0
2. FEELING DOWN, DEPRESSED OR HOPELESS: 0
SUM OF ALL RESPONSES TO PHQ QUESTIONS 1-9: 0
SUM OF ALL RESPONSES TO PHQ QUESTIONS 1-9: 0
2. FEELING DOWN, DEPRESSED OR HOPELESS: 0
SUM OF ALL RESPONSES TO PHQ9 QUESTIONS 1 & 2: 0
SUM OF ALL RESPONSES TO PHQ QUESTIONS 1-9: 0
SUM OF ALL RESPONSES TO PHQ QUESTIONS 1-9: 0

## 2022-04-18 ASSESSMENT — LIFESTYLE VARIABLES
HOW OFTEN DO YOU HAVE A DRINK CONTAINING ALCOHOL: MONTHLY OR LESS
HOW MANY STANDARD DRINKS CONTAINING ALCOHOL DO YOU HAVE ON A TYPICAL DAY: 1 OR 2

## 2022-04-18 NOTE — PATIENT INSTRUCTIONS
Personalized Preventive Plan for Panda Gallagher - 4/18/2022  Medicare offers a range of preventive health benefits. Some of the tests and screenings are paid in full while other may be subject to a deductible, co-insurance, and/or copay. Some of these benefits include a comprehensive review of your medical history including lifestyle, illnesses that may run in your family, and various assessments and screenings as appropriate. After reviewing your medical record and screening and assessments performed today your provider may have ordered immunizations, labs, imaging, and/or referrals for you. A list of these orders (if applicable) as well as your Preventive Care list are included within your After Visit Summary for your review. Other Preventive Recommendations:    · A preventive eye exam performed by an eye specialist is recommended every 1-2 years to screen for glaucoma; cataracts, macular degeneration, and other eye disorders. · A preventive dental visit is recommended every 6 months. · Try to get at least 150 minutes of exercise per week or 10,000 steps per day on a pedometer . · Order or download the FREE \"Exercise & Physical Activity: Your Everyday Guide\" from [employer]. Call 4-715.429.4022 or search [employer] online. · You need 9965-1135 mg of calcium and 4618-3074 IU of vitamin D per day. It is possible to meet your calcium requirement with diet alone, but a vitamin D supplement is usually necessary to meet this goal.  · When exposed to the sun, use a sunscreen that protects against both UVA and UVB radiation with an SPF of 30 or greater. Reapply every 2 to 3 hours or after sweating, drying off with a towel, or swimming. · Always wear a seat belt when traveling in a car. Always wear a helmet when riding a bicycle or motorcycle.

## 2022-04-18 NOTE — PROGRESS NOTES
Medicare Annual Wellness Visit    Riky Gregory is here for Medicare AWV    Assessment & Plan   Initial Medicare annual wellness visit      Recommendations for Preventive Services Due: see orders and patient instructions/AVS.  Recommended screening schedule for the next 5-10 years is provided to the patient in written form: see Patient Instructions/AVS.     Return for Medicare Annual Wellness Visit in 1 year. Patient's complete Health Risk Assessment and screening values have been reviewed and are found in Flowsheets. The following problems were reviewed today and where indicated follow up appointments were made and/or referrals ordered. Positive Risk Factor Screenings with Interventions:              Health Habits/Nutrition:     Physical Activity: Inactive    Days of Exercise per Week: 0 days    Minutes of Exercise per Session: 0 min     Have you lost any weight without trying in the past 3 months?: No  Body mass index: (!) 43.57  Have you seen the dentist within the past year?: Yes    Health Habits/Nutrition Interventions:  · Inadequate physical activity:  patient agrees to exercise for at least 150 minutes/week    Hearing/Vision:  Do you or your family notice any trouble with your hearing that hasn't been managed with hearing aids?: (!) Yes  Do you have difficulty driving, watching TV, or doing any of your daily activities because of your eyesight?: No  Have you had an eye exam within the past year?: Yes  No exam data present    Hearing/Vision Interventions:  · Hearing concerns:  patient declines any further evaluation/treatment for hearing issues            Objective   Vitals:    04/18/22 0936   BP: 110/80   Pulse: 69   SpO2: 98%   Weight: 270 lb (122.5 kg)   Height: 5' 6\" (1.676 m)      Body mass index is 43.58 kg/m².            Allergies   Allergen Reactions    Latex Rash    Bactrim [Sulfamethoxazole-Trimethoprim] Swelling    Iodine Shortness Of Breath     Prior to Visit Medications Medication Sig Taking? Authorizing Provider   acyclovir (ZOVIRAX) 400 MG tablet Take 1 tablet by mouth 5 times daily for 7 days Yes BLANK Krishnamurthy CNP   dofetilide (TIKOSYN) 500 MCG capsule Take 1 capsule by mouth every 12 hours Yes BLANK Wise CNP   ELDERBERRY PO Take by mouth Yes Historical Provider, MD   [employer] Fiber Gummy Bears Children CHEW Take by mouth Yes Historical Provider, MD   spironolactone (ALDACTONE) 25 MG tablet TAKE 1 TABLET ONCE DAILY Yes Harrison Linder MD   levothyroxine (SYNTHROID) 137 MCG tablet TAKE 1 TABLET ONCE DAILY Yes KADEN Bloom   atorvastatin (LIPITOR) 40 MG tablet TAKE 1 TABLET DAILY Yes Harrison Linder MD   citalopram (CELEXA) 40 MG tablet TAKE 1 TABLET DAILY Yes KADEN Shah   omeprazole (PRILOSEC) 40 MG delayed release capsule TAKE 1 CAPSULE EVERY       MORNING BEFORE BREAKFAST Yes KADEN Shah   oxybutynin (DITROPAN XL) 15 MG extended release tablet TAKE 1 TABLET BY MOUTH ONE TIME A DAY Yes KADEN Shah   apixaban (ELIQUIS) 5 MG TABS tablet TAKE 1 TABLET BY MOUTH TWO TIMES A DAY Yes BLANK Wise CNP   montelukast (SINGULAIR) 10 MG tablet TAKE 1 TABLET NIGHTLY Yes KADEN Shah   valsartan (DIOVAN) 40 MG tablet TAKE 1 TABLET BY MOUTH ONE TIME A DAY Yes Harrison Linder MD   fluticasone (FLONASE) 50 MCG/ACT nasal spray 2 SPRAYS IN EACH NOSTRIL ONCE DAILY. Yes KADEN Bloom   furosemide (LASIX) 20 MG tablet TAKE 1 TABLET BY MOUTH ONE TIME A DAY AS NEEDED FOR WEIGHT GAIN OF 3LBS IN A DAY OR 5LBS IN A WEEK Yes BLANK Patricio CNP   multivitamin [facility]) per tablet Take 1 tablet by mouth daily.  Yes Historical Provider, MD Carrasquillo (Including outside providers/suppliers regularly involved in providing care):   Patient Care Team:  KADEN Bloom as PCP - General (Physician Assistant)  KADEN Bloom as PCP - [facility]  Cathy Barron MD as PCP - OBGYN (Obstetrics & Gynecology)  Brooklyn Berg MD as Consulting Physician (Sleep Medicine)  Juan Manuel Waite MD as Consulting Physician (Medical Oncology)  Zaire Valladares MD as Obstetrician (Obstetrics & Gynecology)  BLANK Guzmán CNP as Nurse Practitioner (Nurse Practitioner)    Reviewed and updated this visit:  Tobacco  Allergies  Meds  Problems  Med Hx  Surg Hx  Soc Hx  Fam Hx

## 2022-04-19 NOTE — TELEPHONE ENCOUNTER
Chart/orders reviewed and signed.   Please look for new report about 30 days after pressure is changed

## 2022-04-22 PROCEDURE — 93272 ECG/REVIEW INTERPRET ONLY: CPT | Performed by: INTERNAL MEDICINE

## 2022-05-07 DIAGNOSIS — I48.0 PAF (PAROXYSMAL ATRIAL FIBRILLATION) (HCC): ICD-10-CM

## 2022-05-20 ENCOUNTER — TELEMEDICINE (OUTPATIENT)
Dept: PULMONOLOGY | Age: 67
End: 2022-05-20
Payer: MEDICARE

## 2022-05-20 ENCOUNTER — TELEPHONE (OUTPATIENT)
Dept: PULMONOLOGY | Age: 67
End: 2022-05-20

## 2022-05-20 DIAGNOSIS — Z71.89 CPAP USE COUNSELING: ICD-10-CM

## 2022-05-20 DIAGNOSIS — E66.01 OBESITY, MORBID, BMI 40.0-49.9 (HCC): ICD-10-CM

## 2022-05-20 DIAGNOSIS — G47.33 SEVERE OBSTRUCTIVE SLEEP APNEA: Primary | Chronic | ICD-10-CM

## 2022-05-20 PROCEDURE — 99442 PR PHYS/QHP TELEPHONE EVALUATION 11-20 MIN: CPT | Performed by: NURSE PRACTITIONER

## 2022-05-20 ASSESSMENT — SLEEP AND FATIGUE QUESTIONNAIRES
HOW LIKELY ARE YOU TO NOD OFF OR FALL ASLEEP WHILE SITTING AND TALKING TO SOMEONE: 1
HOW LIKELY ARE YOU TO NOD OFF OR FALL ASLEEP WHILE LYING DOWN TO REST IN THE AFTERNOON WHEN CIRCUMSTANCES PERMIT: 3
HOW LIKELY ARE YOU TO NOD OFF OR FALL ASLEEP WHILE SITTING QUIETLY AFTER LUNCH WITHOUT ALCOHOL: 1
HOW LIKELY ARE YOU TO NOD OFF OR FALL ASLEEP WHILE SITTING AND READING: 0
ESS TOTAL SCORE: 6
HOW LIKELY ARE YOU TO NOD OFF OR FALL ASLEEP WHILE WATCHING TV: 1
HOW LIKELY ARE YOU TO NOD OFF OR FALL ASLEEP IN A CAR, WHILE STOPPED FOR A FEW MINUTES IN TRAFFIC: 0
HOW LIKELY ARE YOU TO NOD OFF OR FALL ASLEEP WHEN YOU ARE A PASSENGER IN A CAR FOR AN HOUR WITHOUT A BREAK: 0
HOW LIKELY ARE YOU TO NOD OFF OR FALL ASLEEP WHILE SITTING INACTIVE IN A PUBLIC PLACE: 0

## 2022-05-20 NOTE — TELEPHONE ENCOUNTER
Within this Telehealth Consent, the terms you and yours refer to the person using the Telehealth Service (Service), or in the case of a use of the Service by or on behalf of a minor, you and yours refer to and include (i) the parent or legal guardian who provides consent to the use of the Service by such minor or uses the Service on behalf of such minor, and (ii) the minor for whom consent is being provided or on whose behalf the Service is being utilized. When using Service, you will be consulting with your health care providers via the use of Telehealth.   Telehealth involves the delivery of healthcare services using electronic communications, information technology or other means between a healthcare provider and a patient who are not in the same physical location. Telehealth may be used for diagnosis, treatment, follow-up and/or patient education, and may include, but is not limited to, one or more of the following:    Electronic transmission of medical records, photo images, personal health information or other data between a patient and a healthcare provider    Interactions between a patient and healthcare provider via audio, video and/or data communications    Use of output data from medical devices, sound and video files    Anticipated Benefits   The use of Telehealth by your Provider(s) through the Service may have the following possible benefits:    Making it easier and more efficient for you to access medical care and treatment for the conditions treated by such Provider(s) utilizing the Service    Allowing you to obtain medical care and treatment by Provider(s) at times that are convenient for you    Enabling you to interact with Provider(s) without the necessity of an in-office appointment     Possible Risks   While the use of Telehealth can provide potential benefits for you, there are also potential risks associated with the use of Telehealth.  These risks include, but may not be limited to the following:    Your Provider(s) may not able to provide medical treatment for your particular condition and you may be required to seek alternative healthcare or emergency care services.  The electronic systems or other security protocols or safeguards used in the Service could fail, causing a breach of privacy of your medical or other information.  Given regulatory requirements in certain jurisdictions, your Provider(s) diagnosis and/or treatment options, especially pertaining to certain prescriptions, may be limited. Acceptance   1. You understand that Services will be provided via Telehealth. This process involves the use of HIPAA compliant and secure, real-time audio-visual interfacing with a qualified and appropriately trained provider located at [facility]. 2. You understand that, under no circumstances, will this session be recorded. 3. You understand that the Provider(s) at [facility] and other clinical participants will be party to the information obtained during the Telehealth session in accordance with best medical practices. 4. You understand that the information obtained during the Telehealth session will be used to help determine the most appropriate treatment options. 5. You understand that You have the right to revoke this consent at any point in time. 6. You understand that Telehealth is voluntary, and that continued treatment is not dependent upon consent. 7. You understand that, in the event of non-consent to Telehealth services and/or technical difficulties, you will obtain services as typically provided in the absence of Telehealth technology. 8. You understand that this consent will be kept in Your medical record. 9. No potential benefits from the use of Telehealth or specific results can be guaranteed. Your condition may not be cured or improved and, in some cases, may get worse.    10. There are limitations in the provision of medical care and treatment via Telehealth and the Service and you may not be able to receive diagnosis and/or treatment through the Service for every condition for which you seek diagnosis and/or treatment. 11. There are potential risks to the use of Telehealth, including but not limited to the risks described in this Telehealth Consent. 12. Your Provider(s) have discussed the use of Telehealth and the Service with you, including the benefits and risks of such and you have provided oral consent to your Provider(s) for the use of Telehealth and the Service. 15. You understand that it is your duty to provide your Provider(s) truthful, accurate and complete information, including all relevant information regarding care that you may have received or may be receiving from other healthcare providers outside of the Service. 14. You understand that each of your Provider(s) may determine in his or sole discretion that your condition is not suitable for diagnosis and/or treatment using the Service, and that you may need to seek medical care and treatment a specialist or other healthcare provider, outside of the Service. 15. You understand that you are fully responsible for payment for all services provided by Provider(s) or through use of the Service and that you may not be able to use third-party insurance. 16. You represent that (a) you have read this Telehealth Consent carefully, (b) you understand the risks and benefits of the Service and the use of Telehealth in the medical care and treatment provided to you by Provider(s) using the Service, and (c) you have the legal capacity and authority to provide this consent for yourself and/or the minor for which you are consenting under applicable federal and state laws, including laws relating to the age of [de-identified] and/or parental/guardian consent.    17. You give your informed consent to the use of Telehealth by Provider(s) using the Service under the terms described in the Terms of Service and this Telehealth Consent. The patient was read the following statement and has consented to the visit as of 5/20/22. The patient has been scheduled for their first telehealth visit on 5/20/22 with Lilian Krueger CNP.

## 2022-05-20 NOTE — PATIENT INSTRUCTIONS

## 2022-05-20 NOTE — PROGRESS NOTES
Patient ID: Bradley Joe is a 78 y.o. female who is being seen today for   Chief Complaint   Patient presents with    Sleep Apnea     1 year sleep         HPI:     Bradley Joe is a 78 y.o. female for telephone only virtual visit for JINA follow up. States she is doing better with CPAP since pressure decrease. Patient is using CPAP   9 hrs/night. Not using humidifier. No snoring on CPAP. The pressure is well tolerated. The mask is comfortable-dreamwear nasal mask. No mask leak. No significant daytime sleepiness. No nodding off when driving. No dry nose or throat. Minimal fatigue. Bedtime is  pm and rise time is 9 am. Sleep onset is usually few minutes. Wakes up 0 times at night total. Usually no nocturia. Occasional naps during the day. No headache in am. No weight gain. 1 caffienated beverages during the day. Rare alcohol. ESS is 6. Initial 1/29/20  Bradley Joe is a 78 y.o. female in office for JINA follow up. She was previously a patient of [facility] sleep however changed offices due to location. States she diagnosed with JINA in 1980s. States she has been on CPAP since, does well with it. States snoring, poor sleep hypersomnia prior to CPAP, resolved with CPAP. Patient is using CPAP 8+ hrs/night. Using humidifier. No snoring on CPAP. The pressure is well tolerated. The mask is comfortable-nasal mask dreamwear. No mask leak. No significant daytime sleepiness. No nodding off when driving. No dry nose or throat. Some fatigue. Bedtime is 8-9 pm and rise time is 530 am. Sleep onset is 2 minutes. Wakes up 0-1 times at night total. 0-1 nocturia. It takes usually few minutes to fall back a sleep. Rare naps during the day, more since diagnosed with afib. No headache in am. No weight gain. 0-1 caffienated beverages during the day. Rare alcohol. ESS is 6.     Sleep Medicine 5/20/2022 5/20/2021 3/17/2021 1/29/2020 1/11/2019 1/5/2018 5/9/2016   Sitting and reading 0 1 1 1 1 2 3 Watching TV 1 1 0 1 1 2 2   Sitting, inactive in a public place (e.g. a theatre or a meeting) 0 0 0 1 1 1 1   As a passenger in a car for an hour without a break 0 1 0 0 1 1 2   Lying down to rest in the afternoon when circumstances permit 3 3 3 3 3 3 3   Sitting and talking to someone 1 0 0 0 0 0 1   Sitting quietly after a lunch without alcohol 1 0 0 0 1 0 2   In a car, while stopped for a few minutes in traffic 0 0 0 0 0 0 0   Total score 6 6 4 6 8 9 14   Neck circumference (Inches) - - - 17.75 - - -       Past Medical History:  Past Medical History:   Diagnosis Date    Abnormal Pap smear of cervix 2018    hpv+    Allergic     Allergic rhinitis     Aortic stenosis, mild-echo 2016    Atrial fibrillation ([address]     BMI 40.0-44.9, adult ([address]     Breast cancer ([address]     53    Cancer ([address]     right breast    Depression     GERD (gastroesophageal reflux disease)     H/O laparoscopic adjustable gastric banding 10/27/2016    Heart palpitations     HPV (human papilloma virus) infection 2018    HPV test positive 2014    Hypertension     Hypothyroidism 2000    Meningioma ([address]     10 years ago    Mild aortic regurgitation-echo 2016    Mild dysplasia of cervix 2016    Obesity     Sleep apnea     Urge incontinence 2017       Past Surgical History:        Procedure Laterality Date    APPENDECTOMY      BREAST BIOPSY      BREAST LUMPECTOMY      BREAST SURGERY      right lumpectomy xrt and chemo and serm    CARDIAC SURGERY      ablation     SECTION  7042,7296,8452,5110    failed to drop in birth canal     SECTION      CHOLECYSTECTOMY      COLONOSCOPY      HAND SURGERY Right 2020    RIGHT THUMB INCISION AND DRAINAGE WITH NAIL REMOVAL AND SPUR REMOVAL performed [facility]ar [facility]      LAP BAND  2012    Removal    OVARY REMOVAL      PARTIAL HYSTERECTOMY      THYROIDECTOMY, PARTIAL Left 2000    TUNNELED VENOUS PORT PLACEMENT  2009/2011    Placed/Removed    UPPER GASTROINTESTINAL ENDOSCOPY  06/10/2016    gastritis, gastric polyp bx        Allergies:  is allergic to latex, bactrim [sulfamethoxazole-trimethoprim], and iodine. Social History:    TOBACCO:   reports that she quit smoking about 50 years ago. Her smoking use included cigarettes. She has a 0.38 pack-year smoking history. She has never used smokeless tobacco.  ETOH:   reports current alcohol use.     Family History:       Problem Relation Age of Onset    Cancer Mother         breast and cervical    Breast Cancer Mother     Heart Disease Father     Cancer Father 61        skin cancer    Other Father         AAA    Heart Attack Father     Cancer Maternal Grandmother         breast    BRCA 2 Negative Maternal Grandmother     Cancer Paternal Grandmother         breast    BRCA 2 Negative Paternal Grandmother     Cancer Sister         lymphoma    No Known Problems Brother     No Known Problems Maternal Aunt     No Known Problems Maternal Uncle     No Known Problems Paternal Aunt     No Known Problems Paternal Uncle     No Known Problems Maternal Grandfather     No Known Problems Paternal Grandfather     Cancer Other         neice- head, not sure what kind skin ca    Heart Disease Sister     Other Sister         biliary hepatitis    Asthma Neg Hx     Diabetes Neg Hx     Emphysema Neg Hx     Heart Failure Neg Hx     Hypertension Neg Hx     Rheum Arthritis Neg Hx     Osteoarthritis Neg Hx     High Cholesterol Neg Hx     Migraines Neg Hx     Ovarian Cancer Neg Hx     Rashes/Skin Problems Neg Hx     Seizures Neg Hx     Stroke Neg Hx     Thyroid Disease Neg Hx        Current Medications:    Current Outpatient Medications:     dofetilide (TIKOSYN) 500 MCG capsule, Take 1 capsule by mouth every 12 hours, Disp: 60 capsule, Rfl: 3    ELDERBERRY PO, Take by mouth, Disp: , Rfl:     [employer] Fiber Gummy Bears Children CHEW, Take by mouth, Disp: , Rfl:     spironolactone (ALDACTONE) 25 MG tablet, TAKE 1 TABLET ONCE DAILY, Disp: 90 tablet, Rfl: 3    levothyroxine (SYNTHROID) 137 MCG tablet, TAKE 1 TABLET ONCE DAILY, Disp: 90 tablet, Rfl: 1    atorvastatin (LIPITOR) 40 MG tablet, TAKE 1 TABLET DAILY, Disp: 90 tablet, Rfl: 3    citalopram (CELEXA) 40 MG tablet, TAKE 1 TABLET DAILY, Disp: 90 tablet, Rfl: 1    omeprazole (PRILOSEC) 40 MG delayed release capsule, TAKE 1 CAPSULE EVERY       MORNING BEFORE BREAKFAST, Disp: 90 capsule, Rfl: 1    oxybutynin (DITROPAN XL) 15 MG extended release tablet, TAKE 1 TABLET BY MOUTH ONE TIME A DAY, Disp: 90 tablet, Rfl: 2    apixaban (ELIQUIS) 5 MG TABS tablet, TAKE 1 TABLET BY MOUTH TWO TIMES A DAY, Disp: 180 tablet, Rfl: 1    montelukast (SINGULAIR) 10 MG tablet, TAKE 1 TABLET NIGHTLY, Disp: 90 tablet, Rfl: 2    valsartan (DIOVAN) 40 MG tablet, TAKE 1 TABLET BY MOUTH ONE TIME A DAY, Disp: 90 tablet, Rfl: 3    fluticasone (FLONASE) 50 MCG/ACT nasal spray, 2 SPRAYS IN EACH NOSTRIL ONCE DAILY. , Disp: 1 Bottle, Rfl: 1    furosemide (LASIX) 20 MG tablet, TAKE 1 TABLET BY MOUTH ONE TIME A DAY AS NEEDED FOR WEIGHT GAIN OF 3LBS IN A DAY OR 5LBS IN A WEEK, Disp: 90 tablet, Rfl: 2    multivitamin (THERAGRAN) per tablet, Take 1 tablet by mouth daily. , Disp: , Rfl:       Objective:   PHYSICAL EXAM:    LMP  (LMP Unknown)       DATA:   6/16/2006 PSG RDI 57.8, low SPO2 84%  6/17/2006 titration, recommendation CPAP 15 cm H2O  3/28/2014 titration recommendation CPAP 12 cm H2O    CPAP compliance data:  Compliance download report from 12/30/19 to 1/28/20 reviewed today by me and showed patient is using machine 8:54 hrs/night with 96.7% compliance and AHI 2.6 within this time frame. 29/30days with greater than 4 hours of machine use. 90% pressure 17.4 cm H20 on auto CPAP 15-20 cm H2O    3/17/2021unable to obtain CPAP download report.   Patient has older outdated CPAP    New CPAP:  Compliance download report from 4/19/21 to 5/18/21 reviewed today by me and showed patient is using machine 9:11 hrs/night with 100% compliance and AHI 6.3 within this time frame. 30/30days with greater than 4 hours of machine use. 90% pressure 18.6 cm H20 on auto CPAP  16-20 cm H2O    Compliance download report from 4/15/22 to 5/14/22 reviewed today by me and showed patient is using machine 9:34 hrs/night with 100% compliance and AHI 2.4 within this time frame. 30/30days with greater than 4 hours of machine use. 90% pressure 17 cm H20 on auto CPAP 16-20 cm H2O     Compliance download report from 4/15/22 to 5/14/22 reviewed today by me and showed patient is using machine 9:34 hrs/night with 100% compliance and AHI 2.4 within this time frame. 30/30days with greater than 4 hours of machine use. 90% pressure 17 cm H20 on auto CPAP 16-20 cm H2O     Assessment:      · Severe JINA. Auto CPAP 16-20 cm H2O. Optimal compliance on review today, residual AHI 6.3.     · Snoring, hypersomnia-resolved on CPAP  · Obesity  · Fatigue  · A. fib and HTNper Cardiology    Plan:     -Auto CPAP 16-20 cm H2O  - Advised to use CPAP 6-8 hrs at night and during naps. - Replacement of mask, tubing, head straps every 3-6 months or sooner if damaged. - Patient instructed to contact [employer] company for any mask, tubing or machine trouble shooting if problems arise.  - Sleep hygiene  - Avoid sedatives, alcohol and caffeinated drinks at bed time. - Patient counseled to never drive or operate heavy machinery while fatigue, drowsy or sleepy.    - Weight loss is recommended as a long-term intervention.    - Complications of JINA if not treated were discussed with patient patient, including: systemic hypertension, pulmonary hypertension, cardiovascular morbidities, car accidents and all cause mortality.  -Patient education  provided regarding sleep tips and CPAP cleaning recommendations     -Discussed [employer] recently recalled some Pap units. Patient states she did register her CPAP with the  for the recall. Discussed risks/benefits of untreated sleep apnea as well as risks/benefits of use of unit if recalled. At this time, if patients have moderate to severe sleep apnea or have severe daytime sleepiness, it is recommended continue therapy until the machine can be replaced. Based upon the information we have so far, it appears the risk of stopping therapy may be higher than the risks associated with foam degradation. However, there are still many unknown variables and each patient will have to make a final determination on his or her own. Patient states she plans to continue current CPAP until she can get a replacement    Please only use cleaning methods recommended by . Harpreet Miles is a 78 y.o. female evaluated via telephone on 5/20/2022 for Sleep Apnea (1 year sleep)  . Total Time: minutes: 11-20 minutes    Harpreet Miles was evaluated through a synchronous (real-time) audio encounter. Patient identification was verified at the start of the visit. She (or guardian if applicable) is aware that this is a billable service, which includes applicable co-pays. This visit was conducted with the patient's (and/or legal guardian's) verbal consent. She has not had a related appointment within my department in the past 7 days or scheduled within the next 24 hours. The patient was located in a state where the provider was licensed to provide care.     Note: not billable if this call serves to triage the patient into an appointment for the relevant concern    Jyoti Verdin, APRN - CNP

## 2022-06-07 RX ORDER — VALSARTAN 40 MG/1
TABLET ORAL
Qty: 90 TABLET | Refills: 3 | Status: SHIPPED | OUTPATIENT
Start: 2022-06-07

## 2022-06-29 DIAGNOSIS — R11.2 NAUSEA AND VOMITING, INTRACTABILITY OF VOMITING NOT SPECIFIED, UNSPECIFIED VOMITING TYPE: ICD-10-CM

## 2022-06-30 RX ORDER — OMEPRAZOLE 40 MG/1
CAPSULE, DELAYED RELEASE ORAL
Qty: 90 CAPSULE | Refills: 1 | Status: SHIPPED | OUTPATIENT
Start: 2022-06-30

## 2022-06-30 NOTE — TELEPHONE ENCOUNTER
Refill Request     CONFIRM preferrred pharmacy with the patient. If Mail Order Rx - Pend for 90 day refill.       Last Seen: Last Seen Department: 4/18/2022  Last Seen by PCP: 4/18/2022    Last Written: 01/19/2022 90 Capsule 1 Refill    Next Appointment:   Future Appointments   Date Time Provider Pelon Whitfield   7/13/2022 11:30 AM KADEN Bermudez [place] - [facility]   7/19/2022 10:30 AM BLANK Sheikh - CNP [facility]   7/19/2022 11:15 AM Ally Donovan MD [facility]   5/26/2023 11:00 AM BLANK Santiago - CNP [facility]       Future appointment scheduled      Requested Prescriptions     Pending Prescriptions Disp Refills    omeprazole (PRILOSEC) 40 MG delayed release capsule [Pharmacy Med Name: OMEPRAZOL RX CAP 40MG] 90 capsule 1     Sig: TAKE 1 CAPSULE EVERY       MORNING BEFORE BREAKFAST

## 2022-07-12 NOTE — PROGRESS NOTES
cardiac medications as prescribed and tolerates them well. Patient denies current edema, chest pain, sob, palpitations, dizziness or syncope. She states her PCP is closely monitoring her A1C as she is prediabetic. She is retired but she is staying active with chores around the house and traveling some. She states she gets exercise occasionally walking her dog but isn't very often. Allergies   Allergen Reactions    Latex Rash    Bactrim [Sulfamethoxazole-Trimethoprim] Swelling    Iodine Shortness Of Breath     Current Outpatient Medications   Medication Sig Dispense Refill    Cetirizine HCl 10 MG CAPS Take 1 capsule by mouth      polyethylene glycol (GLYCOLAX) 17 GM/SCOOP powder Take 17 g by mouth daily 1/2 scoop daily      omeprazole (PRILOSEC) 40 MG delayed release capsule TAKE 1 CAPSULE EVERY       MORNING BEFORE BREAKFAST 90 capsule 1    valsartan (DIOVAN) 40 mg tablet TAKE 1 TABLET BY MOUTH ONE TIME A DAY 90 tablet 3    dofetilide (TIKOSYN) 500 MCG capsule Take 1 capsule by mouth every 12 hours 60 capsule 3    ELDERBERRY PO Take by mouth      spironolactone (ALDACTONE) 25 MG tablet TAKE 1 TABLET ONCE DAILY 90 tablet 3    levothyroxine (SYNTHROID) 137 MCG tablet TAKE 1 TABLET ONCE DAILY 90 tablet 1    atorvastatin (LIPITOR) 40 MG tablet TAKE 1 TABLET DAILY 90 tablet 3    citalopram (CELEXA) 40 MG tablet TAKE 1 TABLET DAILY 90 tablet 1    oxybutynin (DITROPAN XL) 15 MG extended release tablet TAKE 1 TABLET BY MOUTH ONE TIME A DAY 90 tablet 2    apixaban (ELIQUIS) 5 MG TABS tablet TAKE 1 TABLET BY MOUTH TWO TIMES A  tablet 1    montelukast (SINGULAIR) 10 MG tablet TAKE 1 TABLET NIGHTLY 90 tablet 2    fluticasone (FLONASE) 50 MCG/ACT nasal spray 2 SPRAYS IN EACH NOSTRIL ONCE DAILY.  1 Bottle 1    furosemide (LASIX) 20 MG tablet TAKE 1 TABLET BY MOUTH ONE TIME A DAY AS NEEDED FOR WEIGHT GAIN OF 3LBS IN A DAY OR 5LBS IN A WEEK 90 tablet 2    multivitamin (THERAGRAN) per tablet Take 1 tablet by mouth Grandmother         breast    BRCA 2 Negative Maternal Grandmother     Cancer Paternal Grandmother         breast    BRCA 2 Negative Paternal Grandmother     Cancer Sister         lymphoma    No Known Problems Brother     No Known Problems Maternal Aunt     No Known Problems Maternal Uncle     No Known Problems Paternal Aunt     No Known Problems Paternal Uncle     No Known Problems Maternal Grandfather     No Known Problems Paternal Grandfather     Cancer Other         neice- head, not sure what kind skin ca    Heart Disease Sister     Other Sister         biliary hepatitis    Asthma Neg Hx     Diabetes Neg Hx     Emphysema Neg Hx     Heart Failure Neg Hx     Hypertension Neg Hx     Rheum Arthritis Neg Hx     Osteoarthritis Neg Hx     High Cholesterol Neg Hx     Migraines Neg Hx     Ovarian Cancer Neg Hx     Rashes/Skin Problems Neg Hx     Seizures Neg Hx     Stroke Neg Hx     Thyroid Disease Neg Hx      Social History     Socioeconomic History    Marital status:      Spouse name: Not on file    Number of children: Not on file    Years of education: Not on file    Highest education level: Not on file   Occupational History    Occupation: billing [employer]   Tobacco Use    Smoking status: Former     Packs/day: 0.25     Years: 1.50     Pack years: 0.38     Types: Cigarettes     Quit date: 3/27/1972     Years since quittin.3    Smokeless tobacco: Never   Vaping Use    Vaping Use: Never used   Substance and Sexual Activity    Alcohol use:  Yes     Alcohol/week: 0.0 standard drinks     Comment: soc    Drug use: No    Sexual activity: Yes     Partners: Male   Other Topics Concern    Not on file   Social History Narrative            Spends time with , grandchildren        Lives in [address] [place] [address] of Health     Financial Resource Strain: Low Risk     Difficulty of Paying Living Expenses: Not hard at all   Food Insecurity: No Food Insecurity    Worried About [address] in the Last Year: Never true    [address] in the Last Year: Never true   Transportation Needs: No Transportation Needs    Lack of Transportation (Medical): No    Lack of Transportation (Non-Medical): No   Physical Activity: Inactive    Days of Exercise per Week: 0 days    Minutes of Exercise per Session: 0 min   Stress: Not on file   Social Connections: Not on file   Intimate Partner Violence: Not on file   Housing Stability: Low Risk     Unable to Pay for Housing in the Last Year: No    Number of Places Lived in the Last Year: 0    Unstable Housing in the Last Year: No       Review of Systems:   Constitutional: there has been no unanticipated weight loss. There's been no change in energy level, sleep pattern, or activity level. Eyes: No visual changes or diplopia. No scleral icterus. ENT: No Headaches, hearing loss or vertigo. No mouth sores or sore throat. Cardiovascular: Reviewed in HPI  Respiratory: No cough or wheezing, no sputum production. No hematemesis. Gastrointestinal: No abdominal pain, appetite loss, blood in stools. No change in bowel or bladder habits. Genitourinary: No dysuria, trouble voiding, or hematuria. Musculoskeletal:  No gait disturbance, weakness or joint complaints. Integumentary: No rash or pruritis. Neurological: No headache, diplopia, change in muscle strength, numbness or tingling. No change in gait, balance, coordination, mood, affect, memory, mentation, behavior. Psychiatric: No anxiety, no depression. Endocrine: No malaise, fatigue or temperature intolerance. No excessive thirst, fluid intake, or urination. No tremor. Hematologic/Lymphatic: No abnormal bruising or bleeding, blood clots or swollen lymph nodes. Allergic/Immunologic: No nasal congestion or hives. Physical Examination:    Vitals:    07/19/22 1126   BP: 124/72   Pulse: 62   SpO2: 95%   Weight: 279 lb (126.6 kg)   Height: 5' 6\" (1.676 m)     Body mass index is 45.03 kg/m².      Wt Readings from Last 3 Encounters:   07/19/22 279 lb (126.6 kg)   07/19/22 279 lb (126.6 kg)   07/15/22 276 lb (125.2 kg)     BP Readings from Last 3 Encounters:   07/19/22 124/72   07/19/22 124/72   07/15/22 120/80         Constitutional and General Appearance:   Chronically ill appearing  HEENT:  NC/AT  CHEY  No problems with hearing  Skin:  Warm, dry  Respiratory:  Normal excursion and expansion without use of accessory muscles  Resp Auscultation: Normal breath sounds without dullness  Cardiovascular: The apical impulses not displaced  Heart tones are crisp and normal  Cervical veins are not engorged  The carotid upstroke is normal in amplitude and contour without delay or bruit  JVP <8 mm H2O\  Irregularly irregular rhythm with nl S1 and S2 without m,r,g  Peripheral pulses are symmetrical and full  There is no clubbing, cyanosis of the extremities. No edema  Femoral Arteries: 2+ and equal  Pedal Pulses: 2+ and equal   Neck:  No thyromegaly  Abdomen:  No masses or tenderness  Liver/Spleen: No Abnormalities Noted  Neurological/Psychiatric:  Alert and oriented in all spheres  Moves all extremities well  Exhibits normal gait balance and coordination  No abnormalities of mood, affect, memory, mentation, or behavior are noted        Echo: 07/11/19   Summary   Left ventricular systolic function is reduced with ejection fraction   estimated at 48-50 %. Mild global hypokinesis is present. There is mild concentric left ventricular hypertrophy. Normal left ventricular diastolic filling pressure. Mild mitral regurgitation. Aortic sclerosis vs mild aortic stenosis. Mild aortic regurgitation is present. Normal systolic pulmonary artery pressure (SPAP) estimated at 29 mmHg (RA   pressure 8 mmHg).      Stress test: 07/25/19   Normal LVEF 61%  Base to mid anteroseptal hypokinesis  There is a mixed defect in the anteroseptal wall raising concern for mixed  ischemia/scar in that region  This would be considered an abnormal intermediate risk study     [facility]: 08/02/19  LM: luminals  LAD: mid luminals, distal vessel small  LCX: luminals  RCA: dominant, very large     LVEDP: 18  LVEF: 25-30% global hypokinesis      Assessment  1. Nonischemic cardiomyopathy  2. Given worsening cardiomyopathy, suggest Cardiac MRI                - eval for myocarditis or infiltrative cardiomyopathy  3. Will d/c flecainide and dilt given worsening LVEF, pt currently in NSR                - start toprol 25                - start ASA and lisinopril  4. Lasix in post op area                - will make Appt with heart failure team    MRI cardiac: 08/23/19  FINDINGS:    1. The left ventricular size is normal. The left ventricular ejection fraction is 45 % by Melton's method. Global left ventricular function is mildly decreased. There is moderate hypokinesis involving the mid inferior, mid inferoseptal and apical inferior segments. The left ventricular mass is normal.  Delayed systole in the left ventricle relative to the right ventricle and septal bounce suggest incomplete or complete left bundle branch block. 2. Resting first pass myocardial perfusion is probably normal (assessment limited by wrap artifact). There is no late gadolinium enhancement to suggest prior infarct, inflammation, or infiltration. There is no evidence of myocardial edema by T2 weighted imaging (myocardium/skeletal muscle 159/108, normal < 1.9). There is no evidence of increased iron deposition within the myocardium (T2*myocardium = 53 msec; if < 20 msec, suggestive of iron-overloading of the myocardium). 3. The right ventricular size is normal. Global right ventricular function is normal. There are no regional wall motion abnormalities of the right ventricular wall. There is late gadolinium enhancement of the right ventricular insertion points. 4. Left atrial size is mildly enlarged. Right atrial size is normal.  The Qp/Qs is 1.0 by phase contrast imaging and is consistent with no evidence of shunt. There is late gadolinium enhancement involving the RV insertion points. 5. Velocity-encoded phase contrast Imaging in (2D/4D) was performed to assess valvular function. There is mild aortic stenosis. Peak velocity at the aortic valve is 1.76 corresponding to a peak gradient of 12.4 mmHg. There is mild aortic regurgitation. The calculated aortic regurgitant fraction is 10.5 %. There is mild mitral regurgitation. The calculated mitral regurgitant fraction is 10.3 %. Peak velocity at pulmonary valve is 0.73 corresponding to a peak gradient of 2.13 mmHg. The calculated pulmonic regurgitant fraction is 0.0 %.  6. The coronary arteries have normal origins and proximal courses. There is evidence of atherosclerosis in the descending aorta. The descending aorta is dilated measuring 28.0 mm.  7. 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 Echo: 09/29/20  Summary   Left ventricular systolic function is normal with a visually estimated   ejection fraction of 55%. The left ventricle is normal in size with mild concentric hypertrophy. No regional wall motion abnormalities are noted. Systolic pulmonary artery pressure (SPAP) is normal and estimated at 32 mmHg   (right atrial pressure 8 mmHg). The IVC is normal in size (<2.1 cm) but collapses <50% with respiration   consistent with elevated right atrial pressures (8 mmHg) . Echo 3/10/2022:  Summary   Low normal left ventricle systolic function with an estimated ejection   fraction of 50%. The left ventricle is mildly dilated. No regional wall motion abnormalities are seen. Normal left ventricular diastolic filling pressure. Aortic valve appears sclerotic but opens adequately. Mild aortic and mitral regurgitation. Trace pulmonic and tricuspid regurgitation. Systolic pulmonary artery pressure (SPAP) is normal and estimated at 26 mmHg   (right atrial pressure 3 mmHg). Frequent premature beats throughout the study.        Labs were reviewed including labs from other hospital systems through [facility]. Cardiac testing was reviewed including echos, nuclear scans, cardiac catheterization, including from other hospital systems through [address] [place] [address]. Assessment:    1. Chronic systolic heart failure ([facility]    2. PAF (paroxysmal atrial fibrillation) ([facility]    3. Non-ischemic cardiomyopathy ([facility]    4. Essential hypertension         Plan:  1. Continue taking all cardiac medications as prescribed  2. Labs before next office visit: CBC, CMP, BNP, fasting lipids  3. Follow up with me in 6 months       Scribe's attestation: This note was scribed in the presence of Elinor Wilburn MD by Kellen Quigley RN      The scribe's documentation has been prepared under my direction and personally reviewed by me in its entirety. I confirm that the note above accurately reflects all work, treatment, procedures, and medical decision making performed by me. Time Based Itemization  A total of 30 minutes was spent on today's patient encounter. If applicable, non-patient-facing activities:  ( x)Preparing to see the patient and reviewing records  ( ) Individual interpretation of results  ( ) Discussion or coordination of care with other health care professionals  ( x) Ordering of unique tests, medications, or procedures  ( x) Documentation within the EHR         I appreciate the opportunity of cooperating in the care of this patient.     Rex Brito M.D., [facility]

## 2022-07-15 ENCOUNTER — OFFICE VISIT (OUTPATIENT)
Dept: FAMILY MEDICINE CLINIC | Age: 67
End: 2022-07-15
Payer: MEDICARE

## 2022-07-15 VITALS
BODY MASS INDEX: 44.55 KG/M2 | DIASTOLIC BLOOD PRESSURE: 80 MMHG | WEIGHT: 276 LBS | HEART RATE: 62 BPM | OXYGEN SATURATION: 99 % | SYSTOLIC BLOOD PRESSURE: 120 MMHG

## 2022-07-15 DIAGNOSIS — K22.4 ESOPHAGEAL SPASM: ICD-10-CM

## 2022-07-15 DIAGNOSIS — R73.03 PREDIABETES: Primary | ICD-10-CM

## 2022-07-15 DIAGNOSIS — E03.9 ACQUIRED HYPOTHYROIDISM: Chronic | ICD-10-CM

## 2022-07-15 DIAGNOSIS — E66.01 OBESITY, MORBID, BMI 40.0-49.9 (HCC): ICD-10-CM

## 2022-07-15 LAB — HBA1C MFR BLD: 6.1 %

## 2022-07-15 PROCEDURE — 99213 OFFICE O/P EST LOW 20 MIN: CPT | Performed by: PHYSICIAN ASSISTANT

## 2022-07-15 PROCEDURE — G8399 PT W/DXA RESULTS DOCUMENT: HCPCS | Performed by: PHYSICIAN ASSISTANT

## 2022-07-15 PROCEDURE — G8427 DOCREV CUR MEDS BY ELIG CLIN: HCPCS | Performed by: PHYSICIAN ASSISTANT

## 2022-07-15 PROCEDURE — 1036F TOBACCO NON-USER: CPT | Performed by: PHYSICIAN ASSISTANT

## 2022-07-15 PROCEDURE — 3017F COLORECTAL CA SCREEN DOC REV: CPT | Performed by: PHYSICIAN ASSISTANT

## 2022-07-15 PROCEDURE — 1090F PRES/ABSN URINE INCON ASSESS: CPT | Performed by: PHYSICIAN ASSISTANT

## 2022-07-15 PROCEDURE — 83036 HEMOGLOBIN GLYCOSYLATED A1C: CPT | Performed by: PHYSICIAN ASSISTANT

## 2022-07-15 PROCEDURE — G8417 CALC BMI ABV UP PARAM F/U: HCPCS | Performed by: PHYSICIAN ASSISTANT

## 2022-07-15 PROCEDURE — 1123F ACP DISCUSS/DSCN MKR DOCD: CPT | Performed by: PHYSICIAN ASSISTANT

## 2022-07-15 SDOH — ECONOMIC STABILITY: HOUSING INSECURITY: IN THE LAST 12 MONTHS, HOW MANY PLACES HAVE YOU LIVED?: 0

## 2022-07-15 SDOH — ECONOMIC STABILITY: HOUSING INSECURITY
IN THE LAST 12 MONTHS, WAS THERE A TIME WHEN YOU DID NOT HAVE A STEADY PLACE TO SLEEP OR SLEPT IN A SHELTER (INCLUDING NOW)?: NO

## 2022-07-15 SDOH — ECONOMIC STABILITY: FOOD INSECURITY: WITHIN THE PAST 12 MONTHS, YOU WORRIED THAT YOUR FOOD WOULD RUN OUT BEFORE YOU GOT MONEY TO BUY MORE.: NEVER TRUE

## 2022-07-15 SDOH — ECONOMIC STABILITY: INCOME INSECURITY: IN THE LAST 12 MONTHS, WAS THERE A TIME WHEN YOU WERE NOT ABLE TO PAY THE MORTGAGE OR RENT ON TIME?: NO

## 2022-07-15 SDOH — ECONOMIC STABILITY: FOOD INSECURITY: WITHIN THE PAST 12 MONTHS, THE FOOD YOU BOUGHT JUST DIDN'T LAST AND YOU DIDN'T HAVE MONEY TO GET MORE.: NEVER TRUE

## 2022-07-15 ASSESSMENT — ENCOUNTER SYMPTOMS
DIARRHEA: 0
VOMITING: 0
WHEEZING: 0
COUGH: 0
ABDOMINAL PAIN: 1
SHORTNESS OF BREATH: 0
NAUSEA: 0
BLOOD IN STOOL: 0

## 2022-07-15 ASSESSMENT — SOCIAL DETERMINANTS OF HEALTH (SDOH): HOW HARD IS IT FOR YOU TO PAY FOR THE VERY BASICS LIKE FOOD, HOUSING, MEDICAL CARE, AND HEATING?: NOT HARD AT ALL

## 2022-07-15 NOTE — PROGRESS NOTES
Rochelle Leal (:  1955) is a 75 y.o. female,Established patient, here for evaluation of the following chief complaint(s):  Hypothyroidism         ASSESSMENT/PLAN:  1. Prediabetes  -     POCT glycosylated hemoglobin (Hb A1C): improved at 6.1%  -    Decrease carbs to less than 1/4 of your plate at meal time. Follow up with nutritionist  2. Acquired hypothyroidism       -   Reviewed last lab work, well controlled, continue with current dose of medication. Repeat labs in 6 months  3. Obesity, morbid, BMI 40.0-49.9 ([facility]       -   Consider referral to nonsurgical weight loss specialist  4. Esophageal spasm       -   continue with PPI. Follow up with GI for further evaluation  Return in about 6 months (around 1/15/2023) for TSH. Subjective   SUBJECTIVE/OBJECTIVE:  HPI  Hypothyroidism:  Current medication: synthroid 137 mcg  Taking medication on an empty stomach  Denies any S/S of abnormal thyroid  Last labs were within normal range    Prediabetes:  Diet: plans on seeing a dietician  Exercise: walking regularly, plans on starting the bike  Weight is up slightly  Denies any S/S of abnormal blood sugar    Esophageal spasms:  Starting to occur again  Hx of this, improves with a GI cocktail      Review of Systems   Constitutional:  Positive for unexpected weight change. Negative for activity change and appetite change. Eyes:  Negative for visual disturbance. Respiratory:  Negative for cough, shortness of breath and wheezing. Cardiovascular:  Negative for chest pain, palpitations and leg swelling. Gastrointestinal:  Positive for abdominal pain. Negative for blood in stool, diarrhea, nausea and vomiting. Endocrine: Negative for cold intolerance, heat intolerance, polydipsia, polyphagia and polyuria. Genitourinary:  Negative for hematuria. Neurological:  Negative for dizziness, light-headedness and headaches. Objective   Physical Exam  Vitals reviewed.    Constitutional: Appearance: Normal appearance. She is obese. Neck:      Vascular: No carotid bruit. Cardiovascular:      Rate and Rhythm: Normal rate and regular rhythm. Heart sounds: Normal heart sounds. Pulmonary:      Effort: Pulmonary effort is normal.      Breath sounds: Normal breath sounds. Musculoskeletal:      Cervical back: No tenderness. Lymphadenopathy:      Cervical: No cervical adenopathy. Neurological:      Mental Status: She is alert and oriented to person, place, and time. Cranial Nerves: No cranial nerve deficit. An electronic signature was used to authenticate this note.     --KADEN Patel

## 2022-07-19 ENCOUNTER — OFFICE VISIT (OUTPATIENT)
Dept: CARDIOLOGY CLINIC | Age: 67
End: 2022-07-19
Payer: MEDICARE

## 2022-07-19 VITALS
HEART RATE: 62 BPM | DIASTOLIC BLOOD PRESSURE: 72 MMHG | OXYGEN SATURATION: 95 % | WEIGHT: 279 LBS | SYSTOLIC BLOOD PRESSURE: 124 MMHG | HEIGHT: 66 IN | BODY MASS INDEX: 44.84 KG/M2

## 2022-07-19 VITALS
BODY MASS INDEX: 44.84 KG/M2 | SYSTOLIC BLOOD PRESSURE: 124 MMHG | WEIGHT: 279 LBS | HEIGHT: 66 IN | OXYGEN SATURATION: 95 % | HEART RATE: 62 BPM | DIASTOLIC BLOOD PRESSURE: 72 MMHG

## 2022-07-19 DIAGNOSIS — I50.22 CHRONIC SYSTOLIC HEART FAILURE (HCC): Primary | ICD-10-CM

## 2022-07-19 DIAGNOSIS — I48.0 PAF (PAROXYSMAL ATRIAL FIBRILLATION) (HCC): Primary | ICD-10-CM

## 2022-07-19 DIAGNOSIS — I10 ESSENTIAL HYPERTENSION: ICD-10-CM

## 2022-07-19 DIAGNOSIS — I42.8 NON-ISCHEMIC CARDIOMYOPATHY (HCC): ICD-10-CM

## 2022-07-19 DIAGNOSIS — I48.0 PAF (PAROXYSMAL ATRIAL FIBRILLATION) (HCC): ICD-10-CM

## 2022-07-19 PROCEDURE — 1123F ACP DISCUSS/DSCN MKR DOCD: CPT | Performed by: INTERNAL MEDICINE

## 2022-07-19 PROCEDURE — 93000 ELECTROCARDIOGRAM COMPLETE: CPT | Performed by: NURSE PRACTITIONER

## 2022-07-19 PROCEDURE — 99214 OFFICE O/P EST MOD 30 MIN: CPT | Performed by: NURSE PRACTITIONER

## 2022-07-19 PROCEDURE — 99214 OFFICE O/P EST MOD 30 MIN: CPT | Performed by: INTERNAL MEDICINE

## 2022-07-19 PROCEDURE — 1123F ACP DISCUSS/DSCN MKR DOCD: CPT | Performed by: NURSE PRACTITIONER

## 2022-07-19 RX ORDER — POLYETHYLENE GLYCOL 3350 17 G/17G
17 POWDER, FOR SOLUTION ORAL DAILY
COMMUNITY

## 2022-07-19 NOTE — PATIENT INSTRUCTIONS
Plan:  1. Continue taking all cardiac medications as prescribed  2. Labs before next office visit: CBC, CMP, BNP, fasting lipids  3.  Follow up with me in 6 months

## 2022-07-19 NOTE — PROGRESS NOTES
[facility]   Electrophysiology Outpatient Note              Date:  July 19, 2022  Patient name: Sigrid Cheng  YOB: 1955    Primary Care physician: KADEN Da Silva    HISTORY OF PRESENT ILLNESS: The patient is a 78 y.o.  female with a history of AF, nonischemic cardiomyopathy with recovered EF, nonobstructive CAD, chronic diastolic CHF, AS, hypothyroidism, breast cancer and JINA. In 7/2019, she was admitted with rapid AF and flecainide was started. Echo showed an EF of 48-50%. Stress test was abnormal. In 8/2019, LHC showed nonobstructive CAD and an EF of 25-30% and flecainide was stopped due to worsening EF. Cardiac MRI showed an EF of 45%. AF was recurrent and Rythmol was started. Most recent echo in 3/2022 showed an EF of 50%. In 3/2022, Rythmol was stopped due to increasing QRS duration. In 3/2022, he was admitted to start Tikosyn. An event monitor at discharge that showed brief PAF, brief PAT and brief NSVT. Today she is being seen for AF. EKG shows SR with a HR of 62 and stable QTc. She is feeling well. Denies chest pain, palpitations, shortness of breath, and dizziness. She does not feel like she has had any additional AF since wearing the event monitor post discharge. Past Medical History:   has a past medical history of Abnormal Pap smear of cervix, Allergic, Allergic rhinitis, Aortic stenosis, mild-echo 8/2016, Atrial fibrillation ([address] BMI 40.0-44.9, adult ([address] Breast cancer ([address] Cancer ([address] Depression, GERD (gastroesophageal reflux disease), H/O laparoscopic adjustable gastric banding, Heart palpitations, HPV (human papilloma virus) infection, HPV test positive, Hypertension, Hypothyroidism, Meningioma ([address] Mild aortic regurgitation-echo 8/2016, Mild dysplasia of cervix, Obesity, Sleep apnea, and Urge incontinence. Past Surgical History:   has a past surgical history that includes Appendectomy; Cholecystectomy; Colonoscopy;  Lap Band (); Tunneled venous port placement ();  section (0537,4303,0166,3765);  section; Breast surgery (); Breast biopsy; Breast lumpectomy; Hysterectomy (); Lap Band (); Ovary removal; Thyroidectomy, partial (Left, ); Upper gastrointestinal endoscopy (06/10/2016); Partial hysterectomy; Hand surgery (Right, 2020); and Cardiac surgery (). Home Medications:    Prior to Admission medications    Medication Sig Start Date End Date Taking? Authorizing Provider   omeprazole (PRILOSEC) 40 MG delayed release capsule TAKE 1 CAPSULE EVERY       MORNING BEFORE BREAKFAST 22  Yes KADEN Richard   valsartan (DIOVAN) 40 mg tablet TAKE 1 TABLET BY MOUTH ONE TIME A DAY 22  Yes Jason Monteiro MD   dofetilide (TIKOSYN) 500 MCG capsule Take 1 capsule by mouth every 12 hours 3/30/22  Yes BLANK Wise CNP   ELDERBERRY PO Take by mouth   Yes Historical Provider, MD   spironolactone (ALDACTONE) 25 MG tablet TAKE 1 TABLET ONCE DAILY 3/3/22  Yes Jason Monteiro MD   levothyroxine (SYNTHROID) 137 MCG tablet TAKE 1 TABLET ONCE DAILY 3/3/22  Yes KADEN Richadr   atorvastatin (LIPITOR) 40 MG tablet TAKE 1 TABLET DAILY 3/3/22  Yes Jason Monteiro MD   citalopram (CELEXA) 40 MG tablet TAKE 1 TABLET DAILY 3/3/22  Yes KADEN Richard   oxybutynin (DITROPAN XL) 15 MG extended release tablet TAKE 1 TABLET BY MOUTH ONE TIME A DAY 22  Yes KADEN Richard   apixaban (ELIQUIS) 5 MG TABS tablet TAKE 1 TABLET BY MOUTH TWO TIMES A DAY 22  Yes BLANK Wise CNP   montelukast (SINGULAIR) 10 MG tablet TAKE 1 TABLET NIGHTLY 22  Yes KADEN Shah   fluticasone (FLONASE) 50 MCG/ACT nasal spray 2 SPRAYS IN EACH NOSTRIL ONCE DAILY.  21  Yes KADEN Richard   furosemide (LASIX) 20 MG tablet TAKE 1 TABLET BY MOUTH ONE TIME A DAY AS NEEDED FOR WEIGHT GAIN OF 3LBS IN A DAY OR 5LBS IN A WEEK 21  Yes Vernon Doyle, APRN - CNP multivitamin (THERAGRAN) per tablet Take 1 tablet by mouth daily. Yes Historical Provider, MD   [employer] Fiber Gummy Bears Children CHEW Take by mouth  Patient not taking: Reported on 7/19/2022    Historical Provider, MD       Allergies:  Latex, Bactrim [sulfamethoxazole-trimethoprim], and Iodine    Social History:   reports that she quit smoking about 50 years ago. Her smoking use included cigarettes. She has a 0.38 pack-year smoking history. She has never used smokeless tobacco. She reports current alcohol use. She reports that she does not use drugs. Family History: family history includes BRCA 2 Negative in her maternal grandmother and paternal grandmother; Breast Cancer in her mother; Cancer in her maternal grandmother, mother, paternal grandmother, sister, and another family member; Cancer (age of onset: 61) in her father; Heart Attack in her father; Heart Disease in her father and sister; No Known Problems in her brother, maternal aunt, maternal grandfather, maternal uncle, paternal aunt, paternal grandfather, and paternal uncle; Other in her father and sister. All 14 point review of systems are completed and pertinent positives are mentioned in the history of present illness. Other systems are reviewed and are negative. PHYSICAL EXAM:    Vital signs:    /72   Pulse 62   Ht 5' 6\" (1.676 m)   Wt 279 lb (126.6 kg)   LMP  (LMP Unknown)   SpO2 95%   BMI 45.03 kg/m²      Constitutional and general appearance: alert, cooperative, no distress, and appears stated age  HEENT: PERRL, no cervical lymphadenopathy. No masses palpable. Normal oral mucosa  Respiratory:  Normal excursion and expansion without use of accessory muscles  Resp auscultation: Normal breath sounds without wheezing, rhonchi, and rales  Cardiovascular:   The apical impulse is not displaced  Heart tones are crisp and normal. regular S1 and S2.  Jugular venous pulsation Normal  The carotid upstroke is normal in amplitude and contour without delay or bruit  Peripheral pulses are symmetrical and full   Abdomen:  No masses or tenderness  Bowel sounds present  Extremities:   No cyanosis or clubbing   No lower extremity edema   Skin: warm and dry  Neurological:  Alert and oriented  Moves all extremities well  No abnormalities of mood, affect, memory, mentation, or behavior are noted    DATA:    ECG 7/19/2022:  SR 62 bpm        Echo 3/10/2022:      Summary   Low normal left ventricle systolic function with an estimated ejection   fraction of 50%. The left ventricle is mildly dilated. No regional wall motion abnormalities are seen. Normal left ventricular diastolic filling pressure. Aortic valve appears sclerotic but opens adequately. Mild aortic and mitral regurgitation. Trace pulmonic and tricuspid regurgitation. Systolic pulmonary artery pressure (SPAP) is normal and estimated at 26 mmHg   (right atrial pressure 3 mmHg). Frequent premature beats throughout the study. Echo 7/2019:      Summary   Left ventricular systolic function is reduced with ejection fraction   estimated at 48-50 %. Mild global hypokinesis is present. There is mild concentric left ventricular hypertrophy. Normal left ventricular diastolic filling pressure. Mild mitral regurgitation. Aortic sclerosis vs mild aortic stenosis. Mild aortic regurgitation is present. Normal systolic pulmonary artery pressure (SPAP) estimated at 29 mmHg (RA   pressure 8 mmHg). Cardiac MRI 8/23/2019 ():  Findings are most consistent with a nonischemic cardiomyopathy. There is no evidence of left ventricular scarring, edema or iron overload. There is evidence of incomplete or complete left bundle branch block. Regional wall motion abnormalities involving are likely secondary to underlying conduction disease; however, ischemic heart disease cannot be excluded. The right ventricle is normal in size and systolic function.  There is late gadolinium enhancement of the RV insertion points suggestive of elevated right heart pressures. The left atrium is mildly dilated. There is mild mitral regurgitation  The right atrium is normal in size. There is late gadolinium enhancement involving both atria. There is mild aortic stenosis and regurgitation. The descending aorta is dilated there is evidence of associated atherosclerosis. [facility] 8/2/2019 ([place]):  LM: luminals  LAD: mid luminals, distal vessel small  LCX: luminals  RCA: dominant, very large     LVEDP: 18  LVEF: 25-30% global hypokinesis     Assessment  1. Nonischemic cardiomyopathy  2. Given worsening cardiomyopathy, suggest Cardiac MRI                - eval for myocarditis or infiltrative cardiomyopathy  3. Will d/c flecainide and dilt given worsening LVEF, pt currently in NSR                - start toprol 25                - start ASA and lisinopril  4. Lasix in post op area                - will make Appt with heart failure team      All labs and testing reviewed. CARDIOLOGY LABS:   CBC: No results for input(s): WBC, HGB, HCT, PLT in the last 72 hours. BMP: No results for input(s): NA, K, CO2, BUN, CREATININE, LABGLOM, GLUCOSE in the last 72 hours. PT/INR: No results for input(s): PROTIME, INR in the last 72 hours. APTT:No results for input(s): APTT in the last 72 hours. FASTING LIPID PANEL:  Lab Results   Component Value Date/Time    HDL 43 03/04/2022 12:59 PM    LDLCALC 129 03/04/2022 12:59 PM    TRIG 139 03/04/2022 12:59 PM     LIVER PROFILE:No results for input(s): AST, ALT, ALB in the last 72 hours.     IMPRESSION:    Patient Active Problem List   Diagnosis    Chronic GERD    Allergic rhinitis    History of breast cancer    Severe obstructive sleep apnea    Hypothyroidism    Reactive depression    Hiatal hernia    Multiple gastric polyps    Aortic valve stenosis    Mild aortic regurgitation-echo 8/2016    H/O laparoscopic adjustable gastric banding    Essential hypertension    Obesity, morbid, BMI 40.0-49.9 (HCC)    Other and unspecified hyperlipidemia    Non-ischemic cardiomyopathy (HCC)    Chronic systolic heart failure (HCC)    CKD (chronic kidney disease) stage 3, GFR 30-59 ml/min (HCC)    Bilateral change in hearing    PAF (paroxysmal atrial fibrillation) ([facility])    Prediabetes       Assessment:   Symptomatic paroxsymal atrial fibrillation: stable              -[id number] score 4 (age, gender, CHF, HTN)              -flecainide stopped due to worsening EF 8/2019              -Rythmol stopped due to increase in QRS duration 3/2022              -Tikosyn started 3/28/2022              -s/p PVI and RFCA 3/2020 Alis Dears)  NICM with recovered EF: stable              -most recent EF 55% 4/3252  Chronic systolic CHF: compensated   Aortic stenosis  Hypothyroidism on Synthroid  JINA: CPAP compliant        Plan:   Continue Tikosyn, Eliquis, spironolactone and valsartan  No nodals due bradycardia in sinus   Annual labs due 3/2023  Monitor BP at home and call if consistently out of goal ranges  Follow up in 6 months or sooner if needed      BLANK Gan-CNP  [facility]  [phone]

## 2022-07-21 RX ORDER — DOFETILIDE 0.5 MG/1
CAPSULE ORAL
Qty: 180 CAPSULE | Refills: 1 | Status: SHIPPED | OUTPATIENT
Start: 2022-07-21

## 2022-09-02 DIAGNOSIS — I50.22 CHRONIC SYSTOLIC HEART FAILURE (HCC): ICD-10-CM

## 2022-09-02 RX ORDER — SPIRONOLACTONE 25 MG/1
TABLET ORAL
Qty: 90 TABLET | Refills: 3 | Status: SHIPPED | OUTPATIENT
Start: 2022-09-02

## 2022-09-02 NOTE — TELEPHONE ENCOUNTER
Last OV:7/19/2022    Last Labs:CBC, CMP-3/4/2022/BMP-3/30/2022    Last Refill:3/3/2022    Next OV: no HF appt

## 2022-09-06 ENCOUNTER — OFFICE VISIT (OUTPATIENT)
Dept: ORTHOPEDIC SURGERY | Age: 67
End: 2022-09-06
Payer: MEDICARE

## 2022-09-06 VITALS — HEIGHT: 66 IN | BODY MASS INDEX: 44.84 KG/M2 | WEIGHT: 279 LBS

## 2022-09-06 DIAGNOSIS — M25.561 ACUTE PAIN OF RIGHT KNEE: Primary | ICD-10-CM

## 2022-09-06 DIAGNOSIS — M25.562 ACUTE PAIN OF LEFT KNEE: ICD-10-CM

## 2022-09-06 DIAGNOSIS — M17.0 BILATERAL PRIMARY OSTEOARTHRITIS OF KNEE: ICD-10-CM

## 2022-09-06 PROCEDURE — G8427 DOCREV CUR MEDS BY ELIG CLIN: HCPCS | Performed by: ORTHOPAEDIC SURGERY

## 2022-09-06 PROCEDURE — 1090F PRES/ABSN URINE INCON ASSESS: CPT | Performed by: ORTHOPAEDIC SURGERY

## 2022-09-06 PROCEDURE — 1123F ACP DISCUSS/DSCN MKR DOCD: CPT | Performed by: ORTHOPAEDIC SURGERY

## 2022-09-06 PROCEDURE — 20610 DRAIN/INJ JOINT/BURSA W/O US: CPT | Performed by: ORTHOPAEDIC SURGERY

## 2022-09-06 PROCEDURE — G8399 PT W/DXA RESULTS DOCUMENT: HCPCS | Performed by: ORTHOPAEDIC SURGERY

## 2022-09-06 PROCEDURE — 3017F COLORECTAL CA SCREEN DOC REV: CPT | Performed by: ORTHOPAEDIC SURGERY

## 2022-09-06 PROCEDURE — 99203 OFFICE O/P NEW LOW 30 MIN: CPT | Performed by: ORTHOPAEDIC SURGERY

## 2022-09-06 PROCEDURE — 1036F TOBACCO NON-USER: CPT | Performed by: ORTHOPAEDIC SURGERY

## 2022-09-06 PROCEDURE — G8417 CALC BMI ABV UP PARAM F/U: HCPCS | Performed by: ORTHOPAEDIC SURGERY

## 2022-09-06 RX ORDER — LIDOCAINE HYDROCHLORIDE 10 MG/ML
3 INJECTION, SOLUTION EPIDURAL; INFILTRATION; INTRACAUDAL; PERINEURAL ONCE
Status: COMPLETED | OUTPATIENT
Start: 2022-09-06 | End: 2022-09-06

## 2022-09-06 RX ADMIN — LIDOCAINE HYDROCHLORIDE 3 ML: 10 INJECTION, SOLUTION EPIDURAL; INFILTRATION; INTRACAUDAL; PERINEURAL at 15:15

## 2022-09-06 NOTE — PROGRESS NOTES
12 [facility]  History and Physical      Date:  2022    Name:  Jazmin Ramirez  Address:  [place] [id number]    :  1955      Age:   75 y.o. Chief Complaint    Knee Pain (Javon knee pain)      History of Present Illness:  Jazmin Ramirez is a 75 y.o. female who presents for bilateral knee pain right greater than left. Pain has been present for several years. She received a cortisone shot at her last visit 2021 in the right knee. She experienced mild relief for approximately 6 months. Pain is achy and worse with activities specifically climbing stairs and rising from a seated position. Walking tolerance of a half mile. The patient denies any new injury. At this time she would like to discuss a repeat cortisone injection versus gel injection as she is not ready for total knee replacement. The patient denies any catching, giving way, joint locking, numbness, paresthesias, or weakness.       Pain Assessment  Location of Pain: Knee  Location Modifiers: Right, Left  Severity of Pain: 8  Quality of Pain: Sharp  Duration of Pain: A few minutes  Frequency of Pain: Intermittent  Aggravating Factors: Stairs, Walking, Standing, Bending, Stretching  Limiting Behavior: Some  Relieving Factors: Rest  Result of Injury: No  Work-Related Injury: No  Are there other pain locations you wish to document?: No    Past Medical History:   Diagnosis Date    Abnormal Pap smear of cervix 2018    hpv+    Allergic     Allergic rhinitis     Aortic stenosis, mild-echo 2016    Atrial fibrillation (HCC)     BMI 40.0-44.9, adult ([facility]     Breast cancer ([facility]     54    Cancer ([facility]     right breast    Depression     GERD (gastroesophageal reflux disease)     H/O laparoscopic adjustable gastric banding 10/27/2016    Heart palpitations     HPV (human papilloma virus) infection 2018    HPV test positive 2014    Hypertension Hypothyroidism 2000    M[facility]ioma ([facility]     10 years ago    Mild aortic regurgitation-echo 2016    Mild dysplasia of cervix 2016    Obesity     Sleep apnea     Urge incontinence 2017        Past Surgical History:   Procedure Laterality Date    APPENDECTOMY      BREAST BIOPSY      BREAST LUMPECTOMY      BREAST SURGERY  2009    right lumpectomy xrt and chemo and serm    CARDIAC SURGERY      ablation     SECTION  7712,5206,4341,5421    failed to drop in birth canal     SECTION      CHOLECYSTECTOMY      COLONOSCOPY      HAND SURGERY Right 2020    RIGHT THUMB INCISION AND DRAINAGE WITH NAIL REMOVAL AND SPUR REMOVAL performed by Candi Marcus MD at [address] ([address] [facility])  2000    LAP BAND      LAP BAND      Removal    OVARY REMOVAL      PARTIAL HYSTERECTOMY (CERVIX NOT REMOVED)      THYROIDECTOMY, PARTIAL Left 2000    TUNNELED VENOUS PORT PLACEMENT      Placed/Removed    UPPER GASTROINTESTINAL ENDOSCOPY  06/10/2016    gastritis, gastric polyp bx        Family History   Problem Relation Age of Onset    Cancer Mother         breast and cervical    Breast Cancer Mother     Heart Disease Father     Cancer Father 61        skin cancer    Other Father         AAA    Heart Attack Father     Cancer Maternal Grandmother         breast    BRCA 2 Negative Maternal Grandmother     Cancer Paternal Grandmother         breast    BRCA 2 Negative Paternal Grandmother     Cancer Sister         lymphoma    No Known Problems Brother     No Known Problems Maternal Aunt     No Known Problems Maternal Uncle     No Known Problems Paternal Aunt     No Known Problems Paternal Uncle     No Known Problems Maternal Grandfather     No Known Problems Paternal Grandfather     Cancer Other         neice- head, not sure what kind skin ca    Heart Disease Sister     Other Sister         biliary hepatitis    Asthma Neg Hx     Diabetes Neg Hx Emphysema Neg Hx     Heart Failure Neg Hx     Hypertension Neg Hx     Rheum Arthritis Neg Hx     Osteoarthritis Neg Hx     High Cholesterol Neg Hx     Migraines Neg Hx     Ovarian Cancer Neg Hx     Rashes/Skin Problems Neg Hx     Seizures Neg Hx     Stroke Neg Hx     Thyroid Disease Neg Hx        Social History     Socioeconomic History    Marital status:      Spouse name: None    Number of children: None    Years of education: None    Highest education level: None   Occupational History    Occupation: billing [employer]   Tobacco Use    Smoking status: Former     Packs/day: 0.25     Years: 1.50     Pack years: 0.38     Types: Cigarettes     Quit date: 3/27/1972     Years since quittin.4    Smokeless tobacco: Never   Vaping Use    Vaping Use: Never used   Substance and Sexual Activity    Alcohol use: Yes     Alcohol/week: 0.0 standard drinks     Comment: soc    Drug use: No    Sexual activity: Yes     Partners: Male   Social History Narrative            Spends time with , grandchildren        Lives in [address] [place] [address] of Health     Financial Resource Strain: Low Risk     Difficulty of Paying Living Expenses: Not hard at all   Food Insecurity: No Food Insecurity    Worried About Running Out of Food in the Last Year: Never true    [address] in the Last Year: Never true   Transportation Needs: No Transportation Needs    Lack of Transportation (Medical): No    Lack of Transportation (Non-Medical):  No   Physical Activity: Inactive    Days of Exercise per Week: 0 days    Minutes of Exercise per Session: 0 min   Housing Stability: Low Risk     Unable to Pay for Housing in the Last Year: No    Number of Places Lived in the Last Year: 0    Unstable Housing in the Last Year: No       Current Outpatient Medications   Medication Sig Dispense Refill    spironolactone (ALDACTONE) 25 MG tablet TAKE 1 TABLET ONCE DAILY 90 tablet 3    dofetilide (TIKOSYN) 500 MCG capsule TAKE ONE CAPSULE BY MOUTH EVERY 12 HOURS 180 capsule 1    Cetirizine HCl 10 MG CAPS Take 1 capsule by mouth      polyethylene glycol (GLYCOLAX) 17 GM/SCOOP powder Take 17 g by mouth daily 1/2 scoop daily      omeprazole (PRILOSEC) 40 MG delayed release capsule TAKE 1 CAPSULE EVERY       MORNING BEFORE BREAKFAST 90 capsule 1    valsartan (DIOVAN) 40 mg tablet TAKE 1 TABLET BY MOUTH ONE TIME A DAY 90 tablet 3    ELDERBERRY PO Take by mouth      levothyroxine (SYNTHROID) 137 MCG tablet TAKE 1 TABLET ONCE DAILY 90 tablet 1    atorvastatin (LIPITOR) 40 MG tablet TAKE 1 TABLET DAILY 90 tablet 3    citalopram (CELEXA) 40 MG tablet TAKE 1 TABLET DAILY 90 tablet 1    oxybutynin (DITROPAN XL) 15 MG extended release tablet TAKE 1 TABLET BY MOUTH ONE TIME A DAY 90 tablet 2    apixaban (ELIQUIS) 5 MG TABS tablet TAKE 1 TABLET BY MOUTH TWO TIMES A  tablet 1    montelukast (SINGULAIR) 10 MG tablet TAKE 1 TABLET NIGHTLY 90 tablet 2    fluticasone (FLONASE) 50 MCG/ACT nasal spray 2 SPRAYS IN EACH NOSTRIL ONCE DAILY. 1 Bottle 1    furosemide (LASIX) 20 MG tablet TAKE 1 TABLET BY MOUTH ONE TIME A DAY AS NEEDED FOR WEIGHT GAIN OF 3LBS IN A DAY OR 5LBS IN A WEEK 90 tablet 2    multivitamin (THERAGRAN) per tablet Take 1 tablet by mouth daily. No current facility-administered medications for this visit. Allergies   Allergen Reactions    Latex Rash    Bactrim [Sulfamethoxazole-Trimethoprim] Swelling    Iodine Shortness Of Breath         Review of Systems:  A 14 point review of systems was completed by the patient and is available in the media section of the scanned medical record and was reviewed. Vital Signs:   Ht 5' 6\" (1.676 m)   Wt 279 lb (126.6 kg)   LMP  (LMP Unknown)   BMI 45.03 kg/m²     General Exam:    General: Guicho Bocanegra is a healthy and well appearing 75y.o. year old female who is sitting comfortably in our office in no acute distress. Neuro: Alert & Oriented x 3,  normal,  no focal deficits noted.  Normal mood & affect. Eyes: Sclera clear  Ears: Normal external ear  Mouth: Patient wearing a mask  Pulm: Respirations unlabored and regular   Skin: Warm, well perfused      Knee Examination: Right knee    Inspection:  No effusion, ecchymosis, discoloration, erythema, excessive warmth. no gross deformities, no signs of infection. Palpation: There is severe patellofemoral crepitus, there is mild medial joint line tenderness. No other osseous or soft tissue tenderness around the knee. Negative calf tenderness    Range of Motion:  5-120 degrees without pain or difficulty. Limited patellar mobility. Strength:  5/5 quadriceps, 5/5 hamstrings    Special Tests:  No ligament instability, valgus and varus stress test are stable at 0 and 30°. Lachman's exhibits a solid endpoint. Negative posterior drawer. Negative Homans sign    Gait: Non antalgic, without the use of assistive devices    Alignment: Neutral to slight varus    Neuro: Sensation equal bilateral lower extremities    Vascular: 2+ posterior tibialis pulse      Contralateral Knee Comparison Examination: Left knee    Inspection:   No effusion, ecchymosis, discoloration, erythema, excessive warmth. no gross deformities, no signs of infection. Palpation: There is severe patellofemoral crepitus, there is mild medial joint line tenderness. No other osseous or soft tissue tenderness around the knee. Negative calf tenderness    Range of Motion:  5-120 degrees without pain or difficulty. Slightly limited patellar mobility. Strength:  5/5 quadriceps, 5/5 hamstrings    Special Tests:  No ligament instability, valgus and varus stress test are stable at 0 and 30°. Lachman's exhibits a solid endpoint. Negative posterior drawer.   Negative Homans sign    Gait: Non antalgic, without the use of assistive devices    Alignment: Neutral to slight varus    Neuro: Sensation equal bilateral lower extremities    Vascular: 2+ posterior tibialis pulse      Radiology: Previously obtain imaging reviewed. X-rays obtained and reviewed in office: AP and merchant view of both knees and a lateral of the right and left knees. Impression: No fractures, dislocations, visible tumors, or signs of acute trauma. The medial and lateral femoral-tibial compartments exhibit mild joint space narrowing medially bilateral. There's severely narrowed joint spacing in the patellofemoral joints bilaterally with lateral subluxation. KL grade 4. Patella is riding laterally in the trochlear groove with lateral tilt. Freddie-Rosie ratio of 1. No loose bodies or foreign bodies. Office Procedures:  Orders Placed This Encounter   Procedures    XR KNEE RIGHT (3 VIEWS)     Standing Status:   Future     Number of Occurrences:   1     Standing Expiration Date:   9/6/2023     Order Specific Question:   Reason for exam:     Answer:   right knee pain    XR KNEE LEFT (3 VIEWS)     Standing Status:   Future     Number of Occurrences:   1     Standing Expiration Date:   9/6/2023     Order Specific Question:   Reason for exam:     Answer:   left knee pain     Procedure: Right knee gel injection  After informed consent was provided patient sat at bedside. Anterior lateral injection site was marked out. Was cleansed with a chlorhexidine solution. The site was anesthetized with ethyl chloride spray and 1% lidocaine solution. The needle was maintained, gel injection was performed. Needle was withdrawn. Patient tolerated the procedure well without complication    Assessment: Patient is a 75 y.o. female bilateral knee pain right greater than left, bilateral tricompartmental knee osteoarthritis    Encounter Diagnoses   Name Primary? Acute pain of right knee Yes    Acute pain of left knee     Bilateral primary osteoarthritis of knee        No orders of the defined types were placed in this encounter.       Medical decision making:  Patient's workup and evaluation were reviewed with the patient in the office today. Imaging was reviewed with the patient. Knee osteoarthritis discussed at length with patient. Knee osteoarthritis sheet provided. We discussed conservative management, and opted to perform gel injection as patient had had minimal response to her previous cortisone injection. Weight loss management was discussed at length. Patient understands that she would need to be under 40 BMI for total knee replacement. We will see her back as needed as dictated by the response to the gel injection. All the patient's questions were answered while in the clinic. The patient is understanding of all instructions and agrees with the plan. Treatment Plan:    Gel injection provided into the right knee. She will continue with conservative care of both right and left knee osteoarthritis. Weight loss modification was discussed. Activity modification/Rest   Ice 20 minutes ever 1-2 hours PRN  Take medications as prescribed/instructed  Elevation   Lightweight exercise/low impact exercise  Appropriate diet/weight management      Follow up: As needed      Nancy Turner D.O. Orthopedic Surgeon, [facility] Fellow    09/06/22 11:21 AM    This patient exhibits moderate complexity for obtaining an independent history, reviewing past medical records, independent interpretation of diagnostic imaging, and further coordinating care. The patient exhibits low risk secondary to conservative management. Approximately 30 minutes were spent reviewing past medical records, imaging, educating the patient, and coordinating care. This dictation was performed with a verbal recognition program (DRAGON) and it was checked for errors. It is possible that there are still dictated errors within this office note. If so, please bring any errors to my attention for an addendum. All efforts were made to ensure that this office note is accurate.     This dictation was performed with a verbal recognition program (DRAGON) and it was checked for errors. It is possible that there are still dictated errors within this office note. If so, please bring any errors to my attention for an addendum. All efforts were made to ensure that this office note is accurate. Supervising Physician Attestation:  I, Dr. Leah Montes, personally performed the services described in this documentation as above, and it is both accurate and complete and I agree with all pertinent clinical information. I personally interviewed the patient and performed a physical examination and medical decision making. I discussed the patient's condition and treatment options and have  reviewed and agree with the past medical, family and social history unless otherwise noted. All of the patient's questions were answered. I personally supervised the Orthopedic and Sportsmedicine Fellow when noted in the evaluation and treatment plan of the patient.       Board Certified Orthopaedic Surgeon  [facility] and [address] and [address] and [employer]  Professor of Chanell Herrera

## 2022-09-09 NOTE — TELEPHONE ENCOUNTER
Pt is requesting refill of Eliquis 5mg. Preferred pharmacy is [facility] in [facility].  Last ov 07/19/2022 npam.

## 2022-09-23 DIAGNOSIS — N32.81 OAB (OVERACTIVE BLADDER): ICD-10-CM

## 2022-09-23 RX ORDER — OXYBUTYNIN CHLORIDE 15 MG/1
TABLET, EXTENDED RELEASE ORAL
Qty: 90 TABLET | Refills: 1 | Status: SHIPPED | OUTPATIENT
Start: 2022-09-23

## 2022-09-23 RX ORDER — MONTELUKAST SODIUM 10 MG/1
TABLET ORAL
Qty: 90 TABLET | Refills: 1 | Status: SHIPPED | OUTPATIENT
Start: 2022-09-23

## 2022-09-23 NOTE — TELEPHONE ENCOUNTER
.Refill Request     CONFIRM preferrred pharmacy with the patient. If Mail Order Rx - Pend for 90 day refill. Last Seen: Last Seen Department: 7/15/2022  Last Seen by PCP: 7/15/2022    Last Written: 1-4-22 90 with 2     If no future appointment scheduled, route STAFF MESSAGE with patient name to the [facility] for scheduling. Next Appointment:   Future Appointments   Date Time Provider Pelon Whitfield   5/26/2023 11:00 AM BLANK Archer - SOM JEONG       Message sent to [address] [place] [address] to schedule appt with patient?   YES      Requested Prescriptions     Pending Prescriptions Disp Refills    oxybutynin (DITROPAN XL) 15 MG extended release tablet [Pharmacy Med Name: OXYBUTYNIN TAB 15MG ER] 90 tablet 1     Sig: TAKE 1 TABLET ONCE DAILY    montelukast (SINGULAIR) 10 MG tablet [Pharmacy Med Name: MONTELUKAST  TAB 10MG] 90 tablet 1     Sig: TAKE 1 TABLET NIGHTLY

## 2022-10-02 NOTE — PLAN OF CARE
Problem: Safety:  Goal: Free from accidental physical injury  Description  Free from accidental physical injury  3/6/2020 0742 by Kay Natarajan RN  Outcome: Ongoing  3/5/2020 2230 by Brandy Islas RN  Outcome: Ongoing  Goal: Free from intentional harm  Description  Free from intentional harm  Outcome: Ongoing     Problem: Daily Care:  Goal: Daily care needs are met  Description  Daily care needs are met  Outcome: Ongoing     Problem: Pain:  Goal: Patient's pain/discomfort is manageable  Description  Patient's pain/discomfort is manageable  Outcome: Ongoing     Problem: Skin Integrity:  Goal: Skin integrity will stabilize  Description  Skin integrity will stabilize  Outcome: Ongoing
Health Care Proxy (HCP)

## 2022-10-10 RX ORDER — CITALOPRAM 40 MG/1
TABLET ORAL
Qty: 90 TABLET | Refills: 1 | Status: SHIPPED | OUTPATIENT
Start: 2022-10-10

## 2022-10-10 RX ORDER — LEVOTHYROXINE SODIUM 137 UG/1
TABLET ORAL
Qty: 90 TABLET | Refills: 1 | Status: SHIPPED | OUTPATIENT
Start: 2022-10-10

## 2022-10-10 NOTE — TELEPHONE ENCOUNTER
.Refill Request     CONFIRM preferrred pharmacy with the patient. If Mail Order Rx - Pend for 90 day refill. Last Seen: Last Seen Department: 7/15/2022  Last Seen by PCP: 7/15/2022    Last Written: 3-3-22 90 with 1     If no future appointment scheduled, route STAFF MESSAGE with patient name to the [facility] for scheduling. Next Appointment:   Future Appointments   Date Time Provider Pelon Whitfield   5/26/2023 11:00 AM BLANK Raphael - [facility]       Message sent to [employer] to schedule appt with patient?   YES      Requested Prescriptions     Pending Prescriptions Disp Refills    levothyroxine (SYNTHROID) 137 MCG tablet [Pharmacy Med Name: SYNTHROID TAB 137MCG] 90 tablet 1     Sig: TAKE 1 TABLET ONCE DAILY    citalopram (CELEXA) 40 MG tablet [Pharmacy Med Name: CITALOPRAM TAB 40MG] 90 tablet 1     Sig: TAKE 1 TABLET DAILY

## 2022-10-13 ENCOUNTER — PATIENT MESSAGE (OUTPATIENT)
Dept: FAMILY MEDICINE CLINIC | Age: 67
End: 2022-10-13

## 2022-10-13 DIAGNOSIS — L98.9 SKIN LESION: Primary | ICD-10-CM

## 2022-10-13 NOTE — TELEPHONE ENCOUNTER
From: Leighann Girard  To: Jamey Aguilar  Sent: 10/13/2022 1:19 PM EDT  Subject: Dermatologist     Tiana Saavedra, who would you recommend for a dermatologist? I did go to [facility] but she left [address] and when I called the new office message said they are not taking any more patients. I have a spot on my face I wanted to get looked at. She zapped it once and it needs attention.  Is that something I should see you for first? Thanks Alexsandra Howell

## 2022-11-18 RX ORDER — DOFETILIDE 0.5 MG/1
CAPSULE ORAL
Qty: 180 CAPSULE | Refills: 0 | Status: SHIPPED | OUTPATIENT
Start: 2022-11-18

## 2022-11-23 ENCOUNTER — OFFICE VISIT (OUTPATIENT)
Dept: DERMATOLOGY | Age: 67
End: 2022-11-23
Payer: MEDICARE

## 2022-11-23 DIAGNOSIS — L57.0 ACTINIC KERATOSIS: Primary | ICD-10-CM

## 2022-11-23 DIAGNOSIS — Z86.007 HISTORY OF SQUAMOUS CELL CARCINOMA IN SITU (SCCIS): ICD-10-CM

## 2022-11-23 PROCEDURE — 17003 DESTRUCT PREMALG LES 2-14: CPT | Performed by: INTERNAL MEDICINE

## 2022-11-23 PROCEDURE — 17000 DESTRUCT PREMALG LESION: CPT | Performed by: INTERNAL MEDICINE

## 2022-11-23 NOTE — PROGRESS NOTES
[facility]  Miriam Garcia MD  [phone]    Date of Visit: 2022  Lott     Christian Walker is a 75 y.o. female who presents for skin lesion. New pt   Chief Complaint:   Chief Complaint   Patient presents with    Skin Lesion     On face left cheek        History of Present Illness:    Concern:  Skin bump on L cheek  Location: L lateral cheek  Duration:  Several yrs  Symptoms: Crusty, not going away  Previous treatments:  Cryo x1 with Dr. 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 Skin: No new or changing moles, no history of keloids or hypertrophic scars. Past Medical History, Family History, Surgical History, Medications and Allergies reviewed.     Past Medical History:   Diagnosis Date    Abnormal Pap smear of cervix 2018    hpv+    Allergic     Allergic rhinitis     Aortic stenosis, mild-echo 2016    Atrial fibrillation (HCC)     BMI 40.0-44.9, adult ([address]     Breast cancer ([address]     54    Cancer ([address]     right breast    Depression     GERD (gastroesophageal reflux disease)     H/O laparoscopic adjustable gastric banding 10/27/2016    Heart palpitations     HPV (human papilloma virus) infection 2018    HPV test positive 2014    Hypertension     Hypothyroidism 2000    Meningioma ([address]     10 years ago    Mild aortic regurgitation-echo 2016    Mild dysplasia of cervix 2016    Obesity     Sleep apnea     Urge incontinence 2017     Past Surgical History:   Procedure Laterality Date    APPENDECTOMY      BREAST BIOPSY      BREAST LUMPECTOMY      BREAST SURGERY  2009    right lumpectomy xrt and chemo and serm    CARDIAC SURGERY      ablation     SECTION  ,1980,1981,1984    failed to drop in birth canal     SECTION      CHOLECYSTECTOMY COLONOSCOPY      HAND SURGERY Right 11/02/2020    RIGHT THUMB INCISION AND DRAINAGE WITH NAIL REMOVAL AND SPUR REMOVAL performed by Charis Howard MD at [address] ([address])  2000    LAP BAND  2007    LAP BAND  2012    Removal    OVARY REMOVAL      PARTIAL HYSTERECTOMY (CERVIX NOT REMOVED)      THYROIDECTOMY, PARTIAL Left 2000    TUNNELED VENOUS PORT PLACEMENT  2009/2011    Placed/Removed    UPPER GASTROINTESTINAL ENDOSCOPY  06/10/2016    gastritis, gastric polyp bx        Allergies   Allergen Reactions    Latex Rash    Bactrim [Sulfamethoxazole-Trimethoprim] Swelling    Iodine Shortness Of Breath     No outpatient medications have been marked as taking for the 11/23/22 encounter (Office Visit) with Bella Pablo MD.         Physical Examination   No acute distress. Mood clear/affect appropriate. Alert and oriented. Mucous membranes moist.  Sclera anicteric. Visible skin exam was conducted to include the scalp, face, lips/teeth, lids/conjunctiva, ears, neck, right and left hands and forearms and was normal with the following exceptions:   -Pink gritty macules on L cheek and L upper cutaneous lip x2      Assessment and Plan     1. Actinic keratosis  - Counseled on diagnosis, etiology, natural disease course- including pre-malignant nature of lesions and association with sun exposure  - Counseled on importance of daily sun protection (SPF 30+, UVA/UVB) and monthly self skin exams  -Cryotherapy was discussed and patient agreed to proceed. Consent was obtained. 2 AKs were treated cryotherapy on L cheek, L upper cutaneous lip. 2 cycles of liquid nitrogen applied to each lesion for 5 seconds using a Cry-Ac cryo spray gun. Patient was educated regarding the potential risks of blister formation and discomfort. Wound care was discussed. The patient tolerated the procedure well and there were no immediate complications.     This is second cryo for L cheek; if not resolved then biopsy next visit    2. History of squamous cell carcinoma in situ (SCCIS)  -Rec FBSE in 2 months     Return in about 2 months (around 1/23/2023). This is second cryo for L cheek; if not resolved then biopsy next visit  Note is transcribed using voice recognition software. Inadvertent computerized transcription errors may be present.     Boogie Pan MD

## 2022-11-23 NOTE — PATIENT INSTRUCTIONS
Thank you for visiting [facility] today! Please follow the instructions below as we discussed in clinic:      We froze 2 precancerous actinic keratoses today    Return to clinic in Jan or Feb 2023 to recheck cheek and for full body exam    Cryosurgery (Freezing) Wound Care Instructions    AFTER THE PROCEDURE:   You will notice swelling and redness around the site. This is normal.   You may experience a sharp or sore feeling for the next several days. For this discomfort, you may take acetaminophen (Tylenol©). A blister may develop at the treated area, sometimes as soon as by the end of the day. After several days, the blister will subside and a scab will form. If the area is bumped or traumatized during the first few days following freezing, you may develop bleeding into the blister, forming a blood blister. This is nothing to be alarmed about. If the blister is tense, uncomfortable, or much larger than the site that was frozen, you may pop the blister along its edge with a sterile needle (boiled, heated under a flame, or cleaned with alcohol) to allow the fluid to drain out. If the blister does not bother you, no treatment is needed. Do NOT peel off the top of the blister roof. It will act as a dressing on top of your wound. WOUND CARE:   You may shower or bathe as usual, but avoid scrubbing the areas that have been frozen. Cleanse the site twice a day with mild soapy water, and then apply a thin film of white petrolatum (Vaseline©). You do not need to cover the area, but can if you prefer. Do NOT allow the site to become dry or crusted, or attempt to dry it out with rubbing alcohol or hydrogen peroxide. Continue this regimen until the area is pink and healed. Depending on the size and location of your cryosurgery site, healing may take 2 to 4 weeks. The area may continue to be pink for several weeks, and over the next few months may become darker or lighter than the surrounding skin. This may be a permanent change.

## 2022-11-29 ENCOUNTER — HOSPITAL ENCOUNTER (OUTPATIENT)
Dept: WOMENS IMAGING | Age: 67
Discharge: HOME | End: 2022-11-29
Payer: MEDICARE

## 2022-11-29 DIAGNOSIS — Z12.31 SCREENING MAMMOGRAM, ENCOUNTER FOR: ICD-10-CM

## 2022-11-29 PROCEDURE — 77063 BREAST TOMOSYNTHESIS BI: CPT

## 2022-12-14 ENCOUNTER — TELEPHONE (OUTPATIENT)
Dept: FAMILY MEDICINE CLINIC | Age: 67
End: 2022-12-14

## 2022-12-14 ENCOUNTER — TELEMEDICINE (OUTPATIENT)
Dept: FAMILY MEDICINE CLINIC | Age: 67
End: 2022-12-14
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
## 2022-12-14 RX ORDER — DOXYCYCLINE HYCLATE 100 MG
100 TABLET ORAL 2 TIMES DAILY
Qty: 20 TABLET | Refills: 0 | Status: SHIPPED | OUTPATIENT
Start: 2022-12-14 | End: 2022-12-24

## 2022-12-14 ASSESSMENT — ENCOUNTER SYMPTOMS
COLOR CHANGE: 1
CHEST TIGHTNESS: 0

## 2022-12-14 NOTE — PROGRESS NOTES
2022    TELEHEALTH EVALUATION -- Audio/Visual (During LPCPG-48 public health emergency)    HPI:    Catrina Walker (:  1955) has requested an audio/video evaluation for the following concern(s):    Tick Bite:  Timing: noted yesterday  Location: arm  Surrounding red area with pus in the center  Denies any current drainage, warmth and tenderness  Tx: has not put anything on the arm  Was in [facility] country at the end of last week    Review of Systems   Constitutional:  Negative for fever. Respiratory:  Negative for chest tightness. Musculoskeletal:  Negative for arthralgias. Skin:  Positive for color change and rash. Hematological:  Negative for adenopathy. Prior to Visit Medications    Medication Sig Taking?  Authorizing Provider   doxycycline hyclate (VIBRA-TABS) 100 MG tablet Take 1 tablet by mouth 2 times daily for 10 days Yes KADEN Monroe   dofetilide (TIKOSYN) 500 MCG capsule TAKE 1 CAPSULE BY MOUTH EVERY 12 HOURS Yes BLANK Wise CNP   levothyroxine (SYNTHROID) 137 MCG tablet TAKE 1 TABLET ONCE DAILY Yes KADEN Monroe   citalopram (CELEXA) 40 MG tablet TAKE 1 TABLET DAILY Yes KADEN Monroe   oxybutynin (DITROPAN XL) 15 MG extended release tablet TAKE 1 TABLET ONCE DAILY Yes KADEN Shah   montelukast (SINGULAIR) 10 MG tablet TAKE 1 TABLET NIGHTLY Yes KADEN Monroe   apixaban (ELIQUIS) 5 MG TABS tablet TAKE 1 TABLET BY MOUTH TWO TIMES A DAY Yes BLANK Wise CNP   spironolactone (ALDACTONE) 25 MG tablet TAKE 1 TABLET ONCE DAILY Yes Yousuf Joyner MD   Cetirizine HCl 10 MG CAPS Take 1 capsule by mouth Yes Historical Provider, MD   omeprazole (PRILOSEC) 40 MG delayed release capsule TAKE 1 CAPSULE EVERY       MORNING BEFORE BREAKFAST Yes KADEN Shah   valsartan (DIOVAN) 40 mg tablet TAKE 1 TABLET BY MOUTH ONE TIME A DAY Yes Yousuf Joyner MD   ELDERBERRY PO Take by mouth Yes Historical Provider, MD   atorvastatin (LIPITOR) 40 MG tablet TAKE 1 TABLET DAILY Yes Kelsey Vallejo MD   fluticasone (FLONASE) 50 MCG/ACT nasal spray 2 SPRAYS IN EACH NOSTRIL ONCE DAILY. Yes KADEN Rivera   furosemide (LASIX) 20 MG tablet TAKE 1 TABLET BY MOUTH ONE TIME A DAY AS NEEDED FOR WEIGHT GAIN OF 3LBS IN A DAY OR 5LBS IN A WEEK Yes BLANK Weiss - CNP   multivitamin [facility]) per tablet Take 1 tablet by mouth daily. Yes Historical Provider, MD       Social History     Tobacco Use    Smoking status: Former     Packs/day: 0.25     Years: 1.50     Pack years: 0.38     Types: Cigarettes     Quit date: 3/27/1972     Years since quittin.7    Smokeless tobacco: Never   Vaping Use    Vaping Use: Never used   Substance Use Topics    Alcohol use: Yes     Alcohol/week: 0.0 standard drinks     Comment: soc    Drug use: No        Allergies   Allergen Reactions    Latex Rash    Bactrim [Sulfamethoxazole-Trimethoprim] Swelling    Iodine Shortness Of Breath       PHYSICAL EXAMINATION:  [ INSTRUCTIONS:  \"[x]\" Indicates a positive item  \"[]\" Indicates a negative item  -- DELETE ALL ITEMS NOT EXAMINED]  Vital Signs: (As obtained by patient/caregiver or practitioner observation)    Blood pressure-  Heart rate-    Respiratory rate- 14   Temperature- 98.6 Pulse oximetry-     Constitutional: [x] Appears well-developed and well-nourished [x] No apparent distress      [] Abnormal-   Mental status  [x] Alert and awake  [] Oriented to person/place/time []Able to follow commands      Eyes:  EOM    []  Normal  [] Abnormal-  Sclera  []  Normal  [] Abnormal -         Discharge []  None visible  [] Abnormal -    HENT:   [x] Normocephalic, atraumatic.   [] Abnormal   [] Mouth/Throat: Mucous membranes are moist.     External Ears [] Normal  [] Abnormal-     Neck: [] No visualized mass     Pulmonary/Chest: [] Respiratory effort normal.  [] No visualized signs of difficulty breathing or respiratory distress        [] Abnormal-      Musculoskeletal:   [] Normal gait with no signs of ataxia         [] Normal range of motion of neck        [] Abnormal-       Neurological:        [] No Facial Asymmetry (Cranial nerve 7 motor function) (limited exam to video visit)          [] No gaze palsy        [] Abnormal-         Skin:        [] No significant exanthematous lesions or discoloration noted on facial skin         [x] Abnormal- ring of erythema round central papule           Psychiatric:       [] Normal Affect [] No Hallucinations        [] Abnormal-     Other pertinent observable physical exam findings-     ASSESSMENT/PLAN:  1. Tick bite, unspecified site, initial encounter  -  Pt to send a picture through [employer]. Will treat with doxy. Follow up if symptoms do not improve  - doxycycline hyclate (VIBRA-TABS) 100 MG tablet; Take 1 tablet by mouth 2 times daily for 10 days  Dispense: 20 tablet; Refill: 0    Return if symptoms worsen or fail to improve. Hollie Ye, was evaluated through a synchronous (real-time) audio-video encounter. The patient (or guardian if applicable) is aware that this is a billable service, which includes applicable co-pays. This Virtual Visit was conducted with patient's (and/or legal guardian's) consent. The visit was conducted pursuant to the emergency declaration under the [facility], [address] [place] [address] authority and the [employer] General Act. Patient identification was verified, and a caregiver was present when appropriate. The patient was located at Home: [place] [id number]. Provider was located at [facility] (Appt Dept): [address] [place],  [address] [place] [address] [address]. Total time spent on this encounter: Not billed by time    --KADEN Franks on 12/14/2022 at 10:01 AM    An electronic signature was used to authenticate this note.

## 2022-12-14 NOTE — TELEPHONE ENCOUNTER
Patient called stating she spoke with the on call provider last night after hours stating she removed a tick off her arm and it looks now red and infected with pus. Patient called this morning requesting an appt today, there were no availability today. I scheduled her tomorrow with Boneta Patience at 200, but she wants to know if the PCP can get her in today if not she may go to the urgent care, she states she does not really want to to wait until tomorrow, please advise.

## 2023-01-05 ENCOUNTER — OFFICE VISIT (OUTPATIENT)
Dept: DERMATOLOGY | Age: 68
End: 2023-01-05
Payer: MEDICARE

## 2023-01-05 DIAGNOSIS — L81.4 LENTIGINES: ICD-10-CM

## 2023-01-05 DIAGNOSIS — D22.5 MULTIPLE BENIGN MELANOCYTIC NEVI OF UPPER EXTREMITY, LOWER EXTREMITY, AND TRUNK: Primary | ICD-10-CM

## 2023-01-05 DIAGNOSIS — L82.0 INFLAMED SEBORRHEIC KERATOSIS: ICD-10-CM

## 2023-01-05 DIAGNOSIS — R11.2 NAUSEA AND VOMITING: ICD-10-CM

## 2023-01-05 DIAGNOSIS — D22.60 MULTIPLE BENIGN MELANOCYTIC NEVI OF UPPER EXTREMITY, LOWER EXTREMITY, AND TRUNK: Primary | ICD-10-CM

## 2023-01-05 DIAGNOSIS — D22.70 MULTIPLE BENIGN MELANOCYTIC NEVI OF UPPER EXTREMITY, LOWER EXTREMITY, AND TRUNK: Primary | ICD-10-CM

## 2023-01-05 DIAGNOSIS — Z86.007 HISTORY OF SQUAMOUS CELL CARCINOMA IN SITU (SCCIS): ICD-10-CM

## 2023-01-05 DIAGNOSIS — L82.1 OTHER SEBORRHEIC KERATOSIS: ICD-10-CM

## 2023-01-05 PROCEDURE — G8417 CALC BMI ABV UP PARAM F/U: HCPCS | Performed by: INTERNAL MEDICINE

## 2023-01-05 PROCEDURE — G8484 FLU IMMUNIZE NO ADMIN: HCPCS | Performed by: INTERNAL MEDICINE

## 2023-01-05 PROCEDURE — 1036F TOBACCO NON-USER: CPT | Performed by: INTERNAL MEDICINE

## 2023-01-05 PROCEDURE — 99213 OFFICE O/P EST LOW 20 MIN: CPT | Performed by: INTERNAL MEDICINE

## 2023-01-05 PROCEDURE — G8399 PT W/DXA RESULTS DOCUMENT: HCPCS | Performed by: INTERNAL MEDICINE

## 2023-01-05 PROCEDURE — 1090F PRES/ABSN URINE INCON ASSESS: CPT | Performed by: INTERNAL MEDICINE

## 2023-01-05 PROCEDURE — 17110 DESTRUCTION B9 LES UP TO 14: CPT | Performed by: INTERNAL MEDICINE

## 2023-01-05 PROCEDURE — 1123F ACP DISCUSS/DSCN MKR DOCD: CPT | Performed by: INTERNAL MEDICINE

## 2023-01-05 PROCEDURE — G8427 DOCREV CUR MEDS BY ELIG CLIN: HCPCS | Performed by: INTERNAL MEDICINE

## 2023-01-05 PROCEDURE — 3017F COLORECTAL CA SCREEN DOC REV: CPT | Performed by: INTERNAL MEDICINE

## 2023-01-05 RX ORDER — OMEPRAZOLE 40 MG/1
CAPSULE, DELAYED RELEASE ORAL
Qty: 90 CAPSULE | Refills: 1 | Status: SHIPPED | OUTPATIENT
Start: 2023-01-05

## 2023-01-05 NOTE — PATIENT INSTRUCTIONS
Thank you for visiting [facility] today! Please follow the instructions below as we discussed in clinic:      Cryosurgery (Freezing) Wound Care Instructions    AFTER THE PROCEDURE:   You will notice swelling and redness around the site. This is normal.   You may experience a sharp or sore feeling for the next several days. For this discomfort, you may take acetaminophen (Tylenol©). A blister may develop at the treated area, sometimes as soon as by the end of the day. After several days, the blister will subside and a scab will form. If the area is bumped or traumatized during the first few days following freezing, you may develop bleeding into the blister, forming a blood blister. This is nothing to be alarmed about. If the blister is tense, uncomfortable, or much larger than the site that was frozen, you may pop the blister along its edge with a sterile needle (boiled, heated under a flame, or cleaned with alcohol) to allow the fluid to drain out. If the blister does not bother you, no treatment is needed. Do NOT peel off the top of the blister roof. It will act as a dressing on top of your wound. WOUND CARE:   You may shower or bathe as usual, but avoid scrubbing the areas that have been frozen. Cleanse the site twice a day with mild soapy water, and then apply a thin film of white petrolatum (Vaseline©). You do not need to cover the area, but can if you prefer. Do NOT allow the site to become dry or crusted, or attempt to dry it out with rubbing alcohol or hydrogen peroxide. Continue this regimen until the area is pink and healed. Depending on the size and location of your cryosurgery site, healing may take 2 to 4 weeks. The area may continue to be pink for several weeks, and over the next few months may become darker or lighter than the surrounding skin. This may be a permanent change.       SUNSCREENS: UVA and UVB PROTECTION    Sunlight consists of two types of light that can cause or worsen most skin problems:    UVA (ultraviolet A)  UVB (ultraviolet B)   Causes brown spots/sun spots Causes skin cancer   Causes wrinkles Causes sunburn   Causes aging Responsible for tanning    Worsens rosacea Strongest in summer    Less variation with seasons Peak hours 10am-2pm   All year round  SPF rates UVB protection    All day strong    No rating system available     Passes through glass and clouds      *Sunscreens that block both UVA and UVB light contain:  Zinc Oxide (should contain at least 5% zinc oxide)  Titanium Dioxide  Parsol or Avobenzone (additives improve stability of Avobenzone)  There are other UVA blocking ingredients but they are not as complete as these three. Tips/Suggestions  1) Always use sunscreens with SPF of 30 or higher  2) Make sure the sunscreen has zinc oxide or other UVA block such as Helioplex or Anthelios in product  3) Remember there is no \"safe UV light:. ..so there is no such thing as a safe suntan. 4) Apply sunscreen daily (even in winter and on cloudy days) and reapply every 2 to 4 hours depending on activity. 5) Approximately one ounce (a shot glass) is necessary to adequately cover the entire body. 6) Approximately one teaspoon is necessary to adequately cover the face    TINTED SUNSCREENS  - La Roche Posay. Anthelios mineral tinted sunscreen. SPF. 50  [address]vene. [place] UV Mineral Multi-Defense Tinted Sunscreen SPF 50+   Avene. Mineral Tinted Compact SPF 48. Tizo 3 facial primer tinted SPF 40  Eltamd Uv Glow Tinted Broad-Spectrum Spf 36  Eltamd Uv Restore Tinted Broad-Spectrum Spf 40   Eltamd Uv Physical Broad-Spectrum Spf 41   Eltamd Uv Elements Broad-Spectrum Spf 44   Dermablend. Cover Creme. High-performance cream foundation for maximum coverage SPF 30   Vichy. Capital Sulma Tinted 100% Mineral Sunscreen SPF 60%. Vichy. [address] CORRECTIVE FLUID [facility]. [facility] [place]. [place]. Eryfotona Ageless. Ultralight tinted mineral sunscreen. Isdin. Isdinceutics Mineral Brush. Supergoop Mineral CC cream (Comes in several shades, friendly for pigmented skin)  CoTz Flawless Complexion SPF 50 tinted  CoTz Face Prime and Protect SPF 40 tinted    CHEMICAL SUNSCREEN  - La Roche-Posay Anthelios Melt-in Milk Body Face Sunscreen Lotion Broad Spectrum SPF 61 or 100   - La Roche-Posay ANTHELIOS COOLING WATER SUNSCREEN LOTION SPF 60  - La Roche-Posay ANTHELIOS LOTION SPRAY SUNSCREEN SPF 60  - La Roche-Posay ANTHELIOS ACTIVEWEAR SPORT SUNSCREEN LOTION SPF 60    MOISTURIZER WITH CHEMICAL SUNSCREEN NON-COMEDOGENIC  - CeraVe Facial Moisturizing Lotion AM with Sunscreen, Broad Spectrum SPF 30: Ceramides/niacinamide/HA. - CeraVe Ultra-Light Moisturizing Lotion with Sunscreen, Broad Spectrum SPF 30. Ceramides/HA   - La Roche-Posay Toleriane Double Repair Moisturizer SPF 27. Ceramine/Niacinamide. Very dry skin  - Eucerin Daily Protection. Moisturizing Face Lotion Sunscreen SPF 30  - Cetaphil® PRO Dermacontrol. Oil Absorbing Moisturizer SPF 30: Very oily skin  - Cetaphil® Daily Facial Moisturizer SPF 48: Dry skin  - Neutrogena Visibly Even Daily Moisturizer with Sunscreen, Broad Spectrum SPF [place] Shield Water Gel Sunscreen, Broad Spectrum SPF 25  - Aveeno Positively Radiant Daily Moisturizer, Broad Spectrum SPF 30    MOISTURIZER WITH PHYSICAL SUNSCREEN. NON-COMEDOGENIC  - Neutrogena. Ultra-Calming Daily Val Michel MD UV Physical Broad Spectrum SPF 39. Water resistant.  - La Roche-Posay Anthelios SPF 50 Ultra Light Mineral Sunscreen Fluid. Water resistant  - La Roche-Posay Anthelios 100% Mineral Sunscreen Moisturizer with Hyaluronic Acid  - COOLA Mineral Face SPF 30 Matte Tint Moisturizer. Water resistant  - Avene SPF 50+ Mineral Light Hydrating Sunscreen Lotion. Water resistant.   - CeraVe Skin Renewing Day Cream Broad Spectrum SPF 30  - Aveeno Ultra-Calming Daily Moisturizer Broad Spectrum SPF 30    The following are some examples of vendors of sun protective clothing:  - Coolibar (www.coolibar. com)  - Sun Precautions (www.sunprecautions. com)  - Sunday Afternoons (www.sundayaImpact Driven. Oddslife)  Maggie Moody (www.wallaroohats. com)  - Goblinworks life (www.Knowable. Oddslife)    Remember the best sunscreen is whichever one you will wear every day!

## 2023-01-05 NOTE — PROGRESS NOTES
[facility]  Corine Hinds MD  [phone]    Date of Visit: 2022    Anastasia Crum is a 75 y.o. female who presents for skin lesions    Chief Complaint:   Chief Complaint   Patient presents with    Follow-up     Spots on face are improved    Skin Exam       History of Present Illness:    ALs treated last visit pt feels are gone    Patient denies any other new or changing skin lesions. They would like all of their skin lesions to be evaluated for skin cancer. 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 Skin: No new or changing moles, no history of keloids or hypertrophic scars. Past Medical History, Family History, Surgical History, Medications and Allergies reviewed.     Past Medical History:   Diagnosis Date    Abnormal Pap smear of cervix 2018    hpv+    Allergic     Allergic rhinitis     Aortic stenosis, mild-echo 2016    Atrial fibrillation (HCC)     BMI 40.0-44.9, adult ([address]     Breast cancer ([address]     54    Cancer ([address]     right breast    Depression     GERD (gastroesophageal reflux disease)     H/O laparoscopic adjustable gastric banding 10/27/2016    Heart palpitations     HPV (human papilloma virus) infection 2018    HPV test positive 2014    Hypertension     Hypothyroidism 2000    Meningioma ([address]     10 years ago    Mild aortic regurgitation-echo 2016    Mild dysplasia of cervix 2016    Obesity     Sleep apnea     Urge incontinence 2017     Past Surgical History:   Procedure Laterality Date    APPENDECTOMY      BREAST BIOPSY      BREAST LUMPECTOMY      BREAST SURGERY  2009    right lumpectomy xrt and chemo and serm    CARDIAC SURGERY      ablation     SECTION  ,1980,1981,1984    failed to drop in birth canal     SECTION CHOLECYSTECTOMY      COLONOSCOPY      HAND SURGERY Right 11/02/2020    RIGHT THUMB INCISION AND DRAINAGE WITH NAIL REMOVAL AND SPUR REMOVAL performed by Brittany Ledbetter MD at [address]  2000    LAP BAND  2007    LAP BAND  2012    Removal    OVARY REMOVAL      PARTIAL HYSTERECTOMY (CERVIX NOT REMOVED)      THYROIDECTOMY, PARTIAL Left 2000    TUNNELED VENOUS PORT PLACEMENT  2009/2011    Placed/Removed    UPPER GASTROINTESTINAL ENDOSCOPY  06/10/2016    gastritis, gastric polyp bx        Allergies   Allergen Reactions    Latex Rash    Bactrim [Sulfamethoxazole-Trimethoprim] Swelling    Iodine Shortness Of Breath     Outpatient Medications Marked as Taking for the 1/5/23 encounter (Office Visit) with Dewaine Litten, MD   Medication Sig Dispense Refill    omeprazole (PRILOSEC) 40 MG delayed release capsule TAKE 1 CAPSULE EVERY       MORNING BEFORE BREAKFAST 90 capsule 1    dofetilide (TIKOSYN) 500 MCG capsule TAKE 1 CAPSULE BY MOUTH EVERY 12 HOURS 180 capsule 0    levothyroxine (SYNTHROID) 137 MCG tablet TAKE 1 TABLET ONCE DAILY 90 tablet 1    citalopram (CELEXA) 40 MG tablet TAKE 1 TABLET DAILY 90 tablet 1    oxybutynin (DITROPAN XL) 15 MG extended release tablet TAKE 1 TABLET ONCE DAILY 90 tablet 1    montelukast (SINGULAIR) 10 MG tablet TAKE 1 TABLET NIGHTLY 90 tablet 1    apixaban (ELIQUIS) 5 MG TABS tablet TAKE 1 TABLET BY MOUTH TWO TIMES A  tablet 1    spironolactone (ALDACTONE) 25 MG tablet TAKE 1 TABLET ONCE DAILY 90 tablet 3    Cetirizine HCl 10 MG CAPS Take 1 capsule by mouth      valsartan (DIOVAN) 40 mg tablet TAKE 1 TABLET BY MOUTH ONE TIME A DAY 90 tablet 3    ELDERBERRY PO Take by mouth      atorvastatin (LIPITOR) 40 MG tablet TAKE 1 TABLET DAILY 90 tablet 3    fluticasone (FLONASE) 50 MCG/ACT nasal spray 2 SPRAYS IN EACH NOSTRIL ONCE DAILY.  1 Bottle 1    furosemide (LASIX) 20 MG tablet TAKE 1 TABLET BY MOUTH ONE TIME A DAY AS NEEDED FOR WEIGHT GAIN OF 3LBS IN A DAY OR 5LBS IN A WEEK 90 tablet 2    multivitamin (THERAGRAN) per tablet Take 1 tablet by mouth daily. Physical Examination   No acute distress. Mood clear/affect appropriate. Alert and oriented. Mucous membranes moist.  Sclera anicteric. Full body skin exam was conducted to include the scalp, face, lips, lids/conjunctiva, ears, neck, chest, abdomen, back, buttock, right and left hands and forearms, right and left leg and feet and was normal with the following exceptions:  -Resolved Aks on L cheek and L upper cutaneous lip  - On trunk and bilateral upper and lower extremities, there are multiple well-circumscribed, homogenous tan to brown macules and papules   - Multiple tan to light brown macules on face, shoulders and arms in a photo-distributed pattern  -Scattered, brown, stuck-on appearing, verrucous papules on trunk and extremities. Irritated papules on L thigh x3, R thigh x1  -Well healed scar(s) at site of prior skin cancer procedures    Assessment and Plan     1. Multiple benign melanocytic nevi of upper extremity, lower extremity, and trunk  - Benign, reassurance  - Counseled on importance of daily sun protection (Broad spectrum, SPF >30), monthly self skin exams  - Reviewed ABCDEs of melanoma  - Sun screen hand out provided      2. Lentigines  -Benign, reassurance   - Reviewed relationship with sun exposure  - Rec daily sunscreen with SPF 30, broad spectrum      3. Other seborrheic keratosis  -Benign, reassurance       4. Inflamed seborrheic keratosis  -Given itch, irritation treated ISKs today  - Benign, reassured patient  - Given symptoms, discussed treatment options including LN2 vs. No treatment. Pt opted for treatment with LN2  - Reviewed risks of cryotherapy including blistering, pain, erythema, dyspigmentation, infection and patient agreed to proceed. Consent was obtained. 4 ISK(s) were treated cryotherapy: bilateral thighs.  2 cycles of liquid nitrogen applied to each lesion for 5 seconds using a Cry-Ac cryo spray gun. Patient was educated regarding the potential risks of blister formation and discomfort. Wound care was discussed. The patient tolerated the procedure well and there were no immediate complications. 5. History of squamous cell carcinoma in situ (SCCIS)  -NER  -Annual FBSE     RTC 1 year; sooner for new/changing lesions     Note is transcribed using voice recognition software. Inadvertent computerized transcription errors may be present.     Petey Candelario MD

## 2023-01-05 NOTE — TELEPHONE ENCOUNTER
Refill Request     CONFIRM preferrred pharmacy with the patient. If Mail Order Rx - Pend for 90 day refill. Last Seen: Last Seen Department: 12/14/2022  Last Seen by PCP: 12/14/2022    Last Written: 06/30/2022 90 capsule 1 refills     If no future appointment scheduled, route STAFF MESSAGE with patient name to the [facility] for scheduling. Next Appointment:   Future Appointments   Date Time Provider Pelon Whitfield   1/5/2023 10:15 AM MD EVELYN Wiley   1/17/2023  9:30 AM BLANK Rosas CNP [facility]   1/17/2023 10:30 AM MD Leonides Mendez [facility]   5/26/2023 11:00 AM BLANK Castañeda CNP [facility]       Message sent to  to schedule appt with patient?   NO      Requested Prescriptions     Pending Prescriptions Disp Refills    omeprazole (PRILOSEC) 40 MG delayed release capsule [Pharmacy Med Name: OMEPRAZOLE CAP 40MG] 90 capsule 1     Sig: TAKE 1 CAPSULE EVERY       MORNING BEFORE BREAKFAST

## 2023-01-13 NOTE — PROGRESS NOTES
[facility], [place]  Advanced CHF/Pulmonary Hypertension   Cardiac Evaluation      Dexter Painter  YOB: 1955    Date of Visit:  1/17/23    Chief Complaint   Patient presents with    Follow-up    Congestive Heart Failure           History of Present Illness:  Dexter Painter is a 75 y.o. female who presents from referral from Dr. Vitaly Brown for consultation and management of cardiomyopathy. She presented to the hospital 07/11/19 with palpitations. She was found to be in AFib RVR. She was started on flecainide, diltiazem and eliquis. Her echo from 07/11/19 showed her EF was 48-50% with mild/ global hypokinesis. Mild MR and AR. Her stress test from 07/25/19 showed a mixed defect in the anteroseptal wall raising concern for mixed ischemia/scar. Her cardiac cath from 08/02/19 showed nonischemic cardiomyopathy. Flecainide and diltiazem were stopped due to worsening. LVEF. She was started on toprol 25 mg. She was started on lisinopril and eliquis as well. She believes she has had PAF for 30 years but was never able to capture it. She reports she feels awful and SOB with the AFib. She reports she verifies afib with checking her radial pulse. She started having palpitations again last night after eating. She does not check her BP at home. Her cardiac MRI from 08/02/19 showed her EF was 45%. She underwent afib ablation with Dr Tiffany aNpoles on 03/05/20. Her echo from 09/29/20 showed her EF was 55%. Elevated RA pressures. OV 1/11/2022 she reported she had retired recently. Her echo 3/10/2022 demonstrated an LVEF of 50% with frequent premature beats throughout the study. She was admitted for Tikosyn start 3/28/2022. She wore a 2 week cardiac event monitor 4/2022 that showed NSR with brief PAF, brief PAT, and brief NSVT. OV, 7/19/2022,  She has been compliant with her CPAP. She is only using lasix as needed, which isn't very often. Today 1/17/2023, she says she is feeling good.  She says Nyoka Poor is working really well for her. Patient is taking all cardiac medications as prescribed and tolerates them well. Patient denies current edema, chest pain, sob, palpitations, dizziness or syncope. Allergies   Allergen Reactions    Latex Rash    Bactrim [Sulfamethoxazole-Trimethoprim] Swelling    Iodine Shortness Of Breath     Current Outpatient Medications   Medication Sig Dispense Refill    Vitamin D, Cholecalciferol, 25 MCG (1000 UT) CAPS       apixaban (ELIQUIS) 5 MG TABS tablet TAKE 1 TABLET BY MOUTH TWO TIMES A  tablet 2    omeprazole (PRILOSEC) 40 MG delayed release capsule TAKE 1 CAPSULE EVERY       MORNING BEFORE BREAKFAST 90 capsule 1    dofetilide (TIKOSYN) 500 MCG capsule TAKE 1 CAPSULE BY MOUTH EVERY 12 HOURS 180 capsule 0    levothyroxine (SYNTHROID) 137 MCG tablet TAKE 1 TABLET ONCE DAILY 90 tablet 1    citalopram (CELEXA) 40 MG tablet TAKE 1 TABLET DAILY 90 tablet 1    oxybutynin (DITROPAN XL) 15 MG extended release tablet TAKE 1 TABLET ONCE DAILY 90 tablet 1    montelukast (SINGULAIR) 10 MG tablet TAKE 1 TABLET NIGHTLY 90 tablet 1    spironolactone (ALDACTONE) 25 MG tablet TAKE 1 TABLET ONCE DAILY 90 tablet 3    Cetirizine HCl 10 MG CAPS Take 1 capsule by mouth      valsartan (DIOVAN) 40 mg tablet TAKE 1 TABLET BY MOUTH ONE TIME A DAY 90 tablet 3    ELDERBERRY PO Take by mouth      atorvastatin (LIPITOR) 40 MG tablet TAKE 1 TABLET DAILY 90 tablet 3    fluticasone (FLONASE) 50 MCG/ACT nasal spray 2 SPRAYS IN EACH NOSTRIL ONCE DAILY. 1 Bottle 1    furosemide (LASIX) 20 MG tablet TAKE 1 TABLET BY MOUTH ONE TIME A DAY AS NEEDED FOR WEIGHT GAIN OF 3LBS IN A DAY OR 5LBS IN A WEEK 90 tablet 2    multivitamin (THERAGRAN) per tablet Take 1 tablet by mouth daily. No current facility-administered medications for this visit.        Past Medical History:   Diagnosis Date    Abnormal Pap smear of cervix 06/11/2018    hpv+    Allergic     Allergic rhinitis     Aortic stenosis, mild-echo 8/2016 2016    Atrial fibrillation (HCC)     BMI 40.0-44.9, adult ([facility] [address]     Breast cancer ([facility] [address]     54    Cancer ([facility] [address] 2009    right breast    Depression     GERD (gastroesophageal reflux disease)     H/O laparoscopic adjustable gastric banding 10/27/2016    Heart palpitations     HPV (human papilloma virus) infection 2018    HPV test positive 2014    Hypertension     Hypothyroidism 2000    Meningioma ([facility] [address]     10 years ago    Mild aortic regurgitation-echo 2016    Mild dysplasia of cervix 2016    Obesity     Sleep apnea     Urge incontinence 2017     Past Surgical History:   Procedure Laterality Date    APPENDECTOMY      BREAST BIOPSY      BREAST LUMPECTOMY      BREAST SURGERY      right lumpectomy xrt and chemo and serm    CARDIAC SURGERY      ablation     SECTION  ,,,    failed to drop in birth canal     SECTION      CHOLECYSTECTOMY      COLONOSCOPY      HAND SURGERY Right 2020    RIGHT THUMB INCISION AND DRAINAGE WITH NAIL REMOVAL AND SPUR REMOVAL performed by Nallely Nesbitt MD at [address])      LAP BAND      LAP BAND      Removal    OVARY REMOVAL      PARTIAL HYSTERECTOMY (CERVIX NOT REMOVED)      THYROIDECTOMY, PARTIAL Left     TUNNELED VENOUS PORT PLACEMENT      Placed/Removed    UPPER GASTROINTESTINAL ENDOSCOPY  06/10/2016    gastritis, gastric polyp bx      Family History   Problem Relation Age of Onset    Cancer Mother         breast and cervical    Breast Cancer Mother     Heart Disease Father     Cancer Father 61        skin cancer    Other Father         AAA    Heart Attack Father     Cancer Maternal Grandmother         breast    BRCA 2 Negative Maternal Grandmother     Cancer Paternal Grandmother         breast    BRCA 2 Negative Paternal Grandmother     Cancer Sister         lymphoma    No Known Problems Brother     No Known Problems Maternal Aunt     No Known Problems Maternal Uncle     No Known Problems Paternal Aunt     No Known Problems Paternal Uncle     No Known Problems Maternal Grandfather     No Known Problems Paternal Grandfather     Cancer Other         neice- head, not sure what kind skin ca    Heart Disease Sister     Other Sister         biliary hepatitis    Asthma Neg Hx     Diabetes Neg Hx     Emphysema Neg Hx     Heart Failure Neg Hx     Hypertension Neg Hx     Rheum Arthritis Neg Hx     Osteoarthritis Neg Hx     High Cholesterol Neg Hx     Migraines Neg Hx     Ovarian Cancer Neg Hx     Rashes/Skin Problems Neg Hx     Seizures Neg Hx     Stroke Neg Hx     Thyroid Disease Neg Hx      Social History     Socioeconomic History    Marital status:      Spouse name: Not on file    Number of children: Not on file    Years of education: Not on file    Highest education level: Not on file   Occupational History    Occupation: billing [employer]   Tobacco Use    Smoking status: Former     Packs/day: 0.25     Years: 1.50     Pack years: 0.38     Types: Cigarettes     Quit date: 3/27/1972     Years since quittin.8    Smokeless tobacco: Never   Vaping Use    Vaping Use: Never used   Substance and Sexual Activity    Alcohol use: Yes     Alcohol/week: 0.0 standard drinks     Comment: soc    Drug use: No    Sexual activity: Yes     Partners: Male   Other Topics Concern    Not on file   Social History Narrative            Spends time with , grandchildren        Lives in [place] [id number]. Resource Strain: Low Risk     Difficulty of Paying Living Expenses: Not hard at all   Food Insecurity: No Food Insecurity    Worried About Running Out of Food in the Last Year: Never true    [address] in the Last Year: Never true   Transportation Needs: No Transportation Needs    Lack of Transportation (Medical): No    Lack of Transportation (Non-Medical):  No   Physical Activity: Inactive    Days of Exercise per Week: 0 days    Minutes of Exercise per Session: 0 min   Stress: Not on file   Social Connections: Not on file   Intimate Partner Violence: Not on file   Housing Stability: Low Risk     Unable to Pay for Housing in the Last Year: No    Number of Places Lived in the Last Year: 0    Unstable Housing in the Last Year: No       Review of Systems:   Constitutional: there has been no unanticipated weight loss. There's been no change in energy level, sleep pattern, or activity level. Eyes: No visual changes or diplopia. No scleral icterus. ENT: No Headaches, hearing loss or vertigo. No mouth sores or sore throat. Cardiovascular: Reviewed in HPI  Respiratory: No cough or wheezing, no sputum production. No hematemesis. Gastrointestinal: No abdominal pain, appetite loss, blood in stools. No change in bowel or bladder habits. Genitourinary: No dysuria, trouble voiding, or hematuria. Musculoskeletal:  No gait disturbance, weakness or joint complaints. Integumentary: No rash or pruritis. Neurological: No headache, diplopia, change in muscle strength, numbness or tingling. No change in gait, balance, coordination, mood, affect, memory, mentation, behavior. Psychiatric: No anxiety, no depression. Endocrine: No malaise, fatigue or temperature intolerance. No excessive thirst, fluid intake, or urination. No tremor. Hematologic/Lymphatic: No abnormal bruising or bleeding, blood clots or swollen lymph nodes. Allergic/Immunologic: No nasal congestion or hives. Physical Examination:    Vitals:    01/17/23 1027   BP: 121/74   Pulse: 70   SpO2: 98%   Weight: 278 lb (126.1 kg)   Height: 5' 6\" (1.676 m)       Body mass index is 44.87 kg/m².      Wt Readings from Last 3 Encounters:   01/17/23 278 lb (126.1 kg)   01/17/23 278 lb (126.1 kg)   09/06/22 279 lb (126.6 kg)     BP Readings from Last 3 Encounters:   01/17/23 121/74   01/17/23 121/74   07/19/22 124/72         Constitutional and General Appearance:   Chronically ill appearing  HEENT:  NC/AT  CHEY  No problems with hearing  Skin:  Warm, dry  Respiratory:  Normal excursion and expansion without use of accessory muscles  Resp Auscultation: Normal breath sounds without dullness  Cardiovascular: The apical impulses not displaced  Heart tones are crisp and normal  Cervical veins are not engorged  The carotid upstroke is normal in amplitude and contour without delay or bruit  JVP <8 mm H2O\  Irregularly irregular rhythm with nl S1 and S2 without m,r,g  Peripheral pulses are symmetrical and full  There is no clubbing, cyanosis of the extremities. No edema  Femoral Arteries: 2+ and equal  Pedal Pulses: 2+ and equal   Neck:  No thyromegaly  Abdomen:  No masses or tenderness  Liver/Spleen: No Abnormalities Noted  Neurological/Psychiatric:  Alert and oriented in all spheres  Moves all extremities well  Exhibits normal gait balance and coordination  No abnormalities of mood, affect, memory, mentation, or behavior are noted        Echo: 07/11/19   Summary   Left ventricular systolic function is reduced with ejection fraction   estimated at 48-50 %. Mild global hypokinesis is present. There is mild concentric left ventricular hypertrophy. Normal left ventricular diastolic filling pressure. Mild mitral regurgitation. Aortic sclerosis vs mild aortic stenosis. Mild aortic regurgitation is present. Normal systolic pulmonary artery pressure (SPAP) estimated at 29 mmHg (RA   pressure 8 mmHg). Stress test: 07/25/19   Normal LVEF 61%  Base to mid anteroseptal hypokinesis  There is a mixed defect in the anteroseptal wall raising concern for mixed  ischemia/scar in that region  This would be considered an abnormal intermediate risk study     [facility]: 08/02/19  LM: luminals  LAD: mid luminals, distal vessel small  LCX: luminals  RCA: dominant, very large     LVEDP: 18  LVEF: 25-30% global hypokinesis      Assessment  1. Nonischemic cardiomyopathy  2.  Given worsening cardiomyopathy, suggest Cardiac MRI                - eval for myocarditis or infiltrative cardiomyopathy  3. Will d/c flecainide and dilt given worsening LVEF, pt currently in NSR                - start toprol 25                - start ASA and lisinopril  4. Lasix in post op area                - will make Appt with heart failure team    MRI cardiac: 08/23/19  FINDINGS:    1. The left ventricular size is normal. The left ventricular ejection fraction is 45 % by Melton's method. Global left ventricular function is mildly decreased. There is moderate hypokinesis involving the mid inferior, mid inferoseptal and apical inferior segments. The left ventricular mass is normal.  Delayed systole in the left ventricle relative to the right ventricle and septal bounce suggest incomplete or complete left bundle branch block.  2. Resting first pass myocardial perfusion is probably normal (assessment limited by wrap artifact). There is no late gadolinium enhancement to suggest prior infarct, inflammation, or infiltration. There is no evidence of myocardial edema by T2 weighted imaging (myocardium/skeletal muscle 159/108, normal < 1.9). There is no evidence of increased iron deposition within the myocardium (T2*myocardium = 53 msec; if < 20 msec, suggestive of iron-overloading of the myocardium).  3. The right ventricular size is normal. Global right ventricular function is normal. There are no regional wall motion abnormalities of the right ventricular wall. There is late gadolinium enhancement of the right ventricular insertion points.  4. Left atrial size is mildly enlarged. Right atrial size is normal.  The Qp/Qs is 1.0 by phase contrast imaging and is consistent with no evidence of shunt.  There is late gadolinium enhancement involving the RV insertion points.  5. Velocity-encoded phase contrast Imaging in (2D/4D) was performed to assess valvular function. There is mild aortic stenosis. Peak velocity at the aortic valve is 1.76  corresponding to a peak gradient of 12.4 mmHg. There is mild aortic regurgitation. The calculated aortic regurgitant fraction is 10.5 %. There is mild mitral regurgitation. The calculated mitral regurgitant fraction is 10.3 %. Peak velocity at pulmonary valve is 0.73 corresponding to a peak gradient of 2.13 mmHg. The calculated pulmonic regurgitant fraction is 0.0 %.  6. The coronary arteries have normal origins and proximal courses. There is evidence of atherosclerosis in the descending aorta. The descending aorta is dilated measuring 28.0 mm.  7. 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 Echo: 09/29/20  Summary   Left ventricular systolic function is normal with a visually estimated   ejection fraction of 55%. The left ventricle is normal in size with mild concentric hypertrophy. No regional wall motion abnormalities are noted. Systolic pulmonary artery pressure (SPAP) is normal and estimated at 32 mmHg   (right atrial pressure 8 mmHg). The IVC is normal in size (<2.1 cm) but collapses <50% with respiration   consistent with elevated right atrial pressures (8 mmHg) . Echo 3/10/2022:  Summary   Low normal left ventricle systolic function with an estimated ejection   fraction of 50%. The left ventricle is mildly dilated. No regional wall motion abnormalities are seen. Normal left ventricular diastolic filling pressure. Aortic valve appears sclerotic but opens adequately. Mild aortic and mitral regurgitation. Trace pulmonic and tricuspid regurgitation. Systolic pulmonary artery pressure (SPAP) is normal and estimated at 26 mmHg   (right atrial pressure 3 mmHg). Frequent premature beats throughout the study. Labs were reviewed including labs from other hospital systems through [facility].   Cardiac testing was reviewed including echos, nuclear scans, cardiac catheterization, including from other hospital systems through [facility] [place] Everywhere.    Assessment:    1. Chronic systolic heart failure (HCC)    2. Non-ischemic cardiomyopathy (HCC)    3. PAF (paroxysmal atrial fibrillation) (HCC)    4. Essential hypertension    5. Prediabetes           Plan:  1. Continue current medications  2. I will add hemoglobin A1C to be checked   3. Echocardiogram in 6 months to assess heart function and strength    Call [phone] to schedule   4. Labs fasting in 6 months: CBC, BNP, CMP, hemoglobin A1C, lipids  5. Follow up with me in 6 months      This note was scribed in the presence of Brea Leos MD by Maritza Fernandez RN    The scribe's documentation has been prepared under my direction and personally reviewed by me in its entirety.  I confirm that the note above accurately reflects all work, treatment, procedures, and medical decision making performed by me.        Time Based Itemization  A total of 40 minutes was spent on today's patient encounter.  If applicable, non-patient-facing activities:  ( x)Preparing to see the patient and reviewing records  ( ) Individual interpretation of results  ( ) Discussion or coordination of care with other health care professionals  ( x) Ordering of unique tests, medications, or procedures  ( x) Documentation within the EHR       I appreciate the opportunity of cooperating in the care of this patient.    Brea Leos M.D., [facility]

## 2023-01-16 DIAGNOSIS — I50.22 CHRONIC SYSTOLIC HEART FAILURE (HCC): ICD-10-CM

## 2023-01-16 DIAGNOSIS — I48.0 PAF (PAROXYSMAL ATRIAL FIBRILLATION) (HCC): ICD-10-CM

## 2023-01-16 DIAGNOSIS — I42.8 NON-ISCHEMIC CARDIOMYOPATHY (HCC): ICD-10-CM

## 2023-01-16 DIAGNOSIS — I10 ESSENTIAL HYPERTENSION: ICD-10-CM

## 2023-01-17 ENCOUNTER — OFFICE VISIT (OUTPATIENT)
Dept: CARDIOLOGY CLINIC | Age: 68
End: 2023-01-17
Payer: MEDICARE

## 2023-01-17 VITALS
BODY MASS INDEX: 44.68 KG/M2 | DIASTOLIC BLOOD PRESSURE: 74 MMHG | HEIGHT: 66 IN | WEIGHT: 278 LBS | OXYGEN SATURATION: 98 % | SYSTOLIC BLOOD PRESSURE: 121 MMHG | HEART RATE: 70 BPM

## 2023-01-17 VITALS
DIASTOLIC BLOOD PRESSURE: 74 MMHG | WEIGHT: 278 LBS | HEIGHT: 66 IN | BODY MASS INDEX: 44.68 KG/M2 | SYSTOLIC BLOOD PRESSURE: 121 MMHG | HEART RATE: 70 BPM

## 2023-01-17 DIAGNOSIS — I42.8 NON-ISCHEMIC CARDIOMYOPATHY (HCC): ICD-10-CM

## 2023-01-17 DIAGNOSIS — I48.0 PAF (PAROXYSMAL ATRIAL FIBRILLATION) (HCC): ICD-10-CM

## 2023-01-17 DIAGNOSIS — R73.03 PREDIABETES: ICD-10-CM

## 2023-01-17 DIAGNOSIS — I48.0 PAROXYSMAL ATRIAL FIBRILLATION (HCC): ICD-10-CM

## 2023-01-17 DIAGNOSIS — I48.0 PAF (PAROXYSMAL ATRIAL FIBRILLATION) (HCC): Primary | ICD-10-CM

## 2023-01-17 DIAGNOSIS — I50.22 CHRONIC SYSTOLIC HEART FAILURE (HCC): Primary | ICD-10-CM

## 2023-01-17 DIAGNOSIS — E66.01 OBESITY, CLASS III, BMI 40-49.9 (MORBID OBESITY) (HCC): ICD-10-CM

## 2023-01-17 DIAGNOSIS — I10 ESSENTIAL HYPERTENSION: ICD-10-CM

## 2023-01-17 LAB
A/G RATIO: 1.8 (ref 1.1–2.2)
ALBUMIN SERPL-MCNC: 4.3 G/DL (ref 3.4–5)
ALP BLD-CCNC: 81 U/L (ref 40–129)
ALT SERPL-CCNC: 18 U/L (ref 10–40)
ANION GAP SERPL CALCULATED.3IONS-SCNC: 11 MMOL/L (ref 3–16)
AST SERPL-CCNC: 18 U/L (ref 15–37)
BASOPHILS ABSOLUTE: 0 K/UL (ref 0–0.2)
BASOPHILS RELATIVE PERCENT: 0.5 %
BILIRUB SERPL-MCNC: 0.6 MG/DL (ref 0–1)
BUN BLDV-MCNC: 14 MG/DL (ref 7–20)
CALCIUM SERPL-MCNC: 9.7 MG/DL (ref 8.3–10.6)
CHLORIDE BLD-SCNC: 101 MMOL/L (ref 99–110)
CHOLESTEROL, FASTING: 144 MG/DL (ref 0–199)
CO2: 27 MMOL/L (ref 21–32)
CREAT SERPL-MCNC: 0.9 MG/DL (ref 0.6–1.2)
EOSINOPHILS ABSOLUTE: 0.1 K/UL (ref 0–0.6)
EOSINOPHILS RELATIVE PERCENT: 2.1 %
ESTIMATED AVERAGE GLUCOSE: 142.7 MG/DL
GFR SERPL CREATININE-BSD FRML MDRD: >60 ML/MIN/{1.73_M2}
GLUCOSE BLD-MCNC: 131 MG/DL (ref 70–99)
HBA1C MFR BLD: 6.6 %
HCT VFR BLD CALC: 42 % (ref 36–48)
HDLC SERPL-MCNC: 46 MG/DL (ref 40–60)
HEMOGLOBIN: 13.6 G/DL (ref 12–16)
LDL CHOLESTEROL CALCULATED: 74 MG/DL
LYMPHOCYTES ABSOLUTE: 1.7 K/UL (ref 1–5.1)
LYMPHOCYTES RELATIVE PERCENT: 31.2 %
MCH RBC QN AUTO: 29.6 PG (ref 26–34)
MCHC RBC AUTO-ENTMCNC: 32.4 G/DL (ref 31–36)
MCV RBC AUTO: 91.4 FL (ref 80–100)
MONOCYTES ABSOLUTE: 0.5 K/UL (ref 0–1.3)
MONOCYTES RELATIVE PERCENT: 9.1 %
NEUTROPHILS ABSOLUTE: 3.1 K/UL (ref 1.7–7.7)
NEUTROPHILS RELATIVE PERCENT: 57.1 %
PDW BLD-RTO: 13.7 % (ref 12.4–15.4)
PLATELET # BLD: 237 K/UL (ref 135–450)
PMV BLD AUTO: 9 FL (ref 5–10.5)
POTASSIUM SERPL-SCNC: 4.6 MMOL/L (ref 3.5–5.1)
PRO-BNP: 141 PG/ML (ref 0–124)
RBC # BLD: 4.59 M/UL (ref 4–5.2)
SODIUM BLD-SCNC: 139 MMOL/L (ref 136–145)
TOTAL PROTEIN: 6.7 G/DL (ref 6.4–8.2)
TRIGLYCERIDE, FASTING: 118 MG/DL (ref 0–150)
VLDLC SERPL CALC-MCNC: 24 MG/DL
WBC # BLD: 5.4 K/UL (ref 4–11)

## 2023-01-17 PROCEDURE — 1123F ACP DISCUSS/DSCN MKR DOCD: CPT | Performed by: INTERNAL MEDICINE

## 2023-01-17 PROCEDURE — 3074F SYST BP LT 130 MM HG: CPT | Performed by: INTERNAL MEDICINE

## 2023-01-17 PROCEDURE — 3017F COLORECTAL CA SCREEN DOC REV: CPT | Performed by: NURSE PRACTITIONER

## 2023-01-17 PROCEDURE — 1090F PRES/ABSN URINE INCON ASSESS: CPT | Performed by: NURSE PRACTITIONER

## 2023-01-17 PROCEDURE — G8417 CALC BMI ABV UP PARAM F/U: HCPCS | Performed by: NURSE PRACTITIONER

## 2023-01-17 PROCEDURE — G8399 PT W/DXA RESULTS DOCUMENT: HCPCS | Performed by: NURSE PRACTITIONER

## 2023-01-17 PROCEDURE — G8484 FLU IMMUNIZE NO ADMIN: HCPCS | Performed by: NURSE PRACTITIONER

## 2023-01-17 PROCEDURE — 1036F TOBACCO NON-USER: CPT | Performed by: NURSE PRACTITIONER

## 2023-01-17 PROCEDURE — G8417 CALC BMI ABV UP PARAM F/U: HCPCS | Performed by: INTERNAL MEDICINE

## 2023-01-17 PROCEDURE — 1090F PRES/ABSN URINE INCON ASSESS: CPT | Performed by: INTERNAL MEDICINE

## 2023-01-17 PROCEDURE — G8427 DOCREV CUR MEDS BY ELIG CLIN: HCPCS | Performed by: NURSE PRACTITIONER

## 2023-01-17 PROCEDURE — G8484 FLU IMMUNIZE NO ADMIN: HCPCS | Performed by: INTERNAL MEDICINE

## 2023-01-17 PROCEDURE — 1123F ACP DISCUSS/DSCN MKR DOCD: CPT | Performed by: NURSE PRACTITIONER

## 2023-01-17 PROCEDURE — G8399 PT W/DXA RESULTS DOCUMENT: HCPCS | Performed by: INTERNAL MEDICINE

## 2023-01-17 PROCEDURE — 3017F COLORECTAL CA SCREEN DOC REV: CPT | Performed by: INTERNAL MEDICINE

## 2023-01-17 PROCEDURE — 99214 OFFICE O/P EST MOD 30 MIN: CPT | Performed by: NURSE PRACTITIONER

## 2023-01-17 PROCEDURE — 1036F TOBACCO NON-USER: CPT | Performed by: INTERNAL MEDICINE

## 2023-01-17 PROCEDURE — 3078F DIAST BP <80 MM HG: CPT | Performed by: NURSE PRACTITIONER

## 2023-01-17 PROCEDURE — 3074F SYST BP LT 130 MM HG: CPT | Performed by: NURSE PRACTITIONER

## 2023-01-17 PROCEDURE — G8427 DOCREV CUR MEDS BY ELIG CLIN: HCPCS | Performed by: INTERNAL MEDICINE

## 2023-01-17 PROCEDURE — 3078F DIAST BP <80 MM HG: CPT | Performed by: INTERNAL MEDICINE

## 2023-01-17 PROCEDURE — 93000 ELECTROCARDIOGRAM COMPLETE: CPT | Performed by: NURSE PRACTITIONER

## 2023-01-17 PROCEDURE — 99215 OFFICE O/P EST HI 40 MIN: CPT | Performed by: INTERNAL MEDICINE

## 2023-01-17 RX ORDER — FAMOTIDINE 20 MG
TABLET ORAL
COMMUNITY

## 2023-01-17 RX ORDER — ATORVASTATIN CALCIUM 40 MG/1
TABLET, FILM COATED ORAL
Qty: 90 TABLET | Refills: 3 | Status: SHIPPED | OUTPATIENT
Start: 2023-01-17

## 2023-01-17 NOTE — PROGRESS NOTES
[facility]   Electrophysiology Outpatient Note              Date:  January 17, 2023  Patient name: Kota Carrillo  YOB: 1955    Primary Care physician: KADEN Gill    HISTORY OF PRESENT ILLNESS: The patient is a 78 y.o.  female with a history of AF, nonischemic cardiomyopathy with recovered EF, nonobstructive CAD, chronic diastolic CHF, AS, hypothyroidism, breast cancer and JINA. In 7/2019, she was admitted with rapid AF and flecainide was started. Echo showed an EF of 48-50%. Stress test was abnormal. In 8/2019, LHC showed nonobstructive CAD and an EF of 25-30% and flecainide was stopped due to worsening EF. Cardiac MRI showed an EF of 45%. AF was recurrent and Rythmol was started. Most recent echo in 3/2022 showed an EF of 50%. In 3/2022, Rythmol was stopped due to increasing QRS duration. In 3/2022, he was admitted to start Tikosyn. An event monitor at discharge that showed brief PAF, brief PAT and brief NSVT. Today she is being seen for AF. EKG shows SR with a HR of 70 and stable QTc. She is feeling well. She thinks she may have had very episodes of breakthrough AF over the last 6 months. Reports that this has happened 1 or 2 times and episodes only last approximately 2 minutes. Otherwise she is feeling very well. Denies chest pain, shortness of breath, palpitations or dizziness.     Past Medical History:   has a past medical history of Abnormal Pap smear of cervix, Allergic, Allergic rhinitis, Aortic stenosis, mild-echo 8/2016, Atrial fibrillation ([address] BMI 40.0-44.9, adult ([address] Breast cancer ([address] Cancer ([address] Depression, GERD (gastroesophageal reflux disease), H/O laparoscopic adjustable gastric banding, Heart palpitations, HPV (human papilloma virus) infection, HPV test positive, Hypertension, Hypothyroidism, Meningioma ([address] Mild aortic regurgitation-echo 8/2016, Mild dysplasia of cervix, Obesity, Sleep apnea, and Urge incontinence. Past Surgical History:   has a past surgical history that includes Appendectomy; Cholecystectomy; Colonoscopy; Lap Band (); Tunneled venous port placement ();  section (4058,2152,6536,5790);  section; Breast surgery (); Breast biopsy; Breast lumpectomy; Hysterectomy (); Lap Band (); Ovary removal; Thyroidectomy, partial (Left, ); Upper gastrointestinal endoscopy (06/10/2016); Partial hysterectomy; Hand surgery (Right, 2020); and Cardiac surgery (). Home Medications:    Prior to Admission medications    Medication Sig Start Date End Date Taking? Authorizing Provider   apixaban (ELIQUIS) 5 MG TABS tablet TAKE 1 TABLET BY MOUTH TWO TIMES A DAY 23  Yes BLANK Wise CNP   omeprazole (PRILOSEC) 40 MG delayed release capsule TAKE 1 CAPSULE EVERY       MORNING BEFORE BREAKFAST 23  Yes KADEN Bloom   dofetilide (TIKOSYN) 500 MCG capsule TAKE 1 CAPSULE BY MOUTH EVERY 12 HOURS 22  Yes BLANK Wise CNP   levothyroxine (SYNTHROID) 137 MCG tablet TAKE 1 TABLET ONCE DAILY 10/10/22  Yes KADEN Bloom   citalopram (CELEXA) 40 MG tablet TAKE 1 TABLET DAILY 10/10/22  Yes KADEN Bloom   oxybutynin (DITROPAN XL) 15 MG extended release tablet TAKE 1 TABLET ONCE DAILY 22  Yes KADEN Bloom   montelukast (SINGULAIR) 10 MG tablet TAKE 1 TABLET NIGHTLY 22  Yes KADEN Bloom   spironolactone (ALDACTONE) 25 MG tablet TAKE 1 TABLET ONCE DAILY 22  Yes Harrison Linder MD   Cetirizine HCl 10 MG CAPS Take 1 capsule by mouth   Yes Historical Provider, MD   valsartan (DIOVAN) 40 mg tablet TAKE 1 TABLET BY MOUTH ONE TIME A DAY 22  Yes Harrison Linder MD   ELDERBERRY PO Take by mouth   Yes Historical Provider, MD   atorvastatin (LIPITOR) 40 MG tablet TAKE 1 TABLET DAILY 3/3/22  Yes Harrison Linder MD   fluticasone (FLONASE) 50 MCG/ACT nasal spray 2 SPRAYS IN EACH NOSTRIL ONCE DAILY.  21  Yes KADEN Bermudez   furosemide (LASIX) 20 MG tablet TAKE 1 TABLET BY MOUTH ONE TIME A DAY AS NEEDED FOR WEIGHT GAIN OF 3LBS IN A DAY OR 5LBS IN A WEEK 1/27/21  Yes BLANK Bazzi CNP   multivitamin [facility]) per tablet Take 1 tablet by mouth daily. Yes Historical Provider, MD       Allergies:  Latex, Bactrim [sulfamethoxazole-trimethoprim], and Iodine    Social History:   reports that she quit smoking about 50 years ago. Her smoking use included cigarettes. She has a 0.38 pack-year smoking history. She has never used smokeless tobacco. She reports current alcohol use. She reports that she does not use drugs. Family History: family history includes BRCA 2 Negative in her maternal grandmother and paternal grandmother; Breast Cancer in her mother; Cancer in her maternal grandmother, mother, paternal grandmother, sister, and another family member; Cancer (age of onset: 61) in her father; Heart Attack in her father; Heart Disease in her father and sister; No Known Problems in her brother, maternal aunt, maternal grandfather, maternal uncle, paternal aunt, paternal grandfather, and paternal uncle; Other in her father and sister. All 14 point review of systems are completed and pertinent positives are mentioned in the history of present illness. Other systems are reviewed and are negative. PHYSICAL EXAM:    Vital signs:    /74   Pulse 70   Ht 5' 6\" (1.676 m)   Wt 278 lb (126.1 kg)   LMP  (LMP Unknown)   BMI 44.87 kg/m²      Constitutional and general appearance: alert, cooperative, no distress, and appears stated age  [de-identified]: PERRL, no cervical lymphadenopathy. No masses palpable. Normal oral mucosa  Respiratory:  Normal excursion and expansion without use of accessory muscles  Resp auscultation: Normal breath sounds without wheezing, rhonchi, and rales  Cardiovascular:   The apical impulse is not displaced  Heart tones are crisp and normal. regular S1 and S2.  Jugular venous pulsation Normal  The carotid upstroke is normal in amplitude and contour without delay or bruit  Peripheral pulses are symmetrical and full   Abdomen:  No masses or tenderness  Bowel sounds present  Extremities:   No cyanosis or clubbing   No lower extremity edema   Skin: warm and dry  Neurological:  Alert and oriented  Moves all extremities well  No abnormalities of mood, affect, memory, mentation, or behavior are noted    DATA:    ECG 7/19/2022:  SR 62 bpm        Echo 3/10/2022:      Summary   Low normal left ventricle systolic function with an estimated ejection   fraction of 50%. The left ventricle is mildly dilated. No regional wall motion abnormalities are seen. Normal left ventricular diastolic filling pressure. Aortic valve appears sclerotic but opens adequately. Mild aortic and mitral regurgitation. Trace pulmonic and tricuspid regurgitation. Systolic pulmonary artery pressure (SPAP) is normal and estimated at 26 mmHg   (right atrial pressure 3 mmHg). Frequent premature beats throughout the study. Echo 7/2019:      Summary   Left ventricular systolic function is reduced with ejection fraction   estimated at 48-50 %. Mild global hypokinesis is present. There is mild concentric left ventricular hypertrophy. Normal left ventricular diastolic filling pressure. Mild mitral regurgitation. Aortic sclerosis vs mild aortic stenosis. Mild aortic regurgitation is present. Normal systolic pulmonary artery pressure (SPAP) estimated at 29 mmHg (RA   pressure 8 mmHg). Cardiac MRI 8/23/2019 ():  Findings are most consistent with a nonischemic cardiomyopathy. There is no evidence of left ventricular scarring, edema or iron overload. There is evidence of incomplete or complete left bundle branch block. Regional wall motion abnormalities involving are likely secondary to underlying conduction disease; however, ischemic heart disease cannot be excluded.     The right ventricle is normal in size and systolic function. There is late gadolinium enhancement of the RV insertion points suggestive of elevated right heart pressures. The left atrium is mildly dilated. There is mild mitral regurgitation  The right atrium is normal in size. There is late gadolinium enhancement involving both atria. There is mild aortic stenosis and regurgitation. The descending aorta is dilated there is evidence of associated atherosclerosis. [facility] 8/2/2019 ([place]):  LM: luminals  LAD: mid luminals, distal vessel small  LCX: luminals  RCA: dominant, very large     LVEDP: 18  LVEF: 25-30% global hypokinesis     Assessment  1. Nonischemic cardiomyopathy  2. Given worsening cardiomyopathy, suggest Cardiac MRI                - eval for myocarditis or infiltrative cardiomyopathy  3. Will d/c flecainide and dilt given worsening LVEF, pt currently in NSR                - start toprol 25                - start ASA and lisinopril  4. Lasix in post op area                - will make Appt with heart failure team      All labs and testing reviewed. CARDIOLOGY LABS:   CBC:   Recent Labs     01/16/23  1409   WBC 5.4   HGB 13.6   HCT 42.0        BMP:   Recent Labs     01/16/23  1409      K 4.6   CO2 27   BUN 14   CREATININE 0.9   LABGLOM >60   GLUCOSE 131*     PT/INR: No results for input(s): PROTIME, INR in the last 72 hours. APTT:No results for input(s): APTT in the last 72 hours.   FASTING LIPID PANEL:  Lab Results   Component Value Date/Time    HDL 46 01/16/2023 02:09 PM    [address] 01/16/2023 02:09 PM    TRIG 139 03/04/2022 12:59 PM     LIVER PROFILE:  Recent Labs     01/16/23  1409   AST 18   ALT 18       IMPRESSION:    Patient Active Problem List   Diagnosis    Chronic GERD    Allergic rhinitis    History of breast cancer    Severe obstructive sleep apnea    Hypothyroidism    Reactive depression    Hiatal hernia    Multiple gastric polyps    Aortic valve stenosis    Mild aortic regurgitation-echo 8/2016    H/O laparoscopic adjustable gastric banding    Essential hypertension    Obesity, morbid, BMI 40.0-49.9 ([facility])    Other and unspecified hyperlipidemia    Non-ischemic cardiomyopathy (HCC)    Chronic systolic heart failure (HCC)    CKD (chronic kidney disease) stage 3, GFR 30-59 ml/min ([facility])    Bilateral change in hearing    PAF (paroxysmal atrial fibrillation) ([facility])    Prediabetes       Assessment:   Symptomatic paroxsymal atrial fibrillation: stable              -[id number] score 4 (age, gender, CHF, HTN)              -flecainide stopped due to worsening EF 8/2019              -Rythmol stopped due to increase in QRS duration 3/2022              -Tikosyn started 3/28/2022              -s/p PVI and RFCA 3/2020 [facility])  NICM with recovered EF: stable              -most recent EF 55% 7/3078  Chronic systolic CHF: compensated   Aortic stenosis  Hypothyroidism on Synthroid  JINA: CPAP compliant        Plan:   Continue Tikosyn (stable QTc and CrCl 119 ml/min), Eliquis, spironolactone and valsartan  No nodals due bradycardia in sinus   Annual labs due 1/2024  Monitor BP at home and call if consistently out of goal ranges  Follow up in 6 months or sooner if needed      BLANK Gan-CNP  [employer]  [phone]

## 2023-01-17 NOTE — PATIENT INSTRUCTIONS
No changes today     Monitor BP at home and call if consistently out of goal ranges    Follow up in 6 months

## 2023-01-17 NOTE — PATIENT INSTRUCTIONS
Your provider has ordered testing for further evaluation. An order/prescription has been included in your paper work. To schedule outpatient testing, contact Central Scheduling by calling [employer] ([phone]). Plan:  1. Continue current medications  2. I will add hemoglobin A1C to be checked   3. Echocardiogram in 6 months to assess heart function and strength    Call [phone] to schedule   4. Labs fasting in 6 months: CBC, BNP, CMP, hemoglobin A1C, lipids  5.  Follow up with me in 6 months

## 2023-02-07 NOTE — PATIENT INSTRUCTIONS
Try Benefiber instead of Miralax.   Aim for 150 mins of moderate intensity exercise per week  Adjust meals to reflect 1/4 of your plate in carbs, 1/4 plate in protein, and 1/2 your plate in vegetables  Follow up in 3 months for repeat hemoglobin A1C no

## 2023-02-23 RX ORDER — DOFETILIDE 0.5 MG/1
CAPSULE ORAL
Qty: 180 CAPSULE | Refills: 2 | Status: SHIPPED | OUTPATIENT
Start: 2023-02-23

## 2023-03-07 ENCOUNTER — OFFICE VISIT (OUTPATIENT)
Dept: ORTHOPEDIC SURGERY | Age: 68
End: 2023-03-07

## 2023-03-07 VITALS — BODY MASS INDEX: 44.68 KG/M2 | HEIGHT: 66 IN | WEIGHT: 278 LBS

## 2023-03-07 DIAGNOSIS — M17.11 PRIMARY OSTEOARTHRITIS OF RIGHT KNEE: ICD-10-CM

## 2023-03-07 DIAGNOSIS — M25.561 RIGHT KNEE PAIN, UNSPECIFIED CHRONICITY: ICD-10-CM

## 2023-03-07 DIAGNOSIS — M17.0 BILATERAL PRIMARY OSTEOARTHRITIS OF KNEE: Primary | ICD-10-CM

## 2023-03-07 DIAGNOSIS — M25.562 ACUTE PAIN OF LEFT KNEE: ICD-10-CM

## 2023-03-07 DIAGNOSIS — M25.561 ACUTE PAIN OF RIGHT KNEE: ICD-10-CM

## 2023-03-07 DIAGNOSIS — M17.12 PRIMARY OSTEOARTHRITIS OF LEFT KNEE: ICD-10-CM

## 2023-03-07 RX ORDER — LIDOCAINE HYDROCHLORIDE 10 MG/ML
3 INJECTION, SOLUTION EPIDURAL; INFILTRATION; INTRACAUDAL; PERINEURAL ONCE
Status: COMPLETED | OUTPATIENT
Start: 2023-03-07 | End: 2023-03-07

## 2023-03-07 RX ADMIN — LIDOCAINE HYDROCHLORIDE 3 ML: 10 INJECTION, SOLUTION EPIDURAL; INFILTRATION; INTRACAUDAL; PERINEURAL at 12:21

## 2023-03-07 NOTE — PROGRESS NOTES
Chief Complaint    Knee Pain (Javon knee pain)      History of Present Illness:  Pro Johnson is a 75 y.o. female who presents for follow up of bilateral knee(s) R > L . To recap, we have been treating the patient's B knee OA conservatively for some time. She most recently had bilateral cortisone injections 2022 which seemed to help for a small amount of time. She continues to have pain with walking for extended periods of time and getting in/out of the car. She is here today to discuss further treatment.      Pain Assessment:  Location: bilateral  Level: 8 out of 10  Duration: Days  Description: sharp, dull, achy  Result of an injury: No  Work related injury: No  Aggravating actions: walking  Relieving Factors: rest  Wants injections: Yes     Past Medical History:   Diagnosis Date    Abnormal Pap smear of cervix 2018    hpv+    Allergic     Allergic rhinitis     Aortic stenosis, mild-echo 2016    Atrial fibrillation (HCC)     BMI 40.0-44.9, adult ([address]     Breast cancer ([address]     54    Cancer ([address]     right breast    Depression     GERD (gastroesophageal reflux disease)     H/O laparoscopic adjustable gastric banding 10/27/2016    Heart palpitations     HPV (human papilloma virus) infection 2018    HPV test positive 2014    Hypertension     Hypothyroidism 2000    Meningioma ([address]     10 years ago    Mild aortic regurgitation-echo 2016    Mild dysplasia of cervix 2016    Obesity     Sleep apnea     Urge incontinence 2017        Past Surgical History:   Procedure Laterality Date    APPENDECTOMY      BREAST BIOPSY      BREAST LUMPECTOMY      BREAST SURGERY      right lumpectomy xrt and chemo and serm    CARDIAC SURGERY      ablation     SECTION  ,1980,1981,1984    failed to drop in birth canal     SECTION      CHOLECYSTECTOMY      COLONOSCOPY      HAND SURGERY Right 2020    RIGHT THUMB INCISION AND DRAINAGE WITH NAIL REMOVAL AND SPUR REMOVAL performed by Mally Harris MD at [address])  2000    LAP BAND  2007    LAP BAND  2012    Removal    OVARY REMOVAL      PARTIAL HYSTERECTOMY (CERVIX NOT REMOVED)      THYROIDECTOMY, PARTIAL Left 2000    TUNNELED VENOUS PORT PLACEMENT  2009/2011    Placed/Removed    UPPER GASTROINTESTINAL ENDOSCOPY  06/10/2016    gastritis, gastric polyp bx        Family History   Problem Relation Age of Onset    Cancer Mother         breast and cervical    Breast Cancer Mother     Heart Disease Father     Cancer Father 61        skin cancer    Other Father         AAA    Heart Attack Father     Cancer Maternal Grandmother         breast    BRCA 2 Negative Maternal Grandmother     Cancer Paternal Grandmother         breast    BRCA 2 Negative Paternal Grandmother     Cancer Sister         lymphoma    No Known Problems Brother     No Known Problems Maternal Aunt     No Known Problems Maternal Uncle     No Known Problems Paternal Aunt     No Known Problems Paternal Uncle     No Known Problems Maternal Grandfather     No Known Problems Paternal Grandfather     Cancer Other         neice- head, not sure what kind skin ca    Heart Disease Sister     Other Sister         biliary hepatitis    Asthma Neg Hx     Diabetes Neg Hx     Emphysema Neg Hx     Heart Failure Neg Hx     Hypertension Neg Hx     Rheum Arthritis Neg Hx     Osteoarthritis Neg Hx     High Cholesterol Neg Hx     Migraines Neg Hx     Ovarian Cancer Neg Hx     Rashes/Skin Problems Neg Hx     Seizures Neg Hx     Stroke Neg Hx     Thyroid Disease Neg Hx        Social History     Socioeconomic History    Marital status:      Spouse name: None    Number of children: None    Years of education: None    Highest education level: None   Occupational History    Occupation: billing [employer]   Tobacco Use    Smoking status: Former     Packs/day: 0.25     Years: 1.50     Pack years: 0.38 Types: Cigarettes     Quit date: 3/27/1972     Years since quittin.9    Smokeless tobacco: Never   Vaping Use    Vaping Use: Never used   Substance and Sexual Activity    Alcohol use: Yes     Alcohol/week: 0.0 standard drinks     Comment: soc    Drug use: No    Sexual activity: Yes     Partners: Male   Social History Narrative            Spends time with , grandchildren        Lives in [address] [place] [address] of Health     Financial Resource Strain: Low Risk     Difficulty of Paying Living Expenses: Not hard at all   Food Insecurity: No Food Insecurity    Worried About Running Out of Food in the Last Year: Never true    [address] in the Last Year: Never true   Transportation Needs: No Transportation Needs    Lack of Transportation (Medical): No    Lack of Transportation (Non-Medical):  No   Physical Activity: Inactive    Days of Exercise per Week: 0 days    Minutes of Exercise per Session: 0 min   Housing Stability: Low Risk     Unable to Pay for Housing in the Last Year: No    Number of Places Lived in the Last Year: 0    Unstable Housing in the Last Year: No       Current Outpatient Medications   Medication Sig Dispense Refill    dofetilide (TIKOSYN) 500 MCG capsule TAKE 1 CAPSULE BY MOUTH EVERY 12 HOURS 180 capsule 2    Vitamin D, Cholecalciferol, 25 MCG (1000 UT) CAPS       atorvastatin (LIPITOR) 40 MG tablet TAKE 1 TABLET DAILY 90 tablet 3    apixaban (ELIQUIS) 5 MG TABS tablet TAKE 1 TABLET BY MOUTH TWO TIMES A  tablet 2    omeprazole (PRILOSEC) 40 MG delayed release capsule TAKE 1 CAPSULE EVERY       MORNING BEFORE BREAKFAST 90 capsule 1    levothyroxine (SYNTHROID) 137 MCG tablet TAKE 1 TABLET ONCE DAILY 90 tablet 1    citalopram (CELEXA) 40 MG tablet TAKE 1 TABLET DAILY 90 tablet 1    oxybutynin (DITROPAN XL) 15 MG extended release tablet TAKE 1 TABLET ONCE DAILY 90 tablet 1    montelukast (SINGULAIR) 10 MG tablet TAKE 1 TABLET NIGHTLY 90 tablet 1    spironolactone (ALDACTONE) 25 MG tablet TAKE 1 TABLET ONCE DAILY 90 tablet 3    Cetirizine HCl 10 MG CAPS Take 1 capsule by mouth      valsartan (DIOVAN) 40 mg tablet TAKE 1 TABLET BY MOUTH ONE TIME A DAY 90 tablet 3    ELDERBERRY PO Take by mouth      fluticasone (FLONASE) 50 MCG/ACT nasal spray 2 SPRAYS IN EACH NOSTRIL ONCE DAILY. 1 Bottle 1    furosemide (LASIX) 20 MG tablet TAKE 1 TABLET BY MOUTH ONE TIME A DAY AS NEEDED FOR WEIGHT GAIN OF 3LBS IN A DAY OR 5LBS IN A WEEK 90 tablet 2    multivitamin (THERAGRAN) per tablet Take 1 tablet by mouth daily. Current Facility-Administered Medications   Medication Dose Route Frequency Provider Last Rate Last Admin    sodium hyaluronate (DUROLANE) injection PRSY 60 mg  60 mg Intra-artICUlar Once Renuka Done, MD        sodium hyaluronate (DUROLANE) injection PRSY 60 mg  60 mg Intra-artICUlar Once Renuka Done, MD        lidocaine PF 1 % injection 3 mL  3 mL Other Once Renuka Done, MD        lidocaine PF 1 % injection 3 mL  3 mL Other Once Renuka Done, MD           Allergies   Allergen Reactions    Latex Rash    Bactrim [Sulfamethoxazole-Trimethoprim] Swelling    Iodine Shortness Of Breath       Review of Systems:  A 14 point review of systems was completed by the patient and is available in the media section of the scanned medical record and was reviewed on 3/7/2023. The review is negative with the exception of those things mentioned in the HPI and Past Medical History     Vital Signs:   Ht 5' 6\" (1.676 m)   Wt 278 lb (126.1 kg)   LMP  (LMP Unknown)   BMI 44.87 kg/m²     General Exam:   Mental Status: The patient is oriented to time, place and person. The patient's mood and affect are appropriate. Neurological: The patient has good coordination. There is no weakness or sensory deficit. Knee Examination: Bilateral    Skin:  There are no skin lesions, cellulitis, or extreme edema in the lower extremities.  Sensation is grossly intact to light touch bilaterally lower extremity. The patient has warm and well-perfused Bilateral lower extremities with brisk capillary refill. Inspection:  The is mild edema, no gross deformities, and no signs of infection. Palpation: There is patellofemoral crepitus, there is significant tenderness over the knee Medial joint line    Range of Motion:  0 to 120  and grossly intact with pain and/or difficulty    Strength: Motor is grossly intact    Special Tests: There is no ligament instability. Grinding/Crepitus (=)    Gait: non antalgic  without the use of assistive devices    Alignment: valgus    Radiology:     TX ARTHROCENTESIS ASPIR&/INJ MAJOR JT/BURSA W/O US      Office Procedures:  VISCO SUPPLEMENTATION  INJECTION:  Right KNEE    PROCEDURE: Risks, benefits, and alternatives to the injections were discussed in detail with the patient. The risks discussed included but are not limited to infection, skin reactions, hot swollen joints, and anaphylaxis. After informed consent was provided, the patient sat on the exam table. The superolateral aspect of the right  knee was prepped with Chlora-prep. The skin and subcutaneous tissues were anesthetized with ethyl chloride spray and 1% lidocaine injected with a 20-gauge needle. Needle was left in place and the lidocaine syringe was exchanged for the Durolane syringe. 3 mL of Durolane was injected without resistance. The needle was withdrawn and the puncture site sealed with a Band-Aid. The patient tolerated the procedure well. VISCO SUPPLEMENTATION  INJECTION:  Left KNEE    PROCEDURE: Risks, benefits, and alternatives to the injections were discussed in detail with the patient. The risks discussed included but are not limited to infection, skin reactions, hot swollen joints, and anaphylaxis. After informed consent was provided, the patient lay supine on the exam table. The superolateral aspect of the left knee was prepped with Chlora-prep.  The skin and subcutaneous tissues were anesthetized with ethyl chloride spray and 1% lidocaine injected with a 20-gauge needle. Needle was left in place and the lidocaine syringe was exchanged for the Durolane syringe. 3 mL of Durolane was injected without resistance. The needle was withdrawn and the puncture site sealed with a Band-Aid. The patient tolerated the procedure well. Assessment: Dana Gray is a 75 y.o. female who presents for follow up of bilateral knee(s). B knee OA R > L    Patient's workup and evaluation were reviewed with the patient in the office today. Imaging was also reviewed with the patient in the clinic today. Given the patient's history and physical exam we are going to continue to treat her B Knee OA conservatively. We discussed future surgical intervention but that she would need to work on overall health optimization including lowering her BMI, checking in with her cardiologist and lowering her pre-diabetes state. We will proceed with B visco supplementation injections today with understanding she can receive these every 6 months. Goals and expectations were also discussed. All questions and concerns were addressed and answered. Treatment Plan: Activity modification and rest  Ice 20 minutes every 1-2 hours PRN  NSAIDs PRN  Elevation at least 2 inches above the heart with pillows supporting the joint underneath for swelling  Home exercises to maintain strength and range of motion  Physical therapy including Strengthening  Appropriate diet/weight management      Diagnoses and treatments were reviewed with the patient today. Patient education material was provided. Questions were entertained and answered to the patient's satisfaction. Follow up: ALISSON QUINN, Jordyn Zamorano ATC am scribing for and in the presence of Dr. Vicente Lam. The physical examination was performed by Dr. Vicente Lam. All counseling during the appointment was performed between the patient and Dr. Vicente Lam. 03/07/23 11:41 AM   Riddhi Maynard MS, ATC     This patient exhibits moderate complexity for obtaining an independent history, reviewing past medical records, independent interpretation of diagnostic imaging, and further coordinating care. The patient exhibits moderate risk. Approximately 25 minutes were spent reviewing past medical records, imaging, educating the patient, and coordinating care. This dictation was performed with a verbal recognition program (DRAGON) and it was checked for errors. It is possible that there are still dictated errors within this office note. If so, please bring any errors to my attention for an addendum. All efforts were made to ensure that this office note is accurate. Supervising Physician Attestation:  I, Dr. Christina Fabian, personally performed the services described in this documentation as above, and it is both accurate and complete and I agree with all pertinent clinical information. I personally interviewed the patient and performed a physical examination and medical decision making. I discussed the patient's condition and treatment options and have  reviewed and agree with the past medical, family and social history unless otherwise noted. All of the patient's questions were answered. I personally supervised the Orthopedic and Sportsmedicine Fellow when when noted in the evaluation and treatment plan of the patient.       Board Certified Orthopaedic Surgeon  [address] [facility] and [address] and [address] and [employer]  Professor of Chanell Herrera

## 2023-03-21 DIAGNOSIS — I10 ESSENTIAL HYPERTENSION: Primary | ICD-10-CM

## 2023-03-21 DIAGNOSIS — I50.22 CHRONIC SYSTOLIC HEART FAILURE (HCC): ICD-10-CM

## 2023-03-21 DIAGNOSIS — I42.8 NON-ISCHEMIC CARDIOMYOPATHY (HCC): ICD-10-CM

## 2023-03-21 RX ORDER — VALSARTAN 40 MG/1
TABLET ORAL
Qty: 90 TABLET | Refills: 3 | Status: SHIPPED | OUTPATIENT
Start: 2023-03-21

## 2023-03-21 NOTE — TELEPHONE ENCOUNTER
Pt is requesting refill request of Valsartan 40mg. Preferred pharmacy is [facility] [phone]. Last ov 01/17/2023 anam. Upcoming 07/18/2023 anam.

## 2023-03-22 RX ORDER — MONTELUKAST SODIUM 10 MG/1
TABLET ORAL
Qty: 90 TABLET | Refills: 1 | Status: SHIPPED | OUTPATIENT
Start: 2023-03-22

## 2023-03-22 NOTE — TELEPHONE ENCOUNTER
.Refill Request     CONFIRM preferred pharmacy with the patient. If Mail Order Rx - Pend for 90 day refill. Last Seen: Last Seen Department: 12/14/2022  Last Seen by PCP: 12/14/2022    Last Written: 9-23-22 90 with 1     If no future appointment scheduled, route STAFF MESSAGE with patient name to the [facility] for scheduling. Next Appointment:   Future Appointments   Date Time Provider Pelon Whitfield   5/26/2023 11:00 AM Sander Ivanoff, APRN - CNP [facility]   7/18/2023  9:00 AM A ECHO ROOM [facility] AND FRANNIE Cage   7/18/2023 10:15 AM MD Haley Mackenzie   7/18/2023 11:15 AM BLANK Pinto CNP       Message sent to [employer] to schedule appt with patient?   YES      Requested Prescriptions     Pending Prescriptions Disp Refills    montelukast (SINGULAIR) 10 MG tablet [Pharmacy Med Name: MONTELUKAST  TAB 10MG] 90 tablet 1     Sig: TAKE 1 TABLET NIGHTLY

## 2023-04-20 ENCOUNTER — HOSPITAL ENCOUNTER (OUTPATIENT)
Dept: PHYSICAL THERAPY | Age: 68
Setting detail: THERAPIES SERIES
Discharge: HOME | End: 2023-04-20
Payer: MEDICARE

## 2023-04-20 PROCEDURE — 97110 THERAPEUTIC EXERCISES: CPT

## 2023-04-20 PROCEDURE — 97161 PT EVAL LOW COMPLEX 20 MIN: CPT

## 2023-04-20 NOTE — PROGRESS NOTES
superior patellar pole   3\" inferior to inferior patellar pole 50.4 47.3   3 superior to superior patellar pole  53.6 58.2 6\" inferior to inferior patellar pole 48.2 47.7 roll under leg   Superior patellar pole 50.5 50.5 Malleoli 28.4 27.5   Inferior patellar pole 43.7 43 Figure 8 59.4 57.5      Met heads  23.2 23.5     Specific Joint Mobility Testing/Accessory Motions    [x]NT  Lumbar:  Hip:  Knee/patella: Ankle:     Bandages/Dressings/Incisions: [x]NA                       [x] Patient history, allergies, meds reviewed. Medical chart reviewed. See intake form. Review Of Systems (ROS):  [x]Performed Review of systems (Integumentary, CardioPulmonary, Neurological) by intake and observation. Intake form has been scanned into medical record. Patient has been instructed to contact their primary care physician regarding ROS issues if not already being addressed at this time.         Co-morbidities/Complexities (which will affect course of rehabilitation):   []None           Arthritic conditions   []Rheumatoid arthritis (M05.9)  [x]Osteoarthritis (M19.91)   Cardiovascular conditions   []Hypertension (I10)  []Hyperlipidemia (E78.5)  []Angina pectoris (I20)  []Atherosclerosis (I70)   Musculoskeletal conditions   []Disc pathology   []Congenital spine pathologies   []Prior surgical intervention  []Osteoporosis (M81.8)  []Osteopenia (M85.8)   Endocrine conditions   []Hypothyroid (E03.9)  []Hyperthyroid Gastrointestinal conditions   []Constipation (R92.32)   Metabolic conditions   []Morbid obesity (E66.01)  []Diabetes type 1(E10.65) or 2 (E11.65)   []Neuropathy (G60.9)     Pulmonary conditions   []Asthma (J45)  []Coughing   []COPD (J44.9)   Psychological Disorders  []Anxiety (F41.9)  [x]Depression (F32.9)   []Other:   [x]Other:  Aortic stenosis  A fib          Barriers to/and or personal factors that will affect rehab potential:   []None                 []Age  []Sex    []Smoker              [x]Motivation/Lack of

## 2023-04-20 NOTE — FLOWSHEET NOTE
without increased symptoms or restriction. [] Progressing: [] Met: [] Not Met: [] Adjusted  5. Pt able to ascend and descend one flight of stairs with minimal to no noted difficulty.(patient specific functional goal)    [] Progressing: [] Met: [] Not Met: [] Adjusted  Overall Progression Towards Functional goals/ Treatment Progress Update:  [] Patient is progressing as expected towards functional goals listed. [] Progression is slowed due to complexities/Impairments listed. [] Progression has been slowed due to co-morbidities. [x] Plan just implemented, too soon to assess goals progression <30days   [] Goals require adjustment due to lack of progress  [] Patient is not progressing as expected and requires additional follow up with physician  [] Other    Prognosis for POC: [x] Good [] Fair  [] Poor    Patient requires continued skilled intervention: [x] Yes  [] No    Treatment/Activity Tolerance:  [x] Patient able to complete treatment  [] Patient limited by fatigue  [] Patient limited by pain    [] Patient limited by other medical complications  [] Other:         PLAN: See eval  [] Continue per plan of care [] Alter current plan (see comments above)  [x] Plan of care initiated [] Hold pending MD visit [] Discharge      Therapeutic Exercise and NMR EXR  [x] (89014) Provided verbal/tactile cueing for activities related to strengthening, flexibility, endurance, ROM  for improvements in proximal strength and core control with self care, mobility, lifting and ambulation.  [] (98324) Provided verbal/tactile cueing for activities related to improving balance, coordination, kinesthetic sense, posture, motor skill, proprioception  to assist with core control in self care, mobility, lifting, and ambulation.      Therapeutic Activities and Gait:    [] (60348 or 04623) Provided verbal/tactile cueing for activities related to improving balance, coordination, kinesthetic sense, posture, motor skill, proprioception and motor

## 2023-04-21 ENCOUNTER — OFFICE VISIT (OUTPATIENT)
Dept: FAMILY MEDICINE CLINIC | Age: 68
End: 2023-04-21

## 2023-04-21 VITALS
DIASTOLIC BLOOD PRESSURE: 70 MMHG | OXYGEN SATURATION: 98 % | HEIGHT: 66 IN | SYSTOLIC BLOOD PRESSURE: 112 MMHG | HEART RATE: 63 BPM | WEIGHT: 277 LBS | BODY MASS INDEX: 44.52 KG/M2

## 2023-04-21 DIAGNOSIS — E11.9 TYPE 2 DIABETES MELLITUS WITHOUT COMPLICATION, WITHOUT LONG-TERM CURRENT USE OF INSULIN (HCC): ICD-10-CM

## 2023-04-21 DIAGNOSIS — E11.59 HYPERTENSION ASSOCIATED WITH TYPE 2 DIABETES MELLITUS (HCC): ICD-10-CM

## 2023-04-21 DIAGNOSIS — E03.9 ACQUIRED HYPOTHYROIDISM: Chronic | ICD-10-CM

## 2023-04-21 DIAGNOSIS — E66.01 OBESITY, MORBID, BMI 40.0-49.9 (HCC): ICD-10-CM

## 2023-04-21 DIAGNOSIS — Z00.00 MEDICARE ANNUAL WELLNESS VISIT, SUBSEQUENT: Primary | ICD-10-CM

## 2023-04-21 DIAGNOSIS — N32.81 OAB (OVERACTIVE BLADDER): ICD-10-CM

## 2023-04-21 DIAGNOSIS — F33.0 MILD EPISODE OF RECURRENT MAJOR DEPRESSIVE DISORDER (HCC): ICD-10-CM

## 2023-04-21 DIAGNOSIS — K21.9 CHRONIC GERD: ICD-10-CM

## 2023-04-21 DIAGNOSIS — J30.9 ALLERGIC RHINITIS, UNSPECIFIED SEASONALITY, UNSPECIFIED TRIGGER: Chronic | ICD-10-CM

## 2023-04-21 DIAGNOSIS — I15.2 HYPERTENSION ASSOCIATED WITH TYPE 2 DIABETES MELLITUS (HCC): ICD-10-CM

## 2023-04-21 DIAGNOSIS — E78.5 HYPERLIPIDEMIA ASSOCIATED WITH TYPE 2 DIABETES MELLITUS (HCC): ICD-10-CM

## 2023-04-21 DIAGNOSIS — E11.69 HYPERLIPIDEMIA ASSOCIATED WITH TYPE 2 DIABETES MELLITUS (HCC): ICD-10-CM

## 2023-04-21 PROBLEM — E11.22 TYPE 2 DIABETES MELLITUS WITH CHRONIC KIDNEY DISEASE (HCC): Status: RESOLVED | Noted: 2023-04-21 | Resolved: 2023-04-21

## 2023-04-21 PROBLEM — N18.30 CKD (CHRONIC KIDNEY DISEASE) STAGE 3, GFR 30-59 ML/MIN (HCC): Status: RESOLVED | Noted: 2020-01-28 | Resolved: 2023-04-21

## 2023-04-21 PROBLEM — E11.22 TYPE 2 DIABETES MELLITUS WITH CHRONIC KIDNEY DISEASE (HCC): Status: ACTIVE | Noted: 2023-04-21

## 2023-04-21 PROBLEM — R73.03 PREDIABETES: Status: RESOLVED | Noted: 2020-09-23 | Resolved: 2023-04-21

## 2023-04-21 LAB
CREATININE URINE POCT: 50
MICROALBUMIN/CREAT 24H UR: 1 MG/G{CREAT}
MICROALBUMIN/CREAT UR-RTO: <30

## 2023-04-21 RX ORDER — OMEPRAZOLE 40 MG/1
40 CAPSULE, DELAYED RELEASE ORAL
Qty: 90 CAPSULE | Refills: 1 | Status: SHIPPED | OUTPATIENT
Start: 2023-04-21

## 2023-04-21 RX ORDER — OXYBUTYNIN CHLORIDE 15 MG/1
TABLET, EXTENDED RELEASE ORAL
Qty: 90 TABLET | Refills: 1 | Status: SHIPPED | OUTPATIENT
Start: 2023-04-21

## 2023-04-21 ASSESSMENT — PATIENT HEALTH QUESTIONNAIRE - PHQ9
1. LITTLE INTEREST OR PLEASURE IN DOING THINGS: 0
10. IF YOU CHECKED OFF ANY PROBLEMS, HOW DIFFICULT HAVE THESE PROBLEMS MADE IT FOR YOU TO DO YOUR WORK, TAKE CARE OF THINGS AT HOME, OR GET ALONG WITH OTHER PEOPLE: 0
SUM OF ALL RESPONSES TO PHQ9 QUESTIONS 1 & 2: 0
6. FEELING BAD ABOUT YOURSELF - OR THAT YOU ARE A FAILURE OR HAVE LET YOURSELF OR YOUR FAMILY DOWN: 0
8. MOVING OR SPEAKING SO SLOWLY THAT OTHER PEOPLE COULD HAVE NOTICED. OR THE OPPOSITE, BEING SO FIGETY OR RESTLESS THAT YOU HAVE BEEN MOVING AROUND A LOT MORE THAN USUAL: 0
SUM OF ALL RESPONSES TO PHQ QUESTIONS 1-9: 0
2. FEELING DOWN, DEPRESSED OR HOPELESS: 0
4. FEELING TIRED OR HAVING LITTLE ENERGY: 0
9. THOUGHTS THAT YOU WOULD BE BETTER OFF DEAD, OR OF HURTING YOURSELF: 0
3. TROUBLE FALLING OR STAYING ASLEEP: 0
SUM OF ALL RESPONSES TO PHQ QUESTIONS 1-9: 0
5. POOR APPETITE OR OVEREATING: 0
7. TROUBLE CONCENTRATING ON THINGS, SUCH AS READING THE NEWSPAPER OR WATCHING TELEVISION: 0

## 2023-04-21 ASSESSMENT — ENCOUNTER SYMPTOMS
WHEEZING: 0
SHORTNESS OF BREATH: 0
ABDOMINAL PAIN: 0

## 2023-04-21 ASSESSMENT — LIFESTYLE VARIABLES
HOW MANY STANDARD DRINKS CONTAINING ALCOHOL DO YOU HAVE ON A TYPICAL DAY: 1 OR 2
HOW OFTEN DO YOU HAVE A DRINK CONTAINING ALCOHOL: 2-4 TIMES A MONTH

## 2023-04-21 NOTE — PATIENT INSTRUCTIONS
Learning About Being Active as an Older Adult  Why is being active important as you get older? Being active is one of the best things you can do for your health. And it's never too late to start. Being active--or getting active, if you aren't already--has definite benefits. It can:  Give you more energy,  Keep your mind sharp. Improve balance to reduce your risk of falls. Help you manage chronic illness with fewer medicines. No matter how old you are, how fit you are, or what health problems you have, there is a form of activity that will work for you. And the more physical activity you can do, the better your overall health will be. What kinds of activity can help you stay healthy? Being more active will make your daily activities easier. Physical activity includes planned exercise and things you do in daily life. There are four types of activity:  Aerobic. Doing aerobic activity makes your heart and lungs strong. Includes walking, dancing, and gardening. Aim for at least 2½ hours spread throughout the week. It improves your energy and can help you sleep better. Muscle-strengthening. This type of activity can help maintain muscle and strengthen bones. Includes climbing stairs, using resistance bands, and lifting or carrying heavy loads. Aim for at least twice a week. It can help protect the knees and other joints. Stretching. Stretching gives you better range of motion in joints and muscles. Includes upper arm stretches, calf stretches, and gentle yoga. Aim for at least twice a week, preferably after your muscles are warmed up from other activities. It can help you function better in daily life. Balancing. This helps you stay coordinated and have good posture. Includes heel-to-toe walking, fabiana chi, and certain types of yoga. Aim for at least 3 days a week. It can reduce your risk of falling.   Even if you have a hard time meeting the recommendations, it's better to be more active

## 2023-04-21 NOTE — ACP (ADVANCE CARE PLANNING)
Advance Care Planning     General Advance Care Planning (ACP) Conversation    Date of Conversation: 4/21/2023  Conducted with: Patient with Decision Making Capacity    Healthcare Decision Maker:    Primary Decision Maker: Vida Stewart) - Spouse - [phone]    Secondary Decision Maker: Saadialanenisha Bell - [phone]    Supplemental (Other) Decision Maker: Greer Newby - Child - [phone]  Click here to complete Healthcare Decision Makers including selection of the Healthcare Decision Maker Relationship (ie \"Primary\"). Today we documented Decision Maker(s) consistent with Legal Next of Kin hierarchy. Content/Action Overview:   Has ACP document(s) NOT on file - requested patient to provide      Length of Voluntary ACP Conversation in minutes:  <16 minutes (Non-Billable)    KADEN Alberto

## 2023-04-21 NOTE — PROGRESS NOTES
Yaneth Sims (:  1955) is a 75 y.o. female,Established patient, here for evaluation of the following chief complaint(s):  Medicare AWV and Hypothyroidism         ASSESSMENT/PLAN:  1. Medicare annual wellness visit, subsequent  2. Acquired hypothyroidism  -     TSH with Reflex; Future  -     Adjust dose of levothyroxine when lab work comes back. Follow up annually  3. Type 2 diabetes mellitus without complication, without long-term current use of insulin (HCC)  -     POCT microalbumin: normal  -   diabetic foot exam: normal  -    Pt would like to work on diet and exercise before she starts medication. She is using elderberry syrup to help as well. Follow up in 6 months  5. Allergic rhinitis, unspecified seasonality, unspecified trigger       -   stable, continue with singulair. Follow up annually  6. Mild episode of recurrent major depressive disorder ([facility]      -   well controlled. continue with celexa. Follow up with me annually for this medication  7. Chronic GERD  -     omeprazole (PRILOSEC) 40 MG delayed release capsule; Take 1 capsule by mouth every morning (before breakfast), Disp-90 capsule, R-1Normal  -    stable, continue with prilosec. Follow up annually  8. Hypertension associated with type 2 diabetes mellitus ([facility]      -   well controlled, continue with valsartan. Follows with cardiology  9. Hyper[facility] with [facility]     -   stable, managed by cardiology. Continue with lipitor. Increase physical activity to a minimum of 150 minutes per week  10. OAB (overactive bladder)  -     oxybutynin (DITROPAN XL) 15 MG extended release tablet; TAKE 1 TABLET ONCE DAILY, Disp-90 tablet, R-1Normal  11. Obesity      -  discussed diet changes that would be helpful, encouraged more exercise      Return in about 6 months (around 10/21/2023) for DM.          Subjective   SUBJECTIVE/OBJECTIVE:  HPI  Hypothyroidism:  Current medication: levothyroxine  Side effects:
Provider   levothyroxine (SYNTHROID) 137 MCG tablet TAKE 1 TABLET ONCE DAILY Yes KADEN Milan   citalopram (CELEXA) 40 MG tablet TAKE 1 TABLET DAILY Yes KADEN Shah   montelukast (SINGULAIR) 10 MG tablet TAKE 1 TABLET NIGHTLY Yes KADEN Shah   valsartan (DIOVAN) 40 MG tablet TAKE 1 TABLET BY MOUTH ONE TIME A DAY Yes Silvino Santiago MD   dofetilide (TIKOSYN) 500 MCG capsule TAKE 1 CAPSULE BY MOUTH EVERY 12 HOURS Yes BLANK Wise CNP   Vitamin D, Cholecalciferol, 25 MCG (1000 UT) CAPS  Yes Historical Provider, MD   atorvastatin (LIPITOR) 40 MG tablet TAKE 1 TABLET DAILY Yes Silvino Santiago MD   apixaban (ELIQUIS) 5 MG TABS tablet TAKE 1 TABLET BY MOUTH TWO TIMES A DAY Yes BLANK Wise CNP   omeprazole (PRILOSEC) 40 MG delayed release capsule TAKE 1 CAPSULE EVERY       MORNING BEFORE BREAKFAST Yes KADEN Shah   oxybutynin (DITROPAN XL) 15 MG extended release tablet TAKE 1 TABLET ONCE DAILY Yes KADEN Milan   spironolactone (ALDACTONE) 25 MG tablet TAKE 1 TABLET ONCE DAILY Yes Silvino Santiago MD   Cetirizine HCl 10 MG CAPS Take 1 capsule by mouth Yes Historical Provider, MD GALARZA PO Take by mouth Yes Historical Provider, MD   fluticasone (FLONASE) 50 MCG/ACT nasal spray 2 SPRAYS IN EACH NOSTRIL ONCE DAILY.  Yes KADEN Milan   furosemide (LASIX) 20 MG tablet TAKE 1 TABLET BY MOUTH ONE TIME A DAY AS NEEDED FOR WEIGHT GAIN OF 3LBS IN A DAY OR 5LBS IN A WEEK Yes BLANK Estrada CNP   multivitamin [facility]) per tablet Take 1 tablet by mouth daily Yes Historical Provider, MD Carrasquillo (Including outside providers/suppliers regularly involved in providing care):   Patient Care Team:  KADEN Milan as PCP - General (Physician Assistant)  KADEN Milan as PCP - Empaneled Provider  Carolina Jackson MD as PCP - OBGYN (Obstetrics & Gynecology)  Angeli Doshi MD as Consulting Physician ([facility])  Cecilia Huang

## 2023-04-22 LAB — TSH SERPL DL<=0.005 MIU/L-ACNC: 2.2 UIU/ML (ref 0.27–4.2)

## 2023-05-02 ENCOUNTER — HOSPITAL ENCOUNTER (OUTPATIENT)
Dept: PHYSICAL THERAPY | Age: 68
Setting detail: THERAPIES SERIES
Discharge: HOME | End: 2023-05-02
Payer: MEDICARE

## 2023-05-02 PROCEDURE — 97110 THERAPEUTIC EXERCISES: CPT

## 2023-05-02 PROCEDURE — 97112 NEUROMUSCULAR REEDUCATION: CPT

## 2023-05-09 ENCOUNTER — HOSPITAL ENCOUNTER (OUTPATIENT)
Dept: PHYSICAL THERAPY | Age: 68
Setting detail: THERAPIES SERIES
Discharge: HOME | End: 2023-05-09
Payer: MEDICARE

## 2023-05-09 PROCEDURE — 97140 MANUAL THERAPY 1/> REGIONS: CPT

## 2023-05-09 PROCEDURE — 97110 THERAPEUTIC EXERCISES: CPT

## 2023-05-09 PROCEDURE — 97116 GAIT TRAINING THERAPY: CPT

## 2023-05-09 NOTE — FLOWSHEET NOTE
Bre           [] Other:        Electronically signed by: Mabel Ewing, PT, [id number]      Note: If patient does not return for scheduled/ recommended follow up visits, this note will serve as a discharge from care along with most recent update on progress.

## 2023-05-11 ENCOUNTER — HOSPITAL ENCOUNTER (OUTPATIENT)
Dept: PHYSICAL THERAPY | Age: 68
Setting detail: THERAPIES SERIES
Discharge: HOME | End: 2023-05-11
Payer: MEDICARE

## 2023-05-11 NOTE — FLOWSHEET NOTE
Physical Therapy  Cancellation/No-show Note  Patient Name:  Kirsten Andrade  :  1955   Date:  2023  Cancels to Date: 1  No-shows to Date: 0    For today's appointment patient:  []  Cancelled  []  Rescheduled appointment  []  No-show     Reason given by patient:  []  Patient ill  []  Conflicting appointment  []  No transportation    []  Conflict with work  []  No reason given  []  Other:     Comments:      Electronically signed by:   Eliazar Mak, [address] [place] [address], [id number]

## 2023-05-16 ENCOUNTER — HOSPITAL ENCOUNTER (OUTPATIENT)
Dept: PHYSICAL THERAPY | Age: 68
Setting detail: THERAPIES SERIES
Discharge: HOME | End: 2023-05-16
Payer: MEDICARE

## 2023-05-16 PROCEDURE — 97116 GAIT TRAINING THERAPY: CPT

## 2023-05-16 PROCEDURE — 97112 NEUROMUSCULAR REEDUCATION: CPT

## 2023-05-16 PROCEDURE — 97110 THERAPEUTIC EXERCISES: CPT

## 2023-05-16 NOTE — FLOWSHEET NOTE
1x10 []    SLR   1x10 2-3 secs hold []    Seated marches   1x10  []     Standing Abduction   1x10 []    Hamstring stretch; calf stretch standing   3 reps hold 20 seconds []        []        []        []          []         []        []        []        []        []        []        []      Scifit recumbent Seat 17, arm 6 5 minutes []      Therapeutic Exercise/Home Exercise Program:   20 minutes  See above     Therapeutic Activity:  0 minutes     Gait: 10 minutes decreased secondary to last visit increase in soreness. Side stepping without UE support x2; tandem walking with bilateral UE support x2; backward walking x2 UE support; marching x2 UE support    Neuromuscular Re-Education:  8 minutes  Balance EC one minute on foam; nods and rotation (went side to side) 1x10 each on foam  Step ups forward and lateral bilateral LE 1x10    Canalith Repositioning Procedure:      Manual Therapy:  4 minutes  Patella mobilization bilateral LE 10 reps, pt felt that something was going to hurt with right but it didn't, no difficulty with left. Modalities: 0 minutes    ASSESSMENT:  Pt demonstrates better mobility overall with bilateral LE. Goals:  GOALS:  Patient stated goal: noted decrease pain with functional activities   [] Progressing: [] Met: [] Not Met: [] Adjusted        Therapist goals for Patient:   Short Term Goals: To be achieved in: 3 weeks  1. Independent in HEP and progression per patient tolerance, in order to prevent re-injury. [x] Progressing: [] Met: [] Not Met: [] Adjusted  2. Patient will have a decrease in pain to facilitate improvement in movement, function, and ADLs as indicated by Functional Deficits. [] Progressing: [x] Met: [] Not Met: [] Adjusted        Long Term Goals: To be achieved in: 6 weeks  1. Disability index score of 60 or Greater per LEFS to assist with reaching prior level of function. [] Progressing: [] Met: [] Not Met: [] Adjusted  2.  Patient will demonstrate increased AROM to

## 2023-05-17 PROBLEM — M85.89 OSTEOPENIA OF MULTIPLE SITES: Status: ACTIVE | Noted: 2023-05-17

## 2023-05-17 PROBLEM — I70.0 ATHEROSCLEROSIS OF AORTA (HCC): Status: ACTIVE | Noted: 2023-05-17

## 2023-05-18 ENCOUNTER — HOSPITAL ENCOUNTER (OUTPATIENT)
Dept: PHYSICAL THERAPY | Age: 68
Setting detail: THERAPIES SERIES
Discharge: HOME | End: 2023-05-18
Payer: MEDICARE

## 2023-05-23 ENCOUNTER — HOSPITAL ENCOUNTER (OUTPATIENT)
Dept: PHYSICAL THERAPY | Age: 68
Setting detail: THERAPIES SERIES
Discharge: HOME | End: 2023-05-23
Payer: MEDICARE

## 2023-05-23 PROCEDURE — 97116 GAIT TRAINING THERAPY: CPT

## 2023-05-23 PROCEDURE — 97110 THERAPEUTIC EXERCISES: CPT

## 2023-05-23 NOTE — FLOWSHEET NOTE
[] Mechanical Txn [id number]  [] TA    x     [] Ultrasound  [x] Gait   x1  [] Vaso  [] CRP    [] Ionto           [] Other:        Electronically signed by: Vaughn Santos PT, [id number]      Note: If patient does not return for scheduled/ recommended follow up visits, this note will serve as a discharge from care along with most recent update on progress.

## 2023-05-25 ENCOUNTER — HOSPITAL ENCOUNTER (OUTPATIENT)
Dept: PHYSICAL THERAPY | Age: 68
Setting detail: THERAPIES SERIES
Discharge: HOME | End: 2023-05-25
Payer: MEDICARE

## 2023-05-25 PROCEDURE — 97110 THERAPEUTIC EXERCISES: CPT

## 2023-05-25 PROCEDURE — 97116 GAIT TRAINING THERAPY: CPT

## 2023-05-25 NOTE — FLOWSHEET NOTE
Physical Therapy Daily Treatment Note    [x]Daily Tx Note    []Progress Note    [] Discharge Summary         Date:  2023    Patient Name:  Hailee Ritchie   \"Maira\" :  1955  MRN: [id number]      Medical/Treatment Diagnosis Information:  Primary osteoarthritis of right knee M17.11; Primary osteoarthritis of left knee M17.12        Abnormality of gait                                              Insurance/Certification information:  Medicare      Physician Information: David Jay MD       Plan of care sent to provider:      []Faxed   [x]Co-signature    (attempts: 1[x] 23 2 []3[])     Plan of care signed :  [x]  Yes  [] No    Date of Patient follow up with Physician:     Is this a Progress Report:     [x]  Yes  []  No      If Yes:  Date Range for reporting period:  Beginning 2023  Ending 2023    Progress report will be due (10 Rx or 30 days whichever is less):       Recertification will be due (POC Duration  / 90 days whichever is less): 2023      Visit # Insurance Allowable Auth Required     []  Yes [x]  No        Functional Scale:  LEFI    Date    2023  Score   39/80      Latex Allergy:  [x]NO      []YES  Preferred Language for Healthcare:   [x]English       []other:    RESTRICTIONS/PRECAUTIONS:      SUBJECTIVE:  Pt reports that she had increased pain in bilateral LE in knees. Pain level:  Patient reports pain is  2/10 pain at present and end 1/10 turning otherwise 0/10    Plan Moving Forward/ For next visit:   Gait and balance activities    Strengthening and stretching of bilateral LE   Manual techniques as needed       OBJECTIVE: See eval    Exercises/Interventions:     Exercises in bold performed in department today. Items not bolded are carried forward from prior visits for continuity of the record.   Exercise/Equipment Resistance/Repetitions HEP Other comments    Toe curls, AP, knee bends and hip ER/IR  1x10 []    SAQ   1x10 []    SLR   1x10

## 2023-05-26 ENCOUNTER — TELEPHONE (OUTPATIENT)
Dept: PULMONOLOGY | Age: 68
End: 2023-05-26

## 2023-05-26 ENCOUNTER — OFFICE VISIT (OUTPATIENT)
Dept: PULMONOLOGY | Age: 68
End: 2023-05-26
Payer: MEDICARE

## 2023-05-26 VITALS
RESPIRATION RATE: 20 BRPM | DIASTOLIC BLOOD PRESSURE: 78 MMHG | HEIGHT: 66 IN | HEART RATE: 66 BPM | WEIGHT: 275.4 LBS | SYSTOLIC BLOOD PRESSURE: 112 MMHG | BODY MASS INDEX: 44.26 KG/M2 | OXYGEN SATURATION: 99 %

## 2023-05-26 DIAGNOSIS — G47.33 SEVERE OBSTRUCTIVE SLEEP APNEA: Primary | Chronic | ICD-10-CM

## 2023-05-26 DIAGNOSIS — Z71.89 CPAP USE COUNSELING: ICD-10-CM

## 2023-05-26 DIAGNOSIS — I10 PRIMARY HYPERTENSION: ICD-10-CM

## 2023-05-26 DIAGNOSIS — E66.01 OBESITY, MORBID, BMI 40.0-49.9 (HCC): ICD-10-CM

## 2023-05-26 PROCEDURE — 3074F SYST BP LT 130 MM HG: CPT | Performed by: NURSE PRACTITIONER

## 2023-05-26 PROCEDURE — G8399 PT W/DXA RESULTS DOCUMENT: HCPCS | Performed by: NURSE PRACTITIONER

## 2023-05-26 PROCEDURE — G8417 CALC BMI ABV UP PARAM F/U: HCPCS | Performed by: NURSE PRACTITIONER

## 2023-05-26 PROCEDURE — 3017F COLORECTAL CA SCREEN DOC REV: CPT | Performed by: NURSE PRACTITIONER

## 2023-05-26 PROCEDURE — 99214 OFFICE O/P EST MOD 30 MIN: CPT | Performed by: NURSE PRACTITIONER

## 2023-05-26 PROCEDURE — 3078F DIAST BP <80 MM HG: CPT | Performed by: NURSE PRACTITIONER

## 2023-05-26 PROCEDURE — 1123F ACP DISCUSS/DSCN MKR DOCD: CPT | Performed by: NURSE PRACTITIONER

## 2023-05-26 PROCEDURE — 1036F TOBACCO NON-USER: CPT | Performed by: NURSE PRACTITIONER

## 2023-05-26 PROCEDURE — G8427 DOCREV CUR MEDS BY ELIG CLIN: HCPCS | Performed by: NURSE PRACTITIONER

## 2023-05-26 PROCEDURE — 1090F PRES/ABSN URINE INCON ASSESS: CPT | Performed by: NURSE PRACTITIONER

## 2023-05-26 ASSESSMENT — SLEEP AND FATIGUE QUESTIONNAIRES
HOW LIKELY ARE YOU TO NOD OFF OR FALL ASLEEP WHILE SITTING AND READING: 1
HOW LIKELY ARE YOU TO NOD OFF OR FALL ASLEEP WHILE SITTING INACTIVE IN A PUBLIC PLACE: 0
HOW LIKELY ARE YOU TO NOD OFF OR FALL ASLEEP IN A CAR, WHILE STOPPED FOR A FEW MINUTES IN TRAFFIC: 0
HOW LIKELY ARE YOU TO NOD OFF OR FALL ASLEEP WHILE SITTING QUIETLY AFTER LUNCH WITHOUT ALCOHOL: 0
HOW LIKELY ARE YOU TO NOD OFF OR FALL ASLEEP WHEN YOU ARE A PASSENGER IN A CAR FOR AN HOUR WITHOUT A BREAK: 0
HOW LIKELY ARE YOU TO NOD OFF OR FALL ASLEEP WHILE WATCHING TV: 1
ESS TOTAL SCORE: 5
HOW LIKELY ARE YOU TO NOD OFF OR FALL ASLEEP WHILE LYING DOWN TO REST IN THE AFTERNOON WHEN CIRCUMSTANCES PERMIT: 3
HOW LIKELY ARE YOU TO NOD OFF OR FALL ASLEEP WHILE SITTING AND TALKING TO SOMEONE: 0
NECK CIRCUMFERENCE (INCHES): 17.5

## 2023-05-26 NOTE — PATIENT INSTRUCTIONS
on your CPAP   equipment.  - Do follow the recommended cleaning schedule. - Do change your disposable filter frequently. Adapted From: [employer]/cleaning. shtm.   These are general suggestions for all models please follow specific s recommendations and specific instructions

## 2023-05-26 NOTE — PROGRESS NOTES
Patient ID: Mariam Ascencio is a 75 y.o. female who is being seen today for   Chief Complaint   Patient presents with    Sleep Apnea     1 year JINA          HPI:   Mariam Ascencio is a 75 y.o. female in office for JINA follow up. States she received replacement CPAP from the  for the recall. States she is doing well with CPAP. Patient is using CPAP   9-10 hrs/night. Not using humidifier. No snoring on CPAP. The pressure is well tolerated. The mask is comfortable-dreamwear nasal mask. No mask leak. No significant daytime sleepiness. No nodding off when driving. No dry nose or throat. No fatigue as long as eating right. Bedtime is 8-11 pm and rise time is 9 am. Sleep onset is few minutes. Wakes up 0 times at night total. No nocturia. Rare naps during the day. No headache in am. No weight gain. Rare caffienated beverages during the day. Occasional alcohol. ESS is 5      Initial 1/29/20  Mariam Ascencio is a 75 y.o. female in office for JINA follow up. She was previously a patient of [facility] however changed offices due to location. States she diagnosed with JINA in 1980s. States she has been on CPAP since, does well with it. States snoring, poor sleep hypersomnia prior to CPAP, resolved with CPAP. Patient is using CPAP 8+ hrs/night. Using humidifier. No snoring on CPAP. The pressure is well tolerated. The mask is comfortable-nasal mask dreamwear. No mask leak. No significant daytime sleepiness. No nodding off when driving. No dry nose or throat. Some fatigue. Bedtime is 8-9 pm and rise time is 530 am. Sleep onset is 2 minutes. Wakes up 0-1 times at night total. 0-1 nocturia. It takes usually few minutes to fall back a sleep. Rare naps during the day, more since diagnosed with afib. No headache in am. No weight gain. 0-1 caffienated beverages during the day. Rare alcohol. ESS is 6.     Sleep Medicine 5/26/2023 5/20/2022 5/20/2021 3/17/2021 1/29/2020 1/11/2019 1/5/2018   Sitting

## 2023-05-30 ENCOUNTER — HOSPITAL ENCOUNTER (OUTPATIENT)
Dept: PHYSICAL THERAPY | Age: 68
Setting detail: THERAPIES SERIES
Discharge: HOME | End: 2023-05-30
Payer: MEDICARE

## 2023-05-30 PROCEDURE — 97110 THERAPEUTIC EXERCISES: CPT

## 2023-05-30 PROCEDURE — 97116 GAIT TRAINING THERAPY: CPT

## 2023-05-30 NOTE — FLOWSHEET NOTE
ADLs as indicated by Functional Deficits. [] Progressing: [x] Met: [] Not Met: [] Adjusted      Long Term Goals: To be achieved in: 6 weeks  1. Disability index score of 60 or Greater per LEFS to assist with reaching prior level of function. [] Progressing: [] Met: [] Not Met: [] Adjusted  2. Patient will demonstrate increased AROM to bilateral knee flexion to 115 and bilateral knee extension to 0 to allow for proper joint functioning as indicated by patients Functional Deficits. [] Progressing: [] Met: [] Not Met: [] Adjusted  3. Patient will demonstrate an increase in Strength to 4+/5 or greater in LE to allow for proper functional mobility as indicated by patients Functional Deficits. [] Progressing: [] Met: [] Not Met: [] Adjusted  4. Patient will return to 90% functional activities without increased symptoms or restriction. [] Progressing: [] Met: [] Not Met: [] Adjusted  5. Pt able to ascend and descend one flight of stairs with minimal to no noted difficulty.(patient specific functional goal)    [] Progressing: [] Met: [] Not Met: [] Adjusted  Overall Progression Towards Functional goals/ Treatment Progress Update:  [] Patient is progressing as expected towards functional goals listed. [] Progression is slowed due to complexities/Impairments listed. [] Progression has been slowed due to co-morbidities.   [x] Plan just implemented, too soon to assess goals progression <30days   [] Goals require adjustment due to lack of progress  [] Patient is not progressing as expected and requires additional follow up with physician  [] Other    Prognosis for POC: [x] Good [] Fair  [] Poor    Patient requires continued skilled intervention: [x] Yes  [] No    Treatment/Activity Tolerance:  [x] Patient able to complete treatment  [] Patient limited by fatigue  [] Patient limited by pain    [] Patient limited by other medical complications  [] Other:         PLAN: See preston  [x] Continue per plan of care [] Neda Sharma

## 2023-06-01 ENCOUNTER — HOSPITAL ENCOUNTER (OUTPATIENT)
Dept: PHYSICAL THERAPY | Age: 68
Setting detail: THERAPIES SERIES
Discharge: HOME | End: 2023-06-01
Payer: MEDICARE

## 2023-06-01 PROCEDURE — 97140 MANUAL THERAPY 1/> REGIONS: CPT

## 2023-06-01 PROCEDURE — 97110 THERAPEUTIC EXERCISES: CPT

## 2023-06-01 PROCEDURE — 97035 APP MDLTY 1+ULTRASOUND EA 15: CPT

## 2023-06-01 NOTE — FLOWSHEET NOTE
Physical Therapy Daily Treatment Note    [x]Daily Tx Note    []Progress Note    [] Discharge Summary         Date:  2023    Patient Name:  Serena Zamora   \"Maira\" :  1955  MRN: [id number]      Medical/Treatment Diagnosis Information:  Primary osteoarthritis of right knee M17.11; Primary osteoarthritis of left knee M17.12        Abnormality of gait                                              Insurance/Certification information:  Medicare      Physician Information: Latanya Benoit MD       Plan of care sent to provider:      []Faxed   [x]Co-signature    (attempts: 1[x] 23 2 []3[])     Plan of care signed :  [x]  Yes  [] No    Date of Patient follow up with Physician:     Is this a Progress Report:     []  Yes  [x]  No      If Yes:  Date Range for reporting period:  Beginning 2023  Ending 2023    Progress report will be due (10 Rx or 30 days whichever is less):  120     Recertification will be due (POC Duration  / 90 days whichever is less): 2023      Visit # Insurance Allowable Auth Required     []  Yes [x]  No        Functional Scale:  LEFI    Date    2023  Score   39/80      Latex Allergy:  [x]NO      []YES  Preferred Language for Healthcare:   [x]English       []other:    RESTRICTIONS/PRECAUTIONS:      SUBJECTIVE:  Pt reports that she had increased pain in left LE in knee today and noted swelling. Pain level:  beginning 3/10 on left; end L 0/10, R 0/10    Plan Moving Forward/ For next visit:   Gait and balance activities    Strengthening and stretching of bilateral LE   Manual techniques as needed       OBJECTIVE: See eval    Exercises/Interventions:     Exercises in bold performed in department today. Items not bolded are carried forward from prior visits for continuity of the record.   Exercise/Equipment Resistance/Repetitions HEP Other comments    Toe curls, AP, knee bends and hip ER/IR  1x10 []    SAQ   1x10 2 lbs  []    SLR   1x10 2-3 secs hold []

## 2023-06-06 ENCOUNTER — APPOINTMENT (OUTPATIENT)
Dept: PHYSICAL THERAPY | Age: 68
End: 2023-06-06
Payer: MEDICARE

## 2023-06-08 ENCOUNTER — TELEPHONE (OUTPATIENT)
Dept: ORTHOPEDIC SURGERY | Age: 68
End: 2023-06-08

## 2023-06-08 ENCOUNTER — APPOINTMENT (OUTPATIENT)
Dept: PHYSICAL THERAPY | Age: 68
End: 2023-06-08
Payer: MEDICARE

## 2023-06-08 NOTE — TELEPHONE ENCOUNTER
Appointment Request     Patient requesting earlier appointment: Yes  Appointment offered to patient: YES  Patient Contact Number: [phone]      REQ INJECTION/ GOING [facility] [place] 6/19/23

## 2023-06-19 ENCOUNTER — TELEPHONE (OUTPATIENT)
Dept: ORTHOPEDIC SURGERY | Age: 68
End: 2023-06-19

## 2023-06-19 NOTE — TELEPHONE ENCOUNTER
Surgery and/or Procedure Scheduling     Contact Name: Victorino Severin \"Maira\"  Surgical/Procedure Request: L KNEE  Patient Contact Number: [phone]

## 2023-06-20 ENCOUNTER — HOSPITAL ENCOUNTER (OUTPATIENT)
Dept: PHYSICAL THERAPY | Age: 68
Setting detail: THERAPIES SERIES
Discharge: HOME | End: 2023-06-20
Payer: MEDICARE

## 2023-06-20 PROCEDURE — 97110 THERAPEUTIC EXERCISES: CPT

## 2023-06-20 NOTE — PROGRESS NOTES
Physical Therapy Daily Treatment Note    [x]Daily Tx Note    []Progress Note    [] Discharge Summary         Date:  2023    Patient Name:  Ragini Medina   \"Maira\" :  1955  MRN: [id number]      Medical/Treatment Diagnosis Information:  Primary osteoarthritis of right knee M17.11; Primary osteoarthritis of left knee M17.12        Abnormality of gait                                              Insurance/Certification information:  Medicare      Physician Information: Jama Escobar MD       Plan of care sent to provider:      []Faxed   [x]Co-signature    (attempts: 1[x] 23 2 []3[])     Plan of care signed :  [x]  Yes  [] No    Date of Patient follow up with Physician:     Is this a Progress Report:     []  Yes  [x]  No      If Yes:  Date Range for reporting period:  Beginning 2023  Ending 2023    Progress report will be due (10 Rx or 30 days whichever is less):     Recertification will be due (POC Duration  / 90 days whichever is less): 2023      Visit # Insurance Allowable Auth Required   10/12  []  Yes [x]  No        Functional Scale:  LEFI    Date    2023  Score   42/80      Latex Allergy:  [x]NO      []YES  Preferred Language for Healthcare:   [x]English       []other:    RESTRICTIONS/PRECAUTIONS:      SUBJECTIVE:  Pt reports that she Friday night she had a pop in her left LE around knee and thought it was her IT band. Pt reports that she called yesterday to schedule for TKR.         Pain level:  beginning 1/10 on left lateral and posterior and in gastroc muscle and 4/10 with palpation; end L 2/10     Plan Moving Forward/ For next visit:   Gait and balance activities    Strengthening and stretching of bilateral LE   Manual techniques as needed       OBJECTIVE: See eval  Ambulating without cane    Girth Measurements (cm)                                          Location Left Right Location Left Right   6 superior to superior patellar pole     3\" inferior to

## 2023-06-21 NOTE — TELEPHONE ENCOUNTER
Called patient to discuss. No relief from steroid injection. Has been continuing to do PT but with severe pain and feels like she is losing mobility rather than improving. She is interested in L TKA and staging the R 6 weeks later, which I think is a good plan. I am OK with scheduling this at least 1 month after steroid injection as long as her A1C continues to be <7 which it has been. Will proceed with surgery scheduling. See previous OV note for exam and history details. She has now failed an extensive course of conservative management as previously documented. We had a long discussion regarding the implications of obesity and knee replacement surgery. We did discuss that there are increased complications and risk of reoperation as BMI increases. This is a risk that is not an absolute contra-indication but should be balanced against the benefit of knee replacement while considering the patients ability to continue to lose weight. I discussed using a wound VAC, extra tibial fixation and an extended postop course of antibiotics to help minimize these risks. I also discussed the importance of continued weight loss after surgery to maximize the longevity of the implant. The patient understands the increased risks of surgery with high BMI and will continue to try to lose weight through dietary measures as much as possible. The patient does wish to proceed with knee replacement despite this risk. The patient is excited to become more active and continue to lose weight after knee replacement.          Natalya Farrell MD

## 2023-06-22 ENCOUNTER — APPOINTMENT (OUTPATIENT)
Dept: PHYSICAL THERAPY | Age: 68
End: 2023-06-22
Payer: MEDICARE

## 2023-06-23 ENCOUNTER — TELEPHONE (OUTPATIENT)
Dept: ORTHOPEDIC SURGERY | Age: 68
End: 2023-06-23

## 2023-06-27 ENCOUNTER — HOSPITAL ENCOUNTER (OUTPATIENT)
Dept: PHYSICAL THERAPY | Age: 68
Setting detail: THERAPIES SERIES
Discharge: HOME | End: 2023-06-27
Payer: MEDICARE

## 2023-06-27 PROCEDURE — 97035 APP MDLTY 1+ULTRASOUND EA 15: CPT

## 2023-06-27 PROCEDURE — 97110 THERAPEUTIC EXERCISES: CPT

## 2023-06-27 PROCEDURE — 97140 MANUAL THERAPY 1/> REGIONS: CPT

## 2023-06-29 ENCOUNTER — HOSPITAL ENCOUNTER (OUTPATIENT)
Dept: PHYSICAL THERAPY | Age: 68
Setting detail: THERAPIES SERIES
End: 2023-06-29
Payer: MEDICARE

## 2023-06-29 DIAGNOSIS — M17.12 PRIMARY OSTEOARTHRITIS OF LEFT KNEE: Primary | ICD-10-CM

## 2023-06-29 DIAGNOSIS — M25.562 ACUTE PAIN OF LEFT KNEE: ICD-10-CM

## 2023-06-30 ENCOUNTER — TELEPHONE (OUTPATIENT)
Dept: ORTHOPEDIC SURGERY | Age: 68
End: 2023-06-30

## 2023-07-06 ENCOUNTER — HOSPITAL ENCOUNTER (OUTPATIENT)
Dept: PHYSICAL THERAPY | Age: 68
Setting detail: THERAPIES SERIES
Discharge: HOME | End: 2023-07-06

## 2023-07-06 NOTE — PROGRESS NOTES
Outpatient Physical Therapy Discharge Note         Date:  7/6/2023    Patient Name:  Debbie Saravia         YOB: 1955    Medical/Treatment Diagnosis Information:  Primary osteoarthritis of right knee M17.11; Primary osteoarthritis of left knee M17.12        Abnormality of gait                                               Insurance/Certification information:  Medicare       Physician Information: Meryle Saa, MD        Plan of care sent to provider:      []Faxed   [x]Co-signature    (attempts: 1[x] 4/20/23 2 []3[])     Plan of care signed :  [x]  Yes  [] No     Date of Patient follow up with Physician:      Is this a Progress Report:        []  Yes                    [x]  No       If Yes:  Date Range for reporting period:  Beginning 6/13/2023  Ending 6/20/2023     Progress report will be due (10 Rx or 30 days whichever is less):   6/50/2583  Recertification will be due (POC Duration  / 90 days whichever is less): 7/13/2023        Visit # Insurance Allowable Auth Required   11/12   []  Yes     [x]  No                Functional Scale:      LEFI                  Date                            6/20/2023  Score                          42/80      Latex Allergy:  [x]NO      []YES  Preferred Language for Healthcare:   [x]English       []other:     RESTRICTIONS/PRECAUTIONS:     Progress Note covers period from (if applicable):    [x]From   6/20/2023       To     7/6/2023      Visit# / total visits: 11     Subjective:      [] NA due to pt did not show for appointment  [x] Pt called to left message regarding cancelling 7/6/2023 secondary to  having issues and pt having TKR on 7/20/2023.         Objective:    [x]Unable to reassess for discharge note due to pt   [x] Cancelled   [] Did not show  [] Requests discharge   []See last daily/progress note for most recent treatment/assessment        From 6/20/2023:  Ambulating without cane     Girth Measurements (cm)

## 2023-07-06 NOTE — FLOWSHEET NOTE
Physical Therapy  Cancellation/No-show Note  Patient Name:  David Velez  :  1955   Date:  2023  Cancels to Date: 2  No-shows to Date: 0    For today's appointment patient:  [x]  Cancelled  []  Rescheduled appointment  []  No-show     Reason given by patient:  []  Patient ill  []  Conflicting appointment  []  No transportation    []  Conflict with work  []  No reason given  [x]  Other: pt's  ill and pt to have surgery 2023 for TKR. Comments:      Electronically signed by:   Bev Hernandez, [address] [place] [address], [id number]

## 2023-07-07 ENCOUNTER — OFFICE VISIT (OUTPATIENT)
Dept: FAMILY MEDICINE CLINIC | Age: 68
End: 2023-07-07

## 2023-07-07 ENCOUNTER — TELEPHONE (OUTPATIENT)
Dept: CARDIOLOGY CLINIC | Age: 68
End: 2023-07-07

## 2023-07-07 ENCOUNTER — TELEPHONE (OUTPATIENT)
Dept: ORTHOPEDIC SURGERY | Age: 68
End: 2023-07-07

## 2023-07-07 VITALS
HEIGHT: 66 IN | SYSTOLIC BLOOD PRESSURE: 120 MMHG | OXYGEN SATURATION: 98 % | HEART RATE: 61 BPM | BODY MASS INDEX: 43.55 KG/M2 | DIASTOLIC BLOOD PRESSURE: 82 MMHG | WEIGHT: 271 LBS

## 2023-07-07 DIAGNOSIS — I48.0 PAF (PAROXYSMAL ATRIAL FIBRILLATION) (HCC): ICD-10-CM

## 2023-07-07 DIAGNOSIS — I50.22 CHRONIC SYSTOLIC HEART FAILURE (HCC): ICD-10-CM

## 2023-07-07 DIAGNOSIS — Z01.818 PRE-OP EXAM: Primary | ICD-10-CM

## 2023-07-07 DIAGNOSIS — M17.12 PRIMARY OSTEOARTHRITIS OF LEFT KNEE: ICD-10-CM

## 2023-07-07 DIAGNOSIS — M85.89 OSTEOPENIA OF MULTIPLE SITES: ICD-10-CM

## 2023-07-07 DIAGNOSIS — E66.01 OBESITY, MORBID, BMI 40.0-49.9 (HCC): ICD-10-CM

## 2023-07-07 DIAGNOSIS — I15.2 HYPERTENSION ASSOCIATED WITH TYPE 2 DIABETES MELLITUS (HCC): ICD-10-CM

## 2023-07-07 DIAGNOSIS — I70.0 ATHEROSCLEROSIS OF AORTA (HCC): ICD-10-CM

## 2023-07-07 DIAGNOSIS — E11.59 HYPERTENSION ASSOCIATED WITH TYPE 2 DIABETES MELLITUS (HCC): ICD-10-CM

## 2023-07-07 LAB
25(OH)D3 SERPL-MCNC: 51 NG/ML
ALBUMIN SERPL-MCNC: 4.3 G/DL (ref 3.4–5)
ALBUMIN/GLOB SERPL: 2.3 {RATIO} (ref 1.1–2.2)
ALP SERPL-CCNC: 73 U/L (ref 40–129)
ALT SERPL-CCNC: 17 U/L (ref 10–40)
ANION GAP SERPL CALCULATED.3IONS-SCNC: 10 MMOL/L (ref 3–16)
APTT BLD: 36.2 SEC (ref 22.7–35.9)
AST SERPL-CCNC: 15 U/L (ref 15–37)
BACTERIA URNS QL MICRO: NORMAL /HPF
BASOPHILS # BLD: 0 K/UL (ref 0–0.2)
BASOPHILS NFR BLD: 0.6 %
BILIRUB SERPL-MCNC: 0.5 MG/DL (ref 0–1)
BILIRUB UR QL STRIP.AUTO: NEGATIVE
BUN SERPL-MCNC: 19 MG/DL (ref 7–20)
CALCIUM SERPL-MCNC: 9.3 MG/DL (ref 8.3–10.6)
CHLORIDE SERPL-SCNC: 102 MMOL/L (ref 99–110)
CLARITY UR: CLEAR
CO2 SERPL-SCNC: 28 MMOL/L (ref 21–32)
COLOR UR: YELLOW
CREAT SERPL-MCNC: 0.9 MG/DL (ref 0.6–1.2)
DEPRECATED RDW RBC AUTO: 14.1 % (ref 12.4–15.4)
EOSINOPHIL # BLD: 0.1 K/UL (ref 0–0.6)
EOSINOPHIL NFR BLD: 1.7 %
EPI CELLS #/AREA URNS AUTO: 2 /HPF (ref 0–5)
GFR SERPLBLD CREATININE-BSD FMLA CKD-EPI: >60 ML/MIN/{1.73_M2}
GLUCOSE SERPL-MCNC: 122 MG/DL (ref 70–99)
GLUCOSE UR STRIP.AUTO-MCNC: NEGATIVE MG/DL
HCT VFR BLD AUTO: 39.6 % (ref 36–48)
HGB BLD-MCNC: 13.3 G/DL (ref 12–16)
HGB UR QL STRIP.AUTO: NEGATIVE
HYALINE CASTS #/AREA URNS AUTO: 0 /LPF (ref 0–8)
INR PPP: 1.29 (ref 0.84–1.16)
KETONES UR STRIP.AUTO-MCNC: NEGATIVE MG/DL
LEUKOCYTE ESTERASE UR QL STRIP.AUTO: ABNORMAL
LYMPHOCYTES # BLD: 1.1 K/UL (ref 1–5.1)
LYMPHOCYTES NFR BLD: 25.7 %
MCH RBC QN AUTO: 30.6 PG (ref 26–34)
MCHC RBC AUTO-ENTMCNC: 33.7 G/DL (ref 31–36)
MCV RBC AUTO: 90.9 FL (ref 80–100)
MONOCYTES # BLD: 0.4 K/UL (ref 0–1.3)
MONOCYTES NFR BLD: 9.1 %
NEUTROPHILS # BLD: 2.8 K/UL (ref 1.7–7.7)
NEUTROPHILS NFR BLD: 62.9 %
NITRITE UR QL STRIP.AUTO: NEGATIVE
PH UR STRIP.AUTO: 7 [PH] (ref 5–8)
PLATELET # BLD AUTO: 204 K/UL (ref 135–450)
PMV BLD AUTO: 8.3 FL (ref 5–10.5)
POTASSIUM SERPL-SCNC: 4.3 MMOL/L (ref 3.5–5.1)
PREALB SERPL-MCNC: 25 MG/DL (ref 20–40)
PROT SERPL-MCNC: 6.2 G/DL (ref 6.4–8.2)
PROT UR STRIP.AUTO-MCNC: NEGATIVE MG/DL
PROTHROMBIN TIME: 16.1 SEC (ref 11.5–14.8)
RBC # BLD AUTO: 4.35 M/UL (ref 4–5.2)
RBC CLUMPS #/AREA URNS AUTO: 1 /HPF (ref 0–4)
SODIUM SERPL-SCNC: 140 MMOL/L (ref 136–145)
SP GR UR STRIP.AUTO: 1.02 (ref 1–1.03)
TRANSFERRIN SERPL-MCNC: 264 MG/DL (ref 200–360)
UA DIPSTICK W REFLEX MICRO PNL UR: YES
URN SPEC COLLECT METH UR: ABNORMAL
UROBILINOGEN UR STRIP-ACNC: 1 E.U./DL
WBC # BLD AUTO: 4.4 K/UL (ref 4–11)
WBC #/AREA URNS AUTO: 3 /HPF (ref 0–5)

## 2023-07-07 NOTE — TELEPHONE ENCOUNTER
General Question     Subject: Pt ASKING IF SHE SHOULD POST-PONE HER CARDIOLOGY APPT POST SX? Patient and /or Facility Request: Caterina Rendon \"Maira\"  Contact Number: [phone]     Pt HAS A CARDIOLOGY APPT ON 08/01 AFTER HER SX ND SHE WILL NEED TO DO AN ECHO AND SHE SEES 2 OTHER PROVIDERS AT HER APPTS. SHOULD SHE MOVE THIS APPT BACK? WILL SHE BE FEELING UP TO THAT MUCH MOVING AROUND AT THAT STAGE? PLEASE ADVISE @ THE # ABOVE.

## 2023-07-08 LAB
EST. AVERAGE GLUCOSE BLD GHB EST-MCNC: 137 MG/DL
HBA1C MFR BLD: 6.4 %

## 2023-07-10 LAB — MRSA SPEC QL CULT: NORMAL

## 2023-07-12 ENCOUNTER — TELEPHONE (OUTPATIENT)
Dept: ORTHOPEDIC SURGERY | Age: 68
End: 2023-07-12

## 2023-07-12 NOTE — TELEPHONE ENCOUNTER
CPAP download report from 6/10/2023 - 7/9/2023 on auto CPAP 16-20 cm H2O reviewed. Compliance is good 100%. AHI has improved and is good 3.8.

## 2023-07-12 NOTE — TELEPHONE ENCOUNTER
estrogen-         BMI Greater than 40 at time of scheduling?: Yes    Has Surgeon been notified of BMI concern? No    Weight Loss Clinic Consult Ordered: Not Applicable    Date of Wt Loss Clinic Appt:     BMI at time of surgery (if went through [facility]): Additional Medical Concerns:         Discharge Disposition Information:     Attended Pre-Hab Program: yes     Anticipated Discharge Disposition:  Home with OP PT    Who will be with patient at home following discharge?    and son       Equipment pt already has:  Marnette Hives and cane   Bedroom on first or second floor: first   Bathroom on first or second floor: first   Weight bearing status: full    Pre-op ambulatory status: none   Number of entry steps: Zero   Caregiver assistance: full time    Pt plan to DC same day: Alexis Londono to    [facility] AT [facility] preference: OP PT-Linh Gutierrez RN  7/12/2023

## 2023-07-13 RX ORDER — SENNOSIDES 8.6 MG
1 TABLET ORAL DAILY
COMMUNITY

## 2023-07-15 ENCOUNTER — ANESTHESIA EVENT (OUTPATIENT)
Dept: OPERATING ROOM | Age: 68
End: 2023-07-15
Payer: MEDICARE

## 2023-07-19 ENCOUNTER — PATIENT MESSAGE (OUTPATIENT)
Dept: FAMILY MEDICINE CLINIC | Age: 68
End: 2023-07-19

## 2023-07-19 NOTE — TELEPHONE ENCOUNTER
From: Leslie Hackett  To: Skyla Ledezma  Sent: 7/19/2023 11:28 AM EDT  Subject: Esophageal Spasms    Hi Mary, tomorrow is the big day. I have been cleaning like crazy to get ready for my down time and I think the dust got to me today. I am having the esophageal spasms and they are painful. I went to a GI and he had no suggestions. I've just been taking some tums and it eventually goes away. Do you have any other ideas?  Thank you Liudmila Johnson

## 2023-07-20 ENCOUNTER — HOSPITAL ENCOUNTER (OUTPATIENT)
Age: 68
Setting detail: OUTPATIENT SURGERY
Discharge: HOME | End: 2023-07-20
Attending: STUDENT IN AN ORGANIZED HEALTH CARE EDUCATION/TRAINING PROGRAM | Admitting: STUDENT IN AN ORGANIZED HEALTH CARE EDUCATION/TRAINING PROGRAM
Payer: MEDICARE

## 2023-07-20 ENCOUNTER — ANESTHESIA (OUTPATIENT)
Dept: OPERATING ROOM | Age: 68
End: 2023-07-20
Payer: MEDICARE

## 2023-07-20 VITALS
BODY MASS INDEX: 43.55 KG/M2 | HEART RATE: 35 BPM | DIASTOLIC BLOOD PRESSURE: 110 MMHG | HEIGHT: 66 IN | RESPIRATION RATE: 16 BRPM | WEIGHT: 271 LBS | OXYGEN SATURATION: 96 % | SYSTOLIC BLOOD PRESSURE: 149 MMHG | TEMPERATURE: 96.7 F

## 2023-07-20 DIAGNOSIS — I49.3 PVC (PREMATURE VENTRICULAR CONTRACTION): Primary | ICD-10-CM

## 2023-07-20 LAB
ABO + RH BLD: NORMAL
ALBUMIN SERPL-MCNC: 4 G/DL (ref 3.4–5)
ALBUMIN/GLOB SERPL: 1.6 {RATIO} (ref 1.1–2.2)
ALP SERPL-CCNC: 61 U/L (ref 40–129)
ALT SERPL-CCNC: 18 U/L (ref 10–40)
ANION GAP SERPL CALCULATED.3IONS-SCNC: 11 MMOL/L (ref 3–16)
APTT BLD: 30.9 SEC (ref 22.7–35.9)
AST SERPL-CCNC: 16 U/L (ref 15–37)
BILIRUB SERPL-MCNC: 0.9 MG/DL (ref 0–1)
BLD GP AB SCN SERPL QL: NORMAL
BUN SERPL-MCNC: 22 MG/DL (ref 7–20)
CALCIUM SERPL-MCNC: 9.4 MG/DL (ref 8.3–10.6)
CHLORIDE SERPL-SCNC: 103 MMOL/L (ref 99–110)
CO2 SERPL-SCNC: 24 MMOL/L (ref 21–32)
CREAT SERPL-MCNC: 0.9 MG/DL (ref 0.6–1.2)
DEPRECATED RDW RBC AUTO: 14.1 % (ref 12.4–15.4)
EKG ATRIAL RATE: 61 BPM
EKG ATRIAL RATE: 73 BPM
EKG DIAGNOSIS: NORMAL
EKG DIAGNOSIS: NORMAL
EKG P AXIS: 72 DEGREES
EKG P AXIS: 84 DEGREES
EKG P-R INTERVAL: 170 MS
EKG P-R INTERVAL: 170 MS
EKG Q-T INTERVAL: 474 MS
EKG Q-T INTERVAL: 486 MS
EKG QRS DURATION: 100 MS
EKG QRS DURATION: 102 MS
EKG QTC CALCULATION (BAZETT): 489 MS
EKG QTC CALCULATION (BAZETT): 522 MS
EKG R AXIS: -1 DEGREES
EKG R AXIS: -2 DEGREES
EKG T AXIS: 156 DEGREES
EKG T AXIS: 95 DEGREES
EKG VENTRICULAR RATE: 61 BPM
EKG VENTRICULAR RATE: 73 BPM
GFR SERPLBLD CREATININE-BSD FMLA CKD-EPI: >60 ML/MIN/{1.73_M2}
GLUCOSE BLD-MCNC: 151 MG/DL (ref 70–99)
GLUCOSE SERPL-MCNC: 162 MG/DL (ref 70–99)
HCT VFR BLD AUTO: 40.1 % (ref 36–48)
HGB BLD-MCNC: 13.6 G/DL (ref 12–16)
INR PPP: 1.19 (ref 0.84–1.16)
MAGNESIUM SERPL-MCNC: 1.9 MG/DL (ref 1.8–2.4)
MCH RBC QN AUTO: 30.6 PG (ref 26–34)
MCHC RBC AUTO-ENTMCNC: 33.8 G/DL (ref 31–36)
MCV RBC AUTO: 90.4 FL (ref 80–100)
PERFORMED ON: ABNORMAL
PLATELET # BLD AUTO: 234 K/UL (ref 135–450)
PMV BLD AUTO: 8.1 FL (ref 5–10.5)
POTASSIUM SERPL-SCNC: 3.7 MMOL/L (ref 3.5–5.1)
PROT SERPL-MCNC: 6.5 G/DL (ref 6.4–8.2)
PROTHROMBIN TIME: 15.1 SEC (ref 11.5–14.8)
RBC # BLD AUTO: 4.44 M/UL (ref 4–5.2)
SODIUM SERPL-SCNC: 138 MMOL/L (ref 136–145)
TROPONIN, HIGH SENSITIVITY: 10 NG/L (ref 0–14)
WBC # BLD AUTO: 6.3 K/UL (ref 4–11)

## 2023-07-20 PROCEDURE — 85730 THROMBOPLASTIN TIME PARTIAL: CPT

## 2023-07-20 PROCEDURE — 93005 ELECTROCARDIOGRAM TRACING: CPT | Performed by: INTERNAL MEDICINE

## 2023-07-20 PROCEDURE — 6360000002 HC RX W HCPCS: Performed by: STUDENT IN AN ORGANIZED HEALTH CARE EDUCATION/TRAINING PROGRAM

## 2023-07-20 PROCEDURE — 2500000003 HC RX 250 WO HCPCS: Performed by: STUDENT IN AN ORGANIZED HEALTH CARE EDUCATION/TRAINING PROGRAM

## 2023-07-20 PROCEDURE — 86901 BLOOD TYPING SEROLOGIC RH(D): CPT

## 2023-07-20 PROCEDURE — 99223 1ST HOSP IP/OBS HIGH 75: CPT | Performed by: INTERNAL MEDICINE

## 2023-07-20 PROCEDURE — 6370000000 HC RX 637 (ALT 250 FOR IP): Performed by: ANESTHESIOLOGY

## 2023-07-20 PROCEDURE — 80053 COMPREHEN METABOLIC PANEL: CPT

## 2023-07-20 PROCEDURE — 2580000003 HC RX 258: Performed by: STUDENT IN AN ORGANIZED HEALTH CARE EDUCATION/TRAINING PROGRAM

## 2023-07-20 PROCEDURE — 6360000002 HC RX W HCPCS

## 2023-07-20 PROCEDURE — 86850 RBC ANTIBODY SCREEN: CPT

## 2023-07-20 PROCEDURE — 86900 BLOOD TYPING SEROLOGIC ABO: CPT

## 2023-07-20 PROCEDURE — 36415 COLL VENOUS BLD VENIPUNCTURE: CPT

## 2023-07-20 PROCEDURE — 83735 ASSAY OF MAGNESIUM: CPT

## 2023-07-20 PROCEDURE — 85027 COMPLETE CBC AUTOMATED: CPT

## 2023-07-20 PROCEDURE — 6360000002 HC RX W HCPCS: Performed by: ANESTHESIOLOGY

## 2023-07-20 PROCEDURE — 93010 ELECTROCARDIOGRAM REPORT: CPT | Performed by: INTERNAL MEDICINE

## 2023-07-20 PROCEDURE — 85610 PROTHROMBIN TIME: CPT

## 2023-07-20 PROCEDURE — 84484 ASSAY OF TROPONIN QUANT: CPT

## 2023-07-20 DEVICE — CEMENT BONE 40GM HI VISC PALACOS R: Type: IMPLANTABLE DEVICE | Status: FUNCTIONAL

## 2023-07-20 RX ORDER — ACETAMINOPHEN 500 MG
1000 TABLET ORAL ONCE
Status: COMPLETED | OUTPATIENT
Start: 2023-07-20 | End: 2023-07-20

## 2023-07-20 RX ORDER — LIDOCAINE HYDROCHLORIDE 10 MG/ML
2 INJECTION, SOLUTION INFILTRATION; PERINEURAL
Status: DISCONTINUED | OUTPATIENT
Start: 2023-07-20 | End: 2023-07-20 | Stop reason: HOSPADM

## 2023-07-20 RX ORDER — SODIUM CHLORIDE, SODIUM LACTATE, POTASSIUM CHLORIDE, CALCIUM CHLORIDE 600; 310; 30; 20 MG/100ML; MG/100ML; MG/100ML; MG/100ML
INJECTION, SOLUTION INTRAVENOUS CONTINUOUS
Status: DISCONTINUED | OUTPATIENT
Start: 2023-07-20 | End: 2023-07-20 | Stop reason: HOSPADM

## 2023-07-20 RX ORDER — SODIUM CHLORIDE 9 MG/ML
INJECTION, SOLUTION INTRAVENOUS PRN
Status: DISCONTINUED | OUTPATIENT
Start: 2023-07-20 | End: 2023-07-20 | Stop reason: HOSPADM

## 2023-07-20 RX ORDER — SODIUM CHLORIDE 0.9 % (FLUSH) 0.9 %
5-40 SYRINGE (ML) INJECTION PRN
Status: DISCONTINUED | OUTPATIENT
Start: 2023-07-20 | End: 2023-07-20 | Stop reason: HOSPADM

## 2023-07-20 RX ORDER — MAGNESIUM SULFATE IN WATER 40 MG/ML
2000 INJECTION, SOLUTION INTRAVENOUS PRN
Status: DISCONTINUED | OUTPATIENT
Start: 2023-07-20 | End: 2023-07-20 | Stop reason: HOSPADM

## 2023-07-20 RX ORDER — SODIUM CHLORIDE 0.9 % (FLUSH) 0.9 %
5-40 SYRINGE (ML) INJECTION EVERY 12 HOURS SCHEDULED
Status: DISCONTINUED | OUTPATIENT
Start: 2023-07-20 | End: 2023-07-20 | Stop reason: HOSPADM

## 2023-07-20 RX ORDER — ONDANSETRON 2 MG/ML
INJECTION INTRAMUSCULAR; INTRAVENOUS
Status: COMPLETED
Start: 2023-07-20 | End: 2023-07-20

## 2023-07-20 RX ORDER — LIDOCAINE HYDROCHLORIDE 10 MG/ML
1 INJECTION, SOLUTION EPIDURAL; INFILTRATION; INTRACAUDAL; PERINEURAL
Status: DISCONTINUED | OUTPATIENT
Start: 2023-07-20 | End: 2023-07-20 | Stop reason: HOSPADM

## 2023-07-20 RX ORDER — MAGNESIUM SULFATE 1 G/100ML
1000 INJECTION INTRAVENOUS ONCE
Status: DISCONTINUED | OUTPATIENT
Start: 2023-07-20 | End: 2023-07-20 | Stop reason: HOSPADM

## 2023-07-20 RX ORDER — ONDANSETRON 2 MG/ML
4 INJECTION INTRAMUSCULAR; INTRAVENOUS ONCE
Status: COMPLETED | OUTPATIENT
Start: 2023-07-20 | End: 2023-07-20

## 2023-07-20 RX ADMIN — ACETAMINOPHEN 1000 MG: 500 TABLET ORAL at 10:33

## 2023-07-20 RX ADMIN — ONDANSETRON 4 MG: 2 INJECTION INTRAMUSCULAR; INTRAVENOUS at 12:18

## 2023-07-20 ASSESSMENT — PAIN - FUNCTIONAL ASSESSMENT
PAIN_FUNCTIONAL_ASSESSMENT: 0-10
PAIN_FUNCTIONAL_ASSESSMENT: ACTIVITIES ARE NOT PREVENTED

## 2023-07-20 ASSESSMENT — PAIN DESCRIPTION - DESCRIPTORS: DESCRIPTORS: ACHING;SORE

## 2023-07-20 ASSESSMENT — LIFESTYLE VARIABLES: SMOKING_STATUS: 0

## 2023-07-20 NOTE — CONSULTS
Electrophysiology Consultation   Date: 7/20/2023  Admit Date:  7/20/2023  Reason for Consultation: Premature ventricular contractions. Consult Requesting Physician: Marisol Corrales MD     No chief complaint on file. HPI:   Mrs. Jules Ruiz is a pleasant 78year old female with a medical history significant for paroxysmal atrial fibrillation, premature ventricular contractions, history of non-ischemic cardiomyopathy, hypertension, chronic renal insufficiency, osteoarthritis, and hypothyroidism who presented from home for knee replacement surgery that was cancelled for due to premature ventricular contractions. According to patient she has been doing well since she was started on dofetilide. He reports rare intermittent palpitations. She has been under a lot of stress at home and hasn't been getting a lot of sleep. She reports that prior to come back to get prepared for surgery, she began from palpitations, and nausea. Her surgery was cancelled and electrophysiology was consulted. She was started on amiodarone drip.       Patient denies fevers, chest pain, orthopnea, PND, lower extremity edema, abdominal swelling, shortness of breath, dyspnea on exertion, chills, visual changes, headaches, sore throat, cough, abdominal pain, bleeding, bruising, dysuria, muscle/joint pain, confusion, depression, anxiety, skin lesions, etc.    Past Medical History:   Diagnosis Date    Abnormal Pap smear of cervix 06/11/2018    hpv+    Allergic     Allergic rhinitis     Aortic stenosis, mild-echo 8/2016 08/18/2016    Atrial fibrillation (HCC)     BMI 40.0-44.9, adult ([facility])     Breast cancer ([facility])     54    Cancer ([facility]) 2009    right breast    CHF (congestive heart failure) (HCC)     Depression     GERD (gastroesophageal reflux disease)     H/O laparoscopic adjustable gastric banding 10/27/2016    Heart palpitations     HPV (human papilloma virus) infection 06/11/2018    HPV test positive 12/2014    Hyperlipidemia hypokinesis           Assessment  1. Nonischemic cardiomyopathy  2. Given worsening cardiomyopathy, suggest Cardiac MRI                - eval for myocarditis or infiltrative cardiomyopathy  3. Will d/c flecainide and dilt given worsening LVEF, pt currently in NSR                - start toprol 25                - start ASA and lisinopril  4. Lasix in post op area                - will make Appt with heart failure team    I independently reviewed the ECG and telemetry.     Scheduled Meds:   ceFAZolin (ANCEF) IVPB  3,000 mg IntraVENous On Call to OR    sodium chloride flush  5-40 mL IntraVENous 2 times per day    magnesium sulfate  1,000 mg IntraVENous Once    ortho mix injection   Injection On Call     Continuous Infusions:   lactated ringers IV soln      sodium chloride      lactated ringers IV soln       PRN Meds:.lidocaine 1 % injection, sodium chloride flush, sodium chloride, lidocaine, magnesium sulfate     Assessment:   Patient Active Problem List    Diagnosis Date Noted    History of breast cancer 11/20/2013    Osteopenia of multiple sites 05/17/2023    Atherosclerosis of aorta ([address]) 05/17/2023    Hyperlipidemia associated with type 2 diabetes mellitus ([address]) 04/21/2023    PAF (paroxysmal atrial fibrillation) ([address]) 03/05/2020    Bilateral change in hearing 01/28/2020    Non-ischemic cardiomyopathy ([address]) 08/23/2019    Chronic systolic heart failure ([address]) 08/23/2019    Obesity, morbid, BMI 40.0-49.9 ([address]) 06/22/2018    Hypertension associated with type 2 diabetes mellitus ([address]) 05/12/2017    H/O laparoscopic adjustable gastric banding 10/27/2016    Aortic valve stenosis 08/18/2016    Mild aortic regurgitation-echo 8/2016 08/18/2016    Hiatal hernia 06/10/2016    Multiple gastric polyps 06/10/2016    Mild episode of recurrent major depressive disorder ([address]) 05/02/2016    Hypothyroidism 11/04/2015    Severe obstructive sleep apnea 10/07/2014    Allergic rhinitis 08/05/2013    Chronic GERD       There are no active

## 2023-07-20 NOTE — DISCHARGE INSTRUCTIONS
Please contact [facility] Orthopaedic Nurse Navigator with any questions or concerns after your discharge. *** Mon- Fri 9am- 5pm [phone]. If you have any issues or concerns after 5pm or on the weekend please call your surgeon's office. I will be contacting you in a few days to follow up. If you need a pain medication refill please contact your surgeon's office. [place] is participating in [address] [place] [address] for Joint Replacement (CJR) [address] [facility] is participating in a Medicare initiative called the [facility] for Joint Replacement (CJR) model. Medicare designed this model to encourage higher quality care and greater financial accountability from hospitals when Medicare beneficiaries receive lower-extremity joint replacement procedures ([place]), typically hip or knee replacements. Roberto Mcke[place] participation in the [address] [place] [address] should not restrict your access to care for your medical condition or your freedom to choose your health care providers and services. All existing Medicare beneficiary protections continue to be available to you. The CJR model aims to help give you better care. The CJR model aims to support better and more efficient care for beneficiaries undergoing LEJR procedures. A CJR episode of care is typically defined as an admission of an eligible Medicare beneficiary to a hospital participating in the [address] [place] [address] for an [place] procedure. The LEJR procedure can take place in either an inpatient or outpatient setting and will still be considered a CJR episode of care. The CJR episode of care continues for 90 days following discharge.      This model uses episode payment and quality measurement for an episode of care associated with LEJR procedures to encourage hospitals, physicians, and post-acute care providers to work together to improve the quality and coordination of care

## 2023-07-20 NOTE — ANESTHESIA PRE PROCEDURE
Department of Anesthesiology  Preprocedure Note       Name:  Alesia Dakins   Age:  70 y.o.  :  1955                                          MRN:  [id number]         Date:  2023      Surgeon: Roshan Willams):  Beba Cabral MD    Procedure: Procedure(s):  LEFT TOTAL KNEE ARTHROPLASTY              AMAURY          **LATEX SENSITIVE**  NOTE: ADDUCTOR CANAL BLOCK    Medications prior to admission:   Prior to Admission medications    Medication Sig Start Date End Date Taking?  Authorizing Provider   senna (SENOKOT) 8.6 MG TABS tablet Take 1 tablet by mouth daily In am   Yes Historical Provider, MD   oxybutynin (DITROPAN XL) 15 MG extended release tablet TAKE 1 TABLET ONCE DAILY  Patient taking differently: TAKE 1 TABLET ONCE DAILY; takes at [facility] 23   Sari Valentin PA   omeprazole (PRILOSEC) 40 MG delayed release capsule Take 1 capsule by mouth every morning (before breakfast) 23   [facility] PA   levothyroxine (SYNTHROID) 137 MCG tablet TAKE 1 TABLET ONCE DAILY  Patient taking differently: TAKE 1 TABLET ONCE DAILY in am 23   [facility]   citalopram (CELEXA) 40 MG tablet TAKE 1 TABLET DAILY  Patient taking differently: TAKE 1 TABLET DAILY in am 23   [facility], PA   montelukast (SINGULAIR) 10 MG tablet TAKE 1 TABLET NIGHTLY 3/22/23   [facility] PA   valsartan (DIOVAN) 40 MG tablet TAKE 1 TABLET BY MOUTH ONE TIME A DAY  Patient taking differently: TAKE 1 TABLET BY MOUTH ONE TIME A DAY in am 3/21/23   Larry Campbell MD   dofetilide (TIKOSYN) 500 MCG capsule TAKE 1 CAPSULE BY MOUTH EVERY 12 HOURS 23   BLANK Wise - CNP   Vitamin D, Cholecalciferol, 25 MCG (1000 UT) CAPS In am    Historical Provider, MD   atorvastatin (LIPITOR) 40 MG tablet TAKE 1 TABLET DAILY  Patient taking differently: TAKE 1 TABLET DAILY at [facility] 23   Larry Campbell MD   apixaban (ELIQUIS) 5 MG TABS tablet TAKE 1 TABLET BY MOUTH TWO TIMES A DAY 23   BLANK Wise -

## 2023-07-20 NOTE — H&P
Update History & Physical    The patient's History and Physical of July 7, 2023 was reviewed with the patient and I examined the patient. There was no change. The surgical site was confirmed by the patient and me. Plan: The risks, benefits, expected outcome, and alternative to the recommended procedure have been discussed with the patient. Patient understands and wants to proceed with the procedure.      Electronically signed by Aly Waller MD on 2/85/0357 at 7:25 AM

## 2023-07-20 NOTE — PROGRESS NOTES
Arrival time for surgery 7/20/23 is 1100 am, 2 hours prior to procedure.
Brenda Plan    Age 70 y.o.    female    1955    MRN [id number]    7/20/2023  Arrival Time_____________  OR Time____________[address]     Procedure(s):  LEFT TOTAL KNEE ARTHROPLASTY              AMAURY          **LATEX SENSITIVE**  NOTE: ADDUCTOR CANAL BLOCK                      General   Surgeon(s):  Tanja Phoenix, MD      DAY ADMIT ___  SDS/OP ___  OUTPT IN BED ___        Phone [phone] (home)     PCP _____________________ Phone_________________ Epic ( ) Epic CE ( ) Appt ________    ADDITIONAL INFO __________________________________ Cardio/Consult _____________    NOTES _____________________________________________________________________    ____________________________________________________________________________    PAT APPT DATE:________ TIME: ________  FAXED QAD: _______  (__) H&P w/ Hospitalist  __________________________________________________________________________  Preop Nurse phone screen complete: _____________  (__) CBC     (__) W/ DIFF ___________     (__) Hgb A1C    ___________  (__) CHEST X RAY   __________  (__) LIPID PROFILE  ___________  (__) EKG   __________  (__) PT/PTT   ___________  (__) PFT's   __________  (__) BMP   ___________  (__) CAROTIDS  __________  (__) CMP   ___________  (__) VEIN MAPPING  __________  (__) U/A   ___________  (__) HISTORY & PHYSICAL __________  (__) URINE C & S  ___________  (__) CARDIAC CLEARANCE __________  (__) U/A W/ FLEX  ___________  (__) PULM.  CLEARANCE __________  (__) SERUM PREGNANCY ___________  (__) Check Epic DOS orders __________  (__) TYPE & SCREEN __________repeat ( ) (__)  __________________ __________  (__) ALBUMIN   ___________  (__)  __________________ __________  (__) TRANSFERRIN  ___________  (__)  __________________ __________  (__) LIVER PROFILE  ___________  (__)  __________________ __________  (__) MRSA NASAL SWAB ___________  (__) URINE PREG DOS __________  (__) SED RATE  ___________  (__) BLOOD SUGAR DOS __________  (__)
Patient admitted to [address] [place] [address]. Consents verified. Patient NPO since 2300 last night. All patient belongings to go to PACU during procedure, including glasses.
Patient reports feeling nauseated and lightheaded. Verbal order for Zofran IVP from Dr. Valentina Paulino. Patient placed on monitor and found to be in trigeminy PVC. Dr. Valentina Paulino at bedside.
Preoperative Screening for Elective Surgery/Invasive Procedures While COVID-19 present in the community    Have you had any of the following symptoms? Fever, chills  Cough  Shortness of breath  Muscle aches/pain  Diarrhea  Abdominal pain, nausea, vomiting  Loss or decrease in taste and / or smell  Risk of Exposure  Have you recently been hospitalized for COVID-19 or flu-like illness, if so when? Recently diagnosed with COVID-19, if so when? Recently tested for COVID-19, if so when? Have you been in close contact with a person or family member who currently has or recently had COVID-23? If yes, when and in what context? Do you live with anybody who in the last 14 days has had fever, chills, shortness of breath, muscle aches, flu-like illness? Do you have any close contacts or family members who are currently in the hospital for COVID-19 or flu-like illness? If yes, assess recent close contact with this person. Indicate if the patient has a positive screen by answering yes to one or more of the above questions. Patients who test positive or screen positive prior to surgery or on the day of surgery should be evaluated in conjunction with the surgeon/proceduralist/anesthesiologist to determine the urgency of the procedure.     Pt states is symptom free and denies covid exposure
Pt with frequent ventricular beats and nausea, light headedness and general feeling unwell. With these symptoms in the setting of frequent PVCs there is concern for a cardiac issue that should be investigated prior to elective surgery. Will cancel for now and ask the hospitalist team to see pt for further w/u.
Sent [employer] to Hospitalist to see the patient in PACU per Anesthesiology.
Urine culture not done with other pre op labs. States no urine culture comprehensive. Jovana Rivas informed and notified not done. Informed urinalysis WNL. No need for urine C&S per Jovana Rivas.
Verbal order from Dr. Guo Generous to hold off on the magnesium administration that was ordered previously by Dr. Satya Gaspar. Told to wait on medications until hospitalist consult.
guardian must accompany a child scheduled for surgery and plan to stay at the hospital until the child is discharged. Please do not bring other               children with you. Please wear simple, loose-fitting clothing to the hospital. Arley Rider not bring valuables (money, credit cards, checkbooks, etc.) Do not wear any makeup (including no eye               makeup) and no nail polish if applicable. DO NOT wear any jewelry or body piercings day of surgery. All body piercing jewelry must be removed. If you have dentures they will be removed before going to the OR; we will provide a container. If you wear contact lenses or glasses, they will be removed; please               bring a case for them or wear glasses day of surgery. Please see your family doctor/pediatrician for a Preop History & Physical and/or concerning medications within 30 days of the surgery date. Non-Epic               physicians can fax H&P/test results to [phone]. You may need cardiac clearance if you see a cardiologist, check with PCP or surgeon. If you have a Living Will and Durable Power of  for Healthcare, please bring in a copy to be scanned at registration. Notify your Surgeon if you develop any illness between now and the surgery date, cough, cold, fever, sore throat, nausea, vomiting, etc.  Please notify your surgeon              if you experience dizziness, shortness of breath or blurred vision between now & the time of your surgery. DO NOT shave your operative site less than 4 days prior to surgery. For face & neck surgery, men may use an electric razor up to 2 days prior to surgery. To help prevent infection, change your sheets the night before surgery. Also, shower the night before & morning of surgery using an antibacterial soap (Dial    or Safeguard) or Hibiclens soap as instructed by your surgeon.  Do not apply lotion after shower or day of

## 2023-07-20 NOTE — CONSULTS
Hospital Medicine Consult History & Physical    Date of Service: Pt seen/examined in consultation on 7/20/2023 at the request of Munira Mcconnell MD for evaluation of PVCs. Chief Complaint: Nausea, lightheadedness    Presenting Admission History:   70 y.o. female with PMHx significant for non-ischemic cardiomyopathy, Paroxysmal Atrial Fibrillation on Tikosyn. She presented to [facility] initially for elective TKR today with Dr. Laura Marie. However, in the pre-op holding area prior to surgery, she reports she developed sudden onset of nausea and lightheadedness. She reports she was previously feeling well when she came in. Symptoms occurred prior to receiving kelechi anesthesia or procedures. She was placed on tele and noted to have Trigeminy. Given concern for symptomatic PVCs, anesthesia recommended that her case be cancelled and Hospital Medicine was consulted. Upon my evaluation, she reports her symptoms have started to subside with anti-emetics. Assessment/Plan:    Current Principal Problem:  <principal problem not specified>    Frequent PVCs, Paroxysmal Atrial Fibrillation: Telemetry and EKG showed frequent PVCs, with occasional periods of bigeminy/trigeminy. She denied any chest pain or SOB. Electrolytes and troponin were normal. I reviewed her case with her Electrophysiology team and they came to evaluate her as well. It was determined that her PVCs were likely chronic and asymptomatic. Her symptoms appeared most consistent with being NPO for prolonged period pre-op. After eating and ambulating this evening, she feels back to baseline. I updated Orthopedics on our collective recommendations and the patient is suitable for discharge home this evening. A cardiac monitor was provided by the Cardiology team. Although technically she remains eligible to proceed with surgery, with risks as documented by EP, Orthopedics plans to postpone her surgery for now. Medically stable for discharge.      EKG:  I differently: TAKE 1 TABLET ONCE DAILY in am 4/14/23   KADEN Tyler   citalopram (CELEXA) 40 MG tablet TAKE 1 TABLET DAILY  Patient taking differently: TAKE 1 TABLET DAILY in am 4/14/23   KADEN Tyler   montelukast (SINGULAIR) 10 MG tablet TAKE 1 TABLET NIGHTLY 3/22/23   KADEN Tyler   valsartan (DIOVAN) 40 MG tablet TAKE 1 TABLET BY MOUTH ONE TIME A DAY  Patient taking differently: TAKE 1 TABLET BY MOUTH ONE TIME A DAY in am 3/21/23   Tonya Reeder MD   dofetilide (TIKOSYN) 500 MCG capsule TAKE 1 CAPSULE BY MOUTH EVERY 12 HOURS 2/23/23   BLANK Wise CNP   Vitamin D, Cholecalciferol, 25 MCG (1000 UT) CAPS In am    Historical Provider, MD   atorvastatin (LIPITOR) 40 MG tablet TAKE 1 TABLET DAILY  Patient taking differently: TAKE 1 TABLET DAILY at [facility] 1/17/23   Tonya Reeder MD   apixaban (ELIQUIS) 5 MG TABS tablet TAKE 1 TABLET BY MOUTH TWO TIMES A DAY 1/11/23   BLANK Wise CNP   spironolactone (ALDACTONE) 25 MG tablet TAKE 1 TABLET ONCE DAILY  Patient taking differently: TAKE 1 TABLET ONCE DAILY in am 9/2/22   Tonya Reeder MD   Cetirizine HCl 10 MG CAPS Take 1 capsule by mouth In am    Historical Provider, MD GALARZA PO Take by mouth    Historical Provider, MD   fluticasone (FLONASE) 50 MCG/ACT nasal spray 2 SPRAYS IN EACH NOSTRIL ONCE DAILY. 2/25/21   KADEN Tyler   furosemide (LASIX) 20 MG tablet TAKE 1 TABLET BY MOUTH ONE TIME A DAY AS NEEDED FOR WEIGHT GAIN OF 3LBS IN A DAY OR 5LBS IN A WEEK 1/27/21   BLANK Mary CNP   multivitamin [facility]) per tablet Take 1 tablet by mouth daily    Historical Provider, MD       Labs: Personally reviewed and interpreted for clinical significance.    Recent Labs     07/20/23  1601   WBC 6.3   HGB 13.6   HCT 40.1        Recent Labs     07/20/23  1601      K 3.7      CO2 24   BUN 22*   CREATININE 0.9   CALCIUM 9.4   MG 1.90     Recent Labs     07/20/23  1601   TROPHS 10     No results for

## 2023-07-21 ENCOUNTER — TELEPHONE (OUTPATIENT)
Dept: CARDIOLOGY | Age: 68
End: 2023-07-21

## 2023-07-21 ENCOUNTER — TELEPHONE (OUTPATIENT)
Dept: ORTHOPEDIC SURGERY | Age: 68
End: 2023-07-21

## 2023-07-21 RX ORDER — DICYCLOMINE HCL 20 MG
20 TABLET ORAL EVERY 6 HOURS PRN
Qty: 90 TABLET | Refills: 1 | Status: SHIPPED | OUTPATIENT
Start: 2023-07-21 | End: 2023-08-20

## 2023-07-21 NOTE — TELEPHONE ENCOUNTER
Attempted to contact pt, left voicemail with purpose and call back number.     Khoa Vargas  Orthopedic Nurse Navigator  Phone number: [phone]    Follow up appointments:    Future Appointments   Date Time Provider [address]   7/25/2023  2:15 PM Adriane Naranjo, PT [facility] EG PT [place] [facility]   7/27/2023  3:45 PM Adriane Naranjo, PT [facility] EG PT [place] [facility]   8/1/2023 10:30 AM Conni Ganser, PT [facility] PT [place] [facility]   8/1/2023 12:30 PM A ECHO ROOM [facility] AND CAR Juana Garcia   8/1/2023  2:15 PM BLANK Kam CNP [facility]   8/1/2023  2:45 PM Tobias Suero MD [facility]   8/3/2023  1:30 PM Conni Ganser, PT [facility] PT [place] [facility]   8/8/2023  1:30 PM Conni Ganser, PT [facility] PT [place] [facility]   8/9/2023 93:38 AM Silverio Carlos MD [facility]   8/10/2023  8:15 AM Juan Dyer, PT [facility] EG PT [place] [facility]   5/31/2024 11:40 AM Starlene Blizzard, APRN - CNP CLERM PULM [facility]

## 2023-07-21 NOTE — TELEPHONE ENCOUNTER
Monitor company [employer]  Length of monitor 7 days  Monitor ordered by Al Giles MD   Patch ID [id number]   Device number [facility]- 2046  Activation successful prior to pt leaving hospital? Yes  Instructions given to patient. She verbalized understanding. All questions answered to the best of my ability. Monitor appiled on 7/20/21.

## 2023-07-26 ENCOUNTER — TELEPHONE (OUTPATIENT)
Dept: ORTHOPEDIC SURGERY | Age: 68
End: 2023-07-26

## 2023-07-26 NOTE — TELEPHONE ENCOUNTER
Attempted to contact pt, left voicemail with purpose and call back number. Parish Hayley  Orthopedic Nurse Navigator  Phone number: [phone]    Follow up appointments:    Future Appointments   Date Time Provider [address]   8/1/2023 12:30 PM [facility] ECHO ROOM [facility] AND [facility]ANN[facility]   8/1/2023  2:15 [facility]   8/1/2023  2:45 [facility]   8/9/2023 25:15 AM Vinay Garg MD [facility]   5/31/2024 11:40 AM Joseph Carson, APRN - CNP CLERM PULM [facility]     Signed off from pt. Instructed pt to call RN or Surgeon's office with any issues or concerns.

## 2023-07-28 DIAGNOSIS — I48.0 PAF (PAROXYSMAL ATRIAL FIBRILLATION) (HCC): Primary | ICD-10-CM

## 2023-07-28 RX ORDER — DOFETILIDE 0.5 MG/1
500 CAPSULE ORAL EVERY 12 HOURS
Qty: 60 CAPSULE | Refills: 0 | Status: SHIPPED | OUTPATIENT
Start: 2023-07-28

## 2023-07-28 NOTE — TELEPHONE ENCOUNTER
Last OV - 01/11/2023 GAVIN  Upcoming OV - 08/01/2023    Last labs - 07/2023  Last EKG  - 07/20/2023    GAVIN is OOT.

## 2023-07-28 NOTE — TELEPHONE ENCOUNTER
Pt needs small amount of dofetilide (TIKOSYN) 500 MCG capsule called into local pharmacy to cover till mail order arrives. Please send to [facility].

## 2023-07-28 NOTE — PROGRESS NOTES
[facility]  Advanced CHF/Pulmonary Hypertension   Cardiac Evaluation      Sol Beverly  YOB: 1955    Date of Visit:  8/1/23    Chief Complaint   Patient presents with    Follow-up    Congestive Heart Failure      History of Present Illness:  Sol Beverly is a 70 y.o. female who presents from referral from Dr. Reinaldo Casper for consultation and management of cardiomyopathy. She presented to the hospital 07/11/19 with palpitations. She was found to be in AFib RVR. She was started on flecainide, diltiazem and eliquis. Her echo from 07/11/19 showed her EF was 48-50% with mild/ global hypokinesis. Mild MR and AR. Her stress test from 07/25/19 showed a mixed defect in the anteroseptal wall raising concern for mixed ischemia/scar. Her cardiac cath from 08/02/19 showed nonischemic cardiomyopathy. Flecainide and diltiazem were stopped due to worsening. LVEF. She was started on toprol 25 mg. She was started on lisinopril and eliquis as well. She believes she has had PAF for 30 years but was never able to capture it. She reports she feels awful and SOB with the AFib. She reports she verifies afib with checking her radial pulse. She started having palpitations again last night after eating. She does not check her BP at home. Her cardiac MRI from 08/02/19 showed her EF was 45%. She underwent afib ablation with Dr Arie Valdivia on 03/05/20. Her echo from 09/29/20 showed her EF was 55%. Elevated RA pressures. OV 1/11/2022 she reported she had retired recently. Her echo 3/10/2022 demonstrated an LVEF of 50% with frequent premature beats throughout the study. She was admitted for Tikosyn start 3/28/2022. She wore a 2 week cardiac event monitor 4/2022 that showed NSR with brief PAF, brief PAT, and brief NSVT. OV, 7/19/2022,  She has been compliant with her CPAP. She is only using lasix as needed, which isn't very often. LOV 1/17/2023, she says she is feeling good.  She says Maryln Olszewski is working

## 2023-08-01 ENCOUNTER — OFFICE VISIT (OUTPATIENT)
Dept: CARDIOLOGY CLINIC | Age: 68
End: 2023-08-01

## 2023-08-01 ENCOUNTER — APPOINTMENT (OUTPATIENT)
Dept: PHYSICAL THERAPY | Age: 68
End: 2023-08-01
Payer: MEDICARE

## 2023-08-01 ENCOUNTER — HOSPITAL ENCOUNTER (OUTPATIENT)
Dept: CARDIOLOGY | Age: 68
Discharge: HOME | End: 2023-08-01
Payer: MEDICARE

## 2023-08-01 VITALS
BODY MASS INDEX: 43.71 KG/M2 | OXYGEN SATURATION: 98 % | HEART RATE: 55 BPM | WEIGHT: 272 LBS | HEIGHT: 66 IN | SYSTOLIC BLOOD PRESSURE: 130 MMHG | DIASTOLIC BLOOD PRESSURE: 74 MMHG

## 2023-08-01 VITALS
OXYGEN SATURATION: 98 % | HEIGHT: 66 IN | BODY MASS INDEX: 43.71 KG/M2 | HEART RATE: 55 BPM | DIASTOLIC BLOOD PRESSURE: 74 MMHG | WEIGHT: 272 LBS | SYSTOLIC BLOOD PRESSURE: 130 MMHG

## 2023-08-01 DIAGNOSIS — I42.8 NON-ISCHEMIC CARDIOMYOPATHY (HCC): ICD-10-CM

## 2023-08-01 DIAGNOSIS — E11.69 HYPERLIPIDEMIA ASSOCIATED WITH TYPE 2 DIABETES MELLITUS (HCC): ICD-10-CM

## 2023-08-01 DIAGNOSIS — I49.3 PVC (PREMATURE VENTRICULAR CONTRACTION): ICD-10-CM

## 2023-08-01 DIAGNOSIS — I48.0 PAF (PAROXYSMAL ATRIAL FIBRILLATION) (HCC): Primary | ICD-10-CM

## 2023-08-01 DIAGNOSIS — I35.0 AORTIC VALVE STENOSIS, ETIOLOGY OF CARDIAC VALVE DISEASE UNSPECIFIED: ICD-10-CM

## 2023-08-01 DIAGNOSIS — I50.22 CHRONIC SYSTOLIC HEART FAILURE (HCC): Primary | ICD-10-CM

## 2023-08-01 DIAGNOSIS — I48.0 PAF (PAROXYSMAL ATRIAL FIBRILLATION) (HCC): ICD-10-CM

## 2023-08-01 DIAGNOSIS — E78.5 HYPERLIPIDEMIA ASSOCIATED WITH TYPE 2 DIABETES MELLITUS (HCC): ICD-10-CM

## 2023-08-01 DIAGNOSIS — I50.22 CHRONIC SYSTOLIC HEART FAILURE (HCC): ICD-10-CM

## 2023-08-01 LAB
LV EF: 53 %
LVEF MODALITY: NORMAL

## 2023-08-01 PROCEDURE — 93306 TTE W/DOPPLER COMPLETE: CPT

## 2023-08-01 NOTE — PATIENT INSTRUCTIONS
Continue Tikosyn     Start Xarelto when you run out of Eliquis     Notify office if palpitations are recurrent     Follow up in 6 months

## 2023-08-01 NOTE — PATIENT INSTRUCTIONS
Plan:  1. Continue current medications  2. Labs 6 months: fasting lipids, CBC, CMP, BNP  3.  Follow up in 6 months

## 2023-08-01 NOTE — PROGRESS NOTES
(Norma):  LM: luminals  LAD: mid luminals, distal vessel small  LCX: luminals  RCA: dominant, very large     LVEDP: 18  LVEF: 25-30% global hypokinesis     Assessment  1. Nonischemic cardiomyopathy  2. Given worsening cardiomyopathy, suggest Cardiac MRI                - eval for myocarditis or infiltrative cardiomyopathy  3. Will d/c flecainide and dilt given worsening LVEF, pt currently in NSR                - start toprol 25                - start ASA and lisinopril  4. Lasix in post op area                - will make Appt with heart failure team      All labs and testing reviewed. CARDIOLOGY LABS:   CBC:   No results for input(s): WBC, HGB, HCT, PLT in the last 72 hours. BMP:   No results for input(s): NA, K, CO2, BUN, CREATININE, LABGLOM, GLUCOSE in the last 72 hours. PT/INR: No results for input(s): PROTIME, INR in the last 72 hours. APTT:No results for input(s): APTT in the last 72 hours. FASTING LIPID PANEL:  Lab Results   Component Value Date/Time    HDL 46 01/16/2023 02:09 PM    LDLCALC 74 01/16/2023 02:09 PM    TRIG 139 03/04/2022 12:59 PM     LIVER PROFILE:  No results for input(s): AST, ALT, ALB in the last 72 hours.       IMPRESSION:    Patient Active Problem List   Diagnosis    Chronic GERD    Allergic rhinitis    History of breast cancer    Severe obstructive sleep apnea    Hypothyroidism    Mild episode of recurrent major depressive disorder ([address])    Hiatal hernia    Multiple gastric polyps    Aortic valve stenosis    Mild aortic regurgitation-echo 8/2016    H/O laparoscopic adjustable gastric banding    Hypertension associated with type 2 diabetes mellitus (HCC)    Obesity, morbid, BMI 40.0-49.9 (HCC)    Non-ischemic cardiomyopathy (HCC)    Chronic systolic heart failure (HCC)    Bilateral change in hearing    PAF (paroxysmal atrial fibrillation) ([address])    Hyperlipidemia associated with type 2 diabetes mellitus ([address])    Osteopenia of multiple sites    Atherosclerosis of aorta (HCC)    PVC

## 2023-08-03 ENCOUNTER — APPOINTMENT (OUTPATIENT)
Dept: PHYSICAL THERAPY | Age: 68
End: 2023-08-03
Payer: MEDICARE

## 2023-08-08 ENCOUNTER — APPOINTMENT (OUTPATIENT)
Dept: PHYSICAL THERAPY | Age: 68
End: 2023-08-08
Payer: MEDICARE

## 2023-08-09 PROCEDURE — 93228 REMOTE 30 DAY ECG REV/REPORT: CPT | Performed by: INTERNAL MEDICINE

## 2023-08-10 ENCOUNTER — APPOINTMENT (OUTPATIENT)
Dept: PHYSICAL THERAPY | Age: 68
End: 2023-08-10
Payer: MEDICARE

## 2023-08-17 DIAGNOSIS — I49.3 PVC (PREMATURE VENTRICULAR CONTRACTION): ICD-10-CM

## 2023-08-21 ENCOUNTER — TELEPHONE (OUTPATIENT)
Dept: CARDIOLOGY CLINIC | Age: 68
End: 2023-08-21

## 2023-08-23 DIAGNOSIS — I48.0 PAF (PAROXYSMAL ATRIAL FIBRILLATION) (HCC): ICD-10-CM

## 2023-08-24 RX ORDER — DOFETILIDE 0.5 MG/1
CAPSULE ORAL
Qty: 180 CAPSULE | Refills: 1 | Status: SHIPPED | OUTPATIENT
Start: 2023-08-24

## 2023-08-30 DIAGNOSIS — I50.22 CHRONIC SYSTOLIC HEART FAILURE (HCC): ICD-10-CM

## 2023-08-30 RX ORDER — SPIRONOLACTONE 25 MG/1
TABLET ORAL
Qty: 90 TABLET | Refills: 3 | Status: SHIPPED | OUTPATIENT
Start: 2023-08-30 | End: 2023-08-31 | Stop reason: SDUPTHER

## 2023-08-30 NOTE — TELEPHONE ENCOUNTER
Medication Refill    Medication needing refilled:  Spironolactone   Dosage of the medication:  25 mg  How are you taking this medication (QD, BID, TID, QID, PRN):  QD  30 or 90 day supply called in:  90  When will you run out of your medication:  Out of medication   Which Pharmacy are we sending the medication to?:  [id number] [place] for 90 day and refills, Would like a 30 day supply sent to [facility] in [address]- 08.01.23 [facility]  Follow up in 6 months

## 2023-08-31 RX ORDER — SPIRONOLACTONE 25 MG/1
TABLET ORAL
Qty: 30 TABLET | Refills: 0 | Status: SHIPPED | OUTPATIENT
Start: 2023-08-31

## 2023-08-31 NOTE — TELEPHONE ENCOUNTER
Pt called back. Pt would like to make sure a 30 day supply is sent into [facility] because she is out of medication. Please advise.

## 2023-09-01 RX ORDER — SPIRONOLACTONE 25 MG/1
TABLET ORAL
Qty: 90 TABLET | Refills: 3 | OUTPATIENT
Start: 2023-09-01

## 2023-09-06 DIAGNOSIS — R82.3 HEMOGLOBINURIA: ICD-10-CM

## 2023-09-06 DIAGNOSIS — R73.03 PREDIABETES: ICD-10-CM

## 2023-09-06 DIAGNOSIS — M17.12 PRIMARY OSTEOARTHRITIS OF LEFT KNEE: Primary | ICD-10-CM

## 2023-09-06 DIAGNOSIS — R74.8 ABNORMAL LEVELS OF OTHER SERUM ENZYMES: ICD-10-CM

## 2023-09-15 RX ORDER — MONTELUKAST SODIUM 10 MG/1
TABLET ORAL
Qty: 90 TABLET | Refills: 1 | Status: SHIPPED | OUTPATIENT
Start: 2023-09-15

## 2023-09-15 NOTE — TELEPHONE ENCOUNTER
.Refill Request     CONFIRM preferred pharmacy with the patient. If Mail Order Rx - Pend for 90 day refill. Last Seen: Last Seen Department: 7/7/2023  Last Seen by PCP: 7/7/2023    Last Written: 3-22-23 90 with 1     If no future appointment scheduled:  Review the last OV with PCP and review information for follow-up visit,  Route STAFF MESSAGE with patient name to the [facility] for scheduling with the following information:            -  Timing of next visit           -  Visit type ie Physical, OV, etc           -  Diagnoses/Reason ie. COPD, HTN - Do not use MEDICATION, Follow-up or CHECK UP - Give reason for visit      Next Appointment:   Future Appointments   Date Time Provider [address]   10/17/2023  1:30 PM Oren Salgado PT [facility]   11/1/2023 11:42 AM Aziza Temple MD [facility]   5/31/2024 11:40 AM BLANK Gonzales - [facility]       Message sent to  to schedule appt with patient?   YES      Requested Prescriptions     Pending Prescriptions Disp Refills    montelukast (SINGULAIR) 10 MG tablet [Pharmacy Med Name: MONTELUKAST  TAB 10MG] 90 tablet 1     Sig: TAKE 1 TABLET NIGHTLY

## 2023-09-18 ENCOUNTER — TELEPHONE (OUTPATIENT)
Dept: ORTHOPEDIC SURGERY | Age: 68
End: 2023-09-18

## 2023-09-18 NOTE — TELEPHONE ENCOUNTER
Auth: NPR  Date: 10/10/23  Reference # None  Spoke with: None  Type of SX: Observation  Location: [facility]  CPT: 85590   DX: M17.12  SX area: Lt knee  Insurance: Mario [employer]OREY

## 2023-09-20 DIAGNOSIS — S80.01XA CONTUSION OF RIGHT KNEE, INITIAL ENCOUNTER: ICD-10-CM

## 2023-09-20 DIAGNOSIS — M17.11 PRIMARY OSTEOARTHRITIS OF RIGHT KNEE: Primary | ICD-10-CM

## 2023-09-20 DIAGNOSIS — R73.03 PREDIABETES: ICD-10-CM

## 2023-09-20 DIAGNOSIS — M25.561 ACUTE PAIN OF RIGHT KNEE: ICD-10-CM

## 2023-09-22 ENCOUNTER — TELEPHONE (OUTPATIENT)
Dept: ORTHOPEDIC SURGERY | Age: 68
End: 2023-09-22

## 2023-09-22 NOTE — TELEPHONE ENCOUNTER
Orthopedic Nurse Navigator Summary    Patient Name:  Margo Arshad  Anticipated Date of Surgery:  10/10/23  Attended Pre-op Education Class:  Video sent to patient email 09/15/23  PCP: KADEN Patel  Date of PCP visit for H&P: 09/28/23  Is patient in a Pain Management program:  Review of Medical history reveals history of: CHF, PAF, HTN, Hyperlipidemia, PVCs, Diabetes, Aortic valve stenosis, Nonischemic cardiomyopathy, Hypothyroidism, GERD, JINA- CPAP, H/O Right breast cancer with lumpectomy and chemo    Critical Lab Values  - Hemoglobin (g/dL):  Date: 09/28/23 Value 13.6  - Hematocrit(%): Date: 09/28/23  Value 39.7  - HgbA1C:  Date: 09/28/23  Value 6.3  - Albumin:  Date: 09/28/23  Value 4.3  - BUN:  Date: 09/28/23  Value 18  - Creatinine:  Date: 09/28/23  Value 1.0    09/28/23 MRSA swab- negative    Coronary Artery Disease/HTN/CHF history: Yes  Cardiologist: Larry Campbell MD  Cardiac clearance necessary:  Yes  Date of cardiac clearance appt: 08/01/23  Final Cardiac recommendations: May stop 2 days prior to surgery, and restart one day after surgery  On any anticoagulation: Eliquis 5 mg BID    Diabetes History:  Yes  Most recent HgbA1C:  Pulmonary:  COPD/Emphysema/Use of home oxygen: No  Alcohol use: No    BMI greater than 40 at time of scheduling: Additional medical concerns:   Additional recommendations for above concerns:  Attended Pre-Hab program:    Anticipated Discharge Disposition:  Home with [address] [place] [address]  Who will be with patient at home following discharge:   and son  Equipment patient already has:  walker, cane, shower chair  Bedroom on first or second floor:  first  Bathroom on first or second floor:  first  Weight bearing status:  wbat  Pre-op ambulatory status: painful ambulation  Number of entry steps:  none  Caregiver assistance:  full time    Pan Hagen RN  Date:  09/22/23

## 2023-09-28 ENCOUNTER — OFFICE VISIT (OUTPATIENT)
Dept: FAMILY MEDICINE CLINIC | Age: 68
End: 2023-09-28

## 2023-09-28 ENCOUNTER — TELEPHONE (OUTPATIENT)
Dept: CARDIOLOGY CLINIC | Age: 68
End: 2023-09-28

## 2023-09-28 VITALS
HEART RATE: 66 BPM | WEIGHT: 271 LBS | OXYGEN SATURATION: 95 % | BODY MASS INDEX: 43.55 KG/M2 | SYSTOLIC BLOOD PRESSURE: 120 MMHG | HEIGHT: 66 IN | DIASTOLIC BLOOD PRESSURE: 80 MMHG

## 2023-09-28 DIAGNOSIS — R73.03 PREDIABETES: ICD-10-CM

## 2023-09-28 DIAGNOSIS — R82.3 HEMOGLOBINURIA: ICD-10-CM

## 2023-09-28 DIAGNOSIS — I50.22 CHRONIC SYSTOLIC HEART FAILURE (HCC): ICD-10-CM

## 2023-09-28 DIAGNOSIS — E66.01 OBESITY, MORBID, BMI 40.0-49.9 (HCC): ICD-10-CM

## 2023-09-28 DIAGNOSIS — M17.11 PRIMARY OSTEOARTHRITIS OF RIGHT KNEE: ICD-10-CM

## 2023-09-28 DIAGNOSIS — I48.0 PAF (PAROXYSMAL ATRIAL FIBRILLATION) (HCC): ICD-10-CM

## 2023-09-28 DIAGNOSIS — E03.9 ACQUIRED HYPOTHYROIDISM: Chronic | ICD-10-CM

## 2023-09-28 DIAGNOSIS — M17.12 PRIMARY OSTEOARTHRITIS OF LEFT KNEE: ICD-10-CM

## 2023-09-28 DIAGNOSIS — I10 PRIMARY HYPERTENSION: ICD-10-CM

## 2023-09-28 DIAGNOSIS — M25.561 ACUTE PAIN OF RIGHT KNEE: ICD-10-CM

## 2023-09-28 DIAGNOSIS — Z01.818 PRE-OP EXAMINATION: Primary | ICD-10-CM

## 2023-09-28 DIAGNOSIS — G47.33 SEVERE OBSTRUCTIVE SLEEP APNEA: Chronic | ICD-10-CM

## 2023-09-28 DIAGNOSIS — I48.0 PAF (PAROXYSMAL ATRIAL FIBRILLATION) (HCC): Primary | ICD-10-CM

## 2023-09-28 DIAGNOSIS — R74.8 ABNORMAL LEVELS OF OTHER SERUM ENZYMES: ICD-10-CM

## 2023-09-28 DIAGNOSIS — S80.01XA CONTUSION OF RIGHT KNEE, INITIAL ENCOUNTER: ICD-10-CM

## 2023-09-28 LAB
25(OH)D3 SERPL-MCNC: 50.1 NG/ML
ALBUMIN SERPL-MCNC: 4.3 G/DL (ref 3.4–5)
ALBUMIN/GLOB SERPL: 2 {RATIO} (ref 1.1–2.2)
ALP SERPL-CCNC: 78 U/L (ref 40–129)
ALT SERPL-CCNC: 19 U/L (ref 10–40)
ANION GAP SERPL CALCULATED.3IONS-SCNC: 9 MMOL/L (ref 3–16)
AST SERPL-CCNC: 18 U/L (ref 15–37)
BASOPHILS # BLD: 0 K/UL (ref 0–0.2)
BASOPHILS NFR BLD: 0.7 %
BILIRUB SERPL-MCNC: 0.5 MG/DL (ref 0–1)
BILIRUB UR QL STRIP.AUTO: NEGATIVE
BUN SERPL-MCNC: 18 MG/DL (ref 7–20)
CALCIUM SERPL-MCNC: 9 MG/DL (ref 8.3–10.6)
CHLORIDE SERPL-SCNC: 101 MMOL/L (ref 99–110)
CLARITY UR: CLEAR
CO2 SERPL-SCNC: 26 MMOL/L (ref 21–32)
COLOR UR: ABNORMAL
CREAT SERPL-MCNC: 1 MG/DL (ref 0.6–1.2)
DEPRECATED RDW RBC AUTO: 13.9 % (ref 12.4–15.4)
EOSINOPHIL # BLD: 0.1 K/UL (ref 0–0.6)
EOSINOPHIL NFR BLD: 1.5 %
GFR SERPLBLD CREATININE-BSD FMLA CKD-EPI: >60 ML/MIN/{1.73_M2}
GLUCOSE SERPL-MCNC: 124 MG/DL (ref 70–99)
GLUCOSE UR STRIP.AUTO-MCNC: NEGATIVE MG/DL
HCT VFR BLD AUTO: 39.7 % (ref 36–48)
HGB BLD-MCNC: 13.6 G/DL (ref 12–16)
HGB UR QL STRIP.AUTO: NEGATIVE
INR PPP: 1.35 (ref 0.84–1.16)
KETONES UR STRIP.AUTO-MCNC: NEGATIVE MG/DL
LEUKOCYTE ESTERASE UR QL STRIP.AUTO: NEGATIVE
LYMPHOCYTES # BLD: 1.1 K/UL (ref 1–5.1)
LYMPHOCYTES NFR BLD: 25.2 %
MCH RBC QN AUTO: 30.6 PG (ref 26–34)
MCHC RBC AUTO-ENTMCNC: 34.3 G/DL (ref 31–36)
MCV RBC AUTO: 89.5 FL (ref 80–100)
MONOCYTES # BLD: 0.4 K/UL (ref 0–1.3)
MONOCYTES NFR BLD: 9.4 %
NEUTROPHILS # BLD: 2.8 K/UL (ref 1.7–7.7)
NEUTROPHILS NFR BLD: 63.2 %
NITRITE UR QL STRIP.AUTO: NEGATIVE
PH UR STRIP.AUTO: 6.5 [PH] (ref 5–8)
PLATELET # BLD AUTO: 218 K/UL (ref 135–450)
PMV BLD AUTO: 8.5 FL (ref 5–10.5)
POTASSIUM SERPL-SCNC: 4.3 MMOL/L (ref 3.5–5.1)
PROT SERPL-MCNC: 6.4 G/DL (ref 6.4–8.2)
PROT UR STRIP.AUTO-MCNC: NEGATIVE MG/DL
PROTHROMBIN TIME: 16.6 SEC (ref 11.5–14.8)
RBC # BLD AUTO: 4.44 M/UL (ref 4–5.2)
SODIUM SERPL-SCNC: 136 MMOL/L (ref 136–145)
SP GR UR STRIP.AUTO: 1.02 (ref 1–1.03)
TRANSFERRIN SERPL-MCNC: 249 MG/DL (ref 200–360)
UA DIPSTICK W REFLEX MICRO PNL UR: ABNORMAL
URN SPEC COLLECT METH UR: ABNORMAL
UROBILINOGEN UR STRIP-ACNC: 1 E.U./DL
WBC # BLD AUTO: 4.5 K/UL (ref 4–11)

## 2023-09-28 SDOH — ECONOMIC STABILITY: INCOME INSECURITY: HOW HARD IS IT FOR YOU TO PAY FOR THE VERY BASICS LIKE FOOD, HOUSING, MEDICAL CARE, AND HEATING?: NOT HARD AT ALL

## 2023-09-28 SDOH — ECONOMIC STABILITY: FOOD INSECURITY: WITHIN THE PAST 12 MONTHS, YOU WORRIED THAT YOUR FOOD WOULD RUN OUT BEFORE YOU GOT MONEY TO BUY MORE.: NEVER TRUE

## 2023-09-28 SDOH — ECONOMIC STABILITY: FOOD INSECURITY: WITHIN THE PAST 12 MONTHS, THE FOOD YOU BOUGHT JUST DIDN'T LAST AND YOU DIDN'T HAVE MONEY TO GET MORE.: NEVER TRUE

## 2023-09-28 NOTE — PROGRESS NOTES
06/10/2016    gastritis, gastric polyp bx      Family History   Problem Relation Age of Onset    Cancer Mother         breast and cervical    Breast Cancer Mother     Heart Disease Father     Cancer Father 61        skin cancer    Other Father         AAA    Heart Attack Father     Cancer Maternal Grandmother         breast    BRCA 2 Negative Maternal Grandmother     Cancer Paternal Grandmother         breast    BRCA 2 Negative Paternal Grandmother     Cancer Sister         lymphoma    No Known Problems Brother     No Known Problems Maternal Aunt     No Known Problems Maternal Uncle     No Known Problems Paternal Aunt     No Known Problems Paternal Uncle     No Known Problems Maternal Grandfather     No Known Problems Paternal Grandfather     Cancer Other         neice- head, not sure what kind skin ca    Heart Disease Sister     Other Sister         biliary hepatitis    Asthma Neg Hx     Diabetes Neg Hx     Emphysema Neg Hx     Heart Failure Neg Hx     Hypertension Neg Hx     Rheum Arthritis Neg Hx     Osteoarthritis Neg Hx     High Cholesterol Neg Hx     Migraines Neg Hx     Ovarian Cancer Neg Hx     Rashes/Skin Problems Neg Hx     Seizures Neg Hx     Stroke Neg Hx     Thyroid Disease Neg Hx      Social History     Socioeconomic History    Marital status:      Spouse name: Not on file    Number of children: Not on file    Years of education: Not on file    Highest education level: Not on file   Occupational History    Occupation: billing [employer]   Tobacco Use    Smoking status: Former     Packs/day: 0.25     Years: 1.50     Additional pack years: 0.00     Total pack years: 0.38     Types: Cigarettes     Quit date: 3/27/1972     Years since quittin.5    Smokeless tobacco: Never   Vaping Use    Vaping Use: Never used   Substance and Sexual Activity    Alcohol use:  Yes     Alcohol/week: 0.0 standard drinks of alcohol     Comment: soc    Drug use: No    Sexual activity: Yes     Partners: Male   Other Topics

## 2023-09-28 NOTE — TELEPHONE ENCOUNTER
Okay to proceed with surgery. She can stop eliquis 2 days before procedure and restart it one day after surgery. Low risk.   NEFTALI

## 2023-09-28 NOTE — TELEPHONE ENCOUNTER
CARDIAC CLEARANCE REQUEST    What type of procedure are you having:Knee Replacement    Are you taking any blood thinners:Eliquis    Type on anesthesia:unknown    When is your procedure scheduled for:10/02/23    What physician is performing your procedure:    Phone [address] [place] [address]    Fax number to send the letter: [phone]    Last OV: 08/02/23 NEFTALI

## 2023-09-29 ENCOUNTER — TELEPHONE (OUTPATIENT)
Dept: FAMILY MEDICINE CLINIC | Age: 68
End: 2023-09-29

## 2023-09-29 PROBLEM — E78.5 HYPERLIPIDEMIA ASSOCIATED WITH TYPE 2 DIABETES MELLITUS (HCC): Status: RESOLVED | Noted: 2023-04-21 | Resolved: 2023-09-29

## 2023-09-29 PROBLEM — E11.69 HYPERLIPIDEMIA ASSOCIATED WITH TYPE 2 DIABETES MELLITUS (HCC): Status: RESOLVED | Noted: 2023-04-21 | Resolved: 2023-09-29

## 2023-09-29 LAB
BACTERIA UR CULT: NORMAL
EST. AVERAGE GLUCOSE BLD GHB EST-MCNC: 134.1 MG/DL
HBA1C MFR BLD: 6.3 %

## 2023-09-29 NOTE — TELEPHONE ENCOUNTER
LM for pt to call back   If she calls back please ask her if she is taking Eliquis or Xarelto, she shouldn't be on both so we need to verify. She is also clear for surgery.  Thank you

## 2023-09-30 LAB — MRSA SPEC QL CULT: NORMAL

## 2023-10-03 NOTE — TELEPHONE ENCOUNTER
Spoke with pt, relayed message from NEFTALI. Pt v/u. Will fax cardiac clearance letter. While on phone with pt, pt stated that she would like to switch from eliquis to warfarin. 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 NPAM- okay with pt switching from eliquis to warfarin? Also is schedule to have knee surgery on 10/10/2023. Pt has 13 days left of eliquis and would like to finish that up.

## 2023-10-04 RX ORDER — LEVOTHYROXINE SODIUM 137 UG/1
TABLET ORAL
Qty: 90 TABLET | Refills: 1 | Status: SHIPPED | OUTPATIENT
Start: 2023-10-04

## 2023-10-04 RX ORDER — CITALOPRAM 40 MG/1
TABLET ORAL
Qty: 90 TABLET | Refills: 1 | Status: SHIPPED | OUTPATIENT
Start: 2023-10-04

## 2023-10-04 NOTE — TELEPHONE ENCOUNTER
View All Conversations on this Encounter  BLANK Bess - CNP  Thomas Anes; [facility] [employer] 12 minutes ago (3:49 PM)     AM  Okay to switch to Coumadin. Would prefer to set her up with Coumadin clinic initially for dosing purposes. Spoke with patient and relayed NPAM message. She is agreeable to Coumadin Clinic    NPAM please advise on initial dosing and goal INR range- coumadin clinic requires this from provider prior.

## 2023-10-04 NOTE — TELEPHONE ENCOUNTER
Refill Request     CONFIRM preferred pharmacy with the patient. If Mail Order Rx - Pend for 90 day refill. Last Seen: Last Seen Department: 9/28/2023  Last Seen by PCP: 9/28/2023    Last Written: 4/14/23 1 refill    If no future appointment scheduled:  Review the last OV with PCP and review information for follow-up visit,  Route STAFF MESSAGE with patient name to the [facility] for scheduling with the following information:            -  Timing of next visit           -  Visit type ie Physical, OV, etc           -  Diagnoses/Reason ie. COPD, HTN - Do not use MEDICATION, Follow-up or CHECK UP - Give reason for visit      Next Appointment:   Future Appointments   Date Time Provider [address]   10/17/2023  1:30 PM Gonzalez Curtis, PT [facility] EG PT [facility]   00/7/7291 11:36 AM Matthias Burkitt, MD [facility]   51/23/9108 79:98 AM Matthias Burkitt, MD [facility] MMA   5/31/2024 11:40 AM BLANK Chambers - [facility]       Message sent to  to schedule appt with patient?   NO      Requested Prescriptions     Pending Prescriptions Disp Refills    levothyroxine (SYNTHROID) 137 MCG tablet [Pharmacy Med Name: SYNTHROID TAB 137MCG] 90 tablet 1     Sig: TAKE 1 TABLET ONCE DAILY    citalopram (CELEXA) 40 MG tablet [Pharmacy Med Name: CITALOPRAM TAB 40MG] 90 tablet 1     Sig: TAKE 1 TABLET DAILY

## 2023-10-04 NOTE — TELEPHONE ENCOUNTER
Start Coumadin the day after she takes her last Eliquis. Will start at Coumadin at 5 mg PO daily. Goal INR 2-3. Thanks.

## 2023-10-05 RX ORDER — WARFARIN SODIUM 5 MG/1
5 TABLET ORAL DAILY
Qty: 30 TABLET | Refills: 3 | Status: SHIPPED | OUTPATIENT
Start: 2023-10-05

## 2023-10-05 NOTE — PROGRESS NOTES
surgery. 9. If you use a CPAP, please bring it with you on the day of your procedure. 10. If you use oxygen at home, please bring your oxygen tank with you to hospital..     11. We recommend that valuable personal belongings such as cash, cell phones, e-tablets, or jewelry, be left at home during your stay. The hospital will not be responsible for valuables that are not secured in the hospital safe. However, if your insurance requires a co-pay, you may want to bring a method of payment, i.e., Check or credit card, if you wish to pay your co-pay the day of surgery. 12. If you are to stay overnight, you may bring a bag with personal items. Please have any large items you may need brought in by your family after your arrival to your hospital room. 15. If you have a Living Will or Durable Power of , please bring a copy on the day of your procedure. How we keep you safe and work to prevent surgical site infections:   1. Health care workers should always check your ID bracelet to verify your name and birth date. You will be asked many times to state your name, date of birth, and allergies. 2. Health care workers should always clean their hands with soap or alcohol gel before providing care to you. It is okay to ask anyone if they cleaned their hands before they touch you. 3. You will be actively involved in verifying the type of procedure you are having and ensuring the correct surgical site. This will be confirmed multiple times prior to your procedure. Do NOT cesilia your surgery site UNLESS instructed to by your surgeon. 4. When you are in the operating room, your surgical site will be cleansed with a special soap, and in most cases, you will be given an antibiotic before the surgery begins. What to expect AFTER your procedure? 1. Immediately following your procedure, your will be taken to the PACU for the first phase of your recovery.   Your nurse will help you recover from any

## 2023-10-05 NOTE — PROGRESS NOTES
Place patient label inside box (if no patient label, complete below)  Name:  :  MR#:   Chau Hoskins / PROCEDURE  I (we), Arlinchristine Cox (Patient Name) authorize DR Xavier Lynn (Provider / Rodolfo Ocasio) and/or such assistants as may be selected by him/her, to perform the following operation/procedure(s): LEFT TOTAL KNEE ARTHROPLASTY        Note: If unable to obtain consent prior to an emergent procedure, document the emergent reason in the medical record. This procedure has been explained to my (our) satisfaction and included in the explanation was: The intended benefit, nature, and extent of the procedure to be performed; The significant risks involved and the probability of success; Alternative procedures and methods of treatment; The dangers and probable consequences of such alternatives (including no procedure or treatment); The expected consequences of the procedure on my future health; Whether other qualified individuals would be performing important surgical tasks and/or whether  would be present to advise or support the procedure. I (we) understand that there are other risks of infection and other serious complications in the pre-operative/procedural and postoperative/procedural stages of my (our) care. I (we) have asked all of the questions which I (we) thought were important in deciding whether or not to undergo treatment or diagnosis. These questions have been answered to my (our) satisfaction. I (we) understand that no assurance can be given that the procedure will be a success, and no guarantee or warranty of success has been given to me (us). It has been explained to me (us) that during the course of the operation/procedure, unforeseen conditions may be revealed that necessitate extension of the original procedure(s) or different procedure(s) than those set forth in Paragraph 1.  I (we) authorize and request that the ______________________________________________________________________________________________    If a phone consent is obtained, consent will be documented by using two health care professionals, each affirming that the consenting party has no questions and gives consent for the procedure discussed with the physician/provider.   _____________________          ____________________       _____/_____am/pm   2nd witness to phone consent        Printed name           Date / Time    Informed Consent:  I have provided the explanation described above in section 1 to the patient and/or legal representative.  I have provided the patient and/or legal representative with an opportunity to ask any questions about the proposed operation/procedure.   ___________________________          ____________________         ____/____am/pm  Provider / Proceduralist                            Printed name            Date / Time  Revised 8/2/2021                                                                      Page 2 of 2

## 2023-10-09 ENCOUNTER — TELEPHONE (OUTPATIENT)
Dept: ORTHOPEDIC SURGERY | Age: 68
End: 2023-10-09

## 2023-10-09 NOTE — TELEPHONE ENCOUNTER
General Question     Subject: QUESTION ABOUT SURGERY  Patient and /or Facility Request: Margo Arshad  Contact Number: [phone]      PATIENT CALLED STATING HE HAS KNEE REPLACEMENT SURGERY SCHEDULED FOR TOMORROW WITH DR. Renate Alvarez. PATIENT STATED SHE WAS ADVISED TO STOP TAKING HER ELIQUIS MEDICATION 2 DAYS PRIOR TO THE SURGERY. PATIENT STATED SHE STOPPED TAKING HER TIKOSYN MEDICATION INSTEAD OF HER ELIQUIS. PATIENT IS WANTING TO KNOW IF SHE CAN STILL HAVE SURGERY OR NOT. PLEASE CALL PT BACK AT THE ABOVE NUMBER.

## 2023-10-09 NOTE — TELEPHONE ENCOUNTER
Per RBE ok to proceed    Spoke with patient  She states she did not take the Eliquis today 10/9/2023. Kb to call pt this afternoon with sx time.  noemi

## 2023-10-10 ENCOUNTER — APPOINTMENT (OUTPATIENT)
Dept: GENERAL RADIOLOGY | Age: 68
DRG: 470 | End: 2023-10-10
Attending: STUDENT IN AN ORGANIZED HEALTH CARE EDUCATION/TRAINING PROGRAM
Payer: MEDICARE

## 2023-10-10 ENCOUNTER — TELEPHONE (OUTPATIENT)
Dept: ORTHOPEDIC SURGERY | Age: 68
End: 2023-10-10

## 2023-10-10 ENCOUNTER — ANESTHESIA (OUTPATIENT)
Dept: OPERATING ROOM | Age: 68
DRG: 470 | End: 2023-10-10
Payer: MEDICARE

## 2023-10-10 ENCOUNTER — HOSPITAL ENCOUNTER (INPATIENT)
Age: 68
LOS: 1 days | Discharge: HOME | DRG: 470 | End: 2023-10-11
Attending: STUDENT IN AN ORGANIZED HEALTH CARE EDUCATION/TRAINING PROGRAM | Admitting: STUDENT IN AN ORGANIZED HEALTH CARE EDUCATION/TRAINING PROGRAM
Payer: MEDICARE

## 2023-10-10 ENCOUNTER — ANESTHESIA EVENT (OUTPATIENT)
Dept: OPERATING ROOM | Age: 68
DRG: 470 | End: 2023-10-10
Payer: MEDICARE

## 2023-10-10 DIAGNOSIS — Z96.652 S/P TOTAL KNEE ARTHROPLASTY, LEFT: Primary | ICD-10-CM

## 2023-10-10 LAB
ABO + RH BLD: NORMAL
ANION GAP SERPL CALCULATED.3IONS-SCNC: 11 MMOL/L (ref 3–16)
APTT BLD: 32.2 SEC (ref 22.7–35.9)
BLD GP AB SCN SERPL QL: NORMAL
BUN SERPL-MCNC: 22 MG/DL (ref 7–20)
CALCIUM SERPL-MCNC: 8.9 MG/DL (ref 8.3–10.6)
CHLORIDE SERPL-SCNC: 102 MMOL/L (ref 99–110)
CO2 SERPL-SCNC: 23 MMOL/L (ref 21–32)
CREAT SERPL-MCNC: 1 MG/DL (ref 0.6–1.2)
GFR SERPLBLD CREATININE-BSD FMLA CKD-EPI: >60 ML/MIN/{1.73_M2}
GLUCOSE BLD-MCNC: 133 MG/DL (ref 70–99)
GLUCOSE BLD-MCNC: 149 MG/DL (ref 70–99)
GLUCOSE SERPL-MCNC: 161 MG/DL (ref 70–99)
INR PPP: 1.24 (ref 0.84–1.16)
PERFORMED ON: ABNORMAL
PERFORMED ON: ABNORMAL
POTASSIUM SERPL-SCNC: 4.7 MMOL/L (ref 3.5–5.1)
PROTHROMBIN TIME: 15.6 SEC (ref 11.5–14.8)
SODIUM SERPL-SCNC: 136 MMOL/L (ref 136–145)

## 2023-10-10 PROCEDURE — 2580000003 HC RX 258: Performed by: ANESTHESIOLOGY

## 2023-10-10 PROCEDURE — 7100000001 HC PACU RECOVERY - ADDTL 15 MIN: Performed by: STUDENT IN AN ORGANIZED HEALTH CARE EDUCATION/TRAINING PROGRAM

## 2023-10-10 PROCEDURE — A4217 STERILE WATER/SALINE, 500 ML: HCPCS | Performed by: STUDENT IN AN ORGANIZED HEALTH CARE EDUCATION/TRAINING PROGRAM

## 2023-10-10 PROCEDURE — 2700000000 HC OXYGEN THERAPY PER DAY

## 2023-10-10 PROCEDURE — 86850 RBC ANTIBODY SCREEN: CPT

## 2023-10-10 PROCEDURE — 3700000000 HC ANESTHESIA ATTENDED CARE: Performed by: STUDENT IN AN ORGANIZED HEALTH CARE EDUCATION/TRAINING PROGRAM

## 2023-10-10 PROCEDURE — 80048 BASIC METABOLIC PNL TOTAL CA: CPT

## 2023-10-10 PROCEDURE — 36415 COLL VENOUS BLD VENIPUNCTURE: CPT

## 2023-10-10 PROCEDURE — 6370000000 HC RX 637 (ALT 250 FOR IP): Performed by: STUDENT IN AN ORGANIZED HEALTH CARE EDUCATION/TRAINING PROGRAM

## 2023-10-10 PROCEDURE — 27447 TOTAL KNEE ARTHROPLASTY: CPT | Performed by: STUDENT IN AN ORGANIZED HEALTH CARE EDUCATION/TRAINING PROGRAM

## 2023-10-10 PROCEDURE — 85730 THROMBOPLASTIN TIME PARTIAL: CPT

## 2023-10-10 PROCEDURE — 2060000000 HC ICU INTERMEDIATE R&B

## 2023-10-10 PROCEDURE — 6360000002 HC RX W HCPCS

## 2023-10-10 PROCEDURE — C1776 JOINT DEVICE (IMPLANTABLE): HCPCS | Performed by: STUDENT IN AN ORGANIZED HEALTH CARE EDUCATION/TRAINING PROGRAM

## 2023-10-10 PROCEDURE — 3600000004 HC SURGERY LEVEL 4 BASE: Performed by: STUDENT IN AN ORGANIZED HEALTH CARE EDUCATION/TRAINING PROGRAM

## 2023-10-10 PROCEDURE — 93005 ELECTROCARDIOGRAM TRACING: CPT | Performed by: PHYSICIAN ASSISTANT

## 2023-10-10 PROCEDURE — 2500000003 HC RX 250 WO HCPCS

## 2023-10-10 PROCEDURE — 6360000002 HC RX W HCPCS: Performed by: NURSE ANESTHETIST, CERTIFIED REGISTERED

## 2023-10-10 PROCEDURE — C1713 ANCHOR/SCREW BN/BN,TIS/BN: HCPCS | Performed by: STUDENT IN AN ORGANIZED HEALTH CARE EDUCATION/TRAINING PROGRAM

## 2023-10-10 PROCEDURE — 86901 BLOOD TYPING SEROLOGIC RH(D): CPT

## 2023-10-10 PROCEDURE — 6360000002 HC RX W HCPCS: Performed by: STUDENT IN AN ORGANIZED HEALTH CARE EDUCATION/TRAINING PROGRAM

## 2023-10-10 PROCEDURE — 3600000014 HC SURGERY LEVEL 4 ADDTL 15MIN: Performed by: STUDENT IN AN ORGANIZED HEALTH CARE EDUCATION/TRAINING PROGRAM

## 2023-10-10 PROCEDURE — 7100000000 HC PACU RECOVERY - FIRST 15 MIN: Performed by: STUDENT IN AN ORGANIZED HEALTH CARE EDUCATION/TRAINING PROGRAM

## 2023-10-10 PROCEDURE — 6360000002 HC RX W HCPCS: Performed by: ANESTHESIOLOGY

## 2023-10-10 PROCEDURE — 73560 X-RAY EXAM OF KNEE 1 OR 2: CPT

## 2023-10-10 PROCEDURE — 2580000003 HC RX 258: Performed by: STUDENT IN AN ORGANIZED HEALTH CARE EDUCATION/TRAINING PROGRAM

## 2023-10-10 PROCEDURE — 85610 PROTHROMBIN TIME: CPT

## 2023-10-10 PROCEDURE — 86900 BLOOD TYPING SEROLOGIC ABO: CPT

## 2023-10-10 PROCEDURE — 2709999900 HC NON-CHARGEABLE SUPPLY: Performed by: STUDENT IN AN ORGANIZED HEALTH CARE EDUCATION/TRAINING PROGRAM

## 2023-10-10 PROCEDURE — 2500000003 HC RX 250 WO HCPCS: Performed by: STUDENT IN AN ORGANIZED HEALTH CARE EDUCATION/TRAINING PROGRAM

## 2023-10-10 PROCEDURE — 3700000001 HC ADD 15 MINUTES (ANESTHESIA): Performed by: STUDENT IN AN ORGANIZED HEALTH CARE EDUCATION/TRAINING PROGRAM

## 2023-10-10 PROCEDURE — 64447 NJX AA&/STRD FEMORAL NRV IMG: CPT | Performed by: ANESTHESIOLOGY

## 2023-10-10 PROCEDURE — 94761 N-INVAS EAR/PLS OXIMETRY MLT: CPT

## 2023-10-10 PROCEDURE — 2720000010 HC SURG SUPPLY STERILE: Performed by: STUDENT IN AN ORGANIZED HEALTH CARE EDUCATION/TRAINING PROGRAM

## 2023-10-10 PROCEDURE — 0SRD0J9 REPLACEMENT OF LEFT KNEE JOINT WITH SYNTHETIC SUBSTITUTE, CEMENTED, OPEN APPROACH: ICD-10-PCS | Performed by: STUDENT IN AN ORGANIZED HEALTH CARE EDUCATION/TRAINING PROGRAM

## 2023-10-10 DEVICE — CEMENT BONE 40GM HI VISC PALACOS R: Type: IMPLANTABLE DEVICE | Site: KNEE | Status: FUNCTIONAL

## 2023-10-10 DEVICE — PSN TIB STM 5 DEG SZ F L: Type: IMPLANTABLE DEVICE | Site: KNEE | Status: FUNCTIONAL

## 2023-10-10 DEVICE — EXTENSION STEM L30MM DIA14MM KNEE TAPR CEM PERSONA: Type: IMPLANTABLE DEVICE | Site: KNEE | Status: FUNCTIONAL

## 2023-10-10 DEVICE — KNEE K1 TOT HEMI STD CEM IMPL CAPPED K1 ZIM: Type: IMPLANTABLE DEVICE | Site: KNEE | Status: FUNCTIONAL

## 2023-10-10 DEVICE — PSN MC VE ASF L 12MM 8-11 EF: Type: IMPLANTABLE DEVICE | Site: KNEE | Status: FUNCTIONAL

## 2023-10-10 DEVICE — IMPL KNEE PSN FEM CR CMT CCR NRW SZ8 L: Type: IMPLANTABLE DEVICE | Site: KNEE | Status: FUNCTIONAL

## 2023-10-10 RX ORDER — NALOXONE HYDROCHLORIDE 0.4 MG/ML
INJECTION, SOLUTION INTRAMUSCULAR; INTRAVENOUS; SUBCUTANEOUS PRN
Status: DISCONTINUED | OUTPATIENT
Start: 2023-10-10 | End: 2023-10-10 | Stop reason: SDUPTHER

## 2023-10-10 RX ORDER — SODIUM CHLORIDE 0.9 % (FLUSH) 0.9 %
5-40 SYRINGE (ML) INJECTION PRN
Status: DISCONTINUED | OUTPATIENT
Start: 2023-10-10 | End: 2023-10-10 | Stop reason: HOSPADM

## 2023-10-10 RX ORDER — LEVOTHYROXINE SODIUM 0.15 MG/1
150 TABLET ORAL DAILY
Status: DISCONTINUED | OUTPATIENT
Start: 2023-10-11 | End: 2023-10-11 | Stop reason: HOSPADM

## 2023-10-10 RX ORDER — MIDAZOLAM HYDROCHLORIDE 1 MG/ML
INJECTION INTRAMUSCULAR; INTRAVENOUS
Status: COMPLETED | OUTPATIENT
Start: 2023-10-10 | End: 2023-10-10

## 2023-10-10 RX ORDER — MORPHINE SULFATE 2 MG/ML
2 INJECTION, SOLUTION INTRAMUSCULAR; INTRAVENOUS
Status: DISCONTINUED | OUTPATIENT
Start: 2023-10-10 | End: 2023-10-11 | Stop reason: HOSPADM

## 2023-10-10 RX ORDER — ACETAMINOPHEN 500 MG
1000 TABLET ORAL ONCE
Status: COMPLETED | OUTPATIENT
Start: 2023-10-10 | End: 2023-10-10

## 2023-10-10 RX ORDER — ROCURONIUM BROMIDE 10 MG/ML
INJECTION, SOLUTION INTRAVENOUS PRN
Status: DISCONTINUED | OUTPATIENT
Start: 2023-10-10 | End: 2023-10-10 | Stop reason: SDUPTHER

## 2023-10-10 RX ORDER — LABETALOL HYDROCHLORIDE 5 MG/ML
10 INJECTION, SOLUTION INTRAVENOUS
Status: DISCONTINUED | OUTPATIENT
Start: 2023-10-10 | End: 2023-10-10 | Stop reason: HOSPADM

## 2023-10-10 RX ORDER — DEXAMETHASONE SODIUM PHOSPHATE 4 MG/ML
INJECTION, SOLUTION INTRA-ARTICULAR; INTRALESIONAL; INTRAMUSCULAR; INTRAVENOUS; SOFT TISSUE PRN
Status: DISCONTINUED | OUTPATIENT
Start: 2023-10-10 | End: 2023-10-10 | Stop reason: SDUPTHER

## 2023-10-10 RX ORDER — BUPIVACAINE HYDROCHLORIDE 5 MG/ML
INJECTION, SOLUTION EPIDURAL; INTRACAUDAL
Status: COMPLETED | OUTPATIENT
Start: 2023-10-10 | End: 2023-10-10

## 2023-10-10 RX ORDER — DEXAMETHASONE SODIUM PHOSPHATE 4 MG/ML
6 INJECTION, SOLUTION INTRA-ARTICULAR; INTRALESIONAL; INTRAMUSCULAR; INTRAVENOUS; SOFT TISSUE EVERY 8 HOURS
Status: DISCONTINUED | OUTPATIENT
Start: 2023-10-10 | End: 2023-10-11 | Stop reason: HOSPADM

## 2023-10-10 RX ORDER — CETIRIZINE HYDROCHLORIDE 10 MG/1
10 TABLET ORAL DAILY
Status: DISCONTINUED | OUTPATIENT
Start: 2023-10-10 | End: 2023-10-11 | Stop reason: HOSPADM

## 2023-10-10 RX ORDER — FENTANYL CITRATE 50 UG/ML
25 INJECTION, SOLUTION INTRAMUSCULAR; INTRAVENOUS EVERY 5 MIN PRN
Status: DISCONTINUED | OUTPATIENT
Start: 2023-10-10 | End: 2023-10-10 | Stop reason: HOSPADM

## 2023-10-10 RX ORDER — IPRATROPIUM BROMIDE AND ALBUTEROL SULFATE 2.5; .5 MG/3ML; MG/3ML
1 SOLUTION RESPIRATORY (INHALATION)
Status: DISCONTINUED | OUTPATIENT
Start: 2023-10-10 | End: 2023-10-10 | Stop reason: HOSPADM

## 2023-10-10 RX ORDER — ONDANSETRON 2 MG/ML
INJECTION INTRAMUSCULAR; INTRAVENOUS PRN
Status: DISCONTINUED | OUTPATIENT
Start: 2023-10-10 | End: 2023-10-10 | Stop reason: SDUPTHER

## 2023-10-10 RX ORDER — SODIUM CHLORIDE, SODIUM LACTATE, POTASSIUM CHLORIDE, CALCIUM CHLORIDE 600; 310; 30; 20 MG/100ML; MG/100ML; MG/100ML; MG/100ML
INJECTION, SOLUTION INTRAVENOUS CONTINUOUS
Status: DISCONTINUED | OUTPATIENT
Start: 2023-10-10 | End: 2023-10-11 | Stop reason: HOSPADM

## 2023-10-10 RX ORDER — OXYCODONE HYDROCHLORIDE 5 MG/1
10 TABLET ORAL EVERY 4 HOURS PRN
Status: DISCONTINUED | OUTPATIENT
Start: 2023-10-10 | End: 2023-10-11 | Stop reason: HOSPADM

## 2023-10-10 RX ORDER — SODIUM CHLORIDE 9 MG/ML
INJECTION, SOLUTION INTRAVENOUS PRN
Status: DISCONTINUED | OUTPATIENT
Start: 2023-10-10 | End: 2023-10-11 | Stop reason: HOSPADM

## 2023-10-10 RX ORDER — MONTELUKAST SODIUM 10 MG/1
10 TABLET ORAL NIGHTLY
Status: DISCONTINUED | OUTPATIENT
Start: 2023-10-10 | End: 2023-10-11 | Stop reason: HOSPADM

## 2023-10-10 RX ORDER — LIDOCAINE HCL/PF 100 MG/5ML
SYRINGE (ML) INJECTION PRN
Status: DISCONTINUED | OUTPATIENT
Start: 2023-10-10 | End: 2023-10-10 | Stop reason: SDUPTHER

## 2023-10-10 RX ORDER — HYDROMORPHONE HYDROCHLORIDE 2 MG/ML
INJECTION, SOLUTION INTRAMUSCULAR; INTRAVENOUS; SUBCUTANEOUS PRN
Status: DISCONTINUED | OUTPATIENT
Start: 2023-10-10 | End: 2023-10-10 | Stop reason: SDUPTHER

## 2023-10-10 RX ORDER — MORPHINE SULFATE 2 MG/ML
4 INJECTION, SOLUTION INTRAMUSCULAR; INTRAVENOUS
Status: DISCONTINUED | OUTPATIENT
Start: 2023-10-10 | End: 2023-10-11 | Stop reason: HOSPADM

## 2023-10-10 RX ORDER — SPIRONOLACTONE 25 MG/1
25 TABLET ORAL DAILY
Status: DISCONTINUED | OUTPATIENT
Start: 2023-10-10 | End: 2023-10-11 | Stop reason: HOSPADM

## 2023-10-10 RX ORDER — PROCHLORPERAZINE EDISYLATE 5 MG/ML
5 INJECTION INTRAMUSCULAR; INTRAVENOUS
Status: DISCONTINUED | OUTPATIENT
Start: 2023-10-10 | End: 2023-10-10 | Stop reason: HOSPADM

## 2023-10-10 RX ORDER — OXYCODONE HYDROCHLORIDE 5 MG/1
5 TABLET ORAL EVERY 4 HOURS PRN
Status: DISCONTINUED | OUTPATIENT
Start: 2023-10-10 | End: 2023-10-11 | Stop reason: HOSPADM

## 2023-10-10 RX ORDER — FUROSEMIDE 20 MG/1
20 TABLET ORAL 2 TIMES DAILY
Status: DISCONTINUED | OUTPATIENT
Start: 2023-10-10 | End: 2023-10-11 | Stop reason: HOSPADM

## 2023-10-10 RX ORDER — SENNOSIDES A AND B 8.6 MG/1
1 TABLET, FILM COATED ORAL DAILY PRN
Status: DISCONTINUED | OUTPATIENT
Start: 2023-10-10 | End: 2023-10-11 | Stop reason: HOSPADM

## 2023-10-10 RX ORDER — DEXAMETHASONE SODIUM PHOSPHATE 4 MG/ML
4 INJECTION, SOLUTION INTRA-ARTICULAR; INTRALESIONAL; INTRAMUSCULAR; INTRAVENOUS; SOFT TISSUE ONCE
Status: COMPLETED | OUTPATIENT
Start: 2023-10-10 | End: 2023-10-10

## 2023-10-10 RX ORDER — PROPOFOL 10 MG/ML
INJECTION, EMULSION INTRAVENOUS PRN
Status: DISCONTINUED | OUTPATIENT
Start: 2023-10-10 | End: 2023-10-10 | Stop reason: SDUPTHER

## 2023-10-10 RX ORDER — FENTANYL CITRATE 50 UG/ML
INJECTION, SOLUTION INTRAMUSCULAR; INTRAVENOUS PRN
Status: DISCONTINUED | OUTPATIENT
Start: 2023-10-10 | End: 2023-10-10

## 2023-10-10 RX ORDER — DOFETILIDE 0.5 MG/1
500 CAPSULE ORAL EVERY 12 HOURS SCHEDULED
Status: DISCONTINUED | OUTPATIENT
Start: 2023-10-10 | End: 2023-10-11 | Stop reason: HOSPADM

## 2023-10-10 RX ORDER — ACETAMINOPHEN 325 MG/1
650 TABLET ORAL
Status: DISCONTINUED | OUTPATIENT
Start: 2023-10-10 | End: 2023-10-10 | Stop reason: HOSPADM

## 2023-10-10 RX ORDER — ACETAMINOPHEN 325 MG/1
650 TABLET ORAL EVERY 6 HOURS
Status: DISCONTINUED | OUTPATIENT
Start: 2023-10-10 | End: 2023-10-11 | Stop reason: HOSPADM

## 2023-10-10 RX ORDER — FENTANYL CITRATE 50 UG/ML
INJECTION, SOLUTION INTRAMUSCULAR; INTRAVENOUS
Status: COMPLETED | OUTPATIENT
Start: 2023-10-10 | End: 2023-10-10

## 2023-10-10 RX ORDER — SODIUM CHLORIDE 9 MG/ML
INJECTION, SOLUTION INTRAVENOUS PRN
Status: DISCONTINUED | OUTPATIENT
Start: 2023-10-10 | End: 2023-10-10 | Stop reason: HOSPADM

## 2023-10-10 RX ORDER — ONDANSETRON 2 MG/ML
4 INJECTION INTRAMUSCULAR; INTRAVENOUS
Status: DISCONTINUED | OUTPATIENT
Start: 2023-10-10 | End: 2023-10-10 | Stop reason: HOSPADM

## 2023-10-10 RX ORDER — SODIUM CHLORIDE 0.9 % (FLUSH) 0.9 %
5-40 SYRINGE (ML) INJECTION EVERY 12 HOURS SCHEDULED
Status: DISCONTINUED | OUTPATIENT
Start: 2023-10-10 | End: 2023-10-10 | Stop reason: HOSPADM

## 2023-10-10 RX ORDER — SODIUM CHLORIDE 0.9 % (FLUSH) 0.9 %
5-40 SYRINGE (ML) INJECTION EVERY 12 HOURS SCHEDULED
Status: DISCONTINUED | OUTPATIENT
Start: 2023-10-10 | End: 2023-10-11 | Stop reason: HOSPADM

## 2023-10-10 RX ORDER — OXYBUTYNIN CHLORIDE 5 MG/1
15 TABLET, EXTENDED RELEASE ORAL NIGHTLY
Status: DISCONTINUED | OUTPATIENT
Start: 2023-10-11 | End: 2023-10-11 | Stop reason: HOSPADM

## 2023-10-10 RX ORDER — CITALOPRAM 40 MG/1
40 TABLET ORAL DAILY
Status: DISCONTINUED | OUTPATIENT
Start: 2023-10-11 | End: 2023-10-11 | Stop reason: HOSPADM

## 2023-10-10 RX ORDER — MAGNESIUM HYDROXIDE 1200 MG/15ML
LIQUID ORAL CONTINUOUS PRN
Status: DISCONTINUED | OUTPATIENT
Start: 2023-10-10 | End: 2023-10-10 | Stop reason: HOSPADM

## 2023-10-10 RX ORDER — SODIUM CHLORIDE, SODIUM LACTATE, POTASSIUM CHLORIDE, CALCIUM CHLORIDE 600; 310; 30; 20 MG/100ML; MG/100ML; MG/100ML; MG/100ML
INJECTION, SOLUTION INTRAVENOUS CONTINUOUS
Status: DISCONTINUED | OUTPATIENT
Start: 2023-10-10 | End: 2023-10-10 | Stop reason: HOSPADM

## 2023-10-10 RX ORDER — GLYCOPYRROLATE 0.2 MG/ML
INJECTION INTRAMUSCULAR; INTRAVENOUS PRN
Status: DISCONTINUED | OUTPATIENT
Start: 2023-10-10 | End: 2023-10-10 | Stop reason: SDUPTHER

## 2023-10-10 RX ORDER — SODIUM CHLORIDE 0.9 % (FLUSH) 0.9 %
5-40 SYRINGE (ML) INJECTION PRN
Status: DISCONTINUED | OUTPATIENT
Start: 2023-10-10 | End: 2023-10-11 | Stop reason: HOSPADM

## 2023-10-10 RX ORDER — ATORVASTATIN CALCIUM 40 MG/1
40 TABLET, FILM COATED ORAL NIGHTLY
Status: DISCONTINUED | OUTPATIENT
Start: 2023-10-10 | End: 2023-10-11 | Stop reason: HOSPADM

## 2023-10-10 RX ORDER — POLYETHYLENE GLYCOL 3350 17 G/17G
17 POWDER, FOR SOLUTION ORAL DAILY
Status: DISCONTINUED | OUTPATIENT
Start: 2023-10-10 | End: 2023-10-11 | Stop reason: HOSPADM

## 2023-10-10 RX ORDER — PANTOPRAZOLE SODIUM 40 MG/1
40 TABLET, DELAYED RELEASE ORAL
Status: DISCONTINUED | OUTPATIENT
Start: 2023-10-11 | End: 2023-10-11 | Stop reason: HOSPADM

## 2023-10-10 RX ORDER — SENNOSIDES A AND B 8.6 MG/1
1 TABLET, FILM COATED ORAL
Status: DISCONTINUED | OUTPATIENT
Start: 2023-10-10 | End: 2023-10-11 | Stop reason: HOSPADM

## 2023-10-10 RX ORDER — HYDROMORPHONE HYDROCHLORIDE 1 MG/ML
0.5 INJECTION, SOLUTION INTRAMUSCULAR; INTRAVENOUS; SUBCUTANEOUS EVERY 5 MIN PRN
Status: DISCONTINUED | OUTPATIENT
Start: 2023-10-10 | End: 2023-10-10 | Stop reason: HOSPADM

## 2023-10-10 RX ADMIN — SENNOSIDES 8.6 MG: 8.6 TABLET, FILM COATED ORAL at 17:00

## 2023-10-10 RX ADMIN — SODIUM CHLORIDE, POTASSIUM CHLORIDE, SODIUM LACTATE AND CALCIUM CHLORIDE: 600; 310; 30; 20 INJECTION, SOLUTION INTRAVENOUS at 06:45

## 2023-10-10 RX ADMIN — DEXAMETHASONE SODIUM PHOSPHATE 4 MG: 4 INJECTION INTRA-ARTICULAR; INTRALESIONAL; INTRAMUSCULAR; INTRAVENOUS; SOFT TISSUE at 07:01

## 2023-10-10 RX ADMIN — ACETAMINOPHEN 650 MG: 325 TABLET ORAL at 17:00

## 2023-10-10 RX ADMIN — OXYCODONE HYDROCHLORIDE 5 MG: 5 TABLET ORAL at 16:59

## 2023-10-10 RX ADMIN — NALOXONE HYDROCHLORIDE 0.12 MG: 0.4 INJECTION, SOLUTION INTRAMUSCULAR; INTRAVENOUS; SUBCUTANEOUS at 09:13

## 2023-10-10 RX ADMIN — DEXAMETHASONE SODIUM PHOSPHATE 4 MG: 4 INJECTION, SOLUTION INTRAMUSCULAR; INTRAVENOUS at 08:03

## 2023-10-10 RX ADMIN — SUGAMMADEX 200 MG: 100 INJECTION, SOLUTION INTRAVENOUS at 09:00

## 2023-10-10 RX ADMIN — GLYCOPYRROLATE 0.2 MG: 0.2 INJECTION INTRAMUSCULAR; INTRAVENOUS at 07:32

## 2023-10-10 RX ADMIN — Medication 100 MG: at 07:33

## 2023-10-10 RX ADMIN — MIDAZOLAM HYDROCHLORIDE 2 MG: 2 INJECTION, SOLUTION INTRAMUSCULAR; INTRAVENOUS at 07:10

## 2023-10-10 RX ADMIN — TRANEXAMIC ACID 1000 MG: 100 INJECTION, SOLUTION INTRAVENOUS at 07:47

## 2023-10-10 RX ADMIN — SODIUM CHLORIDE, PRESERVATIVE FREE 10 ML: 5 INJECTION INTRAVENOUS at 20:41

## 2023-10-10 RX ADMIN — DOFETILIDE 500 MCG: 0.5 CAPSULE ORAL at 20:47

## 2023-10-10 RX ADMIN — HYDROMORPHONE HYDROCHLORIDE 1 MG: 2 INJECTION, SOLUTION INTRAMUSCULAR; INTRAVENOUS; SUBCUTANEOUS at 08:16

## 2023-10-10 RX ADMIN — BUPIVACAINE HYDROCHLORIDE 30 ML: 5 INJECTION, SOLUTION EPIDURAL; INTRACAUDAL; PERINEURAL at 07:15

## 2023-10-10 RX ADMIN — PROPOFOL 200 MG: 10 INJECTION, EMULSION INTRAVENOUS at 07:33

## 2023-10-10 RX ADMIN — APIXABAN 5 MG: 5 TABLET, FILM COATED ORAL at 20:40

## 2023-10-10 RX ADMIN — SODIUM CHLORIDE 3000 MG: 900 INJECTION INTRAVENOUS at 17:10

## 2023-10-10 RX ADMIN — TRANEXAMIC ACID 1000 MG: 100 INJECTION, SOLUTION INTRAVENOUS at 08:32

## 2023-10-10 RX ADMIN — HYDROMORPHONE HYDROCHLORIDE 1 MG: 2 INJECTION, SOLUTION INTRAMUSCULAR; INTRAVENOUS; SUBCUTANEOUS at 08:02

## 2023-10-10 RX ADMIN — ACETAMINOPHEN 1000 MG: 500 TABLET ORAL at 07:00

## 2023-10-10 RX ADMIN — SODIUM CHLORIDE, POTASSIUM CHLORIDE, SODIUM LACTATE AND CALCIUM CHLORIDE: 600; 310; 30; 20 INJECTION, SOLUTION INTRAVENOUS at 14:39

## 2023-10-10 RX ADMIN — FENTANYL CITRATE 50 MCG: 50 INJECTION, SOLUTION INTRAMUSCULAR; INTRAVENOUS at 07:10

## 2023-10-10 RX ADMIN — DEXAMETHASONE SODIUM PHOSPHATE 6 MG: 4 INJECTION INTRA-ARTICULAR; INTRALESIONAL; INTRAMUSCULAR; INTRAVENOUS; SOFT TISSUE at 22:49

## 2023-10-10 RX ADMIN — MONTELUKAST 10 MG: 10 TABLET, FILM COATED ORAL at 20:40

## 2023-10-10 RX ADMIN — CETIRIZINE HYDROCHLORIDE 10 MG: 10 TABLET, FILM COATED ORAL at 17:00

## 2023-10-10 RX ADMIN — NALOXONE HYDROCHLORIDE 0.08 MG: 0.4 INJECTION, SOLUTION INTRAMUSCULAR; INTRAVENOUS; SUBCUTANEOUS at 09:16

## 2023-10-10 RX ADMIN — HYDROMORPHONE HYDROCHLORIDE 0.5 MG: 1 INJECTION, SOLUTION INTRAMUSCULAR; INTRAVENOUS; SUBCUTANEOUS at 09:45

## 2023-10-10 RX ADMIN — OXYCODONE HYDROCHLORIDE 10 MG: 5 TABLET ORAL at 22:48

## 2023-10-10 RX ADMIN — MORPHINE SULFATE 2 MG: 2 INJECTION, SOLUTION INTRAMUSCULAR; INTRAVENOUS at 20:38

## 2023-10-10 RX ADMIN — ACETAMINOPHEN 650 MG: 325 TABLET ORAL at 20:38

## 2023-10-10 RX ADMIN — ATORVASTATIN CALCIUM 40 MG: 40 TABLET, FILM COATED ORAL at 20:40

## 2023-10-10 RX ADMIN — ONDANSETRON 4 MG: 2 INJECTION INTRAMUSCULAR; INTRAVENOUS at 08:11

## 2023-10-10 RX ADMIN — SODIUM CHLORIDE 3000 MG: 900 INJECTION INTRAVENOUS at 07:43

## 2023-10-10 RX ADMIN — DEXAMETHASONE SODIUM PHOSPHATE 6 MG: 4 INJECTION INTRA-ARTICULAR; INTRALESIONAL; INTRAMUSCULAR; INTRAVENOUS; SOFT TISSUE at 16:59

## 2023-10-10 RX ADMIN — SODIUM CHLORIDE 3000 MG: 900 INJECTION INTRAVENOUS at 22:55

## 2023-10-10 RX ADMIN — POLYETHYLENE GLYCOL 3350 17 G: 17 POWDER, FOR SOLUTION ORAL at 17:01

## 2023-10-10 RX ADMIN — ROCURONIUM BROMIDE 70 MG: 10 INJECTION, SOLUTION INTRAVENOUS at 07:34

## 2023-10-10 RX ADMIN — FENTANYL CITRATE 50 MCG: 50 INJECTION, SOLUTION INTRAMUSCULAR; INTRAVENOUS at 07:53

## 2023-10-10 RX ADMIN — SODIUM CHLORIDE, POTASSIUM CHLORIDE, SODIUM LACTATE AND CALCIUM CHLORIDE: 600; 310; 30; 20 INJECTION, SOLUTION INTRAVENOUS at 08:16

## 2023-10-10 ASSESSMENT — PAIN DESCRIPTION - ONSET
ONSET: ON-GOING
ONSET: ON-GOING
ONSET: AWAKENED FROM SLEEP
ONSET: PROGRESSIVE

## 2023-10-10 ASSESSMENT — PAIN SCALES - GENERAL
PAINLEVEL_OUTOF10: 0
PAINLEVEL_OUTOF10: 0
PAINLEVEL_OUTOF10: 4
PAINLEVEL_OUTOF10: 0
PAINLEVEL_OUTOF10: 7
PAINLEVEL_OUTOF10: 2
PAINLEVEL_OUTOF10: 7
PAINLEVEL_OUTOF10: 4
PAINLEVEL_OUTOF10: 2

## 2023-10-10 ASSESSMENT — PAIN DESCRIPTION - FREQUENCY
FREQUENCY: CONTINUOUS
FREQUENCY: CONTINUOUS
FREQUENCY: INTERMITTENT
FREQUENCY: CONTINUOUS

## 2023-10-10 ASSESSMENT — PAIN DESCRIPTION - PAIN TYPE
TYPE: SURGICAL PAIN

## 2023-10-10 ASSESSMENT — PAIN DESCRIPTION - ORIENTATION
ORIENTATION: LEFT

## 2023-10-10 ASSESSMENT — PAIN DESCRIPTION - LOCATION
LOCATION: KNEE

## 2023-10-10 ASSESSMENT — PAIN DESCRIPTION - DESCRIPTORS
DESCRIPTORS: ACHING;SHARP
DESCRIPTORS: ACHING;SHARP
DESCRIPTORS: BURNING
DESCRIPTORS: DISCOMFORT

## 2023-10-10 ASSESSMENT — PAIN - FUNCTIONAL ASSESSMENT
PAIN_FUNCTIONAL_ASSESSMENT: ACTIVITIES ARE NOT PREVENTED

## 2023-10-10 NOTE — TELEPHONE ENCOUNTER
Patient is scheduled for postop PT eval on 11/1/23 and they are requesting an order be put into Epic.

## 2023-10-10 NOTE — OP NOTE
Operative Note      Patient: Debbie Saravia  YOB: 1955  MRN: [id number]    Date of Procedure: 10/10/2023    Pre-Op Diagnosis Codes:     * Osteoarthritis of left knee, unspecified osteoarthritis type [M17.12]    Post-Op Diagnosis: Same       Procedure(s):  LEFT TOTAL KNEE ARTHROPLASTY    Surgeon(s):  Latha Brownlee MD    Assistant:   Surgical Assistant: Angie Lima    Anesthesia: General    Estimated Blood Loss (mL): 934     Complications: None    Specimens:   * No specimens in log *    Implants:  * No implants in log *    Persona 8 femur, F tibia, 12 MC poly      Drains: * No LDAs found *    Findings: severe OA    Detailed Description of Procedure:   Clinical Indications: This is a 71 y/o F that was evaluated for L knee OA by myself, noted to have failed extensive conservative measures and desired to have surgical intervention. Risks benefits limitations and alternatives to L TKA were discussed with the patient who wished to proceed. The patient was met in the preoperative holding area last-minute questions were answered, the L knee was marked, consent was verified. An adductor canal block was performed per anesthesia. The patient was taken to the OR where general anesthesia was administered. They were positioned in the supine position. The operative extremity was prepped and draped in standard orthopedic fashion. Weight based Ancef and 1 g TXA were administered preoperatively. Timeout was performed according hospital protocol. The operative extremity was exsanguinated and tourniquet inflated. Midline incision marked and made with scalpel. Dissection was carried down to the capsule with electrocautery. Median parapatellar arthrotomy was made with bovie. Medial release was performed. Once adequate exposure was achieved, attention was turned to the femur. The femoral canal was entered at the middle of the notch with the entry reamer. IM guide was inserted.  In this case the medial

## 2023-10-10 NOTE — H&P
Update History & Physical    The patient's History and Physical of September 28, 2023 was reviewed with the patient and I examined the patient. There was no change. The surgical site was confirmed by the patient and me. Plan: The risks, benefits, expected outcome, and alternative to the recommended procedure have been discussed with the patient. Patient understands and wants to proceed with the procedure.      Electronically signed by Librado Lopez MD on 69/15/5439 at 7:30 AM
No

## 2023-10-10 NOTE — PROGRESS NOTES
Pt alert and oriented x4, VSS. Pt tolerating oral intake well. Just got up to the bathroom and voided via gb/walker, tolerated fairly well. Managing pain with ice packs and meds per MAR. Fall precautions in place, call light within reach.

## 2023-10-10 NOTE — PLAN OF CARE
Problem: Pain  Goal: Verbalizes/displays adequate comfort level or baseline comfort level  Outcome: Progressing   -managing pain with meds per MAR    Problem: Safety - Adult  Goal: Free from fall injury  Outcome: Progressing   -fall precautions in place, call light within reach    Problem: ABCDS Injury Assessment  Goal: Absence of physical injury  Outcome: Progressing   -pt educated on call light and fall precaution in place to prevent injury

## 2023-10-10 NOTE — PROGRESS NOTES
Patient is resting in bed with call light. Antibiotic on hold to OR  Belongings CPAP machine, x2 bags to locker,  has glasses, medications (Tikosyn)  IV: 20g to left hand x1 attempt  Labs sent:PT/INR, APTT, & TYPE/SCREEN SENTTed Hose: Rt knee destinee hose placed on   Incentive Spirometer: unable to do pre-op education to due time. Patient specific details:   Procedure:Left adductor canal block  MD: Dr. Chris Zuniga performed. 0715 and stop time at 0720  Pt monitored closely on heart monitor, 2L NC, continuous pulse oximetry, EtCO2, and frequent BPs. Pt remained alert and oriented x4. pt tolerated procedure well.

## 2023-10-10 NOTE — PLAN OF CARE
Problem: Chronic Conditions and Co-morbidities  Goal: Patient's chronic conditions and co-morbidity symptoms are monitored and maintained or improved  Outcome: Progressing     Problem: Discharge Planning  Goal: Discharge to home or other facility with appropriate resources  Outcome: Progressing     Problem: Pain  Goal: Verbalizes/displays adequate comfort level or baseline comfort level  10/10/2023 1940 by Pankaj Shelton RN  Outcome: Progressing     Problem: Safety - Adult  Goal: Free from fall injury  10/10/2023 1940 by Pankaj Shelton RN  Outcome: Progressing    Fall risk precaution in place. Bed is locked and in lowest position. 2/4 side rails are up. Call light with in reach. Fall risk bracelet in place, non slip socks on.frequent check on patient. free from falls at this time. will continue to monitor.    Problem: ABCDS Injury Assessment  Goal: Absence of physical injury  10/10/2023 1940 by Pankaj Shelton RN  Outcome: Progressing

## 2023-10-10 NOTE — DISCHARGE INSTRUCTIONS
This is typically, approximately 20 minutes per hour    Contact [facility] office if  Increased redness, swelling, drainage of any kind as well as new onset fevers and or chills. These could signify an infection. Calf or thigh tenderness to touch as well as increased swelling or redness. This could signify a clot formation. Any rash appears, increased  or new onset nausea/vomiting occur. This may indicate a reaction to a medication. Phone # [phone]. [address] and Sports Medicine. Follow up with Surgeon at scheduled appointment time. [address] [place] [address] is participating in [address]  for Joint Replacement (CJR) [address] is participating in a Medicare initiative called the [facility] for Joint Replacement (CJR) model. Medicare designed this model to encourage higher quality care and greater financial accountability from hospitals when Medicare beneficiaries receive lower-extremity joint replacement procedures (Mara Jil), typically hip or knee replacements. [address] [place] [address] participation in the [address] [place] [address] should not restrict your access to care for your medical condition or your freedom to choose your health care providers and services. All existing Medicare beneficiary protections continue to be available to you. The CJR model aims to help give you better care. The CJR model aims to support better and more efficient care for beneficiaries undergoing LEJR procedures. A CJR episode of care is typically defined as an admission of an eligible Medicare beneficiary to a hospital participating in the [address] [place] [address] for an Del Mar Jil procedure. The LEJR procedure can take place in either an inpatient or outpatient setting and will still be considered a CJR episode of care. The CJR episode of care continues for 90 days following discharge.      This model uses episode payment and quality measurement for an of low sodium recipes and tips.     [employer]  -- more free recipes

## 2023-10-10 NOTE — PROGRESS NOTES
Wellington Perry results:  TWO postop portable VIEWS OF THE LEFT KNEE on 10/10/2023     HISTORY: Status post left total knee replacement     PROCEDURE:     AP and crosstable lateral portable views were obtained. FINDINGS:     There is evidence of left total knee replacement with the femoral and tibial  components in satisfactory alignment. Soft tissue changes and subcutaneous gas overlie the anterior surface of the  skin. Patellofemoral post surgical changes are noted. The alignment is anatomic. IMPRESSION:     Status post total left knee replacement, in satisfactory alignment.

## 2023-10-10 NOTE — PROGRESS NOTES
4 Eyes Skin Assessment     NAME:  Leslie Hackett  YOB: 1955  MEDICAL RECORD NUMBER:  [id number]    The patient is being assessed for  Post-Op Surgical    I agree that at least one RN has performed a thorough Head to Toe Skin Assessment on the patient. ALL assessment sites listed below have been assessed. Areas assessed by both nurses:    Head, Face, Ears, Shoulders, Back, Chest, Arms, Elbows, Hands, Sacrum. Buttock, Coccyx, Ischium, Legs. Feet and Heels, and Under Medical Devices         Does the Patient have a Wound?  No noted wound(s)       Abdiaziz Prevention initiated by RN: No  Wound Care Orders initiated by RN: No    Pressure Injury (Stage 3,4, Unstageable, DTI, NWPT, and Complex wounds) if present, place Wound referral order by RN under : No    New Ostomies, if present place, Ostomy referral order under : No     Nurse 1 eSignature: Electronically signed by Merced Murphy RN on 10/10/23 at 3:57 PM EDT    **SHARE this note so that the co-signing nurse can place an eSignature**    Nurse 2 eSignature: Electronically signed by Vipul Leal RN on 10/10/23 at 6:21 PM EDT

## 2023-10-10 NOTE — ANESTHESIA PROCEDURE NOTES
Peripheral Block    Patient location during procedure: pre-op  Reason for block: post-op pain management  Start time: 10/10/2023 7:10 AM  End time: 10/10/2023 7:20 AM  Staffing  Performed: anesthesiologist and other anesthesia staff   Anesthesiologist: Gilberto Kimble MD  Other anesthesia staff: Paul Stanley RN  Performed by: Paul Stanley RN  Authorized by: Gilberto Kimble MD    Preanesthetic Checklist  Completed: patient identified, IV checked, site marked, risks and benefits discussed, surgical/procedural consents, equipment checked, pre-op evaluation, timeout performed, anesthesia consent given, oxygen available, monitors applied/VS acknowledged, fire risk safety assessment completed and verbalized and blood product R/B/A discussed and consented  Peripheral Block   Patient position: prone  Prep: ChloraPrep  Provider prep: mask  Patient monitoring: cardiac monitor, continuous pulse ox, continuous capnometry, frequent blood pressure checks, IV access, oxygen and responsive to questions  Block type: Saphenous  Laterality: left  Injection technique: single-shot  Guidance: ultrasound guided    Needle   Needle type: insulated echogenic nerve stimulator needle   Needle gauge: 22 G  Needle localization: ultrasound guidance and anatomical landmarks  Assessment   Injection assessment: negative aspiration for heme, no paresthesia on injection, local visualized surrounding nerve on ultrasound and no intravascular symptoms  Paresthesia pain: none  Hemodynamics: stable  Real-time US image taken/store: yes  Outcomes: uncomplicated and patient tolerated procedure well    Additional Notes  30 mls 0.5% bupivicaine administered with negative aspiration every 5 mls. No complications.    Medications Administered  bupivacaine (MARCAINE) PF injection 0.5% - Perineural   30 mL - 10/10/2023 7:15:00 AM  fentaNYL (SUBLIMAZE) injection - IntraVENous   50 mcg - 10/10/2023 7:10:00 AM  midazolam (VERSED) injection 2 mg/2mL - IntraVENous   2

## 2023-10-10 NOTE — PROGRESS NOTES
LEFT TOTAL KNEE ARTHROPLASTY -   Dr Renate Alvarez      Current Allergies: Bactrim [sulfamethoxazole-trimethoprim], Iodine, Mupirocin, and Adhesive tape    Recent Labs     10/10/23  0647 10/10/23  0931   POCGLU 133* 149*       Admitted to PACU bed 13 from OR. Arrived on a bed. Patient to be admitted to 5 tower. Attached to PACU monitoring system. Alarms and parameters set. Report received from anesthesia personnel. [address]  OR staff did not report skin issues that were observed while in OR, or if admitted with skin issue. No problems reported intraoperatively. Pt arrived with oxygen per non-rebreather face mask with oxygen at 15 liters. Oral air way in place, removed before the CRNA left bedside  Athrombic wrap and destinee hose on non operative leg    Surgical site to the left knee. Closed with ace wrap. Ice applied  X--ray ordered    Doctors aware of all labs and diagnostics before coming to recovery.

## 2023-10-10 NOTE — ANESTHESIA POSTPROCEDURE EVALUATION
Department of Anesthesiology  Postprocedure Note    Patient: Nishant Khalil  MRN: [id number]  YOB: 1955  Date of evaluation: 10/10/2023      Procedure Summary     Date: 10/10/23 Room / Location: [facility] OR 10 / The [address] [facility]    Anesthesia Start: 2366 Anesthesia Stop: 6616    Procedure: LEFT TOTAL KNEE ARTHROPLASTY (Left: Knee) Diagnosis:       Osteoarthritis of left knee, unspecified osteoarthritis type      (Osteoarthritis of left knee, unspecified osteoarthritis type [M17.12])    Surgeons: Rosaline Hensley MD Responsible Provider: Awa Soler MD    Anesthesia Type: general, regional ASA Status: 3          Anesthesia Type: No value filed.     Cheri Phase I: Cheri Score: 8    Cheri Phase II:        Anesthesia Post Evaluation    Patient location during evaluation: PACU  Patient participation: complete - patient participated  Level of consciousness: awake  Pain score: 0  Nausea & Vomiting: no nausea and no vomiting  Complications: no  Cardiovascular status: blood pressure returned to baseline  Respiratory status: acceptable  Hydration status: euvolemic  Pain management: adequate

## 2023-10-10 NOTE — ANESTHESIA PRE PROCEDURE
Department of Anesthesiology  Preprocedure Note       Name:  Bruce Dsouza   Age:  70 y.o.  :  1955                                          MRN:  [id number]         Date:  10/10/2023      Surgeon: Devante Mims):  Kevin Savage MD    Procedure: Procedure(s):  LEFT TOTAL KNEE ARTHROPLASTY    Medications prior to admission:   Prior to Admission medications    Medication Sig Start Date End Date Taking?  Authorizing Provider   warfarin (COUMADIN) 5 MG tablet Take 1 tablet by mouth daily  Patient not taking: Reported on 10/10/2023 10/5/23   Gini Pike APRN - CNP   levothyroxine (SYNTHROID) 137 MCG tablet TAKE 1 TABLET ONCE DAILY 10/4/23   KADEN Kirkpatrick   citalopram (CELEXA) 40 MG tablet TAKE 1 TABLET DAILY 10/4/23   KADEN Kirkpatrick   montelukast (SINGULAIR) 10 MG tablet TAKE 1 TABLET NIGHTLY 9/15/23   KADEN Kirkpatrick   spironolactone (ALDACTONE) 25 MG tablet TAKE 1 TABLET ONCE DAILY 23   Vanesa Coleman MD   dofetilide (TIKOSYN) 500 MCG capsule TAKE ONE CAPSULE BY MOUTH EVERY MORNING AND TAKE ONE CAPSULE BY MOUTH EVERY EVENING 23   Gini Pike APRN - CNP   apixaban (ELIQUIS) 5 MG TABS tablet Take 1 tablet by mouth 2 times daily  Patient taking differently: Take 1 tablet by mouth daily 23   Gini Pike APRN - CNP   senna (SENOKOT) 8.6 MG TABS tablet Take 1 tablet by mouth Twice a Week In am    Provider, MD Maynor   oxybutynin (DITROPAN XL) 15 MG extended release tablet TAKE 1 TABLET ONCE DAILY  Patient taking differently: TAKE 1 TABLET ONCE DAILY; takes at [facility] 23   KADEN Kirkpatrick   omeprazole (PRILOSEC) 40 MG delayed release capsule Take 1 capsule by mouth every morning (before breakfast) 23   KADEN Kirkpatrick   valsartan (DIOVAN) 40 MG tablet TAKE 1 TABLET BY MOUTH ONE TIME A DAY  Patient taking differently: TAKE 1 TABLET BY MOUTH ONE TIME A DAY in am 3/21/23   Vanesa Coleman MD   Vitamin D, Cholecalciferol, 25 MCG (1000 UT) CAPS In am

## 2023-10-11 ENCOUNTER — TELEPHONE (OUTPATIENT)
Dept: ORTHOPEDIC SURGERY | Age: 68
End: 2023-10-11

## 2023-10-11 VITALS
WEIGHT: 276 LBS | SYSTOLIC BLOOD PRESSURE: 105 MMHG | TEMPERATURE: 98.2 F | BODY MASS INDEX: 44.36 KG/M2 | OXYGEN SATURATION: 96 % | HEIGHT: 66 IN | RESPIRATION RATE: 18 BRPM | DIASTOLIC BLOOD PRESSURE: 57 MMHG | HEART RATE: 81 BPM

## 2023-10-11 DIAGNOSIS — Z96.652 S/P TOTAL KNEE ARTHROPLASTY, LEFT: ICD-10-CM

## 2023-10-11 DIAGNOSIS — M17.0 BILATERAL PRIMARY OSTEOARTHRITIS OF KNEE: Primary | ICD-10-CM

## 2023-10-11 DIAGNOSIS — M25.561 ACUTE PAIN OF RIGHT KNEE: ICD-10-CM

## 2023-10-11 DIAGNOSIS — M25.562 ACUTE PAIN OF LEFT KNEE: ICD-10-CM

## 2023-10-11 LAB
ANION GAP SERPL CALCULATED.3IONS-SCNC: 8 MMOL/L (ref 3–16)
BASOPHILS # BLD: 0 K/UL (ref 0–0.2)
BASOPHILS NFR BLD: 0.1 %
BUN SERPL-MCNC: 20 MG/DL (ref 7–20)
CALCIUM SERPL-MCNC: 9.2 MG/DL (ref 8.3–10.6)
CHLORIDE SERPL-SCNC: 102 MMOL/L (ref 99–110)
CO2 SERPL-SCNC: 27 MMOL/L (ref 21–32)
CREAT SERPL-MCNC: 0.8 MG/DL (ref 0.6–1.2)
DEPRECATED RDW RBC AUTO: 13.8 % (ref 12.4–15.4)
EKG ATRIAL RATE: 81 BPM
EKG DIAGNOSIS: NORMAL
EKG P AXIS: 74 DEGREES
EKG P-R INTERVAL: 160 MS
EKG Q-T INTERVAL: 408 MS
EKG QRS DURATION: 106 MS
EKG QTC CALCULATION (BAZETT): 473 MS
EKG R AXIS: -12 DEGREES
EKG T AXIS: 131 DEGREES
EKG VENTRICULAR RATE: 81 BPM
EOSINOPHIL # BLD: 0 K/UL (ref 0–0.6)
EOSINOPHIL NFR BLD: 0 %
GFR SERPLBLD CREATININE-BSD FMLA CKD-EPI: >60 ML/MIN/{1.73_M2}
GLUCOSE SERPL-MCNC: 169 MG/DL (ref 70–99)
HCT VFR BLD AUTO: 32.6 % (ref 36–48)
HGB BLD-MCNC: 10.9 G/DL (ref 12–16)
LYMPHOCYTES # BLD: 0.8 K/UL (ref 1–5.1)
LYMPHOCYTES NFR BLD: 7.2 %
MCH RBC QN AUTO: 30.4 PG (ref 26–34)
MCHC RBC AUTO-ENTMCNC: 33.6 G/DL (ref 31–36)
MCV RBC AUTO: 90.4 FL (ref 80–100)
MONOCYTES # BLD: 1 K/UL (ref 0–1.3)
MONOCYTES NFR BLD: 8.5 %
NEUTROPHILS # BLD: 10 K/UL (ref 1.7–7.7)
NEUTROPHILS NFR BLD: 84.2 %
PLATELET # BLD AUTO: 216 K/UL (ref 135–450)
PMV BLD AUTO: 8.4 FL (ref 5–10.5)
POTASSIUM SERPL-SCNC: 4.4 MMOL/L (ref 3.5–5.1)
RBC # BLD AUTO: 3.6 M/UL (ref 4–5.2)
SODIUM SERPL-SCNC: 137 MMOL/L (ref 136–145)
WBC # BLD AUTO: 11.8 K/UL (ref 4–11)

## 2023-10-11 PROCEDURE — 85025 COMPLETE CBC W/AUTO DIFF WBC: CPT

## 2023-10-11 PROCEDURE — 97530 THERAPEUTIC ACTIVITIES: CPT

## 2023-10-11 PROCEDURE — 97161 PT EVAL LOW COMPLEX 20 MIN: CPT

## 2023-10-11 PROCEDURE — 97535 SELF CARE MNGMENT TRAINING: CPT

## 2023-10-11 PROCEDURE — 80048 BASIC METABOLIC PNL TOTAL CA: CPT

## 2023-10-11 PROCEDURE — 6370000000 HC RX 637 (ALT 250 FOR IP): Performed by: STUDENT IN AN ORGANIZED HEALTH CARE EDUCATION/TRAINING PROGRAM

## 2023-10-11 PROCEDURE — 97165 OT EVAL LOW COMPLEX 30 MIN: CPT

## 2023-10-11 PROCEDURE — 94761 N-INVAS EAR/PLS OXIMETRY MLT: CPT

## 2023-10-11 PROCEDURE — 97110 THERAPEUTIC EXERCISES: CPT

## 2023-10-11 PROCEDURE — 2700000000 HC OXYGEN THERAPY PER DAY

## 2023-10-11 PROCEDURE — 94660 CPAP INITIATION&MGMT: CPT

## 2023-10-11 PROCEDURE — 97116 GAIT TRAINING THERAPY: CPT

## 2023-10-11 PROCEDURE — 6360000002 HC RX W HCPCS: Performed by: STUDENT IN AN ORGANIZED HEALTH CARE EDUCATION/TRAINING PROGRAM

## 2023-10-11 PROCEDURE — 36415 COLL VENOUS BLD VENIPUNCTURE: CPT

## 2023-10-11 PROCEDURE — 93010 ELECTROCARDIOGRAM REPORT: CPT | Performed by: INTERNAL MEDICINE

## 2023-10-11 PROCEDURE — 2580000003 HC RX 258: Performed by: STUDENT IN AN ORGANIZED HEALTH CARE EDUCATION/TRAINING PROGRAM

## 2023-10-11 RX ORDER — OXYCODONE HYDROCHLORIDE 5 MG/1
5 TABLET ORAL EVERY 6 HOURS PRN
Qty: 28 TABLET | Refills: 0 | Status: SHIPPED | OUTPATIENT
Start: 2023-10-11 | End: 2023-10-18

## 2023-10-11 RX ORDER — METHYLPREDNISOLONE 4 MG/1
TABLET ORAL
Qty: 1 KIT | Refills: 0 | Status: SHIPPED | OUTPATIENT
Start: 2023-10-11 | End: 2023-10-17

## 2023-10-11 RX ORDER — ACETAMINOPHEN 500 MG
1000 TABLET ORAL 3 TIMES DAILY PRN
Qty: 180 TABLET | Refills: 0 | Status: SHIPPED | OUTPATIENT
Start: 2023-10-11

## 2023-10-11 RX ORDER — ONDANSETRON 4 MG/1
4 TABLET, FILM COATED ORAL 3 TIMES DAILY PRN
Qty: 15 TABLET | Refills: 0 | Status: SHIPPED | OUTPATIENT
Start: 2023-10-11

## 2023-10-11 RX ADMIN — OXYCODONE HYDROCHLORIDE 10 MG: 5 TABLET ORAL at 17:31

## 2023-10-11 RX ADMIN — LEVOTHYROXINE SODIUM 150 MCG: 150 TABLET ORAL at 05:06

## 2023-10-11 RX ADMIN — SENNOSIDES 8.6 MG: 8.6 TABLET, FILM COATED ORAL at 08:32

## 2023-10-11 RX ADMIN — OXYCODONE HYDROCHLORIDE 10 MG: 5 TABLET ORAL at 09:24

## 2023-10-11 RX ADMIN — ACETAMINOPHEN 650 MG: 325 TABLET ORAL at 15:19

## 2023-10-11 RX ADMIN — SODIUM CHLORIDE, PRESERVATIVE FREE 10 ML: 5 INJECTION INTRAVENOUS at 08:29

## 2023-10-11 RX ADMIN — DOFETILIDE 500 MCG: 0.5 CAPSULE ORAL at 08:18

## 2023-10-11 RX ADMIN — FUROSEMIDE 20 MG: 20 TABLET ORAL at 08:18

## 2023-10-11 RX ADMIN — APIXABAN 5 MG: 5 TABLET, FILM COATED ORAL at 08:18

## 2023-10-11 RX ADMIN — SPIRONOLACTONE 25 MG: 25 TABLET ORAL at 08:18

## 2023-10-11 RX ADMIN — CITALOPRAM 40 MG: 40 TABLET, FILM COATED ORAL at 08:18

## 2023-10-11 RX ADMIN — DEXAMETHASONE SODIUM PHOSPHATE 6 MG: 4 INJECTION INTRA-ARTICULAR; INTRALESIONAL; INTRAMUSCULAR; INTRAVENOUS; SOFT TISSUE at 15:19

## 2023-10-11 RX ADMIN — OXYCODONE HYDROCHLORIDE 10 MG: 5 TABLET ORAL at 13:25

## 2023-10-11 RX ADMIN — PANTOPRAZOLE SODIUM 40 MG: 40 TABLET, DELAYED RELEASE ORAL at 05:07

## 2023-10-11 RX ADMIN — OXYCODONE HYDROCHLORIDE 10 MG: 5 TABLET ORAL at 05:06

## 2023-10-11 RX ADMIN — ACETAMINOPHEN 650 MG: 325 TABLET ORAL at 08:18

## 2023-10-11 RX ADMIN — DEXAMETHASONE SODIUM PHOSPHATE 6 MG: 4 INJECTION INTRA-ARTICULAR; INTRALESIONAL; INTRAMUSCULAR; INTRAVENOUS; SOFT TISSUE at 05:07

## 2023-10-11 RX ADMIN — POLYETHYLENE GLYCOL 3350 17 G: 17 POWDER, FOR SOLUTION ORAL at 08:18

## 2023-10-11 RX ADMIN — FUROSEMIDE 20 MG: 20 TABLET ORAL at 17:15

## 2023-10-11 RX ADMIN — CETIRIZINE HYDROCHLORIDE 10 MG: 10 TABLET, FILM COATED ORAL at 08:18

## 2023-10-11 ASSESSMENT — PAIN DESCRIPTION - PAIN TYPE
TYPE: SURGICAL PAIN

## 2023-10-11 ASSESSMENT — PAIN DESCRIPTION - LOCATION
LOCATION: KNEE

## 2023-10-11 ASSESSMENT — PAIN DESCRIPTION - DESCRIPTORS
DESCRIPTORS: ACHING
DESCRIPTORS: ACHING;SHARP

## 2023-10-11 ASSESSMENT — PAIN DESCRIPTION - FREQUENCY
FREQUENCY: CONTINUOUS

## 2023-10-11 ASSESSMENT — PAIN - FUNCTIONAL ASSESSMENT
PAIN_FUNCTIONAL_ASSESSMENT: ACTIVITIES ARE NOT PREVENTED

## 2023-10-11 ASSESSMENT — PAIN DESCRIPTION - ONSET
ONSET: ON-GOING

## 2023-10-11 ASSESSMENT — PAIN DESCRIPTION - ORIENTATION
ORIENTATION: LEFT

## 2023-10-11 ASSESSMENT — PAIN SCALES - GENERAL
PAINLEVEL_OUTOF10: 7
PAINLEVEL_OUTOF10: 7
PAINLEVEL_OUTOF10: 3
PAINLEVEL_OUTOF10: 7
PAINLEVEL_OUTOF10: 3
PAINLEVEL_OUTOF10: 7
PAINLEVEL_OUTOF10: 2

## 2023-10-11 NOTE — CARE COORDINATION
Patient is from home with spouse, independent pta, no DME needs, plans for outpatient therapy. No CM needs at this time. Family to transport home.     Elena Odell RN, BSN,    Ortho/Neuro   [phone]

## 2023-10-11 NOTE — PROGRESS NOTES
VSS on room air. Aox4. No acute events this shift. Voiding adequately via BRP, denies pain or difficulty when urinating. Tolerating PO diet/fluids, denies N/V. Skin C/D/I with exception to surgical site. Surgical dressing C/D/I, no breakthrough drainage noted. Pt has sat in recliner multiple times this shift, tolerated well. Pt has ambulated SBA with rw/gb in room and in ulloa and tolerated well, denies dizziness. All fall precautions in place.

## 2023-10-11 NOTE — PROGRESS NOTES
Pt to d/c home with . D/c packet fully reviewed with Pt with emphasis on medications, follow up appointments, infection prevention and infection S/S. 1 PIV removed. All belongings with Pt.

## 2023-10-11 NOTE — TELEPHONE ENCOUNTER
DEVEN NURSE W/ TERRELL CALLED, PATIENT IS ASKING IF ANYONE FROM ORTHO IS ROUNDING TODAY BECAUSE SHE HAS QUESTIONS. PLEASE CALL Cornel Rm  [phone].

## 2023-10-11 NOTE — CONSULTS
V2.0  [facility] Consult Note      Name:  Sol Beverly /Age/Sex: 1955  (70 y.o. female)   MRN & CSN:  [id number] & [id number] Encounter Date/Time: 10/11/2023 8:02 AM EDT   Location:  [id number] PCP: KADEN Preston MD  Consulting Provider: Eric Hyman MD      Hospital Day: 2    Assessment and Recommendations   Sol Beverly is a 70 y.o. female admitted for L knee total arthroplasty. Chronic diastolic heart failure 2/2 NICM with recovered EF: slightly volume up after receiving IV fluids, spot dose oral lasix    Nonobstructive CAD: continue atorvastatin     Paroxysmal atrial fibrillation, PVCs, bigeminy: asymptomatic. HDS. continue dofetilide. Follow up with cardiology. Cont eliquis  Hypothyroidism: continue synthroid    Personally reviewed Lab Studies and Imaging as noted below  Vitals: afebrile, HR 70s, normotensive, weaned off oxygen with normal O2 sat on room air  AM labs: cr and electrolytes wnl. WBC 11.8. Hgb 10.9, MCV 90. Plt wnl. BG 130s-140s. INR 1.2. EKG personally interpreted - bigeminy. Reviewed prior ekgs. Bigeminy and pvcs present. DVT ppx per primary  Decisionmaker: spouse    History From:    History obtained from the patient. History of Present Illness:      Chief Complaint: L knee arthroplasty    Sol Beverly is a 70 y.o. female with history of nonobstructive CAD, atrial fibrillation, hypothyroidism presenting for elective L knee total arthroplasty. Procedure completed on  with no complications. Doing well today, sitting up in chair with no complaints. Denies chest pain, palpitations, shortness of breath, fevers, or chills. Has been up walking. Voiding with no issue. Review of Systems:      Pertinent positives and negatives discussed in HPI    Objective:      Intake/Output Summary (Last 24 hours) at 10/11/2023 0802  Last data filed at 10/10/2023 2208  Gross per 24 hour   Intake 2242 ml   Output 200 ml   Net 2042 ml lumpectomy; Hysterectomy (); Lap Band (); Ovary removal; Thyroidectomy, partial (Left, ); Upper gastrointestinal endoscopy (06/10/2016); Partial hysterectomy; Hand surgery (Right, 2020); Cardiac surgery (); and Total knee arthroplasty (Left, 10/10/2023). Allergies: Allergies   Allergen Reactions    Bactrim [Sulfamethoxazole-Trimethoprim] Swelling    Iodine Shortness Of Breath     WITH IV DYE    Mupirocin Swelling    Adhesive Tape Rash     Fam HX:  family history includes BRCA 2 Negative in her maternal grandmother and paternal grandmother; Breast Cancer in her mother; Cancer in her maternal grandmother, mother, paternal grandmother, sister, and another family member; Cancer (age of onset: 61) in her father; Heart Attack in her father; Heart Disease in her father and sister; No Known Problems in her brother, maternal aunt, maternal grandfather, maternal uncle, paternal aunt, paternal grandfather, and paternal uncle; Other in her father and sister. Soc HX:   Social History     Socioeconomic History    Marital status:      Spouse name: None    Number of children: None    Years of education: None    Highest education level: None   Occupational History    Occupation: billing [employer]   Tobacco Use    Smoking status: Former     Packs/day: 0.25     Years: 1.50     Additional pack years: 0.00     Total pack years: 0.38     Types: Cigarettes     Quit date: 3/27/1972     Years since quittin.5    Smokeless tobacco: Never   Vaping Use    Vaping Use: Never used   Substance and Sexual Activity    Alcohol use:  Yes     Alcohol/week: 0.0 standard drinks of alcohol     Comment: soc    Drug use: No    Sexual activity: Yes     Partners: Male   Social History Narrative            Spends time with , grandchildren        Lives in [address] [place] [address] of Health     Financial Resource Strain: Low Risk  (2023)    Overall Financial Resource Strain (CARDIA)     Difficulty of Paying

## 2023-10-11 NOTE — PROGRESS NOTES
Occupational Therapy  Facility/Department: [facility] 5T ORTHO/NEURO  Occupational Therapy Initial Assessment/tx    Name: Kahlil Alejandro  : 1955  MRN: [id number]  Date of Service: 10/11/2023    Discharge Recommendations:  Kahlil Alejandro scored a 21/24 on the AM-PAC ADL Inpatient form. Current research shows that an AM-PAC score of 18 or greater is typically associated with a discharge to the patient's home setting. Please see assessment section for further patient specific details. If patient discharges prior to next session this note will serve as a discharge summary. Please see below for the latest assessment towards goals. OT Equipment Recommendations  Equipment Needed: No       Patient Diagnosis(es): There were no encounter diagnoses. Past Medical History:  has a past medical history of Abnormal Pap smear of cervix, Allergic, Allergic rhinitis, Aortic stenosis, mild-echo 2016, Atrial fibrillation ([address]), BMI 40.0-44.9, adult ([address]), Breast cancer ([address]), Cancer ([address]), CHF (congestive heart failure) ([address]), Depression, GERD (gastroesophageal reflux disease), H/O laparoscopic adjustable gastric banding, Heart palpitations, History of chemotherapy, History of radiation therapy, HPV (human papilloma virus) infection, HPV test positive, Hyperlipidemia, Hypertension, Hypothyroidism, Meningioma ([address]), Mild aortic regurgitation-echo 2016, Mild dysplasia of cervix, Obesity, Sleep apnea, Type 2 diabetes mellitus with chronic kidney disease, and Urge incontinence. Past Surgical History:  has a past surgical history that includes Appendectomy; Cholecystectomy; Colonoscopy; Lap Band (); Tunneled venous port placement ();  section (5127,2635,8666,4591);  section; Breast surgery (); Breast biopsy; Breast lumpectomy; Hysterectomy (); Lap Band (); Ovary removal; Thyroidectomy, partial (Left, ); Upper gastrointestinal endoscopy (06/10/2016);  Partial hysterectomy; : Yes  Occupation: Retired  Leisure & Hobbies: plays on phone, checks facebook, board games with her son       Objective                Safety Devices  Type of Devices: Left in chair;Call light within reach; Chair alarm in place;Nurse notified  Balance  Sitting: Intact  Standing: Intact (SBA for safety)  Gait  Overall Level of Assistance:  (Functional mobility in room with rolling walker and SBA)  Toilet Transfers  Toilet - Technique: Ambulating (with rolling walker and SBA to/from bathroom)  Equipment Used: Standard toilet (cues for hand placement sit><stand)  Toilet Transfer: Stand by assistance  Shower Transfers  Shower Transfers Comments: Pt educated in step in shower transfer and verbalized understanding  AROM: Within functional limits  Strength: Within functional limits  ADL  Grooming: Independent  UE Dressing: Independent  LE Dressing: Contact guard assistance (to thread underpants and pants)     Activity Tolerance  Activity Tolerance: Patient tolerated evaluation without incident  Activity Tolerance Comments: SOB with activity requiring breaks. O2 sats 91% on RA after walking.      Transfers  Sit to stand: Stand by assistance  Stand to sit: Stand by assistance  Transfer Comments: [facility]     Cognition  Overall Cognitive Status: WFL  Orientation  Overall Orientation Status: Within Normal Limits                  Education Given To: Patient  Education Provided: Role of Therapy;Plan of Care;ADL Adaptive Strategies;Transfer Training  Education Method: Demonstration;Verbal  Barriers to Learning: None  Education Outcome: Verbalized understanding                        G-Code     OutComes Score                                                  AM-PAC Score        AM-[facility] Inpatient Daily Activity Raw Score: 21 (10/11/23 1035)  -PAC Inpatient ADL T-Scale Score : 44.27 (10/11/23 1035)  ADL Inpatient CMS 0-100% Score: 32.79 (10/11/23 1035)  ADL Inpatient CMS G-Code Modifier : Neela Ferris (10/11/23

## 2023-10-11 NOTE — PROGRESS NOTES
Pt is A&O x4, VSS, o2 2 litre per nasal cannula. Patient is on CPAP at night. C/o pain to the surgery site, pain is being managed with oxycodone and morphine as per MAR. Denies any pain. Voiding adequately, tolerating diet and fluids. Incision site CDI. All fall precaution is in place. Call light is within the reach.

## 2023-10-11 NOTE — PROGRESS NOTES
Physical Therapy  Facility/Department: [address] [place] [address]   Physical Therapy Initial Assessment/Treatment    Name: Chuyita Vasquez  : [birth year]  MRN: [id number]  Date of Service: 10/11/2023    Discharge Recommendations: Chuyita Vasquez scored a 18/24 on the AM-PAC short mobility form. Current research shows that an AM-PAC score of 18 or greater is typically associated with a discharge to the patient's home setting. Based on the patient's AM-PAC score and their current functional mobility deficits, it is recommended that the patient have 2-3 sessions per week of Physical Therapy at d/c to increase the patient's independence. At this time, this patient demonstrates the endurance and safety to discharge home with  (home vs OP services) and a follow up treatment frequency of 2-3x/wk. Please see assessment section for further patient specific details. If patient discharges prior to next session this note will serve as a discharge summary. Please see below for the latest assessment towards goals. PT Equipment Recommendations  Equipment Needed: No      Patient Diagnosis(es): There were no encounter diagnoses. Past Medical History:  has a past medical history of Abnormal Pap smear of cervix, Allergic, Allergic rhinitis, Aortic stenosis, mild-echo 2016, Atrial fibrillation ([address]), BMI 40.0-44.9, adult ([address]), Breast cancer ([address]), Cancer ([address]), CHF (congestive heart failure) ([address]), Depression, GERD (gastroesophageal reflux disease), H/O laparoscopic adjustable gastric banding, Heart palpitations, History of chemotherapy, History of radiation therapy, HPV (human papilloma virus) infection, HPV test positive, Hyperlipidemia, Hypertension, Hypothyroidism, Meningioma ([address]), Mild aortic regurgitation-echo 2016, Mild dysplasia of cervix, Obesity, Sleep apnea, Type 2 diabetes mellitus with chronic kidney disease, and Urge incontinence.   Past Surgical History:  has a past surgical history that includes Appendectomy; Cholecystectomy; Colonoscopy; Lap Band (); Tunneled venous port placement ();  section (0078,0472,7434,8052);  section; Breast surgery (); Breast biopsy; Breast lumpectomy; Hysterectomy (); Lap Band (); Ovary removal; Thyroidectomy, partial (Left, ); Upper gastrointestinal endoscopy (06/10/2016); Partial hysterectomy; Hand surgery (Right, 2020); Cardiac surgery (); and Total knee arthroplasty (Left, 10/10/2023). Assessment   Assessment: 71 yo seen POD 1 from L TKA. Pt demo steady gait with use of rolling walker and was educated on transfer techniques,LE exercises and correct gait sequecing. Will follow up this afternoon for second session- anticipate home today if medically stable. Plans for OP PT next week. Treatment Diagnosis: impaired mobility  Therapy Prognosis: Good  Decision Making: Low Complexity  Requires PT Follow-Up: Yes  Activity Tolerance  Activity Tolerance: Patient tolerated evaluation without incident  Activity Tolerance Comments: SOB with activity requiring breaks. O2 sats 91% on RA after walking. Plan   Physcial Therapy Plan  General Plan: 2 times a day 7 days a week  Current Treatment Recommendations: Functional mobility training, Transfer training, Balance training, Strengthening, ROM, Gait training, Safety education & training, Patient/Caregiver education & training  Safety Devices  Type of Devices: Left in chair, Call light within reach, Chair alarm in place, Nurse notified     Restrictions  Position Activity Restriction  Other position/activity restrictions: FWBAT, up with assist     Subjective   General  Chart Reviewed: Yes  Additional Pertinent Hx: s/p LEFT TOTAL KNEE ARTHROPLASTY 10/10. Family / Caregiver Present: No  Referring Practitioner: Ena Olea MD  Diagnosis: severe OA  Follows Commands: Within Functional Limits  Subjective  Subjective: Found in bed, agreeable to PT.  2/10 pain.   Hopes to go home

## 2023-10-11 NOTE — PROGRESS NOTES
Physical Therapy  Facility/Department: [address] [place] [address]   Physical Therapy Treatment    Name: Brenda Guthrie  : 1955  MRN: [id number]  Date of Service: 10/11/2023    Discharge Recommendations: Brenda Guthrie scored a 21/24 on the AM-PAC short mobility form. Current research shows that an AM-PAC score of 18 or greater is typically associated with a discharge to the patient's home setting. Based on the patient's AM-PAC score and their current functional mobility deficits, it is recommended that the patient have 2-3 sessions per week of Physical Therapy at d/c to increase the patient's independence. At this time, this patient demonstrates the endurance and safety to discharge home with  (home vs OP services) and a follow up treatment frequency of 2-3x/wk. Please see assessment section for further patient specific details. If patient discharges prior to next session this note will serve as a discharge summary. Please see below for the latest assessment towards goals. PT Equipment Recommendations  Equipment Needed: No      Patient Diagnosis(es): There were no encounter diagnoses. Past Medical History:  has a past medical history of Abnormal Pap smear of cervix, Allergic, Allergic rhinitis, Aortic stenosis, mild-echo 2016, Atrial fibrillation ([address]), BMI 40.0-44.9, adult ([address]), Breast cancer ([address]), Cancer ([address]), CHF (congestive heart failure) ([address]), Depression, GERD (gastroesophageal reflux disease), H/O laparoscopic adjustable gastric banding, Heart palpitations, History of chemotherapy, History of radiation therapy, HPV (human papilloma virus) infection, HPV test positive, Hyperlipidemia, Hypertension, Hypothyroidism, Meningioma ([address]), Mild aortic regurgitation-echo 2016, Mild dysplasia of cervix, Obesity, Sleep apnea, Type 2 diabetes mellitus with chronic kidney disease, and Urge incontinence.   Past Surgical History:  has a past surgical history that includes Appendectomy;

## 2023-10-11 NOTE — PROGRESS NOTES
Discussed with patient over the phone. Pain controlled, Ambulated down the ulloa with PT today. No dressing issues. No new issue otherwise. Ready for d/c    Plan for d/c home today. PT to start in [place] Tuesday. Discussed d/c instrutions. F/u with me 2/3 weeks for wound check.     Isamar Santos MD

## 2023-10-11 NOTE — PLAN OF CARE
Problem: Discharge Planning  Goal: Discharge to home or other facility with appropriate resources  10/11/2023 0736 by Nils Ramírez RN  Outcome: Progressing   Pt plans to return home at d/c. CM following for any d/c needs. Pt to work with PT/OT this shift to give d/c recs. Problem: Pain  Goal: Verbalizes/displays adequate comfort level or baseline comfort level  10/11/2023 0736 by Nils Ramírez RN  Outcome: Progressing   Pt endorsing pain to LLE. Being treated with PRN pain medication, rest, and frequent repositioning with pillow support for comfort and pressure relief. Pt reports some relief from pain with above interventions. Problem: Safety - Adult  Goal: Free from fall injury  10/11/2023 0735 by Nils Ramírez RN  Outcome: Progressing   All fall precautions in place. Bed locked and in lowest position with alarm on. Overbed table and personal belonings within reach. Call light within reach and patient instructed to use call light for assistance. Non-skid socks on.

## 2023-10-12 ENCOUNTER — TELEPHONE (OUTPATIENT)
Dept: FAMILY MEDICINE CLINIC | Age: 68
End: 2023-10-12

## 2023-10-12 ENCOUNTER — CARE COORDINATION (OUTPATIENT)
Dept: CASE MANAGEMENT | Age: 68
End: 2023-10-12

## 2023-10-12 DIAGNOSIS — Z96.652 STATUS POST TOTAL LEFT KNEE REPLACEMENT: Primary | ICD-10-CM

## 2023-10-12 PROCEDURE — 1111F DSCHRG MED/CURRENT MED MERGE: CPT | Performed by: PHYSICIAN ASSISTANT

## 2023-10-12 NOTE — TELEPHONE ENCOUNTER
Care Transitions Initial Follow Up Call    Outreach made within 2 business days of discharge: Yes    Patient: Shanika Starkey Patient : 1955   MRN: [id number]  Reason for Admission: There are no discharge diagnoses documented for the most recent discharge. Discharge Date: 10/11/23       Spoke with: Karol Clayton    Discharge department/facility: [facility]    TCM Interactive Patient Contact:  Was patient able to fill all prescriptions: Yes  Was patient instructed to bring all medications to the follow-up visit: Yes  Is patient taking all medications as directed in the discharge summary?  Yes  Does patient understand their discharge instructions: Yes  Does patient have questions or concerns that need addressed prior to 7-14 day follow up office visit: no    Scheduled appointment with PCP within 7-14 days    Follow Up  Future Appointments   Date Time Provider [address] [place] [address]   10/17/2023  1:30 PM Alfredo King PT [facility] EG PT [facility]   9431 54:89 AM Chika Jacob MD [facility]    37:02 AM Chika Jacob MD [facility]   2024 11:40 AM BLANK Moya - CNP [facility]       Stephany Adams LPN

## 2023-10-12 NOTE — CARE COORDINATION
Care Transitions Initial Follow Up Call    Call within 2 business days of discharge: Yes    Patient Current Location:  Home: [address] [place] [address]    Care Transition Nurse contacted the patient by telephone to perform post hospital discharge assessment. Verified name and  with patient as identifiers. Provided introduction to self, and explanation of the Care Transition Nurse role. Patient: Brenda Plan Patient : 1955   MRN: [id number]   Reason for Admission: S/P Total Knee Arthroplasty, Left  Discharge Date: 10/11/23 RARS: No data recorded    Last Discharge Facility       Date Complaint Diagnosis Description Type Department Provider    10/10/23  S/P total knee arthroplasty, left Admission (Discharged) Ophelia Burks MD            Was this an external facility discharge? No     Challenges to be reviewed by the provider   Additional needs identified to be addressed with provider: No  none               Method of communication with provider: none. CTN spoke with patient this am for initial 24 hour discharge follow up CTN call. Patient states she is doing well, having some pain, but states it is well controlled. Patient also reports she is not having any fever, chills, nausea, vomiting, chest pain, SOB or cough. Patient with no congestion, difficulty emptying bladder, feeling lightheaded, dizziness, and heart palpitations. Patient states she has not had a BM yet, she is passing gas. Patient states she does have some BLE Edema, states she is wearing KAY Hose, but feels swelling is down. Dressing is dry ant intact, no signs of infection. CTN and Pt reviewed meds and CTN completed the 1111f. Care Transition Nurse reviewed discharge instructions, medical action plan, and red flags with patient who verbalized understanding. The patient was given an opportunity to ask questions and does not have any further questions or concerns at this time.  Were discharge

## 2023-10-16 ENCOUNTER — TELEPHONE (OUTPATIENT)
Dept: PHARMACY | Age: 68
End: 2023-10-16

## 2023-10-16 NOTE — TELEPHONE ENCOUNTER
Called patient regarding coumadin clinic referral.  She is currently on Eliquis and has 10 tablets remaining. Will plan to start warfarin this u 10/19 at 5 mg daily. Will overlap both medications until she finishes out Eliquis on Sat morning 10/21, then continue on warfarin alone. She is scheduled for first INR check next Tue 10/24 at 1100.   Felipe Baeza, PharmD 1:16 PM EDT 10/16/23

## 2023-10-16 NOTE — DISCHARGE SUMMARY
Orthopedic Discharge Summary      Patient ID:  Obdulia Guerrero  [id number]  36 y.o.  1955    Allergies: Bactrim [sulfamethoxazole-trimethoprim], Iodine, Mupirocin, and Adhesive tape    Admit date: 10/10/2023    Discharge date and time: 10/11/2023  6:35 PM     Admitting Physician: Sariah Baig MD     Discharge Physician: Sariah Baig MD    * Admission Diagnoses: Osteoarthritis of left knee, unspecified osteoarthritis type [M17.12]  S/P total knee arthroplasty, left [Z96.652]    * Discharge Diagnoses:     Surgeon: Sariah Baig MD    * Procedure: Procedure(s):  LEFT TOTAL KNEE ARTHROPLASTY           Perioperative Antibiotics: Ancef      Post Op complications: none    * Discharge Condition: Stable    Patient had pain controlled, tolerating diet and able to urinate prior to discharge. Physical Therapy started on the day following surgery and progressed to independent ambulation with the aid of a walker. At the time of discharge, able to go up and down stairs and had understanding of precautions needed following surgery.       * Discharged to: Home    * Follow-up Care/Discharge instructions:  - Anticoagulate with:   - Resume pre hospital diet            - Resume home medications per medical continuation form     - Ambulate with walker, appropriate total joint protocol  - Follow up in office as scheduled       Signed:  Sariah Baig MD  33/97/4405  8:24 AM

## 2023-10-17 ENCOUNTER — HOSPITAL ENCOUNTER (OUTPATIENT)
Dept: PHYSICAL THERAPY | Age: 68
Setting detail: THERAPIES SERIES
Discharge: HOME | End: 2023-10-17
Payer: MEDICARE

## 2023-10-17 DIAGNOSIS — R26.9 ABNORMALITY OF GAIT: Primary | ICD-10-CM

## 2023-10-17 DIAGNOSIS — M25.662 DECREASED RANGE OF MOTION (ROM) OF LEFT KNEE: ICD-10-CM

## 2023-10-17 PROCEDURE — 97110 THERAPEUTIC EXERCISES: CPT

## 2023-10-17 PROCEDURE — 97140 MANUAL THERAPY 1/> REGIONS: CPT

## 2023-10-17 PROCEDURE — 97161 PT EVAL LOW COMPLEX 20 MIN: CPT

## 2023-10-17 NOTE — PLAN OF CARE
Hypothyroid (E03.9)  [] Hyperthyroid [] Hx of COVID    Musculoskeletal conditions  [] Disc pathology   [] Congenital spine pathologies   [] Osteoporosis (M81.8)  [] Osteopenia (M85.8)  [] Scoliosis    Cardio/Pulmonary conditions  [] Asthma (J45)  [] Coughing   [] COPD (J44.9)  [] CHF  [] A-fib          Rheumatological conditions  [] Fibromyalgia (M79.7)  [] Lupus  [] Sjogrens  [] Ankylosing spondylitis  [] Other autoimmune       Metabolic conditions  [] Morbid obesity (E66.01)  [x] Diabetes type 1(E10.65) or 2 (E11.65)   [] Neuropathy (G60.9)        Cardiovascular conditions  [x] Hypertension (I10)  [x] Hyperlipidemia (E78.5)  [] Angina pectoris (I20)  [] Atherosclerosis (I70)  [] Pacemaker/Defib  [] Hx of CABG/stent/cardiac surgeries        Developmental Disorders  [] Autism (F84.0)  [] Cerebral Palsy (G80)  [] Down Syndrome (Q90.9)  [] Developmental delay     Psychological Disorders  [] Anxiety (F41.9)  [] Depression (F32.9)   [] Bipolar  [] Other:      Prior surgeries  [] Involved limb  [] Previous spinal surgery  []  section birth  [x] Hysterectomy  [] Bowel / bladder surgery  [] Other relevant surgeries        Other conditions  [] Vertigo  [] Syncope  [] Kidney Failure  [x] Cancer prior breast  [] Pregnancy  [] Incontinence   Other Co-morbidities not listed:     ASSESSMENT   Assessment:   Viktor Pelayo is a 70 y.o. female presenting today to Outpatient PT with signs and symptoms consistent with recent surgery for left TKA. Pt. presents with the functional impairments and activity limitations listed below and would benefit from Outpatient PT to address the below impairments as well as improve pain, and restore function.      Functional Impairments:   Noted lumbar/proximal hip/LE joint hypomobility, Decreased LE functional ROM, Decreased LE functional strength , and Reduced balance/proprioceptive control    Functional Activity Limitations (from functional questionnaire and intake):  Reduced

## 2023-10-17 NOTE — FLOWSHEET NOTE
[address] [place] [address] St and Therapy [address] [place] [address] office: [phone] fax: [phone]      Physical Therapy: TREATMENT/PROGRESS NOTE   Patient: Gage Nix (41 y.o. female)   Treatment Date: 10/17/2023   :  1955 MRN: [id number]   Visit #: 1   Insurance Allowable Auth Needed   BMN []Yes    [x]No    Insurance: Payor: MEDICARE / Plan: MEDICARE PART A AND B / Product Type: *No Product type* /   Insurance ID: [id number] - (Medicare)  Secondary Insurance (if applicable): BCBS   Treatment Diagnosis:     ICD-10-CM    1. Abnormality of gait  R26.9       2.  Decreased range of motion (ROM) of left knee  M25.662          Medical Diagnosis:    Bilateral primary osteoarthritis of knee [M17.0]  Acute pain of right knee [M25.561]  Acute pain of left knee [M25.562]  S/P total knee arthroplasty, left [N27.254]   Referring Physician: Bruce Arias MD  PCP: KADEN Bhatt                             Plan of care signed (Y/N):     Date of Patient follow up with Physician: 2023     Progress Report/POC: EVAL today  POC update due: 2023 (OR 10 visits /OR [address], whichever is less)    Precautions/ Contra-indications:                                                                                          Latex allergy:  YES  Pacemaker:    NO  Contraindications for Manipulation: blood thinners (relative)  Other: CHF, HTN, Hypothroidism, hyperlipidema; DM II treating with diet     Red Flags:  None    Preferred Language for Healthcare:   [x]English       []other:    SUBJECTIVE EXAMINATION     Patient Report/Comments: see eval     Test used Initial score  10/17/23 10/17/2023   Pain Summary VAS 5-6/10 ambulation and sitting too long; getting out of car sharp pain     Functional questionnaire LEFS 9/80=11=89%    Other:                OBJECTIVE EXAMINATION     Observation: decreased WB with ambulation instructed on heel/toe

## 2023-10-19 ENCOUNTER — TELEPHONE (OUTPATIENT)
Dept: FAMILY MEDICINE CLINIC | Age: 68
End: 2023-10-19

## 2023-10-19 ENCOUNTER — HOSPITAL ENCOUNTER (OUTPATIENT)
Dept: PHYSICAL THERAPY | Age: 68
Setting detail: THERAPIES SERIES
Discharge: HOME | End: 2023-10-19
Payer: MEDICARE

## 2023-10-19 ENCOUNTER — CARE COORDINATION (OUTPATIENT)
Dept: CARE COORDINATION | Age: 68
End: 2023-10-19

## 2023-10-19 PROCEDURE — 97140 MANUAL THERAPY 1/> REGIONS: CPT

## 2023-10-19 PROCEDURE — 97110 THERAPEUTIC EXERCISES: CPT

## 2023-10-19 NOTE — TELEPHONE ENCOUNTER
Cancelled patients hospital visit tomorrow. But she did want to let you know that her Urinary incontinence medication isnt helping, she wants to know if there is another medication she can try?

## 2023-10-19 NOTE — FLOWSHEET NOTE
[address] [place] [address] St and Therapy [address] [place] [address] office: [phone] fax: [phone]      Physical Therapy: TREATMENT/PROGRESS NOTE   Patient: Caterina Rendon (34 y.o. female)   Treatment Date: 10/19/2023   :  1955 MRN: [id number]   Visit #: 2   Insurance Allowable Auth Needed   BMN []Yes    [x]No    Insurance: Payor: MEDICARE / Plan: MEDICARE PART A AND B / Product Type: *No Product type* /   Insurance ID: [id number] - (Medicare)  Secondary Insurance (if applicable): BCBS   Treatment Diagnosis:        ICD-10-CM     1. Abnormality of gait  R26.9         2. Decreased range of motion (ROM) of left knee  M25.662            Medical Diagnosis:    Bilateral primary osteoarthritis of knee [M17.0]  Acute pain of right knee [M25.561]  Acute pain of left knee [M25.562]  S/P total knee arthroplasty, left [E18.954]   Referring Physician: Matthias Burkitt, MD  PCP: KADEN Magaña                             Plan of care signed (Y/N):     Date of Patient follow up with Physician: 2023     Progress Report/POC: NO  POC update due: 2023 (OR 10 visits /OR [address], whichever is less)    Precautions/ Contra-indications:                                                                                          Latex allergy:  YES  Pacemaker:    NO  Contraindications for Manipulation: blood thinners (relative)  Other: CHF, HTN, Hypothroidism, hyperlipidema; DM II treating with diet     Red Flags:  None    Preferred Language for Healthcare:   [x]English       []other:    SUBJECTIVE EXAMINATION     Patient Report/Comments: Pt reports that she is short of breathe with exertion.       Test used Initial score  10/17/23 10/19/2023   Pain Summary VAS 5-6/10 ambulation and sitting too long; getting out of car sharp pain  Pt did not indicate level of pain she just stated that she had to take pain medication   Functional questionnaire LEFS 80=11=89%

## 2023-10-19 NOTE — TELEPHONE ENCOUNTER
We could try a different medication called myrbetriq. Would she be okay with this change?  Alternatively, I could send her to a urogynecologist specialist

## 2023-10-19 NOTE — CARE COORDINATION
Care Transitions Follow Up Call    Patient Current Location:  Home: [address] [place] [address] the patient by telephone to follow up after admission on 10/10-10/11. Verified name and  with patient as identifiers. Patient: Gage Nix  Patient : 1955   MRN: [id number]  Reason for Admission: s/p (L) TKA  Discharge Date: 10/11/23 RARS: No data recorded    Needs to be reviewed by the provider   Additional needs identified to be addressed with provider: No  none             Method of communication with provider: none. LPN CC spoke with patient. States she is really well. She states she had a set back over the weekend r/t trying to taper off her pain meds prematurely. She feels back on track now. She is trying to balance rest & ambulation. There is significant swelling of LLE, but she is icing & elevating as directed with relief. Ambulating with walker for long distances, cane for shorter distances. Incisions CDI. Denies fever/chills, N/V/D. Appetite good. Denies problems with bowels or bladder. Patient transitioning to warfarin from Eliquis. F/u noted below. Denies needs. Kathaleen Kussmaul, LPN CC  Care Transitions  [phone]    Addressed changes since last contact:  none  Discussed follow-up appointments. If no appointment was previously scheduled, appointment scheduling offered: Yes. Is follow up appointment scheduled within 7 days of discharge? Yes.     Follow Up  Future Appointments   Date Time Provider [address] [place] [address]   10/19/2023  5:15 PM Lamin Salazar PT [facility] EG PT [place] [facility]   10/20/2023  2:00 PM KADEN Bhatt  [place] - [facility]   10/24/2023 11:00 AM [address] [facility] HOD   10/26/2023  2:15 PM [facility]   10/31/2023  3:00 PM Lamin Salazar PT [facility] EG PT [place] HOD   89/3/1930 97:25 AM Bruce Arias MD [facility]   2023  2:15 PM Lamin Salazar PT

## 2023-10-20 DIAGNOSIS — Z96.652 S/P TOTAL KNEE ARTHROPLASTY, LEFT: Primary | ICD-10-CM

## 2023-10-20 RX ORDER — OXYCODONE HYDROCHLORIDE 5 MG/1
5 TABLET ORAL EVERY 6 HOURS PRN
Qty: 28 TABLET | Refills: 0 | Status: SHIPPED | OUTPATIENT
Start: 2023-10-20 | End: 2023-10-27

## 2023-10-20 NOTE — TELEPHONE ENCOUNTER
Prescription Refill     Medication Name:  OXYCODONE 5 MG   Pharmacy: [facility] [id number] [facility] [facility] Ean Booker [phone]  Patient Contact Number:  [phone]    PT CALLING REGARDING THAT SHE HAD A LT TKR ON 10-. PT ONLY HAS A 5 PILL SUPPLY LEFT. PLEASE CALL BACK PT AT THE NUMBER ABOVE.

## 2023-10-24 ENCOUNTER — HOSPITAL ENCOUNTER (OUTPATIENT)
Dept: PHYSICAL THERAPY | Age: 68
Setting detail: THERAPIES SERIES
Discharge: HOME | End: 2023-10-24
Payer: MEDICARE

## 2023-10-24 ENCOUNTER — ANTI-COAG VISIT (OUTPATIENT)
Dept: PHARMACY | Age: 68
End: 2023-10-24
Payer: MEDICARE

## 2023-10-24 DIAGNOSIS — I48.0 PAF (PAROXYSMAL ATRIAL FIBRILLATION) (HCC): Primary | ICD-10-CM

## 2023-10-24 LAB — INTERNATIONAL NORMALIZATION RATIO, POC: 2.1

## 2023-10-24 PROCEDURE — 97112 NEUROMUSCULAR REEDUCATION: CPT

## 2023-10-24 PROCEDURE — 85610 PROTHROMBIN TIME: CPT | Performed by: PHARMACIST

## 2023-10-24 PROCEDURE — 99211 OFF/OP EST MAY X REQ PHY/QHP: CPT | Performed by: PHARMACIST

## 2023-10-24 PROCEDURE — 97140 MANUAL THERAPY 1/> REGIONS: CPT

## 2023-10-24 PROCEDURE — 97110 THERAPEUTIC EXERCISES: CPT

## 2023-10-24 NOTE — PROGRESS NOTES
Ms. Lieutenant Law is here for management of anticoagulation for AFib. PMH also significant for CHF, HLD, HTN . She presents today w/out complaint. Pt verifies dosing regimen as listed above. Pt denies s/s bleeding/bruising/swelling/SOB. No BRBPR. No melena. Address missed doses  Reviewed pt medication list.  No changes in RX/OTCs/Herbal medications. Reviewed dietary concerns  Address EToH and tobacco use. As initial clinic visit, provided pt with counseling for medication use/compliance, adverse events, and nutrition; clinic overview & orientation; and educational materials. Started warfarin last Thu, transitioned from Eliquis. INR in range today after 5 doses. Will decrease dose slightly this week as INR may continue to rise. 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 Will plan for a few months of care in the clinic but can hopefully try to get home monitor approved in the future. INR 2.1 is within therapeutic range of 2-3  Recommend to take 5 mg daily except 2.5 mg Q Sat  Patient has 5 mg tablets. Will continue to monitor and check INR in 7 days. Dosing reminder card given with phone number, appointment date and time.    Return to clinic: 10/31 @ 1:00 pm    Berto Richmond, PharmD 11:21 AM EDT 10/24/23    For Pharmacy Admin Tracking Only    Intervention Detail: Adherence Monitorin and Dose Adjustment: 1, reason: Therapy Optimization  Total # of Interventions Recommended: 2  Total # of Interventions Accepted: 2  Time Spent (min): 30

## 2023-10-25 ENCOUNTER — TELEPHONE (OUTPATIENT)
Dept: CARDIOLOGY CLINIC | Age: 68
End: 2023-10-25

## 2023-10-25 NOTE — TELEPHONE ENCOUNTER
Spoke with pt. She reports she had her knee replaced last week. Since then her shortness of breath has worsened. She reports SOB prior to surgery. Now she feels as if she has labored breathing with exertion. She reports her SOB feels similar to her symptoms when she had afib. Physical therapy was working with her yesterday when this episode occurred. She said the therapist wanted her to go to the ER at the time, however, she refused. Vitals yesterday /8, HR 59, O2 99. She had not had an episode since but she has not exerted herself much today. She is taking all prescribed medications. Oxycodone Q6hrs is new.  Please advise    JUAN LINDSAY 8/1/2023

## 2023-10-25 NOTE — TELEPHONE ENCOUNTER
Pt states she had PT yesterday and she became sob, dizzy and was shaking. Pt states she had issues with sob before when she was moving around her house. Please advise.

## 2023-10-25 NOTE — TELEPHONE ENCOUNTER
Can we please see if there is any availability for pt to be seen ASAP per previous [facility] messages. If not pt to see PCP. Please route back with update.  Thanks

## 2023-10-25 NOTE — TELEPHONE ENCOUNTER
She needs to be seen. We can't sort this out without an OV. She just had surgery, so they may have given her too much fluid or she could be in afib. She can see anyone at [facility] [place] or [place]. She see Tyrese Avila also. If can't get in with cardiology, she could see her pep.   [facility]

## 2023-10-25 NOTE — TELEPHONE ENCOUNTER
10/25-Spoke to pt, [facility] had an opening on 11/07.  Pt has been scheduled for 11/07/2023 at 930 am.

## 2023-10-25 NOTE — FLOWSHEET NOTE
[address] [place] [address] St and Therapy [address] office: [phone] fax: [phone]      Physical Therapy: TREATMENT/PROGRESS NOTE   Patient: Tomi Armendariz (61 y.o. female)   Treatment Date: 10/24/2023   :  1955 MRN: [id number]   Visit #: 3   Insurance Allowable Auth Needed   BMN []Yes    [x]No    Insurance: Payor: MEDICARE / Plan: MEDICARE PART A AND B / Product Type: *No Product type* /   Insurance ID: [id number] - (Medicare)  Secondary Insurance (if applicable): BCBS   Treatment Diagnosis:        ICD-10-CM     1. Abnormality of gait  R26.9         2. Decreased range of motion (ROM) of left knee  M25.662            Medical Diagnosis:    Bilateral primary osteoarthritis of knee [M17.0]  Acute pain of right knee [M25.561]  Acute pain of left knee [M25.562]  S/P total knee arthroplasty, left [N29.298]   Referring Physician: Abran Dance, MD  PCP: KADEN Jones                             Plan of care signed (Y/N):     Date of Patient follow up with Physician: 2023     Progress Report/POC: NO  POC update due: 2023 (OR 10 visits /OR [address], whichever is less)    Precautions/ Contra-indications:                                                                                          Latex allergy:  YES  Pacemaker:    NO  Contraindications for Manipulation: blood thinners (relative)  Other: CHF, HTN, Hypothroidism, hyperlipidema; DM II treating with diet     Red Flags:  None    Preferred Language for Healthcare:   [x]English       []other:    SUBJECTIVE EXAMINATION     Patient Report/Comments: Pt reports that she is short of breathe with exertion, exhausted and shaky. Pt states that she has been like this since surgery. Instructed pt to follow up with PCP and possibly with cardiologist. Pt states that she denies need for ED. Pt stated that she will call MD tomorrow.        Test used Initial score  10/17/23

## 2023-10-26 ENCOUNTER — CARE COORDINATION (OUTPATIENT)
Dept: CASE MANAGEMENT | Age: 68
End: 2023-10-26

## 2023-10-26 ENCOUNTER — HOSPITAL ENCOUNTER (OUTPATIENT)
Dept: PHYSICAL THERAPY | Age: 68
Setting detail: THERAPIES SERIES
Discharge: HOME | End: 2023-10-26
Payer: MEDICARE

## 2023-10-26 PROCEDURE — 97110 THERAPEUTIC EXERCISES: CPT

## 2023-10-26 PROCEDURE — 97140 MANUAL THERAPY 1/> REGIONS: CPT

## 2023-10-26 NOTE — FLOWSHEET NOTE
[address] [place] [address] St and Therapy [address] [place] [address] office: [phone] fax: [phone]      Physical Therapy: TREATMENT/PROGRESS NOTE   Patient: Debbie Saravia (43 y.o. female)   Treatment Date: 10/26/2023   :  1955 MRN: [id number]   Visit #: 4   Insurance Allowable Auth Needed   BMN []Yes    [x]No    Insurance: Payor: MEDICARE / Plan: MEDICARE PART A AND B / Product Type: *No Product type* /   Insurance ID: [id number] - (Medicare)  Secondary Insurance (if applicable): BCBS   Treatment Diagnosis:        ICD-10-CM     1. Abnormality of gait  R26.9         2. Decreased range of motion (ROM) of left knee  M25.662            Medical Diagnosis:    Bilateral primary osteoarthritis of knee [M17.0]  Acute pain of right knee [M25.561]  Acute pain of left knee [M25.562]  S/P total knee arthroplasty, left [S18.478]   Referring Physician: Latha Brownlee MD  PCP: KADEN Monzon                             Plan of care signed (Y/N):     Date of Patient follow up with Physician: 2023     Progress Report/POC: NO  POC update due: 2023 (OR 10 visits /OR [address], whichever is less)    Precautions/ Contra-indications:                                                                                          Latex allergy:  YES  Pacemaker:    NO  Contraindications for Manipulation: blood thinners (relative)  Other: CHF, HTN, Hypothroidism, hyperlipidema; DM II treating with diet     Red Flags:  None    Preferred Language for Healthcare:   [x]English       []other:    SUBJECTIVE EXAMINATION     Patient Report/Comments: Pt reports that she is feeling better today and has a cardiology appointment on 2023.    Test used Initial score  10/17/23 10/26/2023   Pain Summary VAS 5-6/10 ambulation and sitting too long; getting out of car sharp pain  Begginning 2.5/10 whole leg  End 3/10   Functional questionnaire LEFS 9/80=11=89%    Other:

## 2023-10-26 NOTE — CARE COORDINATION
[facility] Transitions Initial Follow Up Call    Call within 2 business days of discharge: Yes    Patient:  Saba Payan   Patient :  1955  MRN:  [id number]     Reason for Admission: Total Knee Arthroplasty, Left  Discharge Date:  10/11/23    RARS:       Transitions of Care Initial Call    Was this an external facility discharge? Discharge Facility: [address] [place] [address] to be reviewed by the provider   Additional needs identified to be addressed with provider:    no    AMB CC Provider Discharge Needs: none            Non-face-to-face services provided:    1ST CTC attempt to reach Pt regarding recent hospital discharge. CTC left voice recording with call back number requesting a call back.     Follow up appointments:    Future Appointments   Date Time Provider [address]   10/26/2023  2:15 PM Mayank Khan, PT [facility] [place] [facility]   10/31/2023  1:00 PM [facility] HOD   10/31/2023  3:00 PM Mayank Khan, PT [facility] [place] [facility]   4101 62:19 AM Nyla Samson MD [facility]   2023  2:15 PM [place] Khan, PT [facility] [place] [facility]   2023  9:30 AM MD Chayo Waddell [facility]   2023  3:00 PM [place] Khan, PT [facility] [place] [facility]   2023  2:15 PM [place] Khan, PT [facility] [place] [facility]   2023  2:15 PM [place] Khan, PT [facility] [place] [facility]   2023  3:00 PM Mayank Khan, PT [facility] [place] [facility]   2023  2:15 PM [place] Khan, PT [facility] [place] [facility]   2023  2:15 PM Mayank Khan, PT [facility] [place] [facility]   2023  1:30 PM [facility] WC MAMMO 2 [facility] [place] [facility]   2023  2:15 PM Mayank Khan, PT [facility] EG PT [place] [facility]OD    79:31 AM Nyla Samson MD [facility]   2024 11:40 AM BLANK Whitmore - CNP [facility]       Thank You,    Lee Childers, RN  Care Transition Coordinator  Contact PMDUGI:[phone]

## 2023-10-31 ENCOUNTER — ANTI-COAG VISIT (OUTPATIENT)
Dept: PHARMACY | Age: 68
End: 2023-10-31
Payer: MEDICARE

## 2023-10-31 ENCOUNTER — HOSPITAL ENCOUNTER (OUTPATIENT)
Dept: PHYSICAL THERAPY | Age: 68
Setting detail: THERAPIES SERIES
Discharge: HOME | End: 2023-10-31
Payer: MEDICARE

## 2023-10-31 DIAGNOSIS — I48.0 PAF (PAROXYSMAL ATRIAL FIBRILLATION) (HCC): Primary | ICD-10-CM

## 2023-10-31 LAB — INTERNATIONAL NORMALIZATION RATIO, POC: 3.7

## 2023-10-31 PROCEDURE — 99211 OFF/OP EST MAY X REQ PHY/QHP: CPT | Performed by: PHARMACIST

## 2023-10-31 PROCEDURE — 97110 THERAPEUTIC EXERCISES: CPT

## 2023-10-31 PROCEDURE — 85610 PROTHROMBIN TIME: CPT | Performed by: PHARMACIST

## 2023-10-31 PROCEDURE — 97112 NEUROMUSCULAR REEDUCATION: CPT

## 2023-10-31 NOTE — PLAN OF CARE
[] Gait (10988)    [] Dry Needle 1-2 muscle (41635)     [] Aquatic Therex (38582)    [] Dry Needle 3+ muscle (89093)     [] Iontophoresis (82632)    [] VASO (47160)     [] Ultrasound (63214)    [] Group Therapy (35847)     [] Estim Attended (94310)    [] Other: Total Timed Code Tx Minutes 46 3       Total Treatment Minutes 46        Charge Justification:  (82742) THERAPEUTIC EXERCISE - Provided verbal/tactile cueing for activities related to strengthening, flexibility, endurance, ROM performed to prevent loss of range of motion, maintain or improve muscular strength or increase flexibility, following either an injury or surgery. (19838) MANUAL THERAPY-  Manual therapy techniques, 1 or more regions, each 15 minutes (Mobilization/manipulation, manual lymphatic drainage, manual traction) for the purpose of modulating pain, promoting relaxation,  increasing ROM, reducing/eliminating soft tissue swelling/inflammation/restriction, improving soft tissue extensibility and allowing for proper ROM for normal function with self care, mobility, lifting and ambulation    TREATMENT PLAN   Plan: Cont POC- Continue emphasis/focus on exercise progression, improving proper muscle recruitment and activation/motor control patterns, increasing ROM, and reduce/eliminate soft tissue swelling/inflammation/restriction. Next visit plan to progress weights, progress reps, add new exercises, and progress balance     Electronically Signed by Jewell Tavarez, [place] [id number]             Date: 10/31/2023     Note: If patient does not return for scheduled/recommended follow up visits, this note will serve as a discharge from care along with the most recent update on progress.

## 2023-10-31 NOTE — PROGRESS NOTES
Ms. Brian Mc is here for management of anticoagulation for AFib. PMH also significant for CHF, HLD, HTN . She presents today w/out complaint. Pt verifies dosing regimen as listed above. Pt denies s/s bleeding/bruising/swelling/SOB. No BRBPR. No melena. Address missed doses  Reviewed pt medication list.  No changes in RX/OTCs/Herbal medications. Reviewed dietary concerns  Address EToH and tobacco use. 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 Will plan for a few months of care in the clinic but can hopefully try to get home monitor approved in the future. Forgot to take 1/2 dose this Sat and took whole 5 mg instead. INR 3.7 is above therapeutic range of 2-3  Recommend to hold dose today, then reduce dose to 5 mg daily except 2.5 mg Q Mon/Fri  Patient has 5 mg tablets. Will continue to monitor and check INR in 7 days. Dosing reminder card given with phone number, appointment date and time.    Return to clinic:  @ 1130 am    Letty Abbasi, PharmD 1:07 PM EDT 10/31/23    For Pharmacy Admin Tracking Only    Intervention Detail: Adherence Monitorin and Dose Adjustment: 1, reason: Therapy Optimization  Total # of Interventions Recommended: 2  Total # of Interventions Accepted: 2  Time Spent (min): 15

## 2023-11-01 ENCOUNTER — OFFICE VISIT (OUTPATIENT)
Dept: ORTHOPEDIC SURGERY | Age: 68
End: 2023-11-01

## 2023-11-01 VITALS — BODY MASS INDEX: 44.36 KG/M2 | WEIGHT: 276 LBS | HEIGHT: 66 IN

## 2023-11-01 DIAGNOSIS — Z96.652 S/P TOTAL KNEE ARTHROPLASTY, LEFT: Primary | ICD-10-CM

## 2023-11-01 PROCEDURE — 99024 POSTOP FOLLOW-UP VISIT: CPT | Performed by: STUDENT IN AN ORGANIZED HEALTH CARE EDUCATION/TRAINING PROGRAM

## 2023-11-01 RX ORDER — METHOCARBAMOL 750 MG/1
750 TABLET, FILM COATED ORAL 4 TIMES DAILY
Qty: 40 TABLET | Refills: 0 | Status: SHIPPED | OUTPATIENT
Start: 2023-11-01 | End: 2023-11-11

## 2023-11-01 RX ORDER — OXYCODONE HYDROCHLORIDE 5 MG/1
5 TABLET ORAL EVERY 6 HOURS PRN
Qty: 28 TABLET | Refills: 0 | Status: SHIPPED | OUTPATIENT
Start: 2023-11-01 | End: 2023-11-08

## 2023-11-02 ENCOUNTER — CARE COORDINATION (OUTPATIENT)
Dept: CASE MANAGEMENT | Age: 68
End: 2023-11-02

## 2023-11-02 ENCOUNTER — HOSPITAL ENCOUNTER (OUTPATIENT)
Dept: PHYSICAL THERAPY | Age: 68
Setting detail: THERAPIES SERIES
Discharge: HOME | End: 2023-11-02
Payer: MEDICARE

## 2023-11-02 PROCEDURE — 97112 NEUROMUSCULAR REEDUCATION: CPT

## 2023-11-02 PROCEDURE — 97140 MANUAL THERAPY 1/> REGIONS: CPT

## 2023-11-02 PROCEDURE — 97110 THERAPEUTIC EXERCISES: CPT

## 2023-11-02 NOTE — CARE COORDINATION
Care Transitions Follow Up Call    Patient Current Location:  Home: [address] [place] [address]    Care Transition Nurse contacted the patient by telephone to follow up after admission on 10/11/2023. Verified name and  with patient as identifiers. Patient: Glynn Rivera  Patient : 1955   MRN: [id number]   Reason for Admission: S/P Total Knee Arthroplasty, Left  Discharge Date: 10/11/23 RARS: No data recorded    Needs to be reviewed by the provider   Additional needs identified to be addressed with provider: No  none             Method of communication with provider: none. CTN spoke with patient this afternoon for final follow up CTN call. Patient states she is doing really well, states she is at PT outpatient session, tolerating therapy well. No reports of any fever, chills, nausea, vomiting, chest pain, SOB or cough. Patient with no congestion, pain, difficulty emptying bladder, LE edema, feeling lightheaded, dizziness, and heart palpitations. Incisions are healing well, no other issues or concerns, no new or changed medications at this time. Addressed changes since last contact:  none  Discussed follow-up appointments. If no appointment was previously scheduled, appointment scheduling offered: Yes. Is follow up appointment scheduled within 7 days of discharge? Yes.     Follow Up  Future Appointments   Date Time Provider [address]   2023  2:15 PM Winlock Rafael, PT [facility] EG PT [facility]   2023  9:30 AM MD Kary Patel [facility]   2023 11:30 AM [address] [place] [address]   2023  3:00 PM [place] Rafael, PT [facility]   2023  2:15 PM Win[place] Rafael, PT [facility]   2023  2:15 PM Winlock Rafael, PT [facility]   2023  2:15 PM Winlock Rafael, PT [facility] EG PT [place] [facility]    60:29 AM Kat Montemayor MD [facility]   2023  2:15 PM Maurilio Sandoval

## 2023-11-02 NOTE — CARE COORDINATION
[facility] Transitions Initial Follow Up Call    Call within 2 business days of discharge: Yes    Patient:  Ernesto Kimble   Patient :  1955  MRN:  [id number]     Reason for Admission: S/P Total Knee Arthroplasty, Left  Discharge Date:  10/11/23    RARS:       Transitions of Care Initial Call    Was this an external facility discharge? Discharge Facility: [address] [place] [address] to be reviewed by the provider   Additional needs identified to be addressed with provider:    no    AMB CC Provider Discharge Needs: none            Non-face-to-face services provided:    2ND CTC attempt to reach Pt regarding recent hospital discharge. CTC left voice recording with call back number requesting a call back. CTN will close out CTN episode at this time.     Follow up appointments:    Future Appointments   Date Time Provider [address]   2023  2:15 PM Adaline Dikes,  [place] [facility] [place] [facility]   2023  9:30 AM Alexandro Coleman MD [facility]   2023 11:30 AM [facility]   2023  3:00 PM Adaline Dikes, PT [facility] [place] [facility]   2023  2:15 PM Adaline Dikes, PT [facility]   2023  2:15 PM Adaline Dikes, PT [facility] [place] [facility]   2023  2:15 PM Adaline Dikes, PT [facility] [place] [facility]    47:23 AM Mayo Rock MD [facility]   2023  2:15 PM Adaline Dikes, PT [facility] [place] [facility]   2023  1:30 PM [facility] WC MAMMO 2 [facility] [place] [facility]   2023  2:15 PM Adaline Dikes,  [place] EG PT [facility]    80:25 AM Mayo Rock MD [facility]   2024 11:40 AM Sj Philip, APRN - CNP [facility]     Thank You,    Jose David Osman RN  Care Transition Coordinator  Contact FWEKFB:[phone]

## 2023-11-02 NOTE — FLOWSHEET NOTE
[address] [place] [address] St and Therapy [address] office: [phone] fax: [phone]      Physical Therapy: TREATMENT/PROGRESS NOTE   Patient: Mirtha Ceballos (7 y.o. female)   Treatment Date: 2023   :  1955 MRN: [id number]   Visit #: 6   Insurance Allowable Auth Needed   BMN []Yes    [x]No    Insurance: Payor: MEDICARE / Plan: MEDICARE PART A AND B / Product Type: *No Product type* /   Insurance ID: [id number] - (Medicare)  Secondary Insurance (if applicable): BCBS   Treatment Diagnosis:        ICD-10-CM     1. Abnormality of gait  R26.9         2. Decreased range of motion (ROM) of left knee  M25.662            Medical Diagnosis:    Bilateral primary osteoarthritis of knee [M17.0]  Acute pain of right knee [M25.561]  Acute pain of left knee [M25.562]  S/P total knee arthroplasty, left [W04.679]   Referring Physician: Tiffany Scruggs MD  PCP: Meaghan Hernandez PA                             Plan of care signed (Y/N):     Date of Patient follow up with Physician: 2023     Progress Report/POC: NO  POC update due: 2023 (OR 10 visits /OR [address], whichever is less)    Precautions/ Contra-indications:                                                                                          Latex allergy:  YES  Pacemaker:    NO  Contraindications for Manipulation: blood thinners (relative)  Other: CHF, HTN, Hypothroidism, hyperlipidema; DM II treating with diet     Red Flags:  None    Preferred Language for Healthcare:   [x]English       []other:    SUBJECTIVE EXAMINATION     Patient Report/Comments: Pt reports that she was able to get in car easier but is still having trouble.     Test used Initial score  10/17/23 2023   Pain Summary VAS 5-6/10 ambulation and sitting too long; getting out of car sharp pain  Beginning 2/10   End 10   Functional questionnaire LEFS 9/80=11=89%    Other:                OBJECTIVE

## 2023-11-05 ENCOUNTER — PATIENT MESSAGE (OUTPATIENT)
Dept: FAMILY MEDICINE CLINIC | Age: 68
End: 2023-11-05

## 2023-11-05 DIAGNOSIS — N32.81 OAB (OVERACTIVE BLADDER): Primary | ICD-10-CM

## 2023-11-06 NOTE — PROGRESS NOTES
[facility]  Advanced CHF/Pulmonary Hypertension   Cardiac Evaluation      Shanika Starkey  YOB: 1955    Date of Visit:  11/7/23    Chief Complaint   Patient presents with    Follow-up    Congestive Heart Failure    Shortness of Breath    Fatigue      History of Present Illness:  Shanika Starkey is a 70 y.o. female who presented from referral from Dr. Flora Dye for consultation and management of cardiomyopathy. She presented to the hospital 07/11/19 with palpitations. She was found to be in AFib RVR. She was started on flecainide, diltiazem and eliquis. Her echo from 07/11/19 showed her EF was 48-50% with mild/ global hypokinesis. Mild MR and AR. Her stress test from 07/25/19 showed a mixed defect in the anteroseptal wall raising concern for mixed ischemia/scar. Her cardiac cath from 08/02/19 showed nonischemic cardiomyopathy. Flecainide and diltiazem were stopped due to worsening. LVEF. She was started on toprol 25 mg. Her cardiac MRI from 08/02/19 showed her EF was 45%. She underwent afib ablation with Dr Donald English on 03/05/20. Her echo from 09/29/20 showed her EF was 55%. Elevated RA pressures. OV 1/11/2022 she reported she had retired recently. Her echo 3/10/2022 demonstrated an LVEF of 50% with frequent premature beats throughout the study. She was admitted for Tikosyn start 3/28/2022. She wore a 2 week cardiac event monitor 4/2022 that showed NSR with brief PAF, brief PAT, and brief NSVT. OV, 7/19/2022,  She has been compliant with her CPAP. She is only using lasix as needed, which isn't very often. OV 1/17/2023, she says she is feeling good. She says Allenanni Campbell is working really well for her. Echo 8/1/2023 EF 50-55%, grade 1 DD, aortic sclerosis with mild stenosis, trace AR.    LOV, 8/1/2023, She reports she went in for her knee replacement and started having PVC's which put the procedure on hold. On 10/10/2023 she has her left knee replaced.     Today, 11/7/2023,

## 2023-11-06 NOTE — TELEPHONE ENCOUNTER
From: Leslie Hackett  To: Skyla Ledezma  Sent: 11/5/2023 8:01 PM EST  Subject: Myrbertriq    I went to [facility] to  above med and it was over $300. I did not pick it up. I wanted to update you on this.  Thanks

## 2023-11-07 ENCOUNTER — OFFICE VISIT (OUTPATIENT)
Dept: CARDIOLOGY CLINIC | Age: 68
End: 2023-11-07

## 2023-11-07 ENCOUNTER — HOSPITAL ENCOUNTER (OUTPATIENT)
Dept: PHYSICAL THERAPY | Age: 68
Setting detail: THERAPIES SERIES
Discharge: HOME | End: 2023-11-07
Payer: MEDICARE

## 2023-11-07 ENCOUNTER — ANTI-COAG VISIT (OUTPATIENT)
Dept: PHARMACY | Age: 68
End: 2023-11-07
Payer: MEDICARE

## 2023-11-07 ENCOUNTER — HOSPITAL ENCOUNTER (OUTPATIENT)
Dept: VASCULAR LAB | Age: 68
Discharge: HOME | End: 2023-11-07
Attending: INTERNAL MEDICINE
Payer: MEDICARE

## 2023-11-07 ENCOUNTER — HOSPITAL ENCOUNTER (OUTPATIENT)
Age: 68
Discharge: HOME | End: 2023-11-07
Attending: INTERNAL MEDICINE
Payer: MEDICARE

## 2023-11-07 VITALS
HEART RATE: 75 BPM | DIASTOLIC BLOOD PRESSURE: 70 MMHG | WEIGHT: 266.5 LBS | BODY MASS INDEX: 42.83 KG/M2 | OXYGEN SATURATION: 97 % | SYSTOLIC BLOOD PRESSURE: 110 MMHG | HEIGHT: 66 IN

## 2023-11-07 DIAGNOSIS — G45.3 AMAUROSIS FUGAX OF RIGHT EYE: ICD-10-CM

## 2023-11-07 DIAGNOSIS — I73.9 CLAUDICATION (HCC): ICD-10-CM

## 2023-11-07 DIAGNOSIS — Z79.899 MEDICATION MANAGEMENT: ICD-10-CM

## 2023-11-07 DIAGNOSIS — I48.0 PAF (PAROXYSMAL ATRIAL FIBRILLATION) (HCC): ICD-10-CM

## 2023-11-07 DIAGNOSIS — I42.8 NON-ISCHEMIC CARDIOMYOPATHY (HCC): ICD-10-CM

## 2023-11-07 DIAGNOSIS — I50.22 CHRONIC SYSTOLIC HEART FAILURE (HCC): Primary | ICD-10-CM

## 2023-11-07 DIAGNOSIS — I48.0 PAF (PAROXYSMAL ATRIAL FIBRILLATION) (HCC): Primary | ICD-10-CM

## 2023-11-07 DIAGNOSIS — I35.0 AORTIC VALVE STENOSIS, ETIOLOGY OF CARDIAC VALVE DISEASE UNSPECIFIED: ICD-10-CM

## 2023-11-07 DIAGNOSIS — I50.22 CHRONIC SYSTOLIC HEART FAILURE (HCC): ICD-10-CM

## 2023-11-07 LAB — INTERNATIONAL NORMALIZATION RATIO, POC: 2

## 2023-11-07 PROCEDURE — 36415 COLL VENOUS BLD VENIPUNCTURE: CPT

## 2023-11-07 PROCEDURE — 93880 EXTRACRANIAL BILAT STUDY: CPT

## 2023-11-07 PROCEDURE — 99211 OFF/OP EST MAY X REQ PHY/QHP: CPT | Performed by: PHARMACIST

## 2023-11-07 PROCEDURE — 97140 MANUAL THERAPY 1/> REGIONS: CPT

## 2023-11-07 PROCEDURE — 85025 COMPLETE CBC W/AUTO DIFF WBC: CPT

## 2023-11-07 PROCEDURE — 83540 ASSAY OF IRON: CPT

## 2023-11-07 PROCEDURE — 80053 COMPREHEN METABOLIC PANEL: CPT

## 2023-11-07 PROCEDURE — 83550 IRON BINDING TEST: CPT

## 2023-11-07 PROCEDURE — 83880 ASSAY OF NATRIURETIC PEPTIDE: CPT

## 2023-11-07 PROCEDURE — 82728 ASSAY OF FERRITIN: CPT

## 2023-11-07 PROCEDURE — 85610 PROTHROMBIN TIME: CPT | Performed by: PHARMACIST

## 2023-11-07 PROCEDURE — 97110 THERAPEUTIC EXERCISES: CPT

## 2023-11-07 RX ORDER — WARFARIN SODIUM 5 MG/1
5 TABLET ORAL DAILY
Qty: 90 TABLET | Refills: 3 | Status: CANCELLED | OUTPATIENT
Start: 2023-11-07

## 2023-11-07 RX ORDER — WARFARIN SODIUM 5 MG/1
5 TABLET ORAL DAILY
Qty: 90 TABLET | Refills: 1 | Status: SHIPPED | OUTPATIENT
Start: 2023-11-07

## 2023-11-07 NOTE — TELEPHONE ENCOUNTER
Patient wants pharmacy changed to mail away instead of local pharmacy. LOV with [facility] 08/01/2023,   Rx was sent in on 10/05/2023.

## 2023-11-07 NOTE — FLOWSHEET NOTE
[address] [place] [address] St and Therapy [address] office: [phone] fax: [phone]      Physical Therapy: TREATMENT/PROGRESS NOTE   Patient: Nishant Khalil (56 y.o. female)   Treatment Date: 2023   :  1955 MRN: [id number]   Visit #: 7   Insurance Allowable Auth Needed   BMN []Yes    [x]No    Insurance: Payor: MEDICARE / Plan: MEDICARE PART A AND B / Product Type: *No Product type* /   Insurance ID: [id number] - (Medicare)  Secondary Insurance (if applicable): BCBS   Treatment Diagnosis:        ICD-10-CM     1. Abnormality of gait  R26.9         2. Decreased range of motion (ROM) of left knee  M25.662            Medical Diagnosis:    Bilateral primary osteoarthritis of knee [M17.0]  Acute pain of right knee [M25.561]  Acute pain of left knee [M25.562]  S/P total knee arthroplasty, left [S06.314]   Referring Physician: Rosaline Hensley MD  PCP: KADEN Ya                             Plan of care signed (Y/N):     Date of Patient follow up with Physician: 2023     Progress Report/POC: NO  POC update due: 2023 (OR 10 visits /OR [address], whichever is less)    Precautions/ Contra-indications:                                                                                          Latex allergy:  YES  Pacemaker:    NO  Contraindications for Manipulation: blood thinners (relative)  Other: CHF, HTN, Hypothroidism, hyperlipidema; DM II treating with diet     Red Flags:  None    Preferred Language for Healthcare:   [x]English       []other:    SUBJECTIVE EXAMINATION     Patient Report/Comments:  Pt has been on her feet all day with increased edema in left LE. Pt reports that she fell on Saturday but did not hit her head or hurt her self. Pt denies need for further medical care.     Test used Initial score  10/17/23 2023   Pain Summary VAS 5-6/10 ambulation and sitting too long; getting out of car sharp pain yes

## 2023-11-07 NOTE — PROGRESS NOTES
Ms. Yarelis Goldstein is here for management of anticoagulation for AFib. PMH also significant for CHF, HLD, HTN . She presents today w/out complaint. Pt verifies dosing regimen as listed above. Pt denies s/s bleeding/bruising/swelling/SOB. No BRBPR. No melena. Address missed doses  Reviewed pt medication list.  No changes in RX/OTCs/Herbal medications. Reviewed dietary concerns  Address EToH and tobacco use. 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 Will plan for a few months of care in the clinic but can hopefully try to get home monitor approved in the future. Forgot to take 1/2 dose this Mon and took whole 5 mg instead. As INR is in range after doing just one day of 2.5 mg, will plan to do that going forward. INR 2.0 is within therapeutic range of 2-3  Recommend to take 5 mg daily except 2.5 mg Q Fri  Patient has 5 mg tablets. Will continue to monitor and check INR in 7 days. Dosing reminder card given with phone number, appointment date and time.    Return to clinic: 23 @ 1115 am    Leobardo Mcqueen, Bogdan 11:39 AM EST 23    For Pharmacy Admin Tracking Only    Intervention Detail: Adherence Monitorin and Dose Adjustment: 1, reason: Therapy Optimization  Total # of Interventions Recommended: 2  Total # of Interventions Accepted: 2  Time Spent (min): 15

## 2023-11-08 ENCOUNTER — TELEPHONE (OUTPATIENT)
Dept: CARDIOLOGY CLINIC | Age: 68
End: 2023-11-08

## 2023-11-08 LAB
ALBUMIN SERPL-MCNC: 4.1 G/DL (ref 3.4–5)
ALBUMIN/GLOB SERPL: 1.8 {RATIO} (ref 1.1–2.2)
ALP SERPL-CCNC: 86 U/L (ref 40–129)
ALT SERPL-CCNC: 16 U/L (ref 10–40)
ANION GAP SERPL CALCULATED.3IONS-SCNC: 10 MMOL/L (ref 3–16)
AST SERPL-CCNC: 19 U/L (ref 15–37)
BASOPHILS # BLD: 0 K/UL (ref 0–0.2)
BASOPHILS NFR BLD: 0.5 %
BILIRUB SERPL-MCNC: 0.4 MG/DL (ref 0–1)
BUN SERPL-MCNC: 16 MG/DL (ref 7–20)
CALCIUM SERPL-MCNC: 9.3 MG/DL (ref 8.3–10.6)
CHLORIDE SERPL-SCNC: 104 MMOL/L (ref 99–110)
CO2 SERPL-SCNC: 24 MMOL/L (ref 21–32)
CREAT SERPL-MCNC: 0.9 MG/DL (ref 0.6–1.2)
DEPRECATED RDW RBC AUTO: 14.6 % (ref 12.4–15.4)
EOSINOPHIL # BLD: 0.1 K/UL (ref 0–0.6)
EOSINOPHIL NFR BLD: 1.8 %
FERRITIN SERPL IA-MCNC: 76 NG/ML (ref 15–150)
GFR SERPLBLD CREATININE-BSD FMLA CKD-EPI: >60 ML/MIN/{1.73_M2}
GLUCOSE SERPL-MCNC: 138 MG/DL (ref 70–99)
HCT VFR BLD AUTO: 38 % (ref 36–48)
HGB BLD-MCNC: 12.4 G/DL (ref 12–16)
IRON SATN MFR SERPL: 17 % (ref 15–50)
IRON SERPL-MCNC: 45 UG/DL (ref 37–145)
LYMPHOCYTES # BLD: 1.4 K/UL (ref 1–5.1)
LYMPHOCYTES NFR BLD: 24.9 %
MCH RBC QN AUTO: 29.8 PG (ref 26–34)
MCHC RBC AUTO-ENTMCNC: 32.6 G/DL (ref 31–36)
MCV RBC AUTO: 91.4 FL (ref 80–100)
MONOCYTES # BLD: 0.6 K/UL (ref 0–1.3)
MONOCYTES NFR BLD: 10.7 %
NEUTROPHILS # BLD: 3.6 K/UL (ref 1.7–7.7)
NEUTROPHILS NFR BLD: 62.1 %
NT-PROBNP SERPL-MCNC: 208 PG/ML (ref 0–124)
PLATELET # BLD AUTO: 300 K/UL (ref 135–450)
PMV BLD AUTO: 8.4 FL (ref 5–10.5)
POTASSIUM SERPL-SCNC: 4.1 MMOL/L (ref 3.5–5.1)
PROT SERPL-MCNC: 6.4 G/DL (ref 6.4–8.2)
RBC # BLD AUTO: 4.15 M/UL (ref 4–5.2)
SODIUM SERPL-SCNC: 138 MMOL/L (ref 136–145)
TIBC SERPL-MCNC: 272 UG/DL (ref 260–445)
WBC # BLD AUTO: 5.8 K/UL (ref 4–11)

## 2023-11-08 NOTE — TELEPHONE ENCOUNTER
----- Message from Rosemary Leonard MD sent at 11/7/2023  9:57 PM EST -----  Please call patient and make sure she gets this message. NEFTALI Rao, great news! Your carotid arteries are not narrowed! Excellent news! You might want to consider seeing an eye doctor to make sure there is nothing wrong with your eye causing the symptoms.   Rosemary Leonard

## 2023-11-09 ENCOUNTER — HOSPITAL ENCOUNTER (OUTPATIENT)
Dept: PHYSICAL THERAPY | Age: 68
Setting detail: THERAPIES SERIES
Discharge: HOME | End: 2023-11-09
Payer: MEDICARE

## 2023-11-09 PROCEDURE — 97110 THERAPEUTIC EXERCISES: CPT

## 2023-11-09 PROCEDURE — 97112 NEUROMUSCULAR REEDUCATION: CPT

## 2023-11-09 PROCEDURE — 97140 MANUAL THERAPY 1/> REGIONS: CPT

## 2023-11-09 NOTE — FLOWSHEET NOTE
[address] [place] [address] St and Therapy [address] office: [phone] fax: [phone]      Physical Therapy: TREATMENT/PROGRESS NOTE   Patient: Tatum Taylor (86 y.o. female)   Treatment Date: 2023   :  1955 MRN: [id number]   Visit #: 8   Insurance Allowable Auth Needed   BMN []Yes    [x]No    Insurance: Payor: MEDICARE / Plan: MEDICARE PART A AND B / Product Type: *No Product type* /   Insurance ID: [id number] - (Medicare)  Secondary Insurance (if applicable): BCBS   Treatment Diagnosis:        ICD-10-CM     1. Abnormality of gait  R26.9         2. Decreased range of motion (ROM) of left knee  M25.662            Medical Diagnosis:    Bilateral primary osteoarthritis of knee [M17.0]  Acute pain of right knee [M25.561]  Acute pain of left knee [M25.562]  S/P total knee arthroplasty, left [J25.477]   Referring Physician: Misti Lund MD  PCP: KADEN Barboza                             Plan of care signed (Y/N):     Date of Patient follow up with Physician: 2023     Progress Report/POC: NO  POC update due: 2023 (OR 10 visits /OR [address], whichever is less)    Precautions/ Contra-indications:                                                                                          Latex allergy:  YES  Pacemaker:    NO  Contraindications for Manipulation: blood thinners (relative)  Other: CHF, HTN, Hypothroidism, hyperlipidema; DM II treating with diet     Red Flags:  None    Preferred Language for Healthcare:   [x]English       []other:    SUBJECTIVE EXAMINATION     Patient Report/Comments:  Pt report that cardiac tests were fine. Pt reports that was in car for long period of time yesterday, approximately 21/2 hours.        Test used Initial score  10/17/23 2023   Pain Summary VAS 5-6/10 ambulation and sitting too long; getting out of car sharp pain  Beginning 2/10  Straightening from bending 6/10   End

## 2023-11-13 ENCOUNTER — TELEPHONE (OUTPATIENT)
Dept: ORTHOPEDIC SURGERY | Age: 68
End: 2023-11-13

## 2023-11-13 NOTE — TELEPHONE ENCOUNTER
Auth: NPR  Date: 12/04/23  Reference # None  Spoke with: None  Type of SX: Observation  Location: [facility]  CPT: 50105   DX: M17.11  SX area: Rt knee  Insurance: Medicare A&B

## 2023-11-14 ENCOUNTER — ANTI-COAG VISIT (OUTPATIENT)
Dept: PHARMACY | Age: 68
End: 2023-11-14
Payer: MEDICARE

## 2023-11-14 ENCOUNTER — HOSPITAL ENCOUNTER (OUTPATIENT)
Dept: PHYSICAL THERAPY | Age: 68
Setting detail: THERAPIES SERIES
Discharge: HOME | End: 2023-11-14
Payer: MEDICARE

## 2023-11-14 DIAGNOSIS — I48.0 PAF (PAROXYSMAL ATRIAL FIBRILLATION) (HCC): Primary | ICD-10-CM

## 2023-11-14 LAB — INTERNATIONAL NORMALIZATION RATIO, POC: 2

## 2023-11-14 PROCEDURE — 97140 MANUAL THERAPY 1/> REGIONS: CPT

## 2023-11-14 PROCEDURE — 97110 THERAPEUTIC EXERCISES: CPT

## 2023-11-14 PROCEDURE — 85610 PROTHROMBIN TIME: CPT | Performed by: PHARMACIST

## 2023-11-14 PROCEDURE — 99211 OFF/OP EST MAY X REQ PHY/QHP: CPT | Performed by: PHARMACIST

## 2023-11-14 NOTE — PROGRESS NOTES
Ms. Torsten [place] is here for management of anticoagulation for AFib. PMH also significant for CHF, HLD, HTN . She presents today w/out complaint. Pt verifies dosing regimen as listed above. Pt denies s/s bleeding/bruising/swelling/SOB. No BRBPR. No melena. Address missed doses  Reviewed pt medication list.  No changes in RX/OTCs/Herbal medications. Reviewed dietary concerns  Address EToH and tobacco use. 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 Will plan for a few months of care in the clinic but can hopefully try to get home monitor approved in the future. No changes per pt. INR 2.0 is within therapeutic range of 2-3  Recommend to continue dose of 5 mg daily except 2.5 mg Q Fri  Patient has 5 mg tablets. Will continue to monitor and check INR in 2 weeks. Dosing reminder card given with phone number, appointment date and time.    Return to clinic: 23 @ 1115 am    Cristine Paz PharmD 11:11 AM EST 23      For Pharmacy Admin Tracking Only    Intervention Detail: Adherence Monitorin  Total # of Interventions Recommended: 1  Total # of Interventions Accepted: 1  Time Spent (min): 15

## 2023-11-14 NOTE — FLOWSHEET NOTE
[address] [place] [address] St and Therapy [address], [address] [place] [address] Drive office: [phone] fax: [phone]      Physical Therapy: TREATMENT/PROGRESS NOTE   Patient: Jyotsna Licea (17 y.o. female)   Treatment Date: 2023   :  1955 MRN: [id number]   Visit #: 9   Insurance Allowable Auth Needed   BMN []Yes    [x]No    Insurance: Payor: MEDICARE / Plan: MEDICARE PART A AND B / Product Type: *No Product type* /   Insurance ID: [id number] - (Medicare)  Secondary Insurance (if applicable): BCBS   Treatment Diagnosis:        ICD-10-CM     1. Abnormality of gait  R26.9         2. Decreased range of motion (ROM) of left knee  M25.662            Medical Diagnosis:    Bilateral primary osteoarthritis of knee [M17.0]  Acute pain of right knee [M25.561]  Acute pain of left knee [M25.562]  S/P total knee arthroplasty, left [L34.703]   Referring Physician: Reji Fonseca MD  PCP: KADEN Puentes                             Plan of care signed (Y/N):     Date of Patient follow up with Physician: 2023     Progress Report/POC: NO  POC update due: 2023 (OR 10 visits /OR [address], whichever is less) next visit secondary to going to MD    Precautions/ Contra-indications:                                                                                          Latex allergy:  YES  Pacemaker:    NO  Contraindications for Manipulation: blood thinners (relative)  Other: CHF, HTN, Hypothroidism, hyperlipidema; DM II treating with diet     Red Flags:  None    Preferred Language for Healthcare:   [x]English       []other:    SUBJECTIVE EXAMINATION     Patient Report/Comments:  Pt report that cardiac tests were fine. Pt reports that was in car for long period of time yesterday, approximately 21/2 hours.        Test used Initial score  10/17/23 2023   Pain Summary VAS 5-6/10 ambulation and sitting too long; getting out of car sharp pain  Beginning

## 2023-11-16 ENCOUNTER — APPOINTMENT (OUTPATIENT)
Dept: PHYSICAL THERAPY | Age: 68
End: 2023-11-16
Payer: MEDICARE

## 2023-11-20 ENCOUNTER — TELEPHONE (OUTPATIENT)
Dept: ORTHOPEDIC SURGERY | Age: 68
End: 2023-11-20

## 2023-11-20 NOTE — TELEPHONE ENCOUNTER
General Question     Subject: [facility] SX  Patient and /or Facility Request: Viktor Pelayo \"Maira\"  Contact Number: [phone]    PT CLLED TO [facility] SX FOR 12/4  IS HAVING SX SAME TIMEFRAME PT SAID SHE DID TELL DR. Gregor Ponce

## 2023-11-20 NOTE — TELEPHONE ENCOUNTER
Orthopedic Nurse Navigator Summary    Patient Name:  Mert Lopez  Anticipated Date of Surgery:  12/04/23  Attended Pre-op Education Class:  Video sent to patient 11/09/23  PCP:  KADEN Carrillo  Date of PCP visit for H&P:   Is patient in a Pain Management program:  Review of Medical history reveals history of: PAF, Nonischemic cardiomyopathy, CHF, Aortic valve stenosis, HTN, Hyperlipidemia, PVCs, GERD, Diabetes, JINA, Rt breast Cancer- lumpectomy and chemo    Critical Lab Values  - Hemoglobin (g/dL):  Date: 11/07/23 Value 12.4  - Hematocrit(%): Date: 11/07/23  Value 38.0  - HgbA1C:  Date:  Value  - Albumin:  Date: 11/07/23  Value 4.1  - BUN:  Date: 11/07/23  Value 16  - Creatinine:  Date: 11/07/23   Value 0.9    Coronary Artery Disease/HTN/CHF history: Yes  Cardiologist: Elvia More MD  Cardiac clearance necessary:  Yes  Date of cardiac clearance appt: Had clearance on 08/01/23 for Left TKR 10/10/23 surgery. Had recent appt 11/07/23. Final Cardiac recommendations: On any anticoagulation: Recently switched to Coumadin 5 mg QD    Diabetes History:  Yes  Most recent HgbA1C:   Pulmonary:  COPD/Emphysema/Use of home oxygen: No  Alcohol use: No    BMI greater than 40 at time of scheduling:  Yes- 43.01  Additional medical concerns:   Additional recommendations for above concerns:  Attended Pre-Hab program:    Anticipated Discharge Disposition:  Home with OPT  Who will be with patient at home following discharge:   and son  Equipment patient already has:  walker, cane, shower chair  Bedroom on first or second floor:  first  Bathroom on first or second floor:  first  Weight bearing status:  wbat  Pre-op ambulatory status: painful ambulation  Number of entry steps:  none  Caregiver assistance:  full time    Eduardo Vaughn RN  Date: 11/20/23

## 2023-11-21 ENCOUNTER — HOSPITAL ENCOUNTER (OUTPATIENT)
Dept: PHYSICAL THERAPY | Age: 68
Setting detail: THERAPIES SERIES
Discharge: HOME | End: 2023-11-21
Payer: MEDICARE

## 2023-11-21 NOTE — FLOWSHEET NOTE
Physical Therapy  Cancellation/No-show Note  Patient Name:  Crispin Kraft  :  1955   Date:  2023  Cancels to Date: 1  No-shows to Date: 0    For today's appointment patient:  [x]  Cancelled  []  Rescheduled appointment  []  No-show     Reason given by patient:  []  Patient ill  []  Conflicting appointment  []  No transportation    []  Conflict with work  []  No reason given  [x]  Other:  Pt's  is in the hospital   Comments:      Electronically signed by:   Caterina Main, [address] [place] [address], [id number]

## 2023-11-24 ENCOUNTER — APPOINTMENT (OUTPATIENT)
Dept: PHYSICAL THERAPY | Age: 68
End: 2023-11-24
Payer: MEDICARE

## 2023-11-28 ENCOUNTER — APPOINTMENT (OUTPATIENT)
Dept: PHYSICAL THERAPY | Age: 68
End: 2023-11-28
Payer: MEDICARE

## 2023-11-28 ENCOUNTER — ANTI-COAG VISIT (OUTPATIENT)
Dept: PHARMACY | Age: 68
End: 2023-11-28
Payer: MEDICARE

## 2023-11-28 DIAGNOSIS — I48.0 PAF (PAROXYSMAL ATRIAL FIBRILLATION) (HCC): Primary | ICD-10-CM

## 2023-11-28 LAB — INTERNATIONAL NORMALIZATION RATIO, POC: 1.6

## 2023-11-28 PROCEDURE — 99211 OFF/OP EST MAY X REQ PHY/QHP: CPT | Performed by: PHARMACIST

## 2023-11-28 PROCEDURE — 85610 PROTHROMBIN TIME: CPT | Performed by: PHARMACIST

## 2023-11-28 NOTE — PROGRESS NOTES
Ms. Yarelis Goldstein is here for management of anticoagulation for AFib. PMH also significant for CHF, HLD, HTN . She presents today w/out complaint. Pt verifies dosing regimen as listed above. Pt denies s/s bleeding/bruising/swelling/SOB. No BRBPR. No melena. Address missed doses  Reviewed pt medication list.  No changes in RX/OTCs/Herbal medications. Reviewed dietary concerns  Address EToH and tobacco use. 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 Will plan for a few months of care in the clinic but can hopefully try to get home monitor approved in the future. No changes per pt. Took 2.5 mg on wrong day but got same total dose. INR 1.6 is below therapeutic range of 2-3  Recommend to take 7.5 mg today, then increase dose to 5 mg daily  Patient has 5 mg tablets. Will continue to monitor and check INR in 2 weeks. Dosing reminder card given with phone number, appointment date and time.    Return to clinic: 23 @ 1115 am    Leobardo Mcqueen PharmD 8:39 AM EST 23    For Pharmacy Admin Tracking Only    Intervention Detail: Adherence Monitorin and Dose Adjustment: 1, reason: Therapy Optimization  Total # of Interventions Recommended: 2  Total # of Interventions Accepted: 2  Time Spent (min): 15

## 2023-11-29 NOTE — PROGRESS NOTES
Dr Kevin Savage      Date /Time 11/1/2023       11:16 AM EST  Name Bruce Dsouza             1955   Location  [address] SURG  MRN [id number]                Chief Complaint   Patient presents with    Post-Op Check     1st Po lt knee--  Sx 10/10/23  Does out pt therapy now        History of Present Illness  Bruce Dsouza is a 70 y.o. female who presents for follow-up after left total knee replacement on October 10. She has been working with outpatient therapy. Her pains been controlled but still using oxycodone at night before therapy. She is ambulating without assistive devices. Has been back on her Coumadin for DVT prophylaxis. Continues to have right knee pain otherwise doing well.             Past History  Past Medical History:   Diagnosis Date    Abnormal Pap smear of cervix 06/11/2018    hpv+    Allergic     Allergic rhinitis     Aortic stenosis, mild-echo 8/2016 08/18/2016    Atrial fibrillation (HCC)     BMI 40.0-44.9, adult ([address])     Breast cancer ([address])     54    Cancer ([address]) 2009    right breast    CHF (congestive heart failure) (HCC)     Depression     GERD (gastroesophageal reflux disease)     H/O laparoscopic adjustable gastric banding 10/27/2016    Heart palpitations     History of chemotherapy     History of radiation therapy     HPV (human papilloma virus) infection 06/11/2018    HPV test positive 12/2014    Hyperlipidemia     Hypertension     Hypothyroidism 2000    Meningioma ([address])     10 years ago    Mild aortic regurgitation-echo 8/2016 08/18/2016    Mild dysplasia of cervix 04/2016    Obesity     Sleep apnea     USES CPAP    Type 2 diabetes mellitus with chronic kidney disease 04/21/2023    Treating DM with diet; currently take no medications    Urge incontinence 05/26/2017     Past Surgical History:   Procedure Laterality Date    APPENDECTOMY      BREAST BIOPSY      BREAST LUMPECTOMY      BREAST SURGERY  2009    right lumpectomy xrt and chemo and serm

## 2023-11-30 ENCOUNTER — HOSPITAL ENCOUNTER (OUTPATIENT)
Dept: PHYSICAL THERAPY | Age: 68
Setting detail: THERAPIES SERIES
Discharge: HOME | End: 2023-11-30
Payer: MEDICARE

## 2023-11-30 PROCEDURE — 97110 THERAPEUTIC EXERCISES: CPT

## 2023-11-30 PROCEDURE — 97140 MANUAL THERAPY 1/> REGIONS: CPT

## 2023-11-30 NOTE — PLAN OF CARE
[address] [place] [address] St and Therapy 9 [address] office: [phone] fax: [phone]      Physical Therapy: TREATMENT/PROGRESS NOTE   Patient: Miguel A Acosta (85 y.o. female)   Treatment Date: 2023   :  1955 MRN: [id number]   Visit #: 10   Insurance Allowable Auth Needed   BMN []Yes    [x]No    Insurance: Payor: MEDICARE / Plan: MEDICARE PART A AND B / Product Type: *No Product type* /   Insurance ID: [id number] - (Medicare)  Secondary Insurance (if applicable): BCBS   Treatment Diagnosis:        ICD-10-CM     1. Abnormality of gait  R26.9         2. Decreased range of motion (ROM) of left knee  M25.662            Medical Diagnosis:    Bilateral primary osteoarthritis of knee [M17.0]  Acute pain of right knee [M25.561]  Acute pain of left knee [M25.562]  S/P total knee arthroplasty, left [T58.444]   Referring Physician: Aziza Temple MD  PCP: KADEN Croft                             Plan of care signed (Y/N):     Date of Patient follow up with Physician: 2023     Progress Report/POC: YES  POC update due: 2023 (OR 10 visits /OR [address], whichever is less) (2024)    Precautions/ Contra-indications:                                                                                          Latex allergy:  YES  Pacemaker:    NO  Contraindications for Manipulation: blood thinners (relative)  Other: CHF, HTN, Hypothroidism, hyperlipidema; DM II treating with diet     Red Flags:  None    Preferred Language for Healthcare:   [x]English       []other:    SUBJECTIVE EXAMINATION     Patient Report/Comments:  Pt reports that she walked all over hospital for  with cane first then walker.  Pt walked all over [place]       Test used Initial score  10/17/23 2023   Pain Summary VAS 5-6/10 ambulation and sitting too long; getting out of car sharp pain  Beginning 2/10  Straightening from bending 6/10   End 0/10

## 2023-12-05 ENCOUNTER — HOSPITAL ENCOUNTER (OUTPATIENT)
Dept: PHYSICAL THERAPY | Age: 68
Setting detail: THERAPIES SERIES
Discharge: HOME | End: 2023-12-05
Payer: MEDICARE

## 2023-12-05 PROCEDURE — 97112 NEUROMUSCULAR REEDUCATION: CPT

## 2023-12-05 PROCEDURE — 97140 MANUAL THERAPY 1/> REGIONS: CPT

## 2023-12-05 PROCEDURE — 97110 THERAPEUTIC EXERCISES: CPT

## 2023-12-05 NOTE — FLOWSHEET NOTE
reassess. Electronically Signed by Dano Schultz, PT [id number]             Date: 12/05/2023     Note: If patient does not return for scheduled/recommended follow up visits, this note will serve as a discharge from care along with the most recent update on progress.

## 2023-12-06 ENCOUNTER — OFFICE VISIT (OUTPATIENT)
Dept: UROGYNECOLOGY | Age: 68
End: 2023-12-06
Payer: MEDICARE

## 2023-12-06 VITALS
WEIGHT: 270.8 LBS | DIASTOLIC BLOOD PRESSURE: 72 MMHG | SYSTOLIC BLOOD PRESSURE: 132 MMHG | HEART RATE: 86 BPM | BODY MASS INDEX: 43.71 KG/M2 | TEMPERATURE: 97.8 F | RESPIRATION RATE: 16 BRPM | OXYGEN SATURATION: 99 %

## 2023-12-06 DIAGNOSIS — R39.15 URINARY URGENCY: Primary | ICD-10-CM

## 2023-12-06 DIAGNOSIS — R35.0 URINARY FREQUENCY: ICD-10-CM

## 2023-12-06 DIAGNOSIS — N32.81 OAB (OVERACTIVE BLADDER): ICD-10-CM

## 2023-12-06 LAB
BILIRUBIN, POC: NORMAL
BLOOD URINE, POC: NORMAL
CLARITY, POC: CLEAR
COLOR, POC: YELLOW
GLUCOSE URINE, POC: NORMAL
KETONES, POC: NORMAL
LEUKOCYTE EST, POC: NORMAL
NITRITE, POC: NORMAL
PH, POC: 6.5
PROTEIN, POC: NORMAL
SPECIFIC GRAVITY, POC: 1.01
UROBILINOGEN, POC: NORMAL

## 2023-12-06 PROCEDURE — G8427 DOCREV CUR MEDS BY ELIG CLIN: HCPCS | Performed by: NURSE PRACTITIONER

## 2023-12-06 PROCEDURE — 3017F COLORECTAL CA SCREEN DOC REV: CPT | Performed by: NURSE PRACTITIONER

## 2023-12-06 PROCEDURE — 3075F SYST BP GE 130 - 139MM HG: CPT | Performed by: NURSE PRACTITIONER

## 2023-12-06 PROCEDURE — 1124F ACP DISCUSS-NO DSCNMKR DOCD: CPT | Performed by: NURSE PRACTITIONER

## 2023-12-06 PROCEDURE — 3078F DIAST BP <80 MM HG: CPT | Performed by: NURSE PRACTITIONER

## 2023-12-06 PROCEDURE — G8484 FLU IMMUNIZE NO ADMIN: HCPCS | Performed by: NURSE PRACTITIONER

## 2023-12-06 PROCEDURE — 51701 INSERT BLADDER CATHETER: CPT | Performed by: NURSE PRACTITIONER

## 2023-12-06 PROCEDURE — 99204 OFFICE O/P NEW MOD 45 MIN: CPT | Performed by: NURSE PRACTITIONER

## 2023-12-06 PROCEDURE — 81002 URINALYSIS NONAUTO W/O SCOPE: CPT | Performed by: NURSE PRACTITIONER

## 2023-12-06 PROCEDURE — 1090F PRES/ABSN URINE INCON ASSESS: CPT | Performed by: NURSE PRACTITIONER

## 2023-12-06 PROCEDURE — 1036F TOBACCO NON-USER: CPT | Performed by: NURSE PRACTITIONER

## 2023-12-06 PROCEDURE — G8417 CALC BMI ABV UP PARAM F/U: HCPCS | Performed by: NURSE PRACTITIONER

## 2023-12-06 PROCEDURE — G8399 PT W/DXA RESULTS DOCUMENT: HCPCS | Performed by: NURSE PRACTITIONER

## 2023-12-06 RX ORDER — OXYBUTYNIN CHLORIDE 15 MG/1
15 TABLET, EXTENDED RELEASE ORAL DAILY
COMMUNITY

## 2023-12-06 ASSESSMENT — ENCOUNTER SYMPTOMS
VOMITING: 1
APNEA: 1

## 2023-12-08 ENCOUNTER — HOSPITAL ENCOUNTER (OUTPATIENT)
Dept: PHYSICAL THERAPY | Age: 68
Setting detail: THERAPIES SERIES
Discharge: HOME | End: 2023-12-08
Payer: MEDICARE

## 2023-12-08 DIAGNOSIS — R60.0 EDEMA OF LEFT LOWER LEG: Primary | ICD-10-CM

## 2023-12-08 PROCEDURE — 97112 NEUROMUSCULAR REEDUCATION: CPT

## 2023-12-08 PROCEDURE — 97110 THERAPEUTIC EXERCISES: CPT

## 2023-12-08 NOTE — PLAN OF CARE
minutes # CPT Code (UNTIMED) #     [x] Therex (51703)  32 2  [] EVAL:LOW (70962 - Typically 20 minutes face-to-face)     [x] Neuromusc. Re-ed (07583) 10 1  [] Re-Eval (11418)     [x] Manual (86553) 5   [] Estim Unattended (31337)     [] Ther. Act (05710)    [] Orma Socks. Traction (93551)     [] Gait (97265)    [] Dry Needle 1-2 muscle (86118)     [] Aquatic Therex (45811)    [] Dry Needle 3+ muscle (28698)     [] Iontophoresis (83756)    [] VASO (41764)     [] Ultrasound (33715)    [] Group Therapy (08781)     [] Estim Attended (42874)    [] Other: Total Timed Code Tx Minutes 47 3       Total Treatment Minutes 47      Medicare Cap total YTD:  $2949       total 5916   Medicare attestation of medical necessity: This patient's annual allowed charges are about to exceed the annual threshold amount for Therapy services. Therapist review indicates that these services are medically necessary. [x]Yes. I attest that continued services are medically necessary   []No,  continued services are not medically necessary     Charge Justification:  (80968) THERAPEUTIC EXERCISE - Provided verbal/tactile cueing for activities related to strengthening, flexibility, endurance, ROM performed to prevent loss of range of motion, maintain or improve muscular strength or increase flexibility, following either an injury or surgery.    (18856) NEUROMUSCULAR RE-EDUCATION - Therapeutic procedure, 1 or more areas, each 15 minutes; neuromuscular reeducation of movement, balance, coordination, kinesthetic sense, posture, and/or proprioception for sitting and/or standing activities  (91853) MANUAL THERAPY -  Manual therapy techniques, 1 or more regions, each 15 minutes (Mobilization/manipulation, manual lymphatic drainage, manual traction) for the purpose of modulating pain, promoting relaxation,  increasing ROM, reducing/eliminating soft tissue swelling/inflammation/restriction, improving soft tissue extensibility and allowing for proper ROM

## 2023-12-11 ENCOUNTER — OFFICE VISIT (OUTPATIENT)
Dept: ORTHOPEDIC SURGERY | Age: 68
End: 2023-12-11

## 2023-12-11 VITALS — HEIGHT: 66 IN | BODY MASS INDEX: 43.39 KG/M2 | WEIGHT: 270 LBS

## 2023-12-11 DIAGNOSIS — Z96.652 S/P TOTAL KNEE ARTHROPLASTY, LEFT: Primary | ICD-10-CM

## 2023-12-11 PROCEDURE — 99024 POSTOP FOLLOW-UP VISIT: CPT | Performed by: STUDENT IN AN ORGANIZED HEALTH CARE EDUCATION/TRAINING PROGRAM

## 2023-12-11 NOTE — PROGRESS NOTES
History: 39-year-old female presents for follow-up after left total knee on October 10. She says she is overall had a bit slower rehab than she was expecting. Continues to do outpatient therapy over [facility]. Pain is controlled without opioids. Using a cane for ambulation assistance. Delaying the right knee surgery for now. No new issues specifically. Exam shows range of motion from 0-90 with stiffness in terminal flexion. No ligamentous instability or neurovascular compromise distally incisions healing well without erythema or drainage. No new imaging today    Assessment and plan: 39-year-old female with suboptimal flexion and arthrofibrosis status post left TKA on October 10. We discussed proceeding with manipulation under anesthesia with steroid injection today to help improve her flexion. We will look to schedule this early next week. I discussed with her proceeding with physical therapy 4 times per week for the next 2 weeks after that and following up 3 weeks after the manipulation.     Misti Lund MD

## 2023-12-12 ENCOUNTER — TELEPHONE (OUTPATIENT)
Dept: ORTHOPEDIC SURGERY | Age: 68
End: 2023-12-12

## 2023-12-12 ENCOUNTER — ANTI-COAG VISIT (OUTPATIENT)
Dept: PHARMACY | Age: 68
End: 2023-12-12
Payer: MEDICARE

## 2023-12-12 ENCOUNTER — TELEPHONE (OUTPATIENT)
Dept: CARDIOLOGY CLINIC | Age: 68
End: 2023-12-12

## 2023-12-12 DIAGNOSIS — I48.0 PAF (PAROXYSMAL ATRIAL FIBRILLATION) (HCC): Primary | ICD-10-CM

## 2023-12-12 DIAGNOSIS — Z96.652 S/P TOTAL KNEE ARTHROPLASTY, LEFT: Primary | ICD-10-CM

## 2023-12-12 LAB — INTERNATIONAL NORMALIZATION RATIO, POC: 1.6

## 2023-12-12 PROCEDURE — 99211 OFF/OP EST MAY X REQ PHY/QHP: CPT | Performed by: PHARMACIST

## 2023-12-12 PROCEDURE — 85610 PROTHROMBIN TIME: CPT | Performed by: PHARMACIST

## 2023-12-12 RX ORDER — METHYLPREDNISOLONE 4 MG/1
TABLET ORAL
Qty: 1 KIT | Refills: 1 | Status: SHIPPED | OUTPATIENT
Start: 2023-12-12 | End: 2023-12-18

## 2023-12-12 NOTE — TELEPHONE ENCOUNTER
Prescription Refill     Medication Name:  STEROID  [facility]  Patient Contact Number:  [phone]

## 2023-12-12 NOTE — TELEPHONE ENCOUNTER
Spoke with CANDELARIA Sullivan with surgeon office, pt is having left knee manipulation under general anesthesia. She will call back with coumadin guidelines requested by surgeon.

## 2023-12-12 NOTE — PROGRESS NOTES
Ms. Taiwo Ryder is here for management of anticoagulation for AFib. PMH also significant for CHF, HLD, HTN . She presents today w/out complaint. Pt verifies dosing regimen as listed above. Pt denies s/s bleeding/bruising/swelling/SOB. No BRBPR. No melena. Address missed doses  Reviewed pt medication list.  No changes in RX/OTCs/Herbal medications. Reviewed dietary concerns  Address EToH and tobacco use. 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 Will plan for a few months of care in the clinic but can hopefully try to get home monitor approved in the future. INR remains low today despite dose increase last visit. Will increase dose again. INR 1.6 is below therapeutic range of 2-3  Recommend to take 7.5 mg today, then increase dose to 5 mg daily except 7.5 mg Q Fri. Patient has 5 mg tablets. Will continue to monitor and check INR in 2 weeks. Dosing reminder card given with phone number, appointment date and time.    Return to clinic: 23 @ 1115 am    Dusty Lyman PharmD 11:25 AM EST 23    For Pharmacy Admin Tracking Only    Intervention Detail: Adherence Monitorin and Dose Adjustment: 1, reason: Therapy Optimization  Total # of Interventions Recommended: 2  Total # of Interventions Accepted: 2  Time Spent (min): 15

## 2023-12-12 NOTE — TELEPHONE ENCOUNTER
CARDIAC CLEARANCE REQUEST    What type of procedure are you having:  MANIPULATION UNDER ANESTHESIA LEFT KNEE       Are you taking any blood thinners:  Warfarin   Type on anesthesia:  General  When is your procedure scheduled for:  12.19.23  What physician is performing your procedure:  Dr. Debora Fontaine  Phone Number:  [phone]  Fax number to send the letter:   [phone]

## 2023-12-12 NOTE — TELEPHONE ENCOUNTER
LVM for pt to return call. If pt call sback please ask where the surgery is being performed (hospital, outpatient facility)? How long are they requesting she hold warfarin? Clearance paperwork being completed for NEFTALI to review.

## 2023-12-12 NOTE — TELEPHONE ENCOUNTER
Auth: NPR  Date: 12/19/23  Reference # None  Spoke with: None  Type of SX: Outpatient  Location: [facility]  CPT: 46701   DX: M24.662  SX area: Lt knee  Insurance: Medicare A&B

## 2023-12-13 ENCOUNTER — TELEPHONE (OUTPATIENT)
Dept: ORTHOPEDIC SURGERY | Age: 68
End: 2023-12-13

## 2023-12-13 ENCOUNTER — APPOINTMENT (OUTPATIENT)
Dept: PHYSICAL THERAPY | Age: 68
End: 2023-12-13
Payer: MEDICARE

## 2023-12-13 DIAGNOSIS — M24.662 ARTHROFIBROSIS OF KNEE JOINT, LEFT: ICD-10-CM

## 2023-12-13 DIAGNOSIS — Z96.652 S/P TOTAL KNEE ARTHROPLASTY, LEFT: Primary | ICD-10-CM

## 2023-12-13 NOTE — TELEPHONE ENCOUNTER
Covering for Dr. Gill Schwab. Farooq to proceed with planned procedure at moderate risk without any additional cardiac testing at this time.    Wellington Martinez CNP, 12/13/2023, 4:00 PM

## 2023-12-13 NOTE — TELEPHONE ENCOUNTER
General Question     Subject: L KNEE QUESTIONS  Patient and /or Facility Request: Brenda Plan \"Maira\"  Contact Number: [phone]    PATIENT CALLED IN TO ABOUT THE L KNEE DO SHE NEED TO DO PHYSICAL THERAPY ON THAT DAY OF HER PROCEDURE ON 12-19-23. AND PHYSICAL THERAPY DON'T HAVE AN ORDER FOR 4X A WEEK AT [address] AFTER THE PROCEDURE HAS BEEN COMPLETED. Benitez Mckenzie      PLEASE ADVISE

## 2023-12-13 NOTE — PROGRESS NOTES
Spoke with pt   Sent therapy orders resigned lab order   Will also do another lab in sx pre op holding urinalysis

## 2023-12-13 NOTE — TELEPHONE ENCOUNTER
[address] [facility] [address] [address] TO BE MOVED FROM MEDIA TO LAB SHE DOESN'T HAVE ACCESS TO MEDIA

## 2023-12-15 ENCOUNTER — APPOINTMENT (OUTPATIENT)
Dept: PHYSICAL THERAPY | Age: 68
End: 2023-12-15
Payer: MEDICARE

## 2023-12-18 ENCOUNTER — TELEPHONE (OUTPATIENT)
Dept: ORTHOPEDIC SURGERY | Age: 68
End: 2023-12-18

## 2023-12-18 DIAGNOSIS — Z96.652 S/P TOTAL KNEE ARTHROPLASTY, LEFT: ICD-10-CM

## 2023-12-18 RX ORDER — METHYLPREDNISOLONE 4 MG/1
TABLET ORAL
Qty: 1 KIT | Refills: 1 | Status: SHIPPED | OUTPATIENT
Start: 2023-12-18 | End: 2023-12-24

## 2023-12-18 NOTE — TELEPHONE ENCOUNTER
General Question     Subject: MEDROL JEREMY  Patient and /or Facility Request: Reina Marin \"Maira\"  Contact Number: [phone]    PATIENT CALLING REQUESTING FOR BRIANNA PRESCRIPTION FOR THE MEDROL JEREMY TO BE RE SENT TO A DIFFERENT PHARMACY    [facility] [address] [place] [address]    PLEASE ADVISE

## 2023-12-24 DIAGNOSIS — K21.9 CHRONIC GERD: ICD-10-CM

## 2023-12-24 NOTE — TELEPHONE ENCOUNTER
Refill Request     CONFIRM preferred pharmacy with the patient. If Mail Order Rx - Pend for 90 day refill. Last Seen: Last Seen Department: 9/28/2023  Last Seen by PCP: 9/28/2023    Last Written: 4/21/2023    If no future appointment scheduled:  Review the last OV with PCP and review information for follow-up visit,  Route STAFF MESSAGE with patient name to the [facility] for scheduling with the following information:            -  Timing of next visit           -  Visit type ie Physical, OV, etc           -  Diagnoses/Reason ie. COPD, HTN - Do not use MEDICATION, Follow-up or CHECK UP - Give reason for visit      Next Appointment:   Future Appointments   Date Time Provider [address]   12/26/2023  3:00 PM Kianna Chiquita, PT [facility] [place] [facility]   12/27/2023  2:15 PM Kianna Aprylbs, PT [facility] [place] [facility]   12/28/2023 11:15 AM [facility]   12/28/2023  3:00 PM Kianna Aprylbs, PT [facility] [place] [facility]   12/29/2023 10:30 AM Kianna Aprylbs, PT [facility] [place] [facility]   1/3/2024  9:00 AM Idell Huertas, PT [facility] [place] [facility]   1/5/2024  9:00 AM Idell Huertas, PT [facility] [place] [facility]   4/8/5301 05:76 AM Kevin Savage MD [facility]   1/10/2024  9:45 AM Idell Huertas, PT [facility] [place] [facility]   1/12/2024  9:45 AM Idell Huertas, PT [facility] [place] [facility]   1/17/2024  9:45 AM Idell Huertas, PT [facility] [place] [facility]   1/19/2024  9:45 AM Idell Huertas, PT [facility] [place] [facility]   1/24/2024  9:00 AM Idell Huertas, PT [facility] [place] [facility]   1/26/2024  9:45 AM Idell Huertas, PT [facility] [place] [facility]   1/31/2024  9:00 AM Idell Huertas, PT [facility] [place] [facility]   2/2/2024  9:45 AM Idell Huertas, PT [facility] [place] [facility]   2/6/2024  2:00 PM MD Shilpa Glynn   2/6/2024  3:15 PM Gini Lebron APRN - [facility]   5/31/2024 11:40 AM BLANK Mendoza CNP       Message sent to  to schedule appt with patient?

## 2023-12-26 ENCOUNTER — HOSPITAL ENCOUNTER (OUTPATIENT)
Dept: PHYSICAL THERAPY | Age: 68
Setting detail: THERAPIES SERIES
Discharge: HOME | End: 2023-12-26
Payer: MEDICARE

## 2023-12-26 PROCEDURE — 97016 VASOPNEUMATIC DEVICE THERAPY: CPT

## 2023-12-26 PROCEDURE — 97110 THERAPEUTIC EXERCISES: CPT

## 2023-12-26 PROCEDURE — 97140 MANUAL THERAPY 1/> REGIONS: CPT

## 2023-12-26 RX ORDER — OMEPRAZOLE 40 MG/1
40 CAPSULE, DELAYED RELEASE ORAL
Qty: 90 CAPSULE | Refills: 1 | Status: SHIPPED | OUTPATIENT
Start: 2023-12-26

## 2023-12-26 NOTE — FLOWSHEET NOTE
continued services are medically necessary   []No,  continued services are not medically necessary     Charge Justification:  (70672) THERAPEUTIC EXERCISE - Provided verbal/tactile cueing for activities related to strengthening, flexibility, endurance, ROM performed to prevent loss of range of motion, maintain or improve muscular strength or increase flexibility, following either an injury or surgery. (33386) MANUAL THERAPY -  Manual therapy techniques, 1 or more regions, each 15 minutes (Mobilization/manipulation, manual lymphatic drainage, manual traction) for the purpose of modulating pain, promoting relaxation,  increasing ROM, reducing/eliminating soft tissue swelling/inflammation/restriction, improving soft tissue extensibility and allowing for proper ROM for normal function with self care, mobility, lifting and ambulation  (89754) VASOPNEUMATIC    TREATMENT PLAN   Plan: Cont POC- Continue emphasis/focus on exercise progression, improving proper muscle recruitment and activation/motor control patterns, increasing ROM, reduce/eliminate soft tissue swelling/inflammation/restriction, and static and dynamic balance. Next visit plan to progress weights, progress reps, add new exercises, progress balance, and PROM as well as AROM to progress ROM after manipulation  Pt will be seen for the next week 4 time per week then recommend to progress to 2 times per week for 4 more weeks. Electronically Signed by Janiece Moritz, PT             Date: 12/26/2023     Note: If patient does not return for scheduled/recommended follow up visits, this note will serve as a discharge from care along with the most recent update on progress.

## 2023-12-27 ENCOUNTER — HOSPITAL ENCOUNTER (OUTPATIENT)
Dept: PHYSICAL THERAPY | Age: 68
Setting detail: THERAPIES SERIES
Discharge: HOME | End: 2023-12-27
Payer: MEDICARE

## 2023-12-27 PROCEDURE — 97140 MANUAL THERAPY 1/> REGIONS: CPT

## 2023-12-27 PROCEDURE — 97110 THERAPEUTIC EXERCISES: CPT

## 2023-12-27 PROCEDURE — 97016 VASOPNEUMATIC DEVICE THERAPY: CPT

## 2023-12-27 NOTE — FLOWSHEET NOTE
[address] [place] [address] St and Therapy 9 [address] office: [phone] fax: [phone]    Physical Therapy: TREATMENT/PROGRESS NOTE   Patient: Miguel A Acosta (46 y.o. female)   Treatment Date: 2023   :  1955 MRN: [id number]   Visit #: 18   Insurance Allowable Auth Needed   BMN []Yes    [x]No    Insurance: Payor: MEDICARE / Plan: MEDICARE PART A AND B / Product Type: *No Product type* /   Insurance ID: [id number] - (Medicare)  Secondary Insurance (if applicable): BCBS   Treatment Diagnosis:        ICD-10-CM     1. Abnormality of gait  R26.9         2. Decreased range of motion (ROM) of left knee  M25.662      3. Edema of left LE                                           R60.0      Medical Diagnosis:    Bilateral primary osteoarthritis of knee [M17.0]  Acute pain of right knee [M25.561]  Acute pain of left knee [M25.562]  S/P total knee arthroplasty, left [Z96.652]    S/P manipulation left knee and injection 2023    Referring Physician: Aziza Temple MD  PCP: KADEN Croft                             Plan of care signed (Y/N):  yes    Date of Patient follow up with Physician: 2023     Progress Report/POC: NO  POC update due:  2024(OR 10 visits /OR [address], whichever is less) (3/1/2023)    Precautions/ Contra-indications:                                                                                          Latex allergy:  YES  Pacemaker:    NO  Contraindications for Manipulation: blood thinners (relative)  Other: CHF, HTN, Hypothroidism, hyperlipidema; DM II treating with diet     Red Flags:  None    Preferred Language for Healthcare:   [x]English       []other:    SUBJECTIVE EXAMINATION     Patient Report/Comments:  Pt reports that she is feeling good this morning.     Test used Initial score  10/17/23 2023   Pain Summary VAS 5-6/10 ambulation and sitting too long; getting out of car sharp

## 2023-12-28 ENCOUNTER — ANTI-COAG VISIT (OUTPATIENT)
Dept: PHARMACY | Age: 68
End: 2023-12-28
Payer: MEDICARE

## 2023-12-28 ENCOUNTER — HOSPITAL ENCOUNTER (OUTPATIENT)
Dept: PHYSICAL THERAPY | Age: 68
Setting detail: THERAPIES SERIES
Discharge: HOME | End: 2023-12-28
Payer: MEDICARE

## 2023-12-28 DIAGNOSIS — I48.0 PAF (PAROXYSMAL ATRIAL FIBRILLATION) (HCC): Primary | ICD-10-CM

## 2023-12-28 LAB — INR BLD: 1.8

## 2023-12-28 PROCEDURE — 97110 THERAPEUTIC EXERCISES: CPT

## 2023-12-28 PROCEDURE — 97140 MANUAL THERAPY 1/> REGIONS: CPT

## 2023-12-28 PROCEDURE — 85610 PROTHROMBIN TIME: CPT | Performed by: PHARMACIST

## 2023-12-28 PROCEDURE — 99211 OFF/OP EST MAY X REQ PHY/QHP: CPT | Performed by: PHARMACIST

## 2023-12-28 PROCEDURE — 97016 VASOPNEUMATIC DEVICE THERAPY: CPT

## 2023-12-28 NOTE — FLOWSHEET NOTE
[address] [place] [address] St and Therapy [address] [facility], [address] [place] [address] office: [phone] fax: [phone]    Physical Therapy: TREATMENT/PROGRESS NOTE   Patient: Merlene Ocasio (25 y.o. female)   Treatment Date: 2023   :  1955 MRN: [id number]   Visit #: 19   Insurance Allowable Auth Needed   BMN []Yes    [x]No    Insurance: Payor: MEDICARE / Plan: MEDICARE PART A AND B / Product Type: *No Product type* /   Insurance ID: [id number] - (Medicare)  Secondary Insurance (if applicable): BCBS   Treatment Diagnosis:        ICD-10-CM     1. Abnormality of gait  R26.9         2. Decreased range of motion (ROM) of left knee  M25.662      3. Edema of left LE                                           R60.0      Medical Diagnosis:    Bilateral primary osteoarthritis of knee [M17.0]  Acute pain of right knee [M25.561]  Acute pain of left knee [M25.562]  S/P total knee arthroplasty, left [Z96.652]    S/P manipulation left knee and injection 2023    Referring Physician: Ena Olea MD  PCP: KADEN Clarke                             Plan of care signed (Y/N):  yes    Date of Patient follow up with Physician: 2023     Progress Report/POC: NO  POC update due:  2024(OR 10 visits /OR [address], whichever is less) (3/1/2023)    Precautions/ Contra-indications:                                                                                          Latex allergy:  YES  Pacemaker:    NO  Contraindications for Manipulation: blood thinners (relative)  Other: CHF, HTN, Hypothroidism, hyperlipidema; DM II treating with diet     Red Flags:  None    Preferred Language for Healthcare:   [x]English       []other:    SUBJECTIVE EXAMINATION     Patient Report/Comments:  Pt reports that she is feeling good today walked all through [address] today for husbands follow up appt from cardiac surgery and her knee did well with all this walking.

## 2023-12-28 NOTE — PROGRESS NOTES
Ms. Chika Duarte is here for management of anticoagulation for AFib. PMH also significant for CHF, HLD, HTN . She presents today w/out complaint. Pt verifies dosing regimen as listed above. Pt denies s/s bleeding/bruising/swelling/SOB. No BRBPR. No melena. Address missed doses  Reviewed pt medication list.  No changes in RX/OTCs/Herbal medications. Reviewed dietary concerns  Address EToH and tobacco use. 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 Will plan for a few months of care in the clinic but can hopefully try to get home monitor approved in the future. INR increased but remains slightly low today despite dose increase last visit. Will increase dose again - pt reports no changes    INR 1.8 is below therapeutic range of 2-3  Recommend to take 7.5 mg today, then increase dose to 5 mg daily except 7.5 mg Q mon/Fri. Patient has 5 mg tablets. Will continue to monitor and check INR in 2 weeks. Dosing reminder card given with phone number, appointment date and time. Return to clinic: 24 @ [phone] am    Goldie Steiner, PharmD Candidate     I have seen the patient and reviewed the progress note written by the PharmD Candidate. I agree with this assessment and plan.    Larisa Houser [facility], PharmD 2023 2:19 PM    For Pharmacy Admin Tracking Only    Intervention Detail: Adherence Monitorin and Dose Adjustment: 1, reason: Therapy Optimization  Total # of Interventions Recommended: 2  Total # of Interventions Accepted: 2  Time Spent (min): 15

## 2023-12-29 ENCOUNTER — HOSPITAL ENCOUNTER (OUTPATIENT)
Dept: PHYSICAL THERAPY | Age: 68
Setting detail: THERAPIES SERIES
Discharge: HOME | End: 2023-12-29
Payer: MEDICARE

## 2023-12-29 PROCEDURE — 97016 VASOPNEUMATIC DEVICE THERAPY: CPT

## 2023-12-29 PROCEDURE — 97110 THERAPEUTIC EXERCISES: CPT

## 2023-12-29 PROCEDURE — 97140 MANUAL THERAPY 1/> REGIONS: CPT

## 2023-12-29 NOTE — FLOWSHEET NOTE
justification of therapy services: The patient has a musculoskeletal condition(s) with a corresponding ICD-10 code that is of complexity and severity that require skilled therapeutic intervention. This has a direct and significant impact on the need for therapy and significantly impacts the rate of recovery. Treatment/Activity Tolerance:  [] Patient tolerated treatment well [] Patient limited by fatique  [] Patient limited by pain  [] Patient limited by other medical complications  [x] Other: pt's  is in hospital at this time    Return to Play: NA    Prognosis for POC: [x] Good [] Fair  [] Poor    Patient requires continued skilled intervention: [x] Yes  [] No      GOALS   Patient stated goal: to walk with minimal limitations  Status: [x] Progressing: [] Met: [] Not Met: [] Adjusted     Therapist goals for Patient:   Short Term Goals: To be achieved in: 2 weeks  Independent in HEP and progression per patient tolerance, in order to progress toward full function and prevent re-injury. Status: [] Progressing: [x] Met: [] Not Met: [] Adjusted  Patient will have a decrease in pain to 4/10 to help  facilitate improvement in movement, function, and ADLs as indicated by functional deficits. Status: [] Progressing: [x] Met: [] Not Met: [] Adjusted     Long Term Goals: To be achieved in: 4-6 weeks  Disability index score of 50% or less for the LEFS to assist with return top prior level of function. Status: [x] Progressing: [] Met: [] Not Met: [] Adjusted  Improve  knee AROM  Flexion to 110 degrees or  better to allow for proper joint functioning as indicated by patients functional deficits. Status: [x] Progressing: [] Met: [] Not Met: [] Adjusted  Pt to improve strength by 2 muscle grades or better of quadriceps and hamstrings to work toward proper functional mobility and improving technique of ADLs.    Status: [x] Progressing: [] Met: [] Not Met: [] Adjusted  Patient

## 2024-01-02 ENCOUNTER — APPOINTMENT (OUTPATIENT)
Dept: PHYSICAL THERAPY | Age: 69
End: 2024-01-02
Payer: MEDICARE

## 2024-01-03 ENCOUNTER — HOSPITAL ENCOUNTER (OUTPATIENT)
Dept: PHYSICAL THERAPY | Age: 69
Setting detail: THERAPIES SERIES
Discharge: HOME | End: 2024-01-03
Payer: MEDICARE

## 2024-01-03 PROCEDURE — 97140 MANUAL THERAPY 1/> REGIONS: CPT

## 2024-01-03 PROCEDURE — 97110 THERAPEUTIC EXERCISES: CPT

## 2024-01-03 NOTE — FLOWSHEET NOTE
over single row of cones       Side stepping, backward, march                 Step ups forward; lateral on foam pad  1 10 each Without UE support          Therapeutic Activity (11350)  Sets/time                                          Modalities:  none       Education/Home Exercise Program: HEP discussed and performed, see exercise grid      ASSESSMENT     Today's Assessment: During therapy this date, patient required verbal cueing, tactile cueing, muscle facilitation, and quad control for exercise progression, improving proper muscle recruitment and activation/motor control patterns, increasing ROM, improving soft tissue extensibility, static and dynamic balance, kinesthetic sense and proprioception, and improving postural awareness.Patient will continue to benefit from ongoing evaluation and advanced clinical decision from a Physical Therapist to address and improve pain control, ROM, muscle strength, neuromuscular control, normalization of gait, balance and proprioception, functional mobility, ADL status, and safety awareness to safely return to PLOF without symptoms or restrictions.     Medical Necessity Documentation:  I certify that this patient meets the below criteria necessary for medical necessity for care and/or justification of therapy services:  The patient has a musculoskeletal condition(s) with a corresponding ICD-10 code that is of complexity and severity that require skilled therapeutic intervention. This has a direct and significant impact on the need for therapy and significantly impacts the rate of recovery.     Treatment/Activity Tolerance:  [x] Patient tolerated treatment well [] Patient limited by fatique  [] Patient limited by pain  [] Patient limited by other medical complications  [] Other:     Return to Play: NA    Prognosis for POC: [x] Good [] Fair  [] Poor    Patient requires continued skilled intervention: [x] Yes  [] No      GOALS   Patient stated goal: to walk with minimal

## 2024-01-04 ENCOUNTER — APPOINTMENT (OUTPATIENT)
Dept: PHYSICAL THERAPY | Age: 69
End: 2024-01-04
Payer: MEDICARE

## 2024-01-05 ENCOUNTER — HOSPITAL ENCOUNTER (OUTPATIENT)
Dept: PHYSICAL THERAPY | Age: 69
Setting detail: THERAPIES SERIES
Discharge: HOME | End: 2024-01-05
Payer: MEDICARE

## 2024-01-05 PROCEDURE — 97016 VASOPNEUMATIC DEVICE THERAPY: CPT

## 2024-01-05 PROCEDURE — 97110 THERAPEUTIC EXERCISES: CPT

## 2024-01-05 PROCEDURE — 97140 MANUAL THERAPY 1/> REGIONS: CPT

## 2024-01-05 NOTE — PLAN OF CARE
[facility]- Outpatient Rehabilitation and Therapy [address], [address], [place] [address] office: [phone] fax: [phone]    Dr. Farfan,   Pt has been seen for a total of 10 visits since manipulation on 2023. Pt continues to progress well with AROM knee flexion 113 after stretching. Pt still demonstrates weakness in left quad strength, improvement noted. Recommend to continue 1-2 times per week for 4 more week to progress exercise for strengthening and stretching as well as manual techniques for ROM and to decreased edema of left LE.     Physical Therapy: TREATMENT/PROGRESS NOTE   Patient: Reina Marin (68 y.o. female)   Treatment Date: 2024   :  1955 MRN: [id number]   Visit #: 22   Insurance Allowable Auth Needed   BMN []Yes    [x]No    Insurance: Payor: MEDICARE / Plan: MEDICARE PART A AND B / Product Type: *No Product type* /   Insurance ID: [id number] - (Medicare)  Secondary Insurance (if applicable): BCBS   Treatment Diagnosis:        ICD-10-CM     1. Abnormality of gait  R26.9         2. Decreased range of motion (ROM) of left knee  M25.662      3.     Edema of left LE                                           R60.0      Medical Diagnosis:    Bilateral primary osteoarthritis of knee [M17.0]  Acute pain of right knee [M25.561]  Acute pain of left knee [M25.562]  S/P total knee arthroplasty, left [Z96.652]    S/P manipulation left knee and injection 2023    Referring Physician: Vidal Farfan MD  PCP: Mary Florentino PA                             Plan of care signed (Y/N):  yes    Date of Patient follow up with Physician: 2023     Progress Report/POC: YES  POC update due:  2024(OR 10 visits /OR AUTH LIMITS, whichever is less) (3/1/2023)    Precautions/ Contra-indications:                                                                                          Latex allergy:  YES  Pacemaker:    NO  Contraindications for

## 2024-01-08 ENCOUNTER — OFFICE VISIT (OUTPATIENT)
Dept: ORTHOPEDIC SURGERY | Age: 69
End: 2024-01-08

## 2024-01-08 VITALS — BODY MASS INDEX: 42.11 KG/M2 | HEIGHT: 66 IN | WEIGHT: 262 LBS

## 2024-01-08 DIAGNOSIS — E66.01 OBESITY, CLASS III, BMI 40-49.9 (MORBID OBESITY) (HCC): ICD-10-CM

## 2024-01-08 DIAGNOSIS — Z96.652 S/P TOTAL KNEE ARTHROPLASTY, LEFT: Primary | ICD-10-CM

## 2024-01-08 PROCEDURE — 99024 POSTOP FOLLOW-UP VISIT: CPT | Performed by: STUDENT IN AN ORGANIZED HEALTH CARE EDUCATION/TRAINING PROGRAM

## 2024-01-09 ENCOUNTER — APPOINTMENT (OUTPATIENT)
Dept: PHYSICAL THERAPY | Age: 69
End: 2024-01-09
Payer: MEDICARE

## 2024-01-10 ENCOUNTER — HOSPITAL ENCOUNTER (OUTPATIENT)
Dept: PHYSICAL THERAPY | Age: 69
Setting detail: THERAPIES SERIES
Discharge: HOME | End: 2024-01-10
Payer: MEDICARE

## 2024-01-10 PROCEDURE — 97112 NEUROMUSCULAR REEDUCATION: CPT

## 2024-01-10 PROCEDURE — 97110 THERAPEUTIC EXERCISES: CPT

## 2024-01-10 PROCEDURE — 97140 MANUAL THERAPY 1/> REGIONS: CPT

## 2024-01-10 NOTE — FLOWSHEET NOTE
[facility]- Outpatient Rehabilitation and Therapy [address], [address], [place] [address] office: [phone] fax: [phone]      Physical Therapy: TREATMENT/PROGRESS NOTE   Patient: Reina Marin (68 y.o. female)   Treatment Date: 01/10/2024   :  1955 MRN: [id number]   Visit #: 23   Insurance Allowable Auth Needed   BMN []Yes    [x]No    Insurance: Payor: MEDICARE / Plan: MEDICARE PART A AND B / Product Type: *No Product type* /   Insurance ID: [id number] - (Medicare)  Secondary Insurance (if applicable): BCBS   Treatment Diagnosis:        ICD-10-CM     1. Abnormality of gait  R26.9         2. Decreased range of motion (ROM) of left knee  M25.662      3.     Edema of left LE                                           R60.0      Medical Diagnosis:    Bilateral primary osteoarthritis of knee [M17.0]  Acute pain of right knee [M25.561]  Acute pain of left knee [M25.562]  S/P total knee arthroplasty, left [Z96.652]    S/P manipulation left knee and injection 2023    Referring Physician: Vidal Farfan MD  PCP: Mary Florentino PA                             Plan of care signed (Y/N):  yes    Date of Patient follow up with Physician: 2023     Progress Report/POC: NO  POC update due:  2023(OR 10 visits /OR AUTH LIMITS, whichever is less) (3/1/2023)    Precautions/ Contra-indications:                                                                                          Latex allergy:  YES  Pacemaker:    NO  Contraindications for Manipulation: blood thinners (relative)  Other: CHF, HTN, Hypothroidism, hyperlipidema; DM II treating with diet     Red Flags:  None    Preferred Language for Healthcare:   [x]English       []other:    SUBJECTIVE EXAMINATION     Patient Report/Comments:  Pt reports that she feels that she is more flexible.      Test used Initial score  10/17/23 01/10/2024   Pain Summary VAS 5-6/10 ambulation and sitting too long; getting out of car

## 2024-01-11 ENCOUNTER — ANTI-COAG VISIT (OUTPATIENT)
Dept: PHARMACY | Age: 69
End: 2024-01-11
Payer: MEDICARE

## 2024-01-11 ENCOUNTER — APPOINTMENT (OUTPATIENT)
Dept: PHYSICAL THERAPY | Age: 69
End: 2024-01-11
Payer: MEDICARE

## 2024-01-11 DIAGNOSIS — I48.0 PAF (PAROXYSMAL ATRIAL FIBRILLATION) (HCC): Primary | ICD-10-CM

## 2024-01-11 DIAGNOSIS — I48.0 PAF (PAROXYSMAL ATRIAL FIBRILLATION) (HCC): ICD-10-CM

## 2024-01-11 LAB — INTERNATIONAL NORMALIZATION RATIO, POC: 1.9

## 2024-01-11 PROCEDURE — 85610 PROTHROMBIN TIME: CPT | Performed by: PHARMACIST

## 2024-01-11 PROCEDURE — 99211 OFF/OP EST MAY X REQ PHY/QHP: CPT | Performed by: PHARMACIST

## 2024-01-11 RX ORDER — DOFETILIDE 0.5 MG/1
500 CAPSULE ORAL EVERY 12 HOURS
Qty: 180 CAPSULE | Refills: 2 | Status: SHIPPED | OUTPATIENT
Start: 2024-01-11

## 2024-01-11 NOTE — PROGRESS NOTES
Ms. Maira Marin is here for management of anticoagulation for AFib.   PMH also significant for CHF, HLD, HTN .   She presents today w/out complaint.  Pt verifies dosing regimen as listed above.   Pt denies s/s bleeding/bruising/swelling/SOB.  No BRBPR. No melena.  Address missed doses  Reviewed pt medication list.  No changes in RX/OTCs/Herbal medications.  Reviewed dietary concerns  Address EToH and tobacco use.    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  Will plan for a few months of care in the clinic but can hopefully try to get home monitor approved in the future.    INR increased but still remains slightly low today after increase last visit. Will increase dose again - pt reports no changes    INR 1.9 is below therapeutic range of 2-3  Recommend to take 7.5 mg today, then increase dose to 5 mg daily except 7.5 mg Q Mon/Wed/Fri.  Patient has 5 mg tablets.  Will continue to monitor and check INR in 2 weeks.  Dosing reminder card given with phone number, appointment date and time.   Return to clinic: 24 @ 1115 am    Fan Strong, PharmD 12:57 PM EST 24    For Pharmacy Admin Tracking Only    Intervention Detail: Adherence Monitorin and Dose Adjustment: 1, reason: Therapy Optimization  Total # of Interventions Recommended: 2  Total # of Interventions Accepted: 2  Time Spent (min): 15

## 2024-01-12 ENCOUNTER — HOSPITAL ENCOUNTER (OUTPATIENT)
Dept: PHYSICAL THERAPY | Age: 69
Setting detail: THERAPIES SERIES
Discharge: HOME | End: 2024-01-12
Payer: MEDICARE

## 2024-01-12 PROCEDURE — 97140 MANUAL THERAPY 1/> REGIONS: CPT

## 2024-01-12 PROCEDURE — 97110 THERAPEUTIC EXERCISES: CPT

## 2024-01-12 PROCEDURE — 97112 NEUROMUSCULAR REEDUCATION: CPT

## 2024-01-12 NOTE — FLOWSHEET NOTE
[facility]- Outpatient Rehabilitation and Therapy [address], [address], [place] [address] office: [phone] fax: [phone]      Physical Therapy: TREATMENT/PROGRESS NOTE   Patient: Reina Marin (68 y.o. female)   Treatment Date: 2024   :  1955 MRN: [id number]   Visit #: 24   Insurance Allowable Auth Needed   BMN []Yes    [x]No    Insurance: Payor: MEDICARE / Plan: MEDICARE PART A AND B / Product Type: *No Product type* /   Insurance ID: [id number] - (Medicare)  Secondary Insurance (if applicable): BCBS   Treatment Diagnosis:        ICD-10-CM     1. Abnormality of gait  R26.9         2. Decreased range of motion (ROM) of left knee  M25.662      3.     Edema of left LE                                           R60.0      Medical Diagnosis:    Bilateral primary osteoarthritis of knee [M17.0]  Acute pain of right knee [M25.561]  Acute pain of left knee [M25.562]  S/P total knee arthroplasty, left [Z96.652]    S/P manipulation left knee and injection 2023    Referring Physician: Vidal Farfan MD  PCP: Mary Florentino PA                             Plan of care signed (Y/N):  yes    Date of Patient follow up with Physician: 2023     Progress Report/POC: NO  POC update due:  2023(OR 10 visits /OR AUTH LIMITS, whichever is less) (3/1/2023)    Precautions/ Contra-indications:                                                                                          Latex allergy:  YES  Pacemaker:    NO  Contraindications for Manipulation: blood thinners (relative)  Other: CHF, HTN, Hypothroidism, hyperlipidema; DM II treating with diet     Red Flags:  None    Preferred Language for Healthcare:   [x]English       []other:    SUBJECTIVE EXAMINATION     Patient Report/Comments:  Pt reports that she feels that she is more flexible.      Test used Initial score  10/17/23 2024   Pain Summary VAS 5-6/10 ambulation and sitting too long; getting out of car

## 2024-01-16 ENCOUNTER — APPOINTMENT (OUTPATIENT)
Dept: PHYSICAL THERAPY | Age: 69
End: 2024-01-16
Payer: MEDICARE

## 2024-01-16 NOTE — PROGRESS NOTES
History: 59-year-old female presents for follow-up after left total knee on October 10.  She is overall done fine but has had persistent stiffness and is now status post manipulation under anesthesia on December 19.  She says she has had a significant improvement in her range of motion and stiffness.  Doing well with outpatient PT and no new issues.  Not requiring opioid pain medicine.    Exam: Incisions healing well no erythema or drainage.  Range of motion 0-1 10 today.  No ligamentous instability or neurovascular compromise distally.    Imaging: No new    Assessment: 68-year-old female doing well 3 months status post left knee replacement and 2 weeks status post manipulation under anesthesia.    Plan: Recommended she continues therapy at a duration per the therapist and her.  Continue home exercise program for further range of motion and strengthening after that.  Over-the-counter pain control as needed.  Follow-up as needed.    Vidal Farfan MD

## 2024-01-17 ENCOUNTER — HOSPITAL ENCOUNTER (OUTPATIENT)
Dept: PHYSICAL THERAPY | Age: 69
Setting detail: THERAPIES SERIES
Discharge: HOME | End: 2024-01-17
Payer: MEDICARE

## 2024-01-17 PROCEDURE — 97016 VASOPNEUMATIC DEVICE THERAPY: CPT

## 2024-01-17 PROCEDURE — 97110 THERAPEUTIC EXERCISES: CPT

## 2024-01-17 PROCEDURE — 97140 MANUAL THERAPY 1/> REGIONS: CPT

## 2024-01-17 NOTE — FLOWSHEET NOTE
stairs, and sit for length of time without increased symptoms or restriction to work towards return to prior level of function.  Status: [x] Progressing: [] Met: [] Not Met: [] Adjusted  Patient will perform normal ADLs without symptoms 90% of the time                                                                                                                  Status: [x] Progressing: [] Met: [] Not Met: [] Adjusted    Overall Progression Towards Functional goals/ Treatment Progress Update:  [] Patient is progressing as expected towards functional goals listed.    [x] Progression is slowed due to complexities/Impairments listed.  [] Progression has been slowed due to co-morbidities.  [] Plan just implemented, too soon (<30days) to assess goals progression   [] Goals require adjustment due to lack of progress  [] Patient is not progressing as expected and requires additional follow up with physician  [] Other:     CHARGE CAPTURE     CHARGE GRID   CPT Code (TIMED) minutes # CPT Code (UNTIMED) #     [x] Therex (64141)  35 2  [] EVAL:LOW (81620 - Typically 20 minutes face-to-face)     [] Neuromusc. Re-ed (84602)    [] Re-Eval (36834)     [x] Manual (31666) 15 1  [] Estim Unattended (54759)     [] Ther. Act (98450)    [] Mech. Traction (97251)     [] Gait (95523)    [] Dry Needle 1-2 muscle (20560)     [] Aquatic Therex (85695)    [] Dry Needle 3+ muscle (20561)     [] Iontophoresis (41964)    [x] VASO (42875) 1 (10 minutes)     [] Ultrasound (63961)    [] Group Therapy (95423)     [] Estim Attended (23947)    [x] Other:  nustep- independent 1 (10 minutes)   Total Timed Code Tx Minutes 50 3  2     Total Treatment Minutes 70      Medicare Cap total YTD:  $2230       total 700  Medicare attestation of medical necessity:  This patient's annual allowed charges are about to exceed the annual threshold amount for Therapy services.     Therapist review indicates that these services are medically necessary.    [x]Yes.  I

## 2024-01-18 ENCOUNTER — APPOINTMENT (OUTPATIENT)
Dept: PHYSICAL THERAPY | Age: 69
End: 2024-01-18
Payer: MEDICARE

## 2024-01-19 ENCOUNTER — APPOINTMENT (OUTPATIENT)
Dept: PHYSICAL THERAPY | Age: 69
End: 2024-01-19
Payer: MEDICARE

## 2024-01-23 ENCOUNTER — APPOINTMENT (OUTPATIENT)
Dept: PHYSICAL THERAPY | Age: 69
End: 2024-01-23
Payer: MEDICARE

## 2024-01-24 ENCOUNTER — APPOINTMENT (OUTPATIENT)
Dept: PHYSICAL THERAPY | Age: 69
End: 2024-01-24
Payer: MEDICARE

## 2024-01-25 ENCOUNTER — APPOINTMENT (OUTPATIENT)
Dept: PHYSICAL THERAPY | Age: 69
End: 2024-01-25
Payer: MEDICARE

## 2024-01-25 ENCOUNTER — ANTI-COAG VISIT (OUTPATIENT)
Dept: PHARMACY | Age: 69
End: 2024-01-25
Payer: MEDICARE

## 2024-01-25 DIAGNOSIS — I48.0 PAF (PAROXYSMAL ATRIAL FIBRILLATION) (HCC): Primary | ICD-10-CM

## 2024-01-25 LAB — INTERNATIONAL NORMALIZATION RATIO, POC: 2.2

## 2024-01-25 PROCEDURE — 99211 OFF/OP EST MAY X REQ PHY/QHP: CPT | Performed by: PHARMACIST

## 2024-01-25 PROCEDURE — 85610 PROTHROMBIN TIME: CPT | Performed by: PHARMACIST

## 2024-01-25 NOTE — PROGRESS NOTES
Ms. Maira Marin is here for management of anticoagulation for AFib.   PMH also significant for CHF, HLD, HTN .   She presents today w/out complaint.  Pt verifies dosing regimen as listed above.   Pt denies s/s bleeding/bruising/swelling/SOB.  No BRBPR. No melena.  Address missed doses  Reviewed pt medication list.  No changes in RX/OTCs/Herbal medications.  Reviewed dietary concerns  Address EToH and tobacco use.    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  Will plan for a few months of care in the clinic but can hopefully try to get home monitor approved in the future.    No recent changes per pt.  INR in range after recent dose increases.      INR 2.2 is within therapeutic range of 2-3  Recommend to continue dose of 5 mg daily except 7.5 mg Q Mon/Wed/Fri.  Patient has 5 mg tablets.  Will continue to monitor and check INR in 3 weeks.  Dosing reminder card given with phone number, appointment date and time.   Return to clinic: 2/15/24 @ 2:30 pm    Fan Strong, PharmD 2:01 PM EST 24    For Pharmacy Admin Tracking Only    Intervention Detail: Adherence Monitorin  Total # of Interventions Recommended: 1  Total # of Interventions Accepted: 1  Time Spent (min): 15

## 2024-01-26 ENCOUNTER — HOSPITAL ENCOUNTER (OUTPATIENT)
Dept: PHYSICAL THERAPY | Age: 69
Setting detail: THERAPIES SERIES
Discharge: HOME | End: 2024-01-26
Payer: MEDICARE

## 2024-01-26 PROCEDURE — 97110 THERAPEUTIC EXERCISES: CPT

## 2024-01-26 PROCEDURE — 97140 MANUAL THERAPY 1/> REGIONS: CPT

## 2024-01-26 PROCEDURE — 97112 NEUROMUSCULAR REEDUCATION: CPT

## 2024-01-26 NOTE — FLOWSHEET NOTE
[facility]- Outpatient Rehabilitation and Therapy [address], [address], [place] [address] office: [phone] fax: [phone]      Physical Therapy: TREATMENT/PROGRESS NOTE   Patient: Reina Marin (68 y.o. female)   Treatment Date: 2024   :  1955 MRN: [id number]   Visit #: 26   Insurance Allowable Auth Needed   BMN []Yes    [x]No    Insurance: Payor: MEDICARE / Plan: MEDICARE PART A AND B / Product Type: *No Product type* /   Insurance ID: [id number] - (Medicare)  Secondary Insurance (if applicable): BCBS   Treatment Diagnosis:        ICD-10-CM     1. Abnormality of gait  R26.9         2. Decreased range of motion (ROM) of left knee  M25.662      3.     Edema of left LE                                           R60.0      Medical Diagnosis:    Bilateral primary osteoarthritis of knee [M17.0]  Acute pain of right knee [M25.561]  Acute pain of left knee [M25.562]  S/P total knee arthroplasty, left [Z96.652]    S/P manipulation left knee and injection 2023    Referring Physician: Vidal Farfan MD  PCP: Mary Florentino PA                             Plan of care signed (Y/N):  yes    Date of Patient follow up with Physician: 2023     Progress Report/POC: NO  POC update due:  2023(OR 10 visits /OR AUTH LIMITS, whichever is less) (3/1/2023)    Precautions/ Contra-indications:                                                                                          Latex allergy:  YES  Pacemaker:    NO  Contraindications for Manipulation: blood thinners (relative)  Other: CHF, HTN, Hypothroidism, hyperlipidema; DM II treating with diet     Red Flags:  None    Preferred Language for Healthcare:   [x]English       []other:    SUBJECTIVE EXAMINATION     Patient Report/Comments:   Pt reports that she is doing well except for getting up from standing still but less irritation. Pt reports that she is having 0-1/10 at patella tendon.      Test used Initial

## 2024-01-30 ENCOUNTER — APPOINTMENT (OUTPATIENT)
Dept: PHYSICAL THERAPY | Age: 69
End: 2024-01-30
Payer: MEDICARE

## 2024-01-31 ENCOUNTER — HOSPITAL ENCOUNTER (OUTPATIENT)
Dept: PHYSICAL THERAPY | Age: 69
Setting detail: THERAPIES SERIES
Discharge: HOME | End: 2024-01-31
Payer: MEDICARE

## 2024-01-31 PROCEDURE — 97140 MANUAL THERAPY 1/> REGIONS: CPT

## 2024-01-31 PROCEDURE — 97110 THERAPEUTIC EXERCISES: CPT

## 2024-01-31 NOTE — FLOWSHEET NOTE
[facility]- Outpatient Rehabilitation and Therapy [address], [address], [place] [address] office: [phone] fax: [phone]      Physical Therapy: TREATMENT/PROGRESS NOTE   Patient: Reina Marin (68 y.o. female)   Treatment Date: 2024   :  1955 MRN: [id number]   Visit #: 27   Insurance Allowable Auth Needed   BMN []Yes    [x]No    Insurance: Payor: MEDICARE / Plan: MEDICARE PART A AND B / Product Type: *No Product type* /   Insurance ID: [id number] - (Medicare)  Secondary Insurance (if applicable): BCBS   Treatment Diagnosis:        ICD-10-CM     1. Abnormality of gait  R26.9         2. Decreased range of motion (ROM) of left knee  M25.662      3.     Edema of left LE                                           R60.0      Medical Diagnosis:    Bilateral primary osteoarthritis of knee [M17.0]  Acute pain of right knee [M25.561]  Acute pain of left knee [M25.562]  S/P total knee arthroplasty, left [Z96.652]    S/P manipulation left knee and injection 2023    Referring Physician: Vidal Farfan MD  PCP: Mary Florentino PA                             Plan of care signed (Y/N):  yes    Date of Patient follow up with Physician: 2023     Progress Report/POC: NO  POC update due:  2023(OR 10 visits /OR AUTH LIMITS, whichever is less) (3/1/2023)    Precautions/ Contra-indications:                                                                                          Latex allergy:  YES  Pacemaker:    NO  Contraindications for Manipulation: blood thinners (relative)  Other: CHF, HTN, Hypothroidism, hyperlipidema; DM II treating with diet     Red Flags:  None    Preferred Language for Healthcare:   [x]English       []other:    SUBJECTIVE EXAMINATION     Patient Report/Comments:  Pt reports that she is not having as much pain with getting up from sitting.       Test used Initial score  10/17/23 2024   Pain Summary VAS 5-6/10 ambulation and sitting too

## 2024-02-01 ENCOUNTER — APPOINTMENT (OUTPATIENT)
Dept: PHYSICAL THERAPY | Age: 69
End: 2024-02-01
Payer: MEDICARE

## 2024-02-02 ENCOUNTER — HOSPITAL ENCOUNTER (OUTPATIENT)
Dept: PHYSICAL THERAPY | Age: 69
Setting detail: THERAPIES SERIES
Discharge: HOME | End: 2024-02-02
Payer: MEDICARE

## 2024-02-02 PROCEDURE — 97110 THERAPEUTIC EXERCISES: CPT

## 2024-02-02 PROCEDURE — 97140 MANUAL THERAPY 1/> REGIONS: CPT

## 2024-02-02 NOTE — FLOWSHEET NOTE
Contract/relax sitting on EOB for extension and flexion  1 10           NMR re-education (75318)  18 minutes resistance Sets/time Reps CUES NEEDED   Stepping over single row of cones       Side stepping, backward, march      3 Forward and backward          Step ups forward; lateral on 4\"  1 10 each With single UE support   Step downs leading with right and stepping back up with left 4\"  1 10 each    Airex balance beam   1 3 Length of beam and back   Therapeutic Activity (68279)  Sets/time                                          Modalities:    None today secondary to pt needing to leave for an appointment    Education/Home Exercise Program: HEP discussed and performed, see exercise grid      ASSESSMENT     Today's Assessment: During therapy this date, patient required verbal cueing, muscle facilitation, and quad control for exercise progression, improving proper muscle recruitment and activation/motor control patterns, increasing ROM, improving soft tissue extensibility, static and dynamic balance, kinesthetic sense and proprioception, and improving postural awareness.Patient will continue to benefit from ongoing evaluation and advanced clinical decision from a Physical Therapist to address and improve pain control, ROM, muscle strength, neuromuscular control, normalization of gait, balance and proprioception, functional mobility, ADL status, and safety awareness to safely return to OF without symptoms or restrictions.     Medical Necessity Documentation:  I certify that this patient meets the below criteria necessary for medical necessity for care and/or justification of therapy services:  The patient has a musculoskeletal condition(s) with a corresponding ICD-10 code that is of complexity and severity that require skilled therapeutic intervention. This has a direct and significant impact on the need for therapy and significantly impacts the rate of recovery.     Treatment/Activity Tolerance:  [x] Patient

## 2024-02-05 NOTE — PROGRESS NOTES
Assessment  1. Nonischemic cardiomyopathy  2. Given worsening cardiomyopathy, suggest Cardiac MRI                - eval for myocarditis or infiltrative cardiomyopathy  3. Will d/c flecainide and dilt given worsening LVEF, pt currently in NSR                - start toprol 25                - start ASA and lisinopril  4. Lasix in post op area                - will make Appt with heart failure team      MRI cardiac: 08/23/19  FINDINGS:  1. The left ventricular size is normal. The left ventricular ejection fraction is 45 % by Melton's method. Global left ventricular function is mildly decreased. There is moderate hypokinesis involving the mid inferior, mid inferoseptal and apical inferior segments. The left ventricular mass is normal.  Delayed systole in the left ventricle relative to the right ventricle and septal bounce suggest incomplete or complete left bundle branch block.  2. Resting first pass myocardial perfusion is probably normal (assessment limited by wrap artifact). There is no late gadolinium enhancement to suggest prior infarct, inflammation, or infiltration. There is no evidence of myocardial edema by T2 weighted imaging (myocardium/skeletal muscle 159/108, normal < 1.9). There is no evidence of increased iron deposition within the myocardium (T2*myocardium = 53 msec; if < 20 msec, suggestive of iron-overloading of the myocardium).  3. The right ventricular size is normal. Global right ventricular function is normal. There are no regional wall motion abnormalities of the right ventricular wall. There is late gadolinium enhancement of the right ventricular insertion points.  4. Left atrial size is mildly enlarged. Right atrial size is normal.  The Qp/Qs is 1.0 by phase contrast imaging and is consistent with no evidence of shunt.  There is late gadolinium enhancement involving the RV insertion points.  5. Velocity-encoded phase contrast Imaging in (2D/4D) was performed to assess valvular function. There

## 2024-02-06 ENCOUNTER — OFFICE VISIT (OUTPATIENT)
Dept: CARDIOLOGY CLINIC | Age: 69
End: 2024-02-06
Payer: MEDICARE

## 2024-02-06 ENCOUNTER — OFFICE VISIT (OUTPATIENT)
Dept: CARDIOLOGY CLINIC | Age: 69
End: 2024-02-06

## 2024-02-06 VITALS
HEART RATE: 60 BPM | DIASTOLIC BLOOD PRESSURE: 60 MMHG | SYSTOLIC BLOOD PRESSURE: 110 MMHG | HEIGHT: 66 IN | OXYGEN SATURATION: 94 % | BODY MASS INDEX: 43.79 KG/M2 | WEIGHT: 272.5 LBS

## 2024-02-06 VITALS
OXYGEN SATURATION: 96 % | BODY MASS INDEX: 43.71 KG/M2 | SYSTOLIC BLOOD PRESSURE: 112 MMHG | WEIGHT: 272 LBS | DIASTOLIC BLOOD PRESSURE: 62 MMHG | HEART RATE: 75 BPM | HEIGHT: 66 IN

## 2024-02-06 DIAGNOSIS — I50.22 CHRONIC SYSTOLIC HEART FAILURE (HCC): Primary | ICD-10-CM

## 2024-02-06 DIAGNOSIS — I42.8 NON-ISCHEMIC CARDIOMYOPATHY (HCC): ICD-10-CM

## 2024-02-06 DIAGNOSIS — I48.0 PAF (PAROXYSMAL ATRIAL FIBRILLATION) (HCC): ICD-10-CM

## 2024-02-06 DIAGNOSIS — I10 ESSENTIAL HYPERTENSION: ICD-10-CM

## 2024-02-06 DIAGNOSIS — I48.0 PAROXYSMAL ATRIAL FIBRILLATION (HCC): Primary | ICD-10-CM

## 2024-02-06 DIAGNOSIS — I49.3 PVC (PREMATURE VENTRICULAR CONTRACTION): ICD-10-CM

## 2024-02-06 DIAGNOSIS — I70.0 ATHEROSCLEROSIS OF AORTA (HCC): ICD-10-CM

## 2024-02-06 PROCEDURE — 1036F TOBACCO NON-USER: CPT | Performed by: NURSE PRACTITIONER

## 2024-02-06 PROCEDURE — G8484 FLU IMMUNIZE NO ADMIN: HCPCS | Performed by: NURSE PRACTITIONER

## 2024-02-06 PROCEDURE — 99214 OFFICE O/P EST MOD 30 MIN: CPT | Performed by: NURSE PRACTITIONER

## 2024-02-06 PROCEDURE — G8399 PT W/DXA RESULTS DOCUMENT: HCPCS | Performed by: NURSE PRACTITIONER

## 2024-02-06 PROCEDURE — G8417 CALC BMI ABV UP PARAM F/U: HCPCS | Performed by: NURSE PRACTITIONER

## 2024-02-06 PROCEDURE — 3078F DIAST BP <80 MM HG: CPT | Performed by: NURSE PRACTITIONER

## 2024-02-06 PROCEDURE — G8427 DOCREV CUR MEDS BY ELIG CLIN: HCPCS | Performed by: NURSE PRACTITIONER

## 2024-02-06 PROCEDURE — 3017F COLORECTAL CA SCREEN DOC REV: CPT | Performed by: NURSE PRACTITIONER

## 2024-02-06 PROCEDURE — 3074F SYST BP LT 130 MM HG: CPT | Performed by: NURSE PRACTITIONER

## 2024-02-06 PROCEDURE — 1124F ACP DISCUSS-NO DSCNMKR DOCD: CPT | Performed by: NURSE PRACTITIONER

## 2024-02-06 PROCEDURE — 1090F PRES/ABSN URINE INCON ASSESS: CPT | Performed by: NURSE PRACTITIONER

## 2024-02-06 RX ORDER — SPIRONOLACTONE 25 MG/1
TABLET ORAL
Qty: 90 TABLET | Refills: 3 | Status: SHIPPED | OUTPATIENT
Start: 2024-02-06

## 2024-02-06 RX ORDER — VALSARTAN 40 MG/1
TABLET ORAL
Qty: 90 TABLET | Refills: 3 | Status: SHIPPED | OUTPATIENT
Start: 2024-02-06

## 2024-02-06 NOTE — PATIENT INSTRUCTIONS
No changes today     Monitor BP and HR at home and call if consistently out of goal ranges    Call me with any heart rhythm concerns or changes    Follow up in 6 months or sooner if needed

## 2024-02-06 NOTE — PROGRESS NOTES
regurgitation.   Systolic pulmonary artery pressure (SPAP) is normal and estimated at 26 mmHg   (right atrial pressure 3 mmHg).   Frequent premature beats throughout the study.     Echo 7/2019:      Summary   Left ventricular systolic function is reduced with ejection fraction   estimated at 48-50 %.   Mild global hypokinesis is present.   There is mild concentric left ventricular hypertrophy.   Normal left ventricular diastolic filling pressure.   Mild mitral regurgitation.   Aortic sclerosis vs mild aortic stenosis.   Mild aortic regurgitation is present.   Normal systolic pulmonary artery pressure (SPAP) estimated at 29 mmHg (RA   pressure 8 mmHg).        Cardiac MRI 8/23/2019 ():  Findings are most consistent with a nonischemic cardiomyopathy.  There is no evidence of left ventricular scarring, edema or iron overload. There is evidence of incomplete or complete left bundle branch block. Regional wall motion abnormalities involving are likely secondary to underlying conduction disease; however, ischemic heart disease cannot be excluded.    The right ventricle is normal in size and systolic function. There is late gadolinium enhancement of the RV insertion points suggestive of elevated right heart pressures.  The left atrium is mildly dilated. There is mild mitral regurgitation  The right atrium is normal in size.  There is late gadolinium enhancement involving both atria.  There is mild aortic stenosis and regurgitation.  The descending aorta is dilated there is evidence of associated atherosclerosis.     [facility] 8/2/2019 ([place]):  LM: luminals  LAD: mid luminals, distal vessel small  LCX: luminals  RCA: dominant, very large     LVEDP: 18  LVEF: 25-30% global hypokinesis     Assessment  1. Nonischemic cardiomyopathy  2. Given worsening cardiomyopathy, suggest Cardiac MRI                - eval for myocarditis or infiltrative cardiomyopathy  3. Will d/c flecainide and dilt given worsening LVEF, pt currently in NSR

## 2024-02-06 NOTE — PATIENT INSTRUCTIONS
Your provider has ordered testing for further evaluation.  An order/prescription has been included in your paper work.   To schedule outpatient testing, contact Central Scheduling by calling [employer] ([phone]).  Plan:  1. Continue current cardiac medications  2. Labs: fasting lipids, bmp,  3. Echo in 6 months  Follow up with me in 6 months with echo

## 2024-02-07 ENCOUNTER — HOSPITAL ENCOUNTER (OUTPATIENT)
Dept: PHYSICAL THERAPY | Age: 69
Setting detail: THERAPIES SERIES
Discharge: HOME | End: 2024-02-07
Payer: MEDICARE

## 2024-02-07 PROCEDURE — 97110 THERAPEUTIC EXERCISES: CPT

## 2024-02-07 PROCEDURE — 97140 MANUAL THERAPY 1/> REGIONS: CPT

## 2024-02-07 NOTE — PLAN OF CARE
achieved in: 2 weeks  Independent in HEP and progression per patient tolerance, in order to progress toward full function and prevent re-injury.               Status: [] Progressing: [x] Met: [] Not Met: [] Adjusted  Patient will have a decrease in pain to 4/10 to help  facilitate improvement in movement, function, and ADLs as indicated by functional deficits.              Status: [] Progressing: [x] Met: [] Not Met: [] Adjusted     Long Term Goals: To be achieved in: 4-6 weeks  Disability index score of 50% or less for the LEFS to assist with return top prior level of function.                  Status: [] Progressing: [x] Met: [] Not Met: [] Adjusted  Improve  knee AROM  Flexion to 110 degrees or  better to allow for proper joint functioning as indicated by patients functional deficits.  Status: [] Progressing: [x] Met: [] Not Met: [] Adjusted  Pt to improve strength by 2 muscle grades or better of quadriceps and hamstrings to work toward proper functional mobility and improving technique of ADLs.   Status: [] Progressing: [x] Met: [] Not Met: [] Adjusted  Patient will return to Usual work, housework or activities, Usual recreational activities, walking around house, getting in/out of a car, up/down 1 flight of stairs, and sit for length of time without increased symptoms or restriction to work towards return to prior level of function.  Status: [] Progressing: [x] Met 95 % 2-3/10 when first getting up from prolonged sitting : [] Not Met: [] Adjusted  Patient will perform normal ADLs without symptoms 90% of the time                                                                                                                  Status: [] Progressing: [x] Met: [] Not Met: [] Adjusted    Overall Progression Towards Functional goals/ Treatment Progress Update:  [] Patient is progressing as expected towards functional goals listed.    [x] Progression is slowed due to complexities/Impairments listed.  [] Progression has

## 2024-02-08 ENCOUNTER — PATIENT MESSAGE (OUTPATIENT)
Dept: FAMILY MEDICINE CLINIC | Age: 69
End: 2024-02-08

## 2024-02-08 NOTE — TELEPHONE ENCOUNTER
From: Reina Marin  To: Mary Florentino  Sent: 2/8/2024 12:21 AM EST  Subject: Ozempic    Hello, I would consider taking Ozempic for my diabetes and weight loss if you agree. I checked my insurance and my copay will be about $50. If you agree would you please send a prescription to [facility]? Thank You Maira

## 2024-02-15 ENCOUNTER — ANTI-COAG VISIT (OUTPATIENT)
Dept: PHARMACY | Age: 69
End: 2024-02-15
Payer: MEDICARE

## 2024-02-15 DIAGNOSIS — I48.0 PAF (PAROXYSMAL ATRIAL FIBRILLATION) (HCC): Primary | ICD-10-CM

## 2024-02-15 LAB — INTERNATIONAL NORMALIZATION RATIO, POC: 1.9

## 2024-02-15 PROCEDURE — 99211 OFF/OP EST MAY X REQ PHY/QHP: CPT | Performed by: PHARMACIST

## 2024-02-15 PROCEDURE — 85610 PROTHROMBIN TIME: CPT | Performed by: PHARMACIST

## 2024-02-15 NOTE — PROGRESS NOTES
Ms. Maira Marin is here for management of anticoagulation for AFib.   PMH also significant for CHF, HLD, HTN .   She presents today w/out complaint.  Pt verifies dosing regimen as listed above.   Pt denies s/s bleeding/bruising/swelling/SOB.  No BRBPR. No melena.  Address missed doses  Reviewed pt medication list.  No changes in RX/OTCs/Herbal medications.  Reviewed dietary concerns  Address EToH and tobacco use.    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  Will plan for a few months of care in the clinic but can hopefully try to get home monitor approved in the future.    No recent changes per pt.    She thinks she may have missed a few days of taking 7.5 mg and took 5 mg those days instead.  Likely reason for lower INR today.  Documented 7 days of 7.5 mg when it should have been 9 days.    INR 1.9 is slightly below therapeutic range of 2-3. Likely due to dosing error.  Recommend to continue dose of 5 mg daily except 7.5 mg Q Mon/Wed/Fri.  Patient has 5 mg tablets.  Will continue to monitor and check INR in 4 weeks.  Dosing reminder card given with phone number, appointment date and time.   Return to clinic: 3/14/24 @ 2:30 pm    Fan Strong, PharmD 2:31 PM EST 2/15/24    For Pharmacy Admin Tracking Only    Intervention Detail: Adherence Monitorin  Total # of Interventions Recommended: 1  Total # of Interventions Accepted: 1  Time Spent (min): 15

## 2024-03-04 ENCOUNTER — PATIENT MESSAGE (OUTPATIENT)
Dept: FAMILY MEDICINE CLINIC | Age: 69
End: 2024-03-04

## 2024-03-04 ENCOUNTER — OFFICE VISIT (OUTPATIENT)
Dept: FAMILY MEDICINE CLINIC | Age: 69
End: 2024-03-04
Payer: MEDICARE

## 2024-03-04 VITALS
HEART RATE: 97 BPM | BODY MASS INDEX: 41.97 KG/M2 | WEIGHT: 260 LBS | OXYGEN SATURATION: 95 % | DIASTOLIC BLOOD PRESSURE: 80 MMHG | SYSTOLIC BLOOD PRESSURE: 110 MMHG | TEMPERATURE: 100.4 F

## 2024-03-04 DIAGNOSIS — J01.90 ACUTE BACTERIAL SINUSITIS: Primary | ICD-10-CM

## 2024-03-04 DIAGNOSIS — B96.89 ACUTE BACTERIAL SINUSITIS: Primary | ICD-10-CM

## 2024-03-04 PROCEDURE — 1036F TOBACCO NON-USER: CPT | Performed by: PHYSICIAN ASSISTANT

## 2024-03-04 PROCEDURE — 1090F PRES/ABSN URINE INCON ASSESS: CPT | Performed by: PHYSICIAN ASSISTANT

## 2024-03-04 PROCEDURE — 3017F COLORECTAL CA SCREEN DOC REV: CPT | Performed by: PHYSICIAN ASSISTANT

## 2024-03-04 PROCEDURE — 99213 OFFICE O/P EST LOW 20 MIN: CPT | Performed by: PHYSICIAN ASSISTANT

## 2024-03-04 PROCEDURE — 3079F DIAST BP 80-89 MM HG: CPT | Performed by: PHYSICIAN ASSISTANT

## 2024-03-04 PROCEDURE — G8427 DOCREV CUR MEDS BY ELIG CLIN: HCPCS | Performed by: PHYSICIAN ASSISTANT

## 2024-03-04 PROCEDURE — 1124F ACP DISCUSS-NO DSCNMKR DOCD: CPT | Performed by: PHYSICIAN ASSISTANT

## 2024-03-04 PROCEDURE — G8484 FLU IMMUNIZE NO ADMIN: HCPCS | Performed by: PHYSICIAN ASSISTANT

## 2024-03-04 PROCEDURE — G8417 CALC BMI ABV UP PARAM F/U: HCPCS | Performed by: PHYSICIAN ASSISTANT

## 2024-03-04 PROCEDURE — G8399 PT W/DXA RESULTS DOCUMENT: HCPCS | Performed by: PHYSICIAN ASSISTANT

## 2024-03-04 PROCEDURE — 3074F SYST BP LT 130 MM HG: CPT | Performed by: PHYSICIAN ASSISTANT

## 2024-03-04 RX ORDER — AMOXICILLIN AND CLAVULANATE POTASSIUM 875; 125 MG/1; MG/1
1 TABLET, FILM COATED ORAL 2 TIMES DAILY
Qty: 20 TABLET | Refills: 0 | Status: SHIPPED | OUTPATIENT
Start: 2024-03-04 | End: 2024-03-14

## 2024-03-04 ASSESSMENT — ENCOUNTER SYMPTOMS
COUGH: 1
VOMITING: 0
RHINORRHEA: 1
FACIAL SWELLING: 0
NAUSEA: 0
DIARRHEA: 0
SORE THROAT: 0
SINUS PRESSURE: 1

## 2024-03-04 ASSESSMENT — PATIENT HEALTH QUESTIONNAIRE - PHQ9
SUM OF ALL RESPONSES TO PHQ QUESTIONS 1-9: 0
6. FEELING BAD ABOUT YOURSELF - OR THAT YOU ARE A FAILURE OR HAVE LET YOURSELF OR YOUR FAMILY DOWN: 0
2. FEELING DOWN, DEPRESSED OR HOPELESS: 0
7. TROUBLE CONCENTRATING ON THINGS, SUCH AS READING THE NEWSPAPER OR WATCHING TELEVISION: 0
DEPRESSION UNABLE TO ASSESS: FUNCTIONAL CAPACITY MOTIVATION LIMITS ACCURACY
3. TROUBLE FALLING OR STAYING ASLEEP: 0
SUM OF ALL RESPONSES TO PHQ QUESTIONS 1-9: 0
SUM OF ALL RESPONSES TO PHQ QUESTIONS 1-9: 0
1. LITTLE INTEREST OR PLEASURE IN DOING THINGS: 0
SUM OF ALL RESPONSES TO PHQ QUESTIONS 1-9: 0
SUM OF ALL RESPONSES TO PHQ9 QUESTIONS 1 & 2: 0
10. IF YOU CHECKED OFF ANY PROBLEMS, HOW DIFFICULT HAVE THESE PROBLEMS MADE IT FOR YOU TO DO YOUR WORK, TAKE CARE OF THINGS AT HOME, OR GET ALONG WITH OTHER PEOPLE: 0
5. POOR APPETITE OR OVEREATING: 0
9. THOUGHTS THAT YOU WOULD BE BETTER OFF DEAD, OR OF HURTING YOURSELF: 0
4. FEELING TIRED OR HAVING LITTLE ENERGY: 0
8. MOVING OR SPEAKING SO SLOWLY THAT OTHER PEOPLE COULD HAVE NOTICED. OR THE OPPOSITE, BEING SO FIGETY OR RESTLESS THAT YOU HAVE BEEN MOVING AROUND A LOT MORE THAN USUAL: 0

## 2024-03-04 NOTE — PROGRESS NOTES
Reina Marin (:  1955) is a 68 y.o. female,Established patient, here for evaluation of the following chief complaint(s):  Anorexia, Cough (Symptoms started 24 ), Chills (Just twice, states its better now ), Other (Pressure on the right side of face), and Sinusitis         ASSESSMENT/PLAN:  1. Acute bacterial sinusitis  -     amoxicillin-clavulanate (AUGMENTIN) 875-125 MG per tablet; Take 1 tablet by mouth 2 times daily for 10 days, Disp-20 tablet, R-0Normal        -    discussed otc medications that can be used for symptom relief        Subjective   SUBJECTIVE/OBJECTIVE:  HPI  URI  Timin24  Please see ROS  Sick contacts: is also sick  Tx: nyquil and dayquil    Review of Systems   Constitutional:  Positive for appetite change.   HENT:  Positive for congestion, ear pain, hearing loss, rhinorrhea and sinus pressure. Negative for ear discharge, facial swelling and sore throat.         Small amount of blood in drainage   Respiratory:  Positive for cough.    Gastrointestinal:  Negative for diarrhea, nausea and vomiting.   Hematological:  Positive for adenopathy.          Objective   Physical Exam  Vitals reviewed.   Constitutional:       Appearance: Normal appearance.   HENT:      Head: Normocephalic and atraumatic.      Right Ear: Hearing and ear canal normal. Tympanic membrane is bulging.      Left Ear: Hearing, tympanic membrane and ear canal normal.      Nose: Nose normal.      Mouth/Throat:      Lips: Pink.      Mouth: Mucous membranes are moist.      Pharynx: Posterior oropharyngeal erythema present.   Pulmonary:      Effort: Pulmonary effort is normal.      Breath sounds: Normal breath sounds. No wheezing.   Neurological:      Mental Status: She is alert.                  An electronic signature was used to authenticate this note.    --KADEN Shah

## 2024-03-04 NOTE — TELEPHONE ENCOUNTER
From: Reina Marin  To: Marynisha Florentino  Sent: 3/4/2024 8:39 AM EST  Subject: Covid/Flu    Hi Mary, I've been down with a bug since Feb 20th. I'm at the end finally but the right side of my face including my ear is painful as of yesterday. I have been taking Nyquil and Dayquil and yesterday took acetaminophen for the pain in my face. What do you suggest at this point? Thanks Maira

## 2024-03-06 ENCOUNTER — PATIENT MESSAGE (OUTPATIENT)
Dept: FAMILY MEDICINE CLINIC | Age: 69
End: 2024-03-06

## 2024-03-06 DIAGNOSIS — I50.22 CHRONIC SYSTOLIC HEART FAILURE (HCC): ICD-10-CM

## 2024-03-06 DIAGNOSIS — I10 ESSENTIAL HYPERTENSION: ICD-10-CM

## 2024-03-06 DIAGNOSIS — I48.0 PAF (PAROXYSMAL ATRIAL FIBRILLATION) (HCC): ICD-10-CM

## 2024-03-06 DIAGNOSIS — I42.8 NON-ISCHEMIC CARDIOMYOPATHY (HCC): ICD-10-CM

## 2024-03-06 RX ORDER — LEVOTHYROXINE SODIUM 137 UG/1
TABLET ORAL
Qty: 90 TABLET | Refills: 1 | Status: SHIPPED | OUTPATIENT
Start: 2024-03-06

## 2024-03-06 RX ORDER — WARFARIN SODIUM 5 MG/1
5 TABLET ORAL DAILY
Qty: 90 TABLET | Refills: 0 | Status: SHIPPED | OUTPATIENT
Start: 2024-03-06

## 2024-03-06 RX ORDER — OXYBUTYNIN CHLORIDE 15 MG/1
15 TABLET, EXTENDED RELEASE ORAL DAILY
Qty: 90 TABLET | Refills: 1 | Status: SHIPPED | OUTPATIENT
Start: 2024-03-06

## 2024-03-06 RX ORDER — MONTELUKAST SODIUM 10 MG/1
10 TABLET ORAL NIGHTLY
Qty: 90 TABLET | Refills: 1 | Status: SHIPPED | OUTPATIENT
Start: 2024-03-06

## 2024-03-06 RX ORDER — CITALOPRAM 40 MG/1
40 TABLET ORAL DAILY
Qty: 90 TABLET | Refills: 1 | Status: SHIPPED | OUTPATIENT
Start: 2024-03-06

## 2024-03-06 NOTE — TELEPHONE ENCOUNTER
Refill Request     CONFIRM preferred pharmacy with the patient.    If Mail Order Rx - Pend for 90 day refill.      Last Seen: Last Seen Department: 3/4/2024  Last Seen by PCP: 3/4/2024    Last Written: 4/21/2023 #90 1 refill    If no future appointment scheduled:  Review the last OV with PCP and review information for follow-up visit,  Route STAFF MESSAGE with patient name to the  Pool for scheduling with the following information:            -  Timing of next visit           -  Visit type ie Physical, OV, etc           -  Diagnoses/Reason ie. COPD, HTN - Do not use MEDICATION, Follow-up or CHECK UP - Give reason for visit      Next Appointment:   Future Appointments   Date Time Provider Department Center   3/14/2024 11:30 AM [facility] [place] [place]   4/22/2024 11:30 AM Mary Florentino PA [facility]  [place] [facility]   5/31/2024 11:40 AM Josy Quiroga APRN - CNP [facility]       Message sent to  to schedule appt with patient?  NO      Requested Prescriptions     Pending Prescriptions Disp Refills    oxyBUTYnin (DITROPAN XL) 15 MG extended release tablet 90 tablet 1     Sig: Take 1 tablet by mouth daily

## 2024-03-06 NOTE — TELEPHONE ENCOUNTER
From: Reina Marin  To: Mary Florentino  Sent: 3/6/2024 11:18 AM EST  Subject: Refill for Oxybutinin    I sent refill request to you but for the Oxybutinin it says I refill through MyChart.  Please send a new prescription to [facility] like the other refills. My face still hurts but it is getting better. Thank you.

## 2024-03-06 NOTE — TELEPHONE ENCOUNTER
.Refill Request     CONFIRM preferred pharmacy with the patient.    If Mail Order Rx - Pend for 90 day refill.      Last Seen: Last Seen Department: 3/4/2024  Last Seen by PCP: 3/4/2024    Last Written: 10-4-23 90 with 1     If no future appointment scheduled:  Review the last OV with PCP and review information for follow-up visit,  Route STAFF MESSAGE with patient name to the  Pool for scheduling with the following information:            -  Timing of next visit           -  Visit type ie Physical, OV, etc           -  Diagnoses/Reason ie. COPD, HTN - Do not use MEDICATION, Follow-up or CHECK UP - Give reason for visit      Next Appointment:   Future Appointments   Date Time Provider Department Center   3/14/2024 11:30 AM [facility] [place]   4/22/2024 11:30 AM Mary Florentino PA [facility]  [place] [facility]   5/31/2024 11:40 AM Josy Quiroga APRN - CNP [facility]       Message sent to  to schedule appt with patient?  NO      Requested Prescriptions     Pending Prescriptions Disp Refills    montelukast (SINGULAIR) 10 MG tablet 90 tablet 1     Sig: Take 1 tablet by mouth nightly    levothyroxine (SYNTHROID) 137 MCG tablet 90 tablet 1     Sig: TAKE 1 TABLET ONCE DAILY    citalopram (CELEXA) 40 MG tablet 90 tablet 1     Sig: Take 1 tablet by mouth daily

## 2024-03-07 RX ORDER — VALSARTAN 40 MG/1
TABLET ORAL
Qty: 90 TABLET | Refills: 3 | Status: SHIPPED | OUTPATIENT
Start: 2024-03-07

## 2024-03-07 RX ORDER — ATORVASTATIN CALCIUM 40 MG/1
TABLET, FILM COATED ORAL
Qty: 90 TABLET | Refills: 3 | Status: SHIPPED | OUTPATIENT
Start: 2024-03-07

## 2024-03-14 ENCOUNTER — ANTI-COAG VISIT (OUTPATIENT)
Dept: PHARMACY | Age: 69
End: 2024-03-14
Payer: MEDICARE

## 2024-03-14 DIAGNOSIS — I48.0 PAF (PAROXYSMAL ATRIAL FIBRILLATION) (HCC): Primary | ICD-10-CM

## 2024-03-14 LAB — INTERNATIONAL NORMALIZATION RATIO, POC: 3.4

## 2024-03-14 PROCEDURE — 85610 PROTHROMBIN TIME: CPT | Performed by: PHARMACIST

## 2024-03-14 PROCEDURE — 99211 OFF/OP EST MAY X REQ PHY/QHP: CPT | Performed by: PHARMACIST

## 2024-03-14 NOTE — PROGRESS NOTES
Ms. Maira Marin is here for management of anticoagulation for AFib.   PMH also significant for CHF, HLD, HTN .   She presents today w/out complaint.  Pt verifies dosing regimen as listed above.   Pt denies s/s bleeding/bruising/swelling/SOB.  No BRBPR. No melena.  Address missed doses  Reviewed pt medication list.  No changes in RX/OTCs/Herbal medications.  Reviewed dietary concerns  Address EToH and tobacco use.    She finished a 10 day course of Augmentin this morning. Likely to be cause for higher INR today.    Plans to start taking supplement with Vit C, D , Zinc, Quercetin and Elderberry. The Quercetin can potentially effect INR. She will start this and we will recheck in 2 weeks.    INR 3.4 is above therapeutic range of 2-3.   Recommend to take 5 mg tomorrow, then continue dose of 5 mg daily except 7.5 mg Q Mon/Wed/Fri.  Patient has 5 mg tablets.  Will continue to monitor and check INR in 2 weeks.  Dosing reminder card given with phone number, appointment date and time.   Return to clinic: 3/28/24 @ 1130 am    Fan Strong, Bogdan 11:33 AM EDT 3/14/24    For Pharmacy Admin Tracking Only    Intervention Detail: Adherence Monitorin and Dose Adjustment: 1, reason: Interaction  Total # of Interventions Recommended: 2  Total # of Interventions Accepted: 2  Time Spent (min): 15

## 2024-03-18 ENCOUNTER — TELEPHONE (OUTPATIENT)
Dept: CARDIOLOGY CLINIC | Age: 69
End: 2024-03-18

## 2024-03-18 NOTE — TELEPHONE ENCOUNTER
Spoke with pt. She says this has been ongoing on and off for the past few years. She denies know triggers to the episodes of vomiting. Please advise     JUAN LINDSAY 2/6/2024  Next NATALIE LINDSAY and дмитрий 8/20/2024

## 2024-03-18 NOTE — TELEPHONE ENCOUNTER
Pt contacted office and just wanted to inform NEFTALI that this past week she has been experiencing episodes where her mouth will water and she immediatly has to vomit. Pt also states her sister had ascending aorta aneurism before passing, and experienced that same symptoms. Pt curious on what it is. Please advise.

## 2024-03-18 NOTE — TELEPHONE ENCOUNTER
I think she should reach out to her pcp about these symptoms.  Any changes in her vital signs?  Bp? Pulse? Any chest discomfort?    These are not classic signs of an aneurysm.  To evaluate aortic aneurysm, could start with a chest xray, but am not sure what diagnosis we can use to cover an xray.  NEFTALI

## 2024-03-27 ENCOUNTER — PATIENT MESSAGE (OUTPATIENT)
Dept: FAMILY MEDICINE CLINIC | Age: 69
End: 2024-03-27

## 2024-03-28 ENCOUNTER — ANTI-COAG VISIT (OUTPATIENT)
Dept: PHARMACY | Age: 69
End: 2024-03-28
Payer: MEDICARE

## 2024-03-28 DIAGNOSIS — I48.0 PAF (PAROXYSMAL ATRIAL FIBRILLATION) (HCC): Primary | ICD-10-CM

## 2024-03-28 LAB — INTERNATIONAL NORMALIZATION RATIO, POC: 1.9

## 2024-03-28 PROCEDURE — 99211 OFF/OP EST MAY X REQ PHY/QHP: CPT | Performed by: PHARMACIST

## 2024-03-28 PROCEDURE — 85610 PROTHROMBIN TIME: CPT | Performed by: PHARMACIST

## 2024-03-28 NOTE — PROGRESS NOTES
Ms. Maira Marin is here for management of anticoagulation for AFib.   PMH also significant for CHF, HLD, HTN .   She presents today w/out complaint.  Pt verifies dosing regimen as listed above.   Pt denies s/s bleeding/bruising/swelling/SOB.  No BRBPR. No melena.  Address missed doses  Reviewed pt medication list.  No changes in RX/OTCs/Herbal medications.  Reviewed dietary concerns  Address EToH and tobacco use.    She took 5 mg daily for one week after being seen last time due to worry about higher INR and bruising. Has resumed planned dose for past 7 days.    INR slightly low but expect should come up with being on planned dose    INR 1.9 is slightly below therapeutic range of 2-3.   Recommend to continue dose of 5 mg daily except 7.5 mg Q Mon/Wed/Fri.  Patient has 5 mg tablets.  Will continue to monitor and check INR in 3 weeks.  Dosing reminder card given with phone number, appointment date and time.   Return to clinic: 24 @ 2:30 pm    Fan Strong PharmD 11:24 AM EDT 3/28/24    For Pharmacy Admin Tracking Only    Intervention Detail: Adherence Monitorin  Total # of Interventions Recommended: 1  Total # of Interventions Accepted: 1  Time Spent (min): 15

## 2024-04-20 SDOH — HEALTH STABILITY: PHYSICAL HEALTH: ON AVERAGE, HOW MANY MINUTES DO YOU ENGAGE IN EXERCISE AT THIS LEVEL?: 10 MIN

## 2024-04-20 SDOH — HEALTH STABILITY: PHYSICAL HEALTH: ON AVERAGE, HOW MANY DAYS PER WEEK DO YOU ENGAGE IN MODERATE TO STRENUOUS EXERCISE (LIKE A BRISK WALK)?: 2 DAYS

## 2024-04-20 ASSESSMENT — PATIENT HEALTH QUESTIONNAIRE - PHQ9
2. FEELING DOWN, DEPRESSED OR HOPELESS: NOT AT ALL
SUM OF ALL RESPONSES TO PHQ9 QUESTIONS 1 & 2: 0
SUM OF ALL RESPONSES TO PHQ QUESTIONS 1-9: 0
1. LITTLE INTEREST OR PLEASURE IN DOING THINGS: NOT AT ALL
SUM OF ALL RESPONSES TO PHQ QUESTIONS 1-9: 0

## 2024-04-20 ASSESSMENT — LIFESTYLE VARIABLES
HOW OFTEN DO YOU HAVE SIX OR MORE DRINKS ON ONE OCCASION: 1
HOW OFTEN DO YOU HAVE A DRINK CONTAINING ALCOHOL: 2
HOW OFTEN DO YOU HAVE A DRINK CONTAINING ALCOHOL: MONTHLY OR LESS
HOW MANY STANDARD DRINKS CONTAINING ALCOHOL DO YOU HAVE ON A TYPICAL DAY: 1
HOW MANY STANDARD DRINKS CONTAINING ALCOHOL DO YOU HAVE ON A TYPICAL DAY: 1 OR 2

## 2024-04-23 ENCOUNTER — OFFICE VISIT (OUTPATIENT)
Dept: FAMILY MEDICINE CLINIC | Age: 69
End: 2024-04-23

## 2024-04-23 ENCOUNTER — ANTI-COAG VISIT (OUTPATIENT)
Dept: PHARMACY | Age: 69
End: 2024-04-23
Payer: MEDICARE

## 2024-04-23 VITALS
DIASTOLIC BLOOD PRESSURE: 64 MMHG | SYSTOLIC BLOOD PRESSURE: 130 MMHG | WEIGHT: 266.4 LBS | HEIGHT: 66 IN | BODY MASS INDEX: 42.81 KG/M2 | OXYGEN SATURATION: 97 % | HEART RATE: 72 BPM

## 2024-04-23 DIAGNOSIS — Z00.00 MEDICARE ANNUAL WELLNESS VISIT, SUBSEQUENT: Primary | ICD-10-CM

## 2024-04-23 DIAGNOSIS — E03.9 ACQUIRED HYPOTHYROIDISM: Chronic | ICD-10-CM

## 2024-04-23 DIAGNOSIS — E66.01 OBESITY, MORBID, BMI 40.0-49.9 (HCC): ICD-10-CM

## 2024-04-23 DIAGNOSIS — I48.0 PAF (PAROXYSMAL ATRIAL FIBRILLATION) (HCC): Primary | ICD-10-CM

## 2024-04-23 DIAGNOSIS — N32.81 OAB (OVERACTIVE BLADDER): ICD-10-CM

## 2024-04-23 DIAGNOSIS — E11.9 TYPE 2 DIABETES MELLITUS WITHOUT COMPLICATION, WITHOUT LONG-TERM CURRENT USE OF INSULIN (HCC): ICD-10-CM

## 2024-04-23 DIAGNOSIS — K21.9 CHRONIC GERD: ICD-10-CM

## 2024-04-23 DIAGNOSIS — F33.0 MILD EPISODE OF RECURRENT MAJOR DEPRESSIVE DISORDER (HCC): ICD-10-CM

## 2024-04-23 PROBLEM — E11.22 TYPE 2 DIABETES MELLITUS WITH CHRONIC KIDNEY DISEASE (HCC): Status: RESOLVED | Noted: 2024-04-23 | Resolved: 2024-04-23

## 2024-04-23 PROBLEM — E11.22 TYPE 2 DIABETES MELLITUS WITH CHRONIC KIDNEY DISEASE (HCC): Status: ACTIVE | Noted: 2024-04-23

## 2024-04-23 LAB
CREATININE URINE POCT: NORMAL
HBA1C MFR BLD: 6.1 %
INTERNATIONAL NORMALIZATION RATIO, POC: 3.9
MICROALBUMIN/CREAT 24H UR: NORMAL MG/G{CREAT}
MICROALBUMIN/CREAT UR-RTO: NORMAL

## 2024-04-23 PROCEDURE — 99211 OFF/OP EST MAY X REQ PHY/QHP: CPT | Performed by: PHARMACIST

## 2024-04-23 PROCEDURE — 85610 PROTHROMBIN TIME: CPT | Performed by: PHARMACIST

## 2024-04-23 RX ORDER — ZINC GLUCONATE 50 MG
50 TABLET ORAL DAILY
COMMUNITY

## 2024-04-23 RX ORDER — ASCORBIC ACID 500 MG
500 TABLET ORAL DAILY
COMMUNITY

## 2024-04-23 ASSESSMENT — ANXIETY QUESTIONNAIRES
7. FEELING AFRAID AS IF SOMETHING AWFUL MIGHT HAPPEN: NOT AT ALL
3. WORRYING TOO MUCH ABOUT DIFFERENT THINGS: NOT AT ALL
1. FEELING NERVOUS, ANXIOUS, OR ON EDGE: NOT AT ALL
GAD7 TOTAL SCORE: 0
IF YOU CHECKED OFF ANY PROBLEMS ON THIS QUESTIONNAIRE, HOW DIFFICULT HAVE THESE PROBLEMS MADE IT FOR YOU TO DO YOUR WORK, TAKE CARE OF THINGS AT HOME, OR GET ALONG WITH OTHER PEOPLE: NOT DIFFICULT AT ALL
2. NOT BEING ABLE TO STOP OR CONTROL WORRYING: NOT AT ALL
6. BECOMING EASILY ANNOYED OR IRRITABLE: NOT AT ALL
4. TROUBLE RELAXING: NOT AT ALL
5. BEING SO RESTLESS THAT IT IS HARD TO SIT STILL: NOT AT ALL

## 2024-04-23 ASSESSMENT — PATIENT HEALTH QUESTIONNAIRE - PHQ9
4. FEELING TIRED OR HAVING LITTLE ENERGY: NOT AT ALL
10. IF YOU CHECKED OFF ANY PROBLEMS, HOW DIFFICULT HAVE THESE PROBLEMS MADE IT FOR YOU TO DO YOUR WORK, TAKE CARE OF THINGS AT HOME, OR GET ALONG WITH OTHER PEOPLE: NOT DIFFICULT AT ALL
5. POOR APPETITE OR OVEREATING: NOT AT ALL
2. FEELING DOWN, DEPRESSED OR HOPELESS: NOT AT ALL
SUM OF ALL RESPONSES TO PHQ QUESTIONS 1-9: 0
SUM OF ALL RESPONSES TO PHQ QUESTIONS 1-9: 0
9. THOUGHTS THAT YOU WOULD BE BETTER OFF DEAD, OR OF HURTING YOURSELF: NOT AT ALL
SUM OF ALL RESPONSES TO PHQ QUESTIONS 1-9: 0
SUM OF ALL RESPONSES TO PHQ9 QUESTIONS 1 & 2: 0
8. MOVING OR SPEAKING SO SLOWLY THAT OTHER PEOPLE COULD HAVE NOTICED. OR THE OPPOSITE, BEING SO FIGETY OR RESTLESS THAT YOU HAVE BEEN MOVING AROUND A LOT MORE THAN USUAL: NOT AT ALL
3. TROUBLE FALLING OR STAYING ASLEEP: NOT AT ALL
7. TROUBLE CONCENTRATING ON THINGS, SUCH AS READING THE NEWSPAPER OR WATCHING TELEVISION: NOT AT ALL
SUM OF ALL RESPONSES TO PHQ QUESTIONS 1-9: 0
6. FEELING BAD ABOUT YOURSELF - OR THAT YOU ARE A FAILURE OR HAVE LET YOURSELF OR YOUR FAMILY DOWN: NOT AT ALL
1. LITTLE INTEREST OR PLEASURE IN DOING THINGS: NOT AT ALL
DEPRESSION UNABLE TO ASSESS: FUNCTIONAL CAPACITY MOTIVATION LIMITS ACCURACY

## 2024-04-23 NOTE — PROGRESS NOTES
Ms. Maira Marin is here for management of anticoagulation for AFib.   PMH also significant for CHF, HLD, HTN .   She presents today w/out complaint.  Pt verifies dosing regimen as listed above.   Pt denies s/s bleeding/bruising/swelling/SOB.  No BRBPR. No melena.  Address missed doses  Reviewed pt medication list.  No changes in RX/OTCs/Herbal medications.  Reviewed dietary concerns  Address EToH and tobacco use.    No medication or diet changes per pt.      INR 3.9 is above therapeutic range of 2-3.   Recommend to hold dose today, then decrease dose to 5 mg daily except 7.5 mg Q Mon/Fri.  Patient has 5 mg tablets.  Will continue to monitor and check INR in 3 weeks.  Dosing reminder card given with phone number, appointment date and time.   Return to clinic: 24 @ 1:30 pm    Fan Strong, PharmD 8:26 AM EDT 24    For Pharmacy Admin Tracking Only    Intervention Detail: Adherence Monitorin and Dose Adjustment: 1, reason: Therapy De-escalation  Total # of Interventions Recommended: 2  Total # of Interventions Accepted: 2  Time Spent (min): 15

## 2024-04-23 NOTE — PROGRESS NOTES
Reina Marin (:  1955) is a 68 y.o. female,Established patient, here for evaluation of the following chief complaint(s):  No chief complaint on file.      Assessment & Plan   {There are no diagnoses linked to this encounter. (Refresh or delete this SmartLink)}    No follow-ups on file.       Subjective   HPI        Review of Systems       Objective   Physical Exam       {Time Documentation Optional:[id number]}      An electronic signature was used to authenticate this note.    --KADEN Shah

## 2024-04-23 NOTE — PROGRESS NOTES
Medicare Annual Wellness Visit    Reina Marin is here for Medicare AWV (Discuss weight loss medications and requesting her L ear looked in )    Assessment & Plan   Medicare annual wellness visit, subsequent       -   reviewed RSV vaccine and mammogram, she would like to do every two years on her mammogram due to radiation exposure.  Type 2 diabetes mellitus without complication, without long-term current use of insulin (HCC)  -     Diabetic Foot Exam: normal  -     POCT microalbumin  -     POCT glycosylated hemoglobin (Hb A1C)  Mild episode of recurrent major depressive disorder (HCC)      -   well controlled, will continue with celexa, follow up in one year  Acquired hypothyroidism  -     TSH; Future  -     T4, Free; Future  -      adjust dose of thyroid medication when lab work returns, follow up in on eyear  Obesity, morbid, BMI 40.0-49.9 ([facility])       -   work on increasing physical activity and strength training, increase fiber and protein, follow up in 6 months  Chronic GERD       -   stable, continue with prilosec, follow up in one year  OAB (overactive bladder)       -   well controlled, will continue with oxybutnin, has seen specialist, will follow up in one year or sooner if needed    Recommendations for Preventive Services Due: see orders and patient instructions/AVS.  Recommended screening schedule for the next 5-10 years is provided to the patient in written form: see Patient Instructions/AVS.     No follow-ups on file.     Subjective   The following acute and/or chronic problems were also addressed today:  Hypothyroidism:  Current medication: levothyroxine  Side effects: none  S/s of abnormal thyroid: fatigue     Allergies:  Current medication: Singulair  Side effects: none     Mood:  Current medication: celexa 40 mg  Side effects: none  Residual symptoms: none  Denies: SI/HI, irritability     GERD:  Current medication: prilosec  Side effects: none  Residual symptoms:none  Denies: dysphagia,

## 2024-05-14 ENCOUNTER — ANTI-COAG VISIT (OUTPATIENT)
Dept: PHARMACY | Age: 69
End: 2024-05-14
Payer: MEDICARE

## 2024-05-14 DIAGNOSIS — I48.0 PAF (PAROXYSMAL ATRIAL FIBRILLATION) (HCC): Primary | ICD-10-CM

## 2024-05-14 LAB — INTERNATIONAL NORMALIZATION RATIO, POC: 2.2

## 2024-05-14 PROCEDURE — 99211 OFF/OP EST MAY X REQ PHY/QHP: CPT | Performed by: PHARMACIST

## 2024-05-14 PROCEDURE — 85610 PROTHROMBIN TIME: CPT | Performed by: PHARMACIST

## 2024-05-14 NOTE — PROGRESS NOTES
Ms. Maira Marin is here for management of anticoagulation for AFib.   PMH also significant for CHF, HLD, HTN .   She presents today w/out complaint.  Pt verifies dosing regimen as listed above.   Pt denies s/s bleeding/bruising/swelling/SOB.  No BRBPR. No melena.  Address missed doses  Reviewed pt medication list.  No changes in RX/OTCs/Herbal medications.  Reviewed dietary concerns  Address EToH and tobacco use.    No medication or diet changes per pt.    Missed dose yesterday.    INR 2.2 is within therapeutic range of 2-3.   Recommend to continue dose of 5 mg daily except 7.5 mg Q Mon/Fri.  Patient has 5 mg tablets.  Will continue to monitor and check INR in 4 weeks.  Dosing reminder card given with phone number, appointment date and time.   Return to clinic: 24 @ 1100 am    Fan Strong, PharmD 1:34 PM EDT 24    For Pharmacy Admin Tracking Only    Intervention Detail: Adherence Monitorin  Total # of Interventions Recommended: 1  Total # of Interventions Accepted: 1  Time Spent (min): 10

## 2024-05-31 ENCOUNTER — TELEMEDICINE (OUTPATIENT)
Dept: PULMONOLOGY | Age: 69
End: 2024-05-31
Payer: MEDICARE

## 2024-05-31 ENCOUNTER — TELEPHONE (OUTPATIENT)
Dept: PULMONOLOGY | Age: 69
End: 2024-05-31

## 2024-05-31 DIAGNOSIS — E66.01 OBESITY, MORBID, BMI 40.0-49.9 (HCC): ICD-10-CM

## 2024-05-31 DIAGNOSIS — Z71.89 CPAP USE COUNSELING: ICD-10-CM

## 2024-05-31 DIAGNOSIS — G47.33 SEVERE OBSTRUCTIVE SLEEP APNEA: Primary | Chronic | ICD-10-CM

## 2024-05-31 DIAGNOSIS — I10 PRIMARY HYPERTENSION: ICD-10-CM

## 2024-05-31 PROCEDURE — 99214 OFFICE O/P EST MOD 30 MIN: CPT | Performed by: NURSE PRACTITIONER

## 2024-05-31 PROCEDURE — G8427 DOCREV CUR MEDS BY ELIG CLIN: HCPCS | Performed by: NURSE PRACTITIONER

## 2024-05-31 PROCEDURE — G8399 PT W/DXA RESULTS DOCUMENT: HCPCS | Performed by: NURSE PRACTITIONER

## 2024-05-31 PROCEDURE — 1124F ACP DISCUSS-NO DSCNMKR DOCD: CPT | Performed by: NURSE PRACTITIONER

## 2024-05-31 PROCEDURE — 1090F PRES/ABSN URINE INCON ASSESS: CPT | Performed by: NURSE PRACTITIONER

## 2024-05-31 PROCEDURE — 3017F COLORECTAL CA SCREEN DOC REV: CPT | Performed by: NURSE PRACTITIONER

## 2024-05-31 ASSESSMENT — SLEEP AND FATIGUE QUESTIONNAIRES
HOW LIKELY ARE YOU TO NOD OFF OR FALL ASLEEP WHILE SITTING AND TALKING TO SOMEONE: WOULD NEVER DOZE
HOW LIKELY ARE YOU TO NOD OFF OR FALL ASLEEP IN A CAR, WHILE STOPPED FOR A FEW MINUTES IN TRAFFIC: WOULD NEVER DOZE
HOW LIKELY ARE YOU TO NOD OFF OR FALL ASLEEP WHILE WATCHING TV: SLIGHT CHANCE OF DOZING
HOW LIKELY ARE YOU TO NOD OFF OR FALL ASLEEP WHILE SITTING QUIETLY AFTER LUNCH WITHOUT ALCOHOL: WOULD NEVER DOZE
HOW LIKELY ARE YOU TO NOD OFF OR FALL ASLEEP WHILE LYING DOWN TO REST IN THE AFTERNOON WHEN CIRCUMSTANCES PERMIT: HIGH CHANCE OF DOZING
HOW LIKELY ARE YOU TO NOD OFF OR FALL ASLEEP WHILE SITTING INACTIVE IN A PUBLIC PLACE: WOULD NEVER DOZE
HOW LIKELY ARE YOU TO NOD OFF OR FALL ASLEEP WHILE SITTING AND READING: WOULD NEVER DOZE
HOW LIKELY ARE YOU TO NOD OFF OR FALL ASLEEP WHEN YOU ARE A PASSENGER IN A CAR FOR AN HOUR WITHOUT A BREAK: WOULD NEVER DOZE
ESS TOTAL SCORE: 4

## 2024-05-31 NOTE — TELEPHONE ENCOUNTER
Faxed nasal mask order, CR, and ov notes to [facility] at [phone] via [employer].

## 2024-05-31 NOTE — PROGRESS NOTES
4/19/21 to 5/18/21 reviewed today by me and showed patient is using machine 9:11 hrs/night with 100% compliance and AHI 6.3 within this time frame.  30/30days with greater than 4 hours of machine use.  90% pressure 18.6 cm H20 on auto CPAP  16-20 cm H2O    Compliance download report from 4/15/22 to 5/14/22 reviewed today by me and showed patient is using machine 9:34 hrs/night with 100% compliance and AHI 2.4 within this time frame.  30/30days with greater than 4 hours of machine use.  90% pressure 17 cm H20 on auto CPAP 16-20 cm H2O     Compliance download report from 4/15/22 to 5/14/22 reviewed today by me and showed patient is using machine 9:34 hrs/night with 100% compliance and AHI 2.4 within this time frame.  30/30days with greater than 4 hours of machine use.  90% pressure 17 cm H20 on auto CPAP 16-20 cm H2O     Replacement DS:  Compliance download report from 4/22/23 to 5/21/23 reviewed today by me and showed patient is using machine 10:06 hrs/night with 100% compliance and AHI 5.1 within this time frame.  30/30days with greater than 4 hours of machine use.  90% pressure 17.7 cm H20 on auto CPAP  16-20 cm H2O    Compliance download report from 4/29/24 to 5/28/24 reviewed today by me and showed patient is using machine 9:36 hrs/night with 100% compliance and AHI 3.1 within this time frame.  30/30days with greater than 4 hours of machine use.  90% pressure 17.4 cm H20 on auto CPAP 16-20 cm H2O    Assessment:      Severe JINA.  Auto CPAP 16-20 cm H2O. Optimal compliance and efficacy on review today.     Snoring, hypersomnia-resolved on CPAP  Obesity  Fatigue  A. fib and HTN-per Cardiology    Plan:     -Continue Auto CPAP 16-20 cm H2O   -Interpreted and reviewed CPAP download data with patient  - Advised to use CPAP 6-8 hrs at night and during naps.  - Replacement of mask, tubing, head straps every 3-6 months or sooner if damaged.   - Patient instructed to contact [employer] for any mask, tubing or machine

## 2024-06-11 ENCOUNTER — ANTI-COAG VISIT (OUTPATIENT)
Dept: PHARMACY | Age: 69
End: 2024-06-11
Payer: MEDICARE

## 2024-06-11 ENCOUNTER — HOSPITAL ENCOUNTER (OUTPATIENT)
Age: 69
Discharge: HOME | End: 2024-06-11
Payer: MEDICARE

## 2024-06-11 DIAGNOSIS — E03.9 ACQUIRED HYPOTHYROIDISM: Chronic | ICD-10-CM

## 2024-06-11 DIAGNOSIS — I70.0 ATHEROSCLEROSIS OF AORTA (HCC): ICD-10-CM

## 2024-06-11 DIAGNOSIS — I50.22 CHRONIC SYSTOLIC HEART FAILURE (HCC): ICD-10-CM

## 2024-06-11 DIAGNOSIS — I10 ESSENTIAL HYPERTENSION: ICD-10-CM

## 2024-06-11 DIAGNOSIS — I48.0 PAF (PAROXYSMAL ATRIAL FIBRILLATION) (HCC): Primary | ICD-10-CM

## 2024-06-11 LAB
ANION GAP SERPL CALCULATED.3IONS-SCNC: 8 MMOL/L (ref 3–16)
BUN SERPL-MCNC: 18 MG/DL (ref 7–20)
CALCIUM SERPL-MCNC: 9.4 MG/DL (ref 8.3–10.6)
CHLORIDE SERPL-SCNC: 107 MMOL/L (ref 99–110)
CHOLEST SERPL-MCNC: 109 MG/DL (ref 0–199)
CO2 SERPL-SCNC: 27 MMOL/L (ref 21–32)
CREAT SERPL-MCNC: 0.8 MG/DL (ref 0.6–1.2)
GFR SERPLBLD CREATININE-BSD FMLA CKD-EPI: 79 ML/MIN/{1.73_M2}
GLUCOSE SERPL-MCNC: 109 MG/DL (ref 70–99)
INTERNATIONAL NORMALIZATION RATIO, POC: 1.8
LDLC SERPL CALC-MCNC: 51 MG/DL
POTASSIUM SERPL-SCNC: 4.3 MMOL/L (ref 3.5–5.1)
SODIUM SERPL-SCNC: 142 MMOL/L (ref 136–145)
T4 FREE SERPL-MCNC: 1.4 NG/DL (ref 0.9–1.8)
TRIGL SERPL-MCNC: 79 MG/DL (ref 0–150)
TSH SERPL DL<=0.005 MIU/L-ACNC: 3.44 UIU/ML (ref 0.27–4.2)
VLDLC SERPL CALC-MCNC: 16 MG/DL

## 2024-06-11 PROCEDURE — 80048 BASIC METABOLIC PNL TOTAL CA: CPT

## 2024-06-11 PROCEDURE — 84439 ASSAY OF FREE THYROXINE: CPT

## 2024-06-11 PROCEDURE — 99211 OFF/OP EST MAY X REQ PHY/QHP: CPT | Performed by: PHARMACIST

## 2024-06-11 PROCEDURE — 80061 LIPID PANEL: CPT

## 2024-06-11 PROCEDURE — 36415 COLL VENOUS BLD VENIPUNCTURE: CPT

## 2024-06-11 PROCEDURE — 85610 PROTHROMBIN TIME: CPT | Performed by: PHARMACIST

## 2024-06-11 PROCEDURE — 84443 ASSAY THYROID STIM HORMONE: CPT

## 2024-06-11 NOTE — PROGRESS NOTES
Ms. Maira Marin is here for management of anticoagulation for AFib.   PMH also significant for CHF, HLD, HTN .   She presents today w/out complaint.  Pt verifies dosing regimen as listed above.   Pt denies s/s bleeding/bruising/swelling/SOB.  No BRBPR. No melena.  Address missed doses  Reviewed pt medication list.  No changes in RX/OTCs/Herbal medications.  Reviewed dietary concerns  Address EToH and tobacco use.    No medication or diet changes per pt.    Missed taking the additional 2.5 mg yesterday. Thinks she has otherwise taken correctly    INR 1.8 is slightly below therapeutic range of 2-3.   Recommend to take 7.5 mg today, then continue dose of 5 mg daily except 7.5 mg Q Mon/Fri.  Patient has 5 mg tablets.  Will continue to monitor and check INR in 4 weeks.  Dosing reminder card given with phone number, appointment date and time.   Return to clinic: 24 @ 1100 am    Fan Strong PharmD 11:11 AM EDT 24    For Pharmacy Admin Tracking Only    Intervention Detail: Adherence Monitorin  Total # of Interventions Recommended: 1  Total # of Interventions Accepted: 1  Time Spent (min): 10

## 2024-06-24 RX ORDER — OXYBUTYNIN CHLORIDE 15 MG/1
15 TABLET, EXTENDED RELEASE ORAL DAILY
Qty: 90 TABLET | Refills: 1 | Status: SHIPPED | OUTPATIENT
Start: 2024-06-24

## 2024-06-24 NOTE — TELEPHONE ENCOUNTER
Refill Request     CONFIRM preferred pharmacy with the patient.    If Mail Order Rx - Pend for 90 day refill.      Last Seen: Last Seen Department: 4/23/2024  Last Seen by PCP: 4/23/2024    Last Written: 3/6/24 90 tab 1 refills    If no future appointment scheduled:  Review the last OV with PCP and review information for follow-up visit,  Route STAFF MESSAGE with patient name to the  Pool for scheduling with the following information:            -  Timing of next visit           -  Visit type ie Physical, OV, etc           -  Diagnoses/Reason ie. COPD, HTN - Do not use MEDICATION, Follow-up or CHECK UP - Give reason for visit      Next Appointment:   Future Appointments   Date Time Provider Department Center   7/9/2024 11:00 AM [facility] [place] [facility] [place] [place] JENNY Arango [facility]   8/20/2024  9:00 AM KEVIN CARDI ECHO 1 MHAZ AND FRANNIE Foster [facility]   8/20/2024 10:30 AM Gini Pike APRN - CNP [facility]   8/20/2024 11:30 AM Brea Leos MD [facility]   10/23/2024  9:30 AM Mary Florentino PA [facility]   5/30/2025 11:40 AM Quiroga, Josy, APRN - CNP [facility]       Message sent to  to schedule appt with patient?  NO      Requested Prescriptions     Pending Prescriptions Disp Refills    oxyBUTYnin (DITROPAN XL) 15 MG extended release tablet 90 tablet 1     Sig: Take 1 tablet by mouth daily

## 2024-07-05 DIAGNOSIS — I48.0 PAF (PAROXYSMAL ATRIAL FIBRILLATION) (HCC): ICD-10-CM

## 2024-07-08 RX ORDER — DOFETILIDE 0.5 MG/1
500 CAPSULE ORAL EVERY 12 HOURS
Qty: 180 CAPSULE | Refills: 2 | Status: SHIPPED | OUTPATIENT
Start: 2024-07-08

## 2024-07-09 ENCOUNTER — ANTI-COAG VISIT (OUTPATIENT)
Dept: PHARMACY | Age: 69
End: 2024-07-09
Payer: MEDICARE

## 2024-07-09 DIAGNOSIS — I48.0 PAF (PAROXYSMAL ATRIAL FIBRILLATION) (HCC): Primary | ICD-10-CM

## 2024-07-09 LAB
INTERNATIONAL NORMALIZATION RATIO, POC: 2.1
PROTHROMBIN TIME, POC: NORMAL

## 2024-07-09 PROCEDURE — 85610 PROTHROMBIN TIME: CPT | Performed by: PHARMACIST

## 2024-07-09 PROCEDURE — 99211 OFF/OP EST MAY X REQ PHY/QHP: CPT | Performed by: PHARMACIST

## 2024-07-09 NOTE — PROGRESS NOTES
Ms. Maira Marin is here for management of anticoagulation for AFib.   PMH also significant for CHF, HLD, HTN .   She presents today w/out complaint.  Pt verifies dosing regimen as listed above.   Pt denies s/s bleeding/bruising/swelling/SOB.  No BRBPR. No melena.  Address missed doses  Reviewed pt medication list.  No changes in RX/OTCs/Herbal medications.  Reviewed dietary concerns  Address EToH and tobacco use.    No medication or diet changes per pt.        INR 2.1 is within therapeutic range of 2-3.   Recommend to continue dose of 5 mg daily except 7.5 mg Q Mon/Fri.  Patient has 5 mg tablets.  Will continue to monitor and check INR in 4 weeks.  Dosing reminder card given with phone number, appointment date and time.   Return to clinic: 24 @ 1100 am    Fan Strong, PharmD 11:01 AM EDT 24    For Pharmacy Admin Tracking Only    Intervention Detail: Adherence Monitorin  Total # of Interventions Recommended: 1  Total # of Interventions Accepted: 1  Time Spent (min): 10

## 2024-07-15 DIAGNOSIS — I48.0 PAF (PAROXYSMAL ATRIAL FIBRILLATION) (HCC): ICD-10-CM

## 2024-07-15 RX ORDER — WARFARIN SODIUM 5 MG/1
5 TABLET ORAL DAILY
Qty: 120 TABLET | Refills: 2 | Status: SHIPPED | OUTPATIENT
Start: 2024-07-15

## 2024-08-08 ENCOUNTER — ANTI-COAG VISIT (OUTPATIENT)
Dept: PHARMACY | Age: 69
End: 2024-08-08
Payer: MEDICARE

## 2024-08-08 DIAGNOSIS — I48.0 PAF (PAROXYSMAL ATRIAL FIBRILLATION) (HCC): Primary | ICD-10-CM

## 2024-08-08 LAB
INTERNATIONAL NORMALIZATION RATIO, POC: 1.9
PROTHROMBIN TIME, POC: NORMAL

## 2024-08-08 PROCEDURE — 85610 PROTHROMBIN TIME: CPT | Performed by: PHARMACIST

## 2024-08-08 PROCEDURE — 99211 OFF/OP EST MAY X REQ PHY/QHP: CPT | Performed by: PHARMACIST

## 2024-08-08 NOTE — PROGRESS NOTES
Ms. Maira Marin is here for management of anticoagulation for AFib.   PMH also significant for CHF, HLD, HTN .   She presents today w/out complaint.  Pt verifies dosing regimen as listed above.   Pt denies s/s bleeding/bruising/swelling/SOB.  No BRBPR. No melena.  Address missed doses  Reviewed pt medication list.  No changes in RX/OTCs/Herbal medications.  Reviewed dietary concerns  Address EToH and tobacco use.    No medication or diet changes per pt.        INR 1.9 is within therapeutic range of 2-3.   Recommend to continue dose of 5 mg daily except 7.5 mg Q Mon/Fri.  Patient has 5 mg tablets.  Will continue to monitor and check INR in 4 weeks.  Dosing reminder card given with phone number, appointment date and time.   Return to clinic: 24 @ 1115 am    Fan Strong, PharmD 11:15 AM EDT 24    For Pharmacy Admin Tracking Only    Intervention Detail: Adherence Monitorin  Total # of Interventions Recommended: 1  Total # of Interventions Accepted: 1  Time Spent (min): 10

## 2024-08-20 ENCOUNTER — HOSPITAL ENCOUNTER (OUTPATIENT)
Dept: CARDIOLOGY | Age: 69
Discharge: HOME | End: 2024-08-22
Attending: INTERNAL MEDICINE
Payer: MEDICARE

## 2024-08-20 ENCOUNTER — OFFICE VISIT (OUTPATIENT)
Dept: CARDIOLOGY CLINIC | Age: 69
End: 2024-08-20
Payer: MEDICARE

## 2024-08-20 VITALS
SYSTOLIC BLOOD PRESSURE: 130 MMHG | DIASTOLIC BLOOD PRESSURE: 64 MMHG | BODY MASS INDEX: 42.75 KG/M2 | WEIGHT: 266 LBS | HEIGHT: 66 IN

## 2024-08-20 VITALS
DIASTOLIC BLOOD PRESSURE: 60 MMHG | BODY MASS INDEX: 41.78 KG/M2 | HEART RATE: 50 BPM | HEIGHT: 66 IN | WEIGHT: 260 LBS | OXYGEN SATURATION: 99 % | SYSTOLIC BLOOD PRESSURE: 120 MMHG

## 2024-08-20 VITALS
DIASTOLIC BLOOD PRESSURE: 60 MMHG | BODY MASS INDEX: 43.32 KG/M2 | HEIGHT: 65 IN | SYSTOLIC BLOOD PRESSURE: 120 MMHG | WEIGHT: 260 LBS | OXYGEN SATURATION: 99 % | HEART RATE: 50 BPM

## 2024-08-20 DIAGNOSIS — R06.02 SHORTNESS OF BREATH ON EXERTION: ICD-10-CM

## 2024-08-20 DIAGNOSIS — I49.3 PVC (PREMATURE VENTRICULAR CONTRACTION): ICD-10-CM

## 2024-08-20 DIAGNOSIS — I48.0 PAF (PAROXYSMAL ATRIAL FIBRILLATION) (HCC): Primary | ICD-10-CM

## 2024-08-20 DIAGNOSIS — I50.22 CHRONIC SYSTOLIC HEART FAILURE (HCC): ICD-10-CM

## 2024-08-20 DIAGNOSIS — I10 ESSENTIAL HYPERTENSION: ICD-10-CM

## 2024-08-20 DIAGNOSIS — Z79.899 MEDICATION MANAGEMENT: ICD-10-CM

## 2024-08-20 DIAGNOSIS — R00.1 SYMPTOMATIC SINUS BRADYCARDIA: ICD-10-CM

## 2024-08-20 DIAGNOSIS — I42.8 NON-ISCHEMIC CARDIOMYOPATHY (HCC): ICD-10-CM

## 2024-08-20 DIAGNOSIS — R94.31 PROLONGED Q-T INTERVAL ON ECG: ICD-10-CM

## 2024-08-20 DIAGNOSIS — I50.22 CHRONIC SYSTOLIC HEART FAILURE (HCC): Primary | ICD-10-CM

## 2024-08-20 DIAGNOSIS — I48.0 PAF (PAROXYSMAL ATRIAL FIBRILLATION) (HCC): ICD-10-CM

## 2024-08-20 LAB
ECHO AO ASC DIAM: 3.7 CM
ECHO AO ASCENDING AORTA INDEX: 1.64 CM/M2
ECHO AO ROOT DIAM: 2.9 CM
ECHO AO ROOT INDEX: 1.28 CM/M2
ECHO AV AREA PEAK VELOCITY: 1.1 CM2
ECHO AV AREA VTI: 1 CM2
ECHO AV AREA/BSA PEAK VELOCITY: 0.5 CM2/M2
ECHO AV AREA/BSA VTI: 0.4 CM2/M2
ECHO AV CUSP MM: 1.7 CM
ECHO AV MEAN GRADIENT: 13 MMHG
ECHO AV MEAN VELOCITY: 1.6 M/S
ECHO AV PEAK GRADIENT: 23 MMHG
ECHO AV PEAK GRADIENT: 23 MMHG
ECHO AV PEAK VELOCITY: 2.4 M/S
ECHO AV VELOCITY RATIO: 0.33
ECHO AV VTI: 59.2 CM
ECHO BSA: 2.37 M2
ECHO EST RA PRESSURE: 8 MMHG
ECHO LA AREA 2C: 24.2 CM2
ECHO LA AREA 4C: 21.9 CM2
ECHO LA DIAMETER INDEX: 1.95 CM/M2
ECHO LA DIAMETER: 4.4 CM
ECHO LA MAJOR AXIS: 5.7 CM
ECHO LA MINOR AXIS: 5.9 CM
ECHO LA TO AORTIC ROOT RATIO: 1.52
ECHO LA VOL BP: 76 ML (ref 22–52)
ECHO LA VOL MOD A2C: 83 ML (ref 22–52)
ECHO LA VOL MOD A4C: 67 ML (ref 22–52)
ECHO LA VOL/BSA BIPLANE: 34 ML/M2 (ref 16–34)
ECHO LA VOLUME INDEX MOD A2C: 37 ML/M2 (ref 16–34)
ECHO LA VOLUME INDEX MOD A4C: 30 ML/M2 (ref 16–34)
ECHO LV E' LATERAL VELOCITY: 13 CM/S
ECHO LV E' SEPTAL VELOCITY: 9 CM/S
ECHO LV EF PHYSICIAN: 50 %
ECHO LV FRACTIONAL SHORTENING: 25 % (ref 28–44)
ECHO LV INTERNAL DIMENSION DIASTOLE INDEX: 2.52 CM/M2
ECHO LV INTERNAL DIMENSION DIASTOLIC: 5.7 CM (ref 3.9–5.3)
ECHO LV INTERNAL DIMENSION SYSTOLIC INDEX: 1.9 CM/M2
ECHO LV INTERNAL DIMENSION SYSTOLIC: 4.3 CM
ECHO LV ISOVOLUMETRIC RELAXATION TIME (IVRT): 63 MS
ECHO LV IVSD: 1.1 CM (ref 0.6–0.9)
ECHO LV MASS 2D: 272.5 G (ref 67–162)
ECHO LV MASS INDEX 2D: 120.6 G/M2 (ref 43–95)
ECHO LV POSTERIOR WALL DIASTOLIC: 1.2 CM (ref 0.6–0.9)
ECHO LV RELATIVE WALL THICKNESS RATIO: 0.42
ECHO LVOT AREA: 3.1 CM2
ECHO LVOT AV VTI INDEX: 0.33
ECHO LVOT DIAM: 2 CM
ECHO LVOT MEAN GRADIENT: 2 MMHG
ECHO LVOT PEAK GRADIENT: 3 MMHG
ECHO LVOT PEAK VELOCITY: 0.8 M/S
ECHO LVOT STROKE VOLUME INDEX: 27 ML/M2
ECHO LVOT SV: 60.9 ML
ECHO LVOT VTI: 19.4 CM
ECHO MV E VELOCITY: 1.11 M/S
ECHO MV E/E' LATERAL: 8.54
ECHO MV E/E' RATIO (AVERAGED): 10.44
ECHO MV E/E' SEPTAL: 12.33
ECHO RA AREA 4C: 9.6 CM2
ECHO RA END SYSTOLIC VOLUME APICAL 4 CHAMBER INDEX BSA: 9 ML/M2
ECHO RA VOLUME: 20 ML
ECHO RIGHT VENTRICULAR SYSTOLIC PRESSURE (RVSP): 37 MMHG
ECHO RV FREE WALL PEAK S': 11 CM/S
ECHO RV TAPSE: 2.3 CM (ref 1.7–?)
ECHO TV REGURGITANT MAX VELOCITY: 2.68 M/S
ECHO TV REGURGITANT PEAK GRADIENT: 29 MMHG

## 2024-08-20 PROCEDURE — 99215 OFFICE O/P EST HI 40 MIN: CPT | Performed by: INTERNAL MEDICINE

## 2024-08-20 PROCEDURE — 93306 TTE W/DOPPLER COMPLETE: CPT

## 2024-08-20 PROCEDURE — G8427 DOCREV CUR MEDS BY ELIG CLIN: HCPCS | Performed by: INTERNAL MEDICINE

## 2024-08-20 PROCEDURE — 3078F DIAST BP <80 MM HG: CPT | Performed by: NURSE PRACTITIONER

## 2024-08-20 PROCEDURE — G8417 CALC BMI ABV UP PARAM F/U: HCPCS | Performed by: INTERNAL MEDICINE

## 2024-08-20 PROCEDURE — G8399 PT W/DXA RESULTS DOCUMENT: HCPCS | Performed by: INTERNAL MEDICINE

## 2024-08-20 PROCEDURE — G8417 CALC BMI ABV UP PARAM F/U: HCPCS | Performed by: NURSE PRACTITIONER

## 2024-08-20 PROCEDURE — 93000 ELECTROCARDIOGRAM COMPLETE: CPT | Performed by: NURSE PRACTITIONER

## 2024-08-20 PROCEDURE — G8427 DOCREV CUR MEDS BY ELIG CLIN: HCPCS | Performed by: NURSE PRACTITIONER

## 2024-08-20 PROCEDURE — 3074F SYST BP LT 130 MM HG: CPT | Performed by: INTERNAL MEDICINE

## 2024-08-20 PROCEDURE — 99215 OFFICE O/P EST HI 40 MIN: CPT | Performed by: NURSE PRACTITIONER

## 2024-08-20 PROCEDURE — 1090F PRES/ABSN URINE INCON ASSESS: CPT | Performed by: NURSE PRACTITIONER

## 2024-08-20 PROCEDURE — 1124F ACP DISCUSS-NO DSCNMKR DOCD: CPT | Performed by: NURSE PRACTITIONER

## 2024-08-20 PROCEDURE — 1036F TOBACCO NON-USER: CPT | Performed by: INTERNAL MEDICINE

## 2024-08-20 PROCEDURE — 3078F DIAST BP <80 MM HG: CPT | Performed by: INTERNAL MEDICINE

## 2024-08-20 PROCEDURE — 3017F COLORECTAL CA SCREEN DOC REV: CPT | Performed by: INTERNAL MEDICINE

## 2024-08-20 PROCEDURE — 3074F SYST BP LT 130 MM HG: CPT | Performed by: NURSE PRACTITIONER

## 2024-08-20 PROCEDURE — 3017F COLORECTAL CA SCREEN DOC REV: CPT | Performed by: NURSE PRACTITIONER

## 2024-08-20 PROCEDURE — 1123F ACP DISCUSS/DSCN MKR DOCD: CPT | Performed by: INTERNAL MEDICINE

## 2024-08-20 PROCEDURE — 1036F TOBACCO NON-USER: CPT | Performed by: NURSE PRACTITIONER

## 2024-08-20 PROCEDURE — 1090F PRES/ABSN URINE INCON ASSESS: CPT | Performed by: INTERNAL MEDICINE

## 2024-08-20 PROCEDURE — G8399 PT W/DXA RESULTS DOCUMENT: HCPCS | Performed by: NURSE PRACTITIONER

## 2024-08-20 RX ORDER — LANOLIN ALCOHOL/MO/W.PET/CERES
400 CREAM (GRAM) TOPICAL DAILY
Status: ON HOLD | COMMUNITY

## 2024-08-20 NOTE — PATIENT INSTRUCTIONS
Stop Tikosyn    Return to clinic for EKG on Thursday for repeat EKG     Treadmill stress test. Please call [phone] to schedule.     Monitor BP and heart rate at home and call if consistently out of goal ranges    Follow up in 3 months with Dr. Harepr

## 2024-08-20 NOTE — PROGRESS NOTES
[facility]   Electrophysiology Outpatient Note              Date:  August 20, 2024  Patient name: Reina Marin  YOB: 1955    Primary Care physician: Mary Florentino PA    HISTORY OF PRESENT ILLNESS: The patient is a 66 y.o.  female with a history of AF, nonischemic cardiomyopathy with recovered EF, nonobstructive CAD, chronic diastolic CHF, AS, hypothyroidism, breast cancer and JINA.               In 7/2019, she was admitted with rapid AF and flecainide was started. Echo showed an EF of 48-50%. Stress test was abnormal. In 8/2019, LHC showed nonobstructive CAD and an EF of 25-30% and flecainide was stopped due to worsening EF. Cardiac MRI showed an EF of 45%. AF was recurrent and Rythmol was started. Most recent echo in 3/2022 showed an EF of 50%. In 3/2022, Rythmol was stopped due to increasing QRS duration.In 3/2022, he was admitted to start Tikosyn.  An event monitor at discharge that showed brief PAF, brief PAT and brief NSVT.  7/20/2023, patient presented for knee replacement surgery.  She was noted to have PVCs on ECG monitoring.  Surgery was canceled and electrophysiology was consulted.  IV amiodarone was started however this was stopped due to patient being on dofetilide.  She wore a one week event monitor that showed a 12% PVC burden.  Prior to our visit today, she had an echocardiogram that showed an EF of 50 to 55%.  Today she is being seen for AF. EKG shows SR with a HR of 50 and a QTc of 485 ms (prolonged from prior).  Patient reports she has not had any abnormal heart rhythms since our last visit. However, she got a job at [employer] this summer in an effort to get out and get more exercise. She reports she gets tried and short of breath with minimal exertion.       Past Medical History:   has a past medical history of Abnormal Pap smear of cervix, Allergic, Allergic rhinitis, Aortic stenosis, mild-echo 8/2016, Arthritis, Atrial fibrillation (HCC), BMI

## 2024-08-22 ENCOUNTER — HOSPITAL ENCOUNTER (OUTPATIENT)
Age: 69
Discharge: HOME | End: 2024-08-22
Payer: MEDICARE

## 2024-08-22 ENCOUNTER — LAB (OUTPATIENT)
Dept: CARDIOLOGY CLINIC | Age: 69
End: 2024-08-22
Payer: MEDICARE

## 2024-08-22 DIAGNOSIS — R06.02 SHORTNESS OF BREATH ON EXERTION: ICD-10-CM

## 2024-08-22 DIAGNOSIS — I50.22 CHRONIC SYSTOLIC HEART FAILURE (HCC): ICD-10-CM

## 2024-08-22 DIAGNOSIS — I48.0 PAF (PAROXYSMAL ATRIAL FIBRILLATION) (HCC): Primary | ICD-10-CM

## 2024-08-22 LAB
ANION GAP SERPL CALCULATED.3IONS-SCNC: 10 MMOL/L (ref 3–16)
BUN SERPL-MCNC: 23 MG/DL (ref 7–20)
CALCIUM SERPL-MCNC: 8.7 MG/DL (ref 8.3–10.6)
CHLORIDE SERPL-SCNC: 104 MMOL/L (ref 99–110)
CO2 SERPL-SCNC: 24 MMOL/L (ref 21–32)
CREAT SERPL-MCNC: 1 MG/DL (ref 0.6–1.2)
GFR SERPLBLD CREATININE-BSD FMLA CKD-EPI: 61 ML/MIN/{1.73_M2}
GLUCOSE SERPL-MCNC: 100 MG/DL (ref 70–99)
NT-PROBNP SERPL-MCNC: 317 PG/ML (ref 0–124)
POTASSIUM SERPL-SCNC: 4.3 MMOL/L (ref 3.5–5.1)
SODIUM SERPL-SCNC: 138 MMOL/L (ref 136–145)

## 2024-08-22 PROCEDURE — 93000 ELECTROCARDIOGRAM COMPLETE: CPT | Performed by: NURSE PRACTITIONER

## 2024-08-22 PROCEDURE — 36415 COLL VENOUS BLD VENIPUNCTURE: CPT

## 2024-08-22 PROCEDURE — 83880 ASSAY OF NATRIURETIC PEPTIDE: CPT

## 2024-08-22 PROCEDURE — 80048 BASIC METABOLIC PNL TOTAL CA: CPT

## 2024-08-23 ENCOUNTER — TELEPHONE (OUTPATIENT)
Dept: CARDIOLOGY CLINIC | Age: 69
End: 2024-08-23

## 2024-08-23 NOTE — TELEPHONE ENCOUNTER
Pt reports since starting jardiance she feels fatigued and SOB. She reports increased urination. BP today 129/60. Please advise     JUAN LINDSAY 8/20/24

## 2024-08-23 NOTE — TELEPHONE ENCOUNTER
Pt called and stated she started on jardiance yesterday and since starting she hasn't felt well, stated she feels drained and SOB. Pt states her bp, pulse and sugar are w/ in normal range. Please advise

## 2024-08-23 NOTE — TELEPHONE ENCOUNTER
Ask her to reduce the dose to 5 mg a day 1/2 tablet and see if symptoms improve.  Make sure she is drinking enough water.  NEFTALI

## 2024-08-24 ENCOUNTER — APPOINTMENT (OUTPATIENT)
Dept: GENERAL RADIOLOGY | Age: 69
End: 2024-08-24
Payer: MEDICARE

## 2024-08-24 ENCOUNTER — HOSPITAL ENCOUNTER (INPATIENT)
Age: 69
LOS: 5 days | Discharge: HOME | End: 2024-08-29
Attending: STUDENT IN AN ORGANIZED HEALTH CARE EDUCATION/TRAINING PROGRAM | Admitting: INTERNAL MEDICINE
Payer: MEDICARE

## 2024-08-24 DIAGNOSIS — I49.8 BIGEMINY: ICD-10-CM

## 2024-08-24 DIAGNOSIS — R00.1 SYMPTOMATIC BRADYCARDIA: Primary | ICD-10-CM

## 2024-08-24 DIAGNOSIS — I49.9 ARRHYTHMIA: ICD-10-CM

## 2024-08-24 LAB
ALBUMIN SERPL-MCNC: 4.5 G/DL (ref 3.4–5)
ALBUMIN/GLOB SERPL: 1.7 {RATIO} (ref 1.1–2.2)
ALP SERPL-CCNC: 85 U/L (ref 40–129)
ALT SERPL-CCNC: 17 U/L (ref 10–40)
ANION GAP SERPL CALCULATED.3IONS-SCNC: 9 MMOL/L (ref 3–16)
AST SERPL-CCNC: 17 U/L (ref 15–37)
BASOPHILS # BLD: 0 K/UL (ref 0–0.2)
BASOPHILS NFR BLD: 0.5 %
BILIRUB SERPL-MCNC: 0.9 MG/DL (ref 0–1)
BILIRUB UR QL STRIP.AUTO: NEGATIVE
BUN SERPL-MCNC: 26 MG/DL (ref 7–20)
CALCIUM SERPL-MCNC: 9.7 MG/DL (ref 8.3–10.6)
CHLORIDE SERPL-SCNC: 101 MMOL/L (ref 99–110)
CLARITY UR: CLEAR
CO2 SERPL-SCNC: 26 MMOL/L (ref 21–32)
COLOR UR: YELLOW
CREAT SERPL-MCNC: 1 MG/DL (ref 0.6–1.2)
DEPRECATED RDW RBC AUTO: 14.5 % (ref 12.4–15.4)
EOSINOPHIL # BLD: 0.1 K/UL (ref 0–0.6)
EOSINOPHIL NFR BLD: 1.1 %
EPI CELLS #/AREA URNS HPF: ABNORMAL /HPF (ref 0–5)
FLUAV RNA RESP QL NAA+PROBE: NOT DETECTED
FLUBV RNA RESP QL NAA+PROBE: NOT DETECTED
GFR SERPLBLD CREATININE-BSD FMLA CKD-EPI: 61 ML/MIN/{1.73_M2}
GLUCOSE SERPL-MCNC: 114 MG/DL (ref 70–99)
GLUCOSE UR STRIP.AUTO-MCNC: >=1000 MG/DL
HCT VFR BLD AUTO: 43.6 % (ref 36–48)
HGB BLD-MCNC: 14.4 G/DL (ref 12–16)
HGB UR QL STRIP.AUTO: NEGATIVE
KETONES UR STRIP.AUTO-MCNC: NEGATIVE MG/DL
LEUKOCYTE ESTERASE UR QL STRIP.AUTO: ABNORMAL
LYMPHOCYTES # BLD: 1.6 K/UL (ref 1–5.1)
LYMPHOCYTES NFR BLD: 24.4 %
MAGNESIUM SERPL-MCNC: 2.2 MG/DL (ref 1.8–2.4)
MCH RBC QN AUTO: 30.1 PG (ref 26–34)
MCHC RBC AUTO-ENTMCNC: 33.1 G/DL (ref 31–36)
MCV RBC AUTO: 90.7 FL (ref 80–100)
MONOCYTES # BLD: 0.6 K/UL (ref 0–1.3)
MONOCYTES NFR BLD: 9.1 %
NEUTROPHILS # BLD: 4.1 K/UL (ref 1.7–7.7)
NEUTROPHILS NFR BLD: 64.9 %
NITRITE UR QL STRIP.AUTO: NEGATIVE
NT-PROBNP SERPL-MCNC: 226 PG/ML (ref 0–124)
PH UR STRIP.AUTO: 7 [PH] (ref 5–8)
PLATELET # BLD AUTO: 226 K/UL (ref 135–450)
PMV BLD AUTO: 8.1 FL (ref 5–10.5)
POTASSIUM SERPL-SCNC: 3.8 MMOL/L (ref 3.5–5.1)
PROT SERPL-MCNC: 7.2 G/DL (ref 6.4–8.2)
PROT UR STRIP.AUTO-MCNC: NEGATIVE MG/DL
RBC # BLD AUTO: 4.8 M/UL (ref 4–5.2)
RBC #/AREA URNS HPF: ABNORMAL /HPF (ref 0–4)
SARS-COV-2 RNA RESP QL NAA+PROBE: NOT DETECTED
SODIUM SERPL-SCNC: 136 MMOL/L (ref 136–145)
SP GR UR STRIP.AUTO: 1.01 (ref 1–1.03)
TROPONIN, HIGH SENSITIVITY: 9 NG/L (ref 0–14)
UA COMPLETE W REFLEX CULTURE PNL UR: ABNORMAL
UA DIPSTICK W REFLEX MICRO PNL UR: YES
URN SPEC COLLECT METH UR: ABNORMAL
UROBILINOGEN UR STRIP-ACNC: 0.2 E.U./DL
WBC # BLD AUTO: 6.4 K/UL (ref 4–11)
WBC #/AREA URNS HPF: ABNORMAL /HPF (ref 0–5)

## 2024-08-24 PROCEDURE — 83880 ASSAY OF NATRIURETIC PEPTIDE: CPT

## 2024-08-24 PROCEDURE — 99285 EMERGENCY DEPT VISIT HI MDM: CPT

## 2024-08-24 PROCEDURE — 2060000000 HC ICU INTERMEDIATE R&B

## 2024-08-24 PROCEDURE — 93005 ELECTROCARDIOGRAM TRACING: CPT | Performed by: STUDENT IN AN ORGANIZED HEALTH CARE EDUCATION/TRAINING PROGRAM

## 2024-08-24 PROCEDURE — 6370000000 HC RX 637 (ALT 250 FOR IP): Performed by: INTERNAL MEDICINE

## 2024-08-24 PROCEDURE — 84484 ASSAY OF TROPONIN QUANT: CPT

## 2024-08-24 PROCEDURE — 2580000003 HC RX 258: Performed by: INTERNAL MEDICINE

## 2024-08-24 PROCEDURE — 87636 SARSCOV2 & INF A&B AMP PRB: CPT

## 2024-08-24 PROCEDURE — 85025 COMPLETE CBC W/AUTO DIFF WBC: CPT

## 2024-08-24 PROCEDURE — 80053 COMPREHEN METABOLIC PANEL: CPT

## 2024-08-24 PROCEDURE — 71045 X-RAY EXAM CHEST 1 VIEW: CPT

## 2024-08-24 PROCEDURE — 81001 URINALYSIS AUTO W/SCOPE: CPT

## 2024-08-24 PROCEDURE — 83735 ASSAY OF MAGNESIUM: CPT

## 2024-08-24 RX ORDER — VALSARTAN 80 MG/1
40 TABLET ORAL DAILY
Status: DISCONTINUED | OUTPATIENT
Start: 2024-08-25 | End: 2024-08-29 | Stop reason: HOSPADM

## 2024-08-24 RX ORDER — VITAMIN B COMPLEX
5000 TABLET ORAL DAILY
Status: DISCONTINUED | OUTPATIENT
Start: 2024-08-25 | End: 2024-08-29 | Stop reason: HOSPADM

## 2024-08-24 RX ORDER — POTASSIUM CHLORIDE 1500 MG/1
40 TABLET, EXTENDED RELEASE ORAL PRN
Status: DISCONTINUED | OUTPATIENT
Start: 2024-08-24 | End: 2024-08-29 | Stop reason: HOSPADM

## 2024-08-24 RX ORDER — CETIRIZINE HYDROCHLORIDE 10 MG/1
10 TABLET ORAL EVERY MORNING
Status: DISCONTINUED | OUTPATIENT
Start: 2024-08-25 | End: 2024-08-25

## 2024-08-24 RX ORDER — ASCORBIC ACID 500 MG
500 TABLET ORAL DAILY
Status: DISCONTINUED | OUTPATIENT
Start: 2024-08-25 | End: 2024-08-29 | Stop reason: HOSPADM

## 2024-08-24 RX ORDER — SODIUM CHLORIDE 0.9 % (FLUSH) 0.9 %
5-40 SYRINGE (ML) INJECTION PRN
Status: DISCONTINUED | OUTPATIENT
Start: 2024-08-24 | End: 2024-08-28 | Stop reason: SDUPTHER

## 2024-08-24 RX ORDER — POTASSIUM CHLORIDE 7.45 MG/ML
10 INJECTION INTRAVENOUS PRN
Status: DISCONTINUED | OUTPATIENT
Start: 2024-08-24 | End: 2024-08-29 | Stop reason: HOSPADM

## 2024-08-24 RX ORDER — MONTELUKAST SODIUM 10 MG/1
10 TABLET ORAL NIGHTLY
Status: DISCONTINUED | OUTPATIENT
Start: 2024-08-24 | End: 2024-08-29 | Stop reason: HOSPADM

## 2024-08-24 RX ORDER — PROCHLORPERAZINE EDISYLATE 5 MG/ML
10 INJECTION INTRAMUSCULAR; INTRAVENOUS EVERY 6 HOURS PRN
Status: DISCONTINUED | OUTPATIENT
Start: 2024-08-24 | End: 2024-08-29 | Stop reason: HOSPADM

## 2024-08-24 RX ORDER — SODIUM CHLORIDE 9 MG/ML
INJECTION, SOLUTION INTRAVENOUS PRN
Status: DISCONTINUED | OUTPATIENT
Start: 2024-08-24 | End: 2024-08-28 | Stop reason: SDUPTHER

## 2024-08-24 RX ORDER — PANTOPRAZOLE SODIUM 40 MG/1
40 TABLET, DELAYED RELEASE ORAL
Status: DISCONTINUED | OUTPATIENT
Start: 2024-08-25 | End: 2024-08-29 | Stop reason: HOSPADM

## 2024-08-24 RX ORDER — ACETAMINOPHEN 650 MG/1
650 SUPPOSITORY RECTAL EVERY 6 HOURS PRN
Status: DISCONTINUED | OUTPATIENT
Start: 2024-08-24 | End: 2024-08-29 | Stop reason: HOSPADM

## 2024-08-24 RX ORDER — OXYBUTYNIN CHLORIDE 5 MG/1
15 TABLET, EXTENDED RELEASE ORAL DAILY
Status: DISCONTINUED | OUTPATIENT
Start: 2024-08-24 | End: 2024-08-25

## 2024-08-24 RX ORDER — CITALOPRAM HYDROBROMIDE 20 MG/1
40 TABLET ORAL DAILY
Status: DISCONTINUED | OUTPATIENT
Start: 2024-08-25 | End: 2024-08-29 | Stop reason: HOSPADM

## 2024-08-24 RX ORDER — SODIUM CHLORIDE 0.9 % (FLUSH) 0.9 %
5-40 SYRINGE (ML) INJECTION EVERY 12 HOURS SCHEDULED
Status: DISCONTINUED | OUTPATIENT
Start: 2024-08-24 | End: 2024-08-28 | Stop reason: SDUPTHER

## 2024-08-24 RX ORDER — ATORVASTATIN CALCIUM 40 MG/1
40 TABLET, FILM COATED ORAL DAILY
Status: DISCONTINUED | OUTPATIENT
Start: 2024-08-24 | End: 2024-08-25

## 2024-08-24 RX ORDER — ACETAMINOPHEN 325 MG/1
650 TABLET ORAL EVERY 6 HOURS PRN
Status: DISCONTINUED | OUTPATIENT
Start: 2024-08-24 | End: 2024-08-29 | Stop reason: HOSPADM

## 2024-08-24 RX ORDER — POTASSIUM CHLORIDE 1500 MG/1
20 TABLET, EXTENDED RELEASE ORAL ONCE
Status: COMPLETED | OUTPATIENT
Start: 2024-08-24 | End: 2024-08-24

## 2024-08-24 RX ORDER — SPIRONOLACTONE 25 MG/1
25 TABLET ORAL DAILY
Status: DISCONTINUED | OUTPATIENT
Start: 2024-08-25 | End: 2024-08-29 | Stop reason: HOSPADM

## 2024-08-24 RX ORDER — POLYETHYLENE GLYCOL 3350 17 G/17G
17 POWDER, FOR SOLUTION ORAL DAILY PRN
Status: DISCONTINUED | OUTPATIENT
Start: 2024-08-24 | End: 2024-08-29 | Stop reason: HOSPADM

## 2024-08-24 RX ORDER — MAGNESIUM SULFATE IN WATER 40 MG/ML
2000 INJECTION, SOLUTION INTRAVENOUS PRN
Status: DISCONTINUED | OUTPATIENT
Start: 2024-08-24 | End: 2024-08-29 | Stop reason: HOSPADM

## 2024-08-24 RX ORDER — ZINC GLUCONATE 50 MG
50 TABLET ORAL DAILY
Status: DISCONTINUED | OUTPATIENT
Start: 2024-08-25 | End: 2024-08-29 | Stop reason: HOSPADM

## 2024-08-24 RX ADMIN — ATORVASTATIN CALCIUM 40 MG: 40 TABLET, FILM COATED ORAL at 19:55

## 2024-08-24 RX ADMIN — Medication 10 ML: at 19:51

## 2024-08-24 RX ADMIN — OXYBUTYNIN CHLORIDE 15 MG: 5 TABLET, EXTENDED RELEASE ORAL at 19:55

## 2024-08-24 RX ADMIN — POTASSIUM CHLORIDE 20 MEQ: 1500 TABLET, EXTENDED RELEASE ORAL at 17:24

## 2024-08-24 RX ADMIN — MONTELUKAST 10 MG: 10 TABLET, FILM COATED ORAL at 19:55

## 2024-08-24 ASSESSMENT — PAIN SCALES - GENERAL: PAINLEVEL_OUTOF10: 0

## 2024-08-24 ASSESSMENT — LIFESTYLE VARIABLES
HOW MANY STANDARD DRINKS CONTAINING ALCOHOL DO YOU HAVE ON A TYPICAL DAY: 1 OR 2
HOW OFTEN DO YOU HAVE A DRINK CONTAINING ALCOHOL: MONTHLY OR LESS

## 2024-08-24 ASSESSMENT — PAIN - FUNCTIONAL ASSESSMENT: PAIN_FUNCTIONAL_ASSESSMENT: 0-10

## 2024-08-24 NOTE — ED PROVIDER NOTES
[facility]  ED  EMERGENCY DEPARTMENT ENCOUNTER        Pt Name: Reina Marin  MRN: [id number]  Birthdate 1955  Date of evaluation: 8/24/2024  Provider: МАРИЯ RUSSO PA-C  PCP: Mary Florentino PA  ED Attending: MD Kendrick       I have seen and evaluated this patient with my supervising physician Felecia Marks MD.    CHIEF COMPLAINT:     Chief Complaint   Patient presents with    Fatigue     Was feeling tired and lethargic, pt stated that she took pulse at home and it was anywhere from 28-40. Called cardiologist and was told to come to ER       HISTORY OF PRESENT ILLNESS:      History provided by the patient. No limitations.    Reina Marin is a 69 y.o. female who arrives to the ED by private vehicle.  Her  has driven her.  Patient states she saw her cardiologist this past Tuesday, 8/20.  She interacted with both Gini Pike NP and Dr. Sanford.  Patient was bradycardic in the office and she states they told her to stop taking her Tikosyn.  That same day they started her on Jardiance 10 mg daily.  She states since that time she has felt tired, lethargic, states her arms have felt heavy and she has felt dyspneic.  She denies chest pain.  Yesterday she called her cardiologist back.  They told her to decrease her Jardiance dose from 10 mg to 5 mg.  She did that yesterday, but today continues to feel bad.  She states she was checking her heart rate at home and found it to be anywhere from 28-40 bpm.  She called the cardiologist on-call and based on this, she was told to come to the ED.    Nursing Notes were reviewed     REVIEW OF SYSTEMS:     Review of Systems  Positives and pertinent negatives as per HPI.      PAST MEDICAL HISTORY:     Past Medical History:   Diagnosis Date    Abnormal Pap smear of cervix 06/11/2018    hpv+    Allergic     Allergic rhinitis     Aortic stenosis, mild-echo 8/2016 08/18/2016    Arthritis     Atrial fibrillation (HCC)     BMI 40.0-44.9,  GASTROINTESTINAL ENDOSCOPY  06/10/2016    gastritis, gastric polyp bx        CURRENT MEDICATIONS:       Previous Medications    ACETAMINOPHEN (TYLENOL) 500 MG TABLET    Take 2 tablets by mouth 3 times daily as needed for Pain    ATORVASTATIN (LIPITOR) 40 MG TABLET    TAKE 1 TABLET DAILY    CETIRIZINE HCL 10 MG CAPS    Take 1 capsule by mouth every morning In am    CITALOPRAM (CELEXA) 40 MG TABLET    Take 1 tablet by mouth daily    ELDERBERRY 500 MG CAPS    Take by mouth    EMPAGLIFLOZIN (JARDIANCE) 10 MG TABLET    Take 1 tablet by mouth daily    FUROSEMIDE (LASIX) 20 MG TABLET    TAKE 1 TABLET BY MOUTH ONE TIME A DAY AS NEEDED FOR WEIGHT GAIN OF 3LBS IN A DAY OR 5LBS IN A WEEK    LEVOTHYROXINE (SYNTHROID) 137 MCG TABLET    TAKE 1 TABLET ONCE DAILY    MAGNESIUM OXIDE (MAG-OX) 400 (240 MG) MG TABLET    Take 1 tablet by mouth daily    MONTELUKAST (SINGULAIR) 10 MG TABLET    Take 1 tablet by mouth nightly    MULTIVITAMIN (THERAGRAN) PER TABLET    Take 1 tablet by mouth daily    OMEPRAZOLE (PRILOSEC) 40 MG DELAYED RELEASE CAPSULE    TAKE 1 CAPSULE EVERY       MORNING, BEFORE BREAKFAST    OXYBUTYNIN (DITROPAN XL) 15 MG EXTENDED RELEASE TABLET    Take 1 tablet by mouth daily    SPIRONOLACTONE (ALDACTONE) 25 MG TABLET    TAKE 1 TABLET ONCE DAILY    VALSARTAN (DIOVAN) 40 MG TABLET    TAKE 1 TABLET BY MOUTH ONE TIME A DAY    VITAMIN C (ASCORBIC ACID) 500 MG TABLET    Take 1 tablet by mouth daily    VITAMIN D3 125 MCG (5000 UT) TABS TABLET    Take 1 tablet by mouth daily    WARFARIN (COUMADIN) 5 MG TABLET    Take 1 tablet by mouth daily 7.5 mg (5 mg x 1.5) every Mon, Wed, Fri; 5 mg (5 mg x 1) all other days OR AS DIRECTED    ZINC GLUCONATE 50 MG TABLET    Take 1 tablet by mouth daily       ALLERGIES:    Latex, Bactrim [sulfamethoxazole-trimethoprim], Iodine, Mupirocin, and Adhesive tape    FAMILY HISTORY:     @FAMX    SOCIAL HISTORY:       Social History     Socioeconomic History    Marital status:      Spouse name:

## 2024-08-24 NOTE — PROGRESS NOTES
4 Eyes Skin Assessment     NAME:  Reina Marin  YOB: 1955  MEDICAL RECORD NUMBER:  [id number]    The patient is being assessed for  Admission    I agree that at least one RN has performed a thorough Head to Toe Skin Assessment on the patient. ALL assessment sites listed below have been assessed.      Areas assessed by both nurses:    Head, Face, Ears, Shoulders, Back, Chest, Arms, Elbows, Hands, Sacrum. Buttock, Coccyx, Ischium, Legs. Feet and Heels, and Under Medical Devices         Does the Patient have a Wound? No noted wound(s)       Abdiaziz Prevention initiated by RN: Yes  Wound Care Orders initiated by RN: No    Pressure Injury (Stage 3,4, Unstageable, DTI, NWPT, and Complex wounds) if present, place Wound referral order by RN under : No    New Ostomies, if present place, Ostomy referral order under : No     Nurse 1 eSignature: Electronically signed by Mariann Valdez RN on 8/24/24 at 5:04 PM EDT    **SHARE this note so that the co-signing nurse can place an eSignature**    Nurse 2 eSignature: {Esignature:[id number]} ey

## 2024-08-24 NOTE — FLOWSHEET NOTE
08/24/24 1928   Vital Signs   Temp 97.7 °F (36.5 °C)   Temp Source Oral   Pulse 56   Heart Rate Source Monitor   Respirations 18   /62   MAP (Calculated) 79   BP Location Left upper arm   BP Method Manual   Patient Position Sitting   Pain Assessment   Pain Assessment None - Denies Pain   Care Plan - Pain Goals   Verbalizes/displays adequate comfort level or baseline comfort level Encourage patient to monitor pain and request assistance   Opioid-Induced Sedation   POSS Score 1   RASS   Sherman Agitation Sedation Scale (RASS) 0   Oxygen Therapy   SpO2 99 %   Pulse Oximetry Type Intermittent   Pulse Oximeter Device Mode Intermittent   Pulse Oximeter Device Location Right;Finger   O2 Device None (Room air)   O2 Flow Rate (L/min) 0 L/min   Oxygen Therapy None (Room air)   Rhythm Interpretation   Cardiac Rhythm Sinus abhinav     Pt resting comfortably on chair. Call light within reach. No needs expressed at this time. Will continue to monitor.

## 2024-08-24 NOTE — H&P
Hospital Medicine History and Physical      Chief Complaint:  fatigue, exertional intolerance      History and Present Illness:  The patient is a pleasant 69 Y F with a h/o former smoking, obesity s/p gastric lap band from 2007 - 2012, HTN, HLD, DM2, nonischemic (nonobstructive CAD on 2019 [facility]) cardiomyopathy with EF recovered to 50% as of a few days ago, CHF, Afib, frequent PVCs (12% of her beats during 2023 event monitor), JINA, and breast cancer s/p lumpectomy in 2009 then chemo and radiation.   She recently saw the EP clinic.  They saw that her QTc was prolonged, so they stopped her dofetilide.  They also saw that her HR was 50, so they ordered an exercise stress test (has not happened yet) to gauge her chronotropic competence.  Over the last few days, however, the patient has felt very fatigued and has not tolerated minimal exertion.  Her pulse oximeter at home showed bradycardia - her HR was reportedly consistently in the 28-40 range.  She came to the ED and was found to be in bigeminy with HRs in the high 50's.  She was also having spells for 15 minutes at a time during which her HR would sustain in the mid 40's.  Otherwise her labs and vitals were fairly stable.  Cardiology was consulted and hospital medicine was paged for admission.        General appearance: No apparent distress, appears stated age and cooperative.  HEENT: Pupils equal, round.  Conjunctivae/corneas clear.  Neck: No jugular venous distention.   Respiratory:  Normal respiratory effort.  Bilaterally without Rales/Wheezes/Rhonchi.  Cardiovascular: bradycardic rate and regular rhythm with normal S1/S2 without murmurs, rubs or gallops.  Abdomen: Soft, non-tender, non-distended with normal bowel sounds.  Musculoskeletal: No cyanosis bilaterally.  No disproportionate or pitting edema.  Without deformity.  Skin: No jaundice.  No rashes or lesions.  Neurologic:  Neurovascularly intact without any focal sensory/motor deficits. Cranial nerves: II-XII  intact, grossly non-focal.  Psychiatric: Alert and oriented, good insight.        Assessment and Plan:        Symptomatic bradycardia.  TSH recently fine.  F/u Mg.  EP consulted for consideration of pacer.    Bigeminy, PVCs.  Pacer might allow for a BB.  Defer to cardiology.    Afib.  Dofetilide recently stopped because of prolonged QTc.  EP consulted.  Held her warfarin in case she needs procedure(s).    Chronic diastolic CHF.  Held her empagliflozin in case she needs anesthesia.  Spironolactone.    99.2 temp.  CXR clear, UA negative, no specific symptoms.  F/u COVID.  Trend.      Diet: ADULT DIET; Regular; Low Fat/Low Chol/High Fiber/KAUSHIK  DVT Prophylaxis: warfarin held, f/u INR and start enoxaparin if needed  Disposition: PT/OT not indicated.  Home when EP is finished, perhaps .      ---------------------------------------------------------------                                Name:  Reina Marin /Age/Sex: 1955  (69 y.o. female)   MRN & CSN:  [id number] & [id number] Encounter Date/Time: 2024 3:00 PM EDT   Location:   PCP: Mary Florentino PA       Hospital Day: 1    I personally reviewed Lab Studies and Imaging and spoke with the  to plan details for eventual discharge.  I discussed about the need for hospitalization with the ED provider.    Hospital Problems             Last Modified POA    * (Principal) Bradycardia 2024 Yes       History from:     The patient and chart review     Review of Systems:      Pertinent positives and negatives discussed in HPI     Objective:   No intake or output data in the 24 hours ending 24 1500   Vitals:   Vitals:    24 1233 24 1257 24 1329 24 1428   BP:  126/74 137/63 124/79   Pulse:  59 57 59   Resp:  23 15 18   Temp:       TempSrc:       SpO2:  97% 96% 95%   Weight: 114.3 kg (252 lb)      Height: 1.651 m (5' 5\")          Medications Prior to Admission     Prior to Admission medications    Medication Sig

## 2024-08-24 NOTE — ED NOTES
@1320 Called Cardio per Charles ALFONSO  RE: Symptomatic bradycardia  @1323  spoke to Charles ALFONSO

## 2024-08-24 NOTE — PROGRESS NOTES
Received patient into room 432 alert and oriented times 4 in no acute distress oriented to room and treatment plan monitor number 8 verified with the cmu . Bed locked call light within reach.

## 2024-08-24 NOTE — ED NOTES
Reina Marin is a 69 y.o. female admitted for  Principal Problem:    Bradycardia  Resolved Problems:    * No resolved hospital problems. *  .   Patient Home via self with   Chief Complaint   Patient presents with    Fatigue     Was feeling tired and lethargic, pt stated that she took pulse at home and it was anywhere from 28-40. Called cardiologist and was told to come to ER   .  Patient is alert and Person, Place, Time, and Situation  Patient's baseline mobility: Stand by  Code Status: Prior   Cardiac Rhythm: Cardiac Rhythm: Sinus abhinav     Is patient on baseline Oxygen: no how many Liters:   Abnormal Assessment Findings:     Isolation: None      NIH Score:    C-SSRS: Risk of Suicide: No Risk  Bedside swallow:        Active LDA's:    Patient admitted with a martinez: no If the martinez is chronic was it exchanged:No  Reason for martinez:   Patient admitted with Central Line:  . PICC line placement confirmed: YES OR NO:[id number]}   Reason for Central line:   Was central line Inserted from an outside facility:        Family/Caregiver Present yes Any Concerns:    Restraints yes  Sitter no         Vitals: MEWS Score: 2    Vitals:    08/24/24 1233 08/24/24 1257 08/24/24 1329 08/24/24 1428   BP:  126/74 137/63 124/79   Pulse:  59 57 59   Resp:  23 15 18   Temp:       TempSrc:       SpO2:  97% 96% 95%   Weight: 114.3 kg (252 lb)      Height: 1.651 m (5' 5\")          Last documented pain score (0-10 scale) Pain Level: 0  Pain medication administered Yes- see MAR.    Pertinent or High Risk Medications/Drips: .    Pending Blood Product Administration: no    Abnormal labs:   Abnormal Labs Reviewed   COMPREHENSIVE METABOLIC PANEL W/ REFLEX TO MG FOR LOW K - Abnormal; Notable for the following components:       Result Value    Glucose 114 (*)     BUN 26 (*)     All other components within normal limits   BRAIN NATRIURETIC PEPTIDE - Abnormal; Notable for the following components:    Pro- (*)     All other components within

## 2024-08-24 NOTE — ED PROVIDER NOTES
THIS IS MY ISAIAS SUPERVISORY AND SHARED VISIT NOTE:    I personally saw the patient and made/approved the management plan and take responsibility for the patient management.    Labs Reviewed   COMPREHENSIVE METABOLIC PANEL W/ REFLEX TO MG FOR LOW K - Abnormal; Notable for the following components:       Result Value    Glucose 114 (*)     BUN 26 (*)     All other components within normal limits   BRAIN NATRIURETIC PEPTIDE - Abnormal; Notable for the following components:    Pro- (*)     All other components within normal limits   URINALYSIS WITH REFLEX TO CULTURE - Abnormal; Notable for the following components:    Glucose, Ur >=1000 (*)     Leukocyte Esterase, Urine TRACE (*)     All other components within normal limits   MICROSCOPIC URINALYSIS - Abnormal; Notable for the following components:    WBC, UA 6-9 (*)     Epithelial Cells, UA 6-10 (*)     All other components within normal limits   CBC WITH AUTO DIFFERENTIAL   TROPONIN     XR CHEST PORTABLE   Final Result   No radiographic evidence of acute pulmonary disease.           The Ekg interpreted by me shows  sinus bradycardia, rate=57  Axis is   Normal  QTc is  normal  Intervals and Durations are unremarkable.      ST Segments: nonspecific changes  No significant change from prior EKG dated 8/22/2024    The Ekg interpreted by me shows  sinus bradycardia, rate=59  with bigeminy  Axis is   Normal  QTc is  normal  Intervals and Durations are unremarkable.      ST Segments: nonspecific changes  Ventricular bigeminy is new compared to previous performed today    History: Patient is a 69-year-old female, presenting with concerns for generalized fatigue and bilateral arm heaviness.  Patient states that symptoms have been ongoing for the past few days and she did see her cardiologist who discontinued her Tikosyn and started her on Jardiance.  She called them because she has been having ongoing issues and they did decrease her Jardiance dose in half.  States

## 2024-08-25 LAB
EKG ATRIAL RATE: 57 BPM
EKG ATRIAL RATE: 59 BPM
EKG DIAGNOSIS: NORMAL
EKG DIAGNOSIS: NORMAL
EKG P AXIS: 55 DEGREES
EKG P AXIS: 86 DEGREES
EKG P-R INTERVAL: 146 MS
EKG P-R INTERVAL: 164 MS
EKG Q-T INTERVAL: 448 MS
EKG Q-T INTERVAL: 454 MS
EKG QRS DURATION: 104 MS
EKG QRS DURATION: 116 MS
EKG QTC CALCULATION (BAZETT): 436 MS
EKG QTC CALCULATION (BAZETT): 449 MS
EKG R AXIS: 2 DEGREES
EKG R AXIS: 7 DEGREES
EKG T AXIS: 139 DEGREES
EKG T AXIS: 142 DEGREES
EKG VENTRICULAR RATE: 57 BPM
EKG VENTRICULAR RATE: 59 BPM
INR PPP: 2.23 (ref 0.85–1.15)
PROTHROMBIN TIME: 24.7 SEC (ref 11.9–14.9)

## 2024-08-25 PROCEDURE — 85610 PROTHROMBIN TIME: CPT

## 2024-08-25 PROCEDURE — 2060000000 HC ICU INTERMEDIATE R&B

## 2024-08-25 PROCEDURE — 2580000003 HC RX 258: Performed by: INTERNAL MEDICINE

## 2024-08-25 PROCEDURE — 93010 ELECTROCARDIOGRAM REPORT: CPT | Performed by: INTERNAL MEDICINE

## 2024-08-25 PROCEDURE — 36415 COLL VENOUS BLD VENIPUNCTURE: CPT

## 2024-08-25 PROCEDURE — 6370000000 HC RX 637 (ALT 250 FOR IP): Performed by: INTERNAL MEDICINE

## 2024-08-25 PROCEDURE — 99223 1ST HOSP IP/OBS HIGH 75: CPT | Performed by: INTERNAL MEDICINE

## 2024-08-25 RX ORDER — ATORVASTATIN CALCIUM 40 MG/1
40 TABLET, FILM COATED ORAL NIGHTLY
Status: DISCONTINUED | OUTPATIENT
Start: 2024-08-26 | End: 2024-08-25

## 2024-08-25 RX ORDER — OXYBUTYNIN CHLORIDE 5 MG/1
15 TABLET, EXTENDED RELEASE ORAL NIGHTLY
Status: DISCONTINUED | OUTPATIENT
Start: 2024-08-26 | End: 2024-08-29 | Stop reason: HOSPADM

## 2024-08-25 RX ORDER — CETIRIZINE HYDROCHLORIDE 10 MG/1
10 TABLET ORAL NIGHTLY
Status: DISCONTINUED | OUTPATIENT
Start: 2024-08-26 | End: 2024-08-29 | Stop reason: HOSPADM

## 2024-08-25 RX ORDER — ATORVASTATIN CALCIUM 40 MG/1
40 TABLET, FILM COATED ORAL NIGHTLY
Status: DISCONTINUED | OUTPATIENT
Start: 2024-08-25 | End: 2024-08-29 | Stop reason: HOSPADM

## 2024-08-25 RX ORDER — WARFARIN SODIUM 7.5 MG/1
7.5 TABLET ORAL
Status: COMPLETED | OUTPATIENT
Start: 2024-08-25 | End: 2024-08-25

## 2024-08-25 RX ADMIN — Medication 10 ML: at 09:01

## 2024-08-25 RX ADMIN — VALSARTAN 40 MG: 80 TABLET, FILM COATED ORAL at 08:58

## 2024-08-25 RX ADMIN — B-COMPLEX W/ C & FOLIC ACID TAB 1 TABLET: TAB at 10:06

## 2024-08-25 RX ADMIN — CETIRIZINE HYDROCHLORIDE 10 MG: 10 TABLET, FILM COATED ORAL at 08:58

## 2024-08-25 RX ADMIN — PANTOPRAZOLE SODIUM 40 MG: 40 TABLET, DELAYED RELEASE ORAL at 06:18

## 2024-08-25 RX ADMIN — CITALOPRAM HYDROBROMIDE 40 MG: 20 TABLET ORAL at 08:57

## 2024-08-25 RX ADMIN — Medication 5000 UNITS: at 08:58

## 2024-08-25 RX ADMIN — LEVOTHYROXINE SODIUM 137 MCG: 0.11 TABLET ORAL at 06:18

## 2024-08-25 RX ADMIN — SPIRONOLACTONE 25 MG: 25 TABLET ORAL at 08:57

## 2024-08-25 RX ADMIN — WARFARIN SODIUM 7.5 MG: 7.5 TABLET ORAL at 22:22

## 2024-08-25 RX ADMIN — ATORVASTATIN CALCIUM 40 MG: 40 TABLET, FILM COATED ORAL at 22:22

## 2024-08-25 RX ADMIN — MONTELUKAST 10 MG: 10 TABLET, FILM COATED ORAL at 20:26

## 2024-08-25 RX ADMIN — POLYETHYLENE GLYCOL 3350 17 G: 17 POWDER, FOR SOLUTION ORAL at 06:17

## 2024-08-25 RX ADMIN — OXYCODONE HYDROCHLORIDE AND ACETAMINOPHEN 500 MG: 500 TABLET ORAL at 08:58

## 2024-08-25 RX ADMIN — Medication 10 ML: at 20:26

## 2024-08-25 NOTE — FLOWSHEET NOTE
08/24/24 2320   Vital Signs   Temp 98.3 °F (36.8 °C)   Temp Source Oral   Pulse 54   Heart Rate Source Monitor   Respirations 18   BP (!) 95/52   MAP (Calculated) 66   BP Location Left upper arm   BP Method Automatic   Patient Position Lying right side   Pain Assessment   Pain Assessment None - Denies Pain   Opioid-Induced Sedation   POSS Score 1   RASS   Sherman Agitation Sedation Scale (RASS) 0   Oxygen Therapy   SpO2 97 %   Pulse Oximetry Type Intermittent   Pulse Oximeter Device Mode Intermittent   Pulse Oximeter Device Location Right;Finger   O2 Device PAP (positive airway pressure)  (home c-pap)   Oxygen Therapy Supplemental oxygen

## 2024-08-25 NOTE — FLOWSHEET NOTE
08/25/24 0502   Vital Signs   Temp Source Oral   Heart Rate Source Monitor   Respirations 18   BP Location Left upper arm   BP Method Automatic   Patient Position Semi pranay   Pain Assessment   Pain Assessment None - Denies Pain   Care Plan - Pain Goals   Verbalizes/displays adequate comfort level or baseline comfort level Encourage patient to monitor pain and request assistance   Oxygen Therapy   Pulse Oximetry Type Intermittent   Pulse Oximeter Device Mode Intermittent   Pulse Oximeter Device Location Right;Finger   O2 Device PAP (positive airway pressure)  (home c-pap)   Oxygen Therapy Supplemental oxygen   Height and Weight   Weight - Scale 115.7 kg (255 lb 2 oz)   Weight Method Standing scale   BMI (Calculated) 42.5

## 2024-08-25 NOTE — CONSULTS
Pharmacy Note  Warfarin Consult      Dx: A-fib  Goal INR range 2-3  Home Warfarin dose: 7.5 mg (5 mg x 1.5) every Mon, Fri; 5 mg (5 mg x 1) all other days    Date  INR   Warfarin  8/25              pending                7.5 mg      Recommend Warfarin 7.5 mg tonight x1.  Daily INR ordered.  Rx will continue to manage therapy per consult order.    Ellen Mehta, PharmD 8/25/2024  3:08 PM

## 2024-08-25 NOTE — CONSULTS
Electrophysiology Consultation   Date: 8/25/2024  Admit Date:  8/24/2024  Reason for Consultation: Symptomatic bradycardia  Consult Requesting Physician: Austin Richardson MD     Chief Complaint   Patient presents with    Fatigue     Was feeling tired and lethargic, pt stated that she took pulse at home and it was anywhere from 28-40. Called cardiologist and was told to come to ER     HPI:   Mrs. Marin is a pleasant 69 year old female with a medical history significant for paroxysmal atrial fibrillation, symptomatic premature ventricular contractions, hypothyroidism, history of breast cancer, hypertension, peripheral artery disease, GERD, and obesity who presents from home with symptomatic bradycardia.  Patient was recently seen by EP and heart failure team last week and had her dofetilide discontinued and was started on Jardiance that was eventually decreased to half dose.  She states that since then she has been having palpitations, fatigue, and generalized malaise.  Her exercise tolerance was immediately decreased.  She called the cardiology team at [place] and was directed to come to the emergency room after she found that her pulse was in the 20s on her pulse oximeter.  She reports having more palpitations now since stopping her dofetilide.    Patient denies fevers, chest pain, orthopnea, PND, lower extremity edema, abdominal swelling, shortness of breath, chills, visual changes, headaches, sore throat, cough, abdominal pain, nausea, vomiting, bleeding, bruising, dysuria, muscle/joint pain, confusion, depression, anxiety, skin lesions, etc.    Emergency Room/Hospital Course:  Patient was evaluated in the ER on 08/24/2024.  Her CMP was reassuring.  Her BNP was reassuring.  Her troponin enzymes reassuring.  Her CBC was reassuring.  Her EKG shows sinus bradycardia with frequent PVCs.    Past Medical History:   Diagnosis Date    Abnormal Pap smear of cervix 06/11/2018    hpv+    Allergic     Allergic rhinitis  [facility] OR    TUNNELED VENOUS PORT PLACEMENT  2009/2011    Placed/Removed    UPPER GASTROINTESTINAL ENDOSCOPY  06/10/2016    gastritis, gastric polyp bx        Allergies   Allergen Reactions    Latex Rash    Bactrim [Sulfamethoxazole-Trimethoprim] Swelling    Iodine Shortness Of Breath     WITH IV DYE    Mupirocin Swelling    Adhesive Tape Rash       Social History:  Reviewed.  reports that she quit smoking about 52 years ago. Her smoking use included cigarettes. She started smoking about 53 years ago. She has a 0.4 pack-year smoking history. She has never used smokeless tobacco. She reports current alcohol use. She reports that she does not use drugs.     Family History:  Reviewed. family history includes BRCA 2 Negative in her maternal grandmother and paternal grandmother; Breast Cancer in her mother; Cancer in her maternal grandmother, mother, paternal grandmother, sister, and another family member; Cancer (age of onset: 60) in her father; Heart Attack in her father; Heart Disease in her father, sister, and sister; No Known Problems in her brother, maternal aunt, maternal grandfather, maternal uncle, paternal aunt, paternal grandfather, and paternal uncle; Other in her father and sister.   No premature CAD.     Review of System:  All other systems reviewed except for that noted above. Pertinent negatives and positives are:     General: negative for fever, chills   Ophthalmic ROS: negative for - eye pain or loss of vision  ENT ROS: negative for - headaches, sore throat   Respiratory: negative for - cough, sputum  Cardiovascular: Reviewed in HPI  Gastrointestinal: negative for - abdominal pain, diarrhea, N/V  Hematology: negative for - bleeding, blood clots, bruising or jaundice  Genito-Urinary:  negative for - Dysuria or incontinence  Musculoskeletal: negative for - Joint swelling, muscle pain  Neurological: negative for - confusion, dizziness, headaches   Psychiatric: No anxiety, no depression.  Dermatological:

## 2024-08-25 NOTE — PROGRESS NOTES
Hospital Medicine Progress Note        Subjective:  She still feels fatigued.  HR's on tele are better today (they truly were sustaining in the mid 40's for 15 minutes at a time yesterday in the ED).  RN has pointed out that PVCs don't seem to be generating pulses - when the patient is in bigeminy her palpated radial pulse is half the rate of her telemetry heart rate.  I bet when she was at home she was having about 60 QRS complexes per minute but her pulse oximeter was only picking up about 30 pulses per minute.        General appearance: No apparent distress, appears stated age and cooperative.  HEENT: Pupils equal, round.  Conjunctivae/corneas clear.  Neck: No jugular venous distention.   Respiratory:  Normal respiratory effort.  Bilaterally without Rales/Wheezes/Rhonchi.  Cardiovascular: bradycardic rate and regular rhythm with normal S1/S2 without murmurs, rubs or gallops.  Abdomen: Soft, non-tender, non-distended with normal bowel sounds.  Musculoskeletal: No cyanosis bilaterally.  No disproportionate or pitting edema.  Without deformity.  Skin: No jaundice.  No rashes or lesions.  Neurologic:  Neurovascularly intact without any focal sensory/motor deficits. Cranial nerves: II-XII intact, grossly non-focal.  Psychiatric: Alert and oriented, good insight.      Assessment and Plan:       The patient is a pleasant 69 Y F with a h/o former smoking, obesity s/p gastric lap band from 2007 - 2012, HTN, HLD, DM2, nonischemic (nonobstructive CAD on 2019 [facility]) cardiomyopathy with EF recovered to 50% as of a few days ago, CHF, Afib, frequent PVCs (12% of her beats during 2023 event monitor), JINA, and breast cancer s/p lumpectomy in 2009 then chemo and radiation.              She recently saw the EP clinic.  They saw that her QTc was prolonged, so they stopped her dofetilide.  They also saw that her HR was 50, so they ordered an exercise stress test (has not happened yet) to gauge her chronotropic competence.  Over the

## 2024-08-25 NOTE — PROGRESS NOTES
08/25/24 1059   Encounter Summary   Encounter Overview/Reason Rituals, Rites and Sacraments   Service Provided For Patient   Referral/Consult From Patient   Last Encounter    (8/25 communion and prayer, Pt would like to receive on Monday as well)   Complexity of Encounter Low   Begin Time 1048   End Time  1059   Total Time Calculated 11 min   Rituals, Rites and Sacraments   Type Oriental orthodox Communion

## 2024-08-25 NOTE — PLAN OF CARE
CHF Care Plan      Patient's EF (Ejection Fraction) is greater than 40%    Heart Failure Medications:  Diuretics:: Furosemide and Spironolactone    (One of the following REQUIRED for EF </= 40%/SYSTOLIC FAILURE but MAY be used in EF% >40%/DIASTOLIC FAILURE)        ACE:: None        ARB:: Valsartan         ARNI:: None    (Beta Blockers)  NON- Evidenced Based Beta Blocker (for EF% >40%/DIASTOLIC FAILURE): None    Evidenced Based Beta Blocker::(REQUIRED for EF% <40%/SYSTOLIC FAILURE) None  ...................................................................................................................................................    Failed to redirect to the Timeline version of the [employer] SmartLink.      Patient's weights and intake/output reviewed    Daily Weight log at bedside, patient/family participation in use of log: \"yes    Patient's current weight today shows a difference of 0 lbs  =  than last documented weight.      Intake/Output Summary (Last 24 hours) at 8/25/2024 1556  Last data filed at 8/25/2024 1532  Gross per 24 hour   Intake 1690 ml   Output 2650 ml   Net -960 ml       Education Booklet Provided: yes    Comorbidities Reviewed No    Patient has a past medical history of Abnormal Pap smear of cervix, Allergic, Allergic rhinitis, Aortic stenosis, mild-echo 8/2016, Arthritis, Atrial fibrillation (HCC), BMI 40.0-44.9, adult (HCC), Breast cancer (HCC), Cancer (HCC), CHF (congestive heart failure) (HCC), Depression, GERD (gastroesophageal reflux disease), H/O laparoscopic adjustable gastric banding, Heart palpitations, History of chemotherapy, History of radiation therapy, HPV (human papilloma virus) infection, HPV test positive, Hyperlipidemia, Hypertension, Hypothyroidism, Meningioma (HCC), Mild aortic regurgitation-echo 8/2016, Mild dysplasia of cervix, Obesity, Sleep apnea, Type 2 diabetes mellitus with chronic kidney disease, and Urge incontinence.     >>For CHF and Comorbidity documentation on  Education Time and Topics, please see Education Tab      Pt up in chair at this time on room air. Pt denies shortness of breath. Pt without lower extremity edema.     Patient and/or Family's stated Goal of Care this Admission: reduce shortness of breath, increase activity tolerance, be more comfortable, and reduce lower extremity edema prior to discharge        :

## 2024-08-26 ENCOUNTER — CARE COORDINATION (OUTPATIENT)
Dept: CASE MANAGEMENT | Age: 69
End: 2024-08-26

## 2024-08-26 PROBLEM — I49.8 BIGEMINY: Status: ACTIVE | Noted: 2024-08-26

## 2024-08-26 PROBLEM — I49.9 ARRHYTHMIA: Status: ACTIVE | Noted: 2024-08-24

## 2024-08-26 PROBLEM — R00.1 SYMPTOMATIC BRADYCARDIA: Status: ACTIVE | Noted: 2024-08-26

## 2024-08-26 LAB
INR PPP: 1.95 (ref 0.85–1.15)
PROTHROMBIN TIME: 22.3 SEC (ref 11.9–14.9)

## 2024-08-26 PROCEDURE — 2580000003 HC RX 258: Performed by: INTERNAL MEDICINE

## 2024-08-26 PROCEDURE — 85610 PROTHROMBIN TIME: CPT

## 2024-08-26 PROCEDURE — 2060000000 HC ICU INTERMEDIATE R&B

## 2024-08-26 PROCEDURE — 36415 COLL VENOUS BLD VENIPUNCTURE: CPT

## 2024-08-26 PROCEDURE — 6370000000 HC RX 637 (ALT 250 FOR IP): Performed by: INTERNAL MEDICINE

## 2024-08-26 PROCEDURE — 99232 SBSQ HOSP IP/OBS MODERATE 35: CPT | Performed by: NURSE PRACTITIONER

## 2024-08-26 RX ORDER — WARFARIN SODIUM 7.5 MG/1
7.5 TABLET ORAL
Status: COMPLETED | OUTPATIENT
Start: 2024-08-26 | End: 2024-08-26

## 2024-08-26 RX ADMIN — LEVOTHYROXINE SODIUM 137 MCG: 0.11 TABLET ORAL at 06:05

## 2024-08-26 RX ADMIN — Medication 5000 UNITS: at 10:02

## 2024-08-26 RX ADMIN — Medication 10 ML: at 10:03

## 2024-08-26 RX ADMIN — PANTOPRAZOLE SODIUM 40 MG: 40 TABLET, DELAYED RELEASE ORAL at 06:05

## 2024-08-26 RX ADMIN — Medication 10 ML: at 20:36

## 2024-08-26 RX ADMIN — WARFARIN SODIUM 7.5 MG: 7.5 TABLET ORAL at 18:15

## 2024-08-26 RX ADMIN — POLYETHYLENE GLYCOL 3350 17 G: 17 POWDER, FOR SOLUTION ORAL at 06:10

## 2024-08-26 RX ADMIN — B-COMPLEX W/ C & FOLIC ACID TAB 1 TABLET: TAB at 10:02

## 2024-08-26 RX ADMIN — CITALOPRAM HYDROBROMIDE 40 MG: 20 TABLET ORAL at 10:02

## 2024-08-26 RX ADMIN — ATORVASTATIN CALCIUM 40 MG: 40 TABLET, FILM COATED ORAL at 20:36

## 2024-08-26 RX ADMIN — OXYBUTYNIN CHLORIDE 15 MG: 5 TABLET, EXTENDED RELEASE ORAL at 20:36

## 2024-08-26 RX ADMIN — MONTELUKAST 10 MG: 10 TABLET, FILM COATED ORAL at 20:36

## 2024-08-26 RX ADMIN — OXYCODONE HYDROCHLORIDE AND ACETAMINOPHEN 500 MG: 500 TABLET ORAL at 10:02

## 2024-08-26 RX ADMIN — SPIRONOLACTONE 25 MG: 25 TABLET ORAL at 10:02

## 2024-08-26 RX ADMIN — CETIRIZINE HYDROCHLORIDE 10 MG: 10 TABLET, FILM COATED ORAL at 20:36

## 2024-08-26 NOTE — PROGRESS NOTES
Hospital Medicine Progress Note        Subjective:  She still feels fatigued.  No presyncope or syncope.  Frustrated with 5 AM INR draws.      General appearance: No apparent distress, appears stated age and cooperative.  HEENT: Pupils equal, round.  Conjunctivae/corneas clear.  Neck: No jugular venous distention.   Respiratory:  Normal respiratory effort.  Bilaterally without Rales/Wheezes/Rhonchi.  Cardiovascular: bradycardic rate and regular rhythm with normal S1/S2 without murmurs, rubs or gallops.  Abdomen: Soft, non-tender, non-distended with normal bowel sounds.  Musculoskeletal: No cyanosis bilaterally.  No disproportionate or pitting edema.  Without deformity.  Skin: No jaundice.  No rashes or lesions.  Neurologic:  Neurovascularly intact without any focal sensory/motor deficits. Cranial nerves: II-XII intact, grossly non-focal.  Psychiatric: Alert and oriented, good insight.      Assessment and Plan:       The patient is a pleasant 69 Y F with a h/o former smoking, obesity s/p gastric lap band from 2007 - 2012, HTN, HLD, DM2, nonischemic (nonobstructive CAD on 2019 [facility]) cardiomyopathy with EF recovered to 50% as of a few days ago, CHF, Afib, frequent PVCs (12% of her beats during 2023 event monitor), JINA, and breast cancer s/p lumpectomy in 2009 then chemo and radiation.              She recently saw the EP clinic.  They saw that her QTc was prolonged, so they stopped her dofetilide.  They also saw that her HR was 50, so they ordered an exercise stress test (has not happened yet) to gauge her chronotropic competence.  Over the last few days, however, the patient has felt very fatigued and has not tolerated minimal exertion.  Her pulse oximeter at home showed bradycardia - her HR was reportedly consistently in the 28-40 range.  She came to the ED and was found to be in bigeminy with HRs in the high 50's.  She was also having spells for 15 minutes at a time during which her HR would sustain in the mid 40's.   Otherwise her labs and vitals were fairly stable.  Cardiology was consulted and hospital medicine was paged for admission.      Symptomatic bradycardia.  TSH recently fine, electrolytes fine.  Dual chamber pacer placement 8/28.     Bigeminy, PVCs.  Pacer might allow for a BB.  Defer to cardiology.     Afib.  Dofetilide recently stopped because of prolonged QTc.  EP consulted.  Warfarin continued by EP.     Chronic diastolic CHF.  Held her empagliflozin in case she needs anesthesia.  Spironolactone.  ARBs less effective, held losartan due to lower BP.     99.2 temp.  CXR clear, UA negative, no specific symptoms.  Negative COVID.  Trended.  No apparent infection.       Diet: ADULT DIET; Regular; Low Fat/Low Chol/High Fiber/KAUSHIK  DVT Prophylaxis: warfarin held, f/u INR and start enoxaparin if needed  Disposition: PT/OT not indicated.  Home when EP is finished, perhaps 8/29.      -----------------------------------------------------------------------                              PCP: Mary Florentino PA    Date of Admission: 8/24/2024  Current Hospital Day: 3    Chief Admission Complaint: Fatigue (Was feeling tired and lethargic, pt stated that she took pulse at home and it was anywhere from 28-40. Called cardiologist and was told to come to ER)       Bradycardia    Medications:  Personally reviewed in detail in conjunction w/ labs as documented for evidence of drug toxicity.     Infusion Medications    sodium chloride       Scheduled Medications    warfarin  7.5 mg Oral Once    warfarin placeholder: dosing by pharmacy   Other RX Placeholder    cetirizine  10 mg Oral Nightly    oxyBUTYnin  15 mg Oral Nightly    atorvastatin  40 mg Oral Nightly    citalopram  40 mg Oral Daily    [Held by provider] empagliflozin  10 mg Oral Daily    levothyroxine  137 mcg Oral Daily    montelukast  10 mg Oral Nightly    vitamin B complex w/C  1 tablet Oral Daily    pantoprazole  40 mg Oral QAM AC    spironolactone  25 mg Oral Daily

## 2024-08-26 NOTE — PROGRESS NOTES
Pharmacy Note  Warfarin Consult        Dx: A-fib  Goal INR range 2-3  Home Warfarin dose: 7.5 mg (5 mg x 1.5) every Mon, Fri; 5 mg (5 mg x 1) all other days     Date                 INR                   Warfarin  8/25              pending                7.5 mg    8/26                2.23                    7.5 mg     Recommend Warfarin 7.5 mg tonight x1.  Daily INR ordered.  Rx will continue to manage therapy per consult order.  Blaine Alvarez PharmD 8/26/2024  6:15 AM

## 2024-08-26 NOTE — CARE COORDINATION
Spoke to IP Jessie FOWLER with patient's eligible for RPM program dx DM HF HTN unit C4  Lisa Jasso, RN   [phone]  Jennifer Andrews, RN  Phone [phone]

## 2024-08-26 NOTE — CARE COORDINATION
Case Management Assessment  Initial Evaluation    Date/Time of Evaluation: 8/26/2024 3:27 PM  Assessment Completed by: Jessie Manzanares RN    If patient is discharged prior to next notation, then this note serves as note for discharge by case management.    Patient Name: Reina Marin                   YOB: 1955  Diagnosis: Bradycardia [R00.1]  Bigeminy [I49.8]  Symptomatic bradycardia [R00.1]                   Date / Time: 8/24/2024 12:20 PM    Patient Admission Status: Inpatient   Readmission Risk (Low < 19, Mod (19-27), High > 27): Readmission Risk Score: 10.1    Current PCP: Mary Florentino PA  PCP verified by CM? (P) Yes    Chart Reviewed: Yes      History Provided by: (P) Patient  Patient Orientation: (P) Alert and Oriented    Patient Cognition: (P) Alert    Hospitalization in the last 30 days (Readmission):  No    If yes, Readmission Assessment in CM Navigator will be completed.    Advance Directives:      Code Status: Full Code   Patient's Primary Decision Maker is: (P) Named in Scanned ACP Document    Primary Decision Maker: Quincy MarinWinston) - Spouse - [phone]    Secondary Decision Maker: Emily Morejon - Child - [phone]    Supplemental (Other) Decision Maker: Christian Marin - Child - [phone]    Discharge Planning:    Patient lives with: (P) Spouse/Significant Other Type of Home: (P) Apartment  Primary Care Giver: (P) Self  Patient Support Systems include: (P) Spouse/Significant Other   Current Financial resources: (P) Medicare  Current community resources: (P) None  Current services prior to admission: (P) Durable Medical Equipment            Current DME: (P) Walker, [employer]            Type of Home Care services:  (P) None    ADLS  Prior functional level: (P) Independent in ADLs/IADLs  Current functional level: (P) Independent in ADLs/IADLs    PT AM-PAC:   /24  OT AM-PAC:   /24    Family can provide assistance at DC: (P) Yes  Would you like Case Management to discuss

## 2024-08-26 NOTE — PLAN OF CARE
Problem: Cardiovascular - Adult  Goal: Absence of cardiac dysrhythmias or at baseline  Outcome: Progressing  Note: NSR on tele this shift with bigeminal PVC's, plans for pacemaker on Wednesday, August 28.

## 2024-08-26 NOTE — PROGRESS NOTES
/65   Pulse 81   Temp 98.1 °F (36.7 °C) (Oral)   Resp 16   Ht 1.651 m (5' 5\")   Wt 116 kg (255 lb 11.2 oz)   LMP  (LMP Unknown)   SpO2 98%   BMI 42.55 kg/m²  on room air.  Pt resting quietly in bed, denies pain, with some dyspnea at rest.  Lungs clear.  NSR on tele at this time.  Pt denies any needs at this time.  Bedside table, call light, fluids within reach.  Pt instructed to call out for any needs and assistance.  Aurea Ramirez, RN  8/26/2024

## 2024-08-26 NOTE — PROGRESS NOTES
[facility]     Electrophysiology                                     Progress Note    Admission date:  2024    Reason for follow up visit: PVC's, bradycardia     HPI/CC: Reina Marin was admitted on 2024 with bradycardia and palpitations. Tikosyn had been stopped a few days prior due to prolonging QTc. EKG and telemetry has shown very frequent PVC's. EP with plans for pacemaker implant and resumption of Tikosyn.  Rhythm has been sinus with bigeminal PVC's.     Subjective: She complains of ongoing palpitations but is feeling well otherwise.     Vitals:  Blood pressure 116/65, pulse 81, temperature 98.1 °F (36.7 °C), temperature source Oral, resp. rate 16, height 1.651 m (5' 5\"), weight 116 kg (255 lb 11.2 oz), SpO2 98%, not currently breastfeeding.  Temp  Av.4 °F (36.9 °C)  Min: 97.8 °F (36.6 °C)  Max: 99 °F (37.2 °C)  Pulse  Av.2  Min: 37  Max: 81  BP  Min: 88/56  Max: 131/71  SpO2  Av %  Min: 96 %  Max: 98 %  FiO2   Av %  Min: 21 %  Max: 21 %    24 hour I/O    Intake/Output Summary (Last 24 hours) at 2024 1001  Last data filed at 2024 0518  Gross per 24 hour   Intake 840 ml   Output 2650 ml   Net -1810 ml     Current Facility-Administered Medications   Medication Dose Route Frequency Provider Last Rate Last Admin    warfarin (COUMADIN) tablet 7.5 mg  7.5 mg Oral Once Austin Richardson MD        warfarin placeholder: dosing by pharmacy   Other RX Placeholder Austin Richardson MD        cetirizine (ZYRTEC) tablet 10 mg  10 mg Oral Nightly Austin Richardson MD        oxyBUTYnin (DITROPAN-XL) extended release tablet 15 mg  15 mg Oral Nightly Austin Richardson MD        atorvastatin (LIPITOR) tablet 40 mg  40 mg Oral Nightly Austin Richardson MD   40 mg at 242    citalopram (CELEXA) tablet 40 mg  40 mg Oral Daily Austin Richardson MD   40 mg at 08/25/24 0857    [Held by provider] empagliflozin (JARDIANCE) tablet 10 mg  10 mg Oral Daily Dylan  recent EF 55% 3/2022, EF 50-55% on echo 8/2023  Chronic systolic CHF: compensated, follows with Dr. Leos   Aortic stenosis: mild AS   Hypothyroidism on Synthroid  JINA: CPAP compliant          Plan:   1. Continue to closely monitor on telemetry   2. Continue Coumadin   3. Plan for dual chamber pacemaker implant on Wednesday 8/28/2024 (risks, benefits, and alternative therapy discussed). Will resume Tikosyn and possibly beta blocker post implant.       Gini Pike, APRN-CNP  [facility] [place]  [phone]

## 2024-08-27 LAB
INR PPP: 2.34 (ref 0.85–1.15)
PROTHROMBIN TIME: 25.6 SEC (ref 11.9–14.9)

## 2024-08-27 PROCEDURE — 2060000000 HC ICU INTERMEDIATE R&B

## 2024-08-27 PROCEDURE — 6370000000 HC RX 637 (ALT 250 FOR IP): Performed by: INTERNAL MEDICINE

## 2024-08-27 PROCEDURE — 85610 PROTHROMBIN TIME: CPT

## 2024-08-27 PROCEDURE — 36415 COLL VENOUS BLD VENIPUNCTURE: CPT

## 2024-08-27 PROCEDURE — 99232 SBSQ HOSP IP/OBS MODERATE 35: CPT | Performed by: NURSE PRACTITIONER

## 2024-08-27 PROCEDURE — 2580000003 HC RX 258: Performed by: INTERNAL MEDICINE

## 2024-08-27 RX ORDER — WARFARIN SODIUM 5 MG/1
5 TABLET ORAL
Status: COMPLETED | OUTPATIENT
Start: 2024-08-27 | End: 2024-08-27

## 2024-08-27 RX ADMIN — B-COMPLEX W/ C & FOLIC ACID TAB 1 TABLET: TAB at 09:04

## 2024-08-27 RX ADMIN — MONTELUKAST 10 MG: 10 TABLET, FILM COATED ORAL at 20:35

## 2024-08-27 RX ADMIN — OXYCODONE HYDROCHLORIDE AND ACETAMINOPHEN 500 MG: 500 TABLET ORAL at 09:04

## 2024-08-27 RX ADMIN — PANTOPRAZOLE SODIUM 40 MG: 40 TABLET, DELAYED RELEASE ORAL at 06:44

## 2024-08-27 RX ADMIN — CETIRIZINE HYDROCHLORIDE 10 MG: 10 TABLET, FILM COATED ORAL at 20:36

## 2024-08-27 RX ADMIN — SPIRONOLACTONE 25 MG: 25 TABLET ORAL at 09:04

## 2024-08-27 RX ADMIN — Medication 5000 UNITS: at 09:49

## 2024-08-27 RX ADMIN — LEVOTHYROXINE SODIUM 137 MCG: 0.11 TABLET ORAL at 06:44

## 2024-08-27 RX ADMIN — Medication 10 ML: at 20:37

## 2024-08-27 RX ADMIN — ATORVASTATIN CALCIUM 40 MG: 40 TABLET, FILM COATED ORAL at 20:36

## 2024-08-27 RX ADMIN — WARFARIN SODIUM 5 MG: 5 TABLET ORAL at 17:57

## 2024-08-27 RX ADMIN — CITALOPRAM HYDROBROMIDE 40 MG: 20 TABLET ORAL at 09:04

## 2024-08-27 RX ADMIN — OXYBUTYNIN CHLORIDE 15 MG: 5 TABLET, EXTENDED RELEASE ORAL at 20:36

## 2024-08-27 RX ADMIN — Medication 10 ML: at 09:06

## 2024-08-27 NOTE — PLAN OF CARE
CHF Care Plan      Patient's EF (Ejection Fraction) is greater than 40%    Heart Failure Medications:  Diuretics:: Spironolactone    (One of the following REQUIRED for EF </= 40%/SYSTOLIC FAILURE but MAY be used in EF% >40%/DIASTOLIC FAILURE)        ACE:: None        ARB:: None         ARNI:: None    (Beta Blockers)  NON- Evidenced Based Beta Blocker (for EF% >40%/DIASTOLIC FAILURE): None    Evidenced Based Beta Blocker::(REQUIRED for EF% <40%/SYSTOLIC FAILURE) None  ...................................................................................................................................................    Failed to redirect to the Timeline version of the [employer] SmartLink.      Patient's weights and intake/output reviewed    Daily Weight log at bedside, patient/family participation in use of log: \"yes    Patient's current weight today shows a difference of 2 lbs less than last documented weight.      Intake/Output Summary (Last 24 hours) at 8/27/2024 1825  Last data filed at 8/27/2024 1802  Gross per 24 hour   Intake 490 ml   Output 3175 ml   Net -2685 ml       Education Booklet Provided: no    Comorbidities Reviewed No    Patient has a past medical history of Abnormal Pap smear of cervix, Allergic, Allergic rhinitis, Aortic stenosis, mild-echo 8/2016, Arthritis, Atrial fibrillation (HCC), BMI 40.0-44.9, adult (HCC), Breast cancer (HCC), Cancer (HCC), CHF (congestive heart failure) (HCC), Depression, GERD (gastroesophageal reflux disease), H/O laparoscopic adjustable gastric banding, Heart palpitations, History of chemotherapy, History of radiation therapy, HPV (human papilloma virus) infection, HPV test positive, Hyperlipidemia, Hypertension, Hypothyroidism, Meningioma (HCC), Mild aortic regurgitation-echo 8/2016, Mild dysplasia of cervix, Obesity, Sleep apnea, Type 2 diabetes mellitus with chronic kidney disease, and Urge incontinence.     >>For CHF and Comorbidity documentation on Education Time and

## 2024-08-27 NOTE — PROGRESS NOTES
ventricular hypertrophy.   Normal left ventricular diastolic filling pressure.   Mild mitral regurgitation.   Aortic sclerosis vs mild aortic stenosis.   Mild aortic regurgitation is present.   Normal systolic pulmonary artery pressure (SPAP) estimated at 29 mmHg (RA   pressure 8 mmHg).        Cardiac MRI 8/23/2019 ():  Findings are most consistent with a nonischemic cardiomyopathy.  There is no evidence of left ventricular scarring, edema or iron overload. There is evidence of incomplete or complete left bundle branch block. Regional wall motion abnormalities involving are likely secondary to underlying conduction disease; however, ischemic heart disease cannot be excluded.    The right ventricle is normal in size and systolic function. There is late gadolinium enhancement of the RV insertion points suggestive of elevated right heart pressures.  The left atrium is mildly dilated. There is mild mitral regurgitation  The right atrium is normal in size.  There is late gadolinium enhancement involving both atria.  There is mild aortic stenosis and regurgitation.  The descending aorta is dilated there is evidence of associated atherosclerosis.     [facility] 8/2/2019 (Norma):  LM: luminals  LAD: mid luminals, distal vessel small  LCX: luminals  RCA: dominant, very large     LVEDP: 18  LVEF: 25-30% global hypokinesis     Assessment  1. Nonischemic cardiomyopathy  2. Given worsening cardiomyopathy, suggest Cardiac MRI                - eval for myocarditis or infiltrative cardiomyopathy  3. Will d/c flecainide and dilt given worsening LVEF, pt currently in NSR                - start toprol 25                - start ASA and lisinopril  4. Lasix in post op area                - will make Appt with heart failure team       All labs and testing reviewed.  Lab Review     Renal Profile:   Lab Results   Component Value Date/Time    CREATININE 1.0 08/24/2024 12:37 PM    BUN 26 08/24/2024 12:37 PM     08/24/2024 12:37 PM    K 3.8  08/24/2024 12:37 PM     08/24/2024 12:37 PM    CO2 26 08/24/2024 12:37 PM     CBC:    Lab Results   Component Value Date/Time    WBC 6.4 08/24/2024 12:37 PM    RBC 4.80 08/24/2024 12:37 PM    RBC 4.71 01/24/2017 11:37 AM    HGB 14.4 08/24/2024 12:37 PM    HCT 43.6 08/24/2024 12:37 PM    MCV 90.7 08/24/2024 12:37 PM    RDW 14.5 08/24/2024 12:37 PM     08/24/2024 12:37 PM     BNP:  No results found for: \"BNP\"  Fasting Lipid Panel:    Lab Results   Component Value Date/Time    CHOL 109 06/11/2024 11:41 AM    HDL 42 06/11/2024 11:41 AM    TRIG 79 06/11/2024 11:41 AM     Cardiac Enzymes:  CK/MbTroponin  Lab Results   Component Value Date/Time    TROPONINI <0.01 07/17/2019 11:45 AM     PT/ INR   Lab Results   Component Value Date/Time    INR 2.34 08/27/2024 08:18 AM    INR 1.95 08/26/2024 04:53 AM    INR 2.23 08/25/2024 05:59 PM    PROTIME 25.6 08/27/2024 08:18 AM    PROTIME 22.3 08/26/2024 04:53 AM    PROTIME 24.7 08/25/2024 05:59 PM     PTT No components found for: \"PTT\"   Lab Results   Component Value Date/Time    MG 2.20 08/24/2024 12:37 PM      Lab Results   Component Value Date/Time    TSH 3.44 06/11/2024 11:41 AM    TSH 2.20 04/21/2023 02:03 PM       Assessment:  Symptomatic sinus bradycardia: ongoing  Symptomatic paroxsymal atrial fibrillation: stable              -[id number] score 4 (age, gender, CHF, HTN)              -flecainide stopped due to worsening EF 8/2019              -Rythmol stopped due to increase in QRS duration 3/2022              -Tikosyn started 3/28/2022 (monitoring for toxicity with antiarrythmic medications), stopped 8/20/2024 for prolonged QTc               -s/p PVI and RFCA 3/2020 (Christian)  Frequent PVC's: recurrent off of Tikosyn   -noted recently when patient presented for ortho surgery              -event monitor showed a 12% PVC burden 7/2023  NICM with recovered EF: stable              -most recent EF 55% 3/2022, EF 50-55% on echo 8/2023  Chronic systolic CHF: compensated,

## 2024-08-27 NOTE — PLAN OF CARE
CHF Care Plan      Patient's EF (Ejection Fraction) is greater than 40%    Heart Failure Medications:  Diuretics:: Spironolactone    (One of the following REQUIRED for EF </= 40%/SYSTOLIC FAILURE but MAY be used in EF% >40%/DIASTOLIC FAILURE)        ACE:: None        ARB:: None         ARNI:: None    (Beta Blockers)  NON- Evidenced Based Beta Blocker (for EF% >40%/DIASTOLIC FAILURE): None    Evidenced Based Beta Blocker::(REQUIRED for EF% <40%/SYSTOLIC FAILURE) None  ...................................................................................................................................................    Failed to redirect to the Timeline version of the [employer] SmartLink.      Patient's weights and intake/output reviewed    Daily Weight log at bedside, patient/family participation in use of log: \"yes    Patient's current weight today shows a difference of 1.98 lbs less than last documented weight.      Intake/Output Summary (Last 24 hours) at 8/27/2024 0656  Last data filed at 8/27/2024 0645  Gross per 24 hour   Intake 1150 ml   Output 3650 ml   Net -2500 ml       Education Booklet Provided: yes    Comorbidities Reviewed Yes    Patient has a past medical history of Abnormal Pap smear of cervix, Allergic, Allergic rhinitis, Aortic stenosis, mild-echo 8/2016, Arthritis, Atrial fibrillation (HCC), BMI 40.0-44.9, adult (HCC), Breast cancer (HCC), Cancer (HCC), CHF (congestive heart failure) (HCC), Depression, GERD (gastroesophageal reflux disease), H/O laparoscopic adjustable gastric banding, Heart palpitations, History of chemotherapy, History of radiation therapy, HPV (human papilloma virus) infection, HPV test positive, Hyperlipidemia, Hypertension, Hypothyroidism, Meningioma (HCC), Mild aortic regurgitation-echo 8/2016, Mild dysplasia of cervix, Obesity, Sleep apnea, Type 2 diabetes mellitus with chronic kidney disease, and Urge incontinence.     >>For CHF and Comorbidity documentation on Education Time and

## 2024-08-27 NOTE — PROGRESS NOTES
Hospital Medicine Progress Note        Subjective:  She still feels fatigued.  No presyncope or syncope.        General appearance: No apparent distress, appears stated age and cooperative.  HEENT: Pupils equal, round.  Conjunctivae/corneas clear.  Neck: No jugular venous distention.   Respiratory:  Normal respiratory effort.  Bilaterally without Rales/Wheezes/Rhonchi.  Cardiovascular: bradycardic rate and regular rhythm with normal S1/S2 without murmurs, rubs or gallops.  Abdomen: Soft, non-tender, non-distended with normal bowel sounds.  Musculoskeletal: No cyanosis bilaterally.  No disproportionate or pitting edema.  Without deformity.  Skin: No jaundice.  No rashes or lesions.  Neurologic:  Neurovascularly intact without any focal sensory/motor deficits. Cranial nerves: II-XII intact, grossly non-focal.  Psychiatric: Alert and oriented, good insight.      Assessment and Plan:       The patient is a pleasant 69 Y F with a h/o former smoking, obesity s/p gastric lap band from 2007 - 2012, HTN, HLD, DM2, nonischemic (nonobstructive CAD on 2019 [facility]) cardiomyopathy with EF recovered to 50% as of a few days ago, CHF, Afib, frequent PVCs (12% of her beats during 2023 event monitor), JINA, and breast cancer s/p lumpectomy in 2009 then chemo and radiation.              She recently saw the EP clinic.  They saw that her QTc was prolonged, so they stopped her dofetilide.  They also saw that her HR was 50, so they ordered an exercise stress test (has not happened yet) to gauge her chronotropic competence.  Over the last few days, however, the patient has felt very fatigued and has not tolerated minimal exertion.  Her pulse oximeter at home showed bradycardia - her HR was reportedly consistently in the 28-40 range.  She came to the ED and was found to be in bigeminy with HRs in the high 50's.  She was also having spells for 15 minutes at a time during which her HR would sustain in the mid 40's.  Otherwise her labs and vitals  were fairly stable.  Cardiology was consulted and hospital medicine was paged for admission.      Symptomatic bradycardia.  TSH recently fine, electrolytes fine.  Dual chamber pacer placement 8/28.     Bigeminy, PVCs.  Cardiology will consider a BB after pacer placement.      Afib.  Dofetilide recently stopped because of prolonged QTc.  EP considering resumption of dofetilide after pacer placement.  Warfarin continued by EP.     Chronic diastolic CHF.  Held her empagliflozin for procedure.  Spironolactone.  ARBs less effective, held losartan due to intermittently lower BP, can probably resume after procedure.     99.2 temp.  CXR clear, UA negative, no specific symptoms.  Negative COVID.  Trended.  No apparent infection.       Diet: ADULT DIET; Regular; Low Fat/Low Chol/High Fiber/KAUSHIK  DVT Prophylaxis: anticoagulation as above   Disposition: PT/OT not indicated.  Home when EP is finished, perhaps 8/29.      -----------------------------------------------------------------------                              PCP: Mary Florentino PA    Date of Admission: 8/24/2024  Current Hospital Day: 4    Chief Admission Complaint: Fatigue (Was feeling tired and lethargic, pt stated that she took pulse at home and it was anywhere from 28-40. Called cardiologist and was told to come to ER)       Bradycardia    Medications:  Personally reviewed in detail in conjunction w/ labs as documented for evidence of drug toxicity.     Infusion Medications    sodium chloride       Scheduled Medications    warfarin  5 mg Oral Once    warfarin placeholder: dosing by pharmacy   Other RX Placeholder    cetirizine  10 mg Oral Nightly    oxyBUTYnin  15 mg Oral Nightly    atorvastatin  40 mg Oral Nightly    citalopram  40 mg Oral Daily    [Held by provider] empagliflozin  10 mg Oral Daily    levothyroxine  137 mcg Oral Daily    montelukast  10 mg Oral Nightly    vitamin B complex w/C  1 tablet Oral Daily    pantoprazole  40 mg Oral QAM AC

## 2024-08-27 NOTE — CARE COORDINATION
Patient is getting a pacemaker tomorrow 8/28. IPTA. Will follow post op course for d/c planning. Likely no needs.

## 2024-08-27 NOTE — PROGRESS NOTES
Pharmacy Note  Warfarin Consult    Dx: A-fib  Goal INR range 2-3  Home Warfarin dose: 7.5 mg (5 mg x 1.5) every Mon, Fri; 5 mg (5 mg x 1) all other days     Date                 INR                   Warfarin   8/25               pending                 7.5 mg    8/26                 2.23                     7.5 mg  8/27                 2.34                      5 mg        Recommend Warfarin 5 mg tonight x1.  Daily INR ordered.  Rx will continue to manage therapy per consult order.    Pan Foster, PharmD    8/27/2024 9:18 AM

## 2024-08-28 ENCOUNTER — NURSE ONLY (OUTPATIENT)
Dept: CARDIOLOGY CLINIC | Age: 69
End: 2024-08-28

## 2024-08-28 ENCOUNTER — ANESTHESIA EVENT (OUTPATIENT)
Dept: CARDIAC CATH/INVASIVE PROCEDURES | Age: 69
End: 2024-08-28
Payer: MEDICARE

## 2024-08-28 ENCOUNTER — ANESTHESIA (OUTPATIENT)
Dept: CARDIAC CATH/INVASIVE PROCEDURES | Age: 69
End: 2024-08-28
Payer: MEDICARE

## 2024-08-28 ENCOUNTER — CARE COORDINATION (OUTPATIENT)
Dept: CASE MANAGEMENT | Age: 69
End: 2024-08-28

## 2024-08-28 DIAGNOSIS — Z95.0 PACEMAKER: Primary | ICD-10-CM

## 2024-08-28 LAB
ECHO BSA: 2.3 M2
GLUCOSE BLD-MCNC: 146 MG/DL (ref 70–99)
INR PPP: 2.59 (ref 0.85–1.15)
PERFORMED ON: ABNORMAL
PROTHROMBIN TIME: 27.7 SEC (ref 11.9–14.9)

## 2024-08-28 PROCEDURE — 99233 SBSQ HOSP IP/OBS HIGH 50: CPT | Performed by: INTERNAL MEDICINE

## 2024-08-28 PROCEDURE — C1892 INTRO/SHEATH,FIXED,PEEL-AWAY: HCPCS | Performed by: INTERNAL MEDICINE

## 2024-08-28 PROCEDURE — 6370000000 HC RX 637 (ALT 250 FOR IP): Performed by: INTERNAL MEDICINE

## 2024-08-28 PROCEDURE — 2580000003 HC RX 258: Performed by: INTERNAL MEDICINE

## 2024-08-28 PROCEDURE — 2709999900 HC NON-CHARGEABLE SUPPLY: Performed by: INTERNAL MEDICINE

## 2024-08-28 PROCEDURE — 6360000002 HC RX W HCPCS

## 2024-08-28 PROCEDURE — 0JH606Z INSERTION OF PACEMAKER, DUAL CHAMBER INTO CHEST SUBCUTANEOUS TISSUE AND FASCIA, OPEN APPROACH: ICD-10-PCS | Performed by: FAMILY MEDICINE

## 2024-08-28 PROCEDURE — 6360000002 HC RX W HCPCS: Performed by: INTERNAL MEDICINE

## 2024-08-28 PROCEDURE — C1894 INTRO/SHEATH, NON-LASER: HCPCS | Performed by: INTERNAL MEDICINE

## 2024-08-28 PROCEDURE — 2500000003 HC RX 250 WO HCPCS

## 2024-08-28 PROCEDURE — 6370000000 HC RX 637 (ALT 250 FOR IP): Performed by: NURSE PRACTITIONER

## 2024-08-28 PROCEDURE — C1785 PMKR, DUAL, RATE-RESP: HCPCS | Performed by: INTERNAL MEDICINE

## 2024-08-28 PROCEDURE — 93005 ELECTROCARDIOGRAM TRACING: CPT | Performed by: INTERNAL MEDICINE

## 2024-08-28 PROCEDURE — C1898 LEAD, PMKR, OTHER THAN TRANS: HCPCS | Performed by: INTERNAL MEDICINE

## 2024-08-28 PROCEDURE — 85610 PROTHROMBIN TIME: CPT

## 2024-08-28 PROCEDURE — C1769 GUIDE WIRE: HCPCS | Performed by: INTERNAL MEDICINE

## 2024-08-28 PROCEDURE — 3700000000 HC ANESTHESIA ATTENDED CARE: Performed by: INTERNAL MEDICINE

## 2024-08-28 PROCEDURE — 36415 COLL VENOUS BLD VENIPUNCTURE: CPT

## 2024-08-28 PROCEDURE — 7100000001 HC PACU RECOVERY - ADDTL 15 MIN: Performed by: INTERNAL MEDICINE

## 2024-08-28 PROCEDURE — 2500000003 HC RX 250 WO HCPCS: Performed by: INTERNAL MEDICINE

## 2024-08-28 PROCEDURE — 2580000003 HC RX 258

## 2024-08-28 PROCEDURE — 02HK3JZ INSERTION OF PACEMAKER LEAD INTO RIGHT VENTRICLE, PERCUTANEOUS APPROACH: ICD-10-PCS | Performed by: FAMILY MEDICINE

## 2024-08-28 PROCEDURE — C1889 IMPLANT/INSERT DEVICE, NOC: HCPCS | Performed by: INTERNAL MEDICINE

## 2024-08-28 PROCEDURE — 2060000000 HC ICU INTERMEDIATE R&B

## 2024-08-28 PROCEDURE — 33208 INSRT HEART PM ATRIAL & VENT: CPT | Performed by: INTERNAL MEDICINE

## 2024-08-28 PROCEDURE — 02H63JZ INSERTION OF PACEMAKER LEAD INTO RIGHT ATRIUM, PERCUTANEOUS APPROACH: ICD-10-PCS | Performed by: FAMILY MEDICINE

## 2024-08-28 PROCEDURE — 7100000000 HC PACU RECOVERY - FIRST 15 MIN: Performed by: INTERNAL MEDICINE

## 2024-08-28 PROCEDURE — 2720000010 HC SURG SUPPLY STERILE: Performed by: INTERNAL MEDICINE

## 2024-08-28 PROCEDURE — 3700000001 HC ADD 15 MINUTES (ANESTHESIA): Performed by: INTERNAL MEDICINE

## 2024-08-28 DEVICE — PACE/SENSE LEAD
Type: IMPLANTABLE DEVICE | Site: HEART | Status: FUNCTIONAL
Brand: INGEVITY™+

## 2024-08-28 DEVICE — PACEMAKER
Type: IMPLANTABLE DEVICE | Site: HEART | Status: FUNCTIONAL
Brand: ACCOLADE™ MRI EL DR

## 2024-08-28 DEVICE — ENVELOPE CMRM6133 ABSORB LRG MR
Type: IMPLANTABLE DEVICE | Site: CHEST  WALL | Status: FUNCTIONAL
Brand: TYRX™

## 2024-08-28 RX ORDER — OXYCODONE HYDROCHLORIDE 5 MG/1
5 TABLET ORAL PRN
Status: DISCONTINUED | OUTPATIENT
Start: 2024-08-28 | End: 2024-08-28 | Stop reason: HOSPADM

## 2024-08-28 RX ORDER — SODIUM CHLORIDE 0.9 % (FLUSH) 0.9 %
5-40 SYRINGE (ML) INJECTION PRN
Status: DISCONTINUED | OUTPATIENT
Start: 2024-08-28 | End: 2024-08-28 | Stop reason: HOSPADM

## 2024-08-28 RX ORDER — SODIUM CHLORIDE 0.9 % (FLUSH) 0.9 %
5-40 SYRINGE (ML) INJECTION PRN
Status: DISCONTINUED | OUTPATIENT
Start: 2024-08-28 | End: 2024-08-29 | Stop reason: HOSPADM

## 2024-08-28 RX ORDER — MEPERIDINE HYDROCHLORIDE 50 MG/ML
12.5 INJECTION INTRAMUSCULAR; INTRAVENOUS; SUBCUTANEOUS EVERY 5 MIN PRN
Status: DISCONTINUED | OUTPATIENT
Start: 2024-08-28 | End: 2024-08-28 | Stop reason: HOSPADM

## 2024-08-28 RX ORDER — FENTANYL CITRATE 50 UG/ML
INJECTION, SOLUTION INTRAMUSCULAR; INTRAVENOUS PRN
Status: DISCONTINUED | OUTPATIENT
Start: 2024-08-28 | End: 2024-08-28 | Stop reason: SDUPTHER

## 2024-08-28 RX ORDER — OXYCODONE HYDROCHLORIDE 5 MG/1
10 TABLET ORAL EVERY 4 HOURS PRN
Status: DISCONTINUED | OUTPATIENT
Start: 2024-08-28 | End: 2024-08-29 | Stop reason: HOSPADM

## 2024-08-28 RX ORDER — DEXAMETHASONE SODIUM PHOSPHATE 4 MG/ML
INJECTION, SOLUTION INTRA-ARTICULAR; INTRALESIONAL; INTRAMUSCULAR; INTRAVENOUS; SOFT TISSUE PRN
Status: DISCONTINUED | OUTPATIENT
Start: 2024-08-28 | End: 2024-08-28 | Stop reason: SDUPTHER

## 2024-08-28 RX ORDER — ONDANSETRON 2 MG/ML
INJECTION INTRAMUSCULAR; INTRAVENOUS PRN
Status: DISCONTINUED | OUTPATIENT
Start: 2024-08-28 | End: 2024-08-28 | Stop reason: SDUPTHER

## 2024-08-28 RX ORDER — SODIUM CHLORIDE 9 MG/ML
INJECTION, SOLUTION INTRAVENOUS PRN
Status: DISCONTINUED | OUTPATIENT
Start: 2024-08-28 | End: 2024-08-29 | Stop reason: HOSPADM

## 2024-08-28 RX ORDER — LIDOCAINE HYDROCHLORIDE AND EPINEPHRINE 5; 5 MG/ML; UG/ML
INJECTION, SOLUTION INFILTRATION; PERINEURAL PRN
Status: DISCONTINUED | OUTPATIENT
Start: 2024-08-28 | End: 2024-08-28 | Stop reason: HOSPADM

## 2024-08-28 RX ORDER — SODIUM CHLORIDE 9 MG/ML
INJECTION, SOLUTION INTRAVENOUS CONTINUOUS PRN
Status: DISCONTINUED | OUTPATIENT
Start: 2024-08-28 | End: 2024-08-28 | Stop reason: SDUPTHER

## 2024-08-28 RX ORDER — NALOXONE HYDROCHLORIDE 0.4 MG/ML
INJECTION, SOLUTION INTRAMUSCULAR; INTRAVENOUS; SUBCUTANEOUS PRN
Status: DISCONTINUED | OUTPATIENT
Start: 2024-08-28 | End: 2024-08-28 | Stop reason: HOSPADM

## 2024-08-28 RX ORDER — SODIUM CHLORIDE 9 MG/ML
INJECTION, SOLUTION INTRAVENOUS PRN
Status: DISCONTINUED | OUTPATIENT
Start: 2024-08-28 | End: 2024-08-28 | Stop reason: HOSPADM

## 2024-08-28 RX ORDER — OXYCODONE HYDROCHLORIDE 5 MG/1
10 TABLET ORAL PRN
Status: DISCONTINUED | OUTPATIENT
Start: 2024-08-28 | End: 2024-08-28 | Stop reason: HOSPADM

## 2024-08-28 RX ORDER — MIDAZOLAM HYDROCHLORIDE 1 MG/ML
INJECTION INTRAMUSCULAR; INTRAVENOUS PRN
Status: DISCONTINUED | OUTPATIENT
Start: 2024-08-28 | End: 2024-08-28 | Stop reason: SDUPTHER

## 2024-08-28 RX ORDER — SODIUM CHLORIDE 0.9 % (FLUSH) 0.9 %
5-40 SYRINGE (ML) INJECTION EVERY 12 HOURS SCHEDULED
Status: DISCONTINUED | OUTPATIENT
Start: 2024-08-28 | End: 2024-08-28 | Stop reason: HOSPADM

## 2024-08-28 RX ORDER — DOFETILIDE 0.5 MG/1
500 CAPSULE ORAL EVERY 12 HOURS SCHEDULED
Status: DISCONTINUED | OUTPATIENT
Start: 2024-08-28 | End: 2024-08-29 | Stop reason: HOSPADM

## 2024-08-28 RX ORDER — MIDAZOLAM HYDROCHLORIDE 1 MG/ML
2 INJECTION INTRAMUSCULAR; INTRAVENOUS
Status: DISCONTINUED | OUTPATIENT
Start: 2024-08-28 | End: 2024-08-28 | Stop reason: HOSPADM

## 2024-08-28 RX ORDER — SODIUM CHLORIDE 0.9 % (FLUSH) 0.9 %
5-40 SYRINGE (ML) INJECTION EVERY 12 HOURS SCHEDULED
Status: DISCONTINUED | OUTPATIENT
Start: 2024-08-28 | End: 2024-08-29 | Stop reason: HOSPADM

## 2024-08-28 RX ORDER — DOXYCYCLINE HYCLATE 100 MG
100 TABLET ORAL EVERY 12 HOURS SCHEDULED
Status: DISCONTINUED | OUTPATIENT
Start: 2024-08-28 | End: 2024-08-29 | Stop reason: HOSPADM

## 2024-08-28 RX ORDER — DIPHENHYDRAMINE HYDROCHLORIDE 50 MG/ML
6.25 INJECTION INTRAMUSCULAR; INTRAVENOUS
Status: DISCONTINUED | OUTPATIENT
Start: 2024-08-28 | End: 2024-08-28 | Stop reason: HOSPADM

## 2024-08-28 RX ORDER — OXYCODONE HYDROCHLORIDE 5 MG/1
5 TABLET ORAL EVERY 4 HOURS PRN
Status: DISCONTINUED | OUTPATIENT
Start: 2024-08-28 | End: 2024-08-29 | Stop reason: HOSPADM

## 2024-08-28 RX ORDER — WARFARIN SODIUM 5 MG/1
5 TABLET ORAL
Status: COMPLETED | OUTPATIENT
Start: 2024-08-28 | End: 2024-08-28

## 2024-08-28 RX ORDER — ONDANSETRON 2 MG/ML
4 INJECTION INTRAMUSCULAR; INTRAVENOUS ONCE
Status: DISCONTINUED | OUTPATIENT
Start: 2024-08-28 | End: 2024-08-28 | Stop reason: HOSPADM

## 2024-08-28 RX ORDER — LIDOCAINE HYDROCHLORIDE 20 MG/ML
INJECTION, SOLUTION INFILTRATION; PERINEURAL PRN
Status: DISCONTINUED | OUTPATIENT
Start: 2024-08-28 | End: 2024-08-28 | Stop reason: SDUPTHER

## 2024-08-28 RX ORDER — PROPOFOL 10 MG/ML
INJECTION, EMULSION INTRAVENOUS CONTINUOUS PRN
Status: DISCONTINUED | OUTPATIENT
Start: 2024-08-28 | End: 2024-08-28 | Stop reason: SDUPTHER

## 2024-08-28 RX ORDER — MEPERIDINE HYDROCHLORIDE 50 MG/ML
INJECTION INTRAMUSCULAR; INTRAVENOUS; SUBCUTANEOUS
Status: COMPLETED
Start: 2024-08-28 | End: 2024-08-28

## 2024-08-28 RX ORDER — BUPIVACAINE HYDROCHLORIDE 5 MG/ML
INJECTION, SOLUTION EPIDURAL; INTRACAUDAL PRN
Status: DISCONTINUED | OUTPATIENT
Start: 2024-08-28 | End: 2024-08-28 | Stop reason: HOSPADM

## 2024-08-28 RX ADMIN — OXYBUTYNIN CHLORIDE 15 MG: 5 TABLET, EXTENDED RELEASE ORAL at 21:05

## 2024-08-28 RX ADMIN — LIDOCAINE HYDROCHLORIDE 60 MG: 20 INJECTION, SOLUTION INFILTRATION; PERINEURAL at 13:40

## 2024-08-28 RX ADMIN — DOXYCYCLINE HYCLATE 100 MG: 100 TABLET, COATED ORAL at 21:06

## 2024-08-28 RX ADMIN — DEXAMETHASONE SODIUM PHOSPHATE 4 MG: 4 INJECTION, SOLUTION INTRAMUSCULAR; INTRAVENOUS at 14:00

## 2024-08-28 RX ADMIN — PANTOPRAZOLE SODIUM 40 MG: 40 TABLET, DELAYED RELEASE ORAL at 05:07

## 2024-08-28 RX ADMIN — SODIUM CHLORIDE: 9 INJECTION, SOLUTION INTRAVENOUS at 13:40

## 2024-08-28 RX ADMIN — Medication 20 MG: at 13:40

## 2024-08-28 RX ADMIN — Medication 5000 UNITS: at 10:23

## 2024-08-28 RX ADMIN — PROPOFOL 50 MCG/KG/MIN: 10 INJECTION, EMULSION INTRAVENOUS at 13:40

## 2024-08-28 RX ADMIN — PHENYLEPHRINE HYDROCHLORIDE 20 MCG/MIN: 10 INJECTION INTRAVENOUS at 14:02

## 2024-08-28 RX ADMIN — DOFETILIDE 500 MCG: 0.5 CAPSULE ORAL at 21:05

## 2024-08-28 RX ADMIN — VANCOMYCIN HYDROCHLORIDE 1750 MG: 1 INJECTION, POWDER, LYOPHILIZED, FOR SOLUTION INTRAVENOUS at 13:42

## 2024-08-28 RX ADMIN — MONTELUKAST 10 MG: 10 TABLET, FILM COATED ORAL at 21:05

## 2024-08-28 RX ADMIN — ACETAMINOPHEN 650 MG: 325 TABLET ORAL at 19:40

## 2024-08-28 RX ADMIN — B-COMPLEX W/ C & FOLIC ACID TAB 1 TABLET: TAB at 10:23

## 2024-08-28 RX ADMIN — CETIRIZINE HYDROCHLORIDE 10 MG: 10 TABLET, FILM COATED ORAL at 21:05

## 2024-08-28 RX ADMIN — WARFARIN SODIUM 5 MG: 5 TABLET ORAL at 19:40

## 2024-08-28 RX ADMIN — Medication 10 ML: at 21:09

## 2024-08-28 RX ADMIN — MIDAZOLAM 2 MG: 1 INJECTION INTRAMUSCULAR; INTRAVENOUS at 13:40

## 2024-08-28 RX ADMIN — FENTANYL CITRATE 25 MCG: 50 INJECTION, SOLUTION INTRAMUSCULAR; INTRAVENOUS at 13:40

## 2024-08-28 RX ADMIN — OXYCODONE HYDROCHLORIDE AND ACETAMINOPHEN 500 MG: 500 TABLET ORAL at 10:23

## 2024-08-28 RX ADMIN — Medication 10 ML: at 10:25

## 2024-08-28 RX ADMIN — ATORVASTATIN CALCIUM 40 MG: 40 TABLET, FILM COATED ORAL at 21:06

## 2024-08-28 RX ADMIN — MEPERIDINE HYDROCHLORIDE 12.5 MG: 50 INJECTION INTRAMUSCULAR; INTRAVENOUS; SUBCUTANEOUS at 15:40

## 2024-08-28 RX ADMIN — CITALOPRAM HYDROBROMIDE 40 MG: 20 TABLET ORAL at 10:23

## 2024-08-28 RX ADMIN — SPIRONOLACTONE 25 MG: 25 TABLET ORAL at 10:23

## 2024-08-28 RX ADMIN — ONDANSETRON 4 MG: 2 INJECTION INTRAMUSCULAR; INTRAVENOUS at 14:00

## 2024-08-28 RX ADMIN — LEVOTHYROXINE SODIUM 137 MCG: 0.11 TABLET ORAL at 05:06

## 2024-08-28 ASSESSMENT — PAIN DESCRIPTION - LOCATION: LOCATION: HAND

## 2024-08-28 ASSESSMENT — PAIN DESCRIPTION - DESCRIPTORS: DESCRIPTORS: ACHING

## 2024-08-28 ASSESSMENT — PAIN SCALES - GENERAL: PAINLEVEL_OUTOF10: 4

## 2024-08-28 NOTE — PLAN OF CARE
CHF Care Plan      Patient's EF (Ejection Fraction) is greater than 40%    Heart Failure Medications:  Diuretics:: Spironolactone    (One of the following REQUIRED for EF </= 40%/SYSTOLIC FAILURE but MAY be used in EF% >40%/DIASTOLIC FAILURE)        ACE:: None        ARB:: None         ARNI:: None    (Beta Blockers)  NON- Evidenced Based Beta Blocker (for EF% >40%/DIASTOLIC FAILURE): None    Evidenced Based Beta Blocker::(REQUIRED for EF% <40%/SYSTOLIC FAILURE) None  ...................................................................................................................................................    Failed to redirect to the Timeline version of the [employer] SmartLink.      Patient's weights and intake/output reviewed    Daily Weight log at bedside, patient/family participation in use of log: \"yes    Patient's current weight today shows a difference of 3 lbs more than last documented weight.      Intake/Output Summary (Last 24 hours) at 8/28/2024 0554  Last data filed at 8/28/2024 0507  Gross per 24 hour   Intake 250 ml   Output 3675 ml   Net -3425 ml       Education Booklet Provided: no    Comorbidities Reviewed No    Patient has a past medical history of Abnormal Pap smear of cervix, Allergic, Allergic rhinitis, Aortic stenosis, mild-echo 8/2016, Arthritis, Atrial fibrillation (HCC), BMI 40.0-44.9, adult (HCC), Breast cancer (HCC), Cancer (HCC), CHF (congestive heart failure) (HCC), Depression, GERD (gastroesophageal reflux disease), H/O laparoscopic adjustable gastric banding, Heart palpitations, History of chemotherapy, History of radiation therapy, HPV (human papilloma virus) infection, HPV test positive, Hyperlipidemia, Hypertension, Hypothyroidism, Meningioma (HCC), Mild aortic regurgitation-echo 8/2016, Mild dysplasia of cervix, Obesity, Sleep apnea, Type 2 diabetes mellitus with chronic kidney disease, and Urge incontinence.     >>For CHF and Comorbidity documentation on Education Time and  Topics, please see Education Tab      Pt sitting in bed at this time on room air. Pt denies shortness of breath. Pt with nonpitting lower extremity edema.     Patient and/or Family's stated Goal of Care this Admission: reduce shortness of breath, increase activity tolerance, better understand heart failure and disease management, be more comfortable, and reduce lower extremity edema prior to discharge        :

## 2024-08-28 NOTE — PROGRESS NOTES
D: Patient continues to deny pain or shivering, is drinking water without difficulty, call light is in reach,.

## 2024-08-28 NOTE — PROGRESS NOTES
D: Patient here from Cath Lab via bed, taken to bay 9, all current drips, treatments, skin issues, and plan of care were reviewed by both RN, patient transferred in stable condition., patient is awake, alert, and oriented  4 denies pain, C/O of shivering but denies pain, call light is in reach. A: Medicated per doctors orders, see MAR, assessment  completed and documented, discussed plan of care with patient who agreed.

## 2024-08-28 NOTE — CARE COORDINATION
Patient is interested in RPM and will need kit prior to d/c. Notified Lisa Jasso RN and Rahel Andrews RN that patient is agreeable. Patient getting pacemaker today and will possibly be ready for discharge tomorrow pending post op course.

## 2024-08-28 NOTE — CARE COORDINATION
CTN received a vm message from MALCOLM Gonzalez who reported patient is agreeable to RPM and staff will send patient with RPM kit at discharge.     Jennifer Andrews MSN, RN, [facility]  Care Transition Nurse  [phone] [place]   Lisa Jasso -[phone]

## 2024-08-28 NOTE — PROGRESS NOTES
Hospital Medicine Progress Note        Subjective:  She still feels fatigued.  Still no presyncope or syncope.        General appearance: No apparent distress, appears stated age and cooperative.  HEENT: Pupils equal, round.  Conjunctivae/corneas clear.  Neck: No jugular venous distention.   Respiratory:  Normal respiratory effort.  Bilaterally without Rales/Wheezes/Rhonchi.  Cardiovascular: bradycardic rate and regular rhythm with normal S1/S2 without murmurs, rubs or gallops.  Abdomen: Soft, non-tender, non-distended with normal bowel sounds.  Musculoskeletal: No cyanosis bilaterally.  No disproportionate or pitting edema.  Without deformity.  Skin: No jaundice.  No rashes or lesions.  Neurologic:  Neurovascularly intact without any focal sensory/motor deficits. Cranial nerves: II-XII intact, grossly non-focal.  Psychiatric: Alert and oriented, good insight.      Assessment and Plan:       The patient is a pleasant 69 Y F with a h/o former smoking, obesity s/p gastric lap band from 2007 - 2012, HTN, HLD, DM2, nonischemic (nonobstructive CAD on 2019 [facility]) cardiomyopathy with EF recovered to 50% as of a few days ago, CHF, Afib, frequent PVCs (12% of her beats during 2023 event monitor), JINA, and breast cancer s/p lumpectomy in 2009 then chemo and radiation.              She recently saw the EP clinic.  They saw that her QTc was prolonged, so they stopped her dofetilide.  They also saw that her HR was 50, so they ordered an exercise stress test (has not happened yet) to gauge her chronotropic competence.  Over the last few days, however, the patient has felt very fatigued and has not tolerated minimal exertion.  Her pulse oximeter at home showed bradycardia - her HR was reportedly consistently in the 28-40 range.  She came to the ED and was found to be in bigeminy with HRs in the high 50's.  She was also having spells for 15 minutes at a time during which her HR would sustain in the mid 40's.  Otherwise her labs and

## 2024-08-28 NOTE — ANESTHESIA POSTPROCEDURE EVALUATION
Department of Anesthesiology  Postprocedure Note    Patient: Reina Marin  MRN: [id number]  YOB: 1955  Date of evaluation: 8/28/2024    Procedure Summary       Date: 08/28/24 Room / Location: [facility] CATH/EP LAB  3 / [facility] CARDIAC CATH LAB    Anesthesia Start: 1335 Anesthesia Stop: 1532    Procedure: Insert PPM dual (Left) Diagnosis:       Arrhythmia      (Arrhythmia [I49.9])    Providers: SARAH Harper Jr., MD Responsible Provider: Elbert Castillo MD    Anesthesia Type: General ASA Status: 3            Anesthesia Type: General    Cheri Phase I: Cheri Score: 10    Cheri Phase II:      Anesthesia Post Evaluation    Comments: Anes Post-op Note    Name:    Reina Marin  MRN:      [id number]    Patient Vitals in the past 12 hrs:  08/28/24 1620, BP:(!) 125/55, Pulse:86, Resp:27, SpO2:92 %  08/28/24 1615, BP:(!) 131/54, Pulse:85, Resp:28, SpO2:94 %  08/28/24 1610, BP:(!) 122/56, Pulse:93, Resp:23, SpO2:91 %  08/28/24 1605, BP:(!) 124/54, Pulse:87, Resp:(!) 7, SpO2:92 %  08/28/24 1600, Pulse:85, Resp:(!) 9, SpO2:93 %  08/28/24 1555, BP:(!) 128/54, Pulse:89, Resp:13, SpO2:94 %  08/28/24 1550, BP:(!) 126/56, Temp:98.5 °F (36.9 °C), Temp src:Temporal, Pulse:87, Resp:14, SpO2:96 %  08/28/24 1545, Pulse:84, Resp:(!) 7, SpO2:94 %  08/28/24 1540, BP:119/65, Pulse:83, Resp:18, SpO2:95 %  08/28/24 1535, BP:(!) 146/132, Pulse:87, Resp:(!) 35, SpO2:(!) 83 %  08/28/24 1530, Pulse:92, Resp:23, SpO2:91 %  08/28/24 1527, BP:(!) 130/96, Temp:97.4 °F (36.3 °C), Temp src:Temporal, Pulse:92, Resp:27, SpO2:96 %  08/28/24 1248, BP:138/82, Pulse:61, SpO2:96 %  08/28/24 0912, BP:135/76, Temp:98.6 °F (37 °C), Temp src:Oral, Pulse:63, Resp:16, SpO2:97 %  08/28/24 0553, Weight:116.5 kg (256 lb 12.8 oz)  08/28/24 0506, BP:115/68, Temp:97.9 °F (36.6 °C), Temp src:Oral, Pulse:60, Resp:18, SpO2:97 %     LABS:    CBC  Lab Results       Component                Value               Date/Time                  WBC                       6.4                 08/24/2024 12:37 PM        HGB                      14.4                08/24/2024 12:37 PM        HCT                      43.6                08/24/2024 12:37 PM        PLT                      226                 08/24/2024 12:37 PM   RENAL  Lab Results       Component                Value               Date/Time                  NA                       136                 08/24/2024 12:37 PM        K                        3.8                 08/24/2024 12:37 PM        CL                       101                 08/24/2024 12:37 PM        CO2                      26                  08/24/2024 12:37 PM        BUN                      26 (H)              08/24/2024 12:37 PM        CREATININE               1.0                 08/24/2024 12:37 PM        GLUCOSE                  114 (H)             08/24/2024 12:37 PM        GLUCOSE                  97                  01/24/2017 11:37 AM   COAGS  Lab Results       Component                Value               Date/Time                  PROTIME                  27.7 (H)            08/28/2024 08:14 AM        INR                      2.59 (H)            08/28/2024 08:14 AM        APTT                     32.2                10/10/2023 07:01 AM     Intake & Output:  In: 890 [P.O.:490; I.V.:400]  Out: 3895 [Urine:3875]    Nausea & Vomiting:  No    Level of Consciousness:  Awake    Pain Assessment:  Adequate analgesia    Anesthesia Complications:  No apparent anesthetic complications    SUMMARY      Vital signs stable  OK to discharge from Stage I post anesthesia care.  Care transferred from Anesthesiology department on discharge from perioperative area       No notable events documented.

## 2024-08-28 NOTE — ANESTHESIA PRE PROCEDURE
Department of Anesthesiology  Preprocedure Note       Name:  Reina Marin   Age:  69 y.o.  :  1955                                          MRN:  [id number]         Date:  2024      Surgeon: Surgeon(s):  SARAH Harper Jr., MD    Procedure: Procedure(s):  Insert PPM dual    Medications prior to admission:   Prior to Admission medications    Medication Sig Start Date End Date Taking? Authorizing Provider   magnesium oxide (MAG-OX) 400 (240 Mg) MG tablet Take 1 tablet by mouth daily   Yes Maynor Quiroga MD   empagliflozin (JARDIANCE) 10 MG tablet Take 1 tablet by mouth daily  Patient taking differently: Take 0.5 tablets by mouth daily 24  Yes Brea Leos MD   warfarin (COUMADIN) 5 MG tablet Take 1 tablet by mouth daily 7.5 mg (5 mg x 1.5) every Mon, Wed, Fri; 5 mg (5 mg x 1) all other days OR AS DIRECTED 7/15/24  Yes Gini Pike, APRN - CNP   oxyBUTYnin (DITROPAN XL) 15 MG extended release tablet Take 1 tablet by mouth daily 24  Yes Mary Florentino PA   zinc gluconate 50 MG tablet Take 1 tablet by mouth daily   Yes Maynor Quiroga MD   vitamin C (ASCORBIC ACID) 500 MG tablet Take 1 tablet by mouth daily   Yes Maynor Quiroga MD   Vitamin D3 125 MCG (5000 UT) TABS tablet Take 1 tablet by mouth daily   Yes Maynor Quiroga MD   Elderberry 500 MG CAPS Take by mouth   Yes Maynor Quiroga MD   atorvastatin (LIPITOR) 40 MG tablet TAKE 1 TABLET DAILY 3/7/24  Yes Brea Leos MD   valsartan (DIOVAN) 40 MG tablet TAKE 1 TABLET BY MOUTH ONE TIME A DAY 3/7/24  Yes Brea Leos MD   montelukast (SINGULAIR) 10 MG tablet Take 1 tablet by mouth nightly 3/6/24  Yes Mary Florentino PA   levothyroxine (SYNTHROID) 137 MCG tablet TAKE 1 TABLET ONCE DAILY 3/6/24  Yes Mary Florentino PA   citalopram (CELEXA) 40 MG tablet Take 1 tablet by mouth daily 3/6/24  Yes Mary Florentino PA   spironolactone (ALDACTONE) 25 MG tablet TAKE 1 TABLET ONCE DAILY 24  Yes      (Medications & allergies reviewed  All available lab & EKG data reviewed)  Induction: intravenous.      Anesthetic plan and risks discussed with patient.      Plan discussed with CRNA.                GUILHERME GARCIA MD   8/28/2024

## 2024-08-28 NOTE — PROGRESS NOTES
Pharmacy Note  Warfarin Consult     Dx: A-fib  Goal INR range 2-3  Home Warfarin dose: 7.5 mg (5 mg x 1.5) every Mon, Fri; 5 mg (5 mg x 1) all other days     Date                 INR                   Warfarin   8/25               pending                 7.5 mg    8/26                 2.23                     7.5 mg  8/27                 2.34                        5 mg    8/28                 2.59                        5 mg      Recommend Warfarin 5 mg tonight x1.  Daily INR ordered.  Rx will continue to manage therapy per consult order.    Riddhi Laird, PharmD 8/28/2024 8:47 AM

## 2024-08-29 ENCOUNTER — APPOINTMENT (OUTPATIENT)
Dept: GENERAL RADIOLOGY | Age: 69
End: 2024-08-29
Payer: MEDICARE

## 2024-08-29 ENCOUNTER — PROCEDURE VISIT (OUTPATIENT)
Dept: CARDIOLOGY CLINIC | Age: 69
End: 2024-08-29

## 2024-08-29 VITALS
SYSTOLIC BLOOD PRESSURE: 127 MMHG | TEMPERATURE: 98.3 F | BODY MASS INDEX: 42.67 KG/M2 | HEIGHT: 65 IN | OXYGEN SATURATION: 94 % | HEART RATE: 81 BPM | WEIGHT: 256.1 LBS | RESPIRATION RATE: 14 BRPM | DIASTOLIC BLOOD PRESSURE: 66 MMHG

## 2024-08-29 DIAGNOSIS — R00.1 SYMPTOMATIC SINUS BRADYCARDIA: ICD-10-CM

## 2024-08-29 DIAGNOSIS — Z95.0 PACEMAKER: Primary | ICD-10-CM

## 2024-08-29 LAB
ANION GAP SERPL CALCULATED.3IONS-SCNC: 11 MMOL/L (ref 3–16)
BUN SERPL-MCNC: 16 MG/DL (ref 7–20)
CALCIUM SERPL-MCNC: 9.2 MG/DL (ref 8.3–10.6)
CHLORIDE SERPL-SCNC: 102 MMOL/L (ref 99–110)
CO2 SERPL-SCNC: 23 MMOL/L (ref 21–32)
CREAT SERPL-MCNC: 0.6 MG/DL (ref 0.6–1.2)
DEPRECATED RDW RBC AUTO: 14.3 % (ref 12.4–15.4)
EKG ATRIAL RATE: 81 BPM
EKG DIAGNOSIS: NORMAL
EKG DIAGNOSIS: NORMAL
EKG P-R INTERVAL: 168 MS
EKG Q-T INTERVAL: 402 MS
EKG Q-T INTERVAL: 442 MS
EKG QRS DURATION: 150 MS
EKG QRS DURATION: 158 MS
EKG QTC CALCULATION (BAZETT): 466 MS
EKG QTC CALCULATION (BAZETT): 513 MS
EKG R AXIS: 96 DEGREES
EKG R AXIS: 98 DEGREES
EKG T AXIS: -72 DEGREES
EKG T AXIS: -78 DEGREES
EKG VENTRICULAR RATE: 81 BPM
EKG VENTRICULAR RATE: 81 BPM
GFR SERPLBLD CREATININE-BSD FMLA CKD-EPI: >90 ML/MIN/{1.73_M2}
GLUCOSE SERPL-MCNC: 160 MG/DL (ref 70–99)
HCT VFR BLD AUTO: 41.2 % (ref 36–48)
HGB BLD-MCNC: 13.5 G/DL (ref 12–16)
INR PPP: 2.49 (ref 0.85–1.15)
MAGNESIUM SERPL-MCNC: 2 MG/DL (ref 1.8–2.4)
MCH RBC QN AUTO: 29.9 PG (ref 26–34)
MCHC RBC AUTO-ENTMCNC: 32.7 G/DL (ref 31–36)
MCV RBC AUTO: 91.4 FL (ref 80–100)
PLATELET # BLD AUTO: 205 K/UL (ref 135–450)
PMV BLD AUTO: 8.7 FL (ref 5–10.5)
POTASSIUM SERPL-SCNC: 4.5 MMOL/L (ref 3.5–5.1)
PROTHROMBIN TIME: 26.9 SEC (ref 11.9–14.9)
RBC # BLD AUTO: 4.5 M/UL (ref 4–5.2)
SODIUM SERPL-SCNC: 136 MMOL/L (ref 136–145)
WBC # BLD AUTO: 9.3 K/UL (ref 4–11)

## 2024-08-29 PROCEDURE — 6370000000 HC RX 637 (ALT 250 FOR IP): Performed by: INTERNAL MEDICINE

## 2024-08-29 PROCEDURE — 83735 ASSAY OF MAGNESIUM: CPT

## 2024-08-29 PROCEDURE — 71046 X-RAY EXAM CHEST 2 VIEWS: CPT

## 2024-08-29 PROCEDURE — 93010 ELECTROCARDIOGRAM REPORT: CPT | Performed by: INTERNAL MEDICINE

## 2024-08-29 PROCEDURE — 93005 ELECTROCARDIOGRAM TRACING: CPT | Performed by: NURSE PRACTITIONER

## 2024-08-29 PROCEDURE — 80048 BASIC METABOLIC PNL TOTAL CA: CPT

## 2024-08-29 PROCEDURE — 36415 COLL VENOUS BLD VENIPUNCTURE: CPT

## 2024-08-29 PROCEDURE — 85027 COMPLETE CBC AUTOMATED: CPT

## 2024-08-29 PROCEDURE — 85610 PROTHROMBIN TIME: CPT

## 2024-08-29 PROCEDURE — 99232 SBSQ HOSP IP/OBS MODERATE 35: CPT | Performed by: NURSE PRACTITIONER

## 2024-08-29 PROCEDURE — 6370000000 HC RX 637 (ALT 250 FOR IP): Performed by: NURSE PRACTITIONER

## 2024-08-29 RX ORDER — WARFARIN SODIUM 5 MG/1
5 TABLET ORAL
Status: DISCONTINUED | OUTPATIENT
Start: 2024-08-29 | End: 2024-08-29 | Stop reason: HOSPADM

## 2024-08-29 RX ORDER — DOFETILIDE 0.5 MG/1
500 CAPSULE ORAL EVERY 12 HOURS SCHEDULED
Qty: 60 CAPSULE | Refills: 3 | Status: SHIPPED
Start: 2024-08-29

## 2024-08-29 RX ORDER — DOXYCYCLINE HYCLATE 100 MG
100 TABLET ORAL EVERY 12 HOURS SCHEDULED
Qty: 8 TABLET | Refills: 0 | Status: SHIPPED | OUTPATIENT
Start: 2024-08-29 | End: 2024-09-02

## 2024-08-29 RX ORDER — METOPROLOL SUCCINATE 25 MG/1
25 TABLET, EXTENDED RELEASE ORAL DAILY
Qty: 30 TABLET | Refills: 3 | Status: SHIPPED | OUTPATIENT
Start: 2024-08-29

## 2024-08-29 RX ORDER — METOPROLOL SUCCINATE 25 MG/1
25 TABLET, EXTENDED RELEASE ORAL DAILY
Status: DISCONTINUED | OUTPATIENT
Start: 2024-08-29 | End: 2024-08-29 | Stop reason: HOSPADM

## 2024-08-29 RX ADMIN — DOXYCYCLINE HYCLATE 100 MG: 100 TABLET, COATED ORAL at 10:05

## 2024-08-29 RX ADMIN — DOFETILIDE 500 MCG: 0.5 CAPSULE ORAL at 10:12

## 2024-08-29 RX ADMIN — PANTOPRAZOLE SODIUM 40 MG: 40 TABLET, DELAYED RELEASE ORAL at 05:27

## 2024-08-29 RX ADMIN — ACETAMINOPHEN 650 MG: 325 TABLET ORAL at 10:13

## 2024-08-29 RX ADMIN — POLYETHYLENE GLYCOL 3350 17 G: 17 POWDER, FOR SOLUTION ORAL at 05:27

## 2024-08-29 RX ADMIN — METOPROLOL SUCCINATE 25 MG: 25 TABLET, EXTENDED RELEASE ORAL at 15:17

## 2024-08-29 RX ADMIN — Medication 5000 UNITS: at 10:05

## 2024-08-29 RX ADMIN — CITALOPRAM HYDROBROMIDE 40 MG: 20 TABLET ORAL at 10:04

## 2024-08-29 RX ADMIN — OXYCODONE HYDROCHLORIDE AND ACETAMINOPHEN 500 MG: 500 TABLET ORAL at 10:04

## 2024-08-29 RX ADMIN — LEVOTHYROXINE SODIUM 137 MCG: 0.11 TABLET ORAL at 05:27

## 2024-08-29 RX ADMIN — SPIRONOLACTONE 25 MG: 25 TABLET ORAL at 10:05

## 2024-08-29 RX ADMIN — B-COMPLEX W/ C & FOLIC ACID TAB 1 TABLET: TAB at 10:05

## 2024-08-29 ASSESSMENT — PAIN DESCRIPTION - DESCRIPTORS: DESCRIPTORS: ACHING

## 2024-08-29 ASSESSMENT — PAIN DESCRIPTION - LOCATION: LOCATION: HEAD

## 2024-08-29 ASSESSMENT — PAIN SCALES - GENERAL: PAINLEVEL_OUTOF10: 4

## 2024-08-29 NOTE — PROGRESS NOTES
Low Risk Nutrition Note     Reason for Visit:   Length of Stay    Nutrition Assessment:  LOS assessment: 69 Y F with a h/o gastric lap band, HTN, HLD, DM2, nonischemic cardiomyopathy, CHF, Afib and breast cancer admission with fatigue. Dual chamber pacer placement 8/28. On cardiac diet. Pt reports good intake PTA and since admission, eating most meals. RD observed % of breakfast consumed. Reports intentional wt loss recently. Discussed CHF diet education, pt reports her  follows with a dietitian so she is familiar w/ the information. Provided handout for pt to review. Continue to encourage PO intake, will continue to monitor.       Current Nutrition Therapies:    ADULT DIET; Regular; Low Fat/Low Chol/High Fiber/2 gm Na    Anthropometrics:   Current Height: 165.1 cm (5' 5\")  Current Weight - Scale: 116.2 kg (256 lb 1.6 oz)      Monitoring and Evaluation:  No nutrition diagnosis. Patient will be monitored per nutrition standards of care.     Consult Dietitian if nutrition intervention essential to patient care is needed.     Discharge Planning:  No needs    Geri Hogue, MS, RD, LD

## 2024-08-29 NOTE — PROGRESS NOTES
Pharmacy Note  Warfarin Consult     Dx: A-fib  Goal INR range 2-3  Home Warfarin dose: 7.5 mg (5 mg x 1.5) every Mon, Fri; 5 mg (5 mg x 1) all other days     Date                 INR                   Warfarin   8/25               pending                 7.5 mg    8/26                 2.23                     7.5 mg  8/27                 2.34                        5 mg    8/28                 2.59                        5 mg  8/29                 2.49                        5 mg      Recommend Warfarin 5 mg tonight x1.  Daily INR ordered.  Rx will continue to manage therapy per consult order.    Yogesh Gaviria, PharmD 8/29/2024 7:39 AM

## 2024-08-29 NOTE — PROGRESS NOTES
Hospital Medicine Progress Note        Subjective:  Headache this AM.  HR oscillating between 60-80, depending upon whether she is fully paced or in bigeminy.        General appearance: No apparent distress, appears stated age and cooperative.  HEENT: Pupils equal, round.  Conjunctivae/corneas clear.  Neck: No jugular venous distention.   Respiratory:  Normal respiratory effort.  Bilaterally without Rales/Wheezes/Rhonchi.  Cardiovascular: bradycardic rate and regular rhythm with normal S1/S2 without murmurs, rubs or gallops.  Abdomen: Soft, non-tender, non-distended with normal bowel sounds.  Musculoskeletal: No cyanosis bilaterally.  No disproportionate or pitting edema.  Without deformity.  Skin: No jaundice.  No rashes or lesions.  Neurologic:  Neurovascularly intact without any focal sensory/motor deficits. Cranial nerves: II-XII intact, grossly non-focal.  Psychiatric: Alert and oriented, good insight.      Assessment and Plan:       The patient is a pleasant 69 Y F with a h/o former smoking, obesity s/p gastric lap band from 2007 - 2012, HTN, HLD, DM2, nonischemic (nonobstructive CAD on 2019 [facility]) cardiomyopathy with EF recovered to 50% as of a few days ago, CHF, Afib, frequent PVCs (12% of her beats during 2023 event monitor), JINA, and breast cancer s/p lumpectomy in 2009 then chemo and radiation.              She recently saw the EP clinic.  They saw that her QTc was prolonged, so they stopped her dofetilide.  They also saw that her HR was 50, so they ordered an exercise stress test (has not happened yet) to gauge her chronotropic competence.  Over the last few days, however, the patient has felt very fatigued and has not tolerated minimal exertion.  Her pulse oximeter at home showed bradycardia - her HR was reportedly consistently in the 28-40 range.  She came to the ED and was found to be in bigeminy with HRs in the high 50's.  She was also having spells for 15 minutes at a time during which her HR would

## 2024-08-29 NOTE — PROGRESS NOTES
Patient discharge completed. Discharge information included information on diagnosis including signs and symptoms, complications and when to seek medical attention. Information on new medications also provided included use for the medication, side effects and when to call the doctor. Patient verbalized understanding of all discharge information. Patient escorted out by staff with all documented belongings. Home with family.

## 2024-08-29 NOTE — DISCHARGE SUMMARY
Discharge Summary    Name:  Reina Marin /Age/Sex: 1955 (69 y.o. female)   Admit Date: 2024  Discharge Date: 24   MRN & CSN:  [id number] & [id number] Encounter Date and Time 24 3:37 PM EDT    Attending:  Emmett Richardson MD Discharging Provider: EMMETT RICHARDSON MD       Hospital Course:       The patient is a pleasant 69 Y F with a h/o former smoking, obesity s/p gastric lap band from  - , HTN, HLD, DM2, nonischemic (nonobstructive CAD on  [facility]) cardiomyopathy with EF recovered to 50% as of a few days ago, CHF, Afib, frequent PVCs (12% of her beats during  event monitor), JINA, and breast cancer s/p lumpectomy in  then chemo and radiation.              She recently saw the EP clinic.  They saw that her QTc was prolonged, so they stopped her dofetilide.  They also saw that her HR was 50, so they ordered an exercise stress test (has not happened yet) to gauge her chronotropic competence.  Over the last few days, however, the patient has felt very fatigued and has not tolerated minimal exertion.  Her pulse oximeter at home showed bradycardia - her HR was reportedly consistently in the 28-40 range.  She came to the ED and was found to be in bigeminy with HRs in the high 50's.  She was also having spells for 15 minutes at a time during which her HR would sustain in the mid 40's.  Otherwise her labs and vitals were fairly stable.  Cardiology was consulted and hospital medicine was paged for admission.        Symptomatic bradycardia due to sick sinus syndrome.  TSH recently fine, electrolytes fine.  Dual chamber pacer placement .     Bigeminy, PVCs.  Started metoprolol now that she has a pacer.     Afib.  Dofetilide recently stopped because of prolonged QTc.  EP resumed dofetilide after pacer placement.  Warfarin continued by EP.     Chronic diastolic CHF.  Held her empagliflozin for procedure, then resumed.  Spironolactone.  ARBs less effective, stopped losartan  due to intermittently lower BP, continue to reassess as outpatient     99.2 temp.  CXR clear, UA negative, no specific symptoms.  Negative COVID.  Trended.  No apparent infection.      JINA.  Very compliant with CPAP.          Discharge Diagnosis:   Bradycardia      Discharge Instructions:     Follow up with PCP within 1-2 weeks.  Follow up with electrophysiology/cardiology per their instructions.     Diet: ADULT DIET; Regular; Low Sodium (2 gm)  Activity: activity as tolerated  Discharged to:  home  Condition on discharge: Stable    Objective Findings at Discharge:   /66   Pulse 81   Temp 98.3 °F (36.8 °C) (Oral)   Resp 14   Ht 1.651 m (5' 5\")   Wt 116.2 kg (256 lb 1.6 oz)   LMP  (LMP Unknown)   SpO2 94%   BMI 42.62 kg/m²       Physical Exam:     General appearance: No apparent distress, appears stated age and cooperative.  HEENT: Pupils equal, round.  Conjunctivae/corneas clear.  Neck: No jugular venous distention.   Respiratory:  Normal respiratory effort.  Bilaterally without Rales/Wheezes/Rhonchi.  Cardiovascular: Normal rate and regular rhythm with normal S1/S2 without murmurs, rubs or gallops.  Abdomen: Soft, non-tender, non-distended with normal bowel sounds.  Musculoskeletal: No cyanosis bilaterally.  No edema.  Without deformity.  Skin: No jaundice.  No rashes or lesions.  Neurologic:  Neurovascularly intact without any focal sensory/motor deficits. Cranial nerves: II-XII intact, grossly non-focal.  Psychiatric: Alert and oriented, normal insight.      Discharge Medications:        Medication List        START taking these medications      dofetilide 500 MCG capsule  Commonly known as: TIKOSYN  Take 1 capsule by mouth every 12 hours     doxycycline hyclate 100 MG tablet  Commonly known as: VIBRA-TABS  Take 1 tablet by mouth every 12 hours for 4 days     metoprolol succinate 25 MG extended release tablet  Commonly known as: TOPROL XL  Take 1 tablet by mouth daily  Notes to patient: Metoprolol

## 2024-08-29 NOTE — PROGRESS NOTES
Assessment completed and medications given. Patient is A&Ox4 and denies any needs at this time. Call light and personal belongings are within reach. Bed is in the lowest and locked position.

## 2024-08-29 NOTE — PLAN OF CARE
Problem: Discharge Planning  Goal: Discharge to home or other facility with appropriate resources  Outcome: Progressing     Problem: Pain  Goal: Verbalizes/displays adequate comfort level or baseline comfort level  Outcome: Progressing     Problem: ABCDS Injury Assessment  Goal: Absence of physical injury  Outcome: Progressing     Problem: Chronic Conditions and Co-morbidities  Goal: Patient's chronic conditions and co-morbidity symptoms are monitored and maintained or improved  Outcome: Progressing     Problem: Cardiovascular - Adult  Goal: Absence of cardiac dysrhythmias or at baseline  Outcome: Progressing     Problem: Safety - Adult  Goal: Free from fall injury  Outcome: Progressing

## 2024-08-29 NOTE — PROGRESS NOTES
sounds present  Extremities:   No cyanosis or clubbing   No lower extremity edema   Skin: warm and dry  Neurological:  Alert and oriented  Moves all extremities well  No abnormalities of mood, affect, memory, mentation, or behavior are noted    Data  Echo 3/10/2022:      Summary   Low normal left ventricle systolic function with an estimated ejection   fraction of 50%.   The left ventricle is mildly dilated.   No regional wall motion abnormalities are seen.   Normal left ventricular diastolic filling pressure.   Aortic valve appears sclerotic but opens adequately.   Mild aortic and mitral regurgitation.   Trace pulmonic and tricuspid regurgitation.   Systolic pulmonary artery pressure (SPAP) is normal and estimated at 26 mmHg   (right atrial pressure 3 mmHg).   Frequent premature beats throughout the study.     Echo 7/2019:      Summary   Left ventricular systolic function is reduced with ejection fraction   estimated at 48-50 %.   Mild global hypokinesis is present.   There is mild concentric left ventricular hypertrophy.   Normal left ventricular diastolic filling pressure.   Mild mitral regurgitation.   Aortic sclerosis vs mild aortic stenosis.   Mild aortic regurgitation is present.   Normal systolic pulmonary artery pressure (SPAP) estimated at 29 mmHg (RA   pressure 8 mmHg).        Cardiac MRI 8/23/2019 ():  Findings are most consistent with a nonischemic cardiomyopathy.  There is no evidence of left ventricular scarring, edema or iron overload. There is evidence of incomplete or complete left bundle branch block. Regional wall motion abnormalities involving are likely secondary to underlying conduction disease; however, ischemic heart disease cannot be excluded.    The right ventricle is normal in size and systolic function. There is late gadolinium enhancement of the RV insertion points suggestive of elevated right heart pressures.  The left atrium is mildly dilated. There is mild mitral regurgitation  The  08/28/2024 08:14 AM    PROTIME 25.6 08/27/2024 08:18 AM     PTT No components found for: \"PTT\"   Lab Results   Component Value Date/Time    MG 2.00 08/29/2024 05:04 AM      Lab Results   Component Value Date/Time    TSH 3.44 06/11/2024 11:41 AM    TSH 2.20 04/21/2023 02:03 PM       Assessment:  Sinus node dysfunction: ongoing   -s/p dual chamber pacemaker with CDS lead implant 8/28/2024   -device check and CXR reviewed   Symptomatic paroxsymal atrial fibrillation: stable              -[id number] score 4 (age, gender, CHF, HTN)              -flecainide stopped due to worsening EF 8/2019              -Rythmol stopped due to increase in QRS duration 3/2022              -Tikosyn started 3/28/2022 (monitoring for toxicity with antiarrythmic medications), stopped 8/20/2024 for prolonged QTc, but resumed post pacemaker implant               -s/p PVI and RFCA 3/2020 ([facility])  Frequent PVC's: ongoing, symptomatic   -noted recently when patient presented for ortho surgery              -event monitor showed a 12% PVC burden 7/2023  NICM with recovered EF: stable              -most recent EF 55% 3/2022, EF 50-55% on echo 8/2023  Chronic systolic CHF: compensated, follows with Dr. Leos   Aortic stenosis: mild AS   Hypothyroidism on Synthroid  JINA: CPAP compliant   History of breast cancer          Plan:   1. Continue to closely monitor on telemetry   2. Continue Coumadin   3. Tikosyn resumed. QTc stable.   4. Start Toprol for suppression of PVC's   5. Post procedure instructions reviewed  6. Device check in one week   7. Follow up as scheduled   8. Okay for discharge     Patient was seen outside of global device window for AF and PVC's.       iGni Pike, APRN-CNP  [facility]  [phone]

## 2024-08-29 NOTE — DISCHARGE INSTRUCTIONS
Follow up with PCP within 1-2 weeks.  Follow up with electrophysiology/cardiology per their instructions.

## 2024-08-29 NOTE — CARE COORDINATION
CASE MANAGEMENT DISCHARGE SUMMARY      Discharge to: Home with RPM kit not other needs        IMM given: (date) 8/29    New Durable Medical Equipment ordered/agency: None    Transportation:    Family/car:On arrival       Confirmed discharge plan with:MD/RN/Patient     Patient: yes     Family:  on arrival, patient notified family she is alert and oriented        RN, name: Erin

## 2024-08-29 NOTE — PROGRESS NOTES
Electrophysiology Progress Note     Admit Date: 2024     Reason for follow up: Symptomatic sinus bradycardia    Interval History:   - Patient seen and examined.   - Clinical notes reviewed.   - Telemetry reviewed.  Sinus bradycardia with frequent PVCs.  - No new complaints today.   - No major events overnight.   - Denies having chest pain, shortness of breath, dyspnea on exertion, Orthopnea, PND at the time of this visit.    Physical Examination:  Vitals:    24 2130   BP: (!) 169/136   Pulse: 89   Resp:    Temp:    SpO2: 95%        Intake/Output Summary (Last 24 hours) at 2024 2300  Last data filed at 2024  Gross per 24 hour   Intake 400 ml   Output 2570 ml   Net -2170 ml     In: 640 [P.O.:240; I.V.:400]  Out: 3270    Wt Readings from Last 3 Encounters:   24 116.5 kg (256 lb 12.8 oz)   24 120.7 kg (266 lb)   24 117.9 kg (260 lb)     Temp  Av.1 °F (36.7 °C)  Min: 97.4 °F (36.3 °C)  Max: 98.6 °F (37 °C)  Pulse  Av.7  Min: 54  Max: 104  BP  Min: 98/54  Max: 169/136  SpO2  Av.8 %  Min: 83 %  Max: 97 %    Telemetry: Sinus bradycardia with frequent PVCs.   Constitutional: Alert. Oriented to person, place, and time. No distress.   Head: Normocephalic and atraumatic.   Mouth/Throat: Lips appear moist. Oropharynx is clear and moist.  Eyes: Conjunctivae normal. EOM are normal.   Neck: Neck supple. No lymphadenopathy. No rigidity. No JVD present.   Cardiovascular: Bradycardic.  Regularly irregular.  Frequent PVCs.  Pulmonary/Chest: Bilateral respiratory sounds present. No respiratory accessory muscle use.   Abdominal: Soft. Normal bowel sounds present. No distension, No tenderness. No splenomegaly. No hernia.  Musculoskeletal: No tenderness. No edema    Neurological: Alert and oriented. Cranial nerve II-XII grossly intact.  Skin: Skin is warm and dry. No rash, lesions, ulcerations noted.  Psychiatric: No anxiety nor agitation.    Labs, diagnostic and imaging results  [phone] [facility]

## 2024-08-29 NOTE — FLOWSHEET NOTE
08/28/24 2109   Assessment   Charting Type Shift assessment   Psychosocial   Psychosocial (WDL) WDL   Neurological   Neuro (WDL) WDL   Level of Consciousness 0   Orientation Level Oriented X4   Cognition Appropriate judgement;Appropriate safety awareness;Appropriate attention/concentration;Appropriate for developmental age;Follows commands   Speech Clear;Appropriate for developmental age   Kriti Coma Scale   Eye Opening 4   Best Verbal Response 5   Best Motor Response 6   [place] Coma Scale Score 15   HEENT (Head, Ears, Eyes, Nose, & Throat)   HEENT (WDL) X   Right Eye Impaired vision;Glasses   Left Eye Impaired vision;Glasses   Respiratory   Respiratory (WDL) WDL   Respiratory Interventions H.O.B. elevated   Respiratory Quality/Effort Unlabored   Chest Assessment Chest expansion symmetrical;Trachea midline   Respiratory Depth Normal   L Breath Sounds Clear   R Breath Sounds Clear   Breath Sounds   Breath Sounds Bilateral Clear   Right Upper Lobe Clear   Right Middle Lobe Clear   Right Lower Lobe Clear   Left Upper Lobe Clear   Left Lower Lobe Clear   Cardiac   Cardiac (WDL) X   Rhythm Interpretation   Pulse 94   Cardiac Monitor   Cardiac/Telemetry Monitor On Portable telemetry pack applied   Alarm Audible Centralized cardiac monitoring   Alarms Set Centralized cardiac monitoring   Gastrointestinal   Abdominal (WDL) WDL   Genitourinary   Genitourinary (WD) WDL   Urine Assessment   Urinary Status Voiding;Bathroom privileges   Urinary Incontinence Absent   Urine Color Yellow/straw   Urine Appearance Clear   Peripheral Vascular   Peripheral Vascular (WDL) WDL   Anti-Embolism   Anti-Embolism Intervention Medication  (doumadin)   Musculoskeletal   Musculoskeletal (WDL) WDL

## 2024-08-30 ENCOUNTER — TELEPHONE (OUTPATIENT)
Dept: FAMILY MEDICINE CLINIC | Age: 69
End: 2024-08-30

## 2024-08-30 ENCOUNTER — CARE COORDINATION (OUTPATIENT)
Dept: CASE MANAGEMENT | Age: 69
End: 2024-08-30

## 2024-08-30 ENCOUNTER — HOSPITAL ENCOUNTER (OUTPATIENT)
Age: 69
Discharge: HOME | End: 2024-08-30

## 2024-08-30 PROBLEM — Z95.0 PACEMAKER: Status: ACTIVE | Noted: 2024-08-30

## 2024-08-30 NOTE — TELEPHONE ENCOUNTER
Care Transitions Initial Follow Up Call    Outreach made within 2 business days of discharge: Yes    Patient: Reina Marin Patient : 1955   MRN: [id number]  Reason for Admission: Bradycardia  Discharge Date: 24       Spoke with: Called patient no answer, left a message for the patient to return the call to the office.    Discharge department/facility: A        Scheduled appointment with PCP within 7-14 days    Follow Up  Future Appointments   Date Time Provider Department Center   2024 11:15 AM [facility] [place] ANTICOAGULATION CLINIC MIKIE JENNY [facility]   2024 10:30 AM SCHEDULE, CHARBEL DEVICE CHECK Charbel Car [facility]   10/23/2024  9:30 AM Mary Florentino PA [facility]   2024 11:00 AM SCHEDULE, CHARBEL DEVICE CHECK Charbel Car [facility]   2024 11:00 AM SARAH Harper Jr., MD [facility]   2025 11:40 AM Josy Quiroga [facility] - CNP [facility]       Jocelyn Padilla LPN

## 2024-08-30 NOTE — PROGRESS NOTES
Patient:Reina Marin  Latest Device Transmission:Aug 30, 2024 01:58 EDT  Patient ID:[id number] CristianDevice:[id number] LOLY [facility]/[id number] of Birth:Jun 18, 1955Patient Group:[facility] (Primary)Search Tags:NoneMonitoring Status:Monitored

## 2024-08-30 NOTE — CARE COORDINATION
Care Transitions Note    Initial Call - Call within 2 business days of discharge: Yes    Attempted to reach patient for transitions of care follow up. Unable to reach patient.    Outreach Attempts:   HIPAA compliant voicemail left for patient.     Patient: Reina Marin    Patient : 1955   MRN: [id number]    Reason for Admission: bradycardia s/p pacemaker placement  Discharge Date: 24  RURS: Readmission Risk Score: 10.9    Last Discharge Facility       Date Complaint Diagnosis Description Type Department Provider    24 Fatigue Symptomatic bradycardia ... ED to Hosp-Admission (Discharged) (ADMITTED) Austin Razo MD; Julio Cesar Marks..            Future Appointments         Provider Specialty Dept Phone    2024 11:15 AM [facility] [place] [place] ANTICOAGULATION CLINIC Pharmacy [phone]    2024 10:30 AM CHARBEL BLAND DEVICE CHECK Cardiology [phone]    10/23/2024 9:30 AM Mary Florentino PA Family Medicine [phone]    2024 11:00 AM CHARBEL BLAND DEVICE CHECK Cardiology [phone] 11:00 AM SARAH Harper Jr., MD Cardiology [phone]    2025 11:40 AM Josy Quiroga APRN - [facility] Pulmonology [phone]            Plan for follow-up on next business day.      Lisa Jasso RN

## 2024-09-02 DIAGNOSIS — F33.0 MILD EPISODE OF RECURRENT MAJOR DEPRESSIVE DISORDER (HCC): Primary | ICD-10-CM

## 2024-09-02 NOTE — TELEPHONE ENCOUNTER
Refill Request     CONFIRM preferred pharmacy with the patient.    If Mail Order Rx - Pend for 90 day refill.      Last Seen: Last Seen Department: 4/23/2024  Last Seen by PCP: 4/23/2024    Last Written: 3/6/2024    If no future appointment scheduled:  Review the last OV with PCP and review information for follow-up visit,  Route STAFF MESSAGE with patient name to the  Pool for scheduling with the following information:            -  Timing of next visit           -  Visit type ie Physical, OV, etc           -  Diagnoses/Reason ie. COPD, HTN - Do not use MEDICATION, Follow-up or CHECK UP - Give reason for visit      Next Appointment:   Future Appointments   Date Time Provider Department Center   9/5/2024 11:15 AM [facility] [place] [facility]UL[facility] CLINIC [facility]   9/6/2024 10:30 AM SCHEDULE, CHARBEL DEVICE CHECK Charbel Car [facility]   10/23/2024  9:30 AM Mary Florentino PA [facility]   11/26/2024 11:00 AM SCHEDULE, CHARBEL DEVICE CHECK Char[facility] Car [facility]   11/26/2024 11:00 AM SARAH Harper Jr., MD [facility]   5/30/2025 11:40 AM Josy Quiroga APRN - CNP [facility]       Message sent to  to schedule appt with patient?  NO      Requested Prescriptions     Pending Prescriptions Disp Refills    citalopram (CELEXA) 40 MG tablet 90 tablet 1     Sig: Take 1 tablet by mouth daily

## 2024-09-03 ENCOUNTER — CARE COORDINATION (OUTPATIENT)
Dept: CASE MANAGEMENT | Age: 69
End: 2024-09-03

## 2024-09-03 DIAGNOSIS — I50.22 CHRONIC SYSTOLIC HEART FAILURE (HCC): Primary | ICD-10-CM

## 2024-09-03 DIAGNOSIS — I10 PRIMARY HYPERTENSION: ICD-10-CM

## 2024-09-03 RX ORDER — CITALOPRAM HYDROBROMIDE 40 MG/1
40 TABLET ORAL DAILY
Qty: 30 TABLET | Refills: 0 | Status: SHIPPED | OUTPATIENT
Start: 2024-09-03

## 2024-09-03 NOTE — CARE COORDINATION
Care Transitions Note    Initial Call - Call within 2 business days of discharge: Yes    Patient Current Location:  Home: [address] [place], [place] [address]    Care Transition Nurse contacted the patient by telephone to perform post hospital discharge assessment, verified name and  as identifiers. Provided introduction to self, and explanation of the Care Transition Nurse role.     Patient: Reina Marin    Patient : 1955   MRN: [id number]    Reason for Admission: symptomatic bradycardia  Discharge Date: 24  RURS: Readmission Risk Score: 10.9      Last Discharge Facility       Date Complaint Diagnosis Description Type Department Provider    24 Fatigue Symptomatic bradycardia ... ED to Hosp-Admission (Discharged) (ADMITTED) Austin Razo MD; Julio Cesar Marks..            Was this an external facility discharge? No    Additional needs identified to be addressed with provider   High priority: please assist with HFU scheduling if needed. Patient dc from [facility] on 24 dx bradycardia s/p pacemaker placement.          Method of communication with provider: chart routing.    Patients top risk factors for readmission: functional physical ability    Interventions to address risk factors:   Review of patient management of conditions/medications: bradycardia  DME: RPM kit given- RPM Kit [id number]    Care Summary Note: Patient answered call and verified . Patient pleasant and agreeable to transition call. Patient stated that she got the pacemaker transmitter set up, but had not opened RPM kit box yet. Patient confirmed she had at her home. Discussed pacemaker placement and insertion site. 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 CTN reviewed HFU visit needed with PCP, but declined to schedule at this time. Stated she would send PCP a [employer] message and scheduled if needed.     Care Transition Nurse reviewed discharge instructions, medical

## 2024-09-03 NOTE — PROGRESS NOTES
Remote Patient Monitoring Treatment Plan    Received request from [facility]/Lisa Morales, RN   to order remote patient monitoring for in home monitoring of CHF; Condition managed by Dr. Brea Leos ([facility]).  HTN; Condition managed by Dr. Leos.  and order completed.     Patient will be monitoring blood pressure   weight.      Patient will engage in Remote Patient Monitoring each day to develop the skills necessary for self management.       RPM Care Team Responsibilities:   Alerts will be reviewed daily and addressed within 2-4 hours during operational hours (Monday -Friday 9 am-4 pm)  Alert response and intervention documented in patient medical record  Alert response escalated to PCP per protocol and documented in patient medical record  Patient monitored over approximately  days  Discharge from program based on self-management readiness    See care coordination encounters for additional details.

## 2024-09-04 ENCOUNTER — CARE COORDINATION (OUTPATIENT)
Dept: PRIMARY CARE CLINIC | Age: 69
End: 2024-09-04

## 2024-09-04 NOTE — CARE COORDINATION
Remote Patient Kit Ordering Note      Date/Time:  9/4/2024 9:56 AM      [facility] call to patient/family today to notify of RPM kit order; patient/family was available; discussed the following topics below and all questions answered.    [x] [facility] confirmed patient shipping address  [x] Patient will receive package over the next 1-3 business days. Someone 21 years or older must be present to sign for UPS delivery.  [x] HRS will contact patient within 24 hours, an HRS  will call the patient directly: If the patient does not answer, HRS will follow up with the clinical team notifying them about the unsuccessful attempt to contact the patient. HRS will make three call attempts to the patient.Provide patient with [facility] Virtual install number is: [phone].  [x] ACM will contact patient once equipment is active to welcome them to the program.                                                         [x] Hours of RPM monitoring - Monday-Friday [phone]; encourage patient to get vitals entered by Noon each day to have the alert addressed same day.  [x][facility] mailed RPM Patient flyer to patient.                      ACM made aware the RPM kit has been ordered.

## 2024-09-05 ENCOUNTER — ANTI-COAG VISIT (OUTPATIENT)
Dept: PHARMACY | Age: 69
End: 2024-09-05
Payer: MEDICARE

## 2024-09-05 DIAGNOSIS — I48.0 PAF (PAROXYSMAL ATRIAL FIBRILLATION) (HCC): Primary | ICD-10-CM

## 2024-09-05 LAB
INTERNATIONAL NORMALIZATION RATIO, POC: 1.8
PROTHROMBIN TIME, POC: 0

## 2024-09-05 PROCEDURE — 85610 PROTHROMBIN TIME: CPT | Performed by: PHARMACIST

## 2024-09-05 PROCEDURE — 99211 OFF/OP EST MAY X REQ PHY/QHP: CPT | Performed by: PHARMACIST

## 2024-09-05 NOTE — PROGRESS NOTES
Ms. Maira Marin is here for management of anticoagulation for AFib.   PMH also significant for CHF, HLD, HTN .   She presents today w/out complaint.  Pt verifies dosing regimen as listed above.   Pt denies s/s bleeding/bruising/swelling/SOB.  No BRBPR. No melena.  Address missed doses  Reviewed pt medication list.  No changes in RX/OTCs/Herbal medications.  Reviewed dietary concerns  Address EToH and tobacco use.    She was in hospital last week.  Had pacemaker placed. Started on Tikosyn, Jardiance, Metoprolol    INR was in range during stay, received typical dose of warfarin.  INR low today, has been in low end of good range or slightly low lately. Will increase dose slightly.    INR 1.8 is slightly below therapeutic range of 2-3.   Recommend to increase dose to 5 mg daily except 7.5 mg Q Mon/Wed/Fri.  Patient has 5 mg tablets.  Will continue to monitor and check INR in 3 weeks.  Dosing reminder card given with phone number, appointment date and time.   Return to clinic: 24 @ 1100 am    Fan Strong, Bogdan 11:30 AM EDT 24    For Pharmacy Admin Tracking Only    Intervention Detail: Adherence Monitorin and Dose Adjustment: 1, reason: Therapy Optimization  Total # of Interventions Recommended: 2  Total # of Interventions Accepted: 2  Time Spent (min): 10

## 2024-09-06 ENCOUNTER — CARE COORDINATION (OUTPATIENT)
Dept: CASE MANAGEMENT | Age: 69
End: 2024-09-06

## 2024-09-06 ENCOUNTER — NURSE ONLY (OUTPATIENT)
Dept: CARDIOLOGY CLINIC | Age: 69
End: 2024-09-06

## 2024-09-06 ENCOUNTER — CARE COORDINATION (OUTPATIENT)
Facility: CLINIC | Age: 69
End: 2024-09-06

## 2024-09-06 VITALS
DIASTOLIC BLOOD PRESSURE: 58 MMHG | SYSTOLIC BLOOD PRESSURE: 120 MMHG | WEIGHT: 257.5 LBS | HEART RATE: 105 BPM | BODY MASS INDEX: 42.85 KG/M2

## 2024-09-06 DIAGNOSIS — I50.22 CHRONIC SYSTOLIC HEART FAILURE (HCC): ICD-10-CM

## 2024-09-06 DIAGNOSIS — I10 PRIMARY HYPERTENSION: Primary | ICD-10-CM

## 2024-09-06 NOTE — CARE COORDINATION
Patient Reina Marin  09/06/24     Care Coordination  placed call to patient to arrange RPM kit  through [employer]. Left HIPAA Compliant Message     provided return and how to pack equipment in original packing via the patients voicemail if available and provided call back number should patient have questions.    Patient made aware UPS will  equipment in 2-4 days.

## 2024-09-06 NOTE — PATIENT INSTRUCTIONS
send additional routine transmissions unless specifically requested by the office staff - the steps to send a manual transmission are the same as when you paired your in-home monitor to your device for the first time.     Your device and monitor are wireless and most transmit cellularly, but please periodically check your monitor is still plugged in to the electrical outlet. If you still use the telephone land line to send, please ensure the connection to the phone shola is secure. This will help to ensure successful automatic transmissions in the future.     Please be aware that the remote device transmission sites are periodically monitored only during regular business hours during which simultaneous in-office device clinics are being conducted. If your transmission requires attention, we will contact you as soon as possible.    Your in-home monitor will do a full report on your device every 3 months (recurring appointments that run parallel to in office checks). You do not need to initiate a transmission or be awake at the appointment time listed - this is solely for office purposes.     Our office utilizes the \"no news is good news\" narrative regarding remote monitoring. A Device Specialist or RN will contact you with any critical findings from your remote monitoring.    Going forward, if you experience issues with your in-home monitor, please verify that it is plugged in to the wall. If issues persist, please contact the Customer Service numbers provided below, as they can troubleshoot issues that may be happening with the monitor itself. The Device Clinic works closely with the remote monitoring websites - if we notice there is a disconnection we will contact you to inform you and ask you to contact the  of your device.    [employer] (Carelink)  [phone]  [employer] (Latitude) [phone]  [employer] (Merlin) [phone]  [employer]   [phone]

## 2024-09-06 NOTE — PROGRESS NOTES
Device check completed by [employer] rep.     Site check:   Incision is closed, clean, and dry with all dressings/steri strips removed. Site left open to the air. Incision well approximated. No s/s of infection. Patient educated on post op instructions and given print out education for post op care.

## 2024-09-06 NOTE — CARE COORDINATION
Care Transitions Note    Follow Up Call     Patient Current Location:  Home: [address] [place] [address]    Care Transition Nurse contacted the patient by telephone. Verified name and  as identifiers.    Additional needs identified to be addressed with provider   Standard priority: patient disenrolled from RPM program per Dr Brea Leos's request.         Method of communication with provider: none.    Care Summary Note: message received from Caterina Brooks (RPM NP) that Dr Brea Leos doesn't think patient needs RPM kit. Per provider, patient has been managing CHF for several years on her own. Provider also noted that patient got a pacemaker that is going to give more information than weight scale and blood pressure cuff.     Incoming call from patient. Verified . Patient is doing well and seen for f/u visit today. Stated she has been released to drive. Incision is healing well and no signs of infection. Patient stated she has reached out to HRS team and started RPM program. CTN discussed above message from PCP provider and instructed that she would be dis-enrolled from program. Patient aware to pack up equipment. Patient stated that in addition to kit she received from hospital, she also got a kit delivered to her home. Instructed to return both kits. Information would be added to d/c order.    Plan of care updates since last contact:  Communication with providers: patient disenrolled from RPM       Advance Care Planning:   Does patient have an Advance Directive: reviewed during previous call, see note. .    Medication Review:  Full medication reconciliation completed during previous call.    Remote Patient Monitoring:  Offered patient enrollment in the Remote Patient Monitoring (RPM) program for in-home monitoring: orders to disenroll sent to HRS team.    Assessments:  Care Transitions Subsequent and Final Call    Subsequent and Final Calls  Do you have any ongoing symptoms?: No  Have your

## 2024-09-06 NOTE — PROGRESS NOTES
Remote Patient Order Discontinued    Received request from Lisa Jasso RN   to discontinue order for remote patient monitoring of CHF and HTN and order completed.

## 2024-09-12 ENCOUNTER — PATIENT MESSAGE (OUTPATIENT)
Dept: FAMILY MEDICINE CLINIC | Age: 69
End: 2024-09-12

## 2024-09-12 RX ORDER — LEVOTHYROXINE SODIUM 137 UG/1
TABLET ORAL
Qty: 90 TABLET | Refills: 1 | Status: SHIPPED | OUTPATIENT
Start: 2024-09-12

## 2024-09-13 ENCOUNTER — CARE COORDINATION (OUTPATIENT)
Dept: CASE MANAGEMENT | Age: 69
End: 2024-09-13

## 2024-09-13 ENCOUNTER — TELEPHONE (OUTPATIENT)
Dept: CARDIOLOGY CLINIC | Age: 69
End: 2024-09-13

## 2024-09-13 NOTE — TELEPHONE ENCOUNTER
Pt returned call. Message given to pt. Pt sts she exhausted like she was before she got her pacer. Pt sts she will send transmission.

## 2024-09-13 NOTE — TELEPHONE ENCOUNTER
[facility] for patient for her to send a remote transmission. She will likely need [employer] remote at [phone] to be walked through this.         Gini Pike APRN - CNP   to Gia Lorenzana Chelsea D, MA  [facility]     9/13/24  2:57 PM  Note     Please have patient send a remote transmission.       Brea Leos MD   to SARAH Harper Jr., MD Malmedahl, Amy, APRN - CNP       9/13/24  1:25 PM  See message from transition nurse.  With a new pacemaker, does she need to be seen for adjustments to help with fatigue?  Lisa Lawrence, RN   to [facility] Remote Pt Monitor Clinical Support   TY      9/13/24  1:18 PM  Patient has RPM equipment packed up and UPS has attempted once for package pick up but patient was not at home at time of . Patient stated there has been no further attempts have been made. Message routed to HRS team      Lisa Jasso, RN   to Brea Leos MD   TY      9/13/24  1:17 PM  Standard priority: Spoke to patient for transition support this afternoon. patient is \"exhausted and not really doing anything\" exertional. Patient is feeling drained  patient asking for treatment options for symptoms. patient asking for treatment options for symptoms.

## 2024-09-16 NOTE — TELEPHONE ENCOUNTER
Gini Pike, APRN - CNP   to [facility]      9/16/24  4:37 PM  Note     She is still having frequent PVC's which may be making her feel poorly. Is she feeling the extra beats or are her symptoms something different?             Spoke with patient (I am unable to reply in care coordination thread for some reason, apologies) She feels fatigued, no energy. Not much changed since being discharged from the hospital and re starting her Tikosyn. She thought she would feel better after device implant and restarting Tikosyn and feels about the same. She wants to know if there is anything else that can be done for PVC suppression?

## 2024-09-17 ENCOUNTER — CARE COORDINATION (OUTPATIENT)
Dept: PRIMARY CARE CLINIC | Age: 69
End: 2024-09-17

## 2024-09-17 ENCOUNTER — CARE COORDINATION (OUTPATIENT)
Dept: CASE MANAGEMENT | Age: 69
End: 2024-09-17

## 2024-09-17 NOTE — TELEPHONE ENCOUNTER
Can we get her in sooner with [facility] to discuss (alternative medication vs ablation)? If not, I can probably work her in. Will need to discuss plan with Rich beforehand.

## 2024-09-17 NOTE — TELEPHONE ENCOUNTER
SAARH Harper Jr., MD Pittman, Chelsea D, MA; Meghana Benitez; Brea Leos MD1 minute ago (1:24 PM)       We can offer ablation or alternative antiarrhythmic therapy such as amiodarone if she would like.

## 2024-09-17 NOTE — TELEPHONE ENCOUNTER
There are two encounters going on regarding this patient/same conversations being had.     Copying [facility] response to consolidate messages.     Patient needs and appointment to discuss alternative treatments such as ablation or medications. [facility] doesn't have anything available within the next month. Are you able to provide and overbook date and time?

## 2024-09-17 NOTE — TELEPHONE ENCOUNTER
Please call patient to schedule with NPAM on 9/19/2024 at 9 AM (per NPAM) to discuss further treatment options. Thank you

## 2024-09-20 ENCOUNTER — CARE COORDINATION (OUTPATIENT)
Dept: CASE MANAGEMENT | Age: 69
End: 2024-09-20

## 2024-09-24 ENCOUNTER — PATIENT MESSAGE (OUTPATIENT)
Dept: CARDIOLOGY CLINIC | Age: 69
End: 2024-09-24

## 2024-09-24 ENCOUNTER — CARE COORDINATION (OUTPATIENT)
Dept: CASE MANAGEMENT | Age: 69
End: 2024-09-24

## 2024-09-26 ENCOUNTER — ANTI-COAG VISIT (OUTPATIENT)
Dept: PHARMACY | Age: 69
End: 2024-09-26
Payer: MEDICARE

## 2024-09-26 DIAGNOSIS — I48.0 PAF (PAROXYSMAL ATRIAL FIBRILLATION) (HCC): Primary | ICD-10-CM

## 2024-09-26 LAB
INTERNATIONAL NORMALIZATION RATIO, POC: 2.1
PROTHROMBIN TIME, POC: 0

## 2024-09-26 PROCEDURE — 99211 OFF/OP EST MAY X REQ PHY/QHP: CPT | Performed by: PHARMACIST

## 2024-09-26 PROCEDURE — 85610 PROTHROMBIN TIME: CPT | Performed by: PHARMACIST

## 2024-09-27 RX ORDER — DOFETILIDE 0.5 MG/1
500 CAPSULE ORAL EVERY 12 HOURS SCHEDULED
Qty: 180 CAPSULE | Refills: 2 | Status: SHIPPED | OUTPATIENT
Start: 2024-09-27

## 2024-09-30 DIAGNOSIS — F33.0 MILD EPISODE OF RECURRENT MAJOR DEPRESSIVE DISORDER (HCC): ICD-10-CM

## 2024-09-30 RX ORDER — CITALOPRAM HYDROBROMIDE 40 MG/1
40 TABLET ORAL DAILY
Qty: 90 TABLET | Refills: 1 | Status: SHIPPED | OUTPATIENT
Start: 2024-09-30

## 2024-09-30 NOTE — TELEPHONE ENCOUNTER
Refill Request     CONFIRM preferred pharmacy with the patient.    If Mail Order Rx - Pend for 90 day refill.      Last Seen: Last Seen Department: 4/23/2024  Last Seen by PCP: 4/23/2024    Last Written: 9/3/24 30 with no refills     If no future appointment scheduled:  Review the last OV with PCP and review information for follow-up visit,  Route STAFF MESSAGE with patient name to the  Pool for scheduling with the following information:            -  Timing of next visit           -  Visit type ie Physical, OV, etc           -  Diagnoses/Reason ie. COPD, HTN - Do not use MEDICATION, Follow-up or CHECK UP - Give reason for visit      Next Appointment:   Future Appointments   Date Time Provider Department Center   10/7/2024  1:00 PM SCHEDULE, [facility] DEVICE CHECK [facility]   10/7/2024  1:00 PM Gini Pike APRN - CNP [facility]   10/23/2024  9:30 AM Mary Florentino PA [facility]   10/24/2024 11:00 AM [facility]   11/26/2024 11:00 AM SCHEDULE, [facility] DEVICE CHECK [facility]   11/26/2024 11:00 AM SARAH Harper Jr., MD [facility]   5/30/2025 11:40 AM Quiroga, Josy, APRN - CNP [facility]       Message sent to  to schedule appt with patient?  NO      Requested Prescriptions     Pending Prescriptions Disp Refills    citalopram (CELEXA) 40 MG tablet [Pharmacy Med Name: CITALOPRAM HBR 40 MG TABLET] 30 tablet 0     Sig: TAKE 1 TABLET BY MOUTH DAILY

## 2024-10-01 DIAGNOSIS — K21.9 CHRONIC GERD: ICD-10-CM

## 2024-10-01 RX ORDER — OMEPRAZOLE 40 MG/1
40 CAPSULE, DELAYED RELEASE ORAL
Qty: 90 CAPSULE | Refills: 1 | Status: SHIPPED | OUTPATIENT
Start: 2024-10-01

## 2024-10-01 NOTE — TELEPHONE ENCOUNTER
Refill Request     CONFIRM preferred pharmacy with the patient.    If Mail Order Rx - Pend for 90 day refill.      Last Seen: Last Seen Department: 4/23/2024  Last Seen by PCP: 4/23/2024    Last Written: 12/6/23 #90 - 1 refill     If no future appointment scheduled:  Review the last OV with PCP and review information for follow-up visit,  Route STAFF MESSAGE with patient name to the  Pool for scheduling with the following information:            -  Timing of next visit           -  Visit type ie Physical, OV, etc           -  Diagnoses/Reason ie. COPD, HTN - Do not use MEDICATION, Follow-up or CHECK UP - Give reason for visit      Next Appointment:   Future Appointments   Date Time Provider Department Center   10/7/2024  1:00 PM SCHEDULE, [facility] DEVICE CHECK [facility]   10/7/2024  1:00 PM Gini Pike APRN - CNP [facility]   10/23/2024  9:30 AM Mary Florentino PA [facility]   10/24/2024 11:00 AM [facility]   11/26/2024 11:00 AM SCHEDULE, [facility] DEVICE CHECK [facility]   11/26/2024 11:00 AM SARAH Harper Jr., MD [facility]   5/30/2025 11:40 AM Quiroga, Josy, APRN - CNP [facility]       Message sent to  to schedule appt with patient?  NO      Requested Prescriptions     Pending Prescriptions Disp Refills    omeprazole (PRILOSEC) 40 MG delayed release capsule 90 capsule 1     Sig: Take 1 capsule by mouth every morning (before breakfast)

## 2024-10-07 ENCOUNTER — OFFICE VISIT (OUTPATIENT)
Dept: CARDIOLOGY CLINIC | Age: 69
End: 2024-10-07
Payer: MEDICARE

## 2024-10-07 ENCOUNTER — NURSE ONLY (OUTPATIENT)
Dept: CARDIOLOGY CLINIC | Age: 69
End: 2024-10-07

## 2024-10-07 VITALS
BODY MASS INDEX: 42.85 KG/M2 | DIASTOLIC BLOOD PRESSURE: 74 MMHG | HEIGHT: 65 IN | OXYGEN SATURATION: 97 % | SYSTOLIC BLOOD PRESSURE: 116 MMHG | HEART RATE: 81 BPM | WEIGHT: 257.2 LBS

## 2024-10-07 DIAGNOSIS — Z95.0 PACEMAKER: Primary | ICD-10-CM

## 2024-10-07 DIAGNOSIS — I49.3 PVC (PREMATURE VENTRICULAR CONTRACTION): ICD-10-CM

## 2024-10-07 DIAGNOSIS — I49.5 SINUS NODE DYSFUNCTION (HCC): ICD-10-CM

## 2024-10-07 DIAGNOSIS — I48.0 PAF (PAROXYSMAL ATRIAL FIBRILLATION) (HCC): Primary | ICD-10-CM

## 2024-10-07 DIAGNOSIS — I10 ESSENTIAL HYPERTENSION: ICD-10-CM

## 2024-10-07 PROCEDURE — G8427 DOCREV CUR MEDS BY ELIG CLIN: HCPCS | Performed by: NURSE PRACTITIONER

## 2024-10-07 PROCEDURE — 1036F TOBACCO NON-USER: CPT | Performed by: NURSE PRACTITIONER

## 2024-10-07 PROCEDURE — 1090F PRES/ABSN URINE INCON ASSESS: CPT | Performed by: NURSE PRACTITIONER

## 2024-10-07 PROCEDURE — 3017F COLORECTAL CA SCREEN DOC REV: CPT | Performed by: NURSE PRACTITIONER

## 2024-10-07 PROCEDURE — 93000 ELECTROCARDIOGRAM COMPLETE: CPT | Performed by: NURSE PRACTITIONER

## 2024-10-07 PROCEDURE — G8399 PT W/DXA RESULTS DOCUMENT: HCPCS | Performed by: NURSE PRACTITIONER

## 2024-10-07 PROCEDURE — 3074F SYST BP LT 130 MM HG: CPT | Performed by: NURSE PRACTITIONER

## 2024-10-07 PROCEDURE — 1123F ACP DISCUSS/DSCN MKR DOCD: CPT | Performed by: NURSE PRACTITIONER

## 2024-10-07 PROCEDURE — 99215 OFFICE O/P EST HI 40 MIN: CPT | Performed by: NURSE PRACTITIONER

## 2024-10-07 PROCEDURE — 3078F DIAST BP <80 MM HG: CPT | Performed by: NURSE PRACTITIONER

## 2024-10-07 PROCEDURE — G8484 FLU IMMUNIZE NO ADMIN: HCPCS | Performed by: NURSE PRACTITIONER

## 2024-10-07 PROCEDURE — G8417 CALC BMI ABV UP PARAM F/U: HCPCS | Performed by: NURSE PRACTITIONER

## 2024-10-07 NOTE — PROGRESS NOTES
is normal in size and systolic function. There is late gadolinium enhancement of the RV insertion points suggestive of elevated right heart pressures.  The left atrium is mildly dilated. There is mild mitral regurgitation  The right atrium is normal in size.  There is late gadolinium enhancement involving both atria.  There is mild aortic stenosis and regurgitation.  The descending aorta is dilated there is evidence of associated atherosclerosis.     [facility] 8/2/2019 ([place]):  LM: luminals  LAD: mid luminals, distal vessel small  LCX: luminals  RCA: dominant, very large     LVEDP: 18  LVEF: 25-30% global hypokinesis     Assessment  1. Nonischemic cardiomyopathy  2. Given worsening cardiomyopathy, suggest Cardiac MRI                - eval for myocarditis or infiltrative cardiomyopathy  3. Will d/c flecainide and dilt given worsening LVEF, pt currently in NSR                - start toprol 25                - start ASA and lisinopril  4. Lasix in post op area                - will make Appt with heart failure team      All labs and testing reviewed.  CARDIOLOGY LABS:   CBC:   No results for input(s): \"WBC\", \"HGB\", \"HCT\", \"PLT\" in the last 72 hours.    BMP:   No results for input(s): \"NA\", \"K\", \"CO2\", \"BUN\", \"CREATININE\", \"LABGLOM\", \"GLUCOSE\" in the last 72 hours.    PT/INR: No results for input(s): \"PROTIME\", \"INR\" in the last 72 hours.  APTT:No results for input(s): \"APTT\" in the last 72 hours.  FASTING LIPID PANEL:  Lab Results   Component Value Date/Time    HDL 42 06/11/2024 11:41 AM    TRIG 79 06/11/2024 11:41 AM     LIVER PROFILE:  No results for input(s): \"AST\", \"ALT\" in the last 72 hours.    Invalid input(s): \"ALB\"      IMPRESSION:    Patient Active Problem List   Diagnosis    Chronic GERD    Allergic rhinitis    History of breast cancer    Severe obstructive sleep apnea    Hypothyroidism    Mild episode of recurrent major depressive disorder (HCC)    Hiatal hernia    Multiple gastric polyps    Aortic valve

## 2024-10-07 NOTE — PATIENT INSTRUCTIONS
No changes today     Will plan for ablation of  PVC's and possibly starting a new medication. Will discuss with Dr. Harper and call you with his recommendations.     Remote device transmissions every three months     Follow up to be determined

## 2024-10-10 ENCOUNTER — TELEPHONE (OUTPATIENT)
Dept: CARDIAC CATH/INVASIVE PROCEDURES | Age: 69
End: 2024-10-10

## 2024-10-10 NOTE — TELEPHONE ENCOUNTER
Spoke with patient and relayed medication instructions. Also sent her instructions via [place] for her reference. She V/U.     Hold tikosyn 3 days prior.  Hold coumadin 2 days prior.    Hold morning of:  Jardiance  Lasix  [place]  Multivitamin  Vitamin C  Vitamin D3  Zinc  Magnesium  spironolactone

## 2024-10-10 NOTE — TELEPHONE ENCOUNTER
Procedure:  PVC Ablation  Doctor:  Dr. Harper  Date:  11/6/24  Time:  8:30am  Arrival:  7am  Reps:  Carto  Anesthesia:  Yes      Spoke with patient (see MyChart msg). Please have patient arrive to the main entrance of [facility] ([address] [place] [id number]) and check in with the registration desk.  They will be directed to the Cath Lab.  Please call patient regarding medication instructions. Remind patient to be NPO after midnight (8 hours prior). Do not apply lotions/creams on skin the day of procedure.

## 2024-10-20 SDOH — ECONOMIC STABILITY: TRANSPORTATION INSECURITY
IN THE PAST 12 MONTHS, HAS LACK OF TRANSPORTATION KEPT YOU FROM MEETINGS, WORK, OR FROM GETTING THINGS NEEDED FOR DAILY LIVING?: PATIENT DECLINED

## 2024-10-20 SDOH — ECONOMIC STABILITY: INCOME INSECURITY: HOW HARD IS IT FOR YOU TO PAY FOR THE VERY BASICS LIKE FOOD, HOUSING, MEDICAL CARE, AND HEATING?: PATIENT DECLINED

## 2024-10-20 SDOH — ECONOMIC STABILITY: FOOD INSECURITY: WITHIN THE PAST 12 MONTHS, YOU WORRIED THAT YOUR FOOD WOULD RUN OUT BEFORE YOU GOT MONEY TO BUY MORE.: PATIENT DECLINED

## 2024-10-20 SDOH — ECONOMIC STABILITY: FOOD INSECURITY: WITHIN THE PAST 12 MONTHS, THE FOOD YOU BOUGHT JUST DIDN'T LAST AND YOU DIDN'T HAVE MONEY TO GET MORE.: PATIENT DECLINED

## 2024-10-24 ENCOUNTER — ANTI-COAG VISIT (OUTPATIENT)
Dept: PHARMACY | Age: 69
End: 2024-10-24
Payer: MEDICARE

## 2024-10-24 DIAGNOSIS — I48.0 PAF (PAROXYSMAL ATRIAL FIBRILLATION) (HCC): Primary | ICD-10-CM

## 2024-10-24 LAB
INTERNATIONAL NORMALIZATION RATIO, POC: 2.5
PROTHROMBIN TIME, POC: 0

## 2024-10-24 PROCEDURE — 85610 PROTHROMBIN TIME: CPT | Performed by: PHARMACIST

## 2024-10-24 PROCEDURE — 99211 OFF/OP EST MAY X REQ PHY/QHP: CPT | Performed by: PHARMACIST

## 2024-10-24 NOTE — PROGRESS NOTES
Ms. Maira Marin is here for management of anticoagulation for AFib.   PMH also significant for CHF, HLD, HTN .   She presents today w/out complaint.  Pt verifies dosing regimen as listed above.   Pt denies s/s bleeding/bruising/swelling/SOB.  No BRBPR. No melena.  Address missed doses  Reviewed pt medication list.  No changes in RX/OTCs/Herbal medications.  Reviewed dietary concerns  Address EToH and tobacco use.    No recent medication changes per pt.    She thinks she missed some days of the 7.5 mg and took just 5 mg those days. Not sure exactly when but does not think she missed any in the past week.      INR 2.5 is within therapeutic range of 2-3.   Recommend to continue dose of 5 mg daily except 7.5 mg Q Mon/Wed/Fri.  Patient has 5 mg tablets.  Will continue to monitor and check INR in 4 weeks.  Dosing reminder card given with phone number, appointment date and time.   Return to clinic: 24 @ 2:00 pm    Fan Strong PharmD 10:55 AM EDT 10/24/24    For Pharmacy Admin Tracking Only    Intervention Detail: Adherence Monitorin  Total # of Interventions Recommended: 1  Total # of Interventions Accepted: 1  Time Spent (min): 15

## 2024-10-29 ENCOUNTER — OFFICE VISIT (OUTPATIENT)
Dept: FAMILY MEDICINE CLINIC | Age: 69
End: 2024-10-29

## 2024-10-29 VITALS
HEART RATE: 41 BPM | DIASTOLIC BLOOD PRESSURE: 70 MMHG | SYSTOLIC BLOOD PRESSURE: 120 MMHG | BODY MASS INDEX: 42.93 KG/M2 | WEIGHT: 258 LBS | OXYGEN SATURATION: 98 %

## 2024-10-29 DIAGNOSIS — E11.9 TYPE 2 DIABETES MELLITUS WITHOUT COMPLICATION, WITHOUT LONG-TERM CURRENT USE OF INSULIN (HCC): Primary | ICD-10-CM

## 2024-10-29 DIAGNOSIS — B35.1 ONYCHOMYCOSIS: ICD-10-CM

## 2024-10-29 DIAGNOSIS — E03.9 ACQUIRED HYPOTHYROIDISM: Chronic | ICD-10-CM

## 2024-10-29 DIAGNOSIS — K22.4 ESOPHAGEAL SPASM: ICD-10-CM

## 2024-10-29 DIAGNOSIS — K21.9 CHRONIC GERD: ICD-10-CM

## 2024-10-29 LAB — HBA1C MFR BLD: 5.9 %

## 2024-10-29 RX ORDER — DICYCLOMINE HCL 20 MG
20 TABLET ORAL EVERY 4 HOURS PRN
Qty: 30 TABLET | Refills: 1 | Status: SHIPPED | OUTPATIENT
Start: 2024-10-29

## 2024-10-29 RX ORDER — CICLOPIROX 80 MG/ML
SOLUTION TOPICAL
Qty: 6 ML | Refills: 1 | Status: SHIPPED | OUTPATIENT
Start: 2024-10-29

## 2024-10-29 ASSESSMENT — ENCOUNTER SYMPTOMS
SHORTNESS OF BREATH: 0
WHEEZING: 0
BLOOD IN STOOL: 0
ABDOMINAL PAIN: 0

## 2024-10-29 NOTE — PROGRESS NOTES
lamisil    Review of Systems   Constitutional:  Positive for fatigue.   Eyes:  Negative for visual disturbance.   Respiratory:  Negative for shortness of breath and wheezing.    Cardiovascular:  Negative for chest pain, palpitations and leg swelling.   Gastrointestinal:  Negative for abdominal pain and blood in stool.   Endocrine: Negative for polydipsia, polyphagia and polyuria.   Skin:         Thickened and split toe nail of great toe   Neurological:  Negative for dizziness, light-headedness and headaches.   Psychiatric/Behavioral:  Negative for agitation, behavioral problems, confusion, decreased concentration, dysphoric mood, hallucinations, self-injury, sleep disturbance and suicidal ideas. The patient is nervous/anxious. The patient is not hyperactive.           Objective   Physical Exam  Vitals reviewed.   Constitutional:       Appearance: Normal appearance.   HENT:      Head: Normocephalic and atraumatic.      Right Ear: Tympanic membrane, ear canal and external ear normal.      Left Ear: Tympanic membrane, ear canal and external ear normal.   Eyes:      Extraocular Movements: Extraocular movements intact.      Conjunctiva/sclera: Conjunctivae normal.      Pupils: Pupils are equal, round, and reactive to light.   Cardiovascular:      Rate and Rhythm: Normal rate and regular rhythm.      Heart sounds: Normal heart sounds.   Pulmonary:      Effort: Pulmonary effort is normal.      Breath sounds: Normal breath sounds.   Abdominal:      General: Abdomen is flat. Bowel sounds are normal.      Palpations: Abdomen is soft.   Skin:     Coloration: Skin is not pale.   Neurological:      General: No focal deficit present.      Mental Status: She is alert and oriented to person, place, and time.      Cranial Nerves: No cranial nerve deficit.                An electronic signature was used to authenticate this note.    --KADEN Shah

## 2024-11-06 ENCOUNTER — ANESTHESIA (OUTPATIENT)
Dept: CARDIAC CATH/INVASIVE PROCEDURES | Age: 69
End: 2024-11-06
Payer: MEDICARE

## 2024-11-06 ENCOUNTER — HOSPITAL ENCOUNTER (OUTPATIENT)
Age: 69
Setting detail: OBSERVATION
Discharge: HOME | End: 2024-11-07
Attending: INTERNAL MEDICINE | Admitting: INTERNAL MEDICINE
Payer: MEDICARE

## 2024-11-06 ENCOUNTER — ANESTHESIA EVENT (OUTPATIENT)
Dept: CARDIAC CATH/INVASIVE PROCEDURES | Age: 69
End: 2024-11-06
Payer: MEDICARE

## 2024-11-06 DIAGNOSIS — G89.18 POST-OP PAIN: Primary | ICD-10-CM

## 2024-11-06 DIAGNOSIS — I49.3 VENTRICULAR PREMATURE BEATS: ICD-10-CM

## 2024-11-06 LAB
ANION GAP SERPL CALCULATED.3IONS-SCNC: 13 MMOL/L (ref 3–16)
BUN SERPL-MCNC: 24 MG/DL (ref 7–20)
CALCIUM SERPL-MCNC: 9.2 MG/DL (ref 8.3–10.6)
CHLORIDE SERPL-SCNC: 103 MMOL/L (ref 99–110)
CO2 SERPL-SCNC: 23 MMOL/L (ref 21–32)
CREAT SERPL-MCNC: 0.9 MG/DL (ref 0.6–1.2)
DEPRECATED RDW RBC AUTO: 14.3 % (ref 12.4–15.4)
EKG ATRIAL RATE: 60 BPM
EKG DIAGNOSIS: NORMAL
EKG P-R INTERVAL: 212 MS
EKG Q-T INTERVAL: 444 MS
EKG QRS DURATION: 116 MS
EKG QTC CALCULATION (BAZETT): 444 MS
EKG R AXIS: 4 DEGREES
EKG T AXIS: 177 DEGREES
EKG VENTRICULAR RATE: 60 BPM
GFR SERPLBLD CREATININE-BSD FMLA CKD-EPI: 69 ML/MIN/{1.73_M2}
GLUCOSE SERPL-MCNC: 130 MG/DL (ref 70–99)
HCT VFR BLD AUTO: 42.7 % (ref 36–48)
HGB BLD-MCNC: 14.1 G/DL (ref 12–16)
INR PPP: 1.65 (ref 0.85–1.15)
MCH RBC QN AUTO: 30.2 PG (ref 26–34)
MCHC RBC AUTO-ENTMCNC: 33 G/DL (ref 31–36)
MCV RBC AUTO: 91.7 FL (ref 80–100)
PLATELET # BLD AUTO: 212 K/UL (ref 135–450)
PMV BLD AUTO: 7.8 FL (ref 5–10.5)
POC ACT LR: 366 SEC
POC ACT LR: 387 SEC
POC ACT LR: >400 SEC
POTASSIUM SERPL-SCNC: 4.3 MMOL/L (ref 3.5–5.1)
PROTHROMBIN TIME: 19.7 SEC (ref 11.9–14.9)
RBC # BLD AUTO: 4.65 M/UL (ref 4–5.2)
SODIUM SERPL-SCNC: 139 MMOL/L (ref 136–145)
WBC # BLD AUTO: 5.2 K/UL (ref 4–11)

## 2024-11-06 PROCEDURE — 7100000011 HC PHASE II RECOVERY - ADDTL 15 MIN: Performed by: INTERNAL MEDICINE

## 2024-11-06 PROCEDURE — 2060000000 HC ICU INTERMEDIATE R&B

## 2024-11-06 PROCEDURE — 2580000003 HC RX 258: Performed by: INTERNAL MEDICINE

## 2024-11-06 PROCEDURE — 6360000002 HC RX W HCPCS: Performed by: INTERNAL MEDICINE

## 2024-11-06 PROCEDURE — 93010 ELECTROCARDIOGRAM REPORT: CPT | Performed by: INTERNAL MEDICINE

## 2024-11-06 PROCEDURE — 6370000000 HC RX 637 (ALT 250 FOR IP): Performed by: INTERNAL MEDICINE

## 2024-11-06 PROCEDURE — 94761 N-INVAS EAR/PLS OXIMETRY MLT: CPT

## 2024-11-06 PROCEDURE — 85027 COMPLETE CBC AUTOMATED: CPT

## 2024-11-06 PROCEDURE — 6360000002 HC RX W HCPCS: Performed by: ANESTHESIOLOGY

## 2024-11-06 PROCEDURE — 2500000003 HC RX 250 WO HCPCS: Performed by: INTERNAL MEDICINE

## 2024-11-06 PROCEDURE — 7100000010 HC PHASE II RECOVERY - FIRST 15 MIN: Performed by: INTERNAL MEDICINE

## 2024-11-06 PROCEDURE — 2709999900 HC NON-CHARGEABLE SUPPLY: Performed by: INTERNAL MEDICINE

## 2024-11-06 PROCEDURE — 93654 COMPRE EP EVAL TX VT: CPT | Performed by: INTERNAL MEDICINE

## 2024-11-06 PROCEDURE — C1894 INTRO/SHEATH, NON-LASER: HCPCS | Performed by: INTERNAL MEDICINE

## 2024-11-06 PROCEDURE — 85347 COAGULATION TIME ACTIVATED: CPT

## 2024-11-06 PROCEDURE — 2580000003 HC RX 258

## 2024-11-06 PROCEDURE — 93005 ELECTROCARDIOGRAM TRACING: CPT | Performed by: INTERNAL MEDICINE

## 2024-11-06 PROCEDURE — 2720000010 HC SURG SUPPLY STERILE: Performed by: INTERNAL MEDICINE

## 2024-11-06 PROCEDURE — 02583ZZ DESTRUCTION OF CONDUCTION MECHANISM, PERCUTANEOUS APPROACH: ICD-10-PCS | Performed by: INTERNAL MEDICINE

## 2024-11-06 PROCEDURE — C1759 CATH, INTRA ECHOCARDIOGRAPHY: HCPCS | Performed by: INTERNAL MEDICINE

## 2024-11-06 PROCEDURE — C1760 CLOSURE DEV, VASC: HCPCS | Performed by: INTERNAL MEDICINE

## 2024-11-06 PROCEDURE — 6360000002 HC RX W HCPCS

## 2024-11-06 PROCEDURE — 93662 INTRACARDIAC ECG (ICE): CPT | Performed by: INTERNAL MEDICINE

## 2024-11-06 PROCEDURE — 02K83ZZ MAP CONDUCTION MECHANISM, PERCUTANEOUS APPROACH: ICD-10-PCS | Performed by: INTERNAL MEDICINE

## 2024-11-06 PROCEDURE — 85610 PROTHROMBIN TIME: CPT

## 2024-11-06 PROCEDURE — 2700000000 HC OXYGEN THERAPY PER DAY

## 2024-11-06 PROCEDURE — 93623 PRGRMD STIMJ&PACG IV RX NFS: CPT | Performed by: INTERNAL MEDICINE

## 2024-11-06 PROCEDURE — C1732 CATH, EP, DIAG/ABL, 3D/VECT: HCPCS | Performed by: INTERNAL MEDICINE

## 2024-11-06 PROCEDURE — 6360000002 HC RX W HCPCS: Performed by: NURSE ANESTHETIST, CERTIFIED REGISTERED

## 2024-11-06 PROCEDURE — G0378 HOSPITAL OBSERVATION PER HR: HCPCS

## 2024-11-06 PROCEDURE — 3700000000 HC ANESTHESIA ATTENDED CARE: Performed by: INTERNAL MEDICINE

## 2024-11-06 PROCEDURE — 3700000001 HC ADD 15 MINUTES (ANESTHESIA): Performed by: INTERNAL MEDICINE

## 2024-11-06 PROCEDURE — 80048 BASIC METABOLIC PNL TOTAL CA: CPT

## 2024-11-06 RX ORDER — SODIUM CHLORIDE 0.9 % (FLUSH) 0.9 %
5-40 SYRINGE (ML) INJECTION EVERY 12 HOURS SCHEDULED
Status: DISCONTINUED | OUTPATIENT
Start: 2024-11-06 | End: 2024-11-06 | Stop reason: HOSPADM

## 2024-11-06 RX ORDER — ATORVASTATIN CALCIUM 40 MG/1
40 TABLET, FILM COATED ORAL NIGHTLY
Status: DISCONTINUED | OUTPATIENT
Start: 2024-11-06 | End: 2024-11-07 | Stop reason: HOSPADM

## 2024-11-06 RX ORDER — SODIUM CHLORIDE 9 MG/ML
INJECTION, SOLUTION INTRAVENOUS PRN
Status: DISCONTINUED | OUTPATIENT
Start: 2024-11-06 | End: 2024-11-07 | Stop reason: HOSPADM

## 2024-11-06 RX ORDER — ONDANSETRON 2 MG/ML
INJECTION INTRAMUSCULAR; INTRAVENOUS
Status: DISCONTINUED | OUTPATIENT
Start: 2024-11-06 | End: 2024-11-06 | Stop reason: SDUPTHER

## 2024-11-06 RX ORDER — NALOXONE HYDROCHLORIDE 0.4 MG/ML
INJECTION, SOLUTION INTRAMUSCULAR; INTRAVENOUS; SUBCUTANEOUS PRN
Status: DISCONTINUED | OUTPATIENT
Start: 2024-11-06 | End: 2024-11-06 | Stop reason: HOSPADM

## 2024-11-06 RX ORDER — MONTELUKAST SODIUM 10 MG/1
10 TABLET ORAL NIGHTLY
Status: DISCONTINUED | OUTPATIENT
Start: 2024-11-06 | End: 2024-11-07 | Stop reason: HOSPADM

## 2024-11-06 RX ORDER — DICYCLOMINE HCL 20 MG
20 TABLET ORAL EVERY 4 HOURS PRN
Status: DISCONTINUED | OUTPATIENT
Start: 2024-11-06 | End: 2024-11-07 | Stop reason: HOSPADM

## 2024-11-06 RX ORDER — LANOLIN ALCOHOL/MO/W.PET/CERES
400 CREAM (GRAM) TOPICAL DAILY
Status: DISCONTINUED | OUTPATIENT
Start: 2024-11-06 | End: 2024-11-07 | Stop reason: HOSPADM

## 2024-11-06 RX ORDER — WARFARIN SODIUM 5 MG/1
5 TABLET ORAL DAILY
Status: DISCONTINUED | OUTPATIENT
Start: 2024-11-06 | End: 2024-11-06

## 2024-11-06 RX ORDER — VANCOMYCIN 1.25 G/250ML
INJECTION, SOLUTION INTRAVENOUS
Status: DISCONTINUED | OUTPATIENT
Start: 2024-11-06 | End: 2024-11-06 | Stop reason: SDUPTHER

## 2024-11-06 RX ORDER — SODIUM CHLORIDE 0.9 % (FLUSH) 0.9 %
5-40 SYRINGE (ML) INJECTION EVERY 12 HOURS SCHEDULED
Status: DISCONTINUED | OUTPATIENT
Start: 2024-11-06 | End: 2024-11-07 | Stop reason: HOSPADM

## 2024-11-06 RX ORDER — ASCORBIC ACID 500 MG
500 TABLET ORAL DAILY
Status: DISCONTINUED | OUTPATIENT
Start: 2024-11-06 | End: 2024-11-07 | Stop reason: HOSPADM

## 2024-11-06 RX ORDER — SODIUM CHLORIDE 0.9 % (FLUSH) 0.9 %
5-40 SYRINGE (ML) INJECTION PRN
Status: DISCONTINUED | OUTPATIENT
Start: 2024-11-06 | End: 2024-11-07 | Stop reason: HOSPADM

## 2024-11-06 RX ORDER — LORAZEPAM 0.5 MG/1
0.5 TABLET ORAL
Status: DISCONTINUED | OUTPATIENT
Start: 2024-11-06 | End: 2024-11-06 | Stop reason: HOSPADM

## 2024-11-06 RX ORDER — MIDAZOLAM HYDROCHLORIDE 1 MG/ML
INJECTION, SOLUTION INTRAMUSCULAR; INTRAVENOUS
Status: DISCONTINUED | OUTPATIENT
Start: 2024-11-06 | End: 2024-11-06 | Stop reason: SDUPTHER

## 2024-11-06 RX ORDER — SODIUM CHLORIDE 9 MG/ML
INJECTION, SOLUTION INTRAVENOUS PRN
Status: DISCONTINUED | OUTPATIENT
Start: 2024-11-06 | End: 2024-11-06 | Stop reason: HOSPADM

## 2024-11-06 RX ORDER — MEPERIDINE HYDROCHLORIDE 50 MG/ML
12.5 INJECTION INTRAMUSCULAR; INTRAVENOUS; SUBCUTANEOUS EVERY 5 MIN PRN
Status: DISCONTINUED | OUTPATIENT
Start: 2024-11-06 | End: 2024-11-06 | Stop reason: HOSPADM

## 2024-11-06 RX ORDER — CITALOPRAM HYDROBROMIDE 20 MG/1
40 TABLET ORAL DAILY
Status: DISCONTINUED | OUTPATIENT
Start: 2024-11-06 | End: 2024-11-07 | Stop reason: HOSPADM

## 2024-11-06 RX ORDER — LANSOPRAZOLE 30 MG/1
30 TABLET, ORALLY DISINTEGRATING, DELAYED RELEASE ORAL
Status: DISCONTINUED | OUTPATIENT
Start: 2024-11-07 | End: 2024-11-07 | Stop reason: HOSPADM

## 2024-11-06 RX ORDER — ZINC GLUCONATE 50 MG
50 TABLET ORAL DAILY
Status: DISCONTINUED | OUTPATIENT
Start: 2024-11-06 | End: 2024-11-06 | Stop reason: RX

## 2024-11-06 RX ORDER — SODIUM CHLORIDE 0.9 % (FLUSH) 0.9 %
5-40 SYRINGE (ML) INJECTION PRN
Status: DISCONTINUED | OUTPATIENT
Start: 2024-11-06 | End: 2024-11-06 | Stop reason: HOSPADM

## 2024-11-06 RX ORDER — OXYBUTYNIN CHLORIDE 5 MG/1
15 TABLET, EXTENDED RELEASE ORAL DAILY
Status: DISCONTINUED | OUTPATIENT
Start: 2024-11-06 | End: 2024-11-07 | Stop reason: HOSPADM

## 2024-11-06 RX ORDER — HEPARIN SODIUM 1000 [USP'U]/ML
INJECTION, SOLUTION INTRAVENOUS; SUBCUTANEOUS PRN
Status: DISCONTINUED | OUTPATIENT
Start: 2024-11-06 | End: 2024-11-06 | Stop reason: HOSPADM

## 2024-11-06 RX ORDER — PROTAMINE SULFATE 10 MG/ML
INJECTION, SOLUTION INTRAVENOUS
Status: DISCONTINUED | OUTPATIENT
Start: 2024-11-06 | End: 2024-11-06 | Stop reason: SDUPTHER

## 2024-11-06 RX ORDER — ONDANSETRON 2 MG/ML
4 INJECTION INTRAMUSCULAR; INTRAVENOUS EVERY 6 HOURS PRN
Status: DISCONTINUED | OUTPATIENT
Start: 2024-11-06 | End: 2024-11-06 | Stop reason: HOSPADM

## 2024-11-06 RX ORDER — OXYCODONE HYDROCHLORIDE 5 MG/1
5 TABLET ORAL PRN
Status: DISCONTINUED | OUTPATIENT
Start: 2024-11-06 | End: 2024-11-06 | Stop reason: HOSPADM

## 2024-11-06 RX ORDER — FENTANYL CITRATE 50 UG/ML
INJECTION, SOLUTION INTRAMUSCULAR; INTRAVENOUS
Status: DISCONTINUED | OUTPATIENT
Start: 2024-11-06 | End: 2024-11-06 | Stop reason: SDUPTHER

## 2024-11-06 RX ORDER — WARFARIN SODIUM 7.5 MG/1
7.5 TABLET ORAL
Status: COMPLETED | OUTPATIENT
Start: 2024-11-06 | End: 2024-11-06

## 2024-11-06 RX ORDER — CETIRIZINE HYDROCHLORIDE 10 MG/1
10 TABLET ORAL EVERY MORNING
Status: DISCONTINUED | OUTPATIENT
Start: 2024-11-07 | End: 2024-11-07 | Stop reason: HOSPADM

## 2024-11-06 RX ORDER — ACETAMINOPHEN 500 MG
1000 TABLET ORAL 3 TIMES DAILY PRN
Status: DISCONTINUED | OUTPATIENT
Start: 2024-11-06 | End: 2024-11-07 | Stop reason: HOSPADM

## 2024-11-06 RX ORDER — OXYCODONE AND ACETAMINOPHEN 5; 325 MG/1; MG/1
1 TABLET ORAL EVERY 4 HOURS PRN
Status: DISCONTINUED | OUTPATIENT
Start: 2024-11-06 | End: 2024-11-07 | Stop reason: HOSPADM

## 2024-11-06 RX ORDER — SODIUM CHLORIDE 9 MG/ML
INJECTION, SOLUTION INTRAVENOUS
Status: DISCONTINUED | OUTPATIENT
Start: 2024-11-06 | End: 2024-11-06 | Stop reason: SDUPTHER

## 2024-11-06 RX ORDER — PROPOFOL 10 MG/ML
INJECTION, EMULSION INTRAVENOUS
Status: DISCONTINUED | OUTPATIENT
Start: 2024-11-06 | End: 2024-11-06 | Stop reason: SDUPTHER

## 2024-11-06 RX ORDER — M-VIT,TX,IRON,MINS/CALC/FOLIC 27MG-0.4MG
1 TABLET ORAL DAILY
Status: DISCONTINUED | OUTPATIENT
Start: 2024-11-06 | End: 2024-11-07 | Stop reason: HOSPADM

## 2024-11-06 RX ORDER — OXYCODONE HYDROCHLORIDE 5 MG/1
10 TABLET ORAL PRN
Status: DISCONTINUED | OUTPATIENT
Start: 2024-11-06 | End: 2024-11-06 | Stop reason: HOSPADM

## 2024-11-06 RX ORDER — METOPROLOL SUCCINATE 25 MG/1
25 TABLET, EXTENDED RELEASE ORAL DAILY
Status: DISCONTINUED | OUTPATIENT
Start: 2024-11-07 | End: 2024-11-07 | Stop reason: HOSPADM

## 2024-11-06 RX ORDER — BUPIVACAINE HYDROCHLORIDE 5 MG/ML
INJECTION, SOLUTION EPIDURAL; INTRACAUDAL PRN
Status: DISCONTINUED | OUTPATIENT
Start: 2024-11-06 | End: 2024-11-06 | Stop reason: HOSPADM

## 2024-11-06 RX ORDER — DIPHENHYDRAMINE HYDROCHLORIDE 50 MG/ML
6.25 INJECTION INTRAMUSCULAR; INTRAVENOUS
Status: DISCONTINUED | OUTPATIENT
Start: 2024-11-06 | End: 2024-11-06 | Stop reason: HOSPADM

## 2024-11-06 RX ORDER — HEPARIN SODIUM 10000 [USP'U]/100ML
INJECTION, SOLUTION INTRAVENOUS CONTINUOUS PRN
Status: DISCONTINUED | OUTPATIENT
Start: 2024-11-06 | End: 2024-11-06 | Stop reason: HOSPADM

## 2024-11-06 RX ORDER — LIDOCAINE HYDROCHLORIDE 20 MG/ML
INJECTION, SOLUTION EPIDURAL; INFILTRATION; INTRACAUDAL; PERINEURAL PRN
Status: DISCONTINUED | OUTPATIENT
Start: 2024-11-06 | End: 2024-11-06 | Stop reason: HOSPADM

## 2024-11-06 RX ORDER — DOFETILIDE 0.5 MG/1
500 CAPSULE ORAL EVERY 12 HOURS SCHEDULED
Status: DISCONTINUED | OUTPATIENT
Start: 2024-11-06 | End: 2024-11-07 | Stop reason: HOSPADM

## 2024-11-06 RX ORDER — ONDANSETRON 2 MG/ML
4 INJECTION INTRAMUSCULAR; INTRAVENOUS ONCE
Status: DISCONTINUED | OUTPATIENT
Start: 2024-11-06 | End: 2024-11-06 | Stop reason: HOSPADM

## 2024-11-06 RX ORDER — SPIRONOLACTONE 25 MG/1
25 TABLET ORAL DAILY
Status: DISCONTINUED | OUTPATIENT
Start: 2024-11-06 | End: 2024-11-07 | Stop reason: HOSPADM

## 2024-11-06 RX ORDER — MIDAZOLAM HYDROCHLORIDE 1 MG/ML
2 INJECTION, SOLUTION INTRAMUSCULAR; INTRAVENOUS
Status: DISCONTINUED | OUTPATIENT
Start: 2024-11-06 | End: 2024-11-06 | Stop reason: HOSPADM

## 2024-11-06 RX ADMIN — FENTANYL CITRATE 100 MCG: 50 INJECTION, SOLUTION INTRAMUSCULAR; INTRAVENOUS at 11:35

## 2024-11-06 RX ADMIN — OXYCODONE HYDROCHLORIDE AND ACETAMINOPHEN 1 TABLET: 5; 325 TABLET ORAL at 15:10

## 2024-11-06 RX ADMIN — CHOLECALCIFEROL TAB 125 MCG (5000 UNIT) 5000 UNITS: 125 TAB at 15:36

## 2024-11-06 RX ADMIN — Medication 10 ML: at 20:53

## 2024-11-06 RX ADMIN — MIDAZOLAM 1 MG: 1 INJECTION INTRAMUSCULAR; INTRAVENOUS at 10:57

## 2024-11-06 RX ADMIN — MONTELUKAST 10 MG: 10 TABLET, FILM COATED ORAL at 20:49

## 2024-11-06 RX ADMIN — ATORVASTATIN CALCIUM 40 MG: 40 TABLET, FILM COATED ORAL at 20:49

## 2024-11-06 RX ADMIN — Medication 1 TABLET: at 15:07

## 2024-11-06 RX ADMIN — MIDAZOLAM 0.5 MG: 1 INJECTION INTRAMUSCULAR; INTRAVENOUS at 10:17

## 2024-11-06 RX ADMIN — WARFARIN SODIUM 7.5 MG: 7.5 TABLET ORAL at 18:50

## 2024-11-06 RX ADMIN — MIDAZOLAM 1 MG: 1 INJECTION INTRAMUSCULAR; INTRAVENOUS at 11:08

## 2024-11-06 RX ADMIN — PROTAMINE SULFATE 50 MG: 10 INJECTION, SOLUTION INTRAVENOUS at 11:49

## 2024-11-06 RX ADMIN — VANCOMYCIN 1750 MG: 1.25 INJECTION, SOLUTION INTRAVENOUS at 08:57

## 2024-11-06 RX ADMIN — PROPOFOL 20 MG: 10 INJECTION, EMULSION INTRAVENOUS at 12:05

## 2024-11-06 RX ADMIN — PROPOFOL 20 MG: 10 INJECTION, EMULSION INTRAVENOUS at 12:12

## 2024-11-06 RX ADMIN — OXYBUTYNIN CHLORIDE 15 MG: 5 TABLET, EXTENDED RELEASE ORAL at 15:07

## 2024-11-06 RX ADMIN — Medication 400 MG: at 15:10

## 2024-11-06 RX ADMIN — ONDANSETRON 4 MG: 2 INJECTION INTRAMUSCULAR; INTRAVENOUS at 10:17

## 2024-11-06 RX ADMIN — SPIRONOLACTONE 25 MG: 25 TABLET, FILM COATED ORAL at 15:10

## 2024-11-06 RX ADMIN — MIDAZOLAM 1 MG: 1 INJECTION INTRAMUSCULAR; INTRAVENOUS at 10:38

## 2024-11-06 RX ADMIN — PROPOFOL 30 MG: 10 INJECTION, EMULSION INTRAVENOUS at 11:58

## 2024-11-06 RX ADMIN — DOFETILIDE 500 MCG: 0.5 CAPSULE ORAL at 20:49

## 2024-11-06 RX ADMIN — SODIUM CHLORIDE: 9 INJECTION, SOLUTION INTRAVENOUS at 08:42

## 2024-11-06 RX ADMIN — MIDAZOLAM 0.5 MG: 1 INJECTION INTRAMUSCULAR; INTRAVENOUS at 10:26

## 2024-11-06 RX ADMIN — HYDROMORPHONE HYDROCHLORIDE 0.5 MG: 1 INJECTION, SOLUTION INTRAMUSCULAR; INTRAVENOUS; SUBCUTANEOUS at 12:54

## 2024-11-06 ASSESSMENT — PAIN SCALES - GENERAL
PAINLEVEL_OUTOF10: 0
PAINLEVEL_OUTOF10: 3
PAINLEVEL_OUTOF10: 2
PAINLEVEL_OUTOF10: 8
PAINLEVEL_OUTOF10: 6

## 2024-11-06 ASSESSMENT — PAIN - FUNCTIONAL ASSESSMENT: PAIN_FUNCTIONAL_ASSESSMENT: ACTIVITIES ARE NOT PREVENTED

## 2024-11-06 ASSESSMENT — PAIN DESCRIPTION - LOCATION
LOCATION: ARM
LOCATION: BACK

## 2024-11-06 ASSESSMENT — PAIN DESCRIPTION - DESCRIPTORS: DESCRIPTORS: ACHING;SORE

## 2024-11-06 ASSESSMENT — PAIN DESCRIPTION - ORIENTATION: ORIENTATION: RIGHT

## 2024-11-06 NOTE — CONSULTS
Pharmacy Note  Warfarin Consult  Dx: AFib  Goal INR range 2-3   Home Warfarin dose: 7.5 mg every Mon, Wed, Fri; 5 mg all other days   New start Warfarin with Heparin/Enoxaparin bridge.  Would recommend continue bridge until INR therapeutic.      Date                 INR                  Warfarin  11/6                1.65                    7.5 mg      Recommend Warfarin 7.5 mg tonight x1.  Daily INR ordered.  Rx will continue to manage therapy per consult order.  Radha NailsD, BCPS 11/6/2024 12:24 PM

## 2024-11-06 NOTE — PROGRESS NOTES
Pt to 439, cmu aware. transferred into be. Purwick placed. Suction hooked up. Bed in lowest position and flat. Pt is to lay flat until 1800. Reviewed poc with pt. Meds given. Site wnl. Drink give. Pt has phone and call light.

## 2024-11-06 NOTE — ANESTHESIA POSTPROCEDURE EVALUATION
Department of Anesthesiology  Postprocedure Note    Patient: Reina Marin  MRN: [id number]  YOB: 1955  Date of evaluation: 11/6/2024    Procedure Summary       Date: 11/06/24 Room / Location: [facility] CATH/EP LAB 4 / [facility] CARDIAC CATH LAB    Anesthesia Start: 0842 Anesthesia Stop: 1238    Procedure: Ablation PVC Diagnosis:       Ventricular premature beats      (Ventricular premature beats [I49.3])    Providers: SARAH Harper Jr., MD Responsible Provider: Elbert Castillo MD    Anesthesia Type: general ASA Status: 3            Anesthesia Type: No value filed.    Cheri Phase I:      Cheri Phase II:      Anesthesia Post Evaluation    Patient location during evaluation: PACU  Patient participation: complete - patient participated  Level of consciousness: awake and alert  Airway patency: patent  Nausea & Vomiting: no vomiting and no nausea  Cardiovascular status: hemodynamically stable and blood pressure returned to baseline  Respiratory status: acceptable  Hydration status: euvolemic  Comments: --------------------            11/06/24               1415     --------------------   BP:       102/67     Pulse:      61       Resp:       17       SpO2:      97%      --------------------    Pain management: adequate    No notable events documented.

## 2024-11-06 NOTE — PROGRESS NOTES
Report given to patients nurse. Pt transferred to room. CMU called and monitor is on and verified.  Sites look unremarkable.

## 2024-11-06 NOTE — PLAN OF CARE
AMC ablation just under cusp.  Post ablation closure failure device failed on right from femoral access.  6 hours bed rest.    Rich Harper MD  Cardiac Electrophysiology  [facility]  [phone] [facility]

## 2024-11-06 NOTE — ANESTHESIA PRE PROCEDURE
Department of Anesthesiology  Preprocedure Note       Name:  Reina Marin   Age:  69 y.o.  :  1955                                          MRN:  [id number]         Date:  2024      Surgeon: Surgeon(s):  SARAH Harper Jr., MD    Procedure: Procedure(s):  Ablation PVC    Medications prior to admission:   Prior to Admission medications    Medication Sig Start Date End Date Taking? Authorizing Provider   ciclopirox (PENLAC) 8 % solution Apply topically to toenails nightly. 10/29/24  Yes Mary Florentino PA   empagliflozin (JARDIANCE) 10 MG tablet Take 1 tablet by mouth daily 10/17/24  Yes Brea Leos MD   omeprazole (PRILOSEC) 40 MG delayed release capsule Take 1 capsule by mouth every morning (before breakfast) 10/1/24  Yes Mary Florentino PA   citalopram (CELEXA) 40 MG tablet TAKE 1 TABLET BY MOUTH DAILY 24  Yes Mary Florentino PA   levothyroxine (SYNTHROID) 137 MCG tablet TAKE 1 TABLET ONCE DAILY 24  Yes Mary Florentino PA   metoprolol succinate (TOPROL XL) 25 MG extended release tablet Take 1 tablet by mouth daily 24  Yes Gini Pike APRN - CNP   magnesium oxide (MAG-OX) 400 (240 Mg) MG tablet Take 1 tablet by mouth daily   Yes Maynor Quiroga MD   oxyBUTYnin (DITROPAN XL) 15 MG extended release tablet Take 1 tablet by mouth daily 24  Yes Mary Florentino PA   zinc gluconate 50 MG tablet Take 1 tablet by mouth daily   Yes Maynor Quiroga MD   vitamin C (ASCORBIC ACID) 500 MG tablet Take 1 tablet by mouth daily   Yes Maynor Quiroga MD   Vitamin D3 125 MCG (5000 UT) TABS tablet Take 1 tablet by mouth daily   Yes Maynor Quiroga MD   Elderberry 500 MG CAPS Take by mouth   Yes Maynor Quiroga MD   atorvastatin (LIPITOR) 40 MG tablet TAKE 1 TABLET DAILY 3/7/24  Yes Brea Leos MD   montelukast (SINGULAIR) 10 MG tablet Take 1 tablet by mouth nightly 3/6/24  Yes Mary Florentino PA   spironolactone (ALDACTONE) 25 MG tablet TAKE

## 2024-11-06 NOTE — PLAN OF CARE
Problem: Chronic Conditions and Co-morbidities  Goal: Patient's chronic conditions and co-morbidity symptoms are monitored and maintained or improved  Outcome: Progressing  Flowsheets (Taken 11/6/2024 1625)  Care Plan - Patient's Chronic Conditions and Co-Morbidity Symptoms are Monitored and Maintained or Improved: Monitor and assess patient's chronic conditions and comorbid symptoms for stability, deterioration, or improvement     Problem: Discharge Planning  Goal: Discharge to home or other facility with appropriate resources  Outcome: Progressing  Flowsheets  Taken 11/6/2024 1625  Discharge to home or other facility with appropriate resources: Identify barriers to discharge with patient and caregiver  Taken 11/6/2024 1530  Discharge to home or other facility with appropriate resources: Identify barriers to discharge with patient and caregiver

## 2024-11-06 NOTE — PROGRESS NOTES
[facility] Herbal & Nutrition Policy    The following medications were discontinued per [facility] P&T policy on Herbal and Nutrition Products:  Zinc gluconate    If the provider deems an herbal or dietary/nutritional supplement medically necessary, please reorder product. With your approval, the patient may use home supply during hospital admission after a pharmacist completes and inspection of the product and labels appropriately.    Thank you,    Ellen Mehta, PharmD   11/6/2024  2:50 PM

## 2024-11-07 ENCOUNTER — TELEPHONE (OUTPATIENT)
Dept: CARDIAC CATH/INVASIVE PROCEDURES | Age: 69
End: 2024-11-07

## 2024-11-07 VITALS
RESPIRATION RATE: 16 BRPM | HEART RATE: 64 BPM | SYSTOLIC BLOOD PRESSURE: 128 MMHG | TEMPERATURE: 98 F | DIASTOLIC BLOOD PRESSURE: 83 MMHG | BODY MASS INDEX: 42.97 KG/M2 | OXYGEN SATURATION: 97 % | HEIGHT: 65 IN | WEIGHT: 257.9 LBS

## 2024-11-07 LAB
ANION GAP SERPL CALCULATED.3IONS-SCNC: 9 MMOL/L (ref 3–16)
BUN SERPL-MCNC: 17 MG/DL (ref 7–20)
CALCIUM SERPL-MCNC: 8.7 MG/DL (ref 8.3–10.6)
CHLORIDE SERPL-SCNC: 105 MMOL/L (ref 99–110)
CO2 SERPL-SCNC: 24 MMOL/L (ref 21–32)
CREAT SERPL-MCNC: 0.8 MG/DL (ref 0.6–1.2)
DEPRECATED RDW RBC AUTO: 14.4 % (ref 12.4–15.4)
GFR SERPLBLD CREATININE-BSD FMLA CKD-EPI: 79 ML/MIN/{1.73_M2}
GLUCOSE SERPL-MCNC: 106 MG/DL (ref 70–99)
HCT VFR BLD AUTO: 36.5 % (ref 36–48)
HGB BLD-MCNC: 12.2 G/DL (ref 12–16)
INR PPP: 1.67 (ref 0.85–1.15)
MAGNESIUM SERPL-MCNC: 2.14 MG/DL (ref 1.8–2.4)
MCH RBC QN AUTO: 30.9 PG (ref 26–34)
MCHC RBC AUTO-ENTMCNC: 33.4 G/DL (ref 31–36)
MCV RBC AUTO: 92.8 FL (ref 80–100)
PLATELET # BLD AUTO: 169 K/UL (ref 135–450)
PMV BLD AUTO: 8.2 FL (ref 5–10.5)
POTASSIUM SERPL-SCNC: 4.2 MMOL/L (ref 3.5–5.1)
PROTHROMBIN TIME: 19.8 SEC (ref 11.9–14.9)
RBC # BLD AUTO: 3.93 M/UL (ref 4–5.2)
SODIUM SERPL-SCNC: 138 MMOL/L (ref 136–145)
WBC # BLD AUTO: 5.9 K/UL (ref 4–11)

## 2024-11-07 PROCEDURE — 99239 HOSP IP/OBS DSCHRG MGMT >30: CPT | Performed by: NURSE PRACTITIONER

## 2024-11-07 PROCEDURE — 85027 COMPLETE CBC AUTOMATED: CPT

## 2024-11-07 PROCEDURE — 85610 PROTHROMBIN TIME: CPT

## 2024-11-07 PROCEDURE — 6370000000 HC RX 637 (ALT 250 FOR IP): Performed by: INTERNAL MEDICINE

## 2024-11-07 PROCEDURE — 83735 ASSAY OF MAGNESIUM: CPT

## 2024-11-07 PROCEDURE — 2580000003 HC RX 258: Performed by: INTERNAL MEDICINE

## 2024-11-07 PROCEDURE — 80048 BASIC METABOLIC PNL TOTAL CA: CPT

## 2024-11-07 PROCEDURE — G0378 HOSPITAL OBSERVATION PER HR: HCPCS

## 2024-11-07 RX ORDER — OXYCODONE AND ACETAMINOPHEN 5; 325 MG/1; MG/1
1 TABLET ORAL EVERY 4 HOURS PRN
Qty: 10 TABLET | Refills: 0 | Status: ON HOLD | OUTPATIENT
Start: 2024-11-07 | End: 2024-11-12 | Stop reason: HOSPADM

## 2024-11-07 RX ORDER — WARFARIN SODIUM 7.5 MG/1
7.5 TABLET ORAL
Status: DISCONTINUED | OUTPATIENT
Start: 2024-11-07 | End: 2024-11-07 | Stop reason: HOSPADM

## 2024-11-07 RX ADMIN — SPIRONOLACTONE 25 MG: 25 TABLET, FILM COATED ORAL at 08:34

## 2024-11-07 RX ADMIN — Medication 1 TABLET: at 08:33

## 2024-11-07 RX ADMIN — LANSOPRAZOLE 30 MG: 30 TABLET, ORALLY DISINTEGRATING, DELAYED RELEASE ORAL at 08:33

## 2024-11-07 RX ADMIN — Medication 400 MG: at 08:33

## 2024-11-07 RX ADMIN — LEVOTHYROXINE SODIUM 137 MCG: 0.03 TABLET ORAL at 06:10

## 2024-11-07 RX ADMIN — METOPROLOL SUCCINATE 25 MG: 25 TABLET, EXTENDED RELEASE ORAL at 08:34

## 2024-11-07 RX ADMIN — Medication 10 ML: at 08:34

## 2024-11-07 RX ADMIN — CHOLECALCIFEROL TAB 125 MCG (5000 UNIT) 5000 UNITS: 125 TAB at 08:33

## 2024-11-07 RX ADMIN — EMPAGLIFLOZIN 10 MG: 10 TABLET, FILM COATED ORAL at 08:51

## 2024-11-07 RX ADMIN — CITALOPRAM HYDROBROMIDE 40 MG: 20 TABLET ORAL at 08:34

## 2024-11-07 RX ADMIN — DOFETILIDE 500 MCG: 0.5 CAPSULE ORAL at 08:34

## 2024-11-07 RX ADMIN — OXYCODONE HYDROCHLORIDE AND ACETAMINOPHEN 500 MG: 500 TABLET ORAL at 08:33

## 2024-11-07 RX ADMIN — CETIRIZINE HYDROCHLORIDE 10 MG: 10 TABLET, FILM COATED ORAL at 08:34

## 2024-11-07 ASSESSMENT — PAIN SCALES - GENERAL
PAINLEVEL_OUTOF10: 0
PAINLEVEL_OUTOF10: 0

## 2024-11-07 NOTE — TELEPHONE ENCOUNTER
11/7- according to pt's chart pt is currently scheduled 11/26/2024. Should the appt be canceled and then scheduled 2/13? Please advise.

## 2024-11-07 NOTE — CARE COORDINATION
CASE MANAGEMENT DISCHARGE SUMMARY      Discharge to: Home with family    Precertification completed: N/A  Hospital Exemption Notification (HENS) completed: N/A    IMM given: (date) N/A  MOON: 11/7/24    New Durable Medical Equipment ordered/agency: N/A    Transportation:    Family/car: Private car       Confirmed discharge plan with: Home with no Home Care Needs     Patient: yes        RN, name: Susu    Note: Discharging nurse to complete MAINOR, reconcile AVS, and place final copy with patient's discharge packet. RN to ensure that written prescriptions for  Level II medications are sent with patient to the facility as per protocol.  .Charlene Floyd RN

## 2024-11-07 NOTE — TELEPHONE ENCOUNTER
Please assist with scheduling the patient for a 3 month HSFU s/p ablation and Device check with Fan Harper MD.  Thank You.

## 2024-11-07 NOTE — PROGRESS NOTES
11/06/24 2033   Encounter Summary   Encounter Overview/Reason Spiritual/Emotional Needs   Service Provided For Patient   Referral/Consult From Nurse   Support System Spouse;Children   Last Encounter  11/06/24  ( provided active, empathetic listening, Episcopalian prayers, rosary, [place] of [place] medal, [place] (with [place]) prayer card and Spiritual Health contact card/IVETT)   Complexity of Encounter Moderate   Begin Time 2000   End Time  2035   Total Time Calculated 35 min   Spiritual/Emotional needs   Type Spiritual Distress;Spiritual Support   Rituals, Rites and Sacraments   Type Blessings   Assessment/Intervention/Outcome   Assessment Calm;Coping;Hopeful;Peaceful   Intervention Active listening;Discussed belief system/Zoroastrian practices/pop;Discussed illness injury and it’s impact;Discussed relationship with God;Explored/Affirmed feelings, thoughts, concerns;Explored Coping Skills/Resources;Prayer (assurance of)/[place];Sustaining Presence/Ministry of presence   Outcome Comfort;Connection/Belonging;Coping;Encouraged;Engaged in conversation;Expressed feelings, needs, and concerns;Expressed Gratitude;Peace      Arlin Garg EdD, CELY ROSE ([facility])    Thank you for consulting Spiritual Health    If you would like a 's presence for emotional, spiritual, grief or comfort care,   please dial \"[phone]\" and ask for the  on-call to be paged.    For help with Advanced Care Planning, Power of  for Healthcare or Living Will forms, you may also call us directly:    [phone] Cristian  [phone]) Conchita  [phone]) Outpatient    [facility]  [facility]

## 2024-11-07 NOTE — PROGRESS NOTES
Pharmacy Note  Warfarin Consult    Dx: AFib  Goal INR range 2-3   Home Warfarin dose: 7.5 mg every Mon, Wed, Fri; 5 mg all other days   New start Warfarin with Heparin/Enoxaparin bridge.  Would recommend continue bridge until INR therapeutic.     Date  INR  Warfarin  11/6                1.65                    7.5 mg   11/7                1.67                    7.5 mg    Recommend Warfarin 7.5 mg tonight x1.  Daily INR ordered.  Rx will continue to manage therapy per consult order.    Ellen Mehta, PharmD 11/7/2024  7:52 AM

## 2024-11-07 NOTE — DISCHARGE SUMMARY
Patient ID:  Reina Marin  [id number]  69 y.o.  1955    Admit date: 11/6/2024    Discharge date and time: 11/7/2024    Admitting Physician: SARAH Harper Jr., MD     Discharge NP: BAHMAN Wise    Admission Diagnoses: Ventricular premature beats [I49.3]  PVC (premature ventricular contraction) [I49.3]    Discharge Diagnoses: SAME    Admission Condition: fair    Discharged Condition: good    Hospital Course: Reina Marin was admitted on 11/6/2024 and had a successful PVC ablation. Rhythm has been sinus with atrial pacing. She complains of right groin tenderness. Denies chest pain, palpitations, shortness of breath, and dizziness.  Has eaten, ambulated, and voided.       Assessment:   Frequent PVC's: improved   -s/p RFCA of PVC's 11/6/2024              -event monitor showed a 12% PVC burden 7/2023              -device check showed 960K PVC's for the lifetime of device (approximately 20% burden)  Sinus node dysfunction: ongoing              -s/p dual chamber pacemaker with CDS lead implant 8/28/2024  Symptomatic paroxsymal atrial fibrillation: stable              -MLE1AK3ldpl score 4 (age, gender, CHF, HTN)              -flecainide stopped due to worsening EF 8/2019              -Rythmol stopped due to increase in QRS duration 3/2022              -Tikosyn started 3/28/2022 (monitoring for toxicity with antiarrythmic medications), stopped 8/20/2024 for prolonged QTc, but resumed post pacemaker implant               -s/p PVI and RFCA 3/2020 (Christian)  NICM with recovered EF: stable              -most recent EF 55% 3/2022, EF 50-55% on echo 8/2023  Chronic systolic CHF: compensated, follows with Dr. Leos   Aortic stenosis: mild AS   Hypothyroidism on Synthroid  JINA: CPAP compliant   History of breast cancer        Plan:   Continue current medications without changes   Percocet 5/325 mg PO q 6 hours as needed for post op pain   Post procedure instructions reviewed  Follow up in office on  2/18/2025      Discharge Exam:  /83   Pulse 64   Temp 98 °F (36.7 °C) (Oral)   Resp 16   Ht 1.651 m (5' 5\")   Wt 117 kg (257 lb 14.4 oz)   LMP  (LMP Unknown)   SpO2 97%   BMI 42.92 kg/m²     General Appearance:    Alert, cooperative, no distress, appears stated age   Head:    Normocephalic, without obvious abnormality, atraumatic   Eyes:    PERRL, conjunctiva/corneas clear, EOM's intact        Ears:    deferred   Nose:   Nares normal, septum midline, mucosa normal, no drainage    or sinus tenderness   Throat:   Lips, mucosa, and tongue normal; teeth and gums normal   Neck:   Supple, symmetrical, trachea midline, no adenopathy;        thyroid:  No enlargement/tenderness/nodules; no carotid    bruit or JVD   Back:     Symmetric, no curvature, ROM normal, no CVA tenderness   Lungs:     Clear to auscultation bilaterally, respirations unlabored   Chest wall:    No tenderness or deformity   Heart:    Regular rate and rhythm, S1 and S2 normal, no murmur, rub   or gallop; NSR  on tele   Abdomen:     Soft, non-tender, bowel sounds active all four quadrants,     no masses, no organomegaly   Genitalia:    deferred   Rectal:    deferred    Extremities:   Extremities normal, atraumatic, no cyanosis or edema; bilateral  femoral sites stable (no sutures present)    Pulses:   2+ and symmetric all extremities   Skin:   Skin color, texture, turgor normal, no rashes or lesions   Lymph nodes:   Cervical, supraclavicular, and axillary nodes normal   Neurologic:   CNII-XII intact. Normal strength, sensation and reflexes       throughout     Disposition: home    Patient Instructions:      Medication List        START taking these medications      oxyCODONE-acetaminophen 5-325 MG per tablet  Commonly known as: PERCOCET  Take 1 tablet by mouth every 4 hours as needed for Pain (post op pain) for up to 3 days. Max Daily Amount: 6 tablets            CONTINUE taking these medications      acetaminophen 500 MG tablet  Commonly  known as: TYLENOL  Take 2 tablets by mouth 3 times daily as needed for Pain     atorvastatin 40 MG tablet  Commonly known as: LIPITOR  TAKE 1 TABLET DAILY     Cetirizine HCl 10 MG Caps     ciclopirox 8 % solution  Commonly known as: PENLAC  Apply topically to toenails nightly.     citalopram 40 MG tablet  Commonly known as: CELEXA  TAKE 1 TABLET BY MOUTH DAILY     dicyclomine 20 MG tablet  Commonly known as: BENTYL  Take 1 tablet by mouth every 4 hours as needed (esophageal spasm)     dofetilide 500 MCG capsule  Commonly known as: TIKOSYN  Take 1 capsule by mouth every 12 hours     Elderberry 500 MG Caps     empagliflozin 10 MG tablet  Commonly known as: Jardiance  Take 1 tablet by mouth daily     furosemide 20 MG tablet  Commonly known as: LASIX  TAKE 1 TABLET BY MOUTH ONE TIME A DAY AS NEEDED FOR WEIGHT GAIN OF 3LBS IN A DAY OR 5LBS IN A WEEK     levothyroxine 137 MCG tablet  Commonly known as: Synthroid  TAKE 1 TABLET ONCE DAILY     magnesium oxide 400 (240 Mg) MG tablet  Commonly known as: MAG-OX     metoprolol succinate 25 MG extended release tablet  Commonly known as: TOPROL XL  Take 1 tablet by mouth daily     montelukast 10 MG tablet  Commonly known as: SINGULAIR  Take 1 tablet by mouth nightly     multivitamin per tablet     omeprazole 40 MG delayed release capsule  Commonly known as: PRILOSEC  Take 1 capsule by mouth every morning (before breakfast)     oxyBUTYnin 15 MG extended release tablet  Commonly known as: DITROPAN XL  Take 1 tablet by mouth daily     spironolactone 25 MG tablet  Commonly known as: ALDACTONE  TAKE 1 TABLET ONCE DAILY     vitamin C 500 MG tablet  Commonly known as: ASCORBIC ACID     vitamin D3 125 MCG (5000 UT) Tabs tablet  Commonly known as: CHOLECALCIFEROL     warfarin 5 MG tablet  Commonly known as: Coumadin  Take as directed. If you are unsure how to take this medication, talk to your nurse or doctor.  Original instructions: Take 1 tablet by mouth daily 7.5 mg (5 mg x 1.5) every

## 2024-11-07 NOTE — CARE COORDINATION
Case Management Assessment  Initial Evaluation    Date/Time of Evaluation: 11/7/2024 9:48 AM  Assessment Completed by: Charlene Floyd RN    If patient is discharged prior to next notation, then this note serves as note for discharge by case management.    Patient Name: Reina Marin                   YOB: 1955  Diagnosis: Ventricular premature beats [I49.3]  PVC (premature ventricular contraction) [I49.3]                   Date / Time: 11/6/2024  7:03 AM    Patient Admission Status: Observation   Readmission Risk (Low < 19, Mod (19-27), High > 27): Readmission Risk Score: 14.4    Current PCP: Mary Florentino PA  PCP verified by CM? Yes (Mary ALFONSO)    Chart Reviewed: Yes      History Provided by: Patient, Medical Record  Patient Orientation: Alert and Oriented    Patient Cognition: Alert    Hospitalization in the last 30 days (Readmission):  No    If yes, Readmission Assessment in CM Navigator will be completed.    Advance Directives:      Code Status: Full Code   Patient's Primary Decision Maker is: Patient Declined (Legal Next of Kin Remains as Decision Maker)    Primary Decision Maker: Quincy Marin ([place]) - Spouse - [phone]    Secondary Decision Maker: Emily Morejon - Child - [phone]    Supplemental (Other) Decision Maker: ClintonChristian salter - Child - [phone]    Discharge Planning:    Patient lives with: Spouse/Significant Other Type of Home: House (Condo)  Primary Care Giver: Self  Patient Support Systems include: Spouse/Significant Other   Current Financial resources: Medicare, Other (Comment) (BCBS)  Current community resources: None  Current services prior to admission: C-pap            Current DME:              Type of Home Care services:  None    ADLS  Prior functional level: Independent in ADLs/IADLs  Current functional level: Independent in ADLs/IADLs    PT AM-PAC:   /24  OT AM-PAC:   /24    Family can provide assistance at DC: Yes  Would you like Case

## 2024-11-07 NOTE — TELEPHONE ENCOUNTER
Patient rescheduled to 2/18/25 at 10:45am with Dr. Leos and at 11:15 am with Dr. Harper and a device check.  Patient's nurse notified and will print on the patients AVS.

## 2024-11-07 NOTE — PROGRESS NOTES
Pt sat up, at 1800, no bleeding noted. Pt was ambulated to the bath room at 1830. Gown and linens changed. Assisted back to bed for dinner. No bleeding noted.     1845-no bleeding noted. Warfarin ordered, reviewed this with pharmacy. Pt to get this dose. Reviewed with pt. Pt in good spirits watching tv. Stating that after she eats she will probably nap.        to come to visit and bring cpap and cell phone with .

## 2024-11-07 NOTE — PROGRESS NOTES
IV's and Tele monitor removed for discharge. CMU notified. Discharge paperwork reviewed with patient. She verbalized understanding. Patient was wheeled out to the front lobby (accompanied by belongings including cell phone, chargers and home C pap machine) by PCA.  picked her up.

## 2024-11-07 NOTE — TELEPHONE ENCOUNTER
Pt is now scheduled for 2/13/25 at 1145a and 11/26/24 agk appt is cancelled. Sent pt ManagerComplete msg about change of appts. Pt likes to utilize ManagerComplete for communication.

## 2024-11-08 ENCOUNTER — CARE COORDINATION (OUTPATIENT)
Dept: CASE MANAGEMENT | Age: 69
End: 2024-11-08

## 2024-11-08 ENCOUNTER — TELEPHONE (OUTPATIENT)
Dept: FAMILY MEDICINE CLINIC | Age: 69
End: 2024-11-08

## 2024-11-08 LAB — ECHO BSA: 2.32 M2

## 2024-11-08 NOTE — CARE COORDINATION
Care Transitions Note    Initial Call - Call within 2 business days of discharge: Yes    Patient Current Location:  Home: [address] [place] [id number]    Care Transition Nurse contacted the patient by telephone to perform post hospital discharge assessment, verified name and  as identifiers. Provided introduction to self, and explanation of the Care Transition Nurse role.     Patient: Reina Marin    Patient : 1955   MRN: [id number]    Reason for Admission: PVC ablation  Discharge Date: 24  RURS: Readmission Risk Score: 14.4      Last Discharge Facility       Date Complaint Diagnosis Description Type Department Provider    24  Post-op pain ... Admission (Discharged) GAYLEAZ C4 SARAH Harper Jr., MD            Was this an external facility discharge? No    Additional needs identified to be addressed with provider   No needs identified             Method of communication with provider: none.    Patients top risk factors for readmission: functional physical ability    Interventions to address risk factors:   Review of patient management of conditions/medications: post procedure instructions reviewed    Care Summary Note: Patient answered call and verified . Patient pleasant and agreeable to transition call. Discussed her recent PVC ablation and how uncomfortable the procedure experience was for her.  Procedure included both groin sites. Groin developed hematoma post procedure and continues to be bruised. Discussed post procedure instructions and stated understanding. Confirmed she has AVS and taking all medication as directed. Offered to schedule HFU with PCP, but stated office called her already this morning and did not want to schedule HFU visit. Note in system. 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 Patient did not enroll to RPM d/t provider dis-enrolled her earlier this year.  Denied any acute needs at present time.  Agreeable to f/u calls.

## 2024-11-08 NOTE — TELEPHONE ENCOUNTER
Care Transitions Initial Follow Up Call    Outreach made within 2 business days of discharge: Yes    Patient: Reina Marin Patient : 1955   MRN: [id number]  Reason for Admission: Planned procedure Ablation  Discharge Date: 24       Spoke with: Maira, patient had a planned procedure an Ablation done, and will be following up with Cardiology.    Discharge department/facility: [facility] Interactive Patient Contact:  Was patient able to fill all prescriptions: Yes  Was patient instructed to bring all medications to the follow-up visit: Yes  Is patient taking all medications as directed in the discharge summary? Yes  Does patient understand their discharge instructions: Yes  Does patient have questions or concerns that need addressed prior to 7-14 day follow up office visit: no        Scheduled appointment with PCP within 7-14 days    Follow Up  Future Appointments   Date Time Provider Department Center   2024  2:00 PM [facility]ICO[facility] JENNY Arango [facility]   2025 10:45 AM Brea Leos MD [facility]   2025 11:15 AM [facility] DEVICE CHECK [facility]   2025 11:15 AM SARAH Harper Jr., MD [facility]   2025 10:30 AM Mary Florentino PA [facility]   2025 11:40 AM Josy Quiroga APRN - CNP [facility]       Jocelyn Padilla LPN

## 2024-11-09 ENCOUNTER — APPOINTMENT (OUTPATIENT)
Dept: CT IMAGING | Age: 69
End: 2024-11-09
Payer: MEDICARE

## 2024-11-09 ENCOUNTER — HOSPITAL ENCOUNTER (EMERGENCY)
Age: 69
Discharge: ANOTHER HOSPITAL | End: 2024-11-09
Attending: STUDENT IN AN ORGANIZED HEALTH CARE EDUCATION/TRAINING PROGRAM
Payer: MEDICARE

## 2024-11-09 ENCOUNTER — HOSPITAL ENCOUNTER (INPATIENT)
Age: 69
LOS: 3 days | Discharge: HOME | End: 2024-11-12
Admitting: FAMILY MEDICINE
Payer: MEDICARE

## 2024-11-09 ENCOUNTER — APPOINTMENT (OUTPATIENT)
Dept: GENERAL RADIOLOGY | Age: 69
End: 2024-11-09
Payer: MEDICARE

## 2024-11-09 VITALS
TEMPERATURE: 98.2 F | RESPIRATION RATE: 17 BRPM | DIASTOLIC BLOOD PRESSURE: 81 MMHG | WEIGHT: 267.5 LBS | OXYGEN SATURATION: 98 % | HEART RATE: 65 BPM | BODY MASS INDEX: 44.51 KG/M2 | SYSTOLIC BLOOD PRESSURE: 138 MMHG

## 2024-11-09 DIAGNOSIS — R41.82 ALTERED MENTAL STATUS, UNSPECIFIED ALTERED MENTAL STATUS TYPE: Primary | ICD-10-CM

## 2024-11-09 DIAGNOSIS — R09.89 SUSPECTED CEREBROVASCULAR ACCIDENT (CVA): ICD-10-CM

## 2024-11-09 DIAGNOSIS — R47.01 APHASIA: ICD-10-CM

## 2024-11-09 PROBLEM — I61.9 CVA (CEREBROVASCULAR ACCIDENT DUE TO INTRACEREBRAL HEMORRHAGE) (HCC): Status: ACTIVE | Noted: 2024-11-09

## 2024-11-09 PROBLEM — G45.9 TIA (TRANSIENT ISCHEMIC ATTACK): Status: ACTIVE | Noted: 2024-11-09

## 2024-11-09 LAB
ALBUMIN SERPL-MCNC: 3.9 G/DL (ref 3.4–5)
ALBUMIN/GLOB SERPL: 1.4 {RATIO} (ref 1.1–2.2)
ALP SERPL-CCNC: 88 U/L (ref 40–129)
ALT SERPL-CCNC: 22 U/L (ref 10–40)
AMPHETAMINES UR QL SCN>1000 NG/ML: NORMAL
ANION GAP SERPL CALCULATED.3IONS-SCNC: 15 MMOL/L (ref 3–16)
APAP SERPL-MCNC: <5 UG/ML (ref 10–30)
APTT BLD: 28.6 SEC (ref 22.1–36.4)
AST SERPL-CCNC: 25 U/L (ref 15–37)
BACTERIA URNS QL MICRO: ABNORMAL /HPF
BARBITURATES UR QL SCN>200 NG/ML: NORMAL
BASE EXCESS BLDV CALC-SCNC: 2.1 MMOL/L (ref -3–3)
BASOPHILS # BLD: 0 K/UL (ref 0–0.2)
BASOPHILS NFR BLD: 0.4 %
BENZODIAZ UR QL SCN>200 NG/ML: NORMAL
BILIRUB SERPL-MCNC: 0.7 MG/DL (ref 0–1)
BILIRUB UR QL STRIP.AUTO: NEGATIVE
BUN SERPL-MCNC: 25 MG/DL (ref 7–20)
CALCIUM SERPL-MCNC: 9 MG/DL (ref 8.3–10.6)
CANNABINOIDS UR QL SCN>50 NG/ML: NORMAL
CHLORIDE SERPL-SCNC: 102 MMOL/L (ref 99–110)
CLARITY UR: CLEAR
CO2 BLDV-SCNC: 24 MMOL/L
CO2 SERPL-SCNC: 21 MMOL/L (ref 21–32)
COCAINE UR QL SCN: NORMAL
COHGB MFR BLDV: 0.7 % (ref 0–1.5)
COLOR UR: YELLOW
CREAT SERPL-MCNC: 1 MG/DL (ref 0.6–1.2)
DEPRECATED RDW RBC AUTO: 14.1 % (ref 12.4–15.4)
DRUG SCREEN COMMENT UR-IMP: NORMAL
EKG ATRIAL RATE: 60 BPM
EKG DIAGNOSIS: NORMAL
EKG P-R INTERVAL: 198 MS
EKG Q-T INTERVAL: 576 MS
EKG QRS DURATION: 118 MS
EKG QTC CALCULATION (BAZETT): 576 MS
EKG R AXIS: 1 DEGREES
EKG T AXIS: 97 DEGREES
EKG VENTRICULAR RATE: 60 BPM
EOSINOPHIL # BLD: 0 K/UL (ref 0–0.6)
EOSINOPHIL NFR BLD: 0.6 %
FENTANYL SCREEN, URINE: NORMAL
GFR SERPLBLD CREATININE-BSD FMLA CKD-EPI: 60 ML/MIN/{1.73_M2}
GLUCOSE BLD-MCNC: 99 MG/DL (ref 70–99)
GLUCOSE SERPL-MCNC: 102 MG/DL (ref 70–99)
GLUCOSE UR STRIP.AUTO-MCNC: >=1000 MG/DL
HCO3 BLDV-SCNC: 23.2 MMOL/L (ref 23–29)
HCT VFR BLD AUTO: 38.9 % (ref 36–48)
HGB BLD-MCNC: 13.3 G/DL (ref 12–16)
HGB UR QL STRIP.AUTO: NEGATIVE
INR PPP: 1.55 (ref 0.85–1.15)
KETONES UR STRIP.AUTO-MCNC: ABNORMAL MG/DL
LEUKOCYTE ESTERASE UR QL STRIP.AUTO: ABNORMAL
LYMPHOCYTES # BLD: 1.5 K/UL (ref 1–5.1)
LYMPHOCYTES NFR BLD: 20.4 %
MAGNESIUM SERPL-MCNC: 1.84 MG/DL (ref 1.8–2.4)
MCH RBC QN AUTO: 31 PG (ref 26–34)
MCHC RBC AUTO-ENTMCNC: 34.1 G/DL (ref 31–36)
MCV RBC AUTO: 90.9 FL (ref 80–100)
METHADONE UR QL SCN>300 NG/ML: NORMAL
METHGB MFR BLDV: 0.3 %
MONOCYTES # BLD: 0.7 K/UL (ref 0–1.3)
MONOCYTES NFR BLD: 10.3 %
NEUTROPHILS # BLD: 4.9 K/UL (ref 1.7–7.7)
NEUTROPHILS NFR BLD: 68.3 %
NITRITE UR QL STRIP.AUTO: NEGATIVE
O2 CT VFR BLDV CALC: 14 VOL %
O2 THERAPY: ABNORMAL
OPIATES UR QL SCN>300 NG/ML: NORMAL
OXYCODONE UR QL SCN: NORMAL
PCO2 BLDV: 27.1 MMHG (ref 40–50)
PCP UR QL SCN>25 NG/ML: NORMAL
PERFORMED ON: NORMAL
PH BLDV: 7.55 [PH] (ref 7.35–7.45)
PH UR STRIP.AUTO: 8 [PH] (ref 5–8)
PH UR STRIP: 8 [PH]
PLATELET # BLD AUTO: 201 K/UL (ref 135–450)
PMV BLD AUTO: 8.1 FL (ref 5–10.5)
PO2 BLDV: 29.3 MMHG (ref 25–40)
POTASSIUM SERPL-SCNC: 3.5 MMOL/L (ref 3.5–5.1)
PROT SERPL-MCNC: 6.6 G/DL (ref 6.4–8.2)
PROT UR STRIP.AUTO-MCNC: NEGATIVE MG/DL
PROTHROMBIN TIME: 18.7 SEC (ref 11.9–14.9)
RBC # BLD AUTO: 4.29 M/UL (ref 4–5.2)
RBC #/AREA URNS HPF: ABNORMAL /HPF (ref 0–4)
RENAL EPI CELLS #/AREA UR COMP ASSIST: ABNORMAL /HPF (ref 0–1)
SALICYLATES SERPL-MCNC: 0.5 MG/DL (ref 15–30)
SAO2 % BLDV: 66 %
SODIUM SERPL-SCNC: 138 MMOL/L (ref 136–145)
SP GR UR STRIP.AUTO: 1.01 (ref 1–1.03)
TROPONIN, HIGH SENSITIVITY: 63 NG/L (ref 0–14)
TROPONIN, HIGH SENSITIVITY: 66 NG/L (ref 0–14)
TROPONIN, HIGH SENSITIVITY: 67 NG/L (ref 0–14)
UA COMPLETE W REFLEX CULTURE PNL UR: ABNORMAL
UA DIPSTICK W REFLEX MICRO PNL UR: YES
URN SPEC COLLECT METH UR: ABNORMAL
UROBILINOGEN UR STRIP-ACNC: 0.2 E.U./DL
WBC # BLD AUTO: 7.2 K/UL (ref 4–11)
WBC #/AREA URNS HPF: ABNORMAL /HPF (ref 0–5)

## 2024-11-09 PROCEDURE — 2060000000 HC ICU INTERMEDIATE R&B

## 2024-11-09 PROCEDURE — 83735 ASSAY OF MAGNESIUM: CPT

## 2024-11-09 PROCEDURE — 71045 X-RAY EXAM CHEST 1 VIEW: CPT

## 2024-11-09 PROCEDURE — 80307 DRUG TEST PRSMV CHEM ANLYZR: CPT

## 2024-11-09 PROCEDURE — 96365 THER/PROPH/DIAG IV INF INIT: CPT

## 2024-11-09 PROCEDURE — 36415 COLL VENOUS BLD VENIPUNCTURE: CPT

## 2024-11-09 PROCEDURE — 84443 ASSAY THYROID STIM HORMONE: CPT

## 2024-11-09 PROCEDURE — 2580000003 HC RX 258: Performed by: STUDENT IN AN ORGANIZED HEALTH CARE EDUCATION/TRAINING PROGRAM

## 2024-11-09 PROCEDURE — 81001 URINALYSIS AUTO W/SCOPE: CPT

## 2024-11-09 PROCEDURE — 93005 ELECTROCARDIOGRAM TRACING: CPT | Performed by: STUDENT IN AN ORGANIZED HEALTH CARE EDUCATION/TRAINING PROGRAM

## 2024-11-09 PROCEDURE — 6370000000 HC RX 637 (ALT 250 FOR IP): Performed by: NURSE PRACTITIONER

## 2024-11-09 PROCEDURE — 80053 COMPREHEN METABOLIC PANEL: CPT

## 2024-11-09 PROCEDURE — 80143 DRUG ASSAY ACETAMINOPHEN: CPT

## 2024-11-09 PROCEDURE — 6360000002 HC RX W HCPCS: Performed by: STUDENT IN AN ORGANIZED HEALTH CARE EDUCATION/TRAINING PROGRAM

## 2024-11-09 PROCEDURE — 82803 BLOOD GASES ANY COMBINATION: CPT

## 2024-11-09 PROCEDURE — 70450 CT HEAD/BRAIN W/O DYE: CPT

## 2024-11-09 PROCEDURE — 70498 CT ANGIOGRAPHY NECK: CPT

## 2024-11-09 PROCEDURE — 84484 ASSAY OF TROPONIN QUANT: CPT

## 2024-11-09 PROCEDURE — 85025 COMPLETE CBC W/AUTO DIFF WBC: CPT

## 2024-11-09 PROCEDURE — 99285 EMERGENCY DEPT VISIT HI MDM: CPT

## 2024-11-09 PROCEDURE — 85610 PROTHROMBIN TIME: CPT

## 2024-11-09 PROCEDURE — 80179 DRUG ASSAY SALICYLATE: CPT

## 2024-11-09 PROCEDURE — 2580000003 HC RX 258: Performed by: NURSE PRACTITIONER

## 2024-11-09 PROCEDURE — 85730 THROMBOPLASTIN TIME PARTIAL: CPT

## 2024-11-09 PROCEDURE — 6360000004 HC RX CONTRAST MEDICATION: Performed by: STUDENT IN AN ORGANIZED HEALTH CARE EDUCATION/TRAINING PROGRAM

## 2024-11-09 PROCEDURE — 93010 ELECTROCARDIOGRAM REPORT: CPT | Performed by: INTERNAL MEDICINE

## 2024-11-09 RX ORDER — INSULIN LISPRO 100 [IU]/ML
0-4 INJECTION, SOLUTION INTRAVENOUS; SUBCUTANEOUS
Status: DISCONTINUED | OUTPATIENT
Start: 2024-11-09 | End: 2024-11-12 | Stop reason: HOSPADM

## 2024-11-09 RX ORDER — SODIUM CHLORIDE 0.9 % (FLUSH) 0.9 %
5-40 SYRINGE (ML) INJECTION PRN
Status: DISCONTINUED | OUTPATIENT
Start: 2024-11-09 | End: 2024-11-12 | Stop reason: HOSPADM

## 2024-11-09 RX ORDER — SODIUM CHLORIDE 9 MG/ML
INJECTION, SOLUTION INTRAVENOUS PRN
Status: DISCONTINUED | OUTPATIENT
Start: 2024-11-09 | End: 2024-11-12 | Stop reason: HOSPADM

## 2024-11-09 RX ORDER — POLYETHYLENE GLYCOL 3350 17 G/17G
17 POWDER, FOR SOLUTION ORAL DAILY PRN
Status: DISCONTINUED | OUTPATIENT
Start: 2024-11-09 | End: 2024-11-12 | Stop reason: HOSPADM

## 2024-11-09 RX ORDER — ACETAMINOPHEN 500 MG
1000 TABLET ORAL 3 TIMES DAILY PRN
Status: DISCONTINUED | OUTPATIENT
Start: 2024-11-09 | End: 2024-11-12 | Stop reason: HOSPADM

## 2024-11-09 RX ORDER — LEVOTHYROXINE SODIUM 125 UG/1
125 TABLET ORAL DAILY
Status: DISCONTINUED | OUTPATIENT
Start: 2024-11-10 | End: 2024-11-12 | Stop reason: HOSPADM

## 2024-11-09 RX ORDER — SODIUM CHLORIDE 0.9 % (FLUSH) 0.9 %
5-40 SYRINGE (ML) INJECTION EVERY 12 HOURS SCHEDULED
Status: DISCONTINUED | OUTPATIENT
Start: 2024-11-09 | End: 2024-11-12 | Stop reason: HOSPADM

## 2024-11-09 RX ORDER — GLUCAGON 1 MG/ML
1 KIT INJECTION PRN
Status: DISCONTINUED | OUTPATIENT
Start: 2024-11-09 | End: 2024-11-12 | Stop reason: HOSPADM

## 2024-11-09 RX ORDER — ATORVASTATIN CALCIUM 40 MG/1
40 TABLET, FILM COATED ORAL NIGHTLY
Status: DISCONTINUED | OUTPATIENT
Start: 2024-11-09 | End: 2024-11-12 | Stop reason: HOSPADM

## 2024-11-09 RX ORDER — OXYBUTYNIN CHLORIDE 5 MG/1
15 TABLET, EXTENDED RELEASE ORAL DAILY
Status: DISCONTINUED | OUTPATIENT
Start: 2024-11-10 | End: 2024-11-12 | Stop reason: HOSPADM

## 2024-11-09 RX ORDER — METOPROLOL SUCCINATE 25 MG/1
25 TABLET, EXTENDED RELEASE ORAL DAILY
Status: DISCONTINUED | OUTPATIENT
Start: 2024-11-10 | End: 2024-11-12 | Stop reason: HOSPADM

## 2024-11-09 RX ORDER — ONDANSETRON 2 MG/ML
4 INJECTION INTRAMUSCULAR; INTRAVENOUS EVERY 6 HOURS PRN
Status: DISCONTINUED | OUTPATIENT
Start: 2024-11-09 | End: 2024-11-09

## 2024-11-09 RX ORDER — PANTOPRAZOLE SODIUM 40 MG/1
40 TABLET, DELAYED RELEASE ORAL
Status: DISCONTINUED | OUTPATIENT
Start: 2024-11-10 | End: 2024-11-12 | Stop reason: HOSPADM

## 2024-11-09 RX ORDER — WARFARIN SODIUM 5 MG/1
5 TABLET ORAL DAILY
Status: DISCONTINUED | OUTPATIENT
Start: 2024-11-10 | End: 2024-11-09

## 2024-11-09 RX ORDER — FUROSEMIDE 20 MG/1
20 TABLET ORAL DAILY
Status: DISCONTINUED | OUTPATIENT
Start: 2024-11-10 | End: 2024-11-12 | Stop reason: HOSPADM

## 2024-11-09 RX ORDER — SUMATRIPTAN SUCCINATE 25 MG/1
50 TABLET ORAL ONCE
Status: COMPLETED | OUTPATIENT
Start: 2024-11-09 | End: 2024-11-09

## 2024-11-09 RX ORDER — MONTELUKAST SODIUM 10 MG/1
10 TABLET ORAL NIGHTLY
Status: DISCONTINUED | OUTPATIENT
Start: 2024-11-09 | End: 2024-11-12 | Stop reason: HOSPADM

## 2024-11-09 RX ORDER — SPIRONOLACTONE 25 MG/1
25 TABLET ORAL DAILY
Status: DISCONTINUED | OUTPATIENT
Start: 2024-11-10 | End: 2024-11-12 | Stop reason: HOSPADM

## 2024-11-09 RX ORDER — DOFETILIDE 0.5 MG/1
500 CAPSULE ORAL EVERY 12 HOURS SCHEDULED
Status: DISCONTINUED | OUTPATIENT
Start: 2024-11-09 | End: 2024-11-12 | Stop reason: HOSPADM

## 2024-11-09 RX ORDER — IOPAMIDOL 755 MG/ML
75 INJECTION, SOLUTION INTRAVASCULAR
Status: COMPLETED | OUTPATIENT
Start: 2024-11-09 | End: 2024-11-09

## 2024-11-09 RX ORDER — ONDANSETRON 4 MG/1
4 TABLET, ORALLY DISINTEGRATING ORAL EVERY 8 HOURS PRN
Status: DISCONTINUED | OUTPATIENT
Start: 2024-11-09 | End: 2024-11-09

## 2024-11-09 RX ORDER — DEXTROSE MONOHYDRATE 100 MG/ML
INJECTION, SOLUTION INTRAVENOUS CONTINUOUS PRN
Status: DISCONTINUED | OUTPATIENT
Start: 2024-11-09 | End: 2024-11-12 | Stop reason: HOSPADM

## 2024-11-09 RX ADMIN — ACETAMINOPHEN 1000 MG: 500 TABLET ORAL at 22:43

## 2024-11-09 RX ADMIN — SODIUM CHLORIDE 1000 MG: 900 INJECTION INTRAVENOUS at 20:06

## 2024-11-09 RX ADMIN — SODIUM CHLORIDE, PRESERVATIVE FREE 10 ML: 5 INJECTION INTRAVENOUS at 22:48

## 2024-11-09 RX ADMIN — ATORVASTATIN CALCIUM 40 MG: 40 TABLET, FILM COATED ORAL at 22:43

## 2024-11-09 RX ADMIN — SUMATRIPTAN SUCCINATE 50 MG: 25 TABLET ORAL at 23:57

## 2024-11-09 RX ADMIN — MONTELUKAST SODIUM 10 MG: 10 TABLET, COATED ORAL at 22:43

## 2024-11-09 RX ADMIN — IOPAMIDOL 75 ML: 755 INJECTION, SOLUTION INTRAVENOUS at 15:15

## 2024-11-09 ASSESSMENT — PAIN - FUNCTIONAL ASSESSMENT: PAIN_FUNCTIONAL_ASSESSMENT: NONE - DENIES PAIN

## 2024-11-09 NOTE — ED NOTES
1512 Paged \"code stroke\" overhead  1512 called radiology and they are ready for Pt.  1513 Placed call to  Stroke for stat consult  1515  Stroke returned call back and is speaking to Dr. Luis at this time.     1655 Placed call to IR and connected Dr. Luis with Dr. Yancey.   1657 [facility] returned call back with Cardiology on the line who is speaking with Dr. Luis at this time.

## 2024-11-09 NOTE — ED PROVIDER NOTES
[facility] ED  EMERGENCY DEPARTMENT ENCOUNTER      Pt Name: Reina Marin  MRN: [id number]  Birthdate 1955  Date of evaluation: 11/9/2024  Provider: MARLA BALES MD     CHIEF COMPLAINT       Chief Complaint   Patient presents with   • Altered Mental Status     Ems reports pt was out shopping and became generally week and confused. Unknown LKW. Glucose 111.          HISTORY OF PRESENT ILLNESS   (Location/Symptom, Timing/Onset, Context/Setting, Quality, Duration, Modifying Factors, Severity) Note limiting factors.   I wore appropriate PPE for the entirety of this encounter.      HPI    Reina Marin is a 69 y.o. female who presents to the emergency department via EMS for evaluation of altered mental status. Per RN she spoke w/ pt's  who state LKW was 1200 today prior to going grocery shopping. Limited HPI due to confusion. Pt alert and oriented to person only.     As noted below on reevaluation later through the patient's ED stay she did become alert and oriented to person, place, time and events with residual aphasia.  States that she remembers shopping and feeling weak and then having to sit down, states that she remembers having difficulty with word finding and EMS was ultimately called because when she was speaking nobody was able to understand what she was saying.    Nursing Notes were reviewed.  Limitations to history: As above  Outside historians: Family    REVIEW OF SYSTEMS     Review of Systems as documented in HPI above.     PAST MEDICAL HISTORY     Past Medical History:   Diagnosis Date   • Abnormal Pap smear of cervix 06/11/2018    hpv+   • Allergic    • Allergic rhinitis    • Aortic stenosis, mild-echo 8/2016 08/18/2016   • Arthritis    • Atrial fibrillation (HCC)    • BMI 40.0-44.9, adult    • Breast cancer (HCC)     54   • Cancer (HCC) 2009    right breast   • CHF (congestive heart failure) (HCC)    • Depression    • GERD (gastroesophageal reflux disease)    • H/O  tests considered but not performed: MRI, unable to obtain MRI at this facility as above.    External records reviewed:  As above    Diagnostics interpreted by me:  EKG as documented elsewhere in this note.    Discussions with other clinicians:  Cardiology, neurology, hospitalist as above.    Chronic conditions impacting care:  As above    Social determinants of health affecting care:        Total critical care time today provided was 76 minutes. This excludes seperately billable procedure. Critical care time provided for altered mental status, aphasia, concern for stroke that required close evaluation and/or intervention with concern for patient decompensation.      ED Medications managed:  Medications   iopamidol (ISOVUE-370) 76 % injection 75 mL (75 mLs IntraVENous Given 11/9/24 2720)       PROCEDURES:  Unless otherwise noted below, none.     Procedures    FINAL IMPRESSION      1. Altered mental status, unspecified altered mental status type    2. Aphasia    3. Suspected cerebrovascular accident (CVA)          DISPOSITION    Decision To Transfer 11/09/2024 05:54:20 PM      PATIENT REFERRED TO:  No follow-up provider specified.    DISCHARGE MEDICATIONS:  New Prescriptions    No medications on file          (Comment: Please note this report has been produced using speech recognition software and may contain errors related to that system including errors in grammar, punctuation, and spelling, as well as words and phrases that may be inappropriate.  If there are any questions or concerns please feel free to contact the dictating provider for clarification.)    JONATHAN LUIS MD (electronically signed)  Emergency Medicine Provider        Jonathan Luis MD  11/09/24 2541

## 2024-11-10 LAB
ANION GAP SERPL CALCULATED.3IONS-SCNC: 9 MMOL/L (ref 3–16)
BUN SERPL-MCNC: 21 MG/DL (ref 7–20)
CALCIUM SERPL-MCNC: 9 MG/DL (ref 8.3–10.6)
CHLORIDE SERPL-SCNC: 106 MMOL/L (ref 99–110)
CHOLEST SERPL-MCNC: 97 MG/DL (ref 0–199)
CO2 SERPL-SCNC: 25 MMOL/L (ref 21–32)
CREAT SERPL-MCNC: 0.9 MG/DL (ref 0.6–1.2)
DEPRECATED RDW RBC AUTO: 14.2 % (ref 12.4–15.4)
GFR SERPLBLD CREATININE-BSD FMLA CKD-EPI: 69 ML/MIN/{1.73_M2}
GLUCOSE BLD-MCNC: 140 MG/DL (ref 70–99)
GLUCOSE BLD-MCNC: 142 MG/DL (ref 70–99)
GLUCOSE BLD-MCNC: 148 MG/DL (ref 70–99)
GLUCOSE BLD-MCNC: 199 MG/DL (ref 70–99)
GLUCOSE SERPL-MCNC: 109 MG/DL (ref 70–99)
HCT VFR BLD AUTO: 38.2 % (ref 36–48)
HDLC SERPL-MCNC: 33 MG/DL (ref 40–60)
HGB BLD-MCNC: 12.5 G/DL (ref 12–16)
INR PPP: 1.57 (ref 0.85–1.15)
LDLC SERPL CALC-MCNC: 48 MG/DL
MCH RBC QN AUTO: 30.4 PG (ref 26–34)
MCHC RBC AUTO-ENTMCNC: 32.9 G/DL (ref 31–36)
MCV RBC AUTO: 92.6 FL (ref 80–100)
PERFORMED ON: ABNORMAL
PLATELET # BLD AUTO: 197 K/UL (ref 135–450)
PMV BLD AUTO: 8.4 FL (ref 5–10.5)
POTASSIUM SERPL-SCNC: 3.9 MMOL/L (ref 3.5–5.1)
PROTHROMBIN TIME: 18.9 SEC (ref 11.9–14.9)
RBC # BLD AUTO: 4.13 M/UL (ref 4–5.2)
SODIUM SERPL-SCNC: 140 MMOL/L (ref 136–145)
TRIGL SERPL-MCNC: 78 MG/DL (ref 0–150)
TROPONIN, HIGH SENSITIVITY: 49 NG/L (ref 0–14)
TROPONIN, HIGH SENSITIVITY: 52 NG/L (ref 0–14)
TROPONIN, HIGH SENSITIVITY: 54 NG/L (ref 0–14)
TROPONIN, HIGH SENSITIVITY: 57 NG/L (ref 0–14)
TROPONIN, HIGH SENSITIVITY: 63 NG/L (ref 0–14)
TROPONIN, HIGH SENSITIVITY: 64 NG/L (ref 0–14)
TROPONIN, HIGH SENSITIVITY: 66 NG/L (ref 0–14)
TROPONIN, HIGH SENSITIVITY: 68 NG/L (ref 0–14)
TSH SERPL DL<=0.005 MIU/L-ACNC: 3.93 UIU/ML (ref 0.27–4.2)
VLDLC SERPL CALC-MCNC: 16 MG/DL
WBC # BLD AUTO: 6.2 K/UL (ref 4–11)

## 2024-11-10 PROCEDURE — 97116 GAIT TRAINING THERAPY: CPT

## 2024-11-10 PROCEDURE — 83036 HEMOGLOBIN GLYCOSYLATED A1C: CPT

## 2024-11-10 PROCEDURE — 99223 1ST HOSP IP/OBS HIGH 75: CPT | Performed by: PSYCHIATRY & NEUROLOGY

## 2024-11-10 PROCEDURE — 2580000003 HC RX 258: Performed by: NURSE PRACTITIONER

## 2024-11-10 PROCEDURE — 1200000000 HC SEMI PRIVATE

## 2024-11-10 PROCEDURE — 80048 BASIC METABOLIC PNL TOTAL CA: CPT

## 2024-11-10 PROCEDURE — 97161 PT EVAL LOW COMPLEX 20 MIN: CPT

## 2024-11-10 PROCEDURE — 84484 ASSAY OF TROPONIN QUANT: CPT

## 2024-11-10 PROCEDURE — 85610 PROTHROMBIN TIME: CPT

## 2024-11-10 PROCEDURE — 80061 LIPID PANEL: CPT

## 2024-11-10 PROCEDURE — 6370000000 HC RX 637 (ALT 250 FOR IP): Performed by: NURSE PRACTITIONER

## 2024-11-10 PROCEDURE — 85027 COMPLETE CBC AUTOMATED: CPT

## 2024-11-10 PROCEDURE — 36415 COLL VENOUS BLD VENIPUNCTURE: CPT

## 2024-11-10 PROCEDURE — 97530 THERAPEUTIC ACTIVITIES: CPT

## 2024-11-10 RX ORDER — WARFARIN SODIUM 5 MG/1
5 TABLET ORAL
Status: COMPLETED | OUTPATIENT
Start: 2024-11-10 | End: 2024-11-10

## 2024-11-10 RX ORDER — LORAZEPAM 2 MG/ML
1 INJECTION INTRAMUSCULAR ONCE
Status: COMPLETED | OUTPATIENT
Start: 2024-11-10 | End: 2024-11-11

## 2024-11-10 RX ADMIN — SODIUM CHLORIDE, PRESERVATIVE FREE 10 ML: 5 INJECTION INTRAVENOUS at 09:17

## 2024-11-10 RX ADMIN — INSULIN LISPRO 1 UNITS: 100 INJECTION, SOLUTION INTRAVENOUS; SUBCUTANEOUS at 12:26

## 2024-11-10 RX ADMIN — ACETAMINOPHEN 1000 MG: 500 TABLET ORAL at 15:59

## 2024-11-10 RX ADMIN — WARFARIN SODIUM 5 MG: 5 TABLET ORAL at 18:32

## 2024-11-10 RX ADMIN — PANTOPRAZOLE SODIUM 40 MG: 40 TABLET, DELAYED RELEASE ORAL at 05:21

## 2024-11-10 RX ADMIN — SPIRONOLACTONE 25 MG: 25 TABLET, FILM COATED ORAL at 09:16

## 2024-11-10 RX ADMIN — SODIUM CHLORIDE, PRESERVATIVE FREE 10 ML: 5 INJECTION INTRAVENOUS at 20:44

## 2024-11-10 RX ADMIN — MONTELUKAST SODIUM 10 MG: 10 TABLET, COATED ORAL at 20:43

## 2024-11-10 RX ADMIN — OXYBUTYNIN CHLORIDE 15 MG: 5 TABLET, EXTENDED RELEASE ORAL at 09:16

## 2024-11-10 RX ADMIN — LEVOTHYROXINE SODIUM 125 MCG: 0.12 TABLET ORAL at 05:21

## 2024-11-10 RX ADMIN — ATORVASTATIN CALCIUM 40 MG: 40 TABLET, FILM COATED ORAL at 20:43

## 2024-11-10 RX ADMIN — METOPROLOL SUCCINATE 25 MG: 25 TABLET, EXTENDED RELEASE ORAL at 09:16

## 2024-11-10 ASSESSMENT — PAIN SCALES - GENERAL
PAINLEVEL_OUTOF10: 0
PAINLEVEL_OUTOF10: 0
PAINLEVEL_OUTOF10: 6

## 2024-11-10 NOTE — CONSULTS
Pharmacy Note  Warfarin Consult  Dx: afib  Goal INR range 2-3   Home Warfarin dose: 7.5 mg (5 mg x 1.5) every Mon, Wed, Fri; 5 mg (5 mg x 1) all other days     Date  INR  Warfarin  11/9                1.55                   5 mg (home)    Daily INR ordered.  Rx will continue to manage therapy per consult order.  Pt admitted for suspicion of stroke. Will need to confirm prior to dosing.  Jin Cohen, Radha D.11/9/2024 10:48 PM

## 2024-11-10 NOTE — PLAN OF CARE
CHF Care Plan      Patient's EF (Ejection Fraction) is greater than 40%    Heart Failure Medications:  Diuretics:: Furosemide and Spironolactone    (One of the following REQUIRED for EF </= 40%/SYSTOLIC FAILURE but MAY be used in EF% >40%/DIASTOLIC FAILURE)        ACE:: None        ARB:: None         ARNI:: None    (Beta Blockers)  NON- Evidenced Based Beta Blocker (for EF% >40%/DIASTOLIC FAILURE): None    Evidenced Based Beta Blocker::(REQUIRED for EF% <40%/SYSTOLIC FAILURE) Metoprolol SUCCinate- Toprol XL  ...................................................................................................................................................    Failed to redirect to the Timeline version of the [employer] SmartLink.      Patient's weights and intake/output reviewed    Daily Weight log at bedside, patient/family participation in use of log: \"yes    Patient's current weight today shows a difference of 0 lbs  n/a  than last documented weight.    No intake or output data in the 24 hours ending 11/10/24 3447    Education Booklet Provided: yes    Comorbidities Reviewed Yes    Patient has a past medical history of Abnormal Pap smear of cervix, Allergic, Allergic rhinitis, Aortic stenosis, mild-echo 8/2016, Arthritis, Atrial fibrillation (HCC), BMI 40.0-44.9, adult, Breast cancer (HCC), Cancer (HCC), CHF (congestive heart failure) (HCC), Depression, GERD (gastroesophageal reflux disease), H/O laparoscopic adjustable gastric banding, Heart palpitations, History of chemotherapy, History of radiation therapy, HPV (human papilloma virus) infection, HPV test positive, Hyperlipidemia, Hypertension, Hypothyroidism, Meningioma (HCC), Mild aortic regurgitation-echo 8/2016, Mild dysplasia of cervix, Obesity, Sleep apnea, Type 2 diabetes mellitus with chronic kidney disease, and Urge incontinence.     >>For CHF and Comorbidity documentation on Education Time and Topics, please see Education Tab      CHF Education    Learners:   Patient and Family  Readineess:   Eager  Method:   Explanation  Response:    Verbalizes Understanding    Comments: pt educated abt importance of daily weights    Time Spent: 3      Pt sitting in bed at this time on room air. Pt denies shortness of breath. Pt with nonpitting lower extremity edema.     Patient and/or Family's stated Goal of Care this Admission: increase activity tolerance, better understand heart failure and disease management, be more comfortable, and reduce lower extremity edema prior to discharge        :

## 2024-11-10 NOTE — H&P
Hospital Medicine History & Physical      Date of Admission: 11/9/2024    Date of Service:  Pt seen/examined on 11/9/2024     [x]Admitted to Inpatient with expected LOS greater than two midnights due to medical therapy.    []Placed in Observation status.    Chief Admission Complaint: Headache, weakness, difficulty with word finding and altered mental status    Presenting Admission History:      69 y.o. female, with PMH of morbid obesity, HTN, HLD, DM, atrial fibrillation, CHF,  who was a direct admit from [facility] to [facility] with headache, weakness, difficulty with word finding and altered mental status. History obtained from the  patient and review of EMR.  The patient stated she had an ablation on Wednesday for atrial fibrillation and was discharged home on Thursday.  She stated she was told to \"take it easy for a week\".  However, patient stated she felt good today so she went shopping.  Patient reports that when she went to the cash register to pay for her belongings she began to have a headache and difficulty with word finding.  Patient's  at the bedside and stated that the patient appeared to be confused to him and was exhibiting just generalized weakness.  The patient stated she does have a history of migraine and usually she just has not are out.  However, she states today she is having pain.  Due to the patient having difficulty with word finding, EMS was called and the patient was taken to the emergency department for further evaluation. In the emergency department a CT head was obtained that revealed atrophy and mild small vessel ischemic disease.  CTA head and neck was also obtained that revealed no acute abnormality or flow-limiting stenosis of the major arteries of the head neck.  Moderate degenerative changes in the cervical spine.  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  The patient's UA as well as her urine drug screen were all negative.   tablets 11/7/24 11/10/24  Gini Pike APRN - CNP   dicyclomine (BENTYL) 20 MG tablet Take 1 tablet by mouth every 4 hours as needed (esophageal spasm) 10/29/24   Mary Florentino PA   ciclopirox (PENLAC) 8 % solution Apply topically to toenails nightly. 10/29/24   Mary Florentino PA   empagliflozin (JARDIANCE) 10 MG tablet Take 1 tablet by mouth daily 10/17/24   Brea Loes MD   omeprazole (PRILOSEC) 40 MG delayed release capsule Take 1 capsule by mouth every morning (before breakfast) 10/1/24   Mary Florentino PA   citalopram (CELEXA) 40 MG tablet TAKE 1 TABLET BY MOUTH DAILY 9/30/24   Mary Florentino PA   dofetilide (TIKOSYN) 500 MCG capsule Take 1 capsule by mouth every 12 hours 9/27/24   Gini Pike APRN - CNP   levothyroxine (SYNTHROID) 137 MCG tablet TAKE 1 TABLET ONCE DAILY 9/12/24   Mary Florentino PA   metoprolol succinate (TOPROL XL) 25 MG extended release tablet Take 1 tablet by mouth daily 8/29/24   Gini Pike APRN - CNP   magnesium oxide (MAG-OX) 400 (240 Mg) MG tablet Take 1 tablet by mouth daily    Maynor Quiroga MD   warfarin (COUMADIN) 5 MG tablet Take 1 tablet by mouth daily 7.5 mg (5 mg x 1.5) every Mon, Wed, Fri; 5 mg (5 mg x 1) all other days OR AS DIRECTED 7/15/24   Gini Pike APRN - CNP   oxyBUTYnin (DITROPAN XL) 15 MG extended release tablet Take 1 tablet by mouth daily 6/24/24   Mary Florentino PA   zinc gluconate 50 MG tablet Take 1 tablet by mouth daily    Maynor Quiroga MD   vitamin C (ASCORBIC ACID) 500 MG tablet Take 1 tablet by mouth daily    Maynor Quiroga MD   Vitamin D3 125 MCG (5000 UT) TABS tablet Take 1 tablet by mouth daily    Maynor Quiroga MD   Elderberry 500 MG CAPS Take by mouth    Maynor Quiroga MD   atorvastatin (LIPITOR) 40 MG tablet TAKE 1 TABLET DAILY 3/7/24   Brea Leos MD   montelukast (SINGULAIR) 10 MG tablet Take 1 tablet by mouth nightly 3/6/24   Mary Florentino PA   spironolactone

## 2024-11-10 NOTE — CARE COORDINATION
Case Management Assessment  Initial Evaluation    Date/Time of Evaluation: 11/10/2024 10:09 AM  Assessment Completed by: Charlene Floyd RN    If patient is discharged prior to next notation, then this note serves as note for discharge by case management.    Patient Name: Reina Marin                   YOB: 1955  Diagnosis: CVA (cerebrovascular accident due to intracerebral hemorrhage) (HCC) [I61.9]  TIA (transient ischemic attack) [G45.9]                   Date / Time: 11/9/2024  9:28 PM    Patient Admission Status: Inpatient   Readmission Risk (Low < 19, Mod (19-27), High > 27): Readmission Risk Score: 17.1    Current PCP: Mary Florentino PA  PCP verified by CM? Yes (Mary ALFONSO)    Chart Reviewed: Yes      History Provided by: Patient, Medical Record  Patient Orientation: Alert and Oriented    Patient Cognition: Alert    Hospitalization in the last 30 days (Readmission):  Yes    If yes, Readmission Assessment in CM Navigator will be completed.    Advance Directives:      Code Status: Full Code   Patient's Primary Decision Maker is: Patient Declined (Legal Next of Kin Remains as Decision Maker)    Primary Decision Maker: Quincy Marin ([place]) - Spouse - [phone]    Secondary Decision Maker: Emily Morejon - Child - [phone]    Supplemental (Other) Decision Maker: TomasTadChristian - Child - [phone]    Discharge Planning:    Patient lives with: Spouse/Significant Other Type of Home: House  Primary Care Giver: Self  Patient Support Systems include: Spouse/Significant Other   Current Financial resources: Medicare, Other (Comment) (BCBS supplement)  Current community resources: None  Current services prior to admission: C-pap            Current DME:              Type of Home Care services:  None    ADLS  Prior functional level: Independent in ADLs/IADLs  Current functional level: Independent in ADLs/IADLs    PT AM-PAC:   /24  OT AM-PAC:   /24    Family can provide  assistance at DC: Yes  Would you like Case Management to discuss the discharge plan with any other family members/significant others, and if so, who? No  Plans to Return to Present Housing: Yes  Other Identified Issues/Barriers to RETURNING to current housing: None  Potential Assistance needed at discharge: N/A            Potential DME:    Patient expects to discharge to: House  Plan for transportation at discharge: Family    Financial    Payor: MEDICARE / Plan: MEDICARE PART A AND B / Product Type: *No Product type* /     Does insurance require precert for SNF: No    Potential assistance Purchasing Medications: No  Meds-to-Beds request:        [facility] [id number] - [place], [place] - [address] - P [phone] - F [phone]  [address]  [facility] [address]  Phone: [phone] Fax: [phone]    [facility] - [place], [place] - [address] - P [phone] - F [phone]  [address]  [address]  [facility] [address]  Phone: [phone] Fax: [phone]    [facility] [place], [place] - [address] F [phone] - F [phone]  [place] [address]  Phone: [phone] Fax: [phone]    [facility] - [place], [place] - [address] [place] [address] C - P [phone] - F [phone]  [address] [place] [address]  Phone: [phone] Fax: [phone]      Notes:    Factors facilitating achievement of predicted outcomes: Family support, Motivated, Cooperative, and Pleasant    Barriers to discharge: Decreased endurance    Additional Case Management Notes: IPTA; lives in a ranch style Condo with spouse. Patient has her own CPAP no other DMEs.No needs at this time Will follow    The Plan for Transition of Care is related to the following treatment goals of CVA (cerebrovascular accident due to intracerebral hemorrhage) (HCC) [I61.9]  TIA (transient ischemic attack) [G45.9]    IF APPLICABLE: The Patient and/or

## 2024-11-10 NOTE — CONSULTS
Neurology consultation note    Patient name: Reina Marin      Chief Complaint:  Transient confusion.    History of present illness:  This is a 69 years old right-handed female.  The patient was brought to the ER yesterday after the patient developed acute confusional episode.  The patient also reported subsequent headache with the confusion.  The whole episode lasted up to 20 minutes.  The patient denied focal weakness or numbness at that time.  Upon admission, blood pressure was normal.  The patient denies history of CVA or seizure disorder.  But the patient is noted for underlying atrial fibrillation.  The patient just recently received ablation.  The patient denies any new focal neurological deficit during my assessment.    Past medical history:    Past Medical History:   Diagnosis Date    Abnormal Pap smear of cervix 06/11/2018    hpv+    Allergic     Allergic rhinitis     Aortic stenosis, mild-echo 8/2016 08/18/2016    Arthritis     Atrial fibrillation (HCC)     BMI 40.0-44.9, adult     Breast cancer (HCC)     54    Cancer (HCC) 2009    right breast    CHF (congestive heart failure) (HCC)     Depression     GERD (gastroesophageal reflux disease)     H/O laparoscopic adjustable gastric banding 10/27/2016    Heart palpitations     History of chemotherapy     History of radiation therapy     HPV (human papilloma virus) infection 06/11/2018    HPV test positive 12/2014    Hyperlipidemia     Hypertension     Hypothyroidism 2000    Meningioma (HCC)     10 years ago    Mild aortic regurgitation-echo 8/2016 08/18/2016    Mild dysplasia of cervix 04/2016    Obesity     Sleep apnea     USES CPAP    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

## 2024-11-10 NOTE — PROGRESS NOTES
Physical Therapy  Facility/Department: [place] [address] - MED SURG/ORTHO  Physical Therapy Initial Assessment/discharge summary.    Name: Reina Marin  : 1955  MRN: [id number]  Date of Service: 11/10/2024    Discharge Recommendations:  Home with assist PRN   PT Equipment Recommendations  Equipment Needed: No      Patient Diagnosis(es):   Past Medical History:  has a past medical history of Abnormal Pap smear of cervix, Allergic, Allergic rhinitis, Aortic stenosis, mild-echo 2016, Arthritis, Atrial fibrillation (HCC), BMI 40.0-44.9, adult, Breast cancer (HCC), Cancer (HCC), CHF (congestive heart failure) (HCC), Depression, GERD (gastroesophageal reflux disease), H/O laparoscopic adjustable gastric banding, Heart palpitations, History of chemotherapy, History of radiation therapy, HPV (human papilloma virus) infection, HPV test positive, Hyperlipidemia, Hypertension, Hypothyroidism, Meningioma (HCC), Mild aortic regurgitation-echo 2016, Mild dysplasia of cervix, Obesity, Sleep apnea, Type 2 diabetes mellitus with chronic kidney disease, and Urge incontinence.  Past Surgical History:  has a past surgical history that includes Appendectomy; Cholecystectomy; Colonoscopy; Lap Band (); Tunneled venous port placement ();  section (,,,);  section; Breast surgery (); Breast biopsy; Breast lumpectomy; Hysterectomy (); Lap Band (); Ovary removal; Thyroidectomy, partial (Left, ); Upper gastrointestinal endoscopy (06/10/2016); Partial hysterectomy; Hand surgery (Right, 2020); Cardiac surgery (); Total knee arthroplasty (Left, 10/10/2023); knee surgery (Left, 2023); Knee Arthroplasty (10/10/2024); and ep device procedure (Left, 2024).    Assessment  Assessment: Pt presents to [facility] with primary diagnosis of CVA.  PTA pt lived at home with spouse with level entry, reports IND with mobility without AD.  Currently pt demonstrates IND for bed

## 2024-11-10 NOTE — PROGRESS NOTES
V2.0    [facility] [place]      Name:  Reina Marin /Age/Sex: 1955  (69 y.o. female)   MRN & CSN:  [id number] & [id number] Encounter Date/Time: 11/10/2024 1:47 PM EST   Location:  [facility] PCP: Mary Florentino PA     Attending:Johnson Camargo MD       Hospital Day: 2    Assessment and Recommendations   Reina Marin is a 69 y.o. female with pmh of Afib on Coumadin, diabetes, CHF who presents with CVA (cerebrovascular accident due to intracerebral hemorrhage) (Formerly [facility])      Plan:   TIA vs CVA - symptoms include headache, weakness, difficulty with word finding and AMS.  Patient reports history of migraines  -CT head revealed: Atrophy and mild small vessel ischemic disease  -CTA head and neck revealed: No acute abnormality or flow-limiting stenosis of the major arteries of the head and neck.  Moderate degenerative changes in the cervical spine.  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  -MRI brain w/o contrast today  -Consult neurology for further recs - appreciated in advance.  -PT/OT  -NIH 0    Elevated troponin, 63  -repeat troponin 67  -likely 2/2 recent ablation  -cardiology consulted in ED - appreciate recommendations in advance  -no c/o chest pain/shortness of breath  -tele monitoring  -Troponin with downtrend - 52.     Atrial fibrillation  -s/p ablation  -Tikosyn held for Qtc of 576  -anticoagulated on coumadin. May continue Coumadin per neuro.  -INR 1.5  -PPM     Hypothyroidism  -continue levothyroxine  -TSH     Morbid obesity  With Body mass index is 44.5 kg/m². Complicating assessment and treatment. Placing patient at risk for multiple co-morbidities as well as early death and contributing to the patient's presentation. Counseled on  to come from LVOT.  PVC1 (dominant):  RBBB positive across precordium with inferior axis.  Decanav catheter was inserted into the CS for electrophysiology study and reference point.  Optrel was inserted into the RVOT however catheter was extremely arrhythmogenic and so was substituted for Decanav catheter.  Activation within this region will significantly delayed as compared to surface QRS.  We attempted to place the cath into CS distally in the patient's AIV however, likely because of the valve, we were unable to cannulate past the mid coronary sinus.  The ablation catheter was then inserted into the coronary sinus however we still were unable to safely traverse the mid coronary sinus.  At this point we decided to go retrograde as we were concerned for LV summit focus versus LVOT focus versus mitral annular focus.  The Optrel catheter was advanced into the left ventricle.  Mapping along the anterior anterior septal region showed a possible focus on time with surface QRS.  Optrell was then exchanged for ablation cathter.  Pacemap in the LV summit endocardially (see below) showed > 90% match with early activation in this region was greater than 30 msec and QS unipolar morphology.  Ablation in this region up to 40 rangel resulted in PVC suppression.  Consolidation lesions were placed in this region (see below).  Patient was continued on isorpel up to 10 mg/min.  Clinic PVCs no longer seen.  CS catheter was inserted.  Electrophysiology study was performed. Hemostasis was obtained with Vascade and Perclose.  The patient was allowed to awake from anesthesia and was returned to prep and recovery for further recovery.   Dr. Rich aHrper, the attending physician, was present throughout the procedure and for interpretation of the data.    RESULTS  ECG     Baseline          Sinus rhythm with PVCs.             Post Ablation   Sinus rhythm without PVCs.  Premature ventricular contractions (clinical) Arrhythmia induced

## 2024-11-11 ENCOUNTER — CARE COORDINATION (OUTPATIENT)
Dept: CASE MANAGEMENT | Age: 69
End: 2024-11-11

## 2024-11-11 ENCOUNTER — TELEPHONE (OUTPATIENT)
Dept: FAMILY MEDICINE CLINIC | Age: 69
End: 2024-11-11

## 2024-11-11 ENCOUNTER — APPOINTMENT (OUTPATIENT)
Dept: MRI IMAGING | Age: 69
End: 2024-11-11
Payer: MEDICARE

## 2024-11-11 LAB
GLUCOSE BLD-MCNC: 124 MG/DL (ref 70–99)
GLUCOSE BLD-MCNC: 125 MG/DL (ref 70–99)
GLUCOSE BLD-MCNC: 153 MG/DL (ref 70–99)
GLUCOSE BLD-MCNC: 99 MG/DL (ref 70–99)
INR PPP: 1.46 (ref 0.85–1.15)
PERFORMED ON: ABNORMAL
PERFORMED ON: NORMAL
PROTHROMBIN TIME: 17.9 SEC (ref 11.9–14.9)

## 2024-11-11 PROCEDURE — 6360000002 HC RX W HCPCS: Performed by: STUDENT IN AN ORGANIZED HEALTH CARE EDUCATION/TRAINING PROGRAM

## 2024-11-11 PROCEDURE — 2580000003 HC RX 258: Performed by: NURSE PRACTITIONER

## 2024-11-11 PROCEDURE — 99233 SBSQ HOSP IP/OBS HIGH 50: CPT | Performed by: PSYCHIATRY & NEUROLOGY

## 2024-11-11 PROCEDURE — 95816 EEG AWAKE AND DROWSY: CPT | Performed by: PSYCHIATRY & NEUROLOGY

## 2024-11-11 PROCEDURE — 85610 PROTHROMBIN TIME: CPT

## 2024-11-11 PROCEDURE — 6370000000 HC RX 637 (ALT 250 FOR IP): Performed by: NURSE PRACTITIONER

## 2024-11-11 PROCEDURE — 36415 COLL VENOUS BLD VENIPUNCTURE: CPT

## 2024-11-11 PROCEDURE — 1200000000 HC SEMI PRIVATE

## 2024-11-11 PROCEDURE — 95816 EEG AWAKE AND DROWSY: CPT

## 2024-11-11 PROCEDURE — APPSS30 APP SPLIT SHARED TIME 16-30 MINUTES

## 2024-11-11 PROCEDURE — 93286 PERI-PX EVAL PM/LDLS PM IP: CPT

## 2024-11-11 PROCEDURE — 6370000000 HC RX 637 (ALT 250 FOR IP): Performed by: STUDENT IN AN ORGANIZED HEALTH CARE EDUCATION/TRAINING PROGRAM

## 2024-11-11 RX ADMIN — Medication 20 ML: at 16:11

## 2024-11-11 RX ADMIN — LORAZEPAM 1 MG: 2 INJECTION INTRAMUSCULAR; INTRAVENOUS at 16:10

## 2024-11-11 RX ADMIN — ATORVASTATIN CALCIUM 40 MG: 40 TABLET, FILM COATED ORAL at 22:10

## 2024-11-11 RX ADMIN — SPIRONOLACTONE 25 MG: 25 TABLET, FILM COATED ORAL at 08:12

## 2024-11-11 RX ADMIN — PANTOPRAZOLE SODIUM 40 MG: 40 TABLET, DELAYED RELEASE ORAL at 05:33

## 2024-11-11 RX ADMIN — METOPROLOL SUCCINATE 25 MG: 25 TABLET, EXTENDED RELEASE ORAL at 08:12

## 2024-11-11 RX ADMIN — SODIUM CHLORIDE, PRESERVATIVE FREE 10 ML: 5 INJECTION INTRAVENOUS at 08:12

## 2024-11-11 RX ADMIN — WARFARIN SODIUM 7.5 MG: 5 TABLET ORAL at 18:07

## 2024-11-11 RX ADMIN — LEVOTHYROXINE SODIUM 125 MCG: 0.12 TABLET ORAL at 05:33

## 2024-11-11 RX ADMIN — OXYBUTYNIN CHLORIDE 15 MG: 5 TABLET, EXTENDED RELEASE ORAL at 08:12

## 2024-11-11 RX ADMIN — MONTELUKAST SODIUM 10 MG: 10 TABLET, COATED ORAL at 22:10

## 2024-11-11 RX ADMIN — SODIUM CHLORIDE, PRESERVATIVE FREE 10 ML: 5 INJECTION INTRAVENOUS at 22:10

## 2024-11-11 ASSESSMENT — PAIN SCALES - GENERAL
PAINLEVEL_OUTOF10: 0
PAINLEVEL_OUTOF10: 0

## 2024-11-11 NOTE — PROGRESS NOTES
Pharmacy Note  Warfarin Consult  Dx: afib  Goal INR range 2-3   Home Warfarin dose: 7.5 mg (5 mg x 1.5) every Mon, Wed, Fri; 5 mg (5 mg x 1) all other days     Date  INR   Warfarin  11/9                 1.55                     5 mg (home)  11/10               1.57                     5 mg  11/11               1.46                  7.5 mg    Give Warfarin 7.5 mg x1 tonight.  Daily INR ordered.  Rx will continue to manage therapy per consult order.    Ellen Mehta, PharmD 11/11/2024  7:52 AM

## 2024-11-11 NOTE — CARE COORDINATION
Chart reviewed day 2. Pt is followed by IM and Neuro. Receiving Warfarin, INR noted at 1.46 this am. Awaiting MRI and EEG. Pt IPTA w/spouse. No needs identified at this time. Will follow for needs as they arise. Electronically signed by MAURICE LOPEZ RN on 11/11/2024 at 1:40 PM

## 2024-11-11 NOTE — FLOWSHEET NOTE
MRI started, pacemaker in MRI safe mode.        11/11/24 1621   Vital Signs   Pulse 70   Heart Rate Source Monitor   /70   MAP (Calculated) 85   Oxygen Therapy   SpO2 96 %   O2 Device None (Room air)

## 2024-11-11 NOTE — FLOWSHEET NOTE
Patient received Ativan for test, patient was dizzy, hypotensive and slightly unstable due to medication. BP improving.    11/11/24 1643   Vital Signs   Pulse 70   Heart Rate Source Monitor   /69   MAP (Calculated) 80

## 2024-11-11 NOTE — TELEPHONE ENCOUNTER
Care Transitions Initial Follow Up Call    Outreach made within 2 business days of discharge: Yes    Patient: Reina Marin Patient : 1955   MRN: [id number]  Reason for Admission: PVC  Discharge Date: 24       Spoke with: Patient is currently admitted at [facility], once the patient is discharged I will schedule for a Hospital follow up.    Discharge department/facility: [facility], patient currently admitted.        Scheduled appointment with PCP within 7-14 days    Follow Up  Future Appointments   Date Time Provider Department Center   2024  2:00 PM [facility]   2025 10:45 AM Brea Leos MD [facility]   2025 11:15 AM [facility] DEVICE CHECK [facility]   2025 11:15 AM SARAH Harper Jr., MD [facility]   2025 10:30 AM Mary Florentino PA [facility]   2025 11:40 AM Jos[facility]iroga APRN - CNP [facility]       Jocelyn Padilla LPN     Double Island Pedicle Flap Text: The defect edges were debeveled with a #15 scalpel blade.  Given the location of the defect, shape of the defect and the proximity to free margins a double island pedicle advancement flap was deemed most appropriate.  Using a sterile surgical marker, an appropriate advancement flap was drawn incorporating the defect, outlining the appropriate donor tissue and placing the expected incisions within the relaxed skin tension lines where possible.    The area thus outlined was incised deep to adipose tissue with a #15 scalpel blade.  The skin margins were undermined to an appropriate distance in all directions around the primary defect and laterally outward around the island pedicle utilizing iris scissors.  There was minimal undermining beneath the pedicle flap.

## 2024-11-11 NOTE — PROGRESS NOTES
04:55 AM     11/10/2024 04:55 AM    CO2 25 11/10/2024 04:55 AM    BUN 21 11/10/2024 04:55 AM    CREATININE 0.9 11/10/2024 04:55 AM    GFRAA >60 03/30/2022 05:03 AM    LABGLOM 69 11/10/2024 04:55 AM    LABGLOM >60 12/19/2023 07:16 AM    GLUCOSE 109 11/10/2024 04:55 AM    GLUCOSE 97 01/24/2017 11:37 AM    MG 1.84 11/09/2024 03:22 PM    CALCIUM 9.0 11/10/2024 04:55 AM     Lab Results   Component Value Date/Time    WBC 6.2 11/10/2024 04:55 AM    RBC 4.13 11/10/2024 04:55 AM    RBC 4.71 01/24/2017 11:37 AM    HGB 12.5 11/10/2024 04:55 AM    HCT 38.2 11/10/2024 04:55 AM    MCV 92.6 11/10/2024 04:55 AM    RDW 14.2 11/10/2024 04:55 AM     11/10/2024 04:55 AM     Lab Results   Component Value Date    INR 1.46 (H) 11/11/2024    PROTIME 17.9 (H) 11/11/2024         Medical decision making:      A. Problems (any 1)    High:    [] Acute/Chronic Illness/injury posing threat to life or bodily function:    [] Severe exacerbation of chronic illness:      Moderate:    []     1 or more chronic illness with exacerbation, progression or side effect of treatment or  []     2 or more stable chronic illnesses or  [x]     1 acute illness with systemic symptoms     ---------------------------------------------------------------------  B. Risk of Treatment (any 1)   [] Drugs/treatments that require intensive monitoring for toxicity include:    [] Change in code status:    [] Decision to escalate care:    [] Major surgery/procedure with associated risk factors:    [x] Prescription drug management  ----------------------------------------------------------------------  [] High (any 2)  [x] Moderate (any 1)    C. Data (any 2 for high and any 1 for moderate)  [] Discussed management of the case with:  nurse   [x] Imaging personally reviewed and interpreted:    [x] Data Review (any 3)  [] Collateral history obtained from:   [x] All available Consultant notes from yesterday/today were reviewed  [x] All current labs were reviewed and  interpreted for clinical significance   [x] Appropriate follow-up labs were ordered      Neuroimaging was independently reviewed by me and discussed results with the patient  I reviewed blood testing and other test results and discussed results with the patient      Impression:  Transient confusion-- now resolved. This reportedly lasted for 20 minutes. Pt now complaining of ongoing reflective \"lights\" in her bilateral peripheral and central vision.  Atrial fibrillation  Hyperlipidemia  Hypertension  Diabetes      Recommendation  MRI brain wo contrast ordered  EEG ordered  PT/OT/SLP  Systolic <140/90  May continue warfarin and statin       Electronically signed by BLANK Pacheco CNP on 11/11/24 at 3:59 PM EST       This dictation was generated by voice recognition computer software. Although all attempts are made to edit the dictation for accuracy, there may be errors in the transcription that are not intended.      Addendum:  Awaiting for MRI brain.  EEG showed no evidence of seizure tendency.  At this point, there is no indication to start antiepileptic medication.  If the patient has negative MRI brain, neurology will treat this episode as TIA.  The patient can be discharged after the MRI brain.  Recommend to continue Coumadin and statin.

## 2024-11-11 NOTE — CARE COORDINATION
Upon chart review, patient noted to be admitted to [facility]. Transition team to continue to monitor.  Lisa Jasso RN   [phone]

## 2024-11-11 NOTE — FLOWSHEET NOTE
MRI completed, MRI set back to normal function.    11/11/24 1648   Vital Signs   /69   MAP (Calculated) 86

## 2024-11-11 NOTE — PLAN OF CARE
Problem: Chronic Conditions and Co-morbidities  Goal: Patient's chronic conditions and co-morbidity symptoms are monitored and maintained or improved  11/11/2024 1055 by Dilma Mehta RN  Outcome: Progressing  11/11/2024 0111 by Manjula Nolan RN  Outcome: Progressing     Problem: Discharge Planning  Goal: Discharge to home or other facility with appropriate resources  11/11/2024 1055 by Dilma Mehta RN  Outcome: Progressing  11/11/2024 0111 by Manjula Nolan, RN  Outcome: Progressing     Problem: Safety - Adult  Goal: Free from fall injury  11/11/2024 1055 by Dilma Mehta, RN  Outcome: Progressing  11/11/2024 0111 by Manjula Nolan, RN  Outcome: Progressing

## 2024-11-11 NOTE — PLAN OF CARE
Problem: Chronic Conditions and Co-morbidities  Goal: Patient's chronic conditions and co-morbidity symptoms are monitored and maintained or improved  Outcome: Progressing     Problem: Discharge Planning  Goal: Discharge to home or other facility with appropriate resources  11/11/2024 0111 by Manjula Nolan RN  Outcome: Progressing  11/10/2024 1657 by Theresa Robledo, RN  Outcome: Progressing     Problem: Safety - Adult  Goal: Free from fall injury  Outcome: Progressing

## 2024-11-11 NOTE — PROGRESS NOTES
Patient back to room from MRI. Primary RN, nikole, updated regarding hypotension, dizziness, and unsteady gait post MRI from medication. Educated patient on fall precautions and use of bed alarm due to side effects from medication. Patient in agreed these precautions are needed for duration of medication effects. Patient returned to bed, bed alarm set with call light in reach.

## 2024-11-11 NOTE — PLAN OF CARE
TODAY'S DATE:  11/11/2024      Current NIHSS 0      Discussed personal risk factors for Stroke/TIA with patient/family, and ways to reduce the risk for a recurrent stroke.     Patient's personal risk factors which were identified are:   []   Alcohol Abuse: check with your physician before any alcohol consumption.   [x]   Atrial fibrillation: may cause blood clots  []   Drug Abuse: Seek help, talk with your doctor  []   Clotting Disorder  [x]   Diabetes  []   Family history of stroke or heart disease  [x]   High Blood Pressure/Hypertension: work with your physician  [x]   High cholesterol: monitor cholesterol levels with your physician  []   Overweight/Obesity: work with your physician for your ideal body weight  [x]   Physical Inactivity: get regular exercise as directed by your physician  []   Personal history of previous TIA or stroke  []   Poor Diet: decrease salt (sodium) in your diet, follow diet directed by physician  []   Smoking: stop smoking      Reviewed the Following Education with Patient and/or Family:   - Signs and Symptoms of Stroke  - Personal risk factors   - How to activate EMS (911)   - Importance of Follow Up Appointments at Discharge   - Importance of Compliance with Medications Prescribed at Discharge  - Available community resources and stroke advocacy groups if needed    Patient and/or family member: verbalized understanding.     Stroke Education booklet given to patient/family (or verified, if given already), which reviews above information. yes         Electronically signed by Dilma Mehta RN on 11/11/2024 at 11:06 AM

## 2024-11-11 NOTE — PROCEDURES
INTERPRETATION:  This 25-minute, computer-assisted video EEG recording is mildly abnormal.  It showed mild degree of generalized slowing background activity.  No potentially epileptiform activity was present during the recording.      The EEG findings were consistent with mild degree of generalized non-specific cerebral dysfunction.    CLASSIFICATION:  Dysrhythmia grade 1, generalized.  Sleep - unsuccessful.  EKG channel.    DESCRIPTION:    BACKGROUND:  The awake recording revealed 9 Hz alpha activity over the posterior head region.  There were increased 4 to 7 Hz theta activity into the EEG background.  Given the extensive study, the patient still did not sleep.  The EEG showed normal V waves during drowsiness.  There was no significant change on the EEG background with photic stimulation.  Hyperventilation was omitted due to old age.    INTERICTAL DISCHARGES: None    CLINICAL EVENTS:  None    The EKG channel was unremarkable.

## 2024-11-11 NOTE — PROGRESS NOTES
V2.0    [facility] Progress Note      Name:  Reina Marin /Age/Sex: 1955  (69 y.o. female)   MRN & CSN:  [id number] & [id number] Encounter Date/Time: 2024 1:47 PM EST   Location:  [facility]/0543-01 PCP: Mary Florentino PA     Attending:José Manuel Tavares MD       Hospital Day: 3    Assessment and Recommendations   Reina Marin is a 69 y.o. female with pmh of Afib on Coumadin, diabetes, CHF who presents with CVA (cerebrovascular accident due to intracerebral hemorrhage) ([facility])      Plan:   TIA vs CVA - symptoms include headache, weakness, difficulty with word finding and AMS.  Patient reports history of migraines  -CT head revealed: Atrophy and mild small vessel ischemic disease  -CTA head and neck revealed: No acute abnormality or flow-limiting stenosis of the major arteries of the head and neck.  Moderate degenerative changes in the cervical spine.  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  -MRI brain w/o contrast today  -Neurology consulted.  -PT/OT - OK for home with assist as needed    Elevated troponin, 63  -repeat troponin 67  -likely 2/2 recent ablation  -cardiology consulted in ED - appreciate recommendations in advance  -no c/o chest pain/shortness of breath  -tele monitoring  -Troponin with downtrend - 52.     Atrial fibrillation  -s/p ablation  -Tikosyn held for Qtc of 576  -anticoagulated on coumadin. May continue Coumadin per neuro.  -INR not at goal, 1.46  -PPM     Hypothyroidism  -continue levothyroxine  -TSH     Morbid obesity  With Body mass index is 44.5 kg/m². Complicating assessment and treatment. Placing patient at risk for multiple co-morbidities as well as early death and contributing to the patient's presentation. Counseled  11/09/2024 10:22 PM     Troponin: No results found for: \"TROPONINT\"  Lactic Acid: No results for input(s): \"LACTA\" in the last 72 hours.  BNP: No results for input(s): \"PROBNP\" in the last 72 hours.  UA:  Lab Results   Component Value Date/Time    NITRU Negative 11/09/2024 05:27 PM    COLORU Yellow 11/09/2024 05:27 PM    PHUR 8.0 11/09/2024 05:27 PM    PHUR 8.0 11/09/2024 05:27 PM    PHUR 6.5 12/06/2023 10:47 AM    PHUR 6.5 09/28/2023 10:56 AM    WBCUA 6-9 11/09/2024 05:27 PM    RBCUA 5-10 11/09/2024 05:27 PM    BACTERIA 1+ 11/09/2024 05:27 PM    CLARITYU Clear 11/09/2024 05:27 PM    SPECGRAV 1.015 12/06/2023 10:47 AM    LEUKOCYTESUR SMALL 11/09/2024 05:27 PM    UROBILINOGEN 0.2 11/09/2024 05:27 PM    BILIRUBINUR Negative 11/09/2024 05:27 PM    BLOODU Negative 11/09/2024 05:27 PM    GLUCOSEU >=1000 11/09/2024 05:27 PM    KETUA TRACE 11/09/2024 05:27 PM     Urine Cultures:   Lab Results   Component Value Date/Time    LABURIN 200mg/dL 04/23/2024 10:12 AM     Blood Cultures: No results found for: \"BC\"  No results found for: \"BLOODCULT2\"  Organism:   Lab Results   Component Value Date/Time    ORG Pseudomonas aeruginosa 11/02/2020 10:04 AM    ORG Staphylococcus coagulase-negative 11/02/2020 10:04 AM    ORG Prevotella species 11/02/2020 10:04 AM         Electronically signed by Vidal Bean MD on 11/11/2024 at 12:50 PM

## 2024-11-12 VITALS
BODY MASS INDEX: 40.62 KG/M2 | TEMPERATURE: 98.1 F | DIASTOLIC BLOOD PRESSURE: 89 MMHG | RESPIRATION RATE: 24 BRPM | HEART RATE: 62 BPM | HEIGHT: 65 IN | WEIGHT: 243.83 LBS | OXYGEN SATURATION: 93 % | SYSTOLIC BLOOD PRESSURE: 132 MMHG

## 2024-11-12 LAB
EST. AVERAGE GLUCOSE BLD GHB EST-MCNC: 128.4 MG/DL
GLUCOSE BLD-MCNC: 110 MG/DL (ref 70–99)
GLUCOSE BLD-MCNC: 121 MG/DL (ref 70–99)
HBA1C MFR BLD: 6.1 %
INR PPP: 1.44 (ref 0.85–1.15)
PERFORMED ON: ABNORMAL
PERFORMED ON: ABNORMAL
PROTHROMBIN TIME: 17.7 SEC (ref 11.9–14.9)

## 2024-11-12 PROCEDURE — 85610 PROTHROMBIN TIME: CPT

## 2024-11-12 PROCEDURE — APPSS30 APP SPLIT SHARED TIME 16-30 MINUTES

## 2024-11-12 PROCEDURE — 6360000002 HC RX W HCPCS: Performed by: STUDENT IN AN ORGANIZED HEALTH CARE EDUCATION/TRAINING PROGRAM

## 2024-11-12 PROCEDURE — 2580000003 HC RX 258: Performed by: NURSE PRACTITIONER

## 2024-11-12 PROCEDURE — 36415 COLL VENOUS BLD VENIPUNCTURE: CPT

## 2024-11-12 PROCEDURE — 99233 SBSQ HOSP IP/OBS HIGH 50: CPT | Performed by: PSYCHIATRY & NEUROLOGY

## 2024-11-12 PROCEDURE — 6370000000 HC RX 637 (ALT 250 FOR IP): Performed by: NURSE PRACTITIONER

## 2024-11-12 RX ORDER — ENOXAPARIN SODIUM 150 MG/ML
1 INJECTION SUBCUTANEOUS ONCE
Status: COMPLETED | OUTPATIENT
Start: 2024-11-12 | End: 2024-11-12

## 2024-11-12 RX ORDER — WARFARIN SODIUM 7.5 MG/1
TABLET ORAL
Qty: 3 TABLET | Refills: 0 | Status: SHIPPED | OUTPATIENT
Start: 2024-11-12 | End: 2024-11-14 | Stop reason: ALTCHOICE

## 2024-11-12 RX ORDER — ENOXAPARIN SODIUM 100 MG/ML
1 INJECTION SUBCUTANEOUS 2 TIMES DAILY
Qty: 6 ML | Refills: 0 | Status: SHIPPED | OUTPATIENT
Start: 2024-11-12 | End: 2024-11-15

## 2024-11-12 RX ADMIN — LEVOTHYROXINE SODIUM 125 MCG: 0.12 TABLET ORAL at 09:32

## 2024-11-12 RX ADMIN — SODIUM CHLORIDE, PRESERVATIVE FREE 10 ML: 5 INJECTION INTRAVENOUS at 09:33

## 2024-11-12 RX ADMIN — METOPROLOL SUCCINATE 25 MG: 25 TABLET, EXTENDED RELEASE ORAL at 09:32

## 2024-11-12 RX ADMIN — ENOXAPARIN SODIUM 105 MG: 150 INJECTION SUBCUTANEOUS at 11:54

## 2024-11-12 RX ADMIN — OXYBUTYNIN CHLORIDE 15 MG: 5 TABLET, EXTENDED RELEASE ORAL at 09:32

## 2024-11-12 RX ADMIN — PANTOPRAZOLE SODIUM 40 MG: 40 TABLET, DELAYED RELEASE ORAL at 09:32

## 2024-11-12 RX ADMIN — SPIRONOLACTONE 25 MG: 25 TABLET, FILM COATED ORAL at 09:33

## 2024-11-12 NOTE — PROGRESS NOTES
Neurology Progress Note    ID: Reina Marin is a 69 y.o. female    : 1955     LOS: 3 days     Impression:  Transient confusion-- now resolved. This reportedly lasted for 20 minutes. Pt now complaining of ongoing reflective \"lights\" in her bilateral peripheral and central vision.  Atrial fibrillation  Hyperlipidemia  Hypertension  Diabetes    EEG showed no evidence of seizure tendency.  MRI brain showed no acute infarction.  It also showed bilateral hygroma.  The patient has no history of traumatic brain injury or subdural hematoma.    24: The patient is neurologically stabilized.  The patient denies any new focal weakness or numbness today.     Recommendation  Continue Coumadin and statin.  PT/OT/SLP  Systolic <140/90    From neurology perspective, there is no clear indication to add any medication at this time.  The risks of recurrent episode for TIA is up to 10% in the first 4 weeks.  The risk of the second episode for focal seizure is up to 30%.    The patient can be discharged if there is no other concern.  Neurology will sign off.    Medications:  Scheduled Meds:    warfarin  7.5 mg Oral Once    enoxaparin  1 mg/kg SubCUTAneous Once    atorvastatin  40 mg Oral Nightly    [Held by provider] dofetilide  500 mcg Oral 2 times per day    [Held by provider] furosemide  20 mg Oral Daily    levothyroxine  125 mcg Oral Daily    metoprolol succinate  25 mg Oral Daily    montelukast  10 mg Oral Nightly    pantoprazole  40 mg Oral QAM AC    oxyBUTYnin  15 mg Oral Daily    spironolactone  25 mg Oral Daily    sodium chloride flush  5-40 mL IntraVENous 2 times per day    insulin lispro  0-4 Units SubCUTAneous 4x Daily AC & HS    warfarin placeholder: dosing by pharmacy   Other RX Placeholder     Continuous Infusions:    sodium chloride      dextrose       PRN Meds: acetaminophen, sodium chloride flush, sodium chloride, polyethylene glycol, glucose, dextrose bolus **OR** dextrose bolus, glucagon (rDNA),  dextrose    OBJECTIVE  Vital signs in the last 24 hours:  Vitals:    11/12/24 0928   BP: 125/75   Pulse: 63   Resp: 18   Temp: 98 °F (36.7 °C)   SpO2: 96%       Intake/Output last 3 shifts:  I/O last 3 completed shifts:  In: 840 [P.O.:840]  Out: 3 [Urine:3]    Intake/Output this shift:  No intake/output data recorded.    Yesterday's Weight:  Wt Readings from Last 1 Encounters:   11/10/24 110.6 kg (243 lb 13.3 oz)       SUBJECTIVE  There was no acute event overnight.    ROS:  Negative except in subjective.    Neurological examination:  MENTAL STATUS:  Alert and oriented to person.  LANG/SPEECH: No dysarthria.  CRANIAL NERVES: No facial asymmetry.  MOTOR: No pronator drift or leg drift.  REFLEXES: Generalized 2+.  SENSORY: Grossly intact.  COORD: No tremor.    Labs and Imaging:  I reviewed labs and brain imaging.    50 minutes were spent with the patient with greater than 50% of the time spent in counseling and coordination of care.    Malcom Moya MD

## 2024-11-12 NOTE — PROGRESS NOTES
Patient given discharge instructions. All questions and concerns were addressed. IV and tele removed without complications.Patient wheeled to car with all patient belongings.

## 2024-11-12 NOTE — PROGRESS NOTES
Reina Marin  Neurology Follow-up  [facility] Neurology    Date of Service: 11/12/2024    Subjective:   CC: Follow up today regarding: Transient confusion     Events noted. Chart and lab reviewed. MRI brain wo contrast completed. No acute findings. EEG with mild gen slowing.      ROS : A 10-12 system review obtained and updated today and is unremarkable except as mentioned  in my interval history.     Medication, past medical history, social history, and family history reviewed.    Objective:  Exam:   Constitutional:   Vitals:    11/11/24 1648 11/11/24 2206 11/12/24 0430 11/12/24 0928   BP: 121/69 128/75 114/74 125/75   Pulse:   64 63   Resp:  16 18 18   Temp:  97.8 °F (36.6 °C) 97.8 °F (36.6 °C) 98 °F (36.7 °C)   TempSrc:  Oral Oral Oral   SpO2:  98% 98% 96%   Weight:       Height:           General appearance:  Normal development and appear in no acute distress.   Mental Status:   Oriented to person, place, problem, and time.    Memory: Good immediate recall.  Intact remote memory  Normal attention span and concentration.  Language: intact naming, repeating and fluency   Good fund of Knowledge.   Cranial Nerves:   II:   Pupils: equal, round, reactive to light  III,IV,VI: Extra Ocular Movements are intact. No nystagmus  V: Facial sensation is intact  VII: Facial strength and movements: intact and symmetric  XII: Tongue movements are normal  Musculoskeletal: 5/5 in all 4 extremities.   Reflexes: Not tested  Coordination: no pronator drift, no dysmetria with FNF.   Sensation: normal to light touch in both arms and legs.  Gait/Posture: Deferred for pt safety           Data:  LABS:   Lab Results   Component Value Date/Time     11/10/2024 04:55 AM    K 3.9 11/10/2024 04:55 AM     11/10/2024 04:55 AM    CO2 25 11/10/2024 04:55 AM    BUN 21 11/10/2024 04:55 AM    CREATININE 0.9 11/10/2024 04:55 AM    GFRAA >60 03/30/2022 05:03 AM    LABGLOM 69 11/10/2024 04:55 AM    LABGLOM >60 12/19/2023 07:16 AM     GLUCOSE 109 11/10/2024 04:55 AM    GLUCOSE 97 01/24/2017 11:37 AM    MG 1.84 11/09/2024 03:22 PM    CALCIUM 9.0 11/10/2024 04:55 AM     Lab Results   Component Value Date/Time    WBC 6.2 11/10/2024 04:55 AM    RBC 4.13 11/10/2024 04:55 AM    RBC 4.71 01/24/2017 11:37 AM    HGB 12.5 11/10/2024 04:55 AM    HCT 38.2 11/10/2024 04:55 AM    MCV 92.6 11/10/2024 04:55 AM    RDW 14.2 11/10/2024 04:55 AM     11/10/2024 04:55 AM     Lab Results   Component Value Date    INR 1.44 (H) 11/12/2024    PROTIME 17.7 (H) 11/12/2024         Medical decision making:        A. Problems (any 1)     High:     [] Acute/Chronic Illness/injury posing threat to life or bodily function:    [] Severe exacerbation of chronic illness:       Moderate:     []     1 or more chronic illness with exacerbation, progression or side effect of treatment or  []     2 or more stable chronic illnesses or  [x]     1 acute illness with systemic symptoms     ---------------------------------------------------------------------  B. Risk of Treatment (any 1)   [] Drugs/treatments that require intensive monitoring for toxicity include:    [] Change in code status:    [] Decision to escalate care:    [] Major surgery/procedure with associated risk factors:    [x] Prescription drug management  ----------------------------------------------------------------------  [] High (any 2)  [x] Moderate (any 1)     C. Data (any 2 for high and any 1 for moderate)  [] Discussed management of the case with:  nurse   [x] Imaging personally reviewed and interpreted:    [x] Data Review (any 3)  [] Collateral history obtained from:   [x] All available Consultant notes from yesterday/today were reviewed  [x] All current labs were reviewed and interpreted for clinical significance   [x] Appropriate follow-up labs were ordered         Neuroimaging was independently reviewed by me and discussed results with the patient  I reviewed blood testing and other test results and

## 2024-11-12 NOTE — DISCHARGE INSTRUCTIONS
Take 7.5 mg of Coumadin daily for 3 days. Will also bridge patient with 100 mg of Lovenox (1 mL) BID. Patient should follow up with Coumadin clinic on 11/14.

## 2024-11-12 NOTE — PROGRESS NOTES
Pharmacy Note  Warfarin Consult  Dx: afib  Goal INR range 2-3   Home Warfarin dose: 7.5 mg (5 mg x 1.5) every Mon, Wed, Fri; 5 mg (5 mg x 1) all other days     Date  INR   Warfarin  11/9                 1.55                     5 mg (home)  11/10               1.57                     5 mg  11/11               1.46                  7.5 mg  11/12               1.44                  7.5 mg    Give Warfarin 7.5 mg x1 tonight.  Daily INR ordered.  Rx will continue to manage therapy per consult order.    Ellen Mehta, PharmD 11/12/2024  7:53 AM

## 2024-11-12 NOTE — CARE COORDINATION
CASE MANAGEMENT DISCHARGE SUMMARY      Discharge to: Home -NN    Precertification completed: N/A  Hospital Exemption Notification (HENS) completed: N/A    IMM given: (date) N/A    New Durable Medical Equipment ordered/agency: N/A    Transportation:    Family/car: Personal      Confirmed discharge plan with:     Patient: yes     Family:  yes           RN, name: Ivon    Note: Discharging nurse to complete MAINOR, reconcile AVS, and place final copy with patient's discharge packet. RN to ensure that written prescriptions for  Level II medications are sent with patient to the facility as per protocol.     Tracy Dumont RN

## 2024-11-13 ENCOUNTER — TELEPHONE (OUTPATIENT)
Dept: FAMILY MEDICINE CLINIC | Age: 69
End: 2024-11-13

## 2024-11-13 ENCOUNTER — CARE COORDINATION (OUTPATIENT)
Dept: CASE MANAGEMENT | Age: 69
End: 2024-11-13

## 2024-11-13 ENCOUNTER — TELEPHONE (OUTPATIENT)
Dept: CARDIOLOGY CLINIC | Age: 69
End: 2024-11-13

## 2024-11-13 DIAGNOSIS — G45.9 TIA (TRANSIENT ISCHEMIC ATTACK): Primary | ICD-10-CM

## 2024-11-13 PROCEDURE — 1111F DSCHRG MED/CURRENT MED MERGE: CPT | Performed by: PHYSICIAN ASSISTANT

## 2024-11-13 NOTE — TELEPHONE ENCOUNTER
Care Transitions Initial Follow Up Call    Outreach made within 2 business days of discharge: Yes    Patient: Reina Marin Patient : 1955   MRN: [id number]  Reason for Admission: CVA  Discharge Date: 24       Spoke with: Maira     Discharge department/facility: [facility] Interactive Patient Contact:  Was patient able to fill all prescriptions: Yes  Was patient instructed to bring all medications to the follow-up visit: Yes  Is patient taking all medications as directed in the discharge summary? Yes  Does patient understand their discharge instructions: Yes  Does patient have questions or concerns that need addressed prior to 7-14 day follow up office visit: no        Scheduled appointment with PCP within 7-14 days    Follow Up  Future Appointments   Date Time Provider Department Center   2024  2:30 PM Mary Florentino PA [facility] DEP   2024  2:00 PM [facility]   2025 10:45 AM Brea Leos MD [facility]   2025 11:15 AM [facility] DEVICE CHECK [facility]   2025 11:15 AM SARAH Harper Jr., MD [facility]   2025 10:30 AM Mary Florentino PA [facility] DEP   2025 11:40 AM [facility]iroga [facility] - CNP [facility]       Jocelyn Padilla LPN

## 2024-11-13 NOTE — TELEPHONE ENCOUNTER
Pt contacted office stating she was just released from hospital, pt was taken in due to her being fatigue and \"talking gibberish\". Pt states in the hosp they suspected a brain bleed, preformed MRI and EEG and found nothing, they concluded it was a TIA. Pt had stopped coumadin last week for a procedure and is now requesting to be on eliquis, pt states she feels being in the hospital and everything going on it was her \"wake up call\" to be on eliquis per NEFTALI wishes. Pt states when she receives a return call ok-ing to be on eliquis she will give us a name of the pharmacy she would like it sent to.

## 2024-11-13 NOTE — CARE COORDINATION
Care Transitions Note    Initial Call - Call within 2 business days of discharge: Yes    Patient Current Location:  Home: [address] [place] [id number]    Care Transition Nurse contacted the patient by telephone to perform post hospital discharge assessment, verified name and  as identifiers. Provided introduction to self, and explanation of the Care Transition Nurse role.     Patient: Reina Marin    Patient : 1955   MRN: [id number]    Reason for Admission: CVA  Discharge Date: 24  RURS: Readmission Risk Score: 16.1      Last Discharge Facility       Date Complaint Diagnosis Description Type Department Provider    24   Admission (Discharged) José Manuel Everett MD    24 Altered Mental Status Altered mental status, unspecified altered mental status type ... ED (TRANSFER) [facility], MD            Was this an external facility discharge? No    Additional needs identified to be addressed with provider   No needs identified         Method of communication with provider: none.    Patients top risk factors for readmission: functional physical ability    Interventions to address risk factors:   Review of patient management of conditions/medications: full medication reconciliation completed    Care Summary Note: Patient answered call and verified . Patient pleasant and agreeable to transition call. Known to CTN from previous episodes. Discussed recent admission and \"never felt anything like that before\". Discussed symptoms that lead to ER visit. Patient without any residual side effects of TIA other than being fatigued. Patient has picked up all prescriptions, but working on getting Eliquis instead of taking coumadin. Patient has reached out to [facility] and request new prescription and waiting for call back. Patient currently taking lovenox injections as directed, but hopeful that she can start eliquis today. CTN provided patient resource for

## 2024-11-14 NOTE — TELEPHONE ENCOUNTER
GAVIN OOT, RAS OOT    Pt requesting to switch from warfarin to eliquis. Please advise     LOV GAVIN 10/7/2024  Pt saw RAS in the hospital 8/2024  Next OV RAS 2/18/2025

## 2024-11-14 NOTE — TELEPHONE ENCOUNTER
Spoke with pt. She reports she wants to switch from warfarin to eliquis due to recent hospitalization. She reports she was hesitant to switching in the past due to cost. She says she will handle the cost \"month by month\". Please advise if you can order eliquis or if pt should f/u with EP      Lov NPAM 10/7/2024  Lov NEFTALI 8/20/2024

## 2024-11-14 NOTE — DISCHARGE SUMMARY
V2.0  Discharge Summary    Name:  Reina Marin /Age/Sex: 1955 (69 y.o. female)   Admit Date: 2024  Discharge Date: 24    MRN & CSN:  [id number] & [id number] Encounter Date and Time 24 8:58 PM EST    Attending:  No att. providers found Discharging Provider: Vidal Bean MD       Hospital Course:     Brief HPI: Reina Marin is a 69 y.o. female who presented with altered mental stats    Brief Problem Based Course:   TIA vs CVA - symptoms include headache, weakness, difficulty with word finding and AMS.  Patient reports history of migraines  -CT head revealed: Atrophy and mild small vessel ischemic disease  -CTA head and neck revealed: No acute abnormality or flow-limiting stenosis of the major arteries of the head and neck.  Moderate degenerative changes in the cervical spine.  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  -MRI brain w/o contrast showed mild small vessel ischemic changes. Patient with no lingering symptoms.  -Neurology consulted. EEG showed no evidence of seizure. No changes in medication. Neuro signed off 24.  -PT/OT - OK for home with assist as needed     Elevated troponin, 63  -repeat troponin 67  -likely 2/2 recent ablation  -cardiology consulted in ED - appreciate recommendations in advance  -no c/o chest pain/shortness of breath  -tele monitoring  -Troponin with downtrend - 52.     Atrial fibrillation  -s/p ablation  -Tikosyn held for Qtc of 576  -anticoagulated on coumadin. May continue Coumadin per neuro.  -INR not at goal, 1.46  -PPM  - Will bridge Coumadin with Lovenox and have patient follow up with Coumadin clinic on Thursday.     Hypothyroidism  -continue levothyroxine  -TSH     Morbid  alert\" been called?->Yes Reason for exam:->Stroke Symptoms Decision Support Exception - unselect if not a suspected or confirmed emergency medical condition->Emergency Medical Condition (MA) Reason for Exam: code stroke FINDINGS: BRAIN/VENTRICLES: Generalized atrophy.  1 cm rounded calcification to the right of the falx posteriorly, similar to prior, likely meningioma.  Mild periventricular white matter hypodensity is seen.  No hydrocephalus.  No mass effect or midline shift.  No gross acute hemorrhage.  Gray matter white matter differentiation is preserved. ORBITS: The visualized portion of the orbits demonstrate no acute abnormality. SINUSES: The visualized paranasal sinuses and mastoid air cells demonstrate no acute abnormality. SOFT TISSUES/SKULL:  No acute abnormality of the visualized skull or soft tissues.     Atrophy and mild small vessel ischemic disease. Findings were discussed with MARLA BALES by the radiology call center at 15:33 on 11/9/2024.       CBC: No results for input(s): \"WBC\", \"HGB\", \"PLT\" in the last 72 hours.  BMP:  No results for input(s): \"NA\", \"K\", \"CL\", \"CO2\", \"BUN\", \"CREATININE\", \"GLUCOSE\" in the last 72 hours.  Hepatic: No results for input(s): \"AST\", \"ALT\", \"BILITOT\", \"ALKPHOS\" in the last 72 hours.    Invalid input(s): \"ALB\"  Lipids:   Lab Results   Component Value Date/Time    CHOL 97 11/10/2024 04:55 AM    HDL 33 11/10/2024 04:55 AM    TRIG 78 11/10/2024 04:55 AM     Hemoglobin A1C:   Lab Results   Component Value Date/Time    LABA1C 6.1 11/10/2024 04:55 AM     TSH:   Lab Results   Component Value Date/Time    TSH 3.93 11/09/2024 10:22 PM     Troponin: No results found for: \"TROPONINT\"  Lactic Acid: No results for input(s): \"LACTA\" in the last 72 hours.  BNP: No results for input(s): \"PROBNP\" in the last 72 hours.  UA:  Lab Results   Component Value Date/Time    NITRU Negative 11/09/2024 05:27 PM    COLORU Yellow 11/09/2024 05:27 PM    PHUR 8.0 11/09/2024 05:27 PM    PHUR 8.0

## 2024-11-18 ENCOUNTER — OFFICE VISIT (OUTPATIENT)
Dept: FAMILY MEDICINE CLINIC | Age: 69
End: 2024-11-18

## 2024-11-18 VITALS
BODY MASS INDEX: 42.27 KG/M2 | OXYGEN SATURATION: 98 % | SYSTOLIC BLOOD PRESSURE: 118 MMHG | WEIGHT: 254 LBS | DIASTOLIC BLOOD PRESSURE: 64 MMHG | HEART RATE: 60 BPM

## 2024-11-18 DIAGNOSIS — R41.3 MEMORY CHANGE: ICD-10-CM

## 2024-11-18 DIAGNOSIS — M25.512 PAIN OF BOTH SHOULDER JOINTS: ICD-10-CM

## 2024-11-18 DIAGNOSIS — M25.511 PAIN OF BOTH SHOULDER JOINTS: ICD-10-CM

## 2024-11-18 DIAGNOSIS — Z09 HOSPITAL DISCHARGE FOLLOW-UP: Primary | ICD-10-CM

## 2024-11-18 DIAGNOSIS — Z86.73 HISTORY OF TIA (TRANSIENT ISCHEMIC ATTACK): ICD-10-CM

## 2024-11-18 ASSESSMENT — ENCOUNTER SYMPTOMS
COLOR CHANGE: 0
TROUBLE SWALLOWING: 0

## 2024-11-18 NOTE — PROGRESS NOTES
Post-Discharge Transitional Care Follow Up      Reina Marin   YOB: 1955    Date of Office Visit:  11/18/2024  Date of Hospital Admission: 11/9/24  Date of Hospital Discharge: 11/12/24  Readmission Risk Score (high >=14%. Medium >=10%):Readmission Risk Score: 16.1      Care management risk score Rising risk (score 2-5) and Complex Care (Scores >=6): No Risk Score On File     Non face to face  following discharge, date last encounter closed (first attempt may have been earlier): 11/13/2024     Call initiated 2 business days of discharge: Yes     Hospital discharge follow-up  -     IN DISCHARGE MEDS RECONCILED W/ CURRENT OUTPATIENT MED LIST  -   reviewed lab work, imaging and notes from the hospital with the patient and her . All questions were answered  History of TIA (transient ischemic attack)  -     IN DISCHARGE MEDS RECONCILED W/ CURRENT OUTPATIENT MED LIST  -    pt is on eliquis and atorvastatin for stroke prevention. Continue with these medications  Memory change  -     RAISA - Amanda Castañeda MD, Neurology, [facility]  -     Vitamin B12 & Folate; Future  -      thyroid and blood sugar are normal. Will draw a B12. Family is concerned and there is a family history of alzheimer's dementia. We will refer to neurology  Pain of both shoulder joints       Ice, heat and tylenol for pain. If pain worsens we will get imaging of shoulder joints but she declined any imaging today    Medical Decision Making:high complexity           Subjective:   HPI    Inpatient course: Discharge summary reviewed- see chart.    Interval history/Current status: The patient is here with her  today. He reports that she has been very fatigued and unmotivated at home since her hospitalization. She continues to have migraines which is new since her most recent heart ablation.  Her last migraine was today, and with aura.  Her  observed shaking during the onset but she did not lose consciousness or

## 2024-11-20 ENCOUNTER — CARE COORDINATION (OUTPATIENT)
Dept: CASE MANAGEMENT | Age: 69
End: 2024-11-20

## 2024-11-20 ENCOUNTER — ANTI-COAG VISIT (OUTPATIENT)
Dept: PHARMACY | Age: 69
End: 2024-11-20

## 2024-11-20 DIAGNOSIS — I48.0 PAF (PAROXYSMAL ATRIAL FIBRILLATION) (HCC): Primary | ICD-10-CM

## 2024-11-20 NOTE — PROGRESS NOTES
Physician Progress Note      PATIENT:               JOANNA HANSEN  [facility] #:                  [id number]  :                       1955  ADMIT DATE:       2024 9:28 PM  DISCH DATE:        2024 3:09 PM  RESPONDING  PROVIDER #:        KELLY GENAO          QUERY TEXT:    Patient admitted - with headache, weakness, difficulty with word   finding and AMS. Noted documentation of \"TIA vs CVA\" on discharge summary. If   possible, please document in progress notes if you are evaluating and /or   treating any of the following:    The medical record reflects the following:  Risk Factors: 69 year old female  Clinical Indicators:  DC Summary: \"TIA versus CVA.  - symptoms include headache, weakness,   difficulty with word finding and AMS. -CT head revealed: Atrophy and mild   small vessel ischemic disease-CTA head and neck revealed: No acute abnormality   or flow-limiting stenosis of the major arteries of the head and neck.    Moderate degenerative changes in the cervical spine.  Ossification of the   posterior longitudinal ligament at C5 and C6. MRI brain w/o contrast showed   mild small vessel ischemic changes. Patient with no lingering symptoms.\"  Neurology : \"EEG showed no evidence of seizure tendency. MRI brain showed   no acute infarction.\"    Treatment: CThead, CTA head, MRI Brain, Neurology consult, PT/OT/Speech   therapy, UA, UDS  Options provided:  -- Patient with suspected TIA, CVA ruled out.  -- Other - I will add my own diagnosis  -- Disagree - Not applicable / Not valid  -- Disagree - Clinically unable to determine / Unknown  -- Refer to Clinical Documentation Reviewer    PROVIDER RESPONSE TEXT:    Patient with suspected TIA, CVA ruled out.    Query created by: Abeba Acosta on 2024 8:47 AM      Electronically signed by:  KELLY GENAO 2024 3:52 PM

## 2024-11-20 NOTE — PROGRESS NOTES
Pt started on Eliquis, will close coumadin clinic episode.  Fan Strong, PharmD 9:58 AM EST 11/20/24

## 2024-11-20 NOTE — CARE COORDINATION
Calls  Are you currently active with any services?: Outpatient/Community Services  Care Transitions Interventions   Medication Assistance Program: Completed     Other Interventions:              Follow Up Appointment:   Reviewed upcoming appointment(s). and WILBERTO appointment attended as scheduled   Future Appointments         Provider Specialty Dept Phone    2/18/2025 10:45 AM Brea Leos MD Cardiology [phone] 2/18/2025 11:15 AM [facility] Cardiology [phone] 2/18/2025 11:15 AM SARAH Harper Jr., MD Cardiology [phone] 4/29/2025 10:30 AM Mary Florentino PA Family Medicine [phone]    5/30/2025 11:40 AM Josy Quiroga APRN - [facility] Pulmonology [phone]            Care Transition Nurse provided contact information.  Plan for follow-up call in 6-10 days based on severity of symptoms and risk factors.  Plan for next call: self management-       Lisa Jasso RN

## 2024-11-21 ENCOUNTER — HOSPITAL ENCOUNTER (OUTPATIENT)
Age: 69
Discharge: HOME | End: 2024-11-21
Payer: MEDICARE

## 2024-11-21 DIAGNOSIS — R41.3 MEMORY CHANGE: ICD-10-CM

## 2024-11-21 LAB
FOLATE SERPL-MCNC: 37.6 NG/ML (ref 4.78–24.2)
VIT B12 SERPL-MCNC: 546 PG/ML (ref 211–911)

## 2024-11-21 PROCEDURE — 82607 VITAMIN B-12: CPT

## 2024-11-21 PROCEDURE — 36415 COLL VENOUS BLD VENIPUNCTURE: CPT

## 2024-11-21 PROCEDURE — 82746 ASSAY OF FOLIC ACID SERUM: CPT

## 2024-11-27 ENCOUNTER — CARE COORDINATION (OUTPATIENT)
Dept: CASE MANAGEMENT | Age: 69
End: 2024-11-27

## 2024-11-27 NOTE — CARE COORDINATION
Care Transitions Note    Follow Up Call     Patient Current Location:  Home: [address] [place] [address]    Care Transition Nurse contacted the patient by telephone. Verified name and  as identifiers.    Additional needs identified to be addressed with provider   No needs identified           Method of communication with provider: none.    Care Summary Note: Patient answered call and verified . Patient pleasant and agreeable to transition call. Continues to be fatigue and noticed some shortness of breath with exertion. Denied any CP.  Patient was seen by PCP for HFU and discussed symptoms. Patient stated that PPM rate was set at 60 and PCP recommended that rate be increased. Patient hasn't called company yet, because she wanted to recover from recent TIA. Confirmed she has number and would reach out if symptoms continued. Denied any acute needs at present time.  Agreeable to f/u calls.  Educated on the use of urgent care or physician’s 24 hr access line if assistance is needed after hours.    Plan of care updates since last contact:  Review of patient management of conditions/medications:         Advance Care Planning:   Does patient have an Advance Directive: reviewed during previous call, see note. .    Medication Review:  Full medication reconciliation completed during previous call.    Remote Patient Monitoring:  Offered patient enrollment in the Remote Patient Monitoring (RPM) program for in-home monitoring: Deferred at this time because provider disenrolled patient from program; will discuss at next outreach.    Assessments:  Care Transitions Subsequent and Final Call    Subsequent and Final Calls  Do you have any ongoing symptoms?: No  Have your medications changed?: No  Do you have any questions related to your medications?: No  Do you currently have any active services?: No  Are you currently active with any services?: Outpatient/Community Services  Do you have any needs or concerns that

## 2024-12-04 ENCOUNTER — CARE COORDINATION (OUTPATIENT)
Dept: CASE MANAGEMENT | Age: 69
End: 2024-12-04

## 2024-12-04 NOTE — CARE COORDINATION
Care Transitions Note    Follow Up Call     Patient Current Location:  Home: [address] [place] [address]    Care Transition Nurse contacted the patient by telephone. Verified name and  as identifiers.    Additional needs identified to be addressed with provider   No needs identified         Method of communication with provider: none.    Care Summary Note:  Patient answered call and verified . Patient pleasant and agreeable to transition call. Stated she was at [facility] ER with her son. Stated the her son had low potassium level, low BP, and dehydrated. Stated that he was given a new medication that had lowered his potassium level and adjustments were made. Patient thankful that her son is doing better and he was getting ready to be discharged from ER.   Patient stated she is doing \"really well\" since last contact. Getting more energy and returning to her baseline. Stated she is scheduled with Amanda Castañeda at [facility] on 25, but has been added to cancellation list for sooner appt if available. Patient continues to monitor vital signs with home equipment and not wanting to adjust PPE rate of 60 at this time. Patient is waiting until seen by neurology for follow up.   Denied any acute needs at present time.  Agreeable to f/u calls.  Educated on the use of urgent care or physician’s 24 hr access line if assistance is needed after hours.     Plan of care updates since last contact:  Review of patient management of conditions/medications:         Advance Care Planning:   Does patient have an Advance Directive: reviewed during previous call, see note. .    Medication Review:  Full medication reconciliation completed during previous call.    Remote Patient Monitoring:  Offered patient enrollment in the Remote Patient Monitoring (RPM) program for in-home monitoring: Yes, but did not enroll at this time: declined to enroll in the program becauseprovider did not want patient enrolled and

## 2024-12-11 ENCOUNTER — CARE COORDINATION (OUTPATIENT)
Dept: CASE MANAGEMENT | Age: 69
End: 2024-12-11

## 2024-12-11 ENCOUNTER — TELEPHONE (OUTPATIENT)
Dept: CARDIOLOGY CLINIC | Age: 69
End: 2024-12-11

## 2024-12-11 DIAGNOSIS — I48.0 PAF (PAROXYSMAL ATRIAL FIBRILLATION) (HCC): Primary | ICD-10-CM

## 2024-12-11 RX ORDER — METOPROLOL SUCCINATE 25 MG/1
25 TABLET, EXTENDED RELEASE ORAL DAILY
Qty: 90 TABLET | Refills: 0 | Status: SHIPPED | OUTPATIENT
Start: 2024-12-11

## 2024-12-11 NOTE — TELEPHONE ENCOUNTER
CMP and CBC 11/2023.   LOV NPAM 10/2024. Next visit 2/2025 w/ KUNAL.     NPAM OOT. Signing for KUNAL.

## 2024-12-11 NOTE — CARE COORDINATION
Care Transitions Note    Follow Up Call     Patient Current Location:  [address] [place] [address]    Care Transition Nurse contacted the patient by telephone. Verified name and  as identifiers.    Additional needs identified to be addressed with provider   No needs identified         Method of communication with provider: none.    Care Summary Note:  Patient answered call and verified . Patient pleasant and agreeable to transition call. Patient scheduled Monday for [facility] Neuro follow up  Patient is feeling \"good\" and more active. Eager to go to neuro follow up and discussed treatment and work up. Patient discussed family and getting ready for holiday plans. Denied any acute needs at present time.  Agreeable to f/u calls.  Educated on the use of urgent care or physician’s 24 hr access line if assistance is needed after hours.      Plan of care updates since last contact:  Review of patient management of conditions/medications: neuro follow up Monday       Advance Care Planning:   Does patient have an Advance Directive: reviewed during previous call, see note. .    Medication Review:  Full medication reconciliation completed during previous call.    Remote Patient Monitoring:  Offered patient enrollment in the Remote Patient Monitoring (RPM) program for in-home monitoring: Patient being discharged from remote patient monitoring program today.    Assessments:  Care Transitions Subsequent and Final Call    Subsequent and Final Calls  Are you currently active with any services?: Outpatient/Community Services  Care Transitions Interventions   Medication Assistance Program: Completed     Other Interventions:              Follow Up Appointment:   Reviewed upcoming appointment(s).  Future Appointments         Provider Specialty Dept Phone    2025 10:45 AM Brea Leos MD Cardiology [phone]    2025 11:15 AM [facility] Cardiology [phone]    2025

## 2024-12-11 NOTE — TELEPHONE ENCOUNTER
Refill  metoprolol succinate (TOPROL XL) 25 MG extended release tablet   [facility]. - [place], [place] - [address] [place] [address] [phone] - F [phone]   Lov 10/7/24  Next 2/18/25

## 2024-12-17 ENCOUNTER — CARE COORDINATION (OUTPATIENT)
Dept: CASE MANAGEMENT | Age: 69
End: 2024-12-17

## 2024-12-17 NOTE — CARE COORDINATION
Care Transitions Note    Final Call     Patient Current Location:  Home: [address] [place] [address]  [place] [address]    Care Transition Nurse contacted the patient by telephone. Verified name and  as identifiers.    Patient graduated from the Care Transitions program.  Patient/family verbalizes confidence in the ability to self-manage at this time. has the ability to self manage at this time..      Advance Care Planning:   Does patient have an Advance Directive: reviewed during previous call, see note. .    Handoff:   Patient was not referred to the ACM team due to patient declined services.      Care Summary Note:   Patient answered call and verified . Patient pleasant and agreeable to final transition call. Doing well and discussed recent neurology follow up appt. Patient was seen yesterday and reviewed visit. Patient stated she had basic testing of remembering words, drawing a clock, remembering items. She was given lab orders for dementia. Scheduled for further testing in 2025. Transition support completed and patient declined ACM support at this time. Denied any acute needs at present time.  Educated on the use of urgent care or physician’s 24 hr access line if assistance is needed after hours.    Assessments:  Care Transitions Subsequent and Final Call    Subsequent and Final Calls  Do you have any ongoing symptoms?: No  Have your medications changed?: No  Do you have any questions related to your medications?: No  Do you currently have any active services?: No  Are you currently active with any services?: Outpatient/Community Services  Do you have any needs or concerns that I can assist you with?: No  Identified Barriers: None  Care Transitions Interventions   Medication Assistance Program: Completed     Other Interventions:              Upcoming Appointments:    Future Appointments         Provider Specialty Dept Phone    2025 10:45 AM Brea Leos MD Cardiology [phone]    2025

## 2024-12-19 DIAGNOSIS — I50.22 CHRONIC SYSTOLIC HEART FAILURE (HCC): ICD-10-CM

## 2024-12-20 RX ORDER — OXYBUTYNIN CHLORIDE 15 MG/1
15 TABLET, EXTENDED RELEASE ORAL DAILY
Qty: 90 TABLET | Refills: 1 | Status: SHIPPED | OUTPATIENT
Start: 2024-12-20

## 2024-12-20 RX ORDER — SPIRONOLACTONE 25 MG/1
TABLET ORAL
Qty: 90 TABLET | Refills: 2 | Status: SHIPPED | OUTPATIENT
Start: 2024-12-20

## 2024-12-20 NOTE — TELEPHONE ENCOUNTER
Last Office Visit: 8/20/2024 Provider: NEFTALI  **Is provider OOT? No    Next Office Visit: 2/18/2025 Provider: NEFTALI      Lab orders needed? no   Encounter provider correct? Yes If not, change provider  Script changes since last refill? no    LAST LABS:   CMP:   Lab Results   Component Value Date     11/10/2024    K 3.9 11/10/2024     11/10/2024    CO2 25 11/10/2024    BUN 21 (H) 11/10/2024    CREATININE 0.9 11/10/2024    GLUCOSE 109 (H) 11/10/2024    CALCIUM 9.0 11/10/2024    BILITOT 0.7 11/09/2024    ALKPHOS 88 11/09/2024    AST 25 11/09/2024    ALT 22 11/09/2024    LABGLOM 69 11/10/2024    GFRAA >60 03/30/2022    AGRATIO 1.4 11/09/2024    GLOB 2.5 07/13/2021

## 2024-12-20 NOTE — TELEPHONE ENCOUNTER
Refill Request     CONFIRM preferred pharmacy with the patient.    If Mail Order Rx - Pend for 90 day refill.      Last Seen: Last Seen Department: 11/18/2024  Last Seen by PCP: 11/18/2024    Last Written: 6/24/24 90 with 1 refill     If no future appointment scheduled:  Review the last OV with PCP and review information for follow-up visit,  Route STAFF MESSAGE with patient name to the  Pool for scheduling with the following information:            -  Timing of next visit           -  Visit type ie Physical, OV, etc           -  Diagnoses/Reason ie. COPD, HTN - Do not use MEDICATION, Follow-up or CHECK UP - Give reason for visit      Next Appointment:   Future Appointments   Date Time Provider Department Center   2/18/2025 10:45 AM Brea Leos MD [facility]   2/18/2025 11:15 AM [facility]   2/18/2025 11:15 AM SARAH Harper Jr., MD [facility]   4/29/2025 10:30 AM Mary Florentino PA [facility]   5/30/2025 11:40 AM Josy Quiroga APRN - CNP [facility]       Message sent to  to schedule appt with patient?  NO      Requested Prescriptions     Pending Prescriptions Disp Refills    oxyBUTYnin (DITROPAN XL) 15 MG extended release tablet [Pharmacy Med Name: oxyBUTYnin CL ER 15 MG TABLET] 90 tablet 1     Sig: TAKE 1 TABLET BY MOUTH DAILY

## 2025-01-05 NOTE — TELEPHONE ENCOUNTER
Refill Request     CONFIRM preferred pharmacy with the patient.    If Mail Order Rx - Pend for 90 day refill.      Last Seen: Last Seen Department: 11/18/2024  Last Seen by PCP: 11/18/2024    Last Written: 3/6/2024 90 tab 1 refills    If no future appointment scheduled:  Review the last OV with PCP and review information for follow-up visit,  Route STAFF MESSAGE with patient name to the  Pool for scheduling with the following information:            -  Timing of next visit           -  Visit type ie Physical, OV, etc           -  Diagnoses/Reason ie. COPD, HTN - Do not use MEDICATION, Follow-up or CHECK UP - Give reason for visit      Next Appointment:   Future Appointments   Date Time Provider Department Center   2/18/2025 10:45 AM Brea Leos MD [facility]   2/18/2025 11:15 AM [facility]   2/18/2025 11:15 AM SARAH Harper Jr., MD [facility]   4/29/2025 10:30 AM Mary Florentino PA [facility]   5/30/2025 11:40 AM Josy Quiroga APRN - CNP [facility]       Message sent to  to schedule appt with patient?  NO      Requested Prescriptions     Pending Prescriptions Disp Refills    montelukast (SINGULAIR) 10 MG tablet [Pharmacy Med Name: MONTELUKAST SODIUM 10MG TABS] 90 tablet 1     Sig: TAKE 1 TABLET BY MOUTH EVERY NIGHT

## 2025-01-06 RX ORDER — MONTELUKAST SODIUM 10 MG/1
10 TABLET ORAL NIGHTLY
Qty: 90 TABLET | Refills: 1 | Status: SHIPPED | OUTPATIENT
Start: 2025-01-06

## 2025-01-20 DIAGNOSIS — K21.9 CHRONIC GERD: ICD-10-CM

## 2025-01-20 DIAGNOSIS — J30.9 ALLERGIC RHINITIS, UNSPECIFIED SEASONALITY, UNSPECIFIED TRIGGER: Primary | Chronic | ICD-10-CM

## 2025-01-20 RX ORDER — MONTELUKAST SODIUM 10 MG/1
10 TABLET ORAL NIGHTLY
Qty: 90 TABLET | Refills: 1 | Status: SHIPPED | OUTPATIENT
Start: 2025-01-20

## 2025-01-20 RX ORDER — OMEPRAZOLE 40 MG/1
40 CAPSULE, DELAYED RELEASE ORAL
Qty: 90 CAPSULE | Refills: 1 | Status: SHIPPED | OUTPATIENT
Start: 2025-01-20

## 2025-01-20 NOTE — TELEPHONE ENCOUNTER
Please see patient message.      Mary my insurance was wrongly  and now has finally been reinstated. I am totally out of singular and omeprazole. Would you please call in 90 days of both of these meds to [facility] ASAP.  Thank you!

## 2025-02-17 NOTE — PROGRESS NOTES
[facility]  Advanced CHF/Pulmonary Hypertension   Cardiac Evaluation      Reina Marin  YOB: 1955    Date of Visit:  2/18/25    Chief Complaint   Patient presents with    Follow-up    Congestive Heart Failure      History of Present Illness:  Reina Marin is a 69 y.o. female who presented from referral from Dr. Green for consultation and management of cardiomyopathy. She presented to the hospital 07/11/19 with palpitations. She was found to be in AFib RVR. She was started on flecainide, diltiazem and eliquis. Her echo from 07/11/19 showed her EF was 48-50% with mild/ global hypokinesis. Mild MR and AR. Her stress test from 07/25/19 showed a mixed defect in the anteroseptal wall raising concern for mixed ischemia/scar. Her cardiac cath from 08/02/19 showed nonischemic cardiomyopathy. Flecainide and diltiazem were stopped due to worsening. LVEF. She was started on toprol 25 mg.    Her cardiac MRI from 08/02/19 showed her EF was 45%.   She underwent afib ablation with Dr Gonzales on 03/05/20.   Her echo from 09/29/20 showed her EF was 55%. Elevated RA pressures.   OV 1/11/2022 she reported she had retired recently.    Her echo 3/10/2022 demonstrated an LVEF of 50% with frequent premature beats throughout the study.   She was admitted for Tikosyn start 3/28/2022. She wore a 2 week cardiac event monitor 4/2022 that showed NSR with brief PAF, brief PAT, and brief NSVT.   OV, 7/19/2022,  She has been compliant with her CPAP. She is only using lasix as needed, which isn't very often.   OV 1/17/2023, she says she is feeling good. She says tikosyn is working really well for her.    OV, 8/1/2023, She reports she went in for her knee replacement and started having PVC's which put the procedure on hold.   On 10/10/2023 she has her left knee replaced.    OV, 11/7/2023, She reports she was switched from eliquis to warfarin. This was switched due to cost. She says she has worsening episodes of

## 2025-02-18 ENCOUNTER — NURSE ONLY (OUTPATIENT)
Dept: CARDIOLOGY CLINIC | Age: 70
End: 2025-02-18

## 2025-02-18 ENCOUNTER — OFFICE VISIT (OUTPATIENT)
Dept: CARDIOLOGY CLINIC | Age: 70
End: 2025-02-18
Payer: MEDICARE

## 2025-02-18 ENCOUNTER — OFFICE VISIT (OUTPATIENT)
Dept: CARDIOLOGY CLINIC | Age: 70
End: 2025-02-18

## 2025-02-18 VITALS
OXYGEN SATURATION: 95 % | HEIGHT: 65 IN | WEIGHT: 261.02 LBS | DIASTOLIC BLOOD PRESSURE: 68 MMHG | SYSTOLIC BLOOD PRESSURE: 124 MMHG | BODY MASS INDEX: 43.49 KG/M2 | HEART RATE: 70 BPM

## 2025-02-18 VITALS
BODY MASS INDEX: 43.49 KG/M2 | WEIGHT: 261 LBS | DIASTOLIC BLOOD PRESSURE: 68 MMHG | HEIGHT: 65 IN | SYSTOLIC BLOOD PRESSURE: 124 MMHG | HEART RATE: 70 BPM | OXYGEN SATURATION: 95 %

## 2025-02-18 DIAGNOSIS — I48.0 PAF (PAROXYSMAL ATRIAL FIBRILLATION) (HCC): Primary | ICD-10-CM

## 2025-02-18 DIAGNOSIS — I42.8 NON-ISCHEMIC CARDIOMYOPATHY (HCC): ICD-10-CM

## 2025-02-18 DIAGNOSIS — I35.0 AORTIC VALVE STENOSIS, ETIOLOGY OF CARDIAC VALVE DISEASE UNSPECIFIED: ICD-10-CM

## 2025-02-18 DIAGNOSIS — I49.5 SINUS NODE DYSFUNCTION (HCC): ICD-10-CM

## 2025-02-18 DIAGNOSIS — Z95.0 PACEMAKER: Primary | ICD-10-CM

## 2025-02-18 DIAGNOSIS — E78.5 HYPERLIPIDEMIA, UNSPECIFIED HYPERLIPIDEMIA TYPE: ICD-10-CM

## 2025-02-18 DIAGNOSIS — I10 ESSENTIAL HYPERTENSION: ICD-10-CM

## 2025-02-18 DIAGNOSIS — I50.22 CHRONIC SYSTOLIC HEART FAILURE (HCC): Primary | ICD-10-CM

## 2025-02-18 DIAGNOSIS — I48.0 PAF (PAROXYSMAL ATRIAL FIBRILLATION) (HCC): ICD-10-CM

## 2025-02-18 DIAGNOSIS — Z95.0 PACEMAKER: ICD-10-CM

## 2025-02-18 DIAGNOSIS — I42.9 CARDIOMYOPATHY, UNSPECIFIED TYPE (HCC): ICD-10-CM

## 2025-02-18 DIAGNOSIS — R00.1 BRADYCARDIA, UNSPECIFIED: ICD-10-CM

## 2025-02-18 DIAGNOSIS — Z79.899 MEDICATION MANAGEMENT: ICD-10-CM

## 2025-02-18 DIAGNOSIS — I49.3 PVC (PREMATURE VENTRICULAR CONTRACTION): ICD-10-CM

## 2025-02-18 PROCEDURE — 1159F MED LIST DOCD IN RCRD: CPT | Performed by: INTERNAL MEDICINE

## 2025-02-18 PROCEDURE — 3017F COLORECTAL CA SCREEN DOC REV: CPT | Performed by: INTERNAL MEDICINE

## 2025-02-18 PROCEDURE — 99214 OFFICE O/P EST MOD 30 MIN: CPT | Performed by: INTERNAL MEDICINE

## 2025-02-18 PROCEDURE — 1090F PRES/ABSN URINE INCON ASSESS: CPT | Performed by: INTERNAL MEDICINE

## 2025-02-18 PROCEDURE — G8399 PT W/DXA RESULTS DOCUMENT: HCPCS | Performed by: INTERNAL MEDICINE

## 2025-02-18 PROCEDURE — G8427 DOCREV CUR MEDS BY ELIG CLIN: HCPCS | Performed by: INTERNAL MEDICINE

## 2025-02-18 PROCEDURE — 3078F DIAST BP <80 MM HG: CPT | Performed by: INTERNAL MEDICINE

## 2025-02-18 PROCEDURE — 1036F TOBACCO NON-USER: CPT | Performed by: INTERNAL MEDICINE

## 2025-02-18 PROCEDURE — 3074F SYST BP LT 130 MM HG: CPT | Performed by: INTERNAL MEDICINE

## 2025-02-18 PROCEDURE — G2211 COMPLEX E/M VISIT ADD ON: HCPCS | Performed by: INTERNAL MEDICINE

## 2025-02-18 PROCEDURE — 1160F RVW MEDS BY RX/DR IN RCRD: CPT | Performed by: INTERNAL MEDICINE

## 2025-02-18 PROCEDURE — 1124F ACP DISCUSS-NO DSCNMKR DOCD: CPT | Performed by: INTERNAL MEDICINE

## 2025-02-18 PROCEDURE — G8417 CALC BMI ABV UP PARAM F/U: HCPCS | Performed by: INTERNAL MEDICINE

## 2025-02-18 RX ORDER — VALSARTAN 40 MG/1
20 TABLET ORAL DAILY
Qty: 45 TABLET | Refills: 3 | Status: SHIPPED | OUTPATIENT
Start: 2025-02-18

## 2025-02-18 RX ORDER — ATORVASTATIN CALCIUM 40 MG/1
TABLET, FILM COATED ORAL
Qty: 90 TABLET | Refills: 3 | Status: SHIPPED | OUTPATIENT
Start: 2025-02-18

## 2025-02-18 NOTE — PATIENT INSTRUCTIONS
Your provider has ordered testing for further evaluation.  An order/prescription has been included in your paper work.   To schedule outpatient testing, contact Central Scheduling by calling [employer] ([phone]).      Plan:  1. Restart valsartan 20 nightly   2. Labs now: iron, tibc, ferritin, A1c, cbc, cmp, bnp  3. Fasting Labs and echo in August before visit   Follow up in August

## 2025-02-18 NOTE — PROGRESS NOTES
[facility]   Electrophysiology Note              Date: 2/18/25  Patient Name: Reina Marin  YOB: 1955    Primary Care Physician: Mary Florentino PA    CHIEF COMPLAINT:   Chief Complaint   Patient presents with    3 Month Follow-Up    Atrial Fibrillation    Device Check    Bradycardia     HISTORY OF PRESENT ILLNESS: Reina Marin is a 69 y.o. female with a history of atrial fibrillation, NICM with recovered EF, nonobstructive CAD, chronic CHF, AS, hypothyroidism, breast cancer, and sleep apnea. She was admitted in 7/2019 with AF RVR and was started on flecainide. Echo showed an EF of 48-50%. Stress test was abnormal and LHC in 8/2019 showed nonobstructive CAD and EF 25-30%. Cardiac MRI 8/2019 showed an EF of 45%. AF was recurrent and Rythmol was started. In 3/2020 she had RFCA of PAF. Most recent echo in 9/2020 showed an EF of 55%. Has remained in sinus at subsequent visits.    At OV 3/22/2022, ECG demonstrated SB (59). She stated that she is doing ok. She had a couple of days of AF on and off a couple of weeks ago. She can tell when she is in AF. At the conclusion of 3/2022 OV, Rythmol was discontinued; patient underwent inpatient dofetilide loading 3/2022.      Interval History since last office visit: In 7/20/2023, patient presented for knee replacement surgery.  She was noted to have PVCs on ECG monitoring.  Surgery was canceled and electrophysiology was consulted.  IV amiodarone was started however this was stopped due to patient being on dofetilide.  She wore a one week event monitor that showed a 12% PVC burden. Most recent echo 8/2024 demonstrated an LVEF of 50%.              In 8/2024, Tikosyn was stopped due to prolonged QTc.  A few days later she was admitted with bradycardia and palpitations.  On 8/28/2024, she had a dual-chamber pacemaker with conduction system pacing lead implanted.  Tikosyn was resumed. She underwent successful PVC ablation 11/2024.    Today,

## 2025-02-18 NOTE — PATIENT INSTRUCTIONS
RECOMMENDATIONS:  We will change rate response today. Let us know how you are doing in 1 week.   -If this does not improve fatigue/CARRION, we will plan on decreasing metoprolol.   2. Continue remote device interrogations every 3 months.   3. Follow up with me in 6-8 weeks.

## 2025-02-28 ENCOUNTER — TELEPHONE (OUTPATIENT)
Dept: CARDIOLOGY CLINIC | Age: 70
End: 2025-02-28

## 2025-02-28 DIAGNOSIS — I48.0 PAF (PAROXYSMAL ATRIAL FIBRILLATION) (HCC): ICD-10-CM

## 2025-02-28 RX ORDER — METOPROLOL SUCCINATE 25 MG/1
12.5 TABLET, EXTENDED RELEASE ORAL DAILY
Qty: 45 TABLET | Refills: 0
Start: 2025-02-28

## 2025-02-28 NOTE — TELEPHONE ENCOUNTER
Pt states she was in office last week to have her device \"tweaked\". Pt states she doesn't feel any better after this. Pt states its not worse but not any better. Please advise.

## 2025-02-28 NOTE — TELEPHONE ENCOUNTER
It appears that rate response was increased on 02/18/2025. Per AGK recommendation, if fatigue and CARRION did not improve, he wanted to decrease Metoprolol. Patient currently taking Toprol 25 mg daily, please advise if okay to decrease to 12.5 mg daily.     We will change rate response today. Let us know how you are doing in 1 week.   -If this does not improve fatigue/CARRION, we will plan on decreasing metoprolol.

## 2025-02-28 NOTE — TELEPHONE ENCOUNTER
Kevin Day MD  [facility] Ep1 minute ago (12:12 PM)       Go ahead and decrease metoprolol   I spoke with the patient and relayed medication change. She V/U and will update the office in one week with how she feels.

## 2025-03-06 ENCOUNTER — NURSE ONLY (OUTPATIENT)
Dept: CARDIOLOGY CLINIC | Age: 70
End: 2025-03-06

## 2025-03-06 ENCOUNTER — HOSPITAL ENCOUNTER (OUTPATIENT)
Age: 70
Discharge: HOME | End: 2025-03-06
Payer: MEDICARE

## 2025-03-06 DIAGNOSIS — Z79.899 MEDICATION MANAGEMENT: ICD-10-CM

## 2025-03-06 DIAGNOSIS — R00.1 BRADYCARDIA, UNSPECIFIED: ICD-10-CM

## 2025-03-06 DIAGNOSIS — I10 ESSENTIAL HYPERTENSION: ICD-10-CM

## 2025-03-06 DIAGNOSIS — I42.8 NON-ISCHEMIC CARDIOMYOPATHY (HCC): ICD-10-CM

## 2025-03-06 DIAGNOSIS — R00.1 SYMPTOMATIC BRADYCARDIA: ICD-10-CM

## 2025-03-06 DIAGNOSIS — Z95.0 PACEMAKER: Primary | ICD-10-CM

## 2025-03-06 LAB
ALBUMIN SERPL-MCNC: 4.1 G/DL (ref 3.4–5)
ALBUMIN/GLOB SERPL: 1.6 {RATIO} (ref 1.1–2.2)
ALP SERPL-CCNC: 86 U/L (ref 40–129)
ALT SERPL-CCNC: 20 U/L (ref 10–40)
ANION GAP SERPL CALCULATED.3IONS-SCNC: 10 MMOL/L (ref 3–16)
AST SERPL-CCNC: 22 U/L (ref 15–37)
BASOPHILS # BLD: 0 K/UL (ref 0–0.2)
BASOPHILS NFR BLD: 0.6 %
BILIRUB SERPL-MCNC: 0.6 MG/DL (ref 0–1)
BUN SERPL-MCNC: 17 MG/DL (ref 7–20)
CALCIUM SERPL-MCNC: 9.7 MG/DL (ref 8.3–10.6)
CHLORIDE SERPL-SCNC: 101 MMOL/L (ref 99–110)
CO2 SERPL-SCNC: 25 MMOL/L (ref 21–32)
CREAT SERPL-MCNC: 0.9 MG/DL (ref 0.6–1.2)
DEPRECATED RDW RBC AUTO: 14 % (ref 12.4–15.4)
EOSINOPHIL # BLD: 0.1 K/UL (ref 0–0.6)
EOSINOPHIL NFR BLD: 1.5 %
FERRITIN SERPL IA-MCNC: 53 NG/ML (ref 15–150)
GFR SERPLBLD CREATININE-BSD FMLA CKD-EPI: 68 ML/MIN/{1.73_M2}
GLUCOSE SERPL-MCNC: 134 MG/DL (ref 70–99)
HCT VFR BLD AUTO: 44 % (ref 36–48)
HGB BLD-MCNC: 14.8 G/DL (ref 12–16)
IRON SATN MFR SERPL: 27 % (ref 15–50)
IRON SERPL-MCNC: 74 UG/DL (ref 37–145)
LYMPHOCYTES # BLD: 1.1 K/UL (ref 1–5.1)
LYMPHOCYTES NFR BLD: 20.4 %
MCH RBC QN AUTO: 30.5 PG (ref 26–34)
MCHC RBC AUTO-ENTMCNC: 33.5 G/DL (ref 31–36)
MCV RBC AUTO: 90.8 FL (ref 80–100)
MONOCYTES # BLD: 0.6 K/UL (ref 0–1.3)
MONOCYTES NFR BLD: 10.5 %
NEUTROPHILS # BLD: 3.7 K/UL (ref 1.7–7.7)
NEUTROPHILS NFR BLD: 67 %
NT-PROBNP SERPL-MCNC: 136 PG/ML (ref 0–124)
PLATELET # BLD AUTO: 206 K/UL (ref 135–450)
PMV BLD AUTO: 8.6 FL (ref 5–10.5)
POTASSIUM SERPL-SCNC: 4.1 MMOL/L (ref 3.5–5.1)
PROT SERPL-MCNC: 6.7 G/DL (ref 6.4–8.2)
RBC # BLD AUTO: 4.84 M/UL (ref 4–5.2)
SODIUM SERPL-SCNC: 136 MMOL/L (ref 136–145)
TIBC SERPL-MCNC: 273 UG/DL (ref 260–445)
WBC # BLD AUTO: 5.6 K/UL (ref 4–11)

## 2025-03-06 PROCEDURE — 83880 ASSAY OF NATRIURETIC PEPTIDE: CPT

## 2025-03-06 PROCEDURE — 82728 ASSAY OF FERRITIN: CPT

## 2025-03-06 PROCEDURE — 80053 COMPREHEN METABOLIC PANEL: CPT

## 2025-03-06 PROCEDURE — 36415 COLL VENOUS BLD VENIPUNCTURE: CPT

## 2025-03-06 PROCEDURE — 83540 ASSAY OF IRON: CPT

## 2025-03-06 PROCEDURE — 85025 COMPLETE CBC W/AUTO DIFF WBC: CPT

## 2025-03-06 PROCEDURE — 83550 IRON BINDING TEST: CPT

## 2025-03-12 ENCOUNTER — TRANSCRIBE ORDERS (OUTPATIENT)
Dept: ADMINISTRATIVE | Age: 70
End: 2025-03-12

## 2025-03-12 DIAGNOSIS — Z85.3 PERSONAL HISTORY OF BREAST CANCER: Primary | ICD-10-CM

## 2025-03-12 RX ORDER — LEVOTHYROXINE SODIUM 137 UG/1
137 TABLET ORAL DAILY
Qty: 90 TABLET | Refills: 1 | Status: SHIPPED | OUTPATIENT
Start: 2025-03-12

## 2025-03-12 NOTE — TELEPHONE ENCOUNTER
Refill Request     CONFIRM preferred pharmacy with the patient.    If Mail Order Rx - Pend for 90 day refill.      Last Seen: Last Seen Department: 11/18/2024  Last Seen by PCP: 11/18/2024    Last Written: 9/12/24  90 with 1 refill    If no future appointment scheduled:  Review the last OV with PCP and review information for follow-up visit,  Route STAFF MESSAGE with patient name to the  Pool for scheduling with the following information:            -  Timing of next visit           -  Visit type ie Physical, OV, etc           -  Diagnoses/Reason ie. COPD, HTN - Do not use MEDICATION, Follow-up or CHECK UP - Give reason for visit      Next Appointment:   Future Appointments   Date Time Provider Department Center   4/10/2025 11:45 AM SCHEDULE, [facility] DEVICE CHECK [facility]   4/10/2025 11:45 AM SARAH Harper Jr., MD [facility]   4/29/2025 10:30 AM Mary Florentino PA [facility]   5/30/2025 11:40 AM Josy Quiroga APRN - CNP [facility]   8/19/2025  9:00 AM [facility] CARDI ECHO 1 [facility]   8/19/2025 10:00 AM Brea Leos MD [facility]       Message sent to  to schedule appt with patient?  NO      Requested Prescriptions     Pending Prescriptions Disp Refills    levothyroxine (SYNTHROID) 137 MCG tablet [Pharmacy Med Name: LEVOTHYROXINE 137 MCG TABLET] 90 tablet 1     Sig: TAKE 1 TABLET BY MOUTH DAILY

## 2025-03-13 ENCOUNTER — RESULTS FOLLOW-UP (OUTPATIENT)
Dept: CARDIOLOGY CLINIC | Age: 70
End: 2025-03-13

## 2025-03-21 DIAGNOSIS — I50.22 CHRONIC SYSTOLIC HEART FAILURE (HCC): ICD-10-CM

## 2025-03-24 ENCOUNTER — HOSPITAL ENCOUNTER (OUTPATIENT)
Dept: NUCLEAR MEDICINE | Age: 70
Discharge: HOME | End: 2025-03-24
Payer: MEDICARE

## 2025-03-24 DIAGNOSIS — Z85.3 PERSONAL HISTORY OF BREAST CANCER: ICD-10-CM

## 2025-03-24 PROCEDURE — 78306 BONE IMAGING WHOLE BODY: CPT | Performed by: INTERNAL MEDICINE

## 2025-03-24 PROCEDURE — 3430000000 HC RX DIAGNOSTIC RADIOPHARMACEUTICAL: Performed by: INTERNAL MEDICINE

## 2025-03-24 PROCEDURE — A9503 TC99M MEDRONATE: HCPCS | Performed by: INTERNAL MEDICINE

## 2025-03-24 RX ORDER — TC 99M MEDRONATE 20 MG/10ML
26 INJECTION, POWDER, LYOPHILIZED, FOR SOLUTION INTRAVENOUS
Status: COMPLETED | OUTPATIENT
Start: 2025-03-24 | End: 2025-03-24

## 2025-03-24 RX ORDER — SPIRONOLACTONE 25 MG/1
25 TABLET ORAL DAILY
Qty: 90 TABLET | Refills: 3 | Status: SHIPPED | OUTPATIENT
Start: 2025-03-24

## 2025-03-24 RX ADMIN — TC 99M MEDRONATE 26 MILLICURIE: 20 INJECTION, POWDER, LYOPHILIZED, FOR SOLUTION INTRAVENOUS at 09:05

## 2025-03-26 DIAGNOSIS — F33.0 MILD EPISODE OF RECURRENT MAJOR DEPRESSIVE DISORDER: ICD-10-CM

## 2025-03-26 RX ORDER — CITALOPRAM HYDROBROMIDE 40 MG/1
40 TABLET ORAL DAILY
Qty: 90 TABLET | Refills: 1 | Status: SHIPPED | OUTPATIENT
Start: 2025-03-26

## 2025-03-26 NOTE — TELEPHONE ENCOUNTER
Refill Request     CONFIRM preferred pharmacy with the patient.    If Mail Order Rx - Pend for 90 day refill.      Last Seen: Last Seen Department: 11/18/2024  Last Seen by PCP: 11/18/2024    Last Written: 9/30/24 90 with 1 refill     If no future appointment scheduled:  Review the last OV with PCP and review information for follow-up visit,  Route STAFF MESSAGE with patient name to the  Pool for scheduling with the following information:            -  Timing of next visit           -  Visit type ie Physical, OV, etc           -  Diagnoses/Reason ie. COPD, HTN - Do not use MEDICATION, Follow-up or CHECK UP - Give reason for visit      Next Appointment:   Future Appointments   Date Time Provider Department Center   4/10/2025 11:45 AM SCHEDULE, [facility] DEVICE CHECK [facility]   4/10/2025 11:45 AM SARAH Harper Jr., MD [facility]   4/29/2025 10:30 AM Mary Florentino PA [facility]   5/30/2025 11:40 AM Josy Quiroga APRN - CNP [facility]   8/19/2025  9:00 AM [facility] CARDI ECHO 1 [facility]   8/19/2025 10:00 AM Brea Leos MD [facility]       Message sent to  to schedule appt with patient?  NO      Requested Prescriptions     Pending Prescriptions Disp Refills    citalopram (CELEXA) 40 MG tablet [Pharmacy Med Name: CITALOPRAM HBR 40 MG TABLET] 90 tablet 1     Sig: TAKE 1 TABLET BY MOUTH DAILY

## 2025-04-10 ENCOUNTER — CLINICAL SUPPORT (OUTPATIENT)
Dept: CARDIOLOGY CLINIC | Age: 70
End: 2025-04-10

## 2025-04-10 ENCOUNTER — OFFICE VISIT (OUTPATIENT)
Dept: CARDIOLOGY CLINIC | Age: 70
End: 2025-04-10
Payer: MEDICARE

## 2025-04-10 VITALS
DIASTOLIC BLOOD PRESSURE: 70 MMHG | OXYGEN SATURATION: 97 % | SYSTOLIC BLOOD PRESSURE: 122 MMHG | HEART RATE: 60 BPM | WEIGHT: 257 LBS | BODY MASS INDEX: 42.82 KG/M2 | HEIGHT: 65 IN

## 2025-04-10 DIAGNOSIS — I48.0 PAF (PAROXYSMAL ATRIAL FIBRILLATION) (HCC): Primary | ICD-10-CM

## 2025-04-10 DIAGNOSIS — I42.8 NON-ISCHEMIC CARDIOMYOPATHY (HCC): ICD-10-CM

## 2025-04-10 DIAGNOSIS — Z95.0 PACEMAKER: Primary | ICD-10-CM

## 2025-04-10 DIAGNOSIS — I49.3 PVC (PREMATURE VENTRICULAR CONTRACTION): ICD-10-CM

## 2025-04-10 DIAGNOSIS — R00.1 SYMPTOMATIC BRADYCARDIA: ICD-10-CM

## 2025-04-10 LAB
EKG ATRIAL RATE: 60 BPM
EKG DIAGNOSIS: NORMAL
EKG P-R INTERVAL: 186 MS
EKG Q-T INTERVAL: 410 MS
EKG QRS DURATION: 108 MS
EKG QTC CALCULATION (BAZETT): 410 MS
EKG R AXIS: -6 DEGREES
EKG T AXIS: -11 DEGREES
EKG VENTRICULAR RATE: 60 BPM

## 2025-04-10 PROCEDURE — 99214 OFFICE O/P EST MOD 30 MIN: CPT | Performed by: INTERNAL MEDICINE

## 2025-04-10 PROCEDURE — G8399 PT W/DXA RESULTS DOCUMENT: HCPCS | Performed by: INTERNAL MEDICINE

## 2025-04-10 PROCEDURE — 1124F ACP DISCUSS-NO DSCNMKR DOCD: CPT | Performed by: INTERNAL MEDICINE

## 2025-04-10 PROCEDURE — 3017F COLORECTAL CA SCREEN DOC REV: CPT | Performed by: INTERNAL MEDICINE

## 2025-04-10 PROCEDURE — 3074F SYST BP LT 130 MM HG: CPT | Performed by: INTERNAL MEDICINE

## 2025-04-10 PROCEDURE — G8417 CALC BMI ABV UP PARAM F/U: HCPCS | Performed by: INTERNAL MEDICINE

## 2025-04-10 PROCEDURE — 3078F DIAST BP <80 MM HG: CPT | Performed by: INTERNAL MEDICINE

## 2025-04-10 PROCEDURE — G8427 DOCREV CUR MEDS BY ELIG CLIN: HCPCS | Performed by: INTERNAL MEDICINE

## 2025-04-10 PROCEDURE — 1036F TOBACCO NON-USER: CPT | Performed by: INTERNAL MEDICINE

## 2025-04-10 PROCEDURE — 1090F PRES/ABSN URINE INCON ASSESS: CPT | Performed by: INTERNAL MEDICINE

## 2025-04-10 RX ORDER — METOPROLOL SUCCINATE 25 MG/1
25 TABLET, EXTENDED RELEASE ORAL DAILY
Qty: 90 TABLET | Refills: 3 | Status: SHIPPED | OUTPATIENT
Start: 2025-04-10

## 2025-04-10 NOTE — PATIENT INSTRUCTIONS
RECOMMENDATIONS:  Remote device interrogations every 3 months.   Continue cardiac medications as prescribed.   Follow up with EP NP in 6 months.

## 2025-04-10 NOTE — PROGRESS NOTES
fibrillation, history of non-ischemic cardiomyopathy, hypertension, chronic renal insufficiency, and hypothyroidism who presents from home to [facility].  Patient's been doing well.  She is home propafenone however her QRS duration is increasing.  She also has a history of nonischemic cardiomyopathy.  Because of this I like to stop her propafenone and start her on a class III antiarrhythmic.  Patient agrees with plan.  We will have her stop her propafenone and schedule a dofetilide admission.  - Stop propafenone.  - Schedule dofetilide admission.  - 2-week monitor after discharge.  - Continue metoprolol.  - continue apixaban.  - Follow up after admission.    2/18/2025 - 04/10/2025  No recurrent atrial fibrillation on her pacemaker.  No changes recommended.  Continue dofetilide.  - Continue metoprolol succinate 25 mg daily.  - Continue dofetilide 500 mcg BID ( msec).  - Continue apixaban 5 mg BID.    2. Premature ventricular contractions  02/18/2024  Patient has a history of premature ventricular contractions.  She underwent a PVC ablation on 10/10/2024 for left ventricular summit PVC.  Post PVC ablation patient PVC burden significantly decreased.  She denies any symptoms consistent with her PVCs.  EKG today is reassuring.  - Continue to monitor clinically.    4/10/2025  Patient presents for follow up.  PVCs have improved.    - Continue to monitor clinically.    3. Sick sinus syndrome.  02/18/2024  Patient underwent dual-chamber pacemaker implantation for sick sinus syndrome/tachybradycardia syndrome.  Device function within normal limits.  She reports some dyspnea on exertion so we will increase her rate response.  If this does not improve her symptoms we will transition her off her metoprolol and see how she does in favor of bisoprolol or atenolol.  - Increase rate response.  - Low threshold to decrease or transition off metoprolol.    4/10/2025  Patient feels improved with increase in rate response.  No

## 2025-04-14 ENCOUNTER — TELEPHONE (OUTPATIENT)
Dept: CARDIOLOGY CLINIC | Age: 70
End: 2025-04-14

## 2025-04-14 NOTE — TELEPHONE ENCOUNTER
I spoke with the patient and informed her that bedside monitor does not have to be taken with her for short duration. She V/U.

## 2025-04-14 NOTE — TELEPHONE ENCOUNTER
Pt called to report that she has both a pacemaker and a monitor. Pt is inquiring if she is to take the monitor with her on her vacation. Pt reports that she will be on vacation from Thursday-Sunday. Please advise.     Best number to call is [phone].

## 2025-04-16 ENCOUNTER — TELEPHONE (OUTPATIENT)
Dept: CARDIOLOGY CLINIC | Age: 70
End: 2025-04-16

## 2025-04-16 DIAGNOSIS — I50.22 CHRONIC SYSTOLIC HEART FAILURE (HCC): ICD-10-CM

## 2025-04-16 NOTE — TELEPHONE ENCOUNTER
Medication Refill    Medication needing refilled:  empagliflozin (JARDIANCE) 10 MG tablet     Dosage of the medication:  10mg    How are you taking this medication (QD, BID, TID, QID, PRN): take one tablet by mouth daily.    30 or 90 day supply called in: 90 day    When will you run out of your medication:  One week left    Which Pharmacy are we sending the medication to?:  [facility]  [address], [place] [address]   (Incorrect medication is on the order as pt would like refills to be sent to the [facility]    NOV 8/19/25 NEFTALI

## 2025-04-23 ENCOUNTER — OFFICE VISIT (OUTPATIENT)
Dept: FAMILY MEDICINE CLINIC | Age: 70
End: 2025-04-23
Payer: MEDICARE

## 2025-04-23 VITALS
HEART RATE: 60 BPM | BODY MASS INDEX: 42.67 KG/M2 | OXYGEN SATURATION: 97 % | DIASTOLIC BLOOD PRESSURE: 62 MMHG | WEIGHT: 256.4 LBS | SYSTOLIC BLOOD PRESSURE: 90 MMHG

## 2025-04-23 DIAGNOSIS — Z91.81 AT HIGH RISK FOR FALLS: ICD-10-CM

## 2025-04-23 DIAGNOSIS — R10.84 GENERALIZED ABDOMINAL PAIN: ICD-10-CM

## 2025-04-23 DIAGNOSIS — R19.7 DIARRHEA OF PRESUMED INFECTIOUS ORIGIN: Primary | ICD-10-CM

## 2025-04-23 DIAGNOSIS — Z86.73 HISTORY OF STROKE: ICD-10-CM

## 2025-04-23 DIAGNOSIS — R19.7 DIARRHEA OF PRESUMED INFECTIOUS ORIGIN: ICD-10-CM

## 2025-04-23 LAB
ALBUMIN SERPL-MCNC: 4.3 G/DL (ref 3.4–5)
ALBUMIN/GLOB SERPL: 1.7 {RATIO} (ref 1.1–2.2)
ALP SERPL-CCNC: 102 U/L (ref 40–129)
ALT SERPL-CCNC: 21 U/L (ref 10–40)
ANION GAP SERPL CALCULATED.3IONS-SCNC: 9 MMOL/L (ref 3–16)
AST SERPL-CCNC: 18 U/L (ref 15–37)
BASOPHILS # BLD: 0 K/UL (ref 0–0.2)
BASOPHILS NFR BLD: 0.5 %
BILIRUB SERPL-MCNC: 0.5 MG/DL (ref 0–1)
BUN SERPL-MCNC: 25 MG/DL (ref 7–20)
CALCIUM SERPL-MCNC: 9.9 MG/DL (ref 8.3–10.6)
CHLORIDE SERPL-SCNC: 102 MMOL/L (ref 99–110)
CO2 SERPL-SCNC: 28 MMOL/L (ref 21–32)
CREAT SERPL-MCNC: 0.9 MG/DL (ref 0.6–1.2)
DEPRECATED RDW RBC AUTO: 14.1 % (ref 12.4–15.4)
EOSINOPHIL # BLD: 0.1 K/UL (ref 0–0.6)
EOSINOPHIL NFR BLD: 2.2 %
GFR SERPLBLD CREATININE-BSD FMLA CKD-EPI: 68 ML/MIN/{1.73_M2}
GLUCOSE SERPL-MCNC: 126 MG/DL (ref 70–99)
HCT VFR BLD AUTO: 42.3 % (ref 36–48)
HGB BLD-MCNC: 14.5 G/DL (ref 12–16)
LYMPHOCYTES # BLD: 1.1 K/UL (ref 1–5.1)
LYMPHOCYTES NFR BLD: 19.4 %
MCH RBC QN AUTO: 30.4 PG (ref 26–34)
MCHC RBC AUTO-ENTMCNC: 34.1 G/DL (ref 31–36)
MCV RBC AUTO: 89.1 FL (ref 80–100)
MONOCYTES # BLD: 0.6 K/UL (ref 0–1.3)
MONOCYTES NFR BLD: 10.7 %
NEUTROPHILS # BLD: 3.7 K/UL (ref 1.7–7.7)
NEUTROPHILS NFR BLD: 67.2 %
PLATELET # BLD AUTO: 233 K/UL (ref 135–450)
PMV BLD AUTO: 8.7 FL (ref 5–10.5)
POTASSIUM SERPL-SCNC: 4.4 MMOL/L (ref 3.5–5.1)
PROT SERPL-MCNC: 6.8 G/DL (ref 6.4–8.2)
RBC # BLD AUTO: 4.75 M/UL (ref 4–5.2)
SODIUM SERPL-SCNC: 139 MMOL/L (ref 136–145)
WBC # BLD AUTO: 5.5 K/UL (ref 4–11)

## 2025-04-23 PROCEDURE — 1090F PRES/ABSN URINE INCON ASSESS: CPT | Performed by: PHYSICIAN ASSISTANT

## 2025-04-23 PROCEDURE — 1159F MED LIST DOCD IN RCRD: CPT | Performed by: PHYSICIAN ASSISTANT

## 2025-04-23 PROCEDURE — G8427 DOCREV CUR MEDS BY ELIG CLIN: HCPCS | Performed by: PHYSICIAN ASSISTANT

## 2025-04-23 PROCEDURE — G2211 COMPLEX E/M VISIT ADD ON: HCPCS | Performed by: PHYSICIAN ASSISTANT

## 2025-04-23 PROCEDURE — 3078F DIAST BP <80 MM HG: CPT | Performed by: PHYSICIAN ASSISTANT

## 2025-04-23 PROCEDURE — G8399 PT W/DXA RESULTS DOCUMENT: HCPCS | Performed by: PHYSICIAN ASSISTANT

## 2025-04-23 PROCEDURE — 3017F COLORECTAL CA SCREEN DOC REV: CPT | Performed by: PHYSICIAN ASSISTANT

## 2025-04-23 PROCEDURE — 1124F ACP DISCUSS-NO DSCNMKR DOCD: CPT | Performed by: PHYSICIAN ASSISTANT

## 2025-04-23 PROCEDURE — 3074F SYST BP LT 130 MM HG: CPT | Performed by: PHYSICIAN ASSISTANT

## 2025-04-23 PROCEDURE — 1036F TOBACCO NON-USER: CPT | Performed by: PHYSICIAN ASSISTANT

## 2025-04-23 PROCEDURE — 99213 OFFICE O/P EST LOW 20 MIN: CPT | Performed by: PHYSICIAN ASSISTANT

## 2025-04-23 PROCEDURE — G8417 CALC BMI ABV UP PARAM F/U: HCPCS | Performed by: PHYSICIAN ASSISTANT

## 2025-04-23 ASSESSMENT — PATIENT HEALTH QUESTIONNAIRE - PHQ9
9. THOUGHTS THAT YOU WOULD BE BETTER OFF DEAD, OR OF HURTING YOURSELF: NOT AT ALL
8. MOVING OR SPEAKING SO SLOWLY THAT OTHER PEOPLE COULD HAVE NOTICED. OR THE OPPOSITE, BEING SO FIGETY OR RESTLESS THAT YOU HAVE BEEN MOVING AROUND A LOT MORE THAN USUAL: NOT AT ALL
SUM OF ALL RESPONSES TO PHQ QUESTIONS 1-9: 0
7. TROUBLE CONCENTRATING ON THINGS, SUCH AS READING THE NEWSPAPER OR WATCHING TELEVISION: NOT AT ALL
5. POOR APPETITE OR OVEREATING: NOT AT ALL
3. TROUBLE FALLING OR STAYING ASLEEP: NOT AT ALL
2. FEELING DOWN, DEPRESSED OR HOPELESS: NOT AT ALL
SUM OF ALL RESPONSES TO PHQ QUESTIONS 1-9: 0
4. FEELING TIRED OR HAVING LITTLE ENERGY: NOT AT ALL
10. IF YOU CHECKED OFF ANY PROBLEMS, HOW DIFFICULT HAVE THESE PROBLEMS MADE IT FOR YOU TO DO YOUR WORK, TAKE CARE OF THINGS AT HOME, OR GET ALONG WITH OTHER PEOPLE: NOT DIFFICULT AT ALL
1. LITTLE INTEREST OR PLEASURE IN DOING THINGS: NOT AT ALL

## 2025-04-23 ASSESSMENT — ENCOUNTER SYMPTOMS
CONSTIPATION: 0
VOMITING: 0
DIARRHEA: 1
ABDOMINAL PAIN: 1
NAUSEA: 0
ABDOMINAL DISTENTION: 1

## 2025-04-23 NOTE — PROGRESS NOTES
Reina Marin (:  1955) is a 69 y.o. female,Established patient, here for evaluation of the following chief complaint(s):  Diarrhea (X1 month, watery, abdominal cramping, after going feels so full that it hurts. Had tried some nutmeg, did help at first, but then stopped. ) and Heartburn         Assessment & Plan  Diarrhea of presumed infectious origin    Seems to be infectious given her 's reoccurring diarrhea. We will get c diff today and labs. Insurance will not cover bacterial gi pathogens. She would like to see if these other tests are positive before getting this test as it's cost is an estimated 1100.00. pt has follow up for diabetes in two weeks    Orders:    Comprehensive Metabolic Panel; Future    CBC with Auto Differential; Future    C DIFF TOXIN/ANTIGEN; Future    At high risk for falls   Chronic, at goal (stable), make sure area at home is clear of clutter on the floor         Generalized abdominal pain    Rule out c diff    Orders:    C DIFF TOXIN/ANTIGEN; Future      No follow-ups on file.       Subjective   HPI  Diarrhea  Timing: waxing and waning over the last month  Characteristics of bowel movements: watery  Associated symptoms: abdominal cramping, bloating, sulfur burping  Denies: blood or mucous in her stool, fever, pale stool  Tx: nutmeg, pepto bismol  No recent changes in medication   is getting the same symptoms as well    Review of Systems   Constitutional:  Negative for diaphoresis and fever.   Gastrointestinal:  Positive for abdominal distention, abdominal pain and diarrhea. Negative for constipation, nausea and vomiting.        Increased reflux   Neurological:  Negative for dizziness, light-headedness and headaches.          Objective   Physical Exam  Vitals reviewed.   Constitutional:       Appearance: Normal appearance. She is obese.   HENT:      Head: Normocephalic and atraumatic.   Cardiovascular:      Rate and Rhythm: Normal rate and regular rhythm.

## 2025-04-24 ENCOUNTER — RESULTS FOLLOW-UP (OUTPATIENT)
Dept: FAMILY MEDICINE CLINIC | Age: 70
End: 2025-04-24

## 2025-04-25 DIAGNOSIS — R19.7 DIARRHEA OF PRESUMED INFECTIOUS ORIGIN: ICD-10-CM

## 2025-04-25 DIAGNOSIS — R10.84 GENERALIZED ABDOMINAL PAIN: ICD-10-CM

## 2025-04-28 ENCOUNTER — RESULTS FOLLOW-UP (OUTPATIENT)
Dept: FAMILY MEDICINE CLINIC | Age: 70
End: 2025-04-28

## 2025-04-28 DIAGNOSIS — R19.7 DIARRHEA OF PRESUMED INFECTIOUS ORIGIN: Primary | ICD-10-CM

## 2025-04-28 LAB
C DIFF TOX A+B STL QL IA: NORMAL
REASON FOR REJECTION: NORMAL
REJECTED TEST: NORMAL

## 2025-04-28 NOTE — TELEPHONE ENCOUNTER
Lab calling to report stool sample was left in the lab all weekend and not able to be completed now.   Patient notified and voiced she will drop off a new sample at the hospital.

## 2025-05-08 ENCOUNTER — OFFICE VISIT (OUTPATIENT)
Dept: FAMILY MEDICINE CLINIC | Age: 70
End: 2025-05-08
Payer: MEDICARE

## 2025-05-08 VITALS
SYSTOLIC BLOOD PRESSURE: 110 MMHG | BODY MASS INDEX: 42.15 KG/M2 | HEIGHT: 65 IN | WEIGHT: 253 LBS | HEART RATE: 64 BPM | DIASTOLIC BLOOD PRESSURE: 80 MMHG | OXYGEN SATURATION: 97 %

## 2025-05-08 DIAGNOSIS — E11.9 TYPE 2 DIABETES MELLITUS WITHOUT COMPLICATION, WITHOUT LONG-TERM CURRENT USE OF INSULIN (HCC): ICD-10-CM

## 2025-05-08 DIAGNOSIS — E66.813 OBESITY, CLASS 3 (HCC): ICD-10-CM

## 2025-05-08 DIAGNOSIS — E03.9 ACQUIRED HYPOTHYROIDISM: Chronic | ICD-10-CM

## 2025-05-08 DIAGNOSIS — F33.0 MILD EPISODE OF RECURRENT MAJOR DEPRESSIVE DISORDER: ICD-10-CM

## 2025-05-08 DIAGNOSIS — Z12.31 ENCOUNTER FOR SCREENING MAMMOGRAM FOR MALIGNANT NEOPLASM OF BREAST: ICD-10-CM

## 2025-05-08 DIAGNOSIS — Z00.00 MEDICARE ANNUAL WELLNESS VISIT, SUBSEQUENT: Primary | ICD-10-CM

## 2025-05-08 PROBLEM — Z96.652 S/P TOTAL KNEE ARTHROPLASTY, LEFT: Status: RESOLVED | Noted: 2023-10-10 | Resolved: 2025-05-08

## 2025-05-08 PROBLEM — I49.9 ARRHYTHMIA: Status: RESOLVED | Noted: 2024-08-24 | Resolved: 2025-05-08

## 2025-05-08 PROBLEM — I61.9 CVA (CEREBROVASCULAR ACCIDENT DUE TO INTRACEREBRAL HEMORRHAGE) (HCC): Status: RESOLVED | Noted: 2024-11-09 | Resolved: 2025-05-08

## 2025-05-08 PROBLEM — R00.1 BRADYCARDIA: Status: RESOLVED | Noted: 2024-08-24 | Resolved: 2025-05-08

## 2025-05-08 PROBLEM — G45.3 AMAUROSIS FUGAX OF RIGHT EYE: Status: RESOLVED | Noted: 2023-11-07 | Resolved: 2025-05-08

## 2025-05-08 PROBLEM — G45.9 TIA (TRANSIENT ISCHEMIC ATTACK): Status: RESOLVED | Noted: 2024-11-09 | Resolved: 2025-05-08

## 2025-05-08 LAB — HBA1C MFR BLD: 5.8 %

## 2025-05-08 PROCEDURE — 3017F COLORECTAL CA SCREEN DOC REV: CPT | Performed by: PHYSICIAN ASSISTANT

## 2025-05-08 PROCEDURE — 3079F DIAST BP 80-89 MM HG: CPT | Performed by: PHYSICIAN ASSISTANT

## 2025-05-08 PROCEDURE — 1159F MED LIST DOCD IN RCRD: CPT | Performed by: PHYSICIAN ASSISTANT

## 2025-05-08 PROCEDURE — 3074F SYST BP LT 130 MM HG: CPT | Performed by: PHYSICIAN ASSISTANT

## 2025-05-08 PROCEDURE — 1124F ACP DISCUSS-NO DSCNMKR DOCD: CPT | Performed by: PHYSICIAN ASSISTANT

## 2025-05-08 PROCEDURE — 83036 HEMOGLOBIN GLYCOSYLATED A1C: CPT | Performed by: PHYSICIAN ASSISTANT

## 2025-05-08 PROCEDURE — 3044F HG A1C LEVEL LT 7.0%: CPT | Performed by: PHYSICIAN ASSISTANT

## 2025-05-08 PROCEDURE — G0439 PPPS, SUBSEQ VISIT: HCPCS | Performed by: PHYSICIAN ASSISTANT

## 2025-05-08 ASSESSMENT — PATIENT HEALTH QUESTIONNAIRE - PHQ9
DEPRESSION UNABLE TO ASSESS: FUNCTIONAL CAPACITY MOTIVATION LIMITS ACCURACY
10. IF YOU CHECKED OFF ANY PROBLEMS, HOW DIFFICULT HAVE THESE PROBLEMS MADE IT FOR YOU TO DO YOUR WORK, TAKE CARE OF THINGS AT HOME, OR GET ALONG WITH OTHER PEOPLE: NOT DIFFICULT AT ALL
SUM OF ALL RESPONSES TO PHQ QUESTIONS 1-9: 0
9. THOUGHTS THAT YOU WOULD BE BETTER OFF DEAD, OR OF HURTING YOURSELF: NOT AT ALL
SUM OF ALL RESPONSES TO PHQ QUESTIONS 1-9: 0
5. POOR APPETITE OR OVEREATING: NOT AT ALL
6. FEELING BAD ABOUT YOURSELF - OR THAT YOU ARE A FAILURE OR HAVE LET YOURSELF OR YOUR FAMILY DOWN: NOT AT ALL
8. MOVING OR SPEAKING SO SLOWLY THAT OTHER PEOPLE COULD HAVE NOTICED. OR THE OPPOSITE, BEING SO FIGETY OR RESTLESS THAT YOU HAVE BEEN MOVING AROUND A LOT MORE THAN USUAL: NOT AT ALL
SUM OF ALL RESPONSES TO PHQ QUESTIONS 1-9: 0
7. TROUBLE CONCENTRATING ON THINGS, SUCH AS READING THE NEWSPAPER OR WATCHING TELEVISION: NOT AT ALL
1. LITTLE INTEREST OR PLEASURE IN DOING THINGS: NOT AT ALL
2. FEELING DOWN, DEPRESSED OR HOPELESS: NOT AT ALL
SUM OF ALL RESPONSES TO PHQ QUESTIONS 1-9: 0
4. FEELING TIRED OR HAVING LITTLE ENERGY: NOT AT ALL
3. TROUBLE FALLING OR STAYING ASLEEP: NOT AT ALL

## 2025-05-08 NOTE — PATIENT INSTRUCTIONS
[email or web address] and enter R264 to learn more about \"Advance Directives: Care Instructions.\"  Current as of: March 1, 2024  Content Version: 14.4  © 9715-2473 [employer]   Care instructions adapted under license by [employer]. If you have questions about a medical condition or this instruction, always ask your healthcare professional. [employer] disclaims any warranty or liability for your use of this information.         A Healthy Heart: Care Instructions  Overview     Coronary artery disease, also called heart disease, occurs when a substance called plaque builds up in the vessels that supply oxygen-rich blood to your heart muscle. This can narrow the blood vessels and reduce blood flow. A heart attack happens when blood flow is completely blocked. A high-fat diet, smoking, and other factors increase the risk of heart disease.  Your doctor has found that you have a chance of having heart disease. A heart-healthy lifestyle can help keep your heart healthy and prevent heart disease. This lifestyle includes eating healthy, being active, staying at a weight that's healthy for you, and not smoking or using tobacco. It also includes taking medicines as directed, managing other health conditions, and trying to get a healthy amount of sleep.  Follow-up care is a key part of your treatment and safety. Be sure to make and go to all appointments, and call your doctor if you are having problems. It's also a good idea to know your test results and keep a list of the medicines you take.  How can you care for yourself at home?  Diet    Use less salt when you cook and eat. This helps lower your blood pressure. Taste food before salting. Add only a little salt when you think you need it. With time, your taste buds will adjust to less salt.     Eat fewer snack items, fast foods, canned soups, and other high-salt, high-fat, processed foods.     Read food labels and try to avoid

## 2025-05-08 NOTE — PROGRESS NOTES
Medicare Annual Wellness Visit    Reina Marin is here for Medicare AWV and Diabetes    Assessment & Plan   Medicare annual wellness visit, subsequent      -  reviewed age appropriate immunizations and cancer screenings  Type 2 diabetes mellitus without complication, without long-term current use of insulin (HCC)  -     POCT glycosylated hemoglobin (Hb A1C): improved at 5.9%. she has been in prediabetic range for over a year. We will adjust diagnosis  -     Albumin/Creatinine Ratio, Urine  -      DIABETES FOOT EXAM: normal  Obesity, class 3 (E66.813)        -  continue to work on increasing exercise with a goal of 150 minutes per week  Body mass index [BMI] 40.0-44.9, adult (Z68.41)             -  continue to work on increasing exercise with a goal of 150 minutes per week  Encounter for screening mammogram for malignant neoplasm of breast  -     [facility] CHANTAL DIGITAL SCREEN BILATERAL; Future  Acquired hypothyroidism       -   chronic, at goal, continue with levothyroxine, follow up in 6 months  Mild episode of recurrent major depressive disorder       -   well controlled with celexa, follow up in 6 months     Return in about 6 months (around 11/8/2025) for DM.     Subjective   The following acute and/or chronic problems were also addressed today:  Hypothyroidism:  Current medication: levothyroxine  Side effects: none  S/s of abnormal thyroid: fatigue     Allergies:  Current medication: Singulair  Side effects: none  Having rhinorrhea     Mood:  Current medication: celexa 40 mg  Side effects: none  Residual symptoms: none  Denies: SI/HI, irritability     GERD:  Current medication: prilosec  Side effects: none  Residual symptoms:none  Denies: dysphagia, breakthrough symptoms     OAB:  Current medication: ditropan  Side effects: none  Residual symptoms: working well     Diabetes:  Diet controlled  Diet: intermittent fasting, portion control, occasional processed, vegetables in most days  Exercise: physical therapy

## 2025-05-09 ENCOUNTER — RESULTS FOLLOW-UP (OUTPATIENT)
Dept: FAMILY MEDICINE CLINIC | Age: 70
End: 2025-05-09

## 2025-05-09 DIAGNOSIS — Z85.3 PERSONAL HISTORY OF MALIGNANT NEOPLASM OF BREAST: ICD-10-CM

## 2025-05-09 DIAGNOSIS — R92.333 HETEROGENEOUSLY DENSE TISSUE OF BOTH BREASTS ON MAMMOGRAPHY: Primary | ICD-10-CM

## 2025-05-09 LAB
CREAT UR-MCNC: 111 MG/DL (ref 28–259)
MICROALBUMIN UR DL<=1MG/L-MCNC: <1.2 MG/DL
MICROALBUMIN/CREAT UR: NORMAL MG/G (ref 0–30)

## 2025-05-13 ENCOUNTER — HOSPITAL ENCOUNTER (OUTPATIENT)
Dept: WOMENS IMAGING | Age: 70
Discharge: HOME | End: 2025-05-13
Payer: MEDICARE

## 2025-05-13 VITALS — BODY MASS INDEX: 40.18 KG/M2 | WEIGHT: 250 LBS | HEIGHT: 66 IN

## 2025-05-13 DIAGNOSIS — Z12.31 ENCOUNTER FOR SCREENING MAMMOGRAM FOR MALIGNANT NEOPLASM OF BREAST: ICD-10-CM

## 2025-05-13 PROCEDURE — 77063 BREAST TOMOSYNTHESIS BI: CPT

## 2025-05-13 RX ORDER — PREDNISONE 50 MG/1
50 TABLET ORAL EVERY 6 HOURS
Qty: 3 TABLET | Refills: 0 | Status: SHIPPED | OUTPATIENT
Start: 2025-05-13 | End: 2025-05-14

## 2025-05-13 RX ORDER — DIPHENHYDRAMINE HCL 25 MG
50 CAPSULE ORAL ONCE
Qty: 2 CAPSULE | Refills: 0 | Status: SHIPPED | OUTPATIENT
Start: 2025-05-13 | End: 2025-05-13

## 2025-05-30 ENCOUNTER — TELEPHONE (OUTPATIENT)
Dept: PULMONOLOGY | Age: 70
End: 2025-05-30

## 2025-05-30 ENCOUNTER — TELEMEDICINE (OUTPATIENT)
Dept: PULMONOLOGY | Age: 70
End: 2025-05-30
Payer: MEDICARE

## 2025-05-30 DIAGNOSIS — G47.33 SEVERE OBSTRUCTIVE SLEEP APNEA: Primary | ICD-10-CM

## 2025-05-30 DIAGNOSIS — E66.01 OBESITY, MORBID, BMI 40.0-49.9 (HCC): ICD-10-CM

## 2025-05-30 DIAGNOSIS — I10 PRIMARY HYPERTENSION: ICD-10-CM

## 2025-05-30 DIAGNOSIS — Z72.821 POOR SLEEP HYGIENE: ICD-10-CM

## 2025-05-30 DIAGNOSIS — F51.04 PSYCHOPHYSIOLOGICAL INSOMNIA: ICD-10-CM

## 2025-05-30 DIAGNOSIS — Z71.89 CPAP USE COUNSELING: ICD-10-CM

## 2025-05-30 PROCEDURE — 1160F RVW MEDS BY RX/DR IN RCRD: CPT | Performed by: NURSE PRACTITIONER

## 2025-05-30 PROCEDURE — 1090F PRES/ABSN URINE INCON ASSESS: CPT | Performed by: NURSE PRACTITIONER

## 2025-05-30 PROCEDURE — 1159F MED LIST DOCD IN RCRD: CPT | Performed by: NURSE PRACTITIONER

## 2025-05-30 PROCEDURE — 1124F ACP DISCUSS-NO DSCNMKR DOCD: CPT | Performed by: NURSE PRACTITIONER

## 2025-05-30 PROCEDURE — G8399 PT W/DXA RESULTS DOCUMENT: HCPCS | Performed by: NURSE PRACTITIONER

## 2025-05-30 PROCEDURE — G8427 DOCREV CUR MEDS BY ELIG CLIN: HCPCS | Performed by: NURSE PRACTITIONER

## 2025-05-30 PROCEDURE — 3017F COLORECTAL CA SCREEN DOC REV: CPT | Performed by: NURSE PRACTITIONER

## 2025-05-30 PROCEDURE — 99214 OFFICE O/P EST MOD 30 MIN: CPT | Performed by: NURSE PRACTITIONER

## 2025-05-30 ASSESSMENT — SLEEP AND FATIGUE QUESTIONNAIRES
HOW LIKELY ARE YOU TO NOD OFF OR FALL ASLEEP WHILE SITTING AND TALKING TO SOMEONE: WOULD NEVER DOZE
HOW LIKELY ARE YOU TO NOD OFF OR FALL ASLEEP WHILE SITTING AND READING: MODERATE CHANCE OF DOZING
HOW LIKELY ARE YOU TO NOD OFF OR FALL ASLEEP WHEN YOU ARE A PASSENGER IN A CAR FOR AN HOUR WITHOUT A BREAK: WOULD NEVER DOZE
HOW LIKELY ARE YOU TO NOD OFF OR FALL ASLEEP IN A CAR, WHILE STOPPED FOR A FEW MINUTES IN TRAFFIC: WOULD NEVER DOZE
HOW LIKELY ARE YOU TO NOD OFF OR FALL ASLEEP WHILE LYING DOWN TO REST IN THE AFTERNOON WHEN CIRCUMSTANCES PERMIT: HIGH CHANCE OF DOZING
HOW LIKELY ARE YOU TO NOD OFF OR FALL ASLEEP WHILE WATCHING TV: MODERATE CHANCE OF DOZING
HOW LIKELY ARE YOU TO NOD OFF OR FALL ASLEEP WHILE SITTING QUIETLY AFTER LUNCH WITHOUT ALCOHOL: SLIGHT CHANCE OF DOZING
ESS TOTAL SCORE: 8
HOW LIKELY ARE YOU TO NOD OFF OR FALL ASLEEP WHILE SITTING INACTIVE IN A PUBLIC PLACE: WOULD NEVER DOZE

## 2025-05-30 NOTE — TELEPHONE ENCOUNTER
Faxed mask order and OV note to izabela via [employer]fax   independent/as per chart review prior to recent hospitalization for CVA

## 2025-06-04 ENCOUNTER — PATIENT MESSAGE (OUTPATIENT)
Dept: FAMILY MEDICINE CLINIC | Age: 70
End: 2025-06-04

## 2025-06-04 ENCOUNTER — TELEPHONE (OUTPATIENT)
Dept: CARDIOLOGY CLINIC | Age: 70
End: 2025-06-04

## 2025-06-04 DIAGNOSIS — F41.9 ANXIETY DUE TO INVASIVE PROCEDURE: Primary | ICD-10-CM

## 2025-06-04 PROCEDURE — 93296 REM INTERROG EVL PM/IDS: CPT | Performed by: INTERNAL MEDICINE

## 2025-06-04 PROCEDURE — 93294 REM INTERROG EVL PM/LDLS PM: CPT | Performed by: INTERNAL MEDICINE

## 2025-06-04 RX ORDER — ALPRAZOLAM 0.5 MG
0.5 TABLET ORAL ONCE
Qty: 1 TABLET | Refills: 0 | Status: SHIPPED | OUTPATIENT
Start: 2025-06-04 | End: 2025-06-04

## 2025-06-04 NOTE — TELEPHONE ENCOUNTER
Reina Marin, 1955    Cardiac Risk Assessment    What type of procedure are you having?  Colonoscopy     When is your procedure scheduled for?  06/26/2025    Medications to be stopped.  Eliquis for 2 days prior to procedure     What physician is performing your procedure?  Dr. Somers     Phone Number:   [phone]     Fax number to send the letter:   [phone]    Cardiologist:   KUNAL    Last Appointment:   04/10/2025    Next Appointment:   10/13/2025    Last EKG  04/2025    Are you on any blood thinners?   YES

## 2025-06-05 ENCOUNTER — HOSPITAL ENCOUNTER (OUTPATIENT)
Dept: MRI IMAGING | Age: 70
Discharge: HOME | End: 2025-06-05
Payer: MEDICARE

## 2025-06-05 DIAGNOSIS — Z85.3 PERSONAL HISTORY OF MALIGNANT NEOPLASM OF BREAST: ICD-10-CM

## 2025-06-05 DIAGNOSIS — R92.333 HETEROGENEOUSLY DENSE TISSUE OF BOTH BREASTS ON MAMMOGRAPHY: ICD-10-CM

## 2025-06-05 PROCEDURE — 93286 PERI-PX EVAL PM/LDLS PM IP: CPT

## 2025-06-05 PROCEDURE — A9579 GAD-BASE MR CONTRAST NOS,1ML: HCPCS | Performed by: PHYSICIAN ASSISTANT

## 2025-06-05 PROCEDURE — 6360000004 HC RX CONTRAST MEDICATION: Performed by: PHYSICIAN ASSISTANT

## 2025-06-05 RX ADMIN — GADOTERIDOL 20 ML: 279.3 INJECTION, SOLUTION INTRAVENOUS at 15:25

## 2025-06-06 ENCOUNTER — RESULTS FOLLOW-UP (OUTPATIENT)
Dept: FAMILY MEDICINE CLINIC | Age: 70
End: 2025-06-06

## 2025-06-06 NOTE — TELEPHONE ENCOUNTER
Please help me generate a letter.  Patient at low risk for MACE during a low risk procedure.  No further cardiac testing indicated.  Ok to hold OAC however while off OAC risk for CVA increases however risk benefits favor holding.  Please make sure patient aware.   Consent: Written consent was obtained and risks were reviewed including but not limited to scarring, infection, bleeding, scabbing, incomplete removal, nerve damage and allergy to anesthesia.

## 2025-06-19 DIAGNOSIS — K21.9 CHRONIC GERD: ICD-10-CM

## 2025-06-19 DIAGNOSIS — I50.22 CHRONIC SYSTOLIC HEART FAILURE (HCC): ICD-10-CM

## 2025-06-19 RX ORDER — OXYBUTYNIN CHLORIDE 15 MG/1
15 TABLET, EXTENDED RELEASE ORAL DAILY
Qty: 90 TABLET | Refills: 1 | Status: SHIPPED | OUTPATIENT
Start: 2025-06-19

## 2025-06-19 RX ORDER — EMPAGLIFLOZIN 10 MG/1
10 TABLET, FILM COATED ORAL DAILY
Qty: 90 TABLET | Refills: 3 | Status: SHIPPED | OUTPATIENT
Start: 2025-06-19

## 2025-06-19 RX ORDER — OMEPRAZOLE 40 MG/1
40 CAPSULE, DELAYED RELEASE ORAL
Qty: 90 CAPSULE | Refills: 1 | Status: SHIPPED | OUTPATIENT
Start: 2025-06-19

## 2025-06-19 RX ORDER — DOFETILIDE 0.5 MG/1
500 CAPSULE ORAL EVERY 12 HOURS SCHEDULED
Qty: 180 CAPSULE | Refills: 1 | Status: SHIPPED | OUTPATIENT
Start: 2025-06-19

## 2025-06-19 NOTE — TELEPHONE ENCOUNTER
LOV AGK 4/10/2025  2/18/2025 - 04/10/2025  No recurrent atrial fibrillation on her pacemaker.  No changes recommended.  Continue dofetilide.  - Continue metoprolol succinate 25 mg daily.  - Continue dofetilide 500 mcg BID ( msec).  - Continue apixaban 5 mg BID.      Reviewed. RX pending for provider signoff

## 2025-06-19 NOTE — PROGRESS NOTES
[facility] PRE-OPERATIVE INSTRUCTIONS    Day of Procedure: 6/26/2025               Arrival time:    0730              Surgery time:  0900    Take the following medications with a sip of water:  Follow your MD/Surgeons pre-procedure instructions regarding your medications     Do not eat or drink anything after 12:00 midnight prior to your surgery.  This includes water, chewing gum, mints and ice chips.   You may brush your teeth and gargle the morning of your surgery, but do not swallow the water.     Please see your family doctor/pediatrician for a history and physical and/or concerning medications.   Bring any test results/reports from your physicians office.   If you are under the care of a heart doctor or specialist doctor, please be aware that you may be asked to see them for clearance.    You may be asked to stop blood thinners such as Coumadin, Plavix, Fragmin, Lovenox, etc., or any anti-inflammatories such as:  Aspirin, Ibuprofen, Advil, Naproxen prior to your surgery.    We also ask that you stop any over the counter medications such as fish oil, vitamin E, glucosamine, garlic, Multivitamins, COQ 10, etc.    We ask that you do not smoke 24 hours prior to surgery.  We ask that you do not  drink any alcoholic beverages 24 hours prior to surgery     You must make arrangements for a responsible adult to take you home after your surgery.    For your safety, you will not be allowed to leave alone or drive yourself home.  Your surgery will be cancelled if you do not have a ride home.     Also for your safety, you must have someone stay with you the first 24 hours after your surgery.     A parent or legal guardian must accompany a child scheduled for surgery and plan to stay at the hospital until the child is discharged.    Please do not bring other children with you.    For your comfort, please wear simple loose fitting clothing to the hospital.  Please do not bring valuables.    Do not wear any

## 2025-06-19 NOTE — TELEPHONE ENCOUNTER
.Refill Request     CONFIRM preferred pharmacy with the patient.    If Mail Order Rx - Pend for 90 day refill.      Last Seen: Last Seen Department: 5/8/2025  Last Seen by PCP: 5/8/2025    Last Written: 12-20-24 90 with 1   Prilosec 1-20-25 90 with 1    If no future appointment scheduled:  Review the last OV with PCP and review information for follow-up visit,  Route STAFF MESSAGE with patient name to the  Pool for scheduling with the following information:            -  Timing of next visit           -  Visit type ie Physical, OV, etc           -  Diagnoses/Reason ie. COPD, HTN - Do not use MEDICATION, Follow-up or CHECK UP - Give reason for visit      Next Appointment:   Future Appointments   Date Time Provider Department Center   8/19/2025  9:00 AM [facility] CARDI ECHO 1 [facility]   8/19/2025 10:00 AM Brea Leos MD [facility]   10/13/2025 11:15 AM SCHEDULE, [facility] DEVICE CHECK [facility]   10/13/2025 11:15 AM Gini Pike APRN - CNP [facility]   11/12/2025 11:00 AM Mary Florentino PA [facility]   5/29/2026 11:40 AM Quiroga, Josy, APRN - CNP [facility]       Message sent to  to schedule appt with patient?  NO      Requested Prescriptions     Pending Prescriptions Disp Refills    omeprazole (PRILOSEC) 40 MG delayed release capsule [Pharmacy Med Name: OMEPRAZOLE 40MG CPDR] 90 capsule 1     Sig: TAKE 1 CAPSULE EVERY       MORNING, BEFORE BREAKFAST    oxyBUTYnin (DITROPAN XL) 15 MG extended release tablet [Pharmacy Med Name: OXYBUTYNIN CHLORIDE ER 15MG TB24] 90 tablet 1     Sig: TAKE 1 TABLET BY MOUTH DAILY

## 2025-06-19 NOTE — TELEPHONE ENCOUNTER
Last Office Visit: 4/10/2025 Provider: KUNAL  **Is provider OOT? No    Next Office Visit: 10/13/2025 Provider: GAVIN  **Has patient already had 30 day supply? No    Lab orders needed? no   Encounter provider correct? Yes If not, change provider  Script changes since last refill? no    LAST LABS:   BMP:  Lab Results   Component Value Date/Time     2025 09:53 AM    K 4.4 2025 09:53 AM    K 3.9 11/10/2024 04:55 AM     2025 09:53 AM    CO2 28 2025 09:53 AM    BUN 25 2025 09:53 AM    CREATININE 0.9 2025 09:53 AM    GLUCOSE 126 2025 09:53 AM    GLUCOSE 97 2017 11:37 AM    CALCIUM 9.9 2025 09:53 AM    LABGLOM 68 2025 09:53 AM    LABGLOM >60 2023 07:16 AM      EK/10/2025

## 2025-06-25 ENCOUNTER — ANESTHESIA EVENT (OUTPATIENT)
Dept: ENDOSCOPY | Age: 70
End: 2025-06-25
Payer: MEDICARE

## 2025-06-26 ENCOUNTER — HOSPITAL ENCOUNTER (OUTPATIENT)
Dept: ENDOSCOPY | Age: 70
Setting detail: OUTPATIENT SURGERY
Discharge: HOME | End: 2025-06-26
Attending: INTERNAL MEDICINE
Payer: MEDICARE

## 2025-06-26 ENCOUNTER — HOSPITAL ENCOUNTER (OUTPATIENT)
Age: 70
Setting detail: OUTPATIENT SURGERY
Discharge: HOME | End: 2025-06-26
Attending: INTERNAL MEDICINE | Admitting: INTERNAL MEDICINE
Payer: MEDICARE

## 2025-06-26 ENCOUNTER — ANESTHESIA (OUTPATIENT)
Dept: ENDOSCOPY | Age: 70
End: 2025-06-26
Payer: MEDICARE

## 2025-06-26 VITALS
OXYGEN SATURATION: 95 % | HEART RATE: 64 BPM | WEIGHT: 254.74 LBS | DIASTOLIC BLOOD PRESSURE: 73 MMHG | TEMPERATURE: 97.4 F | BODY MASS INDEX: 40.94 KG/M2 | RESPIRATION RATE: 16 BRPM | HEIGHT: 66 IN | SYSTOLIC BLOOD PRESSURE: 114 MMHG

## 2025-06-26 DIAGNOSIS — Z12.11 SPECIAL SCREENING FOR MALIGNANT NEOPLASMS, COLON: ICD-10-CM

## 2025-06-26 LAB
GLUCOSE BLD-MCNC: 117 MG/DL (ref 70–99)
GLUCOSE BLD-MCNC: 121 MG/DL (ref 70–99)
PERFORMED ON: ABNORMAL
PERFORMED ON: ABNORMAL

## 2025-06-26 PROCEDURE — 6370000000 HC RX 637 (ALT 250 FOR IP)

## 2025-06-26 PROCEDURE — C1889 IMPLANT/INSERT DEVICE, NOC: HCPCS | Performed by: INTERNAL MEDICINE

## 2025-06-26 PROCEDURE — 7100000010 HC PHASE II RECOVERY - FIRST 15 MIN: Performed by: INTERNAL MEDICINE

## 2025-06-26 PROCEDURE — 6360000002 HC RX W HCPCS

## 2025-06-26 PROCEDURE — 7100000001 HC PACU RECOVERY - ADDTL 15 MIN: Performed by: INTERNAL MEDICINE

## 2025-06-26 PROCEDURE — 7100000011 HC PHASE II RECOVERY - ADDTL 15 MIN: Performed by: INTERNAL MEDICINE

## 2025-06-26 PROCEDURE — 3700000000 HC ANESTHESIA ATTENDED CARE: Performed by: INTERNAL MEDICINE

## 2025-06-26 PROCEDURE — 7100000000 HC PACU RECOVERY - FIRST 15 MIN: Performed by: INTERNAL MEDICINE

## 2025-06-26 PROCEDURE — 88305 TISSUE EXAM BY PATHOLOGIST: CPT

## 2025-06-26 PROCEDURE — 3609010600 HC COLONOSCOPY POLYPECTOMY SNARE/COLD BIOPSY: Performed by: INTERNAL MEDICINE

## 2025-06-26 PROCEDURE — 2709999900 HC NON-CHARGEABLE SUPPLY: Performed by: INTERNAL MEDICINE

## 2025-06-26 PROCEDURE — 2580000003 HC RX 258: Performed by: ANESTHESIOLOGY

## 2025-06-26 PROCEDURE — 3700000001 HC ADD 15 MINUTES (ANESTHESIA): Performed by: INTERNAL MEDICINE

## 2025-06-26 DEVICE — WORKING LENGTH 235CM, WORKING CHANNEL 2.8MM
Type: IMPLANTABLE DEVICE | Site: DESCENDING COLON | Status: FUNCTIONAL
Brand: RESOLUTION 360 CLIP

## 2025-06-26 RX ORDER — DIPHENHYDRAMINE HYDROCHLORIDE 50 MG/ML
12.5 INJECTION, SOLUTION INTRAMUSCULAR; INTRAVENOUS
Status: CANCELLED | OUTPATIENT
Start: 2025-06-26

## 2025-06-26 RX ORDER — SODIUM CHLORIDE 0.9 % (FLUSH) 0.9 %
5-40 SYRINGE (ML) INJECTION EVERY 12 HOURS SCHEDULED
Status: DISCONTINUED | OUTPATIENT
Start: 2025-06-26 | End: 2025-06-26 | Stop reason: HOSPADM

## 2025-06-26 RX ORDER — ONDANSETRON 2 MG/ML
4 INJECTION INTRAMUSCULAR; INTRAVENOUS
Status: CANCELLED | OUTPATIENT
Start: 2025-06-26

## 2025-06-26 RX ORDER — SODIUM CHLORIDE 9 MG/ML
INJECTION, SOLUTION INTRAVENOUS PRN
Status: CANCELLED | OUTPATIENT
Start: 2025-06-26

## 2025-06-26 RX ORDER — LABETALOL HYDROCHLORIDE 5 MG/ML
5 INJECTION, SOLUTION INTRAVENOUS
Status: CANCELLED | OUTPATIENT
Start: 2025-06-26

## 2025-06-26 RX ORDER — SODIUM CHLORIDE 9 MG/ML
INJECTION, SOLUTION INTRAVENOUS PRN
Status: DISCONTINUED | OUTPATIENT
Start: 2025-06-26 | End: 2025-06-26 | Stop reason: HOSPADM

## 2025-06-26 RX ORDER — NALOXONE HYDROCHLORIDE 0.4 MG/ML
INJECTION, SOLUTION INTRAMUSCULAR; INTRAVENOUS; SUBCUTANEOUS PRN
Status: CANCELLED | OUTPATIENT
Start: 2025-06-26

## 2025-06-26 RX ORDER — SODIUM CHLORIDE 0.9 % (FLUSH) 0.9 %
5-40 SYRINGE (ML) INJECTION PRN
Status: DISCONTINUED | OUTPATIENT
Start: 2025-06-26 | End: 2025-06-26 | Stop reason: HOSPADM

## 2025-06-26 RX ORDER — LIDOCAINE HYDROCHLORIDE 20 MG/ML
INJECTION, SOLUTION EPIDURAL; INFILTRATION; INTRACAUDAL; PERINEURAL
Status: DISCONTINUED | OUTPATIENT
Start: 2025-06-26 | End: 2025-06-26 | Stop reason: SDUPTHER

## 2025-06-26 RX ORDER — SODIUM CHLORIDE 0.9 % (FLUSH) 0.9 %
5-40 SYRINGE (ML) INJECTION EVERY 12 HOURS SCHEDULED
Status: CANCELLED | OUTPATIENT
Start: 2025-06-26

## 2025-06-26 RX ORDER — PROPOFOL 10 MG/ML
INJECTION, EMULSION INTRAVENOUS
Status: DISCONTINUED | OUTPATIENT
Start: 2025-06-26 | End: 2025-06-26 | Stop reason: SDUPTHER

## 2025-06-26 RX ORDER — SODIUM CHLORIDE 0.9 % (FLUSH) 0.9 %
5-40 SYRINGE (ML) INJECTION PRN
Status: CANCELLED | OUTPATIENT
Start: 2025-06-26

## 2025-06-26 RX ADMIN — LIDOCAINE HYDROCHLORIDE 100 MG: 20 INJECTION, SOLUTION EPIDURAL; INFILTRATION; INTRACAUDAL; PERINEURAL at 08:31

## 2025-06-26 RX ADMIN — SODIUM CHLORIDE: 0.9 INJECTION, SOLUTION INTRAVENOUS at 08:23

## 2025-06-26 RX ADMIN — PROPOFOL 150 MCG/KG/MIN: 10 INJECTION, EMULSION INTRAVENOUS at 08:32

## 2025-06-26 RX ADMIN — PROPOFOL 70 MG: 10 INJECTION, EMULSION INTRAVENOUS at 08:31

## 2025-06-26 ASSESSMENT — LIFESTYLE VARIABLES: SMOKING_STATUS: 0

## 2025-06-26 ASSESSMENT — ENCOUNTER SYMPTOMS: SHORTNESS OF BREATH: 0

## 2025-06-26 ASSESSMENT — PAIN - FUNCTIONAL ASSESSMENT: PAIN_FUNCTIONAL_ASSESSMENT: 0-10

## 2025-06-26 ASSESSMENT — PAIN SCALES - GENERAL: PAINLEVEL_OUTOF10: 0

## 2025-06-26 NOTE — ANESTHESIA POSTPROCEDURE EVALUATION
Department of Anesthesiology  Postprocedure Note    Patient: Reina Marin  MRN: [id number]  YOB: 1955  Date of evaluation: 6/26/2025    Procedure Summary       Date: 06/26/25 Room / Location: [address]    Anesthesia Start: 0825 Anesthesia Stop: 0853    Procedure: COLONOSCOPY POLYPECTOMY SNARE, and clip Diagnosis:       Special screening for malignant neoplasms, colon      (Special screening for malignant neoplasms, colon [Z12.11])    Surgeons: Jj Cazares Jr., DO Responsible Provider: Winnie Vaughn MD    Anesthesia Type: MAC ASA Status: 3            Anesthesia Type: No value filed.    Cheri Phase I: Cheri Score: 10    Cheri Phase II: Cheri Score: 10    Anesthesia Post Evaluation    Patient location during evaluation: PACU  Patient participation: complete - patient participated  Level of consciousness: awake  Pain score: 0  Airway patency: patent  Nausea & Vomiting: no nausea  Cardiovascular status: hemodynamically stable  Respiratory status: acceptable  Hydration status: euvolemic  Multimodal analgesia pain management approach    No notable events documented.

## 2025-06-26 NOTE — DISCHARGE INSTRUCTIONS
Recommendations:  Repeat colonoscopy in 5 years.     The patient had colon polyp(s) successfully removed today. Theres is a small risk for these sites to bleed for up to several weeks out from the procedure. The patient is instructed to call if there is any significant amounts of  rectal bleeding, abdominal pain, dizziness, or other complaints thought to be related to the procedure.      The patient had a metallic clipping device applied today during their procedure to decrease the risk of post polypectomy bleeding.  These clips will spontaneously pass out with the stool in the next several weeks.  The patient should not have an MRI for the next 30 days.       OK to resume Eliquis in am.     Discharge Instructions for Colonoscopy     Colonoscopy is a visual exam of the lining of the large intestine, also called the bowel or colon, with a colonoscope. A colonoscope is a flexible tube with a light and a viewing device. It allows the doctor to view the inside of the colon through a tiny video camera.     Colonoscopy is performed for many reasons: unexplained anemia , pain, diarrhea , bloody stools, cancer screening, among many other reasons.     Complications from a colonoscopy are rare. Some possible serious complications include perforated bowel (which might require surgery) and bleeding (which could require blood transfusion ). Minor complications include bloating, gas, and cramping that can last for 1-2 days after the procedure.     Because air is put into your colon during the procedure, it is normal to pass large amounts of air from your rectum. You may not have a bowel movement for 1-3 days after the procedure.     What You Will Need:  Someone to drive you home after the procedure     Steps to Take:  Home Care -  Rest when you get home.   Because the sedative will make you drowsy, don't drive, operate machinery, or make important decisions the day of the procedure.  Feelings of bloating, gas, or cramping may

## 2025-06-26 NOTE — OP NOTE
Colonoscopy Procedure Note      Patient: Reina Marin  : 1955  Acct#:     Procedure: Colonoscopy with polypectomy (cold snare), clip application    Date:  2025    Surgeon:  Jj Cazares Jr, DO    Referring Physician:  Mary Florentino PA    Previous Colonoscopy: YES  Date:   Greater than 3 years: YES    Preoperative Diagnosis:  Screening colonoscopy    Postoperative Diagnosis:  1) A 8 mm polyp in the descending colon was removed completely with cold snare polypectomy. A single hemoclip was successfully deployed to the polypectomy site to decrease the risks of post polypectomy bleeding  2) Moderate left and right sided diverticulosis was noted. 3) Moderate internal hemorrhoids were noted.     Consent:  The patient or their legal guardian has signed a consent, and is aware of the potential risks, benefits, alternatives, and potential complications of this procedure.  These include, but are not limited to hemorrhage, bleeding, post procedural pain, perforation, phlebitis, aspiration, hypotension, hypoxia, cardiovascular events such as arryhthmia, and possibly death.  Additionally, the possibility of missed colonic polyps and interval colon cancer was discussed in the consent.    Anesthesia:  The patient was administered TIVA per anesthesiology team.  Please see their operative records for full details of medications administered.        Procedure:   An informed consent was obtained from the patient after explanation of indications, benefits, possible risks and complications of the procedure.  The patient was then taken to the endoscopy suite, placed in the left lateral decubitus position, and the above IV anesthesia was administered.    A digital rectal examination was performed and revealed negative without mass, lesions or tenderness, internal hemorrhoids noted.      The Olympus video colonoscope was placed in the patient's rectum under digital direction and advanced to the cecum.

## 2025-06-26 NOTE — ANESTHESIA PRE PROCEDURE
Department of Anesthesiology  Preprocedure Note       Name:  Reina Marin   Age:  70 y.o.  :  1955                                          MRN:  [id number]         Date:  2025      Surgeon: Surgeon(s):  Jj Cazares Jr., DO    Procedure: Procedure(s):  COLONOSCOPY    Medications prior to admission:   Prior to Admission medications    Medication Sig Start Date End Date Taking? Authorizing Provider   dofetilide (TIKOSYN) 500 MCG capsule Take 1 capsule by mouth every 12 hours 25  Yes SARAH Harper Jr., MD   empagliflozin (JARDIANCE) 10 MG tablet TAKE ONE TABLET BY MOUTH EVERY DAY 25  Yes Brea Leos MD   omeprazole (PRILOSEC) 40 MG delayed release capsule TAKE 1 CAPSULE EVERY       MORNING, BEFORE BREAKFAST 25  Yes Mary Florentino PA   oxyBUTYnin (DITROPAN XL) 15 MG extended release tablet TAKE 1 TABLET BY MOUTH DAILY 25  Yes Mary Florentino PA   metoprolol succinate (TOPROL XL) 25 MG extended release tablet Take 1 tablet by mouth daily 4/10/25  Yes SARAH Harper Jr., MD   citalopram (CELEXA) 40 MG tablet TAKE 1 TABLET BY MOUTH DAILY 3/26/25  Yes Mary Florentino PA   spironolactone (ALDACTONE) 25 MG tablet TAKE ONE TABLET BY MOUTH EVERY DAY 3/24/25  Yes Brea Leos MD   levothyroxine (SYNTHROID) 137 MCG tablet TAKE 1 TABLET BY MOUTH DAILY 3/12/25  Yes Mary Florentino PA   atorvastatin (LIPITOR) 40 MG tablet TAKE 1 TABLET DAILY 25  Yes Brea Leos MD   valsartan (DIOVAN) 40 MG tablet Take 0.5 tablets by mouth daily 25  Yes Brea Leos MD   montelukast (SINGULAIR) 10 MG tablet Take 1 tablet by mouth nightly 25  Yes Mary Florentino PA   apixaban (ELIQUIS) 5 MG TABS tablet Take 1 tablet by mouth 2 times daily 24 Yes SARAH Harper Jr., MD   dicyclomine (BENTYL) 20 MG tablet Take 1 tablet by mouth every 4 hours as needed (esophageal spasm) 10/29/24  Yes Mary Florentino PA   ciclopirox (PENLAC) 8 % solution Apply

## 2025-06-26 NOTE — H&P
Pre-operative History and Physical    Patient: Reina Marin  : 1955  Acct#:     Intended Procedure:  Colonoscopy    HISTORY OF PRESENT ILLNESS:  The patient is a 70 y.o. female  who presents for/due to screening colonoscopy      Past Medical History:        Diagnosis Date    Abnormal Pap smear of cervix 2018    hpv+    Allergic     Allergic rhinitis     Aortic stenosis, mild-echo 2016    Arthritis     Atrial fibrillation (HCC)     symptomatic    BMI 40.0-44.9, adult (HCC)     Breast cancer (HCC)     54 chemo and radiation    Breast cyst 2009    Cancer (HCC) 2009    right breast NO BP OR IV RIGHT ARM    CHF (congestive heart failure) (HCC)     CVA (cerebrovascular accident due to intracerebral hemorrhage) (HCC) 2024    no residual effects    Depression     GERD (gastroesophageal reflux disease)     H/O laparoscopic adjustable gastric banding 10/27/2016    Heart palpitations     History of chemotherapy     History of radiation therapy     History of therapeutic radiation     HPV (human papilloma virus) infection 2018    HPV test positive 2014    Hx antineoplastic chemo     Hyperlipidemia     Hypertension     Hypothyroidism 2000    Meningioma (HCC)     10 years ago    Mild aortic regurgitation-echo 2016    Mild dysplasia of cervix 2016    Obesity     Pacemaker     Sleep apnea     USES CPAP    Type 2 diabetes mellitus with chronic kidney disease 2023    Treating DM with diet; currently take no medications    Urge incontinence 2017     Past Surgical History:        Procedure Laterality Date    APPENDECTOMY      BREAST BIOPSY      BREAST LUMPECTOMY      BREAST SURGERY  2009    right lumpectomy xrt and chemo and serm    CARDIAC SURGERY      ablation     SECTION  ,,,    failed to drop in birth canal     SECTION      CHOLECYSTECTOMY      COLONOSCOPY      EP DEVICE PROCEDURE Left 2024    Insert PPM

## 2025-07-10 ENCOUNTER — APPOINTMENT (OUTPATIENT)
Dept: CT IMAGING | Age: 70
End: 2025-07-10
Payer: MEDICARE

## 2025-07-10 ENCOUNTER — HOSPITAL ENCOUNTER (EMERGENCY)
Age: 70
Discharge: HOME | End: 2025-07-10
Attending: EMERGENCY MEDICINE
Payer: MEDICARE

## 2025-07-10 VITALS
DIASTOLIC BLOOD PRESSURE: 85 MMHG | RESPIRATION RATE: 16 BRPM | SYSTOLIC BLOOD PRESSURE: 131 MMHG | TEMPERATURE: 97.9 F | WEIGHT: 260.8 LBS | OXYGEN SATURATION: 98 % | BODY MASS INDEX: 41.91 KG/M2 | HEART RATE: 60 BPM | HEIGHT: 66 IN

## 2025-07-10 DIAGNOSIS — G43.819 OTHER MIGRAINE WITHOUT STATUS MIGRAINOSUS, INTRACTABLE: Primary | ICD-10-CM

## 2025-07-10 DIAGNOSIS — K21.9 CHRONIC GERD: ICD-10-CM

## 2025-07-10 LAB
ALBUMIN SERPL-MCNC: 4.3 G/DL (ref 3.4–5)
ALBUMIN/GLOB SERPL: 1.9 {RATIO} (ref 1.1–2.2)
ALP SERPL-CCNC: 88 U/L (ref 40–129)
ALT SERPL-CCNC: 22 U/L (ref 10–40)
ANION GAP SERPL CALCULATED.3IONS-SCNC: 10 MMOL/L (ref 3–16)
AST SERPL-CCNC: 22 U/L (ref 15–37)
BASOPHILS # BLD: 0 K/UL (ref 0–0.2)
BASOPHILS NFR BLD: 0.6 %
BILIRUB SERPL-MCNC: 0.5 MG/DL (ref 0–1)
BILIRUB UR QL STRIP.AUTO: NEGATIVE
BUN SERPL-MCNC: 25 MG/DL (ref 7–20)
CALCIUM SERPL-MCNC: 9.8 MG/DL (ref 8.3–10.6)
CHLORIDE SERPL-SCNC: 103 MMOL/L (ref 99–110)
CLARITY UR: CLEAR
CO2 SERPL-SCNC: 26 MMOL/L (ref 21–32)
COLOR UR: YELLOW
CREAT SERPL-MCNC: 1.2 MG/DL (ref 0.6–1.2)
DEPRECATED RDW RBC AUTO: 13.7 % (ref 12.4–15.4)
EOSINOPHIL # BLD: 0.1 K/UL (ref 0–0.6)
EOSINOPHIL NFR BLD: 1.6 %
GFR SERPLBLD CREATININE-BSD FMLA CKD-EPI: 48 ML/MIN/{1.73_M2}
GLUCOSE SERPL-MCNC: 112 MG/DL (ref 70–99)
GLUCOSE UR STRIP.AUTO-MCNC: >=1000 MG/DL
HCT VFR BLD AUTO: 42.2 % (ref 36–48)
HGB BLD-MCNC: 14.2 G/DL (ref 12–16)
HGB UR QL STRIP.AUTO: NEGATIVE
KETONES UR STRIP.AUTO-MCNC: NEGATIVE MG/DL
LEUKOCYTE ESTERASE UR QL STRIP.AUTO: NEGATIVE
LYMPHOCYTES # BLD: 1.8 K/UL (ref 1–5.1)
LYMPHOCYTES NFR BLD: 27 %
MCH RBC QN AUTO: 30.9 PG (ref 26–34)
MCHC RBC AUTO-ENTMCNC: 33.6 G/DL (ref 31–36)
MCV RBC AUTO: 91.9 FL (ref 80–100)
MONOCYTES # BLD: 0.8 K/UL (ref 0–1.3)
MONOCYTES NFR BLD: 11.6 %
NEUTROPHILS # BLD: 3.9 K/UL (ref 1.7–7.7)
NEUTROPHILS NFR BLD: 59.2 %
NITRITE UR QL STRIP.AUTO: NEGATIVE
PH UR STRIP.AUTO: 6 [PH] (ref 5–8)
PLATELET # BLD AUTO: 217 K/UL (ref 135–450)
PMV BLD AUTO: 7.8 FL (ref 5–10.5)
POTASSIUM SERPL-SCNC: 4.2 MMOL/L (ref 3.5–5.1)
PROT SERPL-MCNC: 6.6 G/DL (ref 6.4–8.2)
PROT UR STRIP.AUTO-MCNC: NEGATIVE MG/DL
RBC # BLD AUTO: 4.59 M/UL (ref 4–5.2)
SODIUM SERPL-SCNC: 139 MMOL/L (ref 136–145)
SP GR UR STRIP.AUTO: <=1.005 (ref 1–1.03)
UA COMPLETE W REFLEX CULTURE PNL UR: ABNORMAL
UA DIPSTICK W REFLEX MICRO PNL UR: ABNORMAL
URN SPEC COLLECT METH UR: ABNORMAL
UROBILINOGEN UR STRIP-ACNC: 0.2 E.U./DL
WBC # BLD AUTO: 6.7 K/UL (ref 4–11)

## 2025-07-10 PROCEDURE — 81003 URINALYSIS AUTO W/O SCOPE: CPT

## 2025-07-10 PROCEDURE — 6370000000 HC RX 637 (ALT 250 FOR IP): Performed by: EMERGENCY MEDICINE

## 2025-07-10 PROCEDURE — 96374 THER/PROPH/DIAG INJ IV PUSH: CPT

## 2025-07-10 PROCEDURE — 2580000003 HC RX 258: Performed by: EMERGENCY MEDICINE

## 2025-07-10 PROCEDURE — 6360000002 HC RX W HCPCS: Performed by: EMERGENCY MEDICINE

## 2025-07-10 PROCEDURE — 80053 COMPREHEN METABOLIC PANEL: CPT

## 2025-07-10 PROCEDURE — 36415 COLL VENOUS BLD VENIPUNCTURE: CPT

## 2025-07-10 PROCEDURE — 85025 COMPLETE CBC W/AUTO DIFF WBC: CPT

## 2025-07-10 PROCEDURE — 99284 EMERGENCY DEPT VISIT MOD MDM: CPT

## 2025-07-10 PROCEDURE — 70450 CT HEAD/BRAIN W/O DYE: CPT

## 2025-07-10 RX ORDER — METOCLOPRAMIDE HYDROCHLORIDE 5 MG/ML
10 INJECTION INTRAMUSCULAR; INTRAVENOUS ONCE
Status: COMPLETED | OUTPATIENT
Start: 2025-07-10 | End: 2025-07-10

## 2025-07-10 RX ORDER — BUTALBITAL, ACETAMINOPHEN AND CAFFEINE 50; 325; 40 MG/1; MG/1; MG/1
1 TABLET ORAL EVERY 4 HOURS PRN
Qty: 60 TABLET | Refills: 0 | Status: SHIPPED | OUTPATIENT
Start: 2025-07-10 | End: 2025-07-20

## 2025-07-10 RX ORDER — BUTALBITAL, ACETAMINOPHEN AND CAFFEINE 50; 325; 40 MG/1; MG/1; MG/1
1 TABLET ORAL ONCE
Status: COMPLETED | OUTPATIENT
Start: 2025-07-10 | End: 2025-07-10

## 2025-07-10 RX ORDER — 0.9 % SODIUM CHLORIDE 0.9 %
1000 INTRAVENOUS SOLUTION INTRAVENOUS ONCE
Status: COMPLETED | OUTPATIENT
Start: 2025-07-10 | End: 2025-07-10

## 2025-07-10 RX ORDER — METOCLOPRAMIDE 10 MG/1
10 TABLET ORAL
Qty: 9 TABLET | Refills: 0 | Status: SHIPPED | OUTPATIENT
Start: 2025-07-10 | End: 2025-07-13

## 2025-07-10 RX ADMIN — BUTALBITAL, ACETAMINOPHEN AND CAFFEINE 1 TABLET: 325; 50; 40 TABLET ORAL at 17:44

## 2025-07-10 RX ADMIN — SODIUM CHLORIDE 1000 ML: 0.9 INJECTION, SOLUTION INTRAVENOUS at 18:28

## 2025-07-10 RX ADMIN — METOCLOPRAMIDE HYDROCHLORIDE 10 MG: 5 INJECTION INTRAMUSCULAR; INTRAVENOUS at 18:28

## 2025-07-10 ASSESSMENT — PAIN DESCRIPTION - LOCATION: LOCATION: HEAD

## 2025-07-10 ASSESSMENT — PAIN SCALES - GENERAL
PAINLEVEL_OUTOF10: 4
PAINLEVEL_OUTOF10: 6
PAINLEVEL_OUTOF10: 0

## 2025-07-10 ASSESSMENT — PAIN - FUNCTIONAL ASSESSMENT: PAIN_FUNCTIONAL_ASSESSMENT: 0-10

## 2025-07-10 NOTE — ED PROVIDER NOTES
Emergency Department Attending Physician Note  Location: [facility] EMERGENCY DEPARTMENT  7/10/2025       Pt Name: Reina Marin  MRN: [id number]  Birthdate 1955    Date of evaluation: 7/10/2025  Provider: ALL MIGUEL DO  PCP: Mary Florentino PA    Note Started: 5:24 PM EDT 7/10/25    CHIEF COMPLAINT  Chief Complaint   Patient presents with   • Migraine     Migraine since 4pm. States at 12pm today, she was trying to do something on her phone and couldn't figure out how to work her phone. Reports fatigue afterwards.        ROOM: ***    HISTORY OF PRESENT ILLNESS:  History obtained by {Persons; family members:64391}. {Limitations to history (Optional):55535}.     Reina Marin is a 70 y.o. female with a significant PMHx of ***      Nursing Notes were all reviewed and agreed with or any disagreements were addressed in the HPI.      MEDICAL HISTORY  Past Medical History:   Diagnosis Date   • Abnormal Pap smear of cervix 06/11/2018    hpv+   • Allergic    • Allergic rhinitis    • Aortic stenosis, mild-echo 8/2016 08/18/2016   • Arthritis    • Atrial fibrillation (HCC)     symptomatic   • BMI 40.0-44.9, adult (HCC)    • Breast cancer (HCC)     54 chemo and radiation   • Breast cyst 2009   • Cancer (HCC) 2009    right breast NO BP OR IV RIGHT ARM   • CHF (congestive heart failure) (HCC)    • CVA (cerebrovascular accident due to intracerebral hemorrhage) (HCC) 11/09/2024    no residual effects   • Depression    • GERD (gastroesophageal reflux disease)    • H/O laparoscopic adjustable gastric banding 10/27/2016   • Heart palpitations    • History of chemotherapy    • History of radiation therapy    • History of therapeutic radiation    • HPV (human papilloma virus) infection 06/11/2018   • HPV test positive 12/2014   • Hx antineoplastic chemo    • Hyperlipidemia    • Hypertension    • Hypothyroidism 2000   • Meningioma (HCC)     10 years ago   • Mild aortic regurgitation-echo 8/2016 08/18/2016

## 2025-07-11 ENCOUNTER — TELEPHONE (OUTPATIENT)
Dept: FAMILY MEDICINE CLINIC | Age: 70
End: 2025-07-11

## 2025-07-11 NOTE — TELEPHONE ENCOUNTER
ED Follow Up Call/ Schedule appt   [facility]   Reason: Migraine  Date: 7/10/25    Appt scheduled:   Future Appointments   Date Time Provider Department Center   8/19/2025  9:00 AM KEVIN CARDI ECHO 1 [facility]   8/19/2025 10:00 AM Brea Leos MD [facility]   10/13/2025 11:15 AM [facility] BLAND DEVICE CHECK [facility]   10/13/2025 11:15 AM Gini Pike APRN - CNP [facility]   11/12/2025 11:00 AM Mary Florentino PA [facility]   5/29/2026 11:40 AM Quiroga, Josy, APRN - CNP [facility]         Comments:   Patient has a history of migraines, but has never taken anything daily for them. Patient will call back if she needs an appt

## 2025-07-14 ASSESSMENT — ENCOUNTER SYMPTOMS
ABDOMINAL PAIN: 0
RHINORRHEA: 0
VOMITING: 0
DIARRHEA: 0
BACK PAIN: 0
COUGH: 0
SHORTNESS OF BREATH: 0
CHEST TIGHTNESS: 0

## 2025-07-20 DIAGNOSIS — J30.9 ALLERGIC RHINITIS, UNSPECIFIED SEASONALITY, UNSPECIFIED TRIGGER: Chronic | ICD-10-CM

## 2025-07-21 RX ORDER — MONTELUKAST SODIUM 10 MG/1
10 TABLET ORAL NIGHTLY
Qty: 90 TABLET | Refills: 1 | Status: SHIPPED | OUTPATIENT
Start: 2025-07-21

## 2025-07-21 NOTE — TELEPHONE ENCOUNTER
.Refill Request     CONFIRM preferred pharmacy with the patient.    If Mail Order Rx - Pend for 90 day refill.      Last Seen: Last Seen Department: 5/8/2025  Last Seen by PCP: 5/8/2025    Last Written: 1-20-25 90 with 1     If no future appointment scheduled:  Review the last OV with PCP and review information for follow-up visit,  Route STAFF MESSAGE with patient name to the  Pool for scheduling with the following information:            -  Timing of next visit           -  Visit type ie Physical, OV, etc           -  Diagnoses/Reason ie. COPD, HTN - Do not use MEDICATION, Follow-up or CHECK UP - Give reason for visit      Next Appointment:   Future Appointments   Date Time Provider Department Center   8/19/2025  9:00 AM [facility] CARDI ECHO 1 [facility]   8/19/2025 10:00 AM Brea Leos MD [facility]   10/13/2025 11:15 AM SCHEDULE[facility]BEL DEVICE CHECK [facility]   10/13/2025 11:15 AM Gini Pike APRN - CNP [facility]   11/12/2025 11:00 AM Mary Florentino PA [facility] DEP   5/29/2026 11:40 AM Quiroga, Josy, APRN - CNP [facility]       Message sent to  to schedule appt with patient?  NO      Requested Prescriptions     Pending Prescriptions Disp Refills    montelukast (SINGULAIR) 10 MG tablet [Pharmacy Med Name: MONTELUKAST SOD 10 MG TABLET] 90 tablet 1     Sig: TAKE ONE TABLET BY MOUTH ONCE NIGHTLY

## 2025-07-28 ENCOUNTER — PATIENT MESSAGE (OUTPATIENT)
Dept: FAMILY MEDICINE CLINIC | Age: 70
End: 2025-07-28

## 2025-07-28 ENCOUNTER — PATIENT MESSAGE (OUTPATIENT)
Dept: CARDIOLOGY CLINIC | Age: 70
End: 2025-07-28

## 2025-07-28 DIAGNOSIS — J30.9 ALLERGIC RHINITIS, UNSPECIFIED SEASONALITY, UNSPECIFIED TRIGGER: Chronic | ICD-10-CM

## 2025-07-28 DIAGNOSIS — F33.0 MILD EPISODE OF RECURRENT MAJOR DEPRESSIVE DISORDER: ICD-10-CM

## 2025-07-28 RX ORDER — CITALOPRAM HYDROBROMIDE 40 MG/1
40 TABLET ORAL DAILY
Qty: 90 TABLET | Refills: 1 | Status: SHIPPED | OUTPATIENT
Start: 2025-07-28

## 2025-07-28 RX ORDER — LEVOTHYROXINE SODIUM 137 UG/1
137 TABLET ORAL DAILY
Qty: 90 TABLET | Refills: 1 | Status: SHIPPED | OUTPATIENT
Start: 2025-07-28

## 2025-07-28 RX ORDER — MONTELUKAST SODIUM 10 MG/1
10 TABLET ORAL NIGHTLY
Qty: 90 TABLET | Refills: 1 | Status: SHIPPED | OUTPATIENT
Start: 2025-07-28

## 2025-07-28 NOTE — TELEPHONE ENCOUNTER
Last Office Visit: 4/10/2025 Provider: KUNAL  **Is provider OOT? No    Next Office Visit: 10/13/2025 Provider: GAVIN    **Has patient already had 30 day supply? No    Lab orders needed? no   Encounter provider correct? Yes If not, change provider  Script changes since last refill? no    LAST LABS:   CBC:  Lab Results   Component Value Date    WBC 6.7 07/10/2025    HGB 14.2 07/10/2025    HCT 42.2 07/10/2025    MCV 91.9 07/10/2025     07/10/2025    MID 0.4 01/24/2017    GRAN 2.4 01/24/2017    LYMPHOPCT 27.0 07/10/2025    MIDPERCENT 9.0 01/24/2017    GRANULOCYTES 53.0 01/24/2017    RBC 4.59 07/10/2025    MCH 30.9 07/10/2025    MCHC 33.6 07/10/2025    RDW 13.7 07/10/2025           CMP:  Lab Results   Component Value Date     07/10/2025    K 4.2 07/10/2025     07/10/2025    CO2 26 07/10/2025    BUN 25 (H) 07/10/2025    CREATININE 1.2 07/10/2025    GLUCOSE 112 (H) 07/10/2025    CALCIUM 9.8 07/10/2025    BILITOT 0.5 07/10/2025    ALKPHOS 88 07/10/2025    AST 22 07/10/2025    ALT 22 07/10/2025    LABGLOM 48 (A) 07/10/2025    GFRAA >60 03/30/2022    AGRATIO 1.9 07/10/2025    GLOB 2.5 07/13/2021       **Care Everywhere? no

## 2025-08-05 ENCOUNTER — HOSPITAL ENCOUNTER (OUTPATIENT)
Dept: LAB | Age: 70
Discharge: HOME | End: 2025-08-05
Payer: MEDICARE

## 2025-08-05 DIAGNOSIS — Z79.899 MEDICATION MANAGEMENT: ICD-10-CM

## 2025-08-05 DIAGNOSIS — E78.5 HYPERLIPIDEMIA, UNSPECIFIED HYPERLIPIDEMIA TYPE: ICD-10-CM

## 2025-08-05 DIAGNOSIS — I42.8 NON-ISCHEMIC CARDIOMYOPATHY (HCC): ICD-10-CM

## 2025-08-05 LAB
ALBUMIN SERPL-MCNC: 4.1 G/DL (ref 3.4–5)
ALBUMIN/GLOB SERPL: 1.7 {RATIO} (ref 1.1–2.2)
ALP SERPL-CCNC: 90 U/L (ref 40–129)
ALT SERPL-CCNC: 18 U/L (ref 10–40)
ANION GAP SERPL CALCULATED.3IONS-SCNC: 9 MMOL/L (ref 3–16)
AST SERPL-CCNC: 20 U/L (ref 15–37)
BASOPHILS # BLD: 0 K/UL (ref 0–0.2)
BASOPHILS NFR BLD: 0.9 %
BILIRUB SERPL-MCNC: 0.6 MG/DL (ref 0–1)
BUN SERPL-MCNC: 18 MG/DL (ref 7–20)
CALCIUM SERPL-MCNC: 9.6 MG/DL (ref 8.3–10.6)
CHLORIDE SERPL-SCNC: 104 MMOL/L (ref 99–110)
CHOLEST SERPL-MCNC: 126 MG/DL (ref 0–199)
CO2 SERPL-SCNC: 27 MMOL/L (ref 21–32)
CREAT SERPL-MCNC: 0.9 MG/DL (ref 0.6–1.2)
DEPRECATED RDW RBC AUTO: 13.6 % (ref 12.4–15.4)
EOSINOPHIL # BLD: 0.1 K/UL (ref 0–0.6)
EOSINOPHIL NFR BLD: 3.1 %
GFR SERPLBLD CREATININE-BSD FMLA CKD-EPI: 68 ML/MIN/{1.73_M2}
GLUCOSE SERPL-MCNC: 143 MG/DL (ref 70–99)
HCT VFR BLD AUTO: 40.9 % (ref 36–48)
HDLC SERPL-MCNC: 39 MG/DL (ref 40–60)
HGB BLD-MCNC: 13.9 G/DL (ref 12–16)
LDLC SERPL CALC-MCNC: 54 MG/DL
LYMPHOCYTES # BLD: 1.1 K/UL (ref 1–5.1)
LYMPHOCYTES NFR BLD: 21.9 %
MCH RBC QN AUTO: 30.8 PG (ref 26–34)
MCHC RBC AUTO-ENTMCNC: 34.1 G/DL (ref 31–36)
MCV RBC AUTO: 90.2 FL (ref 80–100)
MONOCYTES # BLD: 0.5 K/UL (ref 0–1.3)
MONOCYTES NFR BLD: 10.5 %
NEUTROPHILS # BLD: 3.1 K/UL (ref 1.7–7.7)
NEUTROPHILS NFR BLD: 63.6 %
NT-PROBNP SERPL-MCNC: 199 PG/ML (ref 0–124)
PLATELET # BLD AUTO: 191 K/UL (ref 135–450)
PMV BLD AUTO: 8.5 FL (ref 5–10.5)
POTASSIUM SERPL-SCNC: 4.5 MMOL/L (ref 3.5–5.1)
PROT SERPL-MCNC: 6.5 G/DL (ref 6.4–8.2)
RBC # BLD AUTO: 4.53 M/UL (ref 4–5.2)
SODIUM SERPL-SCNC: 140 MMOL/L (ref 136–145)
TRIGL SERPL-MCNC: 163 MG/DL (ref 0–150)
VLDLC SERPL CALC-MCNC: 33 MG/DL
WBC # BLD AUTO: 4.8 K/UL (ref 4–11)

## 2025-08-05 PROCEDURE — 85025 COMPLETE CBC W/AUTO DIFF WBC: CPT

## 2025-08-05 PROCEDURE — 83880 ASSAY OF NATRIURETIC PEPTIDE: CPT

## 2025-08-05 PROCEDURE — 80053 COMPREHEN METABOLIC PANEL: CPT

## 2025-08-05 PROCEDURE — 80061 LIPID PANEL: CPT

## 2025-08-05 PROCEDURE — 36415 COLL VENOUS BLD VENIPUNCTURE: CPT

## 2025-08-15 ENCOUNTER — OFFICE VISIT (OUTPATIENT)
Dept: ORTHOPEDIC SURGERY | Age: 70
End: 2025-08-15

## 2025-08-15 VITALS — BODY MASS INDEX: 41.78 KG/M2 | HEIGHT: 66 IN | WEIGHT: 260 LBS

## 2025-08-15 DIAGNOSIS — M25.561 RIGHT KNEE PAIN, UNSPECIFIED CHRONICITY: ICD-10-CM

## 2025-08-15 DIAGNOSIS — Z96.652 S/P TOTAL KNEE ARTHROPLASTY, LEFT: ICD-10-CM

## 2025-08-15 DIAGNOSIS — M17.11 PRIMARY OSTEOARTHRITIS OF RIGHT KNEE: Primary | ICD-10-CM

## 2025-08-15 RX ORDER — LIDOCAINE HYDROCHLORIDE 10 MG/ML
4 INJECTION, SOLUTION INFILTRATION; PERINEURAL ONCE
Status: COMPLETED | OUTPATIENT
Start: 2025-08-15 | End: 2025-08-15

## 2025-08-15 RX ORDER — TRIAMCINOLONE ACETONIDE 40 MG/ML
40 INJECTION, SUSPENSION INTRA-ARTICULAR; INTRAMUSCULAR ONCE
Status: COMPLETED | OUTPATIENT
Start: 2025-08-15 | End: 2025-08-15

## 2025-08-15 RX ADMIN — LIDOCAINE HYDROCHLORIDE 4 ML: 10 INJECTION, SOLUTION INFILTRATION; PERINEURAL at 09:52

## 2025-08-15 RX ADMIN — TRIAMCINOLONE ACETONIDE 40 MG: 40 INJECTION, SUSPENSION INTRA-ARTICULAR; INTRAMUSCULAR at 09:52

## 2025-08-19 ENCOUNTER — HOSPITAL ENCOUNTER (OUTPATIENT)
Dept: CARDIOLOGY | Age: 70
Discharge: HOME | End: 2025-08-21
Attending: INTERNAL MEDICINE
Payer: MEDICARE

## 2025-08-19 ENCOUNTER — OFFICE VISIT (OUTPATIENT)
Dept: CARDIOLOGY CLINIC | Age: 70
End: 2025-08-19
Payer: MEDICARE

## 2025-08-19 VITALS
WEIGHT: 251.5 LBS | HEIGHT: 66 IN | DIASTOLIC BLOOD PRESSURE: 70 MMHG | OXYGEN SATURATION: 97 % | SYSTOLIC BLOOD PRESSURE: 106 MMHG | BODY MASS INDEX: 40.42 KG/M2 | HEART RATE: 62 BPM

## 2025-08-19 DIAGNOSIS — Z95.0 PACEMAKER: ICD-10-CM

## 2025-08-19 DIAGNOSIS — I35.0 AORTIC VALVE STENOSIS, ETIOLOGY OF CARDIAC VALVE DISEASE UNSPECIFIED: ICD-10-CM

## 2025-08-19 DIAGNOSIS — R00.1 SYMPTOMATIC BRADYCARDIA: ICD-10-CM

## 2025-08-19 DIAGNOSIS — I10 ESSENTIAL HYPERTENSION: ICD-10-CM

## 2025-08-19 DIAGNOSIS — I42.9 CARDIOMYOPATHY, UNSPECIFIED TYPE (HCC): ICD-10-CM

## 2025-08-19 DIAGNOSIS — E78.5 HYPERLIPIDEMIA, UNSPECIFIED HYPERLIPIDEMIA TYPE: ICD-10-CM

## 2025-08-19 DIAGNOSIS — I50.22 CHRONIC SYSTOLIC HEART FAILURE (HCC): Primary | ICD-10-CM

## 2025-08-19 DIAGNOSIS — I48.0 PAF (PAROXYSMAL ATRIAL FIBRILLATION) (HCC): ICD-10-CM

## 2025-08-19 LAB
ECHO AO ASC DIAM: 3.4 CM
ECHO AO ASCENDING AORTA INDEX: 1.55 CM/M2
ECHO AO ROOT DIAM: 3.2 CM
ECHO AO ROOT INDEX: 1.45 CM/M2
ECHO AV AREA PEAK VELOCITY: 1.8 CM2
ECHO AV AREA VTI: 1.7 CM2
ECHO AV AREA/BSA PEAK VELOCITY: 0.8 CM2/M2
ECHO AV AREA/BSA VTI: 0.8 CM2/M2
ECHO AV MEAN GRADIENT: 11 MMHG
ECHO AV MEAN GRADIENT: 11 MMHG
ECHO AV MEAN VELOCITY: 1.5 M/S
ECHO AV PEAK GRADIENT: 20 MMHG
ECHO AV PEAK VELOCITY: 2.2 M/S
ECHO AV PEAK VELOCITY: 2.2 M/S
ECHO AV VTI: 52 CM
ECHO EST RA PRESSURE: 8 MMHG
ECHO LA AREA 2C: 25.6 CM2
ECHO LA AREA 4C: 24.7 CM2
ECHO LA DIAMETER INDEX: 1.86 CM/M2
ECHO LA DIAMETER: 4.1 CM
ECHO LA MAJOR AXIS: 6.4 CM
ECHO LA MINOR AXIS: 6.5 CM
ECHO LA TO AORTIC ROOT RATIO: 1.28
ECHO LA VOL BP: 78 ML (ref 22–52)
ECHO LA VOL MOD A2C: 82 ML (ref 22–52)
ECHO LA VOL MOD A4C: 74 ML (ref 22–52)
ECHO LA VOL/BSA BIPLANE: 35 ML/M2 (ref 16–34)
ECHO LA VOLUME INDEX MOD A2C: 37 ML/M2 (ref 16–34)
ECHO LA VOLUME INDEX MOD A4C: 34 ML/M2 (ref 16–34)
ECHO LV E' LATERAL VELOCITY: 7.83 CM/S
ECHO LV E' SEPTAL VELOCITY: 6.31 CM/S
ECHO LV EF PHYSICIAN: 47 %
ECHO LV EJECTION FRACTION BIPLANE: 47 % (ref 55–100)
ECHO LV FRACTIONAL SHORTENING: 24 % (ref 28–44)
ECHO LV GLOBAL LONGITUDINAL STRAIN (GLS): -10.9 %
ECHO LV GLOBAL LONGITUDINAL STRAIN (GLS): -13.4 %
ECHO LV GLOBAL LONGITUDINAL STRAIN (GLS): -14.1 %
ECHO LV GLOBAL LONGITUDINAL STRAIN (GLS): -15.3 %
ECHO LV INTERNAL DIMENSION DIASTOLE INDEX: 2.5 CM/M2
ECHO LV INTERNAL DIMENSION DIASTOLIC: 5.5 CM (ref 3.9–5.3)
ECHO LV INTERNAL DIMENSION SYSTOLIC INDEX: 1.91 CM/M2
ECHO LV INTERNAL DIMENSION SYSTOLIC: 4.2 CM
ECHO LV ISOVOLUMETRIC RELAXATION TIME (IVRT): 92 MS
ECHO LV IVSD: 1.1 CM (ref 0.6–0.9)
ECHO LV MASS 2D: 227.4 G (ref 67–162)
ECHO LV MASS INDEX 2D: 103.4 G/M2 (ref 43–95)
ECHO LV POSTERIOR WALL DIASTOLIC: 1 CM (ref 0.6–0.9)
ECHO LV RELATIVE WALL THICKNESS RATIO: 0.36
ECHO LVOT AREA: 4.2 CM2
ECHO LVOT AV VTI INDEX: 0.4
ECHO LVOT DIAM: 2.3 CM
ECHO LVOT MEAN GRADIENT: 2 MMHG
ECHO LVOT PEAK GRADIENT: 4 MMHG
ECHO LVOT PEAK VELOCITY: 1 M/S
ECHO LVOT STROKE VOLUME INDEX: 39.3 ML/M2
ECHO LVOT SV: 86.4 ML
ECHO LVOT VTI: 20.8 CM
ECHO MV A VELOCITY: 0.47 M/S
ECHO MV E DECELERATION TIME (DT): 162 MS
ECHO MV E VELOCITY: 0.97 M/S
ECHO MV E/A RATIO: 2.06
ECHO MV E/E' LATERAL: 12.39
ECHO MV E/E' RATIO (AVERAGED): 13.88
ECHO MV E/E' SEPTAL: 15.37
ECHO PV MAX VELOCITY: 0.9 M/S
ECHO PV MEAN GRADIENT: 2 MMHG
ECHO PV MEAN VELOCITY: 0.6 M/S
ECHO PV PEAK GRADIENT: 3 MMHG
ECHO PV VTI: 22.8 CM
ECHO RA AREA 4C: 16.1 CM2
ECHO RA END SYSTOLIC VOLUME APICAL 4 CHAMBER INDEX BSA: 20 ML/M2
ECHO RA VOLUME: 43 ML
ECHO RIGHT VENTRICULAR SYSTOLIC PRESSURE (RVSP): 29 MMHG
ECHO RV BASAL DIMENSION: 4.3 CM
ECHO RV FREE WALL PEAK S': 9.4 CM/S
ECHO RV LONGITUDINAL DIMENSION: 7.9 CM
ECHO RV MID DIMENSION: 2.6 CM
ECHO RV TAPSE: 1.8 CM (ref 1.7–?)
ECHO RVOT MEAN GRADIENT: 1 MMHG
ECHO RVOT PEAK GRADIENT: 2 MMHG
ECHO RVOT PEAK VELOCITY: 0.7 M/S
ECHO RVOT VTI: 17.4 CM
ECHO TV REGURGITANT MAX VELOCITY: 2.28 M/S
ECHO TV REGURGITANT PEAK GRADIENT: 21 MMHG
ECHO TV REGURGITANT PEAK GRADIENT: 21 MMHG

## 2025-08-19 PROCEDURE — 99215 OFFICE O/P EST HI 40 MIN: CPT | Performed by: INTERNAL MEDICINE

## 2025-08-19 PROCEDURE — 3074F SYST BP LT 130 MM HG: CPT | Performed by: INTERNAL MEDICINE

## 2025-08-19 PROCEDURE — 93306 TTE W/DOPPLER COMPLETE: CPT | Performed by: INTERNAL MEDICINE

## 2025-08-19 PROCEDURE — 1036F TOBACCO NON-USER: CPT | Performed by: INTERNAL MEDICINE

## 2025-08-19 PROCEDURE — 93306 TTE W/DOPPLER COMPLETE: CPT

## 2025-08-19 PROCEDURE — G8427 DOCREV CUR MEDS BY ELIG CLIN: HCPCS | Performed by: INTERNAL MEDICINE

## 2025-08-19 PROCEDURE — 3078F DIAST BP <80 MM HG: CPT | Performed by: INTERNAL MEDICINE

## 2025-08-19 PROCEDURE — 3017F COLORECTAL CA SCREEN DOC REV: CPT | Performed by: INTERNAL MEDICINE

## 2025-08-19 PROCEDURE — 1159F MED LIST DOCD IN RCRD: CPT | Performed by: INTERNAL MEDICINE

## 2025-08-19 PROCEDURE — G2211 COMPLEX E/M VISIT ADD ON: HCPCS | Performed by: INTERNAL MEDICINE

## 2025-08-19 PROCEDURE — G8417 CALC BMI ABV UP PARAM F/U: HCPCS | Performed by: INTERNAL MEDICINE

## 2025-08-19 PROCEDURE — 1090F PRES/ABSN URINE INCON ASSESS: CPT | Performed by: INTERNAL MEDICINE

## 2025-08-19 PROCEDURE — 93356 MYOCRD STRAIN IMG SPCKL TRCK: CPT | Performed by: INTERNAL MEDICINE

## 2025-08-19 PROCEDURE — G8399 PT W/DXA RESULTS DOCUMENT: HCPCS | Performed by: INTERNAL MEDICINE

## 2025-08-19 PROCEDURE — 1124F ACP DISCUSS-NO DSCNMKR DOCD: CPT | Performed by: INTERNAL MEDICINE

## 2025-08-19 RX ORDER — ATORVASTATIN CALCIUM 20 MG/1
TABLET, FILM COATED ORAL
Qty: 90 TABLET | Refills: 3 | Status: SHIPPED | OUTPATIENT
Start: 2025-08-19

## 2025-08-19 RX ORDER — ATORVASTATIN CALCIUM 20 MG/1
TABLET, FILM COATED ORAL
Qty: 90 TABLET | Refills: 3 | Status: SHIPPED | OUTPATIENT
Start: 2025-08-19 | End: 2025-08-19

## (undated) DEVICE — DRESSING THERABOND 3D ANTIMIC CNTCT SYS 15INCHX10INCH

## (undated) DEVICE — GLOVE SURG SZ 75 L12IN FNGR THK79MIL GRN LTX FREE

## (undated) DEVICE — FLUID TRAP FOR MINIVAC ES EQUIP FLD TRAP

## (undated) DEVICE — PENCIL SMK EVAC TELSCP 3 M TBNG

## (undated) DEVICE — CABLE REPROC RPRCSS 20 POLE A/20 POLE B LGHT BL 34PN DARK BL 34PN C

## (undated) DEVICE — SUTURE VCRL + SZ 1 L18IN ABSRB VLT L36MM CT-1 1/2 CIR VCP741D

## (undated) DEVICE — ADHESIVE SKIN CLOSURE WND 8.661X1.5 IN 22 CM LIQUIBAND SECUR

## (undated) DEVICE — FORMALIN CLEAR VIAL 20 ML 10%

## (undated) DEVICE — CATHETER EP 7FR L115CM 2-8-2MM SPC TIP 2MM 10 ELECTRD D CRV

## (undated) DEVICE — SUTURE VICRYL SZ 4-0 L18IN ABSRB UD L19MM PS-2 3/8 CIR PRIM J496H

## (undated) DEVICE — SUTURE ETHIBOND EXCEL SZ 0 L18IN NONABSORBABLE GRN L26MM CT-2 CX27D

## (undated) DEVICE — SUTURE STRATAFIX SPRL SZ 3-0 L12IN ABSRB UD FS-1 L30X30CM SXMP2B410

## (undated) DEVICE — GAUZE,SPONGE,4"X4",8PLY,STRL,LF,10/TRAY: Brand: MEDLINE

## (undated) DEVICE — WRAP COMPR W1INXL5YD TAN COBAN

## (undated) DEVICE — SOLUTION IV HEPARIN SODIUM SODIUM CHL 0.9% 500 ML INJ VIAFLX

## (undated) DEVICE — CATHETER MAPPING LOOP SMALL OPTRELL POLE

## (undated) DEVICE — PINNACLE INTRODUCER SHEATH: Brand: PINNACLE

## (undated) DEVICE — APPLICATOR MEDICATED 26 CC SOLUTION HI LT ORNG CHLORAPREP

## (undated) DEVICE — RADIFOCUS GLIDEWIRE: Brand: GLIDEWIRE

## (undated) DEVICE — STERILE PVP: Brand: MEDLINE INDUSTRIES, INC.

## (undated) DEVICE — Z CONVERTED USE 2273163 BANDAGE COMPR W3INXL4.5YD LTWT E EC SGL LAYERED CLP CLSR

## (undated) DEVICE — COTTON UNDERCAST PADDING,CRIMPED FINISH: Brand: WEBRIL

## (undated) DEVICE — DRESSING W4XL8IN ISLANDTHERABOND 3D

## (undated) DEVICE — SOUNDSTAR REPROC ECO 8F DGNSTC ULTRSND CATH USE GE IMGNG SSTM

## (undated) DEVICE — TOWEL,OR,DSP,ST,BLUE,DLX,8/PK,10PK/CS: Brand: MEDLINE

## (undated) DEVICE — PRESSURE MONITORING SET: Brand: TRUWAVE

## (undated) DEVICE — PENCIL SMK EVAC ALL IN 1 DSGN ENH VISIBILITY IMPROVED AIR

## (undated) DEVICE — GOWN,SIRUS,POLYRNF,BRTHSLV,XL,30/CS: Brand: MEDLINE

## (undated) DEVICE — ZIMMER® STERILE DISPOSABLE TOURNIQUET CUFF WITH PLC, DUAL PORT, SINGLE BLADDER, 30 IN. (76 CM)

## (undated) DEVICE — GLOVE ORTHO 7 1/2   MSG9475

## (undated) DEVICE — GLOVE SURG SZ 7 L12IN FNGR THK94MIL STD WHT ISOLEX LTX FREE

## (undated) DEVICE — CATHETER PACING SITE SELECTIVE SSPC2 MULTIPURPOSE

## (undated) DEVICE — BLADE OPHTH ORNG GRINDLESS SMALLER ALTERNATIVE TO NO15 GEN

## (undated) DEVICE — Device

## (undated) DEVICE — 3M™ IOBAN™ 2 ANTIMICROBIAL INCISE DRAPE 6640EZ: Brand: IOBAN™ 2

## (undated) DEVICE — SUTURE STRATAFIX SPRL SZ 2 0 L14IN ABSRB UD MH L36MM 1 2 CIR SXMD2B401

## (undated) DEVICE — BLANKET WRM W29.9XL79.1IN UP BODY FORC AIR MISTRAL-AIR

## (undated) DEVICE — ENDOSCOPY KIT: Brand: MEDLINE INDUSTRIES, INC.

## (undated) DEVICE — SUTURE STRATAFIX SPRL SZ 1 L14IN ABSRB VLT L48CM CTX 1/2 SXPD2B405

## (undated) DEVICE — KNEE HOLDER DISPOSABLE LINER: Brand: ALVARADO®  KNEE SUPPORT

## (undated) DEVICE — NEEDLE HYPO 18GA L1.5IN THN WALL PIVOTING SHLD BVL ORIENTED

## (undated) DEVICE — SOLUTION,SALINE,IRRGATION,500ML,STRL: Brand: MEDLINE

## (undated) DEVICE — SUTURE VICRYL + SZ 3-0 L18IN ABSRB UD SH 1/2 CIR TAPERCUT NDL VCP864D

## (undated) DEVICE — SYRINGE 30ML MEDICAL LEUR LOCK TIP WO

## (undated) DEVICE — SUTURE STRATAFIX SYMMETRIC PDS + SZ 2-0 L18IN ABSRB VLT SXPP1A403

## (undated) DEVICE — 3 BONE CEMENT MIXER: Brand: MIXEVAC

## (undated) DEVICE — SUTURE CHROMIC GUT SZ 4-0 L27IN ABSRB BRN L17MM RB-1 1/2 U203H

## (undated) DEVICE — DRAPE EQUIP CVR STRL

## (undated) DEVICE — KIT INTRO 9FR L13CM DIA0.118IN SPLITTABLE HEMSTAT ROBUST

## (undated) DEVICE — EP VASCULAR PACK: Brand: MEDLINE INDUSTRIES, INC.

## (undated) DEVICE — DUAL CUT SAGITTAL BLADE

## (undated) DEVICE — SYSTEM CLOSURE 6-12 FR VEN VASC VASCADE MVP

## (undated) DEVICE — DISCONTINUED USE 394504 SYRINGE LUER LOCK TIP 12 ML

## (undated) DEVICE — TUBING PMP FOR CARTO SYS SMARTABLATE

## (undated) DEVICE — HOOD, PEEL-AWAY: Brand: FLYTE

## (undated) DEVICE — HANDPIECE SET WITH HIGH FLOW TIP AND SUCTION TUBE: Brand: INTERPULSE

## (undated) DEVICE — STERILE PATIENT PROTECTIVE PAD FOR IMP® KNEE POSITIONERS & COHESIVE WRAP (10 / CASE): Brand: DE MAYO KNEE POSITIONER®

## (undated) DEVICE — PACEMAKER PACK: Brand: MEDLINE INDUSTRIES, INC.

## (undated) DEVICE — TUBE SUCT L10IN MINI FLTR CRV KAMVAC

## (undated) DEVICE — ZIMMER® A.T.S. CYCLINDRICAL TOURNIQUET CUFF, SINGLE PORT, SINGLE BLADDER 30 IN. 76 CM)

## (undated) DEVICE — 2108 SERIES SAGITTAL BLADE FLARED, GROUND  (18.5 X 1.27 X 90.5MM)

## (undated) DEVICE — PATCH REF EXT FOR CARTO 3 SYS (EA = 6 PACKS)

## (undated) DEVICE — PERCLOSE™ PROSTYLE™ SUTURE-MEDIATED CLOSURE AND REPAIR SYSTEM: Brand: PERCLOSE™ PROSTYLE™

## (undated) DEVICE — HEADLESS TROCHAR PIN 75MM: Brand: ZUK

## (undated) DEVICE — TRAP POLYP ETRAP

## (undated) DEVICE — SOLUTION IV 1000ML 0.9% SOD CHL

## (undated) DEVICE — SOLUTION IRRIG 2000ML 0.9% SOD CHL USP UROMATIC PLAS CONT

## (undated) DEVICE — SNARES COLD OVAL 10MM THIN

## (undated) DEVICE — STERILE LATEX POWDER-FREE SURGICAL GLOVESWITH NITRILE COATING: Brand: PROTEXIS

## (undated) DEVICE — HOOD: Brand: FLYTE

## (undated) DEVICE — CATHETER ABLAT 8FR L115CM 1-6-2MM SPC TIP 3.5MM DF CRV

## (undated) DEVICE — TOWEL,STOP FLAG GOLD N-W: Brand: MEDLINE

## (undated) DEVICE — 2108 SERIES SAGITTAL BLADE FAN, OFFSET  (29.0 X 0.89 X 73.0MM)

## (undated) DEVICE — 5FR MICRO INTRODUCER KIT

## (undated) DEVICE — SOLUTION IV 100ML 0.9% SOD CHL PLAS CONT USP VIAFLX 1 PER

## (undated) DEVICE — SOLUTION IRRIG 3000ML 0.9% SOD CHL USP UROMATIC PLAS CONT

## (undated) DEVICE — TOTAL KNEE: Brand: MEDLINE INDUSTRIES, INC.

## (undated) DEVICE — 6FR SAFESHEATH PEEL-AWAY SHEATH

## (undated) DEVICE — NEEDLE HYPO 22GA L1.5IN BLK POLYPR HUB S STL REG BVL STR

## (undated) DEVICE — NEEDLE HYPO 20GA L1.5IN YEL POLYPR HUB S STL REG BVL STR